# Patient Record
Sex: MALE | Race: WHITE | NOT HISPANIC OR LATINO
[De-identification: names, ages, dates, MRNs, and addresses within clinical notes are randomized per-mention and may not be internally consistent; named-entity substitution may affect disease eponyms.]

---

## 2017-01-04 ENCOUNTER — RX RENEWAL (OUTPATIENT)
Age: 62
End: 2017-01-04

## 2017-01-06 ENCOUNTER — OTHER (OUTPATIENT)
Age: 62
End: 2017-01-06

## 2017-01-23 ENCOUNTER — OTHER (OUTPATIENT)
Age: 62
End: 2017-01-23

## 2017-01-23 ENCOUNTER — MOBILE ON CALL (OUTPATIENT)
Age: 62
End: 2017-01-23

## 2017-01-27 ENCOUNTER — OTHER (OUTPATIENT)
Age: 62
End: 2017-01-27

## 2017-02-06 ENCOUNTER — LABORATORY RESULT (OUTPATIENT)
Age: 62
End: 2017-02-06

## 2017-02-06 ENCOUNTER — APPOINTMENT (OUTPATIENT)
Dept: INTERNAL MEDICINE | Facility: HOSPITAL | Age: 62
End: 2017-02-06

## 2017-02-06 ENCOUNTER — OUTPATIENT (OUTPATIENT)
Dept: OUTPATIENT SERVICES | Facility: HOSPITAL | Age: 62
LOS: 1 days | End: 2017-02-06

## 2017-02-06 ENCOUNTER — RESULT CHARGE (OUTPATIENT)
Age: 62
End: 2017-02-06

## 2017-02-06 ENCOUNTER — MED ADMIN CHARGE (OUTPATIENT)
Age: 62
End: 2017-02-06

## 2017-02-06 VITALS — HEIGHT: 67 IN | BODY MASS INDEX: 32.01 KG/M2 | WEIGHT: 203.92 LBS

## 2017-02-06 DIAGNOSIS — Z87.2 PERSONAL HISTORY OF DISEASES OF THE SKIN AND SUBCUTANEOUS TISSUE: ICD-10-CM

## 2017-02-06 LAB
ALBUMIN SERPL ELPH-MCNC: 4.1 G/DL — SIGNIFICANT CHANGE UP (ref 3.3–5)
ALP SERPL-CCNC: 78 U/L — SIGNIFICANT CHANGE UP (ref 40–120)
ALT FLD-CCNC: 18 U/L — SIGNIFICANT CHANGE UP (ref 4–41)
APPEARANCE UR: CLEAR — SIGNIFICANT CHANGE UP
AST SERPL-CCNC: 14 U/L — SIGNIFICANT CHANGE UP (ref 4–40)
BACTERIA # UR AUTO: SIGNIFICANT CHANGE UP
BASOPHILS # BLD AUTO: 0.07 K/UL — SIGNIFICANT CHANGE UP (ref 0–0.2)
BASOPHILS NFR BLD AUTO: 0.9 % — SIGNIFICANT CHANGE UP (ref 0–2)
BILIRUB SERPL-MCNC: < 0.2 MG/DL — LOW (ref 0.2–1.2)
BILIRUB UR-MCNC: NEGATIVE — SIGNIFICANT CHANGE UP
BLOOD UR QL VISUAL: HIGH
BUN SERPL-MCNC: 15 MG/DL — SIGNIFICANT CHANGE UP (ref 7–23)
CALCIUM SERPL-MCNC: 9.4 MG/DL — SIGNIFICANT CHANGE UP (ref 8.4–10.5)
CHLORIDE SERPL-SCNC: 97 MMOL/L — LOW (ref 98–107)
CO2 SERPL-SCNC: 25 MMOL/L — SIGNIFICANT CHANGE UP (ref 22–31)
COLOR SPEC: YELLOW — SIGNIFICANT CHANGE UP
CREAT SERPL-MCNC: 0.79 MG/DL — SIGNIFICANT CHANGE UP (ref 0.5–1.3)
EOSINOPHIL # BLD AUTO: 0.17 K/UL — SIGNIFICANT CHANGE UP (ref 0–0.5)
EOSINOPHIL NFR BLD AUTO: 2.1 % — SIGNIFICANT CHANGE UP (ref 0–6)
GLUCOSE BLDC GLUCOMTR-MCNC: 154
GLUCOSE SERPL-MCNC: 169 MG/DL — HIGH (ref 70–99)
GLUCOSE UR-MCNC: NEGATIVE — SIGNIFICANT CHANGE UP
HBA1C BLD-MCNC: 7.8 % — HIGH (ref 4–5.6)
HCT VFR BLD CALC: 33.8 % — LOW (ref 39–50)
HGB BLD-MCNC: 11.1 G/DL — LOW (ref 13–17)
HYALINE CASTS # UR AUTO: SIGNIFICANT CHANGE UP (ref 0–?)
IMM GRANULOCYTES NFR BLD AUTO: 0.1 % — SIGNIFICANT CHANGE UP (ref 0–1.5)
KETONES UR-MCNC: NEGATIVE — SIGNIFICANT CHANGE UP
LEUKOCYTE ESTERASE UR-ACNC: SIGNIFICANT CHANGE UP
LYMPHOCYTES # BLD AUTO: 1.56 K/UL — SIGNIFICANT CHANGE UP (ref 1–3.3)
LYMPHOCYTES # BLD AUTO: 19.6 % — SIGNIFICANT CHANGE UP (ref 13–44)
MCHC RBC-ENTMCNC: 29.8 PG — SIGNIFICANT CHANGE UP (ref 27–34)
MCHC RBC-ENTMCNC: 32.8 % — SIGNIFICANT CHANGE UP (ref 32–36)
MCV RBC AUTO: 90.9 FL — SIGNIFICANT CHANGE UP (ref 80–100)
MONOCYTES # BLD AUTO: 0.43 K/UL — SIGNIFICANT CHANGE UP (ref 0–0.9)
MONOCYTES NFR BLD AUTO: 5.4 % — SIGNIFICANT CHANGE UP (ref 2–14)
MUCOUS THREADS # UR AUTO: SIGNIFICANT CHANGE UP
NEUTROPHILS # BLD AUTO: 5.72 K/UL — SIGNIFICANT CHANGE UP (ref 1.8–7.4)
NEUTROPHILS NFR BLD AUTO: 71.9 % — SIGNIFICANT CHANGE UP (ref 43–77)
NITRITE UR-MCNC: NEGATIVE — SIGNIFICANT CHANGE UP
PH UR: 6.5 — SIGNIFICANT CHANGE UP (ref 4.6–8)
PLATELET # BLD AUTO: 227 K/UL — SIGNIFICANT CHANGE UP (ref 150–400)
PMV BLD: 10.5 FL — SIGNIFICANT CHANGE UP (ref 7–13)
POTASSIUM SERPL-MCNC: 5.2 MMOL/L — SIGNIFICANT CHANGE UP (ref 3.5–5.3)
POTASSIUM SERPL-SCNC: 5.2 MMOL/L — SIGNIFICANT CHANGE UP (ref 3.5–5.3)
PROT SERPL-MCNC: 6.7 G/DL — SIGNIFICANT CHANGE UP (ref 6–8.3)
PROT UR-MCNC: 30 — SIGNIFICANT CHANGE UP
RBC # BLD: 3.72 M/UL — LOW (ref 4.2–5.8)
RBC # FLD: 14.4 % — SIGNIFICANT CHANGE UP (ref 10.3–14.5)
RBC CASTS # UR COMP ASSIST: SIGNIFICANT CHANGE UP (ref 0–?)
SODIUM SERPL-SCNC: 135 MMOL/L — SIGNIFICANT CHANGE UP (ref 135–145)
SP GR SPEC: 1.02 — SIGNIFICANT CHANGE UP (ref 1–1.03)
SQUAMOUS # UR AUTO: SIGNIFICANT CHANGE UP
UROBILINOGEN FLD QL: NORMAL E.U. — SIGNIFICANT CHANGE UP (ref 0.1–0.2)
WBC # BLD: 7.96 K/UL — SIGNIFICANT CHANGE UP (ref 3.8–10.5)
WBC # FLD AUTO: 7.96 K/UL — SIGNIFICANT CHANGE UP (ref 3.8–10.5)
WBC UR QL: SIGNIFICANT CHANGE UP (ref 0–?)

## 2017-02-07 VITALS — SYSTOLIC BLOOD PRESSURE: 135 MMHG | DIASTOLIC BLOOD PRESSURE: 70 MMHG

## 2017-02-07 DIAGNOSIS — E11.65 TYPE 2 DIABETES MELLITUS WITH HYPERGLYCEMIA: ICD-10-CM

## 2017-02-08 ENCOUNTER — RX RENEWAL (OUTPATIENT)
Age: 62
End: 2017-02-08

## 2017-02-08 ENCOUNTER — OTHER (OUTPATIENT)
Age: 62
End: 2017-02-08

## 2017-02-08 LAB
M TB TUBERC IFN-G BLD QL: 0 IU/ML — SIGNIFICANT CHANGE UP
M TB TUBERC IFN-G BLD QL: 0.03 IU/ML — SIGNIFICANT CHANGE UP
M TB TUBERC IFN-G BLD QL: NEGATIVE — SIGNIFICANT CHANGE UP
MITOGEN IGNF BCKGRD COR BLD-ACNC: >10 IU/ML — SIGNIFICANT CHANGE UP

## 2017-02-10 DIAGNOSIS — H40.9 UNSPECIFIED GLAUCOMA: ICD-10-CM

## 2017-02-10 DIAGNOSIS — E03.9 HYPOTHYROIDISM, UNSPECIFIED: ICD-10-CM

## 2017-02-10 DIAGNOSIS — F20.9 SCHIZOPHRENIA, UNSPECIFIED: ICD-10-CM

## 2017-02-10 DIAGNOSIS — Z00.00 ENCOUNTER FOR GENERAL ADULT MEDICAL EXAMINATION WITHOUT ABNORMAL FINDINGS: ICD-10-CM

## 2017-02-10 DIAGNOSIS — R56.9 UNSPECIFIED CONVULSIONS: ICD-10-CM

## 2017-02-13 ENCOUNTER — MEDICATION RENEWAL (OUTPATIENT)
Age: 62
End: 2017-02-13

## 2017-02-14 ENCOUNTER — RX RENEWAL (OUTPATIENT)
Age: 62
End: 2017-02-14

## 2017-03-02 ENCOUNTER — APPOINTMENT (OUTPATIENT)
Dept: OPHTHALMOLOGY | Facility: CLINIC | Age: 62
End: 2017-03-02

## 2017-03-02 ENCOUNTER — OUTPATIENT (OUTPATIENT)
Dept: OUTPATIENT SERVICES | Facility: HOSPITAL | Age: 62
LOS: 1 days | End: 2017-03-02

## 2017-03-06 ENCOUNTER — OTHER (OUTPATIENT)
Age: 62
End: 2017-03-06

## 2017-03-09 ENCOUNTER — MEDICATION RENEWAL (OUTPATIENT)
Age: 62
End: 2017-03-09

## 2017-04-11 ENCOUNTER — APPOINTMENT (OUTPATIENT)
Dept: INTERNAL MEDICINE | Facility: HOSPITAL | Age: 62
End: 2017-04-11

## 2017-04-26 ENCOUNTER — OTHER (OUTPATIENT)
Age: 62
End: 2017-04-26

## 2017-05-22 ENCOUNTER — APPOINTMENT (OUTPATIENT)
Dept: INTERNAL MEDICINE | Facility: HOSPITAL | Age: 62
End: 2017-05-22

## 2017-05-22 ENCOUNTER — OUTPATIENT (OUTPATIENT)
Dept: OUTPATIENT SERVICES | Facility: HOSPITAL | Age: 62
LOS: 1 days | End: 2017-05-22

## 2017-05-22 VITALS — HEIGHT: 67 IN | WEIGHT: 207.23 LBS | BODY MASS INDEX: 32.53 KG/M2

## 2017-05-22 VITALS — SYSTOLIC BLOOD PRESSURE: 129 MMHG | DIASTOLIC BLOOD PRESSURE: 67 MMHG

## 2017-05-23 DIAGNOSIS — I10 ESSENTIAL (PRIMARY) HYPERTENSION: ICD-10-CM

## 2017-05-23 DIAGNOSIS — E78.5 HYPERLIPIDEMIA, UNSPECIFIED: ICD-10-CM

## 2017-05-23 DIAGNOSIS — F20.9 SCHIZOPHRENIA, UNSPECIFIED: ICD-10-CM

## 2017-05-23 DIAGNOSIS — E87.1 HYPO-OSMOLALITY AND HYPONATREMIA: ICD-10-CM

## 2017-05-23 DIAGNOSIS — H40.9 UNSPECIFIED GLAUCOMA: ICD-10-CM

## 2017-05-23 DIAGNOSIS — E11.65 TYPE 2 DIABETES MELLITUS WITH HYPERGLYCEMIA: ICD-10-CM

## 2017-05-23 DIAGNOSIS — Z23 ENCOUNTER FOR IMMUNIZATION: ICD-10-CM

## 2017-05-23 DIAGNOSIS — Z00.00 ENCOUNTER FOR GENERAL ADULT MEDICAL EXAMINATION WITHOUT ABNORMAL FINDINGS: ICD-10-CM

## 2017-05-26 ENCOUNTER — OTHER (OUTPATIENT)
Age: 62
End: 2017-05-26

## 2017-05-30 LAB — GLUCOSE BLDC GLUCOMTR-MCNC: 188

## 2017-06-02 ENCOUNTER — OTHER (OUTPATIENT)
Age: 62
End: 2017-06-02

## 2017-06-09 DIAGNOSIS — H40.10X3 UNSPECIFIED OPEN-ANGLE GLAUCOMA, SEVERE STAGE: ICD-10-CM

## 2017-06-20 ENCOUNTER — APPOINTMENT (OUTPATIENT)
Dept: PLASTIC SURGERY | Facility: HOSPITAL | Age: 62
End: 2017-06-20

## 2017-06-20 ENCOUNTER — OUTPATIENT (OUTPATIENT)
Dept: OUTPATIENT SERVICES | Facility: HOSPITAL | Age: 62
LOS: 1 days | End: 2017-06-20

## 2017-06-20 VITALS
BODY MASS INDEX: 32.49 KG/M2 | DIASTOLIC BLOOD PRESSURE: 69 MMHG | SYSTOLIC BLOOD PRESSURE: 129 MMHG | WEIGHT: 207 LBS | HEIGHT: 67 IN | HEART RATE: 71 BPM

## 2017-06-21 DIAGNOSIS — M65.341 TRIGGER FINGER, RIGHT RING FINGER: ICD-10-CM

## 2017-07-03 ENCOUNTER — EMERGENCY (EMERGENCY)
Facility: HOSPITAL | Age: 62
LOS: 1 days | Discharge: HOME | End: 2017-07-03
Attending: EMERGENCY MEDICINE | Admitting: EMERGENCY MEDICINE
Payer: MEDICAID

## 2017-07-03 ENCOUNTER — APPOINTMENT (OUTPATIENT)
Dept: OPHTHALMOLOGY | Facility: CLINIC | Age: 62
End: 2017-07-03

## 2017-07-03 VITALS
HEART RATE: 86 BPM | DIASTOLIC BLOOD PRESSURE: 63 MMHG | RESPIRATION RATE: 16 BRPM | SYSTOLIC BLOOD PRESSURE: 130 MMHG | OXYGEN SATURATION: 98 %

## 2017-07-03 VITALS
SYSTOLIC BLOOD PRESSURE: 98 MMHG | RESPIRATION RATE: 18 BRPM | DIASTOLIC BLOOD PRESSURE: 60 MMHG | TEMPERATURE: 98 F | HEART RATE: 96 BPM | OXYGEN SATURATION: 98 %

## 2017-07-03 LAB
ALBUMIN SERPL ELPH-MCNC: 3.6 G/DL — SIGNIFICANT CHANGE UP (ref 3.3–5)
ALP SERPL-CCNC: 52 U/L — SIGNIFICANT CHANGE UP (ref 40–120)
ALT FLD-CCNC: 12 U/L — SIGNIFICANT CHANGE UP (ref 4–41)
APPEARANCE UR: CLEAR — SIGNIFICANT CHANGE UP
AST SERPL-CCNC: 14 U/L — SIGNIFICANT CHANGE UP (ref 4–40)
BASE EXCESS BLDV CALC-SCNC: -3.4 MMOL/L — SIGNIFICANT CHANGE UP
BASOPHILS # BLD AUTO: 0.01 K/UL — SIGNIFICANT CHANGE UP (ref 0–0.2)
BASOPHILS NFR BLD AUTO: 0.2 % — SIGNIFICANT CHANGE UP (ref 0–2)
BASOPHILS NFR SPEC: 0 % — SIGNIFICANT CHANGE UP (ref 0–2)
BILIRUB SERPL-MCNC: 0.2 MG/DL — SIGNIFICANT CHANGE UP (ref 0.2–1.2)
BILIRUB UR-MCNC: NEGATIVE — SIGNIFICANT CHANGE UP
BLOOD GAS VENOUS - CREATININE: 1.05 MG/DL — SIGNIFICANT CHANGE UP (ref 0.5–1.3)
BLOOD UR QL VISUAL: NEGATIVE — SIGNIFICANT CHANGE UP
BUN SERPL-MCNC: 30 MG/DL — HIGH (ref 7–23)
CALCIUM SERPL-MCNC: 7.3 MG/DL — LOW (ref 8.4–10.5)
CHLORIDE BLDV-SCNC: 104 MMOL/L — SIGNIFICANT CHANGE UP (ref 96–108)
CHLORIDE SERPL-SCNC: 97 MMOL/L — LOW (ref 98–107)
CO2 SERPL-SCNC: 19 MMOL/L — LOW (ref 22–31)
COLOR SPEC: SIGNIFICANT CHANGE UP
CREAT SERPL-MCNC: 0.98 MG/DL — SIGNIFICANT CHANGE UP (ref 0.5–1.3)
EOSINOPHIL # BLD AUTO: 0.25 K/UL — SIGNIFICANT CHANGE UP (ref 0–0.5)
EOSINOPHIL NFR BLD AUTO: 4.9 % — SIGNIFICANT CHANGE UP (ref 0–6)
EOSINOPHIL NFR FLD: 4 % — SIGNIFICANT CHANGE UP (ref 0–6)
GAS PNL BLDV: 128 MMOL/L — LOW (ref 136–146)
GLUCOSE BLDV-MCNC: 133 — HIGH (ref 70–99)
GLUCOSE SERPL-MCNC: 154 MG/DL — HIGH (ref 70–99)
GLUCOSE UR-MCNC: NEGATIVE — SIGNIFICANT CHANGE UP
HCO3 BLDV-SCNC: 20 MMOL/L — SIGNIFICANT CHANGE UP (ref 20–27)
HCT VFR BLD CALC: 33.3 % — LOW (ref 39–50)
HCT VFR BLDV CALC: 31.5 % — LOW (ref 39–51)
HGB BLD-MCNC: 10.9 G/DL — LOW (ref 13–17)
HGB BLDV-MCNC: 10.2 G/DL — LOW (ref 13–17)
IMM GRANULOCYTES # BLD AUTO: 0.02 # — SIGNIFICANT CHANGE UP
IMM GRANULOCYTES NFR BLD AUTO: 0.4 % — SIGNIFICANT CHANGE UP (ref 0–1.5)
KETONES UR-MCNC: NEGATIVE — SIGNIFICANT CHANGE UP
LACTATE BLDV-MCNC: 2.4 MMOL/L — HIGH (ref 0.5–2)
LEUKOCYTE ESTERASE UR-ACNC: NEGATIVE — SIGNIFICANT CHANGE UP
LYMPHOCYTES # BLD AUTO: 1.12 K/UL — SIGNIFICANT CHANGE UP (ref 1–3.3)
LYMPHOCYTES # BLD AUTO: 22.1 % — SIGNIFICANT CHANGE UP (ref 13–44)
LYMPHOCYTES NFR SPEC AUTO: 18 % — SIGNIFICANT CHANGE UP (ref 13–44)
MANUAL SMEAR VERIFICATION: YES — SIGNIFICANT CHANGE UP
MCHC RBC-ENTMCNC: 29.8 PG — SIGNIFICANT CHANGE UP (ref 27–34)
MCHC RBC-ENTMCNC: 32.7 % — SIGNIFICANT CHANGE UP (ref 32–36)
MCV RBC AUTO: 91 FL — SIGNIFICANT CHANGE UP (ref 80–100)
METAMYELOCYTES # FLD: 1 % — SIGNIFICANT CHANGE UP (ref 0–1)
MONOCYTES # BLD AUTO: 0.94 K/UL — HIGH (ref 0–0.9)
MONOCYTES NFR BLD AUTO: 18.5 % — HIGH (ref 2–14)
MONOCYTES NFR BLD: 10 % — HIGH (ref 2–9)
NEUTROPHIL AB SER-ACNC: 58 % — SIGNIFICANT CHANGE UP (ref 43–77)
NEUTROPHILS # BLD AUTO: 2.73 K/UL — SIGNIFICANT CHANGE UP (ref 1.8–7.4)
NEUTROPHILS NFR BLD AUTO: 53.9 % — SIGNIFICANT CHANGE UP (ref 43–77)
NEUTS BAND # BLD: 9 % — HIGH (ref 0–6)
NITRITE UR-MCNC: NEGATIVE — SIGNIFICANT CHANGE UP
NON-SQ EPI CELLS # UR AUTO: <1 — SIGNIFICANT CHANGE UP
NRBC # FLD: 0 — SIGNIFICANT CHANGE UP
PCO2 BLDV: 43 MMHG — SIGNIFICANT CHANGE UP (ref 41–51)
PH BLDV: 7.32 PH — SIGNIFICANT CHANGE UP (ref 7.32–7.43)
PH UR: 6 — SIGNIFICANT CHANGE UP (ref 4.6–8)
PLATELET # BLD AUTO: 176 K/UL — SIGNIFICANT CHANGE UP (ref 150–400)
PLATELET COUNT - ESTIMATE: NORMAL — SIGNIFICANT CHANGE UP
PMV BLD: 10.2 FL — SIGNIFICANT CHANGE UP (ref 7–13)
PO2 BLDV: < 24 MMHG — LOW (ref 35–40)
POTASSIUM BLDV-SCNC: 4.3 MMOL/L — SIGNIFICANT CHANGE UP (ref 3.4–4.5)
POTASSIUM SERPL-MCNC: 4.6 MMOL/L — SIGNIFICANT CHANGE UP (ref 3.5–5.3)
POTASSIUM SERPL-SCNC: 4.6 MMOL/L — SIGNIFICANT CHANGE UP (ref 3.5–5.3)
PROT SERPL-MCNC: 6.3 G/DL — SIGNIFICANT CHANGE UP (ref 6–8.3)
PROT UR-MCNC: NEGATIVE — SIGNIFICANT CHANGE UP
RBC # BLD: 3.66 M/UL — LOW (ref 4.2–5.8)
RBC # FLD: 14.1 % — SIGNIFICANT CHANGE UP (ref 10.3–14.5)
RBC CASTS # UR COMP ASSIST: SIGNIFICANT CHANGE UP (ref 0–?)
SAO2 % BLDV: 25.4 % — LOW (ref 60–85)
SODIUM SERPL-SCNC: 132 MMOL/L — LOW (ref 135–145)
SP GR SPEC: 1.01 — SIGNIFICANT CHANGE UP (ref 1–1.03)
T4 FREE SERPL-MCNC: 1.02 NG/DL — SIGNIFICANT CHANGE UP (ref 0.9–1.8)
TSH SERPL-MCNC: 6 UIU/ML — HIGH (ref 0.27–4.2)
UROBILINOGEN FLD QL: NORMAL E.U. — SIGNIFICANT CHANGE UP (ref 0.1–0.2)
WBC # BLD: 5.07 K/UL — SIGNIFICANT CHANGE UP (ref 3.8–10.5)
WBC # FLD AUTO: 5.07 K/UL — SIGNIFICANT CHANGE UP (ref 3.8–10.5)
WBC UR QL: SIGNIFICANT CHANGE UP (ref 0–?)

## 2017-07-03 PROCEDURE — 93010 ELECTROCARDIOGRAM REPORT: CPT

## 2017-07-03 PROCEDURE — 99285 EMERGENCY DEPT VISIT HI MDM: CPT | Mod: 25

## 2017-07-03 RX ORDER — METOCLOPRAMIDE HCL 10 MG
10 TABLET ORAL ONCE
Qty: 0 | Refills: 0 | Status: COMPLETED | OUTPATIENT
Start: 2017-07-03 | End: 2017-07-03

## 2017-07-03 RX ORDER — SODIUM CHLORIDE 9 MG/ML
1000 INJECTION INTRAMUSCULAR; INTRAVENOUS; SUBCUTANEOUS ONCE
Qty: 0 | Refills: 0 | Status: COMPLETED | OUTPATIENT
Start: 2017-07-03 | End: 2017-07-03

## 2017-07-03 RX ORDER — ONDANSETRON 8 MG/1
4 TABLET, FILM COATED ORAL ONCE
Qty: 0 | Refills: 0 | Status: COMPLETED | OUTPATIENT
Start: 2017-07-03 | End: 2017-07-03

## 2017-07-03 RX ADMIN — ONDANSETRON 4 MILLIGRAM(S): 8 TABLET, FILM COATED ORAL at 16:05

## 2017-07-03 RX ADMIN — SODIUM CHLORIDE 1000 MILLILITER(S): 9 INJECTION INTRAMUSCULAR; INTRAVENOUS; SUBCUTANEOUS at 16:06

## 2017-07-03 NOTE — ED PROVIDER NOTE - PMH
Bipolar disorder    Diabetes    Glaucoma    Hypertension    Hypothyroid    Schizophrenia    Seborrheic dermatitis

## 2017-07-03 NOTE — ED ADULT TRIAGE NOTE - CHIEF COMPLAINT QUOTE
pt c/o generalized weakness, nausea, vomiting, and decreased appetite since Saturday. pt reports having diarrhea since last night. denies any fevers, chills or any additional symptoms. FS: 152  PMHX: dm

## 2017-07-03 NOTE — ED ADULT NURSE NOTE - OBJECTIVE STATEMENT
to ed room 20. changed into gown, c/o nausea and vomiting on Sat.  decreased appetite. iv start 20g right ac. blood drawn. provided urinal.

## 2017-07-03 NOTE — ED PROVIDER NOTE - OBJECTIVE STATEMENT
62yo male with h/o DM, htn, hypothyroid, schizophrenia p/w 2 days of nausea, retching as well as watery, nonbloody diarrhea since last night. No abdominal pain, no fevers, chills. Also with decreased PO and weakness. No dysuria. no chest pain, no sob. NO sick contacts 60yo male with h/o DM, htn, hypothyroid, schizophrenia p/w 2 days of nausea, retching as well as watery, nonbloody diarrhea since last night. No abdominal pain, no fevers, chills. Also with decreased PO and weakness. No dysuria. no chest pain, no sob. NO sick contacts.  Steele att: 61M h/o htn, dm, hypothyroid, schizophrenia c/o nausea, vomiting, diarrhea x 2 days. Past two days patient nausea, 5-6 nonbloody emesis, 5-6 nonbloody loose stools, neg abd pain. 62yo male with h/o DM, htn, hypothyroid, schizophrenia p/w 2 days of nausea, retching as well as watery, nonbloody diarrhea since last night. No abdominal pain, no fevers, chills. Also with decreased PO and weakness. No dysuria. no chest pain, no sob. NO sick contacts.    Ivette att: 61M h/o htn, dm, hypothyroid, schizophrenia c/o nausea, vomiting, diarrhea x 2 days. Past two days patient nausea, 5-6 nonbloody emesis, 5-6 nonbloody loose stools, neg abd pain. Gregory po, drinking fluids. Of note patient has baseline hyponatremia, takes 2 sodium tabs a day, has been tolerating his salt tabs. Rec'd zofran in ED with some relief, has some persistent nausea.

## 2017-07-03 NOTE — ED PROVIDER NOTE - PROGRESS NOTE DETAILS
Ivette att: vss, k nl, na nl, lactate 2.2, patient rec'd 1L normal saline by iv. Plan reglan, reassess nausea. Discussed with patient either gastroenteritis or food poisoning. Important to rehydrate with pedialyte or gatorade, c/w salt tabs. IMproved, tolerating PO, waiting UA and then can dc

## 2017-07-03 NOTE — ED PROVIDER NOTE - CARE PLAN
Principal Discharge DX:	Gastroenteritis  Instructions for follow-up, activity and diet:	Seen in ER for abdominal pain, acute gastroenteritis versus stomach poisoning. Take Tylenol as directed every 4-6 hours as needed for pain. Hydrate well. Advance slowly to your regular diet. See your primary doctor within 3-5 days of discharge for close follow-up. If you develop any new or worsening symptoms, return to ER.

## 2017-07-03 NOTE — ED PROVIDER NOTE - MEDICAL DECISION MAKING DETAILS
62yo wit nausea and diarrhea, no abdominal pain, abd soft nontender, vss, will hydrate, labs, zofran, will check vbg given metformin and thryoid

## 2017-07-03 NOTE — ED PROVIDER NOTE - ATTENDING CONTRIBUTION TO CARE
Dr. Steele: I have personally seen and examined this patient at the bedside. I have fully participated in the care of this patient. I have reviewed all pertinent clinical information, including history, physical exam, plan and the Resident's note and agree except as noted. HPI above as by me. PE above as by me. DDX PLAN  N/V/D X 2 DAYS. vss. vss, k nl, na nl, lactate 2.2, patient rec'd 1L normal saline by iv. Plan reglan, reassess nausea. Discussed with patient either gastroenteritis or food poisoning. Important to rehydrate with pedialyte or gatorade, c/w salt tabs.

## 2017-07-03 NOTE — ED PROVIDER NOTE - PLAN OF CARE
Seen in ER for abdominal pain, acute gastroenteritis versus stomach poisoning. Take Tylenol as directed every 4-6 hours as needed for pain. Hydrate well. Advance slowly to your regular diet. See your primary doctor within 3-5 days of discharge for close follow-up. If you develop any new or worsening symptoms, return to ER.

## 2017-07-05 LAB
BACTERIA UR CULT: SIGNIFICANT CHANGE UP
SPECIMEN SOURCE: SIGNIFICANT CHANGE UP

## 2017-07-07 ENCOUNTER — OTHER (OUTPATIENT)
Age: 62
End: 2017-07-07

## 2017-07-12 ENCOUNTER — RESULT REVIEW (OUTPATIENT)
Age: 62
End: 2017-07-12

## 2017-07-12 ENCOUNTER — LABORATORY RESULT (OUTPATIENT)
Age: 62
End: 2017-07-12

## 2017-07-12 ENCOUNTER — OUTPATIENT (OUTPATIENT)
Dept: OUTPATIENT SERVICES | Facility: HOSPITAL | Age: 62
LOS: 1 days | End: 2017-07-12

## 2017-07-12 ENCOUNTER — APPOINTMENT (OUTPATIENT)
Dept: INTERNAL MEDICINE | Facility: HOSPITAL | Age: 62
End: 2017-07-12

## 2017-07-12 VITALS — BODY MASS INDEX: 32.33 KG/M2 | WEIGHT: 206 LBS | HEIGHT: 67 IN

## 2017-07-12 LAB — TSH SERPL-MCNC: 2.67 UIU/ML — SIGNIFICANT CHANGE UP (ref 0.27–4.2)

## 2017-07-13 DIAGNOSIS — L23.9 ALLERGIC CONTACT DERMATITIS, UNSPECIFIED CAUSE: ICD-10-CM

## 2017-07-13 DIAGNOSIS — E11.65 TYPE 2 DIABETES MELLITUS WITH HYPERGLYCEMIA: ICD-10-CM

## 2017-07-13 LAB
CREAT UR-MCNC: 116 MG/DL — SIGNIFICANT CHANGE UP
GLUCOSE BLDC GLUCOMTR-MCNC: 198
MICROALBUMIN UR-MCNC: 7.2 MG/DL — SIGNIFICANT CHANGE UP
MICROALBUMIN/CREAT UR-RTO: 62 MG/G — HIGH (ref 0–30)

## 2017-07-17 ENCOUNTER — OUTPATIENT (OUTPATIENT)
Dept: OUTPATIENT SERVICES | Facility: HOSPITAL | Age: 62
LOS: 1 days | End: 2017-07-17

## 2017-07-17 ENCOUNTER — LABORATORY RESULT (OUTPATIENT)
Age: 62
End: 2017-07-17

## 2017-07-17 ENCOUNTER — APPOINTMENT (OUTPATIENT)
Dept: OTOLARYNGOLOGY | Facility: CLINIC | Age: 62
End: 2017-07-17

## 2017-07-17 ENCOUNTER — OUTPATIENT (OUTPATIENT)
Dept: OUTPATIENT SERVICES | Facility: HOSPITAL | Age: 62
LOS: 1 days | Discharge: HOME | End: 2017-07-17

## 2017-07-17 ENCOUNTER — APPOINTMENT (OUTPATIENT)
Dept: INTERNAL MEDICINE | Facility: HOSPITAL | Age: 62
End: 2017-07-17

## 2017-07-17 VITALS — BODY MASS INDEX: 32.18 KG/M2 | WEIGHT: 205 LBS | HEIGHT: 67 IN

## 2017-07-17 VITALS — WEIGHT: 198 LBS | HEIGHT: 67 IN | BODY MASS INDEX: 31.08 KG/M2

## 2017-07-17 VITALS — HEART RATE: 80 BPM | SYSTOLIC BLOOD PRESSURE: 124 MMHG | DIASTOLIC BLOOD PRESSURE: 72 MMHG

## 2017-07-17 DIAGNOSIS — M65.341 TRIGGER FINGER, RIGHT RING FINGER: ICD-10-CM

## 2017-07-17 DIAGNOSIS — L03.119 CELLULITIS OF UNSPECIFIED PART OF LIMB: ICD-10-CM

## 2017-07-17 DIAGNOSIS — M65.321 TRIGGER FINGER, RIGHT INDEX FINGER: ICD-10-CM

## 2017-07-17 DIAGNOSIS — S92.414D NONDISPLACED FRACTURE OF PROXIMAL PHALANX OF RIGHT GREAT TOE, SUBSEQUENT ENCOUNTER FOR FRACTURE WITH ROUTINE HEALING: ICD-10-CM

## 2017-07-17 LAB
ALBUMIN SERPL ELPH-MCNC: 3.3 G/DL — SIGNIFICANT CHANGE UP (ref 3.3–5)
ALP SERPL-CCNC: 47 U/L — SIGNIFICANT CHANGE UP (ref 40–120)
ALT FLD-CCNC: 13 U/L — SIGNIFICANT CHANGE UP (ref 4–41)
AST SERPL-CCNC: 17 U/L — SIGNIFICANT CHANGE UP (ref 4–40)
BILIRUB SERPL-MCNC: 0.3 MG/DL — SIGNIFICANT CHANGE UP (ref 0.2–1.2)
BUN SERPL-MCNC: 42 MG/DL — HIGH (ref 7–23)
CALCIUM SERPL-MCNC: 7.1 MG/DL — LOW (ref 8.4–10.5)
CHLORIDE SERPL-SCNC: 98 MMOL/L — SIGNIFICANT CHANGE UP (ref 98–107)
CO2 SERPL-SCNC: 11 MMOL/L — LOW (ref 22–31)
CREAT SERPL-MCNC: 1.28 MG/DL — SIGNIFICANT CHANGE UP (ref 0.5–1.3)
GLUCOSE SERPL-MCNC: 119 MG/DL — HIGH (ref 70–99)
POTASSIUM SERPL-MCNC: 5 MMOL/L — SIGNIFICANT CHANGE UP (ref 3.5–5.3)
POTASSIUM SERPL-SCNC: 5 MMOL/L — SIGNIFICANT CHANGE UP (ref 3.5–5.3)
PROT SERPL-MCNC: 6 G/DL — SIGNIFICANT CHANGE UP (ref 6–8.3)
SODIUM SERPL-SCNC: 130 MMOL/L — LOW (ref 135–145)
VALPROATE SERPL-MCNC: 43.1 UG/ML — LOW (ref 50–100)

## 2017-07-18 DIAGNOSIS — R11.10 VOMITING, UNSPECIFIED: ICD-10-CM

## 2017-07-18 DIAGNOSIS — E11.65 TYPE 2 DIABETES MELLITUS WITH HYPERGLYCEMIA: ICD-10-CM

## 2017-07-19 ENCOUNTER — INPATIENT (INPATIENT)
Facility: HOSPITAL | Age: 62
LOS: 4 days | Discharge: HOME | End: 2017-07-24
Attending: SURGERY | Admitting: SURGERY
Payer: MEDICAID

## 2017-07-19 VITALS
RESPIRATION RATE: 18 BRPM | DIASTOLIC BLOOD PRESSURE: 64 MMHG | HEART RATE: 105 BPM | OXYGEN SATURATION: 97 % | SYSTOLIC BLOOD PRESSURE: 111 MMHG | TEMPERATURE: 98 F

## 2017-07-19 LAB
ALBUMIN SERPL ELPH-MCNC: 3.2 G/DL — LOW (ref 3.3–5)
ALP SERPL-CCNC: 43 U/L — SIGNIFICANT CHANGE UP (ref 40–120)
ALT FLD-CCNC: 8 U/L — SIGNIFICANT CHANGE UP (ref 4–41)
APPEARANCE UR: CLEAR — SIGNIFICANT CHANGE UP
AST SERPL-CCNC: 11 U/L — SIGNIFICANT CHANGE UP (ref 4–40)
BASE EXCESS BLDV CALC-SCNC: -1.1 MMOL/L — SIGNIFICANT CHANGE UP
BASOPHILS # BLD AUTO: 0.01 K/UL — SIGNIFICANT CHANGE UP (ref 0–0.2)
BASOPHILS NFR BLD AUTO: 0.2 % — SIGNIFICANT CHANGE UP (ref 0–2)
BILIRUB SERPL-MCNC: 0.2 MG/DL — SIGNIFICANT CHANGE UP (ref 0.2–1.2)
BILIRUB UR-MCNC: NEGATIVE — SIGNIFICANT CHANGE UP
BLOOD GAS VENOUS - CREATININE: 0.94 MG/DL — SIGNIFICANT CHANGE UP (ref 0.5–1.3)
BLOOD UR QL VISUAL: NEGATIVE — SIGNIFICANT CHANGE UP
BUN SERPL-MCNC: 26 MG/DL — HIGH (ref 7–23)
CALCIUM SERPL-MCNC: 7.7 MG/DL — LOW (ref 8.4–10.5)
CHLORIDE BLDV-SCNC: 101 MMOL/L — SIGNIFICANT CHANGE UP (ref 96–108)
CHLORIDE SERPL-SCNC: 98 MMOL/L — SIGNIFICANT CHANGE UP (ref 98–107)
CO2 SERPL-SCNC: 22 MMOL/L — SIGNIFICANT CHANGE UP (ref 22–31)
COLOR SPEC: YELLOW — SIGNIFICANT CHANGE UP
CREAT SERPL-MCNC: 0.87 MG/DL — SIGNIFICANT CHANGE UP (ref 0.5–1.3)
EOSINOPHIL # BLD AUTO: 0.18 K/UL — SIGNIFICANT CHANGE UP (ref 0–0.5)
EOSINOPHIL NFR BLD AUTO: 3.6 % — SIGNIFICANT CHANGE UP (ref 0–6)
GAS PNL BLDV: 129 MMOL/L — LOW (ref 136–146)
GLUCOSE BLDV-MCNC: 90 — SIGNIFICANT CHANGE UP (ref 70–99)
GLUCOSE SERPL-MCNC: 89 MG/DL — SIGNIFICANT CHANGE UP (ref 70–99)
GLUCOSE UR-MCNC: NEGATIVE — SIGNIFICANT CHANGE UP
HCO3 BLDV-SCNC: 23 MMOL/L — SIGNIFICANT CHANGE UP (ref 20–27)
HCT VFR BLD CALC: 29.8 % — LOW (ref 39–50)
HCT VFR BLDV CALC: 32.8 % — LOW (ref 39–51)
HGB BLD-MCNC: 10.2 G/DL — LOW (ref 13–17)
HGB BLDV-MCNC: 10.6 G/DL — LOW (ref 13–17)
IMM GRANULOCYTES # BLD AUTO: 0.03 # — SIGNIFICANT CHANGE UP
IMM GRANULOCYTES NFR BLD AUTO: 0.6 % — SIGNIFICANT CHANGE UP (ref 0–1.5)
KETONES UR-MCNC: SIGNIFICANT CHANGE UP
LACTATE BLDV-MCNC: 1.4 MMOL/L — SIGNIFICANT CHANGE UP (ref 0.5–2)
LEUKOCYTE ESTERASE UR-ACNC: NEGATIVE — SIGNIFICANT CHANGE UP
LIDOCAIN IGE QN: 40.2 U/L — SIGNIFICANT CHANGE UP (ref 7–60)
LYMPHOCYTES # BLD AUTO: 0.97 K/UL — LOW (ref 1–3.3)
LYMPHOCYTES # BLD AUTO: 19.3 % — SIGNIFICANT CHANGE UP (ref 13–44)
MCHC RBC-ENTMCNC: 30.7 PG — SIGNIFICANT CHANGE UP (ref 27–34)
MCHC RBC-ENTMCNC: 34.2 % — SIGNIFICANT CHANGE UP (ref 32–36)
MCV RBC AUTO: 89.8 FL — SIGNIFICANT CHANGE UP (ref 80–100)
MONOCYTES # BLD AUTO: 0.62 K/UL — SIGNIFICANT CHANGE UP (ref 0–0.9)
MONOCYTES NFR BLD AUTO: 12.4 % — SIGNIFICANT CHANGE UP (ref 2–14)
MUCOUS THREADS # UR AUTO: SIGNIFICANT CHANGE UP
NEUTROPHILS # BLD AUTO: 3.21 K/UL — SIGNIFICANT CHANGE UP (ref 1.8–7.4)
NEUTROPHILS NFR BLD AUTO: 63.9 % — SIGNIFICANT CHANGE UP (ref 43–77)
NITRITE UR-MCNC: NEGATIVE — SIGNIFICANT CHANGE UP
NRBC # FLD: 0 — SIGNIFICANT CHANGE UP
OB PNL STL: NEGATIVE — SIGNIFICANT CHANGE UP
PCO2 BLDV: 45 MMHG — SIGNIFICANT CHANGE UP (ref 41–51)
PH BLDV: 7.35 PH — SIGNIFICANT CHANGE UP (ref 7.32–7.43)
PH UR: 6 — SIGNIFICANT CHANGE UP (ref 4.6–8)
PLATELET # BLD AUTO: 179 K/UL — SIGNIFICANT CHANGE UP (ref 150–400)
PMV BLD: 10.8 FL — SIGNIFICANT CHANGE UP (ref 7–13)
PO2 BLDV: 33 MMHG — LOW (ref 35–40)
POTASSIUM BLDV-SCNC: 4 MMOL/L — SIGNIFICANT CHANGE UP (ref 3.4–4.5)
POTASSIUM SERPL-MCNC: 4.2 MMOL/L — SIGNIFICANT CHANGE UP (ref 3.5–5.3)
POTASSIUM SERPL-SCNC: 4.2 MMOL/L — SIGNIFICANT CHANGE UP (ref 3.5–5.3)
PROT SERPL-MCNC: 5.9 G/DL — LOW (ref 6–8.3)
PROT UR-MCNC: 30 — SIGNIFICANT CHANGE UP
RBC # BLD: 3.32 M/UL — LOW (ref 4.2–5.8)
RBC # FLD: 14.4 % — SIGNIFICANT CHANGE UP (ref 10.3–14.5)
RBC CASTS # UR COMP ASSIST: SIGNIFICANT CHANGE UP (ref 0–?)
SAO2 % BLDV: 50.3 % — LOW (ref 60–85)
SODIUM SERPL-SCNC: 132 MMOL/L — LOW (ref 135–145)
SP GR SPEC: 1.02 — SIGNIFICANT CHANGE UP (ref 1–1.03)
SQUAMOUS # UR AUTO: SIGNIFICANT CHANGE UP
UROBILINOGEN FLD QL: NORMAL E.U. — SIGNIFICANT CHANGE UP (ref 0.1–0.2)
WBC # BLD: 5.02 K/UL — SIGNIFICANT CHANGE UP (ref 3.8–10.5)
WBC # FLD AUTO: 5.02 K/UL — SIGNIFICANT CHANGE UP (ref 3.8–10.5)
WBC UR QL: SIGNIFICANT CHANGE UP (ref 0–?)

## 2017-07-19 PROCEDURE — 74177 CT ABD & PELVIS W/CONTRAST: CPT | Mod: 26

## 2017-07-19 RX ORDER — ACETAMINOPHEN 500 MG
650 TABLET ORAL ONCE
Qty: 0 | Refills: 0 | Status: COMPLETED | OUTPATIENT
Start: 2017-07-19 | End: 2017-07-19

## 2017-07-19 RX ORDER — METOCLOPRAMIDE HCL 10 MG
10 TABLET ORAL ONCE
Qty: 0 | Refills: 0 | Status: COMPLETED | OUTPATIENT
Start: 2017-07-19 | End: 2017-07-19

## 2017-07-19 RX ORDER — SODIUM CHLORIDE 9 MG/ML
1500 INJECTION INTRAMUSCULAR; INTRAVENOUS; SUBCUTANEOUS ONCE
Qty: 0 | Refills: 0 | Status: COMPLETED | OUTPATIENT
Start: 2017-07-19 | End: 2017-07-19

## 2017-07-19 RX ORDER — FAMOTIDINE 10 MG/ML
20 INJECTION INTRAVENOUS ONCE
Qty: 0 | Refills: 0 | Status: COMPLETED | OUTPATIENT
Start: 2017-07-19 | End: 2017-07-19

## 2017-07-19 RX ADMIN — FAMOTIDINE 20 MILLIGRAM(S): 10 INJECTION INTRAVENOUS at 19:13

## 2017-07-19 RX ADMIN — Medication 650 MILLIGRAM(S): at 19:13

## 2017-07-19 RX ADMIN — Medication 10 MILLIGRAM(S): at 19:13

## 2017-07-19 RX ADMIN — Medication 1 ENEMA: at 19:50

## 2017-07-19 RX ADMIN — Medication 30 MILLILITER(S): at 19:13

## 2017-07-19 RX ADMIN — SODIUM CHLORIDE 1500 MILLILITER(S): 9 INJECTION INTRAMUSCULAR; INTRAVENOUS; SUBCUTANEOUS at 19:13

## 2017-07-19 NOTE — ED PROVIDER NOTE - ATTENDING CONTRIBUTION TO CARE
Pt was seen and evaluated by me. Pt states having some abd pain over the past 4 days having abd pain with nausea. Pt was at [place] and had a CT that showed abdominal hernias but no entrapment or incarceration. Pt states since he was told to take in clears but did eat solids with some worsening nausea. Pt denies any fever, chills, SOB, chest pain, or diarrhea. Lungs CTA b/l. RRR. Abd soft, non-tender.

## 2017-07-19 NOTE — ED PROVIDER NOTE - MEDICAL DECISION MAKING DETAILS
62 y/o male with abdominal hernia with nausea and abd discomfort. Concern for hernia/constipation. Labs, IVF, UA. 60 y/o male with abdominal hernia with nausea and abd discomfort. Concern for hernia/constipation. Labs, IVF, UA.  drea: ddx: pancreatitis vs gastritis vs reducibel abdominal hernia

## 2017-07-19 NOTE — ED ADULT TRIAGE NOTE - CHIEF COMPLAINT QUOTE
patient was at [facility] from adult day care program on sunday for nausea and vomiting, dx with hernia and sent home. patient presents today with same symptoms of nausea and vomiting, also reports high blood sugar. Patient states he did not understand his instructions of clear liquids and ate solid food. pt c/o generalized abd pain and nausea.

## 2017-07-19 NOTE — ED PROVIDER NOTE - OBJECTIVE STATEMENT
62 y/o male with PMHx of DM, HTN, Hypothyroidism, and abd hernia presents to ED for abd pain X 4 days. Pt states having some abd pain with nausea and was seen at [facility] where he had a CT that showed a hernia but no entrapment of obstruction. Pt was told to stick to clears and advance as tolerated and had solids yesterday and started to have some increased nausea and abd pain. Denies any fever, chills, SOB, chest pain, or diarrhea. Last BM yesterday. 60 y/o male with PMHx of DM, HTN, Hypothyroidism, and abd hernia presents to ED for abd pain X 4 days. Pt states having some abd pain with nausea and was seen at [facility] where he had a CT that showed a hernia but no entrapment or obstruction. Pt was told to stick to clears and advance as tolerated and had solids yesterday and started to have some increased nausea and abd pain. Denies any fever, chills, SOB, chest pain, or diarrhea. Last BM yesterday.  drea: 60yo M pmh htn, dm, bipolar d/o, recently dx w/ non incarcerated abdominal hernia p/w weakness & abdominal pain for past 4 days. pt also c/o of n/v, last on monday. denies any cp, obstipation, f/c, diarrhea

## 2017-07-19 NOTE — ED ADULT NURSE REASSESSMENT NOTE - NS ED NURSE REASSESS COMMENT FT1
Received on stretcher in 23 with brother at bedside. Awake, ambulatory without assistance, A&Ox4, NAD observed, respirations even and unlabored on room air. Enema administered earlier, pt had BM, reports feeling better and med administration from earlier and BM. VS recorded. Comfort and safety measures provided. Call bell within reach. Awaiting CT scan.

## 2017-07-20 DIAGNOSIS — K56.69 OTHER INTESTINAL OBSTRUCTION: ICD-10-CM

## 2017-07-20 DIAGNOSIS — F20.9 SCHIZOPHRENIA, UNSPECIFIED: ICD-10-CM

## 2017-07-20 LAB
BUN SERPL-MCNC: 9 MG/DL — SIGNIFICANT CHANGE UP (ref 7–23)
CALCIUM SERPL-MCNC: 8 MG/DL — LOW (ref 8.4–10.5)
CHLORIDE SERPL-SCNC: 100 MMOL/L — SIGNIFICANT CHANGE UP (ref 98–107)
CO2 SERPL-SCNC: 25 MMOL/L — SIGNIFICANT CHANGE UP (ref 22–31)
CREAT SERPL-MCNC: 0.64 MG/DL — SIGNIFICANT CHANGE UP (ref 0.5–1.3)
GLUCOSE SERPL-MCNC: 134 MG/DL — HIGH (ref 70–99)
HCT VFR BLD CALC: 29.2 % — LOW (ref 39–50)
HGB BLD-MCNC: 9.8 G/DL — LOW (ref 13–17)
MAGNESIUM SERPL-MCNC: 1.6 MG/DL — SIGNIFICANT CHANGE UP (ref 1.6–2.6)
MCHC RBC-ENTMCNC: 29.7 PG — SIGNIFICANT CHANGE UP (ref 27–34)
MCHC RBC-ENTMCNC: 33.6 % — SIGNIFICANT CHANGE UP (ref 32–36)
MCV RBC AUTO: 88.5 FL — SIGNIFICANT CHANGE UP (ref 80–100)
NRBC # FLD: 0 — SIGNIFICANT CHANGE UP
PHOSPHATE SERPL-MCNC: 1.9 MG/DL — LOW (ref 2.5–4.5)
PLATELET # BLD AUTO: 191 K/UL — SIGNIFICANT CHANGE UP (ref 150–400)
PMV BLD: 10.1 FL — SIGNIFICANT CHANGE UP (ref 7–13)
POTASSIUM SERPL-MCNC: 4.3 MMOL/L — SIGNIFICANT CHANGE UP (ref 3.5–5.3)
POTASSIUM SERPL-SCNC: 4.3 MMOL/L — SIGNIFICANT CHANGE UP (ref 3.5–5.3)
RBC # BLD: 3.3 M/UL — LOW (ref 4.2–5.8)
RBC # FLD: 14.7 % — HIGH (ref 10.3–14.5)
SODIUM SERPL-SCNC: 137 MMOL/L — SIGNIFICANT CHANGE UP (ref 135–145)
WBC # BLD: 3.84 K/UL — SIGNIFICANT CHANGE UP (ref 3.8–10.5)
WBC # FLD AUTO: 3.84 K/UL — SIGNIFICANT CHANGE UP (ref 3.8–10.5)

## 2017-07-20 PROCEDURE — 71010: CPT | Mod: 26

## 2017-07-20 RX ORDER — ENOXAPARIN SODIUM 100 MG/ML
40 INJECTION SUBCUTANEOUS
Qty: 0 | Refills: 0 | Status: DISCONTINUED | OUTPATIENT
Start: 2017-07-20 | End: 2017-07-24

## 2017-07-20 RX ORDER — LATANOPROST 0.05 MG/ML
1 SOLUTION/ DROPS OPHTHALMIC; TOPICAL AT BEDTIME
Qty: 0 | Refills: 0 | Status: DISCONTINUED | OUTPATIENT
Start: 2017-07-20 | End: 2017-07-21

## 2017-07-20 RX ORDER — VENLAFAXINE HCL 75 MG
75 CAPSULE, EXT RELEASE 24 HR ORAL DAILY
Qty: 0 | Refills: 0 | Status: DISCONTINUED | OUTPATIENT
Start: 2017-07-20 | End: 2017-07-20

## 2017-07-20 RX ORDER — VALPROIC ACID (AS SODIUM SALT) 250 MG/5ML
500 SOLUTION, ORAL ORAL
Qty: 0 | Refills: 0 | Status: DISCONTINUED | OUTPATIENT
Start: 2017-07-20 | End: 2017-07-21

## 2017-07-20 RX ORDER — LISINOPRIL 2.5 MG/1
10 TABLET ORAL DAILY
Qty: 0 | Refills: 0 | Status: DISCONTINUED | OUTPATIENT
Start: 2017-07-20 | End: 2017-07-20

## 2017-07-20 RX ORDER — CLOZAPINE 150 MG/1
25 TABLET, ORALLY DISINTEGRATING ORAL
Qty: 0 | Refills: 0 | Status: DISCONTINUED | OUTPATIENT
Start: 2017-07-20 | End: 2017-07-21

## 2017-07-20 RX ORDER — SODIUM CHLORIDE 9 MG/ML
1000 INJECTION, SOLUTION INTRAVENOUS
Qty: 0 | Refills: 0 | Status: DISCONTINUED | OUTPATIENT
Start: 2017-07-20 | End: 2017-07-21

## 2017-07-20 RX ORDER — ATORVASTATIN CALCIUM 80 MG/1
40 TABLET, FILM COATED ORAL AT BEDTIME
Qty: 0 | Refills: 0 | Status: DISCONTINUED | OUTPATIENT
Start: 2017-07-20 | End: 2017-07-20

## 2017-07-20 RX ORDER — HALOPERIDOL DECANOATE 100 MG/ML
5 INJECTION INTRAMUSCULAR EVERY 6 HOURS
Qty: 0 | Refills: 0 | Status: DISCONTINUED | OUTPATIENT
Start: 2017-07-20 | End: 2017-07-22

## 2017-07-20 RX ORDER — HALOPERIDOL DECANOATE 100 MG/ML
5 INJECTION INTRAMUSCULAR EVERY 6 HOURS
Qty: 0 | Refills: 0 | Status: DISCONTINUED | OUTPATIENT
Start: 2017-07-20 | End: 2017-07-24

## 2017-07-20 RX ORDER — INSULIN LISPRO 100/ML
VIAL (ML) SUBCUTANEOUS EVERY 6 HOURS
Qty: 0 | Refills: 0 | Status: DISCONTINUED | OUTPATIENT
Start: 2017-07-20 | End: 2017-07-23

## 2017-07-20 RX ORDER — LEVOTHYROXINE SODIUM 125 MCG
37.5 TABLET ORAL DAILY
Qty: 0 | Refills: 0 | Status: DISCONTINUED | OUTPATIENT
Start: 2017-07-20 | End: 2017-07-23

## 2017-07-20 RX ORDER — CLOZAPINE 150 MG/1
100 TABLET, ORALLY DISINTEGRATING ORAL
Qty: 0 | Refills: 0 | Status: DISCONTINUED | OUTPATIENT
Start: 2017-07-20 | End: 2017-07-21

## 2017-07-20 RX ORDER — DIVALPROEX SODIUM 500 MG/1
1000 TABLET, DELAYED RELEASE ORAL AT BEDTIME
Qty: 0 | Refills: 0 | Status: DISCONTINUED | OUTPATIENT
Start: 2017-07-20 | End: 2017-07-20

## 2017-07-20 RX ORDER — VENLAFAXINE HCL 75 MG
75 CAPSULE, EXT RELEASE 24 HR ORAL
Qty: 0 | Refills: 0 | Status: DISCONTINUED | OUTPATIENT
Start: 2017-07-20 | End: 2017-07-21

## 2017-07-20 RX ORDER — CLOZAPINE 150 MG/1
100 TABLET, ORALLY DISINTEGRATING ORAL DAILY
Qty: 0 | Refills: 0 | Status: DISCONTINUED | OUTPATIENT
Start: 2017-07-20 | End: 2017-07-20

## 2017-07-20 RX ORDER — CLOZAPINE 150 MG/1
25 TABLET, ORALLY DISINTEGRATING ORAL DAILY
Qty: 0 | Refills: 0 | Status: DISCONTINUED | OUTPATIENT
Start: 2017-07-20 | End: 2017-07-20

## 2017-07-20 RX ORDER — SODIUM CHLORIDE 9 MG/ML
1000 INJECTION, SOLUTION INTRAVENOUS
Qty: 0 | Refills: 0 | Status: DISCONTINUED | OUTPATIENT
Start: 2017-07-20 | End: 2017-07-20

## 2017-07-20 RX ORDER — TIMOLOL 0.5 %
1 DROPS OPHTHALMIC (EYE) DAILY
Qty: 0 | Refills: 0 | Status: DISCONTINUED | OUTPATIENT
Start: 2017-07-20 | End: 2017-07-22

## 2017-07-20 RX ADMIN — CLOZAPINE 25 MILLIGRAM(S): 150 TABLET, ORALLY DISINTEGRATING ORAL at 23:40

## 2017-07-20 RX ADMIN — Medication 75 MILLIGRAM(S): at 23:40

## 2017-07-20 RX ADMIN — LISINOPRIL 10 MILLIGRAM(S): 2.5 TABLET ORAL at 07:37

## 2017-07-20 RX ADMIN — CLOZAPINE 100 MILLIGRAM(S): 150 TABLET, ORALLY DISINTEGRATING ORAL at 19:51

## 2017-07-20 RX ADMIN — Medication 500 MILLIGRAM(S): at 19:50

## 2017-07-20 RX ADMIN — SODIUM CHLORIDE 150 MILLILITER(S): 9 INJECTION, SOLUTION INTRAVENOUS at 02:59

## 2017-07-20 RX ADMIN — Medication 37.5 MICROGRAM(S): at 10:45

## 2017-07-20 RX ADMIN — LATANOPROST 1 DROP(S): 0.05 SOLUTION/ DROPS OPHTHALMIC; TOPICAL at 23:39

## 2017-07-20 RX ADMIN — ENOXAPARIN SODIUM 40 MILLIGRAM(S): 100 INJECTION SUBCUTANEOUS at 19:51

## 2017-07-20 RX ADMIN — SODIUM CHLORIDE 100 MILLILITER(S): 9 INJECTION, SOLUTION INTRAVENOUS at 19:52

## 2017-07-20 RX ADMIN — SODIUM CHLORIDE 100 MILLILITER(S): 9 INJECTION, SOLUTION INTRAVENOUS at 12:54

## 2017-07-20 NOTE — BEHAVIORAL HEALTH ASSESSMENT NOTE - SUMMARY
60 yo male, domiciled with brother, with schizophrenia and OCD, multiple past psychiatric hospitalizations (last in late 90s), no past suicide attempts, no current substance use, PMH Diabetes, Glaucoma, Hypertension, Hypothyroid, Seborrheic dermatitis, presents with a chief complaint of abdominal pain, nausea, and emesis.  Found to have primary SBO.  NGT placed.  Psych consulted for med management given pt is NPO with NGT in place.  Pt has been psychiatrically stable on current regiment.  Discussed with team and pt will be able to continue home meds through NGT with tube clamped for 1 hr after med administration.

## 2017-07-20 NOTE — BEHAVIORAL HEALTH ASSESSMENT NOTE - NSBHCHARTREVIEWIMAGING_PSY_A_CORE FT
< from: Xray Chest 1 View AP- PORTABLE-Urgent (07.20.17 @ 13:20) >    IMPRESSION:  Enteric tube present extending below diaphragm into abdomen with distal   end in epigastric region and tip over right upper quadrant.    Visualized lungs underinflated with slight crowding and accentuation of   parenchymal markings but otherwise remain grossly clear. No left pleural   effusion. Stable cardiac and mediastinal silhouettes.    Several distended bowel loops noted compatible with history of bowel   obstruction. No pneumoperitoneum.    Spinal degenerative changes again noted.    < end of copied text >

## 2017-07-20 NOTE — BEHAVIORAL HEALTH ASSESSMENT NOTE - HPI (INCLUDE ILLNESS QUALITY, SEVERITY, DURATION, TIMING, CONTEXT, MODIFYING FACTORS, ASSOCIATED SIGNS AND SYMPTOMS)
62 yo male, domiciled with brother, with schizophrenia and OCD, multiple past psychiatric hospitalizations (last in late 90s), no past suicide attempts, no current substance use, PMH Diabetes, Glaucoma, Hypertension, Hypothyroid, Seborrheic dermatitis, presents with a chief complaint of abdominal pain, nausea, and emesis.  Found to have primary SBO.  NGT placed.  Psych consulted for med management given pt is NPO with NGT in place.      Seen and examined at bedside.  Pt's brother David is present.  Pt AAOx3.  Pt calm and cooperative.  Reports attending adult day care program at [place] 6 days per week.  States he is doing well and has lots of friends there.  Pt reports stable psychiatric symptoms on current regimen.  Denies mood or psychotic symptoms.  Denies SI/HI/AH/VH.  No delusions elicited.  Reports washing rituals are manageable at this time.  Reports last hospitalization >20 years ago.  Per staff at [place], pt has been in good behavioral control, calm and social with peers. 60 yo male, domiciled with brother, with schizophrenia and OCD, multiple past psychiatric hospitalizations (last in late 90s), no past suicide attempts, no current substance use, PMH Diabetes, Glaucoma, Hypertension, Hypothyroid, Seborrheic dermatitis, presents with a chief complaint of abdominal pain, nausea, and emesis.  Found to have primary SBO.  NGT placed.  Psych consulted for med management given pt is NPO with NGT in place.      Seen and examined at bedside.  Pt's brother David is present.  Pt AAOx3.  Pt calm and cooperative.  Reports attending adult day care program at [place] 6 days per week.  States he is doing well and has lots of friends there.  Pt reports stable psychiatric symptoms on current regimen.  Denies mood or psychotic symptoms.  Denies SI/HI/AH/VH.  No delusions elicited.  Reports washing rituals are manageable at this time.  Reports last hospitalization >20 years ago.  Per staff at [place], pt has been in good behavioral control, calm and social with peers.  Per pt and his brother, pt was hospitalized previously and all psychiatric medications were stopped.  Pt decompensated during this time.

## 2017-07-20 NOTE — H&P ADULT - NSHPPHYSICALEXAM_GEN_ALL_CORE
T(C): 36.8 (07-19-17 @ 21:04), Max: 36.8 (07-19-17 @ 21:04)  HR: 82 (07-20-17 @ 02:45) (77 - 105)  BP: 146/58 (07-20-17 @ 02:45) (111/64 - 164/79)  BP(mean): --  RR: 18 (07-20-17 @ 02:45) (18 - 18)  SpO2: 99% (07-20-17 @ 02:45) (97% - 99%)  Wt(kg): --  CAPILLARY BLOOD GLUCOSE  72 (20 Jul 2017 00:59)  86 (19 Jul 2017 22:23)  122 (19 Jul 2017 15:56)      General: NAD, Lying in bed comfortably  Neuro: A+Ox3, no focal deficits  HEENT: NC/AT, EOMI  GI/Abd: Soft, NT, distended, no scars, no rebound/guarding,   Vascular: All 4 extremities warm  Skin: Intact, no breakdown  Musculoskeletal: All 4 extremities moving spontaneously, no limitations.

## 2017-07-20 NOTE — BEHAVIORAL HEALTH ASSESSMENT NOTE - NSBHCHARTREVIEWLAB_PSY_A_CORE FT
9.8    3.84  )-----------( 191      ( 20 Jul 2017 18:26 )             29.2   07-20    137  |  100  |  9   ----------------------------<  134<H>  4.3   |  25  |  0.64    Ca    8.0<L>      20 Jul 2017 18:26  Phos  1.9     07-20  Mg     1.6     07-20    TPro  5.9<L>  /  Alb  3.2<L>  /  TBili  0.2  /  DBili  x   /  AST  11  /  ALT  8   /  AlkPhos  43  07-19 10.6   4.61  )-----------( 194      ( 21 Jul 2017 07:45 )             31.2   07-21    141  |  102  |  8   ----------------------------<  191<H>  4.0   |  27  |  0.57    Ca    8.0<L>      21 Jul 2017 07:45  Phos  2.4     07-21  Mg     1.6     07-21    TPro  5.9<L>  /  Alb  3.2<L>  /  TBili  0.2  /  DBili  x   /  AST  11  /  ALT  8   /  AlkPhos  43  07-19

## 2017-07-20 NOTE — H&P ADULT - FAMILY HISTORY
Father  Still living? Unknown  Family history of acute myocardial infarction, Age at diagnosis: Age Unknown     Mother  Still living? Unknown  Family history of diabetes mellitus (DM), Age at diagnosis: Age Unknown

## 2017-07-20 NOTE — H&P ADULT - NSHPREVIEWOFSYSTEMS_GEN_ALL_CORE
Patient denies fevers/chills, denies lightheadedness/dizziness, denies SOB/chest pain    ROS: 10-system review is otherwise negative except HPI above.

## 2017-07-20 NOTE — BEHAVIORAL HEALTH ASSESSMENT NOTE - PAST PSYCHOTROPIC MEDICATION
Paxil, Zoloft, Lithium, Zyprexa, Risperdal, Mellaril, Prolixin, Navane, Elavil, Pamelor, Anafranil, Lexapro

## 2017-07-20 NOTE — ED ADULT NURSE NOTE - CHIEF COMPLAINT QUOTE
patient was at [facility] from adult day care program on sunday for nausea and vomiting, dx with hernia and sent home. patient presents today with same symptoms of nausea and vomiting, also reports high blood sugar. Patient states he did not understand his instructions of clear liquids and ate solid food. pt c/o generalized abd pain and nausea.

## 2017-07-20 NOTE — ED ADULT NURSE REASSESSMENT NOTE - NS ED NURSE REASSESS COMMENT FT1
Resting on stretcher in 22. Awake, A&Ox4, NAD observed, respirations even and unlabored on room air. Pt ambulatory without assistance to restroom, pt had three loose BMs overnight with sense of urgency with each episode. Last loose BM at 07:15. VS recorded. AM meds administered as ordered. MD Kat from Surgery paged in regard to Synthroid IVP order, not given, want to clarify with provider before administration. 20G IV in right AC, dressing is clean, dry, intact, no redness, warmth, pain, or swelling at site. D5 1/2 NS infusing at 150cc/hr as ordered at IV site. Pt denies pain. States he is comfortable. Pt is NPO. Comfort and safety measures continued. Call bell within reach. Admitted to Surgery, awaiting bed assignment.

## 2017-07-20 NOTE — BEHAVIORAL HEALTH ASSESSMENT NOTE - CASE SUMMARY
60 yo male, domiciled with brother, with schizophrenia and OCD, multiple past psychiatric hospitalizations (last in late 90s), no past suicide attempts, no current substance use, PMH Diabetes, Glaucoma, Hypertension, Hypothyroid, Seborrheic dermatitis, presents with a chief complaint of abdominal pain, nausea, and emesis.  Found to have primary SBO.  NGT placed.  Psych consulted for med management given pt is NPO with NGT in place.  Pt has been psychiatrically stable on current regiment.  Discussed with team and pt will be able to continue home meds through NGT with tube clamped for 1 hr after med administration. May give the whole daily clozapine dose in the evening. Pt is calm and cooperative.

## 2017-07-20 NOTE — PROGRESS NOTE ADULT - ASSESSMENT
61 M w/ primary SBO. The ED was going to place a NGT. Will continue with decompression for now. However, if patient does not improve, will need operative intervention.  -NPO and NGT  -IVF  -VTE prophylaxis  -ISS and Synthroid  -Will need Psychiatry consult to help with IV medications while patient is NPO  AVERY Aguilar  [id number]

## 2017-07-20 NOTE — H&P ADULT - HISTORY OF PRESENT ILLNESS
Patient is a 61y old  Male who presents with a chief complaint of abdominal pain, nausea, and emesis.  Patient has a history of intermittent constipation over the past 6 months, for which there has been no cause determined so far. Patient currently has complained of abdominal fullness since Sunday.  he was seen at [facility] for evaluation, where a CT scan demonstrated a hernia without evidence of obstruction and discharged him.  He has not been able to tolerate diet since then.  He has had emesis, most recently on Monday evening, and has had mild nausea since then.  He has mild to moderate abdominal pain intermittently.  Patient had received an enema in the ED after which he felt much better.      Patient has a history of bipolar disorder, well-treated on his current medications.  He participates in an adult group home.  Patient seen and examined at bedside with brother David.

## 2017-07-20 NOTE — H&P ADULT - ATTENDING COMMENTS
This patient is a 61-year-old male who presented to the ED at [facility] at approximately 2:20 PM on 7/19/17 with complaints of having abdominal pain. The pain, that began three days prior to hospitalization, was located diffusely in his abdomen and was accompanied with nausea and emesis. He denied having had diarrhea but he reports having had constipation for the last six months. He has not had fever or chills and had not previously had similar symptoms. He has not had jaundice nor had he had dark urine. He has not had abdominal surgery. He was evaluated at [facility] a few days prior to hospitalization where he reportedly had an abdominal CT scan. The CT scan reportedly revealed the presence of a hernia and the patient was reportedly told to remain on a clear liquid diet. He took a solid diet and had recurrence of his symptoms. He had a normal bowel movement the day prior to the day of hospitalization.    He has a medical history significant for having hypertension, hypothyroidism, hyperlipidemia, chronic hyponatremia, type 2 non-insulin requiring diabetes mellitus, myopia and open angle glaucoma, schizophrenia and a bipolar disorder with obsessive compulsive disorder. He is known to have benign prostatic hypertrophy and reportedly he has had seizures. He has not had previous abdominal surgery. He has had a right great toe fracture and has contractures of the fingers of his right hand. In November 2003 he had a right sided thoracentesis and in February, 2004 he had abdominal radiographs obtained for complaints of abdominal pain. He was found to have distended small and large bowel. In June, 2010 the patient was evaluated in the ED here at [facility] for complaints of having abdominal pain and nausea. He was hospitalized on the Medical Service here at [facility] from 2/1/12 – 2/2/12 for evaluation and treatment of nausea and hyponatremia.  He was hospitalized on the Medical Service at [facility] from 12/12/12 – 12/14/12 for evaluation and treatment of hypo-osmolarity, nausea, emesis and diarrhea. He was thought to have gastroenteritis due to a virus. From 1/6/13 – 1/8/13 he was again hospitalized on the Medical Service at [facility] for evaluation and treatment of hypertension. He was hospitalized on the Medical Service at [facility] from 8/26/16 – 8/30/16 for evaluation and treatment of pain. He was evaluated in the ED here at [facility] for complaints of having generalized weakness, nausea, emesis and diarrhea. He was thought to have gastroenteritis or food poisoning. He was given parenteral fluid and was subsequently discharged to home. He has had buttock abscesses drained. Prior to the current hospitalization he took:    1)	Glucotrol	2)	Glucophage	3)	Synthroid	4)	Lisinopril  5)	Lipitor		6)	Depakote	7)	Clozapine	8)	Effexor  9)	Doxycycline	10	NaCl tablets	11)	Lumigan	12)	Timolol  13)	Chlorhexidine shower    He has no known medication allergies and he is not allergic to latex. He does not abuse ethanol and previously smoked cigarettes. The patient is single and resides with his adult brother. The patient attends [employer] and previously resided at [facility]. His family history is significant for his father having had a myocardial infarction and his mother having diabetes mellitus. Family members have schizophrenia (an aunt) and his mother had obsessive compulsive disorder. He has an adult brother, David Olmstead ([phone]). He had decreased hearing and impaired vision for which he wears corrective lenses. He has neuropathy in his feet due to diabetes mellitus. He has had lower (lumbar) back pain. He has not had headaches, dizziness, or photophobia and denied having had chest pain, dyspnea, or palpitations. He has the above noted abdominal pain that was located diffusely in his abdomen and was accompanied with anorexia, nausea, and emesis. He has not had diarrhea and he is chronically constipated. He has not had fever or chills and denied recent sick contacts although he resides in a group home. He has not had foreign travel. He has enuresis and denied having had dysuria. His 10-point review of systems was otherwise negative.    On presentation to the ED he had a:    BP	=	111/64  P	=	105  R	=	18  Temp	=	36.7  O2 Sat	=	97%  VAS	=	7/10    He was awake, alert, and fully oriented. He was in no acute distress.  He did not appear toxic.  He was anicteric. He had reactive pupils and his extra-occular movements were intact.  He did not have thrush. He had normal oropharyngeal mucosa.  He did not have JVD. There was no abnormal cervical adenopathy.  His lungs were clear and he had non-labored respirations. He had symmetrical chest wall movements.  He had regular heart tones and he did not have a murmur.  His abdomen was softly distended and non-tender. He was obese (BMI = 31.4). He did not have palpable masses. He had active bowel sounds.   He had normal external genitalia.  His extremities were well perfused. He did not have a rash.  He had dermatitis and he had contractures of the fingers of his right hand (trigger finger). He had kyphoscoliosis. His musculoskeletal exam was normal.    A 20 gauge IV cannula was inserted into a vein of his right antecubital fossa and fluid was given. Maalox, Pepcid and Reglan were given. Tylenol was given for pain. He was given an enema. Laboratory tests revealed a:    WBC	=	5	Na		=	132	Bilirubin		=	0.2  Neutro	=	64%	K		=	4.2	Alk Phos	=	43  Hgb	=	10	Cl		=	98	SGOT		=	11  Hct	=	30%	HCO3		=	22	SGPT		=	8  MCH	=	31	Glucose	=	89  MCV	=	90	BUN		=	26	Lipase		=	40  MCHC	=	34	Creat		=	0.9  Plts	=	179  			Albumin	=	3.2  PT	=	-  PTT	=	-  INR	=	-    pH	=	7.35	Lactate		=	1.4  pCO2	=	45  pO2	=	33	Urine analysis  BE	=	4.0		Specific gravity	=	1.024  				WBC			=	2 – 5  				RBC			=	2 – 5    A contrast enhanced CT scan of his abdomen and pelvis revealed multiple dilated loops of small bowel with a transition to normal caliber small intestine at the level of the terminal ileum. There was mesenteric edema and a small amount of ascites. He had a small fat-containing inguinal hernia.    A chest radiograph was obtained that revealed the NGT and clear lungs, although most of the right lung was not seen in the image.    While in the ED he had a:    BP	=	111 - 168/58 - 83  P	=	77 - 105  R	=	16 - 18  Temp	=	36.7 – 37.0  O2 Sat	=	97% - 100%  VAS	=	0/10 - 7/10    Assessment:	This patient is assessed as an acutely and chronically ill 61-year-old hypertensive, non-insulin requiring type 2 diabetic male who has schizophrenia, obsessive compulsive disorder, bipolar disorder, hyperlipidemia, hypothyroidism, chronic hyponatremia, possibly a seizure disorder, glaucoma, and benign prostatic hypertrophy who presented to the ED at [facility] at approximately 2:20 PM on 7/19/17 with complaints of having three to four days of abdominal pain that was accompanied with nausea and intermittent emesis. He had a bowel movement and passed flatus while in the ED. He was found on imaging tests to have evidence of having a small bowel obstruction with a transition zone in the distal small bowel. He has not previously had intra-abdominal surgery. It is not clear if he has had lower endoscopy and there is no history of having inflammatory bowel disease or tuberculosis.    The plan was to:    1)	Admit this patient to the Acute Care (B-Team) Surgical service.  2)	Review the imaging tests.  3)	Plan to place an NGT.  4)	Keep him NPO and give parenteral fluid. Will give NS to maintain sodium normal.  5)	Re-check and replete electrolytes.  6)	Obtain a psychiatry consultation.  7)	Provide mechanical and chemical VTE prophylaxis.  8)	Check for the etiology of his anemia. Has not previously had lower endoscopy.  9)	Obtain delayed imaging if he does not rapidly improve the symptoms of his  	bowel obstruction. May require surgery but his abdomen is benign.  10)	Check coagulation profile (in case surgery is required)  11)	Check his TSH and Hemoglobin A1-C  12)	Try to obtain additional patient history – tuberculosis, prior lower endoscopy, etc.  13)	Full support in place    Topher Colvin  90 minutes exclusive of procedures. This patient is a 61-year-old male who presented to the ED at [facility] at approximately 2:20 PM on 7/19/17 with complaints of having abdominal pain. The pain, that began three days prior to hospitalization, was located diffusely in his abdomen and was accompanied with nausea and emesis. He denied having had diarrhea but he reports having had constipation for the last six months. He has not had fever or chills and had not previously had similar symptoms. He has not had jaundice nor had he had dark urine. He has not had abdominal surgery. He was evaluated at [facility] a few days prior to hospitalization where he reportedly had an abdominal CT scan. The CT scan reportedly revealed the presence of a hernia and the patient was reportedly told to remain on a clear liquid diet. He took a solid diet and had recurrence of his symptoms. He had a normal bowel movement the day prior to the day of hospitalization.    He has a medical history significant for having hypertension, hypothyroidism, hyperlipidemia, chronic hyponatremia, type 2 non-insulin requiring diabetes mellitus, myopia and open angle glaucoma, schizophrenia and a bipolar disorder with obsessive compulsive disorder. He is known to have benign prostatic hypertrophy and reportedly he has had seizures. He has not had previous abdominal surgery. He has had a right great toe fracture and has contractures of the fingers of his right hand. In November 2003 he had a right sided thoracentesis and in February, 2004 he had abdominal radiographs obtained for complaints of abdominal pain. He was found to have distended small and large bowel. In June, 2010 the patient was evaluated in the ED here at [facility] for complaints of having abdominal pain and nausea. He was hospitalized on the Medical Service here at [facility] from 2/1/12 – 2/2/12 for evaluation and treatment of nausea and hyponatremia.  He was hospitalized on the Medical Service at [facility] from 12/12/12 – 12/14/12 for evaluation and treatment of hypo-osmolarity, nausea, emesis and diarrhea. He was thought to have gastroenteritis due to a virus. From 1/6/13 – 1/8/13 he was again hospitalized on the Medical Service at [facility] for evaluation and treatment of hypertension. He was hospitalized on the Medical Service at [facility] from 8/26/16 – 8/30/16 for evaluation and treatment of pain. He was evaluated in the ED here at [facility] for complaints of having generalized weakness, nausea, emesis and diarrhea. He was thought to have gastroenteritis or food poisoning. He was given parenteral fluid and was subsequently discharged to home. He has had buttock abscesses drained. Prior to the current hospitalization he took:    1)	Glucotrol	2)	Glucophage	3)	Synthroid	4)	Lisinopril  5)	Lipitor		6)	Depakote	7)	Clozapine	8)	Effexor  9)	Doxycycline	10	NaCl tablets	11)	Lumigan	12)	Timolol  13)	Chlorhexidine shower    He has no known medication allergies and he is not allergic to latex. He does not abuse ethanol and previously smoked cigarettes. The patient is single and resides with his adult brother. There are steps needed to enter the residence and there is a flight of steps to the basement. There is also a ramp to enter the residence. The patient attends [employer] [place] and previously resided at [facility]. His family history is significant for his mother having had myocardial infarctions and his mother having diabetes mellitus. Family members have schizophrenia (an aunt) and his mother had obsessive compulsive disorder. He has two adult brother, one of whom this patient resides with - [facility] ([phone]). The patient's other brother resides in [place]. He had decreased hearing and impaired vision for which he wears corrective lenses. He has neuropathy in his feet due to diabetes mellitus. He has had lower (lumbar) back pain. He has not had headaches, dizziness, or photophobia and denied having had chest pain, dyspnea, or palpitations. He has the above noted abdominal pain that was located diffusely in his abdomen and was accompanied with anorexia, nausea, and emesis. He has not had diarrhea and he is chronically constipated. He has not had fever or chills and denied recent sick contacts although he resides in a group home. He has not had foreign travel. He has enuresis and denied having had dysuria. His 10-point review of systems was otherwise negative. The patient has given permission for hospital personnel to discuss his healthcare with his family members.    On presentation to the ED he had a:    BP	=	111/64  P	=	105  R	=	18  Temp	=	36.7  O2 Sat	=	97%  VAS	=	7/10    He was awake, alert, and fully oriented. He was in no acute distress.  He did not appear toxic.  He was anicteric. He had reactive pupils and his extra-occular movements were intact.  He did not have thrush. He had normal oropharyngeal mucosa.  He did not have JVD. There was no abnormal cervical adenopathy.  His lungs were clear and he had non-labored respirations. He had symmetrical chest wall movements.  He had regular heart tones and he did not have a murmur.  His abdomen was softly distended and non-tender. He was obese (BMI = 31.4). He did not have palpable masses. He had active bowel sounds.   He had normal external genitalia.  His extremities were well perfused. He did not have a rash.  He had dermatitis and he had contractures of the fingers of his right hand (trigger finger). He had kyphoscoliosis. His musculoskeletal exam was normal.    A 20 gauge IV cannula was inserted into a vein of his right antecubital fossa and fluid was given. Maalox, Pepcid and Reglan were given. Tylenol was given for pain. He was given an enema. Laboratory tests revealed a:    WBC	=	5	Na		=	132	Bilirubin		=	0.2  Neutro	=	64%	K		=	4.2	Alk Phos	=	43  Hgb	=	10	Cl		=	98	SGOT		=	11  Hct	=	30%	HCO3		=	22	SGPT		=	8  MCH	=	31	Glucose	=	89  MCV	=	90	BUN		=	26	Lipase		=	40  MCHC	=	34	Creat		=	0.9  Plts	=	179  			Albumin	=	3.2  PT	=	-  PTT	=	-  INR	=	-    pH	=	7.35	Lactate		=	1.4  pCO2	=	45  pO2	=	33	Urine analysis  BE	=	4.0		Specific gravity	=	1.024  				WBC			=	2 – 5  				RBC			=	2 – 5    A contrast enhanced CT scan of his abdomen and pelvis revealed multiple dilated loops of small bowel with a transition to normal caliber small intestine at the level of the terminal ileum. There was mesenteric edema and a small amount of ascites. He had a small fat-containing inguinal hernia.    A chest radiograph was obtained that revealed the NGT and clear lungs, although most of the right lung was not seen in the image.    While in the ED he had a:    BP	=	111 - 168/58 - 83  P	=	77 - 105  R	=	16 - 18  Temp	=	36.7 – 37.0  O2 Sat	=	97% - 100%  VAS	=	0/10 - 7/10    Assessment:	This patient is assessed as an acutely and chronically ill 61-year-old hypertensive, non-insulin requiring type 2 diabetic male who has schizophrenia, obsessive compulsive disorder, bipolar disorder, hyperlipidemia, hypothyroidism, chronic hyponatremia, possibly a seizure disorder, glaucoma, and benign prostatic hypertrophy who presented to the ED at [facility] at approximately 2:20 PM on 7/19/17 with complaints of having three to four days of abdominal pain that was accompanied with nausea and intermittent emesis. He had a bowel movement and passed flatus while in the ED. He was found on imaging tests to have evidence of having a small bowel obstruction with a transition zone in the distal small bowel. He has not previously had intra-abdominal surgery. It is not clear if he has had lower endoscopy and there is no history of having inflammatory bowel disease or tuberculosis.    The plan was to:    1)	Admit this patient to the Acute Care (B-Team) Surgical service.  2)	Review the imaging tests.  3)	Plan to place an NGT.  4)	Keep him NPO and give parenteral fluid. Will give NS to maintain sodium normal.  5)	Re-check and replete electrolytes.  6)	Obtain a psychiatry consultation.  7)	Provide mechanical and chemical VTE prophylaxis.  8)	Check for the etiology of his anemia. Has not previously had lower endoscopy.  9)	Obtain delayed imaging if he does not rapidly improve the symptoms of his  	bowel obstruction. May require surgery but his abdomen is benign.  10)	Check coagulation profile (in case surgery is required)  11)	Check his TSH and Hemoglobin A1-C  12)	Try to obtain additional patient history – tuberculosis, prior lower endoscopy, etc.  13)	Try to determine who gives informed consent for this patient. He appears to have  	the capacity to make informed medical decisions.  14)	Full support in place    Topher Colvin  90 minutes exclusive of procedures.

## 2017-07-21 DIAGNOSIS — F20.0 PARANOID SCHIZOPHRENIA: ICD-10-CM

## 2017-07-21 LAB
BUN SERPL-MCNC: 8 MG/DL — SIGNIFICANT CHANGE UP (ref 7–23)
CALCIUM SERPL-MCNC: 8 MG/DL — LOW (ref 8.4–10.5)
CHLORIDE SERPL-SCNC: 102 MMOL/L — SIGNIFICANT CHANGE UP (ref 98–107)
CO2 SERPL-SCNC: 27 MMOL/L — SIGNIFICANT CHANGE UP (ref 22–31)
CREAT SERPL-MCNC: 0.57 MG/DL — SIGNIFICANT CHANGE UP (ref 0.5–1.3)
GLUCOSE SERPL-MCNC: 191 MG/DL — HIGH (ref 70–99)
HCT VFR BLD CALC: 31.2 % — LOW (ref 39–50)
HGB BLD-MCNC: 10.6 G/DL — LOW (ref 13–17)
MAGNESIUM SERPL-MCNC: 1.6 MG/DL — SIGNIFICANT CHANGE UP (ref 1.6–2.6)
MCHC RBC-ENTMCNC: 30.8 PG — SIGNIFICANT CHANGE UP (ref 27–34)
MCHC RBC-ENTMCNC: 34 % — SIGNIFICANT CHANGE UP (ref 32–36)
MCV RBC AUTO: 90.7 FL — SIGNIFICANT CHANGE UP (ref 80–100)
NRBC # FLD: 0 — SIGNIFICANT CHANGE UP
PHOSPHATE SERPL-MCNC: 2.4 MG/DL — LOW (ref 2.5–4.5)
PLATELET # BLD AUTO: 194 K/UL — SIGNIFICANT CHANGE UP (ref 150–400)
PMV BLD: 9.9 FL — SIGNIFICANT CHANGE UP (ref 7–13)
POTASSIUM SERPL-MCNC: 4 MMOL/L — SIGNIFICANT CHANGE UP (ref 3.5–5.3)
POTASSIUM SERPL-SCNC: 4 MMOL/L — SIGNIFICANT CHANGE UP (ref 3.5–5.3)
RBC # BLD: 3.44 M/UL — LOW (ref 4.2–5.8)
RBC # FLD: 14.6 % — HIGH (ref 10.3–14.5)
SODIUM SERPL-SCNC: 141 MMOL/L — SIGNIFICANT CHANGE UP (ref 135–145)
WBC # BLD: 4.61 K/UL — SIGNIFICANT CHANGE UP (ref 3.8–10.5)
WBC # FLD AUTO: 4.61 K/UL — SIGNIFICANT CHANGE UP (ref 3.8–10.5)

## 2017-07-21 PROCEDURE — 90792 PSYCH DIAG EVAL W/MED SRVCS: CPT

## 2017-07-21 PROCEDURE — 74177 CT ABD & PELVIS W/CONTRAST: CPT | Mod: 26

## 2017-07-21 PROCEDURE — 99223 1ST HOSP IP/OBS HIGH 75: CPT | Mod: AI,GC

## 2017-07-21 RX ORDER — FAMOTIDINE 10 MG/ML
20 INJECTION INTRAVENOUS
Qty: 0 | Refills: 0 | Status: DISCONTINUED | OUTPATIENT
Start: 2017-07-21 | End: 2017-07-23

## 2017-07-21 RX ORDER — MAGNESIUM SULFATE 500 MG/ML
2 VIAL (ML) INJECTION ONCE
Qty: 0 | Refills: 0 | Status: COMPLETED | OUTPATIENT
Start: 2017-07-21 | End: 2017-07-21

## 2017-07-21 RX ORDER — BIMATOPROST 0.3 MG/ML
1 SOLUTION/ DROPS OPHTHALMIC AT BEDTIME
Qty: 0 | Refills: 0 | Status: DISCONTINUED | OUTPATIENT
Start: 2017-07-21 | End: 2017-07-24

## 2017-07-21 RX ORDER — VALPROIC ACID (AS SODIUM SALT) 250 MG/5ML
1000 SOLUTION, ORAL ORAL AT BEDTIME
Qty: 0 | Refills: 0 | Status: DISCONTINUED | OUTPATIENT
Start: 2017-07-21 | End: 2017-07-22

## 2017-07-21 RX ORDER — VALPROIC ACID (AS SODIUM SALT) 250 MG/5ML
500 SOLUTION, ORAL ORAL
Qty: 0 | Refills: 0 | Status: DISCONTINUED | OUTPATIENT
Start: 2017-07-21 | End: 2017-07-21

## 2017-07-21 RX ORDER — CLOZAPINE 150 MG/1
100 TABLET, ORALLY DISINTEGRATING ORAL AT BEDTIME
Qty: 0 | Refills: 0 | Status: COMPLETED | OUTPATIENT
Start: 2017-07-21 | End: 2017-07-21

## 2017-07-21 RX ORDER — DEXTROSE MONOHYDRATE, SODIUM CHLORIDE, AND POTASSIUM CHLORIDE 50; .745; 4.5 G/1000ML; G/1000ML; G/1000ML
1000 INJECTION, SOLUTION INTRAVENOUS
Qty: 0 | Refills: 0 | Status: DISCONTINUED | OUTPATIENT
Start: 2017-07-21 | End: 2017-07-22

## 2017-07-21 RX ORDER — VENLAFAXINE HCL 75 MG
75 CAPSULE, EXT RELEASE 24 HR ORAL
Qty: 0 | Refills: 0 | Status: DISCONTINUED | OUTPATIENT
Start: 2017-07-21 | End: 2017-07-22

## 2017-07-21 RX ORDER — CLOZAPINE 150 MG/1
100 TABLET, ORALLY DISINTEGRATING ORAL
Qty: 0 | Refills: 0 | Status: DISCONTINUED | OUTPATIENT
Start: 2017-07-21 | End: 2017-07-21

## 2017-07-21 RX ORDER — CLOZAPINE 150 MG/1
25 TABLET, ORALLY DISINTEGRATING ORAL
Qty: 0 | Refills: 0 | Status: DISCONTINUED | OUTPATIENT
Start: 2017-07-21 | End: 2017-07-21

## 2017-07-21 RX ORDER — CLOZAPINE 150 MG/1
125 TABLET, ORALLY DISINTEGRATING ORAL AT BEDTIME
Qty: 0 | Refills: 0 | Status: DISCONTINUED | OUTPATIENT
Start: 2017-07-22 | End: 2017-07-22

## 2017-07-21 RX ADMIN — Medication 2: at 00:30

## 2017-07-21 RX ADMIN — Medication 37.5 MICROGRAM(S): at 06:23

## 2017-07-21 RX ADMIN — Medication 50 GRAM(S): at 17:43

## 2017-07-21 RX ADMIN — BIMATOPROST 1 DROP(S): 0.3 SOLUTION/ DROPS OPHTHALMIC at 23:21

## 2017-07-21 RX ADMIN — Medication 50 GRAM(S): at 07:29

## 2017-07-21 RX ADMIN — Medication 63.75 MILLIMOLE(S): at 16:00

## 2017-07-21 RX ADMIN — Medication 2: at 12:30

## 2017-07-21 RX ADMIN — CLOZAPINE 25 MILLIGRAM(S): 150 TABLET, ORALLY DISINTEGRATING ORAL at 07:29

## 2017-07-21 RX ADMIN — Medication 63.75 MILLIMOLE(S): at 12:31

## 2017-07-21 RX ADMIN — Medication 1000 MILLIGRAM(S): at 23:14

## 2017-07-21 RX ADMIN — FAMOTIDINE 20 MILLIGRAM(S): 10 INJECTION INTRAVENOUS at 23:07

## 2017-07-21 RX ADMIN — Medication 4: at 17:27

## 2017-07-21 RX ADMIN — Medication 500 MILLIGRAM(S): at 07:29

## 2017-07-21 RX ADMIN — CLOZAPINE 100 MILLIGRAM(S): 150 TABLET, ORALLY DISINTEGRATING ORAL at 22:06

## 2017-07-21 RX ADMIN — Medication 1 DROP(S): at 17:27

## 2017-07-21 RX ADMIN — Medication 75 MILLIGRAM(S): at 07:29

## 2017-07-21 RX ADMIN — Medication 75 MILLIGRAM(S): at 17:31

## 2017-07-21 RX ADMIN — ENOXAPARIN SODIUM 40 MILLIGRAM(S): 100 INJECTION SUBCUTANEOUS at 22:06

## 2017-07-21 NOTE — PROVIDER CONTACT NOTE (MEDICATION) - SITUATION
Pt states the he would like to continue his home glaucoma medication regimen lumigan and Timilol preservative free (xalatan ordered).

## 2017-07-21 NOTE — PROVIDER CONTACT NOTE (MEDICATION) - ACTION/TREATMENT ORDERED:
MD states that he will review the patiens chart and evaluate on 7/21 MD states that he will review the patient's chart and evaluate on 7/21

## 2017-07-21 NOTE — CHART NOTE - NSCHARTNOTEFT_GEN_A_CORE
Patient's brother brought home lumigan eye drop and insisted on using it instead of the one carried by pharmacy. Talked to pharmacy and will have nurse take the home med over to ID it and patient can use the home eye drop. Patient also takes valproic acid 1000mg at bed time at home, changed the regimen to match home dose.

## 2017-07-22 LAB
APTT BLD: 31.1 SEC — SIGNIFICANT CHANGE UP (ref 27.5–37.4)
BASOPHILS # BLD AUTO: 0.02 K/UL — SIGNIFICANT CHANGE UP (ref 0–0.2)
BASOPHILS NFR BLD AUTO: 0.5 % — SIGNIFICANT CHANGE UP (ref 0–2)
BLD GP AB SCN SERPL QL: NEGATIVE — SIGNIFICANT CHANGE UP
BUN SERPL-MCNC: 6 MG/DL — LOW (ref 7–23)
CALCIUM SERPL-MCNC: 8.3 MG/DL — LOW (ref 8.4–10.5)
CHLORIDE SERPL-SCNC: 97 MMOL/L — LOW (ref 98–107)
CO2 SERPL-SCNC: 29 MMOL/L — SIGNIFICANT CHANGE UP (ref 22–31)
CREAT SERPL-MCNC: 0.58 MG/DL — SIGNIFICANT CHANGE UP (ref 0.5–1.3)
EOSINOPHIL # BLD AUTO: 0.11 K/UL — SIGNIFICANT CHANGE UP (ref 0–0.5)
EOSINOPHIL NFR BLD AUTO: 2.5 % — SIGNIFICANT CHANGE UP (ref 0–6)
FOLATE SERPL-MCNC: > 20 NG/ML — HIGH (ref 4.7–20)
GLUCOSE SERPL-MCNC: 210 MG/DL — HIGH (ref 70–99)
HBA1C BLD-MCNC: 7.5 % — HIGH (ref 4–5.6)
HCT VFR BLD CALC: 28.4 % — LOW (ref 39–50)
HGB BLD-MCNC: 9.4 G/DL — LOW (ref 13–17)
IMM GRANULOCYTES # BLD AUTO: 0.04 # — SIGNIFICANT CHANGE UP
IMM GRANULOCYTES NFR BLD AUTO: 0.9 % — SIGNIFICANT CHANGE UP (ref 0–1.5)
INR BLD: 1.35 — HIGH (ref 0.88–1.17)
IRON SATN MFR SERPL: 167 UG/DL — SIGNIFICANT CHANGE UP (ref 155–535)
IRON SATN MFR SERPL: 20 UG/DL — LOW (ref 45–165)
LYMPHOCYTES # BLD AUTO: 0.94 K/UL — LOW (ref 1–3.3)
LYMPHOCYTES # BLD AUTO: 21.6 % — SIGNIFICANT CHANGE UP (ref 13–44)
MCHC RBC-ENTMCNC: 29.3 PG — SIGNIFICANT CHANGE UP (ref 27–34)
MCHC RBC-ENTMCNC: 33.1 % — SIGNIFICANT CHANGE UP (ref 32–36)
MCV RBC AUTO: 88.5 FL — SIGNIFICANT CHANGE UP (ref 80–100)
MONOCYTES # BLD AUTO: 0.55 K/UL — SIGNIFICANT CHANGE UP (ref 0–0.9)
MONOCYTES NFR BLD AUTO: 12.6 % — SIGNIFICANT CHANGE UP (ref 2–14)
NEUTROPHILS # BLD AUTO: 2.69 K/UL — SIGNIFICANT CHANGE UP (ref 1.8–7.4)
NEUTROPHILS NFR BLD AUTO: 61.9 % — SIGNIFICANT CHANGE UP (ref 43–77)
NRBC # FLD: 0 — SIGNIFICANT CHANGE UP
PLATELET # BLD AUTO: 230 K/UL — SIGNIFICANT CHANGE UP (ref 150–400)
PMV BLD: 10.1 FL — SIGNIFICANT CHANGE UP (ref 7–13)
POTASSIUM SERPL-MCNC: 3.8 MMOL/L — SIGNIFICANT CHANGE UP (ref 3.5–5.3)
POTASSIUM SERPL-SCNC: 3.8 MMOL/L — SIGNIFICANT CHANGE UP (ref 3.5–5.3)
PROTHROM AB SERPL-ACNC: 15.2 SEC — HIGH (ref 9.8–13.1)
RBC # BLD: 3.21 M/UL — LOW (ref 4.2–5.8)
RBC # FLD: 14.5 % — SIGNIFICANT CHANGE UP (ref 10.3–14.5)
RETICS #: 58 10X3/UL — SIGNIFICANT CHANGE UP (ref 17–73)
RETICS/RBC NFR: 1.8 % — SIGNIFICANT CHANGE UP (ref 0.5–2.5)
RH IG SCN BLD-IMP: POSITIVE — SIGNIFICANT CHANGE UP
SODIUM SERPL-SCNC: 137 MMOL/L — SIGNIFICANT CHANGE UP (ref 135–145)
UIBC SERPL-MCNC: 147 UG/DL — SIGNIFICANT CHANGE UP (ref 110–370)
VIT B12 SERPL-MCNC: 931 PG/ML — HIGH (ref 200–900)
WBC # BLD: 4.35 K/UL — SIGNIFICANT CHANGE UP (ref 3.8–10.5)
WBC # FLD AUTO: 4.35 K/UL — SIGNIFICANT CHANGE UP (ref 3.8–10.5)

## 2017-07-22 PROCEDURE — 74010: CPT | Mod: 26

## 2017-07-22 PROCEDURE — 99232 SBSQ HOSP IP/OBS MODERATE 35: CPT | Mod: GC

## 2017-07-22 RX ORDER — DEXTROSE MONOHYDRATE, SODIUM CHLORIDE, AND POTASSIUM CHLORIDE 50; .745; 4.5 G/1000ML; G/1000ML; G/1000ML
1000 INJECTION, SOLUTION INTRAVENOUS
Qty: 0 | Refills: 0 | Status: DISCONTINUED | OUTPATIENT
Start: 2017-07-22 | End: 2017-07-23

## 2017-07-22 RX ORDER — HALOPERIDOL DECANOATE 100 MG/ML
5 INJECTION INTRAMUSCULAR EVERY 6 HOURS
Qty: 0 | Refills: 0 | Status: DISCONTINUED | OUTPATIENT
Start: 2017-07-22 | End: 2017-07-24

## 2017-07-22 RX ORDER — CLOZAPINE 150 MG/1
125 TABLET, ORALLY DISINTEGRATING ORAL AT BEDTIME
Qty: 0 | Refills: 0 | Status: DISCONTINUED | OUTPATIENT
Start: 2017-07-22 | End: 2017-07-24

## 2017-07-22 RX ORDER — VALPROIC ACID (AS SODIUM SALT) 250 MG/5ML
1000 SOLUTION, ORAL ORAL AT BEDTIME
Qty: 0 | Refills: 0 | Status: DISCONTINUED | OUTPATIENT
Start: 2017-07-22 | End: 2017-07-24

## 2017-07-22 RX ORDER — VENLAFAXINE HCL 75 MG
75 CAPSULE, EXT RELEASE 24 HR ORAL
Qty: 0 | Refills: 0 | Status: DISCONTINUED | OUTPATIENT
Start: 2017-07-22 | End: 2017-07-24

## 2017-07-22 RX ADMIN — Medication 37.5 MICROGRAM(S): at 07:05

## 2017-07-22 RX ADMIN — Medication 75 MILLIGRAM(S): at 07:06

## 2017-07-22 RX ADMIN — Medication 1000 MILLIGRAM(S): at 21:35

## 2017-07-22 RX ADMIN — FAMOTIDINE 20 MILLIGRAM(S): 10 INJECTION INTRAVENOUS at 10:00

## 2017-07-22 RX ADMIN — ENOXAPARIN SODIUM 40 MILLIGRAM(S): 100 INJECTION SUBCUTANEOUS at 20:32

## 2017-07-22 RX ADMIN — BIMATOPROST 1 DROP(S): 0.3 SOLUTION/ DROPS OPHTHALMIC at 21:36

## 2017-07-22 RX ADMIN — Medication 4: at 06:09

## 2017-07-22 RX ADMIN — DEXTROSE MONOHYDRATE, SODIUM CHLORIDE, AND POTASSIUM CHLORIDE 50 MILLILITER(S): 50; .745; 4.5 INJECTION, SOLUTION INTRAVENOUS at 13:04

## 2017-07-22 RX ADMIN — Medication 2: at 13:05

## 2017-07-22 RX ADMIN — Medication 2: at 18:05

## 2017-07-22 RX ADMIN — Medication 4: at 00:00

## 2017-07-22 RX ADMIN — Medication 1 DROP(S): at 13:06

## 2017-07-22 RX ADMIN — CLOZAPINE 125 MILLIGRAM(S): 150 TABLET, ORALLY DISINTEGRATING ORAL at 21:34

## 2017-07-22 RX ADMIN — FAMOTIDINE 20 MILLIGRAM(S): 10 INJECTION INTRAVENOUS at 21:33

## 2017-07-22 RX ADMIN — DEXTROSE MONOHYDRATE, SODIUM CHLORIDE, AND POTASSIUM CHLORIDE 100 MILLILITER(S): 50; .745; 4.5 INJECTION, SOLUTION INTRAVENOUS at 06:09

## 2017-07-22 NOTE — PROGRESS NOTE ADULT - ASSESSMENT
60 yo M w/ history of bipolar disorder, schizophrenia, HTN, DM, and hypothyroidism admitted 7/19 for a terminal ileum transition point SBO. 62 yo M w/ history of bipolar disorder, schizophrenia, HTN, DM, and hypothyroidism admitted 7/20 for a terminal ileum transition point SBO.  Latest CT scan on 07/21/17 revealed no obstruction present, thus likely resolved via management with NGT and NPO.    Plan:  -Clamp NGT today for 22 hour trial.  If tolerated, will advance diet slowly.  -Lower the rate of parenteral fluid  -Monitor electrolytes

## 2017-07-23 LAB
BUN SERPL-MCNC: 7 MG/DL — SIGNIFICANT CHANGE UP (ref 7–23)
CALCIUM SERPL-MCNC: 8.4 MG/DL — SIGNIFICANT CHANGE UP (ref 8.4–10.5)
CHLORIDE SERPL-SCNC: 99 MMOL/L — SIGNIFICANT CHANGE UP (ref 98–107)
CO2 SERPL-SCNC: 26 MMOL/L — SIGNIFICANT CHANGE UP (ref 22–31)
CREAT SERPL-MCNC: 0.62 MG/DL — SIGNIFICANT CHANGE UP (ref 0.5–1.3)
GLUCOSE SERPL-MCNC: 175 MG/DL — HIGH (ref 70–99)
HCT VFR BLD CALC: 30.3 % — LOW (ref 39–50)
HGB BLD-MCNC: 10 G/DL — LOW (ref 13–17)
MAGNESIUM SERPL-MCNC: 1.5 MG/DL — LOW (ref 1.6–2.6)
MCHC RBC-ENTMCNC: 29.5 PG — SIGNIFICANT CHANGE UP (ref 27–34)
MCHC RBC-ENTMCNC: 33 % — SIGNIFICANT CHANGE UP (ref 32–36)
MCV RBC AUTO: 89.4 FL — SIGNIFICANT CHANGE UP (ref 80–100)
NRBC # FLD: 0 — SIGNIFICANT CHANGE UP
PHOSPHATE SERPL-MCNC: 3.1 MG/DL — SIGNIFICANT CHANGE UP (ref 2.5–4.5)
PLATELET # BLD AUTO: 232 K/UL — SIGNIFICANT CHANGE UP (ref 150–400)
PMV BLD: 9.9 FL — SIGNIFICANT CHANGE UP (ref 7–13)
POTASSIUM SERPL-MCNC: 4.8 MMOL/L — SIGNIFICANT CHANGE UP (ref 3.5–5.3)
POTASSIUM SERPL-SCNC: 4.8 MMOL/L — SIGNIFICANT CHANGE UP (ref 3.5–5.3)
RBC # BLD: 3.39 M/UL — LOW (ref 4.2–5.8)
RBC # FLD: 14.4 % — SIGNIFICANT CHANGE UP (ref 10.3–14.5)
SODIUM SERPL-SCNC: 137 MMOL/L — SIGNIFICANT CHANGE UP (ref 135–145)
WBC # BLD: 5.68 K/UL — SIGNIFICANT CHANGE UP (ref 3.8–10.5)
WBC # FLD AUTO: 5.68 K/UL — SIGNIFICANT CHANGE UP (ref 3.8–10.5)

## 2017-07-23 PROCEDURE — 99231 SBSQ HOSP IP/OBS SF/LOW 25: CPT | Mod: GC

## 2017-07-23 RX ORDER — METOPROLOL TARTRATE 50 MG
5 TABLET ORAL ONCE
Qty: 0 | Refills: 0 | Status: COMPLETED | OUTPATIENT
Start: 2017-07-23 | End: 2017-07-23

## 2017-07-23 RX ORDER — LEVOTHYROXINE SODIUM 125 MCG
75 TABLET ORAL DAILY
Qty: 0 | Refills: 0 | Status: DISCONTINUED | OUTPATIENT
Start: 2017-07-24 | End: 2017-07-24

## 2017-07-23 RX ORDER — ATORVASTATIN CALCIUM 80 MG/1
40 TABLET, FILM COATED ORAL AT BEDTIME
Qty: 0 | Refills: 0 | Status: DISCONTINUED | OUTPATIENT
Start: 2017-07-23 | End: 2017-07-24

## 2017-07-23 RX ORDER — LISINOPRIL 2.5 MG/1
10 TABLET ORAL DAILY
Qty: 0 | Refills: 0 | Status: DISCONTINUED | OUTPATIENT
Start: 2017-07-23 | End: 2017-07-24

## 2017-07-23 RX ORDER — MAGNESIUM SULFATE 500 MG/ML
2 VIAL (ML) INJECTION ONCE
Qty: 0 | Refills: 0 | Status: COMPLETED | OUTPATIENT
Start: 2017-07-23 | End: 2017-07-23

## 2017-07-23 RX ORDER — FAMOTIDINE 10 MG/ML
20 INJECTION INTRAVENOUS
Qty: 0 | Refills: 0 | Status: DISCONTINUED | OUTPATIENT
Start: 2017-07-23 | End: 2017-07-24

## 2017-07-23 RX ORDER — INSULIN LISPRO 100/ML
VIAL (ML) SUBCUTANEOUS
Qty: 0 | Refills: 0 | Status: DISCONTINUED | OUTPATIENT
Start: 2017-07-23 | End: 2017-07-24

## 2017-07-23 RX ADMIN — Medication 5 MILLIGRAM(S): at 03:18

## 2017-07-23 RX ADMIN — LISINOPRIL 10 MILLIGRAM(S): 2.5 TABLET ORAL at 13:02

## 2017-07-23 RX ADMIN — BIMATOPROST 1 DROP(S): 0.3 SOLUTION/ DROPS OPHTHALMIC at 22:38

## 2017-07-23 RX ADMIN — FAMOTIDINE 20 MILLIGRAM(S): 10 INJECTION INTRAVENOUS at 18:16

## 2017-07-23 RX ADMIN — ENOXAPARIN SODIUM 40 MILLIGRAM(S): 100 INJECTION SUBCUTANEOUS at 18:16

## 2017-07-23 RX ADMIN — Medication 4: at 08:05

## 2017-07-23 RX ADMIN — Medication 50 GRAM(S): at 15:45

## 2017-07-23 RX ADMIN — CLOZAPINE 125 MILLIGRAM(S): 150 TABLET, ORALLY DISINTEGRATING ORAL at 22:37

## 2017-07-23 RX ADMIN — DEXTROSE MONOHYDRATE, SODIUM CHLORIDE, AND POTASSIUM CHLORIDE 50 MILLILITER(S): 50; .745; 4.5 INJECTION, SOLUTION INTRAVENOUS at 07:09

## 2017-07-23 RX ADMIN — Medication 2: at 12:53

## 2017-07-23 RX ADMIN — Medication: at 02:47

## 2017-07-23 RX ADMIN — Medication 37.5 MICROGRAM(S): at 07:04

## 2017-07-23 RX ADMIN — Medication 75 MILLIGRAM(S): at 07:04

## 2017-07-23 RX ADMIN — Medication: at 23:37

## 2017-07-23 RX ADMIN — Medication 1000 MILLIGRAM(S): at 22:38

## 2017-07-23 RX ADMIN — ATORVASTATIN CALCIUM 40 MILLIGRAM(S): 80 TABLET, FILM COATED ORAL at 22:37

## 2017-07-23 RX ADMIN — FAMOTIDINE 20 MILLIGRAM(S): 10 INJECTION INTRAVENOUS at 12:53

## 2017-07-23 NOTE — PROVIDER CONTACT NOTE (OTHER) - ASSESSMENT
hypertensive 180/90 not headache or pounding reported
pt is calm, not diaphoretic. Does not complain of dizziness or headache

## 2017-07-24 ENCOUNTER — TRANSCRIPTION ENCOUNTER (OUTPATIENT)
Age: 62
End: 2017-07-24

## 2017-07-24 VITALS
HEART RATE: 90 BPM | DIASTOLIC BLOOD PRESSURE: 90 MMHG | SYSTOLIC BLOOD PRESSURE: 160 MMHG | TEMPERATURE: 98 F | OXYGEN SATURATION: 97 % | RESPIRATION RATE: 18 BRPM

## 2017-07-24 DIAGNOSIS — H61.21 IMPACTED CERUMEN, RIGHT EAR: ICD-10-CM

## 2017-07-24 DIAGNOSIS — H91.90 UNSPECIFIED HEARING LOSS, UNSPECIFIED EAR: ICD-10-CM

## 2017-07-24 LAB
BUN SERPL-MCNC: 6 MG/DL — LOW (ref 7–23)
CALCIUM SERPL-MCNC: 8.9 MG/DL — SIGNIFICANT CHANGE UP (ref 8.4–10.5)
CHLORIDE SERPL-SCNC: 96 MMOL/L — LOW (ref 98–107)
CO2 SERPL-SCNC: 28 MMOL/L — SIGNIFICANT CHANGE UP (ref 22–31)
CREAT SERPL-MCNC: 0.71 MG/DL — SIGNIFICANT CHANGE UP (ref 0.5–1.3)
GLUCOSE SERPL-MCNC: 191 MG/DL — HIGH (ref 70–99)
HCT VFR BLD CALC: 35 % — LOW (ref 39–50)
HGB BLD-MCNC: 11.8 G/DL — LOW (ref 13–17)
MAGNESIUM SERPL-MCNC: 1.5 MG/DL — LOW (ref 1.6–2.6)
MCHC RBC-ENTMCNC: 30.3 PG — SIGNIFICANT CHANGE UP (ref 27–34)
MCHC RBC-ENTMCNC: 33.7 % — SIGNIFICANT CHANGE UP (ref 32–36)
MCV RBC AUTO: 90 FL — SIGNIFICANT CHANGE UP (ref 80–100)
NRBC # FLD: 0 — SIGNIFICANT CHANGE UP
PHOSPHATE SERPL-MCNC: 3.9 MG/DL — SIGNIFICANT CHANGE UP (ref 2.5–4.5)
PLATELET # BLD AUTO: 253 K/UL — SIGNIFICANT CHANGE UP (ref 150–400)
PMV BLD: 9.3 FL — SIGNIFICANT CHANGE UP (ref 7–13)
POTASSIUM SERPL-MCNC: 4 MMOL/L — SIGNIFICANT CHANGE UP (ref 3.5–5.3)
POTASSIUM SERPL-SCNC: 4 MMOL/L — SIGNIFICANT CHANGE UP (ref 3.5–5.3)
RBC # BLD: 3.89 M/UL — LOW (ref 4.2–5.8)
RBC # FLD: 14.4 % — SIGNIFICANT CHANGE UP (ref 10.3–14.5)
SODIUM SERPL-SCNC: 138 MMOL/L — SIGNIFICANT CHANGE UP (ref 135–145)
WBC # BLD: 5.73 K/UL — SIGNIFICANT CHANGE UP (ref 3.8–10.5)
WBC # FLD AUTO: 5.73 K/UL — SIGNIFICANT CHANGE UP (ref 3.8–10.5)

## 2017-07-24 PROCEDURE — 99232 SBSQ HOSP IP/OBS MODERATE 35: CPT

## 2017-07-24 PROCEDURE — 99232 SBSQ HOSP IP/OBS MODERATE 35: CPT | Mod: GC

## 2017-07-24 RX ORDER — LEVOTHYROXINE SODIUM 125 MCG
75 TABLET ORAL
Qty: 0 | Refills: 0 | Status: DISCONTINUED | OUTPATIENT
Start: 2017-07-25 | End: 2017-07-24

## 2017-07-24 RX ORDER — AMLODIPINE BESYLATE 2.5 MG/1
5 TABLET ORAL
Qty: 0 | Refills: 0 | Status: DISCONTINUED | OUTPATIENT
Start: 2017-07-24 | End: 2017-07-24

## 2017-07-24 RX ORDER — FERROUS SULFATE 325(65) MG
325 TABLET ORAL
Qty: 0 | Refills: 0 | Status: DISCONTINUED | OUTPATIENT
Start: 2017-07-24 | End: 2017-07-24

## 2017-07-24 RX ORDER — ASCORBIC ACID 60 MG
500 TABLET,CHEWABLE ORAL
Qty: 0 | Refills: 0 | Status: DISCONTINUED | OUTPATIENT
Start: 2017-07-24 | End: 2017-07-24

## 2017-07-24 RX ORDER — ASCORBIC ACID 60 MG
1 TABLET,CHEWABLE ORAL
Qty: 0 | Refills: 0 | COMMUNITY
Start: 2017-07-24

## 2017-07-24 RX ORDER — DIVALPROEX SODIUM 500 MG/1
2 TABLET, DELAYED RELEASE ORAL
Qty: 0 | Refills: 0 | COMMUNITY

## 2017-07-24 RX ORDER — SODIUM CHLORIDE 9 MG/ML
2 INJECTION INTRAMUSCULAR; INTRAVENOUS; SUBCUTANEOUS
Qty: 0 | Refills: 0 | Status: DISCONTINUED | OUTPATIENT
Start: 2017-07-24 | End: 2017-07-24

## 2017-07-24 RX ORDER — DOCUSATE SODIUM 100 MG
100 CAPSULE ORAL THREE TIMES A DAY
Qty: 0 | Refills: 0 | Status: DISCONTINUED | OUTPATIENT
Start: 2017-07-24 | End: 2017-07-24

## 2017-07-24 RX ORDER — DOCUSATE SODIUM 100 MG
1 CAPSULE ORAL
Qty: 0 | Refills: 0 | COMMUNITY
Start: 2017-07-24

## 2017-07-24 RX ORDER — ATORVASTATIN CALCIUM 80 MG/1
40 TABLET, FILM COATED ORAL AT BEDTIME
Qty: 0 | Refills: 0 | Status: DISCONTINUED | OUTPATIENT
Start: 2017-07-24 | End: 2017-07-24

## 2017-07-24 RX ORDER — MAGNESIUM SULFATE 500 MG/ML
2 VIAL (ML) INJECTION ONCE
Qty: 0 | Refills: 0 | Status: COMPLETED | OUTPATIENT
Start: 2017-07-24 | End: 2017-07-24

## 2017-07-24 RX ORDER — VALPROIC ACID (AS SODIUM SALT) 250 MG/5ML
20 SOLUTION, ORAL ORAL
Qty: 0 | Refills: 0 | COMMUNITY
Start: 2017-07-24

## 2017-07-24 RX ADMIN — Medication 325 MILLIGRAM(S): at 12:13

## 2017-07-24 RX ADMIN — LISINOPRIL 10 MILLIGRAM(S): 2.5 TABLET ORAL at 05:50

## 2017-07-24 RX ADMIN — Medication 50 GRAM(S): at 11:47

## 2017-07-24 RX ADMIN — Medication: at 12:13

## 2017-07-24 RX ADMIN — Medication 75 MICROGRAM(S): at 05:22

## 2017-07-24 RX ADMIN — Medication 100 MILLIGRAM(S): at 14:28

## 2017-07-24 RX ADMIN — FAMOTIDINE 20 MILLIGRAM(S): 10 INJECTION INTRAVENOUS at 05:50

## 2017-07-24 RX ADMIN — AMLODIPINE BESYLATE 5 MILLIGRAM(S): 2.5 TABLET ORAL at 10:14

## 2017-07-24 RX ADMIN — Medication 2: at 10:14

## 2017-07-24 RX ADMIN — Medication 500 MILLIGRAM(S): at 10:14

## 2017-07-24 RX ADMIN — SODIUM CHLORIDE 2 GRAM(S): 9 INJECTION INTRAMUSCULAR; INTRAVENOUS; SUBCUTANEOUS at 10:14

## 2017-07-24 NOTE — PROGRESS NOTE ADULT - ASSESSMENT
- Iron deficient anemia - treat with iron and vitamin C, needs outpatient endoscopy  - Resume sodium tablets for hyponatremia   - add amlodipine for HTN  - D/c home

## 2017-07-24 NOTE — DISCHARGE NOTE ADULT - CONDITIONS AT DISCHARGE
Alert & oriented. Out of bed ambulating. Tolerating diet without nausea or vomiting. Abdomen is softly distended, + BS, + flatus. Voiding without difficulty. Vitals stable, afebrile. Understands all discharge instruction & will follow up with the MD. Time allowed for questions.

## 2017-07-24 NOTE — DISCHARGE NOTE ADULT - MEDICATION SUMMARY - MEDICATIONS TO TAKE
I will START or STAY ON the medications listed below when I get home from the hospital:    lisinopril 10 mg oral tablet  -- 1 tab(s) by mouth once a day  -- Indication: For Essential hypertension    Depakene 250 mg/5 mL oral syrup  -- 20 milliliter(s) by mouth once a day (at bedtime)  -- Indication: For Seizure    venlafaxine 75 mg oral capsule, extended release  -- 1 cap(s) by mouth once a day  -- Indication: For Seizure    glipiZIDE 10 mg oral tablet  -- 1 tab(s) by mouth 2 times a day  -- Indication: For Type 2 DM    Januvia 100 mg oral tablet  -- 1 tab(s) by mouth once a day  -- Indication: For Type 2 DM    Glucophage 1000 mg oral tablet  -- 1 tab(s) by mouth 2 times a day  -- Indication: For Type 2 DM    loratadine 10 mg oral tablet  -- 1 tab(s) by mouth once a day  -- Indication: For Allergies    Lipitor 40 mg oral tablet  -- 1 tab(s) by mouth once a day (at bedtime)  -- Indication: For HLD    cloZAPine 25 mg oral tablet  -- 1 tab(s) by mouth once a day  -- Indication: For Schizophrenia    cloZAPine 100 mg oral tablet  -- 1 tab(s) by mouth once a day  -- Indication: For Schizophrenia    docusate sodium 100 mg oral capsule  -- 1 cap(s) by mouth 3 times a day  -- Indication: For Bowel Regimen    sodium chloride 1000 mg oral tablet  -- 2 tab(s) by mouth once a day  -- Indication: For Supplements    Lumigan 0.01% ophthalmic solution  -- 1 drop(s) to each affected eye once a day (in the evening)  -- Indication: For Glaucoma    timolol maleate 0.5% ophthalmic gel forming solution  -- 1 drop(s) to each affected eye once a day  -- Indication: For GLaucoma    Synthroid 75 mcg (0.075 mg) oral tablet  -- 1 tab(s) by mouth once a day  -- Indication: For Hypothyroid    ascorbic acid 500 mg oral tablet  -- 1 tab(s) by mouth   -- Indication: For Supplements

## 2017-07-24 NOTE — PROGRESS NOTE BEHAVIORAL HEALTH - NSBHCHARTREVIEWLAB_PSY_A_CORE FT
11.8   5.73  )-----------( 253      ( 24 Jul 2017 07:10 )             35.0   07-24    138  |  96<L>  |  6<L>  ----------------------------<  191<H>  4.0   |  28  |  0.71    Ca    8.9      24 Jul 2017 07:10  Phos  3.9     07-24  Mg     1.5     07-24

## 2017-07-24 NOTE — DISCHARGE NOTE ADULT - HOSPITAL COURSE
61-year-old male with significant medical history of hypertension, Type 2 diabetes, schizophrenia, obsessive compulsive disorder, bipolar disorder, HLD, hypothyroidism, chronic hyponatremia, glaucoma, and BPH presented to the ED at [facility] on 7/19/17 with complaints of having abdominal pain for several days that was accompanied with nausea and vomiting. He had a bowel movement and passed flatus while in the ED. He had a CT of his abdomen performed on 07/19/17 showing small bowel obstruction with transition point at the terminal ileum with associated mesenteric edema, trace interloop fluid, and small volume abdominal and pelvic ascites. He has not previously had intra-abdominal surgery. Follow-up CT scan indicated that the bowel did not have a physical obstruction (obstruction resolved). Patient is uncertain if he has ever had a colonoscopy, and we recommend that he follows up with his primary care physician for elective colonscopy.    Additionally we have discussed with the patient the necessity to take sodium, iron (over-the-counter) and additional vitamin supplements.

## 2017-07-24 NOTE — DISCHARGE NOTE ADULT - PLAN OF CARE
return to your baseline , diet as tolerated , pain control Please follow up with Dr. Colvin within 1-2 weeks after discharge from the hospital. You may call [phone] to schedule an appointment. Please follow-up with your primary care doctor for further management, and discuss the recent hospitalization

## 2017-07-24 NOTE — PROGRESS NOTE ADULT - ATTENDING COMMENTS
July 21st, 2017  1:10 PM    Hospital Day #2    This patient was seen / evaluated on daily B-Team rounds. Care was discussed at B-Team morning report sign out. Initial assessment (H&P) completed.    BP		=	132 – 150/61 - 78  P		=	63 - 94		O2 Sat	=	95% - 99%  R		=	17 -18		I/O	=	1,250 in/2,850 out  Temp		=	36.8 – 37.1			-1.6 liters since admission  							NGT = 1,450 ml  Glucose	=	123 – 175    Weight		=	91 Kg  BMI		=	31.4    Awake, alert, and fully oriented. In no distress and non-toxic.  Anicteric. No thrush. NGT with clear fluid output.  No JVD.  Lungs clear with non-labored respirations. Symmetrical chest wall movement.  COR – RRR. No murmur.  Abdomen softly distended. No tenderness. No palpable masses or abdominal wall hernias. Bowel sounds hypoactive. No CVA tenderness.  Extremities well perfused. Some edema of his hands and scattered areas of a rash.    WBC	=	5	Na		=	141  Neutro	=	-	K		=	4.0  Hgb	=	11	Cl		=	102  Hct	=	31%	HCO3		=	27  Plts	=	194	Glucose	=	191  			BUN		=	8  PT	=	-	Creat		=	0.6  PTT	=	-  INR	=	-    Remains on:    1)	NPO  2)	D5½NS @ 100 ml/hour  3)	Enoxaparin 40 mg sq q24 hours  4)	Clozapine 25 mg via NGT qAM and 100 mg via NGT qPM  5)	Effexor XR 75 mg via NGT q24 hours  6)	Haldol 5 mg IM q6 hours prn  7)	Valproic Acid 500 mg via NGT q12 hours  8)	Insulin sliding scale  9)	Synthroid 37.5 micrograms IVP q24 hours  10)	Eye drops  	a.	Xalatan 1gtts ou qhs  	b.	Timoptic 1 gtts ou q24 hours      Assessment:	This patient is assessed as an acutely and chronically ill 61-year-old hypertensive, non-insulin requiring type 2 diabetic male who has schizophrenia, obsessive compulsive disorder, bipolar disorder, hyperlipidemia, hypothyroidism, chronic hyponatremia, possibly a seizure disorder, glaucoma, and benign prostatic hypertrophy who presented to the ED at [facility] at approximately 2:20 PM on 7/19/17 with complaints of having three to four days of abdominal pain that was accompanied with nausea and intermittent emesis. He had a bowel movement and passed flatus while in the ED. He was found on imaging tests to have evidence of having a small bowel obstruction with a transition zone in the distal small bowel. He has not previously had intra-abdominal surgery. It is not clear if he has had lower endoscopy and there is no history of having inflammatory bowel disease or tuberculosis.    The plan was to:    1)	Admit this patient to the Acute Care (B-Team) Surgical service.  2)	Review the imaging tests.  3)	Plan to place an NGT.  4)	Keep him NPO and give parenteral fluid.  5)	Re-check and replete electrolytes.  6)	Obtain a psychiatry consultation.  7)	Provide mechanical and chemical VTE prophylaxis.  8)	Check for the etiology of his anemia. Has not previously had lower  	endoscopy.  9)	Obtain delayed imaging if he does not rapidly improve the symptoms  	of his bowel obstruction. May require surgery but his abdomen is distended  	but otherwise benign.  10)	Try to obtain additional patient history – tuberculosis, prior lower  	endoscopy, etc.  11)	Try to determine who gives informed consent  for this patient.  12)	Full support in place    Topher Colvin  20 minutes exclusive of procedures.
July 22nd, 2017  9 AM    Hospital Day #3    This patient was seen / evaluated on daily B-Team rounds. Care was discussed at B-Team morning report sign out. CT scan from yesterday reviewed.    BP		=	135 – 150/74 - 91  P		=	77 - 95		O2 Sat	=	95% - 100%  R		=	16 -18		I/O	=	2,400 in/3,200 out  Temp		=	36.7 – 37.1			-2.4 liters since admission  							NGT = 1,050 ml  Glucose	=	124 – 214    Weight		=	91 Kg  BMI		=	31.4    Awake, alert, and fully oriented. In no distress and non-toxic.  Anicteric. No thrush. NGT with clear fluid output.  No JVD.  Lungs clear with non-labored respirations. Symmetrical chest wall movement.  COR – RRR. No murmur.  Abdomen softly distended. No tenderness. No palpable masses or abdominal wall hernias. Bowel sounds hypoactive. No CVA tenderness.  Extremities well perfused. Some edema of his hands and scattered areas of a rash.    WBC	=	4	Na		=	137	Reticulocyte	=	1.8%  Neutro	=	62%	K		=	3.8	  Hgb	=	9	Cl		=	97	Iron		=	20  Hct	=	28%	HCO3		=	29	TIBC		=	167  Plts	=	230	Glucose	=	210	B12		=	931  			BUN		=	6	Folate		>	20  PT	=	15.2	Creat		=	0.6  PTT	=	31.1					Hgb A1-C	=	7.5  INR	=	1.35    Contrast enhanced CT scan of the abdomen and pelvis on 7/19/17 – No bowel obstruction, trace with a small amount of ascites. A small right pleural effusion. Gallbladder sludge. Gastric diverticulum.    Remains on:    1)	NPO with sips / chips  2)	D5NS + 20 KCl/liter @ 100 ml/hour  3)	Enoxaparin 40 mg sq q24 hours  4)	Clozapine 125 mg po qPM  5)	Effexor XR 75 mg po qAM  6)	Haldol 5 mg IM q6 hours prn  7)	Valproic Acid 1000 mg po qhs  8)	Insulin sliding scale  9)	Synthroid 37.5 micrograms IVP q24 hours  9)	Pepcid 20 mg IVP q12 hours  10)	Eye drops  	a.	Lumigan 1gtts ou qhs  	b.	Timoptic 1 gtts ou q24 hours    Assessment:	This patient is assessed as an acutely and chronically ill 61-year-old hypertensive, non-insulin requiring type 2 diabetic male who has schizophrenia, obsessive compulsive disorder, bipolar disorder, hyperlipidemia, hypothyroidism, chronic hyponatremia, possibly a seizure disorder, glaucoma, and benign prostatic hypertrophy who presented to the ED at [facility] [place] at approximately 2:20 PM on 7/19/17 with complaints of having three to four days of abdominal pain that was accompanied with nausea and intermittent emesis. He had a bowel movement and passed flatus while in the ED. He was found on imaging tests to have evidence of having a small bowel obstruction with a transition zone in the distal small bowel. He has not previously had intra-abdominal surgery. Follow-up CT scan / delayed images failed to reveal evidence of the presence of a bowel obstruction (resolution of obstruction) and he has been found to have iron deficiency anemia. It is not clear if he has had lower endoscopy.    The plan was to:    1)	Review the imaging tests.  2)	Plan to clamp the NGT. If tolerated will gradually advance his diet.  3)	Lower the rate of parenteral fluid.  4)	If the patient tolerates an oral diet will resume the Lipitor, change the synthroid to an  	oral form, and resume NaCl tablets.  5)	Re-check and replete electrolytes.  6)	Give iron and vitamin C.  7)	Inform the patient and his family members of the finding of iron deficiency anemia  	and the need for endoscopy.  8)	Full support in place    Topher Colvin  20 minutes exclusive of procedures.
July 24nd, 2017  8:30 AM    Hospital Day #5    This patient was seen / evaluated on daily B-Team rounds. Care was discussed at B-Team morning report sign out. NGT removed and now getting an oral diet. Hematocrit and hemoglobin increasing without transfusions.     BP		=	146 – 189/75 - 108  P		=	75 - 88		O2 Sat	=	96% - 100%  R		=	15 -18		I/O	=	1,200 in/550 out  Temp		=	36.4 – 37.1			-2.1 liters since admission    Glucose	=	129 – 205    Weight		=	91 Kg  BMI		=	31.4    Awake, alert, and fully oriented. In no distress and non-toxic.  Anicteric. No thrush.  No JVD.  Lungs clear with non-labored respirations. Symmetrical chest wall movement.  COR – RRR. No murmur.  Abdomen softly distended. No tenderness. No palpable masses or abdominal wall hernias. Bowel sounds hypoactive. No CVA tenderness.  Extremities well perfused. Some edema of his hands and scattered areas of a rash.    WBC	=	6	Na		=	138	Reticulocyte	=	1.8%  Neutro	=	-	K		=	4.0	  Hgb	=	12	Cl		=	96	Iron		=	20  Hct	=	35%	HCO3		=	28	TIBC		=	167  Plts	=	253	Glucose	=	191	B12		=	931  			BUN		=	6	Folate		>	20  PT	=	-	Creat		=	0.7  PTT	=	-					Hgb A1-C	=	7.5  INR	=	-    Contrast enhanced CT scan of the abdomen and pelvis on 7/19/17 – No bowel obstruction, trace with a small amount of ascites. A small right pleural effusion. Gallbladder sludge. Gastric diverticulum.    Remains on:    1)	Regular, low carbohydrate diet  2)	Enoxaparin 40 mg sq q24 hours  3)	Clozapine 125 mg po qhs  4)	Effexor XR 75 mg po qAM  5)	Haldol 5 mg IVP q6 hours prn  6)	Valproic Acid 1000 mg po qhs  7)	Insulin sliding scale  8)	Synthroid 75 micrograms po q24 hours  9)	Lipitor 40 mg po qhs  10)	Lisinopril 10 mg po q24 hours  11)	Pepcid 20 mg po q12 hours  12)	Eye drops  	a.	Braxton,olon 1 gtts ou q12 hours  	b.	Lumigan 1 gtts ou q24 hours    Assessment:	This patient is assessed as an acutely and chronically ill 61-year-old hypertensive, non-insulin requiring type 2 diabetic male who has schizophrenia, obsessive compulsive disorder, bipolar disorder, hyperlipidemia, hypothyroidism, chronic hyponatremia, possibly a seizure disorder, glaucoma, and benign prostatic hypertrophy who presented to the ED at [facility] at approximately 2:20 PM on 7/19/17 with complaints of having three to four days of abdominal pain that was accompanied with nausea and intermittent emesis. He had a bowel movement and passed flatus while in the ED. He was found on imaging tests to have evidence of having a small bowel obstruction with a transition zone in the distal small bowel. He has not previously had intra-abdominal surgery. Follow-up CT scan / delayed images failed to reveal evidence of the presence of a bowel obstruction (resolution of obstruction) and he has been found to have iron deficiency anemia. It is not clear if he has had lower endoscopy.    The plan was to:    1)	Resume NaCl tablets.  2)	Give amlodipine.  3)	Treat with iron and vitamin C.  4)	Clarify orders  5)	Inform the patient and his family members of the finding of iron deficiency anemia  	and the need for endoscopy. May benefit from a capsule study of the small bowel.  6)	Discharge to home.    Topher Colvin  15 minutes exclusive of procedures.
I have personally interviewed and examined this patient, reviewed pertinent labs and imaging, and discussed the case with residents on B Team rounds.  See their adjacent note for details.    The active care issues are:  1. small bowel obstruction, resolved  2. psychiatric d/o    Advance oral diet and assess for tolerance.  May benefit from colonoscopy; will consult GI.

## 2017-07-24 NOTE — PROGRESS NOTE BEHAVIORAL HEALTH - NSBHCHARTREVIEWINVESTIGATE_PSY_A_CORE FT
Ventricular Rate 94 BPM    Atrial Rate 94 BPM    P-R Interval 148 ms    QRS Duration 92 ms    Q-T Interval 368 ms    QTC Calculation(Bezet) 460 ms    P Axis 56 degrees    R Axis 15 degrees    T Axis 67 degrees    Diagnosis Line Normal sinus rhythm  Normal ECG

## 2017-07-24 NOTE — DISCHARGE NOTE ADULT - CARE PLAN
Principal Discharge DX:	Small bowel obstruction  Goal:	return to your baseline , diet as tolerated , pain control  Instructions for follow-up, activity and diet:	Please follow up with Dr. Colvin within 1-2 weeks after discharge from the hospital. You may call [phone] to schedule an appointment.  Secondary Diagnosis:	Essential hypertension  Goal:	Please follow-up with your primary care doctor for further management, and discuss the recent hospitalization  Secondary Diagnosis:	Schizophrenia  Goal:	Please follow-up with your primary care doctor for further management, and discuss the recent hospitalization  Secondary Diagnosis:	Hypothyroid  Goal:	Please follow-up with your primary care doctor for further management, and discuss the recent hospitalization Principal Discharge DX:	Small bowel obstruction  Goal:	return to your baseline , diet as tolerated , pain control  Instructions for follow-up, activity and diet:	Please follow up with Dr. Colvin within 1-2 weeks after discharge from the hospital. You may call [phone] to schedule an appointment.  Secondary Diagnosis:	Essential hypertension  Goal:	Please follow-up with your primary care doctor for further management, and discuss the recent hospitalization  Secondary Diagnosis:	Schizophrenia  Goal:	Please follow-up with your primary care doctor for further management, and discuss the recent hospitalization  Secondary Diagnosis:	Hypothyroid  Goal:	Please follow-up with your primary care doctor for further management, and discuss the recent hospitalization Principal Discharge DX:	Small bowel obstruction  Goal:	return to your baseline , diet as tolerated , pain control  Instructions for follow-up, activity and diet:	Please follow up with Dr. Colvin within 1-2 weeks after discharge from the hospital. You may call [phone] to schedule an appointment.  Secondary Diagnosis:	Essential hypertension  Goal:	Please follow-up with your primary care doctor for further management, and discuss the recent hospitalization  Secondary Diagnosis:	Schizophrenia  Goal:	Please follow-up with your primary care doctor for further management, and discuss the recent hospitalization  Secondary Diagnosis:	Hypothyroid  Goal:	Please follow-up with your primary care doctor for further management, and discuss the recent hospitalization

## 2017-07-24 NOTE — DISCHARGE NOTE ADULT - CARE PROVIDER_API CALL
Topher Colvin (MD), Surgery  [address] [facility] [address]  Phone: [phone]  Fax: [phone]

## 2017-07-24 NOTE — PROGRESS NOTE ADULT - SUBJECTIVE AND OBJECTIVE BOX
Surgery B Team Progress Note  Hospital Day 3    Subjective:  Patient was examined at bedside, resting well.  No acute overnight events.  Denies passing flatus and denies BM.  No complaints at this time.      Objective:    Vitals:  T(C): 36.9 (22 Jul 2017 14:40), Max: 37 (21 Jul 2017 22:02)  T(F): 98.4 (22 Jul 2017 14:40), Max: 98.6 (21 Jul 2017 22:02)  HR: 82 (22 Jul 2017 14:40) (77 - 88)  BP: 165/98 (22 Jul 2017 14:40) (141/82 - 165/98)  BP(mean): --  ABP: --  ABP(mean): --  RR: 16 (22 Jul 2017 14:40) (16 - 16)  SpO2: 97% (22 Jul 2017 14:40) (96% - 100%)    Labs:                        9.4    4.35  )-----------( 230      ( 22 Jul 2017 06:00 )             28.4       07-22    137  |  97<L>  |  6<L>  ----------------------------<  210<H>  3.8   |  29  |  0.58    Ca    8.3<L>      22 Jul 2017 06:00  Phos  2.4     07-21  Mg     1.6     07-21        I&O's Detail    21 Jul 2017 07:01  -  22 Jul 2017 07:00  --------------------------------------------------------  IN:    dextrose 5% + sodium chloride 0.45%.: 800 mL    dextrose 5% + sodium chloride 0.9% with potassium chloride 20 mEq/L: 1600 mL  Total IN: 2400 mL    OUT:    Nasoenteral Tube: 1050 mL    Voided: 2150 mL  Total OUT: 3200 mL    Total NET: -800 mL      22 Jul 2017 07:01  -  22 Jul 2017 17:02  --------------------------------------------------------  IN:    dextrose 5% + sodium chloride 0.9% with potassium chloride 20 mEq/L: 200 mL    dextrose 5% + sodium chloride 0.9% with potassium chloride 20 mEq/L: 200 mL  Total IN: 400 mL    OUT:  Total OUT: 0 mL    Total NET: 400 mL    Physical Exam:  General: NAD  Lungs: Nonlabored breathing  Abdomen: Softly distended, nontender    MEDICATIONS  (STANDING):  levothyroxine Injectable 37.5 MICROGram(s) IV Push daily  enoxaparin Injectable 40 milliGRAM(s) SubCutaneous <User Schedule>  insulin lispro (HumaLOG) corrective regimen sliding scale   SubCutaneous every 6 hours  famotidine Injectable 20 milliGRAM(s) IV Push <User Schedule>  bimatoprost 0.01% Ophthalmic Solution 1 Drop(s) Both EYES at bedtime  dextrose 5% + sodium chloride 0.9% with potassium chloride 20 mEq/L 1000 milliLiter(s) (50 mL/Hr) IV Continuous <Continuous>  cloZAPine 125 milliGRAM(s) Oral at bedtime  valproic  acid Syrup 1000 milliGRAM(s) Oral at bedtime  venlafaxine XR. 75 milliGRAM(s) Oral <User Schedule>  Timolol 0.5% PF othalmic solution 1 Drop(s) 1 Drop(s) Both EYES two times a day    MEDICATIONS  (PRN):  haloperidol    Injectable 5 milliGRAM(s) IV Push every 6 hours PRN Agitation  haloperidol    Injectable 5 milliGRAM(s) IV Push every 6 hours PRN Agitation    Imaging:  CT Abdomen 07/21/17  IMPRESSION:    No bowel obstruction with progression of contrast into the colon.   Interval improvement in small bowel distention.
B Team Surgery     Subjective:   CURTIS DIAZ is a 61y Male with history of bipolar disorder, schizophrenia, HTN, DM, and hypothyroidism admitted 7/19 for a terminal ileum transition point SBO.     The patient denies any pain, but continue to wait on GI function.     Objective:  Allergies:  - No Known Allergies      PMH:  - Seborrheic dermatitis  - Bipolar disorder  - Schizophrenia  - Glaucoma  - Hypertension  - Hypothyroid  - Diabetes  - Small bowel obstruction    Meds:   - levothyroxine Injectable 37.5 MICROGram(s) IV Push daily  - enoxaparin Injectable 40 milliGRAM(s) SubCutaneous <User Schedule>  - dextrose 5% + sodium chloride 0.45%. 1000 milliLiter(s) IV Continuous <Continuous>  - timolol 0.5% Solution 1 Drop(s) Both EYES daily  - insulin lispro (HumaLOG) corrective regimen sliding scale   SubCutaneous every 6 hours  - latanoprost 0.005% Ophthalmic Solution 1 Drop(s) Both EYES at bedtime  - venlafaxine XR. 75 milliGRAM(s) Oral <User Schedule>  - valproic  acid Syrup 500 milliGRAM(s) Oral <User Schedule>  - cloZAPine 100 milliGRAM(s) Oral <User Schedule>  - cloZAPine 25 milliGRAM(s) Oral <User Schedule>  - haloperidol    Injectable 5 milliGRAM(s) IV Push every 6 hours PRN  - haloperidol    Injectable 5 milliGRAM(s) IntraMuscular every 6 hours PRN    ICU Vital Signs Last 24 Hrs  T(C): 37.1 (21 Jul 2017 06:03), Max: 37.1 (20 Jul 2017 17:46)  T(F): 98.8 (21 Jul 2017 06:03), Max: 98.8 (21 Jul 2017 06:03)  HR: 94 (21 Jul 2017 06:03) (63 - 95)  BP: 150/78 (21 Jul 2017 06:03) (130/78 - 168/83)  BP(mean): --  ABP: --  ABP(mean): --  RR: 18 (21 Jul 2017 06:03) (17 - 18)  SpO2: 95% (21 Jul 2017 06:03) (95% - 100%)    I/O:   07-20-17 @ 07:01  -  07-21-17 @ 06:35  --------------------------------------------------------  IN: 800 mL / OUT: 2600 mL / NET: -1800 mL    Physical Exam:   - General: alert, interactive during exam, NAD  - HEENT: NGT   - Pulm: breathing comfortably   - Abd: distended, nontender   - Msk: moving all extremities, moves in bed without issue   - Neuro: answers simple questions appropriately     Labs:                         9.8    3.84  )-----------( 191      ( 20 Jul 2017 18:26 )             29.2   07-20    137  |  100  |  9   ----------------------------<  134<H>  4.3   |  25  |  0.64    Ca    8.0<L>      20 Jul 2017 18:26  Phos  1.9     07-20  Mg     1.6     07-20    TPro  5.9<L>  /  Alb  3.2<L>  /  TBili  0.2  /  DBili  x   /  AST  11  /  ALT  8   /  AlkPhos  43  07-19    Rads:   CT Abd/Pelvis (7/19): Small bowel obstruction with transition point at the terminal ileum. Associated mesenteric edema, trace interloop fluid, and small volume abdominal and pelvic ascites.    Assessment:   CURTIS DIAZ is a 61y Male admitted on 7/19 for SBO, being treated medically with NGT for GI decompression. NGT continues to put out a significant amount, 1200 mL in the past 24 hours. Abdomen remains distended. The patient claimed to have a bowel movement on 7/20, but nursing notes do not corroborate. Will continue with current treatment and await confirmation of return of GI function.    Plan:   - NPO + NGT  - Home psychiatric medications through NGT   - Await GI function
B Team Surgery - [id number]    Subjective:   CURTIS DIAZ is a 61y Male with history of bipolar, schizophrenia, DM, hypothyroidism, and HTN admitted for SBO on 7/20. The patient had resumption of GI function. Denies any nausea, vomiting, or diarrhea. Tolerating clear liquids without issue.     Objective:  Allergies:  - No Known Allergies    PMH:  - Seborrheic dermatitis  - Bipolar disorder  - Glaucoma  - Hypertension  - Hypothyroid  - Diabetes  - Small bowel obstruction  - Paranoid schizophrenia    Meds:   - enoxaparin Injectable 40 milliGRAM(s) SubCutaneous <User Schedule>  - insulin lispro (HumaLOG) corrective regimen sliding scale   SubCutaneous every 6 hours  - haloperidol    Injectable 5 milliGRAM(s) IV Push every 6 hours PRN  - bimatoprost 0.01% Ophthalmic Solution 1 Drop(s) Both EYES at bedtime  - haloperidol    Injectable 5 milliGRAM(s) IV Push every 6 hours PRN  - cloZAPine 125 milliGRAM(s) Oral at bedtime  - valproic  acid Syrup 1000 milliGRAM(s) Oral at bedtime  - venlafaxine XR. 75 milliGRAM(s) Oral <User Schedule>  - Timolol 0.5% PF othalmic solution 1 Drop(s) 1 Drop(s) Both EYES two times a day  - lisinopril 10 milliGRAM(s) Oral daily  - famotidine    Tablet 20 milliGRAM(s) Oral two times a day  - levothyroxine 75 MICROGram(s) Oral daily  - atorvastatin 40 milliGRAM(s) Oral at bedtime    ICU Vital Signs Last 24 Hrs  T(C): 36.4 (23 Jul 2017 17:44), Max: 37.4 (22 Jul 2017 22:00)  T(F): 97.6 (23 Jul 2017 17:44), Max: 99.4 (22 Jul 2017 22:00)  HR: 78 (23 Jul 2017 17:44) (74 - 94)  BP: 146/84 (23 Jul 2017 17:44) (146/84 - 189/108)  BP(mean): --  ABP: --  ABP(mean): --  RR: 18 (23 Jul 2017 17:44) (17 - 18)  SpO2: 100% (23 Jul 2017 17:44) (96% - 100%)    I/O:     07-22-17 @ 07:01  -  07-23-17 @ 07:00  --------------------------------------------------------  IN: 1300 mL / OUT: 1650 mL / NET: -350 mL    07-23-17 @ 07:01  -  07-23-17 @ 21:36  --------------------------------------------------------  IN: 1200 mL / OUT: 550 mL / NET: 650 mL    Physical Exam:   - General: alert, interactive throughout exam, NAD  - Pulm: breathing comfortably on room air  - Abd: soft, NTD, mild distention   - Msk: ambulating     Labs:                         10.0   5.68  )-----------( 232      ( 23 Jul 2017 10:00 )             30.3   07-23    137  |  99  |  7   ----------------------------<  175<H>  4.8   |  26  |  0.62    Ca    8.4      23 Jul 2017 09:30  Phos  3.1     07-23  Mg     1.5     07-23    Rads:   CT Abd/Pelvis (7/22): No bowel obstruction with progression of contrast into the colon. Interval improvement in small bowel distention.    Assessment:   CURTIS DIAZ is a 61y Male admitted for SBO, now resolved (with GI function and resolution on CT scan). The patient is asymptomatic. Tolerating CLD.     Plan:   - Removed NGT  - Will advance to regular diet   - Resume home medications
B team surgery progress note    No acute events overnight. Tolerated 2 clear diets yesterday without n/v. Passing flatus, no stool yesterday. Otherwise, pain is well controlled.    O:  ICU Vital Signs Last 24 Hrs  T(C): 36.6 (24 Jul 2017 10:19), Max: 37.1 (23 Jul 2017 22:49)  T(F): 97.8 (24 Jul 2017 10:19), Max: 98.7 (23 Jul 2017 22:49)  HR: 85 (24 Jul 2017 10:19) (74 - 89)  BP: 147/96 (24 Jul 2017 10:19) (146/84 - 180/92)  RR: 18 (24 Jul 2017 10:19) (15 - 18)  SpO2: 96% (24 Jul 2017 10:19) (96% - 100%)    Vitals: Afebrile, hypertensive    Gen: NAD  Abd: Non-tender, soft, distended                          11.8   5.73  )-----------( 253      ( 24 Jul 2017 07:10 )             35.0     07-24    138  |  96<L>  |  6<L>  ----------------------------<  191<H>  4.0   |  28  |  0.71    Ca    8.9      24 Jul 2017 07:10  Phos  3.9     07-24  Mg     1.5     07-24      I&O's Detail    23 Jul 2017 07:01  -  24 Jul 2017 07:00  --------------------------------------------------------  IN:    dextrose 5% + sodium chloride 0.9% with potassium chloride 20 mEq/L: 350 mL    IV PiggyBack: 50 mL    Oral Fluid: 800 mL  Total IN: 1200 mL    OUT:    Voided: 550 mL  Total OUT: 550 mL    Total NET: 650 mL
Team B Surgery Progress Note    Patient denies abdominal pain. Has some nausea, but no vomiting.    Tmax: 37 (07-20-17 @ 05:24)  HR: 95  BP: 168/83  RR: 18  SpO2: 100%    Gen: NAD  Lungs: Non-labored breathing  Heart: S1, S2  Abdomen: Soft, distended, minimal periumbilical TTP. No skin changes.  Extremities: No deformities    07-19-17    WBC: 5.02  Hgb: 10.2  Hct: 29.8  Plt: 179    Na: 132  K: 4.2  Cl: 98  HCO3: 22  BUN: 26  Cr: 0.87  Glu: 89    Ca: 7.7    Protein: 5.9  Albumin: 3.2  Total bilirubin: 0.2  AST: 11  ALT: 8  Lipase: 40.2    CT: Small bowel obstruction with transition point at the terminal ileum.

## 2017-07-24 NOTE — DISCHARGE NOTE ADULT - PATIENT PORTAL LINK FT
“You can access the [employer] Patient Portal, offered by [facility], by registering with the following website: [email or web address]”

## 2017-07-24 NOTE — PROGRESS NOTE BEHAVIORAL HEALTH - SUMMARY
Pt is a 60 yo man, domiciled with his brother, with a hx of schizophrenia and OCD, multiple past psychiatric hospitalizations (last in late 90s), no past suicide attempts, no current substance use, PMH Diabetes, Glaucoma, Hypertension, Hypothyroid, Seborrheic dermatitis, presents with a chief complaint of abdominal pain, nausea, and emesis.  Found to have primary SBO now resolved.  Pt has been psychiatrically stable on current regiment.  Pt continues to be psychiatrically stable and may be discharged when medically cleared.

## 2017-07-24 NOTE — PROGRESS NOTE BEHAVIORAL HEALTH - NSBHCHARTREVIEWVS_PSY_A_CORE FT
Vital Signs Last 24 Hrs  T(C): 36.6 (24 Jul 2017 14:59), Max: 37.1 (23 Jul 2017 22:49)  T(F): 97.8 (24 Jul 2017 14:59), Max: 98.7 (23 Jul 2017 22:49)  HR: 90 (24 Jul 2017 14:59) (75 - 90)  BP: 160/90 (24 Jul 2017 14:59) (146/84 - 180/90)  BP(mean): --  RR: 18 (24 Jul 2017 14:59) (15 - 18)  SpO2: 97% (24 Jul 2017 14:59) (96% - 100%)

## 2017-07-24 NOTE — DISCHARGE NOTE ADULT - INSTRUCTIONS
Watch for signs of abdominal pain, nausea vomiting, fever, chills, report such symptoms to the MD. Drink 6-8 glasses of fluids daily to promote hydration. Monitor your blood sugars daily & follow up with MD regarding glucose & medications. No heavy lifting, pulling or pushing heavy objects. Follow up with primary & surgical MD. Watch for signs of abdominal pain, nausea vomiting, fever, chills, report such symptoms to the MD. Drink 6-8 glasses of fluids daily to promote hydration. Monitor your blood sugars daily & follow up with MD regarding glucose & medications. No heavy lifting, pulling or pushing heavy objects. Follow up with primary & surgical MD. Alert & oriented. Out of bed ambulating. Tolerating diet without nausea or vomiting. Abdomen is softly distended, + BS, + flatus. Voiding without difficulty. Vitals stable, afebrile. Understands all discharge instruction & will follow up with the MD. Time allowed for questions.

## 2017-07-31 ENCOUNTER — APPOINTMENT (OUTPATIENT)
Dept: INTERNAL MEDICINE | Facility: HOSPITAL | Age: 62
End: 2017-07-31
Payer: MEDICAID

## 2017-07-31 ENCOUNTER — OUTPATIENT (OUTPATIENT)
Dept: OUTPATIENT SERVICES | Facility: HOSPITAL | Age: 62
LOS: 1 days | End: 2017-07-31

## 2017-07-31 ENCOUNTER — RESULT CHARGE (OUTPATIENT)
Age: 62
End: 2017-07-31

## 2017-07-31 VITALS — WEIGHT: 197 LBS | BODY MASS INDEX: 30.92 KG/M2 | HEIGHT: 67 IN

## 2017-07-31 VITALS — DIASTOLIC BLOOD PRESSURE: 65 MMHG | SYSTOLIC BLOOD PRESSURE: 105 MMHG

## 2017-07-31 PROCEDURE — 99213 OFFICE O/P EST LOW 20 MIN: CPT | Mod: GE

## 2017-07-31 RX ORDER — NORMAL SALT TABLETS 1 G/G
1 TABLET ORAL
Qty: 60 | Refills: 1 | Status: DISCONTINUED | COMMUNITY
Start: 2017-02-06 | End: 2017-07-31

## 2017-08-01 DIAGNOSIS — E11.65 TYPE 2 DIABETES MELLITUS WITH HYPERGLYCEMIA: ICD-10-CM

## 2017-08-01 DIAGNOSIS — N40.0 BENIGN PROSTATIC HYPERPLASIA WITHOUT LOWER URINARY TRACT SYMPTOMS: ICD-10-CM

## 2017-08-01 DIAGNOSIS — Z87.891 PERSONAL HISTORY OF NICOTINE DEPENDENCE: ICD-10-CM

## 2017-08-01 DIAGNOSIS — D64.9 ANEMIA, UNSPECIFIED: ICD-10-CM

## 2017-08-01 DIAGNOSIS — E78.5 HYPERLIPIDEMIA, UNSPECIFIED: ICD-10-CM

## 2017-08-01 DIAGNOSIS — L23.9 ALLERGIC CONTACT DERMATITIS, UNSPECIFIED CAUSE: ICD-10-CM

## 2017-08-01 DIAGNOSIS — H40.9 UNSPECIFIED GLAUCOMA: ICD-10-CM

## 2017-08-01 DIAGNOSIS — E03.9 HYPOTHYROIDISM, UNSPECIFIED: ICD-10-CM

## 2017-08-01 DIAGNOSIS — E87.1 HYPO-OSMOLALITY AND HYPONATREMIA: ICD-10-CM

## 2017-08-01 DIAGNOSIS — K56.60 UNSPECIFIED INTESTINAL OBSTRUCTION: ICD-10-CM

## 2017-08-02 ENCOUNTER — APPOINTMENT (OUTPATIENT)
Dept: CHRONIC DISEASE MANAGEMENT | Facility: CLINIC | Age: 62
End: 2017-08-02

## 2017-08-02 LAB — GLUCOSE BLDC GLUCOMTR-MCNC: 145

## 2017-08-03 DIAGNOSIS — F20.9 SCHIZOPHRENIA, UNSPECIFIED: ICD-10-CM

## 2017-08-03 DIAGNOSIS — E66.9 OBESITY, UNSPECIFIED: ICD-10-CM

## 2017-08-09 ENCOUNTER — OTHER (OUTPATIENT)
Age: 62
End: 2017-08-09

## 2017-08-09 ENCOUNTER — RX RENEWAL (OUTPATIENT)
Age: 62
End: 2017-08-09

## 2017-08-14 ENCOUNTER — APPOINTMENT (OUTPATIENT)
Dept: OPHTHALMOLOGY | Facility: CLINIC | Age: 62
End: 2017-08-14

## 2017-08-14 ENCOUNTER — OUTPATIENT (OUTPATIENT)
Dept: OUTPATIENT SERVICES | Facility: HOSPITAL | Age: 62
LOS: 1 days | End: 2017-08-14

## 2017-08-14 ENCOUNTER — APPOINTMENT (OUTPATIENT)
Age: 62
End: 2017-08-14

## 2017-08-24 ENCOUNTER — APPOINTMENT (OUTPATIENT)
Dept: OPHTHALMOLOGY | Facility: CLINIC | Age: 62
End: 2017-08-24

## 2017-08-25 ENCOUNTER — MESSAGE (OUTPATIENT)
Age: 62
End: 2017-08-25

## 2017-08-28 ENCOUNTER — RX RENEWAL (OUTPATIENT)
Age: 62
End: 2017-08-28

## 2017-08-30 ENCOUNTER — LABORATORY RESULT (OUTPATIENT)
Age: 62
End: 2017-08-30

## 2017-08-30 DIAGNOSIS — H40.1132 PRIMARY OPEN-ANGLE GLAUCOMA, BILATERAL, MODERATE STAGE: ICD-10-CM

## 2017-08-31 ENCOUNTER — APPOINTMENT (OUTPATIENT)
Dept: GASTROENTEROLOGY | Facility: HOSPITAL | Age: 62
End: 2017-08-31
Payer: MEDICAID

## 2017-08-31 ENCOUNTER — OUTPATIENT (OUTPATIENT)
Dept: OUTPATIENT SERVICES | Facility: HOSPITAL | Age: 62
LOS: 1 days | End: 2017-08-31

## 2017-08-31 VITALS
SYSTOLIC BLOOD PRESSURE: 134 MMHG | HEIGHT: 67 IN | HEART RATE: 81 BPM | DIASTOLIC BLOOD PRESSURE: 65 MMHG | BODY MASS INDEX: 30.29 KG/M2 | WEIGHT: 193 LBS

## 2017-08-31 DIAGNOSIS — Z87.19 PERSONAL HISTORY OF OTHER DISEASES OF THE DIGESTIVE SYSTEM: ICD-10-CM

## 2017-08-31 LAB — CRP SERPL-MCNC: 1.7 MG/L — SIGNIFICANT CHANGE UP

## 2017-08-31 PROCEDURE — 99203 OFFICE O/P NEW LOW 30 MIN: CPT | Mod: GC

## 2017-09-01 DIAGNOSIS — R93.3 ABNORMAL FINDINGS ON DIAGNOSTIC IMAGING OF OTHER PARTS OF DIGESTIVE TRACT: ICD-10-CM

## 2017-09-01 DIAGNOSIS — Z87.19 PERSONAL HISTORY OF OTHER DISEASES OF THE DIGESTIVE SYSTEM: ICD-10-CM

## 2017-09-01 DIAGNOSIS — E66.9 OBESITY, UNSPECIFIED: ICD-10-CM

## 2017-09-06 ENCOUNTER — MED ADMIN CHARGE (OUTPATIENT)
Age: 62
End: 2017-09-06

## 2017-09-06 ENCOUNTER — APPOINTMENT (OUTPATIENT)
Dept: INTERNAL MEDICINE | Facility: HOSPITAL | Age: 62
End: 2017-09-06
Payer: MEDICAID

## 2017-09-06 ENCOUNTER — EMERGENCY (EMERGENCY)
Facility: HOSPITAL | Age: 62
LOS: 1 days | Discharge: HOME | End: 2017-09-06
Attending: EMERGENCY MEDICINE | Admitting: INTERNAL MEDICINE
Payer: MEDICAID

## 2017-09-06 ENCOUNTER — OUTPATIENT (OUTPATIENT)
Dept: OUTPATIENT SERVICES | Facility: HOSPITAL | Age: 62
LOS: 1 days | End: 2017-09-06

## 2017-09-06 VITALS
RESPIRATION RATE: 18 BRPM | SYSTOLIC BLOOD PRESSURE: 87 MMHG | HEART RATE: 94 BPM | DIASTOLIC BLOOD PRESSURE: 46 MMHG | OXYGEN SATURATION: 99 % | TEMPERATURE: 99 F

## 2017-09-06 VITALS — SYSTOLIC BLOOD PRESSURE: 95 MMHG | DIASTOLIC BLOOD PRESSURE: 49 MMHG

## 2017-09-06 VITALS — WEIGHT: 194 LBS | BODY MASS INDEX: 30.45 KG/M2 | HEIGHT: 67 IN

## 2017-09-06 VITALS
HEART RATE: 97 BPM | RESPIRATION RATE: 16 BRPM | OXYGEN SATURATION: 97 % | SYSTOLIC BLOOD PRESSURE: 90 MMHG | DIASTOLIC BLOOD PRESSURE: 60 MMHG

## 2017-09-06 DIAGNOSIS — R93.3 ABNORMAL FINDINGS ON DIAGNOSTIC IMAGING OF OTHER PARTS OF DIGESTIVE TRACT: ICD-10-CM

## 2017-09-06 DIAGNOSIS — Z23 ENCOUNTER FOR IMMUNIZATION: ICD-10-CM

## 2017-09-06 DIAGNOSIS — R11.2 NAUSEA WITH VOMITING, UNSPECIFIED: ICD-10-CM

## 2017-09-06 DIAGNOSIS — Z87.438 PERSONAL HISTORY OF OTHER DISEASES OF MALE GENITAL ORGANS: ICD-10-CM

## 2017-09-06 LAB
ALBUMIN SERPL ELPH-MCNC: 3.8 G/DL — SIGNIFICANT CHANGE UP (ref 3.3–5)
ALP SERPL-CCNC: 54 U/L — SIGNIFICANT CHANGE UP (ref 40–120)
ALT FLD-CCNC: 16 U/L — SIGNIFICANT CHANGE UP (ref 4–41)
AST SERPL-CCNC: 16 U/L — SIGNIFICANT CHANGE UP (ref 4–40)
BASE EXCESS BLDV CALC-SCNC: -8.2 MMOL/L — SIGNIFICANT CHANGE UP
BASOPHILS # BLD AUTO: 0.01 K/UL — SIGNIFICANT CHANGE UP (ref 0–0.2)
BASOPHILS NFR BLD AUTO: 0.1 % — SIGNIFICANT CHANGE UP (ref 0–2)
BILIRUB SERPL-MCNC: 0.3 MG/DL — SIGNIFICANT CHANGE UP (ref 0.2–1.2)
BLOOD GAS VENOUS - CREATININE: 0.94 MG/DL — SIGNIFICANT CHANGE UP (ref 0.5–1.3)
BUN SERPL-MCNC: 22 MG/DL — SIGNIFICANT CHANGE UP (ref 7–23)
BUN SERPL-MCNC: 28 MG/DL — HIGH (ref 7–23)
CALCIUM SERPL-MCNC: 8.8 MG/DL — SIGNIFICANT CHANGE UP (ref 8.4–10.5)
CALCIUM SERPL-MCNC: 9.2 MG/DL — SIGNIFICANT CHANGE UP (ref 8.4–10.5)
CHLORIDE BLDV-SCNC: 87 MMOL/L — LOW (ref 96–108)
CHLORIDE SERPL-SCNC: 94 MMOL/L — LOW (ref 98–107)
CHLORIDE SERPL-SCNC: 98 MMOL/L — SIGNIFICANT CHANGE UP (ref 98–107)
CHLORIDE UR-SCNC: 32 MMOL/L — SIGNIFICANT CHANGE UP
CO2 SERPL-SCNC: 20 MMOL/L — LOW (ref 22–31)
CO2 SERPL-SCNC: 22 MMOL/L — SIGNIFICANT CHANGE UP (ref 22–31)
CREAT ?TM UR-MCNC: 15.48 MG/DL — SIGNIFICANT CHANGE UP
CREAT SERPL-MCNC: 0.84 MG/DL — SIGNIFICANT CHANGE UP (ref 0.5–1.3)
CREAT SERPL-MCNC: 1.03 MG/DL — SIGNIFICANT CHANGE UP (ref 0.5–1.3)
EOSINOPHIL # BLD AUTO: 0.03 K/UL — SIGNIFICANT CHANGE UP (ref 0–0.5)
EOSINOPHIL NFR BLD AUTO: 0.3 % — SIGNIFICANT CHANGE UP (ref 0–6)
GAS PNL BLDV: 109 MMOL/L — CRITICAL LOW (ref 136–146)
GLUCOSE BLDV-MCNC: 104 — HIGH (ref 70–99)
GLUCOSE SERPL-MCNC: 110 MG/DL — HIGH (ref 70–99)
GLUCOSE SERPL-MCNC: 66 MG/DL — LOW (ref 70–99)
HCO3 BLDV-SCNC: 18 MMOL/L — LOW (ref 20–27)
HCT VFR BLD CALC: 34 % — LOW (ref 39–50)
HCT VFR BLDV CALC: 35.8 % — LOW (ref 39–51)
HGB BLD-MCNC: 11.3 G/DL — LOW (ref 13–17)
HGB BLDV-MCNC: 11.6 G/DL — LOW (ref 13–17)
IMM GRANULOCYTES # BLD AUTO: 0.02 # — SIGNIFICANT CHANGE UP
IMM GRANULOCYTES NFR BLD AUTO: 0.2 % — SIGNIFICANT CHANGE UP (ref 0–1.5)
LACTATE BLDV-MCNC: 1.8 MMOL/L — SIGNIFICANT CHANGE UP (ref 0.5–2)
LYMPHOCYTES # BLD AUTO: 0.68 K/UL — LOW (ref 1–3.3)
LYMPHOCYTES # BLD AUTO: 7.5 % — LOW (ref 13–44)
MAGNESIUM SERPL-MCNC: 1.3 MG/DL — LOW (ref 1.6–2.6)
MAGNESIUM SERPL-MCNC: 2.5 MG/DL — SIGNIFICANT CHANGE UP (ref 1.6–2.6)
MCHC RBC-ENTMCNC: 29.8 PG — SIGNIFICANT CHANGE UP (ref 27–34)
MCHC RBC-ENTMCNC: 33.2 % — SIGNIFICANT CHANGE UP (ref 32–36)
MCV RBC AUTO: 89.7 FL — SIGNIFICANT CHANGE UP (ref 80–100)
MONOCYTES # BLD AUTO: 0.83 K/UL — SIGNIFICANT CHANGE UP (ref 0–0.9)
MONOCYTES NFR BLD AUTO: 9.2 % — SIGNIFICANT CHANGE UP (ref 2–14)
NEUTROPHILS # BLD AUTO: 7.5 K/UL — HIGH (ref 1.8–7.4)
NEUTROPHILS NFR BLD AUTO: 82.7 % — HIGH (ref 43–77)
NRBC # FLD: 0 — SIGNIFICANT CHANGE UP
OSMOLALITY UR: 178 MOSMO/KG — SIGNIFICANT CHANGE UP (ref 50–1200)
PCO2 BLDV: 37 MMHG — LOW (ref 41–51)
PH BLDV: 7.29 PH — LOW (ref 7.32–7.43)
PHOSPHATE SERPL-MCNC: 3 MG/DL — SIGNIFICANT CHANGE UP (ref 2.5–4.5)
PLATELET # BLD AUTO: 236 K/UL — SIGNIFICANT CHANGE UP (ref 150–400)
PMV BLD: 10.9 FL — SIGNIFICANT CHANGE UP (ref 7–13)
PO2 BLDV: 66 MMHG — HIGH (ref 35–40)
POTASSIUM BLDV-SCNC: > 14 MMOL/L — CRITICAL HIGH (ref 3.4–4.5)
POTASSIUM SERPL-MCNC: 4.4 MMOL/L — SIGNIFICANT CHANGE UP (ref 3.5–5.3)
POTASSIUM SERPL-MCNC: 5.6 MMOL/L — HIGH (ref 3.5–5.3)
POTASSIUM SERPL-SCNC: 4.4 MMOL/L — SIGNIFICANT CHANGE UP (ref 3.5–5.3)
POTASSIUM SERPL-SCNC: 5.6 MMOL/L — HIGH (ref 3.5–5.3)
POTASSIUM UR-SCNC: 13.1 MEQ/L — SIGNIFICANT CHANGE UP
PROT SERPL-MCNC: 6.5 G/DL — SIGNIFICANT CHANGE UP (ref 6–8.3)
RBC # BLD: 3.79 M/UL — LOW (ref 4.2–5.8)
RBC # FLD: 14.6 % — HIGH (ref 10.3–14.5)
SAO2 % BLDV: 91 % — HIGH (ref 60–85)
SODIUM SERPL-SCNC: 129 MMOL/L — LOW (ref 135–145)
SODIUM SERPL-SCNC: 135 MMOL/L — SIGNIFICANT CHANGE UP (ref 135–145)
SODIUM UR-SCNC: 32 MEQ/L — SIGNIFICANT CHANGE UP
VALPROATE SERPL-MCNC: 40 UG/ML — LOW (ref 50–100)
WBC # BLD: 9.07 K/UL — SIGNIFICANT CHANGE UP (ref 3.8–10.5)
WBC # FLD AUTO: 9.07 K/UL — SIGNIFICANT CHANGE UP (ref 3.8–10.5)

## 2017-09-06 PROCEDURE — 99285 EMERGENCY DEPT VISIT HI MDM: CPT | Mod: NC

## 2017-09-06 PROCEDURE — 99213 OFFICE O/P EST LOW 20 MIN: CPT | Mod: GE

## 2017-09-06 RX ORDER — BRIMONIDINE TARTRATE 1.5 MG/ML
0.15 SOLUTION/ DROPS OPHTHALMIC TWICE DAILY
Qty: 1 | Refills: 5 | Status: DISCONTINUED | COMMUNITY
Start: 2017-08-15 | End: 2017-09-06

## 2017-09-06 RX ORDER — SODIUM CHLORIDE 9 MG/ML
1000 INJECTION INTRAMUSCULAR; INTRAVENOUS; SUBCUTANEOUS ONCE
Qty: 0 | Refills: 0 | Status: COMPLETED | OUTPATIENT
Start: 2017-09-06 | End: 2017-09-06

## 2017-09-06 RX ORDER — INSULIN HUMAN 100 [IU]/ML
5 INJECTION, SOLUTION SUBCUTANEOUS ONCE
Qty: 0 | Refills: 0 | Status: COMPLETED | OUTPATIENT
Start: 2017-09-06 | End: 2017-09-06

## 2017-09-06 RX ORDER — CALCIUM GLUCONATE 100 MG/ML
1 VIAL (ML) INTRAVENOUS ONCE
Qty: 0 | Refills: 0 | Status: COMPLETED | OUTPATIENT
Start: 2017-09-06 | End: 2017-09-06

## 2017-09-06 RX ORDER — INSULIN HUMAN 100 [IU]/ML
5 INJECTION, SOLUTION SUBCUTANEOUS ONCE
Qty: 0 | Refills: 0 | Status: DISCONTINUED | OUTPATIENT
Start: 2017-09-06 | End: 2017-09-06

## 2017-09-06 RX ORDER — DEXTROSE 50 % IN WATER 50 %
50 SYRINGE (ML) INTRAVENOUS ONCE
Qty: 0 | Refills: 0 | Status: DISCONTINUED | OUTPATIENT
Start: 2017-09-06 | End: 2017-09-06

## 2017-09-06 RX ORDER — MAGNESIUM SULFATE 500 MG/ML
2 VIAL (ML) INJECTION ONCE
Qty: 0 | Refills: 0 | Status: COMPLETED | OUTPATIENT
Start: 2017-09-06 | End: 2017-09-06

## 2017-09-06 RX ORDER — ALBUTEROL 90 UG/1
2.5 AEROSOL, METERED ORAL
Qty: 0 | Refills: 0 | Status: DISCONTINUED | OUTPATIENT
Start: 2017-09-06 | End: 2017-09-06

## 2017-09-06 RX ORDER — ONDANSETRON 8 MG/1
4 TABLET, FILM COATED ORAL ONCE
Qty: 0 | Refills: 0 | Status: COMPLETED | OUTPATIENT
Start: 2017-09-06 | End: 2017-09-06

## 2017-09-06 RX ORDER — ALBUTEROL 90 UG/1
2.5 AEROSOL, METERED ORAL
Qty: 0 | Refills: 0 | Status: COMPLETED | OUTPATIENT
Start: 2017-09-06 | End: 2017-09-06

## 2017-09-06 RX ORDER — CALCIUM GLUCONATE 100 MG/ML
1 VIAL (ML) INTRAVENOUS ONCE
Qty: 0 | Refills: 0 | Status: DISCONTINUED | OUTPATIENT
Start: 2017-09-06 | End: 2017-09-06

## 2017-09-06 RX ORDER — CLOZAPINE 150 MG/1
1 TABLET, ORALLY DISINTEGRATING ORAL
Qty: 0 | Refills: 0 | COMMUNITY

## 2017-09-06 RX ORDER — DEXTROSE 50 % IN WATER 50 %
50 SYRINGE (ML) INTRAVENOUS ONCE
Qty: 0 | Refills: 0 | Status: COMPLETED | OUTPATIENT
Start: 2017-09-06 | End: 2017-09-06

## 2017-09-06 RX ADMIN — SODIUM CHLORIDE 4000 MILLILITER(S): 9 INJECTION INTRAMUSCULAR; INTRAVENOUS; SUBCUTANEOUS at 16:07

## 2017-09-06 RX ADMIN — ALBUTEROL 2.5 MILLIGRAM(S): 90 AEROSOL, METERED ORAL at 17:40

## 2017-09-06 RX ADMIN — Medication 200 GRAM(S): at 17:15

## 2017-09-06 RX ADMIN — ALBUTEROL 2.5 MILLIGRAM(S): 90 AEROSOL, METERED ORAL at 17:15

## 2017-09-06 RX ADMIN — SODIUM CHLORIDE 2000 MILLILITER(S): 9 INJECTION INTRAMUSCULAR; INTRAVENOUS; SUBCUTANEOUS at 17:50

## 2017-09-06 RX ADMIN — Medication 50 GRAM(S): at 21:39

## 2017-09-06 RX ADMIN — Medication 50 MILLILITER(S): at 17:15

## 2017-09-06 RX ADMIN — ONDANSETRON 4 MILLIGRAM(S): 8 TABLET, FILM COATED ORAL at 16:07

## 2017-09-06 RX ADMIN — ALBUTEROL 2.5 MILLIGRAM(S): 90 AEROSOL, METERED ORAL at 18:00

## 2017-09-06 RX ADMIN — INSULIN HUMAN 5 UNIT(S): 100 INJECTION, SOLUTION SUBCUTANEOUS at 17:15

## 2017-09-06 RX ADMIN — Medication 50 GRAM(S): at 20:33

## 2017-09-06 NOTE — H&P ADULT - HISTORY OF PRESENT ILLNESS
60 y/o M with schizophrenia, HTN, DM, HLD transferred to ED from medicine clinic after he was found to be hypotensive.  Pt presented to medicine clinic today for routine f/u.  This AM, he had developed several episodes of vomiting, and loose stools.  He felt generalized weakness and lightheadedness.  He reports no fever or chills.  No known sick contacts.  He has had several similar episodes in the past that were self resolving.  He was admitted last month for SBO which resolved after non-operative management with NGT decompression.      He was transferred to ED after he was noted to by hypotensive with SBP in 90's.   In the ED, he was given 2L NS, and insulin/d50, and calcium for hyperkalemia. 62 y/o M with schizophrenia, HTN, DM, HLD, glaucoma, and BPH transferred to ED from medicine clinic after he was found to be hypotensive.  Pt presented to medicine clinic today for routine f/u.  This AM, he had developed several episodes of vomiting, and loose stools.  He felt generalized weakness and lightheadedness.  He reports no fever or chills.  No known sick contacts.  He has had several similar episodes in the past that were self resolving.  He was admitted last month for SBO which resolved after non-operative management with NGT decompression.      He was transferred to ED after he was noted to by hypotensive with SBP in 90's.   In the ED, he was given 2L NS, and insulin/d50, and calcium for hyperkalemia.

## 2017-09-06 NOTE — H&P ADULT - ASSESSMENT
62 y/o M with schizophrenia, HTN, DM, HLD, glaucoma, and BPH p/w hypotension in the setting of vomiting and diarrhea, now resolved.

## 2017-09-06 NOTE — ED PROVIDER NOTE - PROGRESS NOTE DETAILS
Brennan: Pt seen and examined by me, well appearing but has h/o SBO that presented similarly in the past requiring NG tube for management. Pt denies other associated symptoms, MMM, milf periumbilical TTP, will f/u labs, CT abdomen, IV fluids and continue to reassess. MD Ott, PGY-3: Pt signed out to me by SAPPHIRE Jeronimo.  Patient currently comfortable with normal BP, but still with N/V and concerning electrolyte derangements.  Will recheck BMP with Mg now after treatment for hyperkalemia and repletion for hypomagnesemia.  [facility]IC agreeable for admission.

## 2017-09-06 NOTE — ED PROVIDER NOTE - OBJECTIVE STATEMENT
Patient is 61 y M with PMH schizophrenia, htn, hypothyroid, DM on metformin presenting with n/v x many times and diarrhea x 1, all nonbloody,  beginning this morning. Went to routine checkup at PMD today, was hypotensive in the office and was sent to ED. Has lightheadedness on standing up today. Has had these symptoms once a week for several months.     PMD: RASHID Hammond  ROS: Denies fever, palpitations, chills, recent sickness, HA, vision changes, cough, SOB, chest pain, abdominal pain, dysuria, hematuria, rash, new joint aches, sick contacts, and recent travel. Patient is 61 y M with PMH schizophrenia, htn, hypothyroid, DM on metformin presenting with n/v x many times and diarrhea x 1, all nonbloody,  beginning this morning. Went to routine checkup at PMD today, was hypotensive in the office and was sent to ED. Has lightheadedness on standing up today. Has had these symptoms once a week for several months. No complaint of abdominal distension. Has history of recurrent SBOs managed medically.     PMD: RASHID Hammond  ROS: Denies fever, palpitations, chills, recent sickness, HA, vision changes, cough, SOB, chest pain, abdominal pain, dysuria, hematuria, rash, new joint aches, sick contacts, and recent travel.

## 2017-09-06 NOTE — ED ADULT NURSE NOTE - OBJECTIVE STATEMENT
received to room 20. states was sent in by his PMD after he noticed hypotension at a routine appointment. pt states he is feeling some nausea and dizziness for past few days. denies any onm received to room 20. states was sent in by his PMD after he noticed hypotension at a routine appointment. pt states he is feeling some nausea and dizziness for past few days. denies any chest pain, sob, weakness, constipation or abd pain. had an episode of diarrhea and vomiting this am.  denies any blood. respirations even and unlabored. received to room 20. states was sent in by his PMD after he noticed hypotension at a routine appointment. pt states he is feeling some nausea and dizziness for past few days. denies any chest pain, sob, weakness, constipation or abd pain. had an episode of diarrhea and vomiting this am.  denies any blood. respirations even and unlabored. appears comfortable. labs sent. 20g Iv placed in right hand. NS running as ordered. will reassess.

## 2017-09-06 NOTE — H&P ADULT - PROBLEM SELECTOR PLAN 2
Recurrent episodes, unclear cause  - Plan is for colonoscopy in November.  Pt follows with [facility] GI clinic. notes reviewed

## 2017-09-06 NOTE — H&P ADULT - NSHPPHYSICALEXAM_GEN_ALL_CORE
Vital Signs Last 24 Hrs  T(C): 36.9 (06 Sep 2017 20:33), Max: 37.1 (06 Sep 2017 15:00)  T(F): 98.5 (06 Sep 2017 20:33), Max: 98.7 (06 Sep 2017 15:00)  HR: 80 (06 Sep 2017 20:33) (80 - 94)  BP: 133/51 (06 Sep 2017 20:33) (87/45 - 133/51)  BP(mean): --  RR: 15 (06 Sep 2017 20:33) (15 - 18)  SpO2: 100% (06 Sep 2017 20:33) (98% - 100%)    PHYSICAL EXAM:  GENERAL: No Acute Distress  HEAD:  Atraumatic, Normocephalic  EYES: conjunctiva and sclera clear  ENMT: Moist mucous membranes   NECK: Supple  CHEST/LUNG: Clear to auscultation bilaterally  HEART: Regular rate and rhythm; No murmurs, rubs, or gallops  ABDOMEN: Soft, Nontender, distended; Bowel sounds normal  EXTREMITIES:   No clubbing, cyanosis, or pedal edema  PSYCH: Normal Affect, Normal Behavior  NEUROLOGY:   - Mental status A&O x 3,  SKIN: No rashes or lesions  MUSCULOSKELETAL: No joint swelling

## 2017-09-06 NOTE — H&P ADULT - NSHPLABSRESULTS_GEN_ALL_CORE
09-06    135  |  98  |  22  ----------------------------<  66<L>  4.4   |  22  |  0.84    Ca    9.2      06 Sep 2017 22:15  Phos  3.0     09-06  Mg     2.5     09-06    TPro  6.5  /  Alb  3.8  /  TBili  0.3  /  DBili  x   /  AST  16  /  ALT  16  /  AlkPhos  54  09-06                            11.3   9.07  )-----------( 236      ( 06 Sep 2017 15:40 )             34.0

## 2017-09-06 NOTE — ED PROVIDER NOTE - MEDICAL DECISION MAKING DETAILS
Patient hypotensive to 90s systolic with orthostatic symptoms, no tachycardia no beta blocker, no signs of internal or external hemorrhage, no other signs of sepsis, likely hypovolemic from n/v/d. Abdomen slightly distended and with history of SBO, will get scan. Plan: ivf, monitor bp, zofran, labs, vbg, ekg, pain control, ct a/p, depakote level

## 2017-09-06 NOTE — H&P ADULT - FAMILY HISTORY
<<-----Click on this checkbox to enter Family History Family history of diabetes mellitus (DM)     Father  Still living? Unknown  Family history of colon cancer, Age at diagnosis: Age Unknown     Mother  Still living? Unknown  Family history of acute myocardial infarction, Age at diagnosis: Age Unknown  Family history of colon cancer, Age at diagnosis: Age Unknown

## 2017-09-06 NOTE — ED PROVIDER NOTE - ATTENDING CONTRIBUTION TO CARE
52 yo male Type II DM, denies abd surgery hx but possible hx of obstruction resolved with NG tube presents with multiple episdes of N/V  and one large semi loose BM since this morning, without fever. No chest pain. Was seen for routine checkup coincidentally by PMD scheduled today, notced patient with low BP and sent here. In ED BP 86/50 P 90  afebrile  chest clear abd soft non tender, hypoactive BS, ext without edema. Imp: N/V, CXR, IV NS bolus for BP improvement Zofran, r/o source of infection, lactate CXR U/A, CMP. Patient strated and signed out pending IV and blood drawing.

## 2017-09-06 NOTE — ED PROVIDER NOTE - CARE PLAN
Principal Discharge DX:	Nausea vomiting and diarrhea Principal Discharge DX:	Nausea vomiting and diarrhea  Secondary Diagnosis:	Hyponatremia  Secondary Diagnosis:	Hyperkalemia

## 2017-09-06 NOTE — ED ADULT TRIAGE NOTE - CHIEF COMPLAINT QUOTE
arrives from medical clinic for eval. of hypotension.  pt states he was going to his regular clinic check up today but coincidently c/o nausea with vomiting and diarrhea since this morning. "they said my pressure was low."  pt c/o dizziness this morning w generalized weakness.   denies chest pain.  denies abd pain.  h/o DM, schizophrenia,  htn, depression, bph

## 2017-09-06 NOTE — ED PROVIDER NOTE - SHIFT CHANGE DETAILS
Patient initially evaluated since noted as hypertensive, Workup in progress and IV fluid resucitation started. Will require re-evaluation and workup, pending initial results.

## 2017-09-07 ENCOUNTER — TRANSCRIPTION ENCOUNTER (OUTPATIENT)
Age: 62
End: 2017-09-07

## 2017-09-07 VITALS
HEART RATE: 93 BPM | RESPIRATION RATE: 17 BRPM | DIASTOLIC BLOOD PRESSURE: 78 MMHG | OXYGEN SATURATION: 98 % | SYSTOLIC BLOOD PRESSURE: 136 MMHG | TEMPERATURE: 98 F

## 2017-09-07 DIAGNOSIS — E87.5 HYPERKALEMIA: ICD-10-CM

## 2017-09-07 DIAGNOSIS — E87.1 HYPO-OSMOLALITY AND HYPONATREMIA: ICD-10-CM

## 2017-09-07 DIAGNOSIS — N17.9 ACUTE KIDNEY FAILURE, UNSPECIFIED: ICD-10-CM

## 2017-09-07 DIAGNOSIS — R11.10 VOMITING, UNSPECIFIED: ICD-10-CM

## 2017-09-07 DIAGNOSIS — I95.9 HYPOTENSION, UNSPECIFIED: ICD-10-CM

## 2017-09-07 DIAGNOSIS — E03.9 HYPOTHYROIDISM, UNSPECIFIED: ICD-10-CM

## 2017-09-07 DIAGNOSIS — F20.9 SCHIZOPHRENIA, UNSPECIFIED: ICD-10-CM

## 2017-09-07 LAB
BUN SERPL-MCNC: 14 MG/DL — SIGNIFICANT CHANGE UP (ref 7–23)
CALCIUM SERPL-MCNC: 8.5 MG/DL — SIGNIFICANT CHANGE UP (ref 8.4–10.5)
CHLORIDE SERPL-SCNC: 101 MMOL/L — SIGNIFICANT CHANGE UP (ref 98–107)
CO2 SERPL-SCNC: 24 MMOL/L — SIGNIFICANT CHANGE UP (ref 22–31)
CREAT SERPL-MCNC: 0.65 MG/DL — SIGNIFICANT CHANGE UP (ref 0.5–1.3)
GLUCOSE BLDC GLUCOMTR-MCNC: 140
GLUCOSE SERPL-MCNC: 102 MG/DL — HIGH (ref 70–99)
HBA1C BLD-MCNC: 6.8 % — HIGH (ref 4–5.6)
HCT VFR BLD CALC: 32 % — LOW (ref 39–50)
HGB BLD-MCNC: 10.4 G/DL — LOW (ref 13–17)
MAGNESIUM SERPL-MCNC: 2.1 MG/DL — SIGNIFICANT CHANGE UP (ref 1.6–2.6)
MCHC RBC-ENTMCNC: 29 PG — SIGNIFICANT CHANGE UP (ref 27–34)
MCHC RBC-ENTMCNC: 32.5 % — SIGNIFICANT CHANGE UP (ref 32–36)
MCV RBC AUTO: 89.1 FL — SIGNIFICANT CHANGE UP (ref 80–100)
NRBC # FLD: 0 — SIGNIFICANT CHANGE UP
PHOSPHATE SERPL-MCNC: 3.3 MG/DL — SIGNIFICANT CHANGE UP (ref 2.5–4.5)
PLATELET # BLD AUTO: 218 K/UL — SIGNIFICANT CHANGE UP (ref 150–400)
PMV BLD: 10.5 FL — SIGNIFICANT CHANGE UP (ref 7–13)
POTASSIUM SERPL-MCNC: 4.7 MMOL/L — SIGNIFICANT CHANGE UP (ref 3.5–5.3)
POTASSIUM SERPL-SCNC: 4.7 MMOL/L — SIGNIFICANT CHANGE UP (ref 3.5–5.3)
RBC # BLD: 3.59 M/UL — LOW (ref 4.2–5.8)
RBC # FLD: 14.8 % — HIGH (ref 10.3–14.5)
SODIUM SERPL-SCNC: 138 MMOL/L — SIGNIFICANT CHANGE UP (ref 135–145)
WBC # BLD: 5.4 K/UL — SIGNIFICANT CHANGE UP (ref 3.8–10.5)
WBC # FLD AUTO: 5.4 K/UL — SIGNIFICANT CHANGE UP (ref 3.8–10.5)

## 2017-09-07 RX ORDER — CLOZAPINE 150 MG/1
100 TABLET, ORALLY DISINTEGRATING ORAL AT BEDTIME
Qty: 0 | Refills: 0 | Status: DISCONTINUED | OUTPATIENT
Start: 2017-09-07 | End: 2017-09-07

## 2017-09-07 RX ORDER — TIMOLOL 0.5 %
1 DROPS OPHTHALMIC (EYE)
Qty: 0 | Refills: 0 | Status: DISCONTINUED | OUTPATIENT
Start: 2017-09-07 | End: 2017-09-07

## 2017-09-07 RX ORDER — BRIMONIDINE TARTRATE 2 MG/MG
1 SOLUTION/ DROPS OPHTHALMIC
Qty: 0 | Refills: 0 | Status: DISCONTINUED | OUTPATIENT
Start: 2017-09-07 | End: 2017-09-07

## 2017-09-07 RX ORDER — CLOZAPINE 150 MG/1
25 TABLET, ORALLY DISINTEGRATING ORAL DAILY
Qty: 0 | Refills: 0 | Status: DISCONTINUED | OUTPATIENT
Start: 2017-09-07 | End: 2017-09-07

## 2017-09-07 RX ORDER — VENLAFAXINE HCL 75 MG
75 CAPSULE, EXT RELEASE 24 HR ORAL DAILY
Qty: 0 | Refills: 0 | Status: DISCONTINUED | OUTPATIENT
Start: 2017-09-07 | End: 2017-09-07

## 2017-09-07 RX ORDER — DIVALPROEX SODIUM 500 MG/1
1000 TABLET, DELAYED RELEASE ORAL AT BEDTIME
Qty: 0 | Refills: 0 | Status: DISCONTINUED | OUTPATIENT
Start: 2017-09-07 | End: 2017-09-07

## 2017-09-07 RX ORDER — HEPARIN SODIUM 5000 [USP'U]/ML
5000 INJECTION INTRAVENOUS; SUBCUTANEOUS EVERY 8 HOURS
Qty: 0 | Refills: 0 | Status: DISCONTINUED | OUTPATIENT
Start: 2017-09-07 | End: 2017-09-07

## 2017-09-07 RX ORDER — LEVOTHYROXINE SODIUM 125 MCG
75 TABLET ORAL DAILY
Qty: 0 | Refills: 0 | Status: DISCONTINUED | OUTPATIENT
Start: 2017-09-07 | End: 2017-09-07

## 2017-09-07 RX ORDER — ATORVASTATIN CALCIUM 80 MG/1
40 TABLET, FILM COATED ORAL AT BEDTIME
Qty: 0 | Refills: 0 | Status: DISCONTINUED | OUTPATIENT
Start: 2017-09-07 | End: 2017-09-07

## 2017-09-07 RX ORDER — LATANOPROST 0.05 MG/ML
1 SOLUTION/ DROPS OPHTHALMIC; TOPICAL AT BEDTIME
Qty: 0 | Refills: 0 | Status: DISCONTINUED | OUTPATIENT
Start: 2017-09-07 | End: 2017-09-07

## 2017-09-07 RX ORDER — LORATADINE 10 MG/1
10 TABLET ORAL DAILY
Qty: 0 | Refills: 0 | Status: DISCONTINUED | OUTPATIENT
Start: 2017-09-07 | End: 2017-09-07

## 2017-09-07 RX ADMIN — Medication 75 MILLIGRAM(S): at 12:16

## 2017-09-07 RX ADMIN — DIVALPROEX SODIUM 1000 MILLIGRAM(S): 500 TABLET, DELAYED RELEASE ORAL at 03:02

## 2017-09-07 RX ADMIN — LORATADINE 10 MILLIGRAM(S): 10 TABLET ORAL at 12:16

## 2017-09-07 RX ADMIN — Medication 75 MICROGRAM(S): at 06:52

## 2017-09-07 RX ADMIN — HEPARIN SODIUM 5000 UNIT(S): 5000 INJECTION INTRAVENOUS; SUBCUTANEOUS at 06:51

## 2017-09-07 RX ADMIN — HEPARIN SODIUM 5000 UNIT(S): 5000 INJECTION INTRAVENOUS; SUBCUTANEOUS at 13:21

## 2017-09-07 RX ADMIN — CLOZAPINE 25 MILLIGRAM(S): 150 TABLET, ORALLY DISINTEGRATING ORAL at 12:16

## 2017-09-07 NOTE — DISCHARGE NOTE ADULT - PLAN OF CARE
improved Follow up with primary care provider in 1 week for blood pressure check. resolved Follow up with PCP in 1 week to monitor kidney function and repeat BMP stable Follow up with GI outpatient, plan for colonoscopy in november diabetic diet Potassium is 4.7 on discharge. Please follow up with your primary care provider for further medical assessment. Please continue with your home medications as prescribed. We stopped your lisinopril in the hospital. Follow up with primary care provider Dr. Hammond at the [facility] medical clinic within 1 week for blood pressure check and for guidance on restarting medication. Call [phone] to make an appointment. Follow up with GI outpatient, and follow up with your scheduled for colonoscopy in november HgA1c is 6.8. Continue with diabetic diet. Continue home medications and follow up with primary care physician for further medical management.

## 2017-09-07 NOTE — DISCHARGE NOTE ADULT - PATIENT PORTAL LINK FT
“You can access the [facility] Patient Portal, offered by [facility], by registering with the following website: [email or web address]”

## 2017-09-07 NOTE — DISCHARGE NOTE ADULT - MEDICATION SUMMARY - MEDICATIONS TO TAKE
I will START or STAY ON the medications listed below when I get home from the hospital:    Depakote  mg oral tablet, extended release  -- 2 tab(s) (1000mg) by mouth once a day (at bedtime)  -- Indication: For Schizophrenia, unspecified type    venlafaxine 75 mg oral capsule, extended release  -- 1 cap(s) by mouth once a day  -- Indication: For Schizophrenia, unspecified type    glipiZIDE 10 mg oral tablet  -- 1 tab(s) by mouth 2 times a day  -- Indication: For Diabetes    Januvia 100 mg oral tablet  -- 1 tab(s) by mouth once a day  -- Indication: For Diabetes    Glucophage 1000 mg oral tablet  -- 1 tab(s) by mouth 2 times a day  -- Indication: For Diabetes    loratadine 10 mg oral tablet  -- 1 tab(s) by mouth once a day  -- Indication: For Antihistamine    Lipitor 40 mg oral tablet  -- 1 tab(s) by mouth once a day (at bedtime)  -- Indication: For Hyperlipidemia    cloZAPine 25 mg oral tablet  -- 1 tab(s) by mouth once a day after lunch  -- Indication: For Schizophrenia, unspecified type    cloZAPine 100 mg oral tablet  -- 1 tab(s) by mouth once a day (at bedtime)  -- Indication: For Schizophrenia, unspecified type    docusate sodium 100 mg oral capsule  -- 1 cap(s) by mouth 3 times a day, As Needed  -- Indication: For bowel regimen    Sodium Chloride  -- 2 tab(s) by mouth once a day  -- Indication: For Hyponatremia    Timoptic Ocudose 0.5% ophthalmic solution  -- 1 drop(s) to each affected eye 2 times a day  -- Indication: For eye drops    brimonidine 0.15% ophthalmic solution  -- 1 drop(s) to each affected eye 2 times a day  -- Indication: For eye drops    Lumigan 0.01% ophthalmic solution  -- 1 drop(s) to each affected eye once a day (in the evening)  -- Indication: For eye drops    Synthroid 75 mcg (0.075 mg) oral tablet  -- 1 tab(s) by mouth once a day  -- Indication: For Hypothyroidism, unspecified type

## 2017-09-07 NOTE — DISCHARGE NOTE ADULT - CARE PLAN
Principal Discharge DX:	Hypotension, unspecified hypotension type  Goal:	improved  Instructions for follow-up, activity and diet:	Follow up with primary care provider in 1 week for blood pressure check.  Secondary Diagnosis:	Hyperkalemia  Goal:	resolved  Secondary Diagnosis:	MARISSA (acute kidney injury)  Goal:	improved  Instructions for follow-up, activity and diet:	Follow up with PCP in 1 week to monitor kidney function and repeat BMP  Secondary Diagnosis:	Nausea vomiting and diarrhea  Goal:	stable  Instructions for follow-up, activity and diet:	Follow up with GI outpatient, plan for colonoscopy in november  Secondary Diagnosis:	Schizophrenia, unspecified type  Goal:	stable  Secondary Diagnosis:	Diabetes  Instructions for follow-up, activity and diet:	diabetic diet Principal Discharge DX:	Hypotension, unspecified hypotension type  Goal:	improved  Instructions for follow-up, activity and diet:	Follow up with primary care provider in 1 week for blood pressure check.  Secondary Diagnosis:	Hyperkalemia  Goal:	resolved  Instructions for follow-up, activity and diet:	Potassium is 4.7 on discharge. Please follow up with your primary care provider for further medical assessment.  Secondary Diagnosis:	MARISSA (acute kidney injury)  Goal:	improved  Instructions for follow-up, activity and diet:	Follow up with PCP in 1 week to monitor kidney function and repeat BMP  Secondary Diagnosis:	Nausea vomiting and diarrhea  Goal:	stable  Instructions for follow-up, activity and diet:	Follow up with GI outpatient, plan for colonoscopy in november  Secondary Diagnosis:	Schizophrenia, unspecified type  Goal:	stable  Instructions for follow-up, activity and diet:	Please continue with your home medications as prescribed.  Secondary Diagnosis:	Diabetes  Instructions for follow-up, activity and diet:	diabetic diet Principal Discharge DX:	Hypotension, unspecified hypotension type  Goal:	improved  Instructions for follow-up, activity and diet:	We stopped your lisinopril in the hospital. Follow up with primary care provider Dr. Hammond at the [facility] medical clinic within 1 week for blood pressure check and for guidance on restarting medication. Call [phone] to make an appointment.  Secondary Diagnosis:	Hyperkalemia  Goal:	resolved  Instructions for follow-up, activity and diet:	Potassium is 4.7 on discharge. Please follow up with your primary care provider for further medical assessment.  Secondary Diagnosis:	MARISSA (acute kidney injury)  Goal:	improved  Instructions for follow-up, activity and diet:	Follow up with PCP in 1 week to monitor kidney function and repeat BMP  Secondary Diagnosis:	Nausea vomiting and diarrhea  Goal:	stable  Instructions for follow-up, activity and diet:	Follow up with GI outpatient, and follow up with your scheduled for colonoscopy in november  Secondary Diagnosis:	Schizophrenia, unspecified type  Goal:	stable  Instructions for follow-up, activity and diet:	Please continue with your home medications as prescribed.  Secondary Diagnosis:	Diabetes  Instructions for follow-up, activity and diet:	HgA1c is 6.8. Continue with diabetic diet. Continue home medications and follow up with primary care physician for further medical management. Principal Discharge DX:	Hypotension, unspecified hypotension type  Goal:	improved  Instructions for follow-up, activity and diet:	We stopped your lisinopril in the hospital. Follow up with primary care provider Dr. Hammond at the [facility] medical clinic within 1 week for blood pressure check and for guidance on restarting medication. Call [phone] to make an appointment.  Secondary Diagnosis:	Hyperkalemia  Goal:	resolved  Instructions for follow-up, activity and diet:	Potassium is 4.7 on discharge. Please follow up with your primary care provider for further medical assessment.  Secondary Diagnosis:	MARISSA (acute kidney injury)  Goal:	improved  Instructions for follow-up, activity and diet:	Follow up with PCP in 1 week to monitor kidney function and repeat BMP  Secondary Diagnosis:	Nausea vomiting and diarrhea  Goal:	stable  Instructions for follow-up, activity and diet:	Follow up with GI outpatient, and follow up with your scheduled for colonoscopy in november  Secondary Diagnosis:	Schizophrenia, unspecified type  Goal:	stable  Instructions for follow-up, activity and diet:	Please continue with your home medications as prescribed.  Secondary Diagnosis:	Diabetes  Instructions for follow-up, activity and diet:	HgA1c is 6.8. Continue with diabetic diet. Continue home medications and follow up with primary care physician for further medical management.

## 2017-09-07 NOTE — PROGRESS NOTE ADULT - ASSESSMENT
60 y/o M with schizophrenia, HTN, DM, HLD, glaucoma, and BPH p/w hypotension in the setting of vomiting and diarrhea, now resolved.

## 2017-09-07 NOTE — DISCHARGE NOTE ADULT - SECONDARY DIAGNOSIS.
Hyperkalemia MARISSA (acute kidney injury) Nausea vomiting and diarrhea Schizophrenia, unspecified type Diabetes

## 2017-09-07 NOTE — DISCHARGE NOTE ADULT - HOSPITAL COURSE
62 y/o M with schizophrenia, HTN, DM, HLD, glaucoma, and BPH transferred to ED from medicine clinic after he was found to be hypotensive.  Pt presented to medicine clinic today for routine f/u.  This AM, he had developed several episodes of vomiting, and loose stools.  He felt generalized weakness and lightheadedness. He was admitted last month for SBO which resolved after non-operative management with NGT decompression.      Hospital Course: 62 y/o M with schizophrenia, HTN, DM, HLD, glaucoma, and BPH transferred to ED from medicine clinic after he was found to be hypotensive.  Pt presented to medicine clinic today for routine f/u.  This AM, he had developed several episodes of vomiting, and loose stools.  He felt generalized weakness and lightheadedness. He was admitted last month for SBO which resolved after non-operative management with NGT decompression.      Hospital Course:    Hypotension  - likely 2/2 GI losses.  Now resolved after 2L NS bolus in ED  - will continue to monitor.     Vomiting and diarrhea  -Recurrent episodes, unclear cause  -Plan is for colonoscopy in November.    -Pt follows with [facility] GI clinic. notes reviewed.     MARISSA  -Prerenal, improved after IVF hydration.     Hyperkalemia  - improved after insulin/D50.   -continue home medication, education provided    Schizophrenia  - continue clozapine, depakote, and effexor.     Hypothyroidism  -continue synthroid.    DM  -monitor FS  -SS-A1c- 6.8    Dispo: HOME

## 2017-09-07 NOTE — PROGRESS NOTE ADULT - SUBJECTIVE AND OBJECTIVE BOX
Patient is a 61y old  Male who presents with a chief complaint of Low blood pressure (07 Sep 2017 09:00)      SUBJECTIVE / OVERNIGHT EVENTS:  Pt feels well, no complaints. tolerates food. No dizziness / lightheadedness / n/ v/ d / abd pain.     MEDICATIONS  (STANDING):  diVALproex ER 1000 milliGRAM(s) Oral at bedtime  venlafaxine XR. 75 milliGRAM(s) Oral daily  loratadine 10 milliGRAM(s) Oral daily  atorvastatin 40 milliGRAM(s) Oral at bedtime  cloZAPine 100 milliGRAM(s) Oral at bedtime  cloZAPine 25 milliGRAM(s) Oral daily  brimonidine 0.2% Ophthalmic Solution 1 Drop(s) Both EYES two times a day  latanoprost 0.005% Ophthalmic Solution 1 Drop(s) Both EYES at bedtime  timolol 0.5% Solution 1 Drop(s) Both EYES two times a day  levothyroxine 75 MICROGram(s) Oral daily  heparin  Injectable 5000 Unit(s) SubCutaneous every 8 hours    MEDICATIONS  (PRN):      T(C): 36.7 (09-07-17 @ 08:39), Max: 37.1 (09-06-17 @ 15:00)  HR: 83 (09-07-17 @ 08:39) (80 - 94)  BP: 150/74 (09-07-17 @ 08:39) (87/45 - 154/86)  RR: 17 (09-07-17 @ 08:39) (15 - 18)  SpO2: 99% (09-07-17 @ 08:39) (98% - 100%)  CAPILLARY BLOOD GLUCOSE  95 (07 Sep 2017 08:56)  134 (07 Sep 2017 00:22)  115 (06 Sep 2017 15:00)        I&O's Summary      PHYSICAL EXAM:  GENERAL: NAD, well-developed  HEAD:  Atraumatic, Normocephalic  EYES: EOMI, PERRLA, conjunctiva and sclera clear  NECK: Supple, No JVD  CHEST/LUNG: Clear to auscultation bilaterally; No wheeze  HEART: s1 s2, regular rhythm and rate   ABDOMEN: Soft, Nontender, Nondistended; Bowel sounds present  EXTREMITIES:  2+ Peripheral Pulses, No clubbing, cyanosis, or edema  PSYCH: AAOx3, calm   NEUROLOGY: non-focal  SKIN: No rashes or lesions    LABS:                        10.4   5.40  )-----------( 218      ( 07 Sep 2017 09:33 )             32.0     09-07    138  |  101  |  14  ----------------------------<  102<H>  4.7   |  24  |  0.65    Ca    8.5      07 Sep 2017 09:33  Phos  3.3     09-07  Mg     2.1     09-07    TPro  6.5  /  Alb  3.8  /  TBili  0.3  /  DBili  x   /  AST  16  /  ALT  16  /  AlkPhos  54  09-06              RADIOLOGY & ADDITIONAL TESTS:    Imaging Personally Reviewed:    Consultant(s) Notes Reviewed:      Care Discussed with Consultants/Other Providers: Patient is a 61y old  Male who presents with a chief complaint of Low blood pressure (07 Sep 2017 09:00)      SUBJECTIVE / OVERNIGHT EVENTS:  Pt feels well, no complaints. tolerates food. No dizziness / lightheadedness / n/ v/ d / abd pain. No cough. No symptoms of URI.     MEDICATIONS  (STANDING):  diVALproex ER 1000 milliGRAM(s) Oral at bedtime  venlafaxine XR. 75 milliGRAM(s) Oral daily  loratadine 10 milliGRAM(s) Oral daily  atorvastatin 40 milliGRAM(s) Oral at bedtime  cloZAPine 100 milliGRAM(s) Oral at bedtime  cloZAPine 25 milliGRAM(s) Oral daily  brimonidine 0.2% Ophthalmic Solution 1 Drop(s) Both EYES two times a day  latanoprost 0.005% Ophthalmic Solution 1 Drop(s) Both EYES at bedtime  timolol 0.5% Solution 1 Drop(s) Both EYES two times a day  levothyroxine 75 MICROGram(s) Oral daily  heparin  Injectable 5000 Unit(s) SubCutaneous every 8 hours    MEDICATIONS  (PRN):      T(C): 36.7 (09-07-17 @ 08:39), Max: 37.1 (09-06-17 @ 15:00)  HR: 83 (09-07-17 @ 08:39) (80 - 94)  BP: 150/74 (09-07-17 @ 08:39) (87/45 - 154/86)  RR: 17 (09-07-17 @ 08:39) (15 - 18)  SpO2: 99% (09-07-17 @ 08:39) (98% - 100%)  CAPILLARY BLOOD GLUCOSE  95 (07 Sep 2017 08:56)  134 (07 Sep 2017 00:22)  115 (06 Sep 2017 15:00)        I&O's Summary      PHYSICAL EXAM:  GENERAL: NAD, well-developed  HEAD:  Atraumatic, Normocephalic  EYES: EOMI, PERRLA, conjunctiva and sclera clear  NECK: Supple, No JVD  CHEST/LUNG: Clear to auscultation bilaterally; No wheeze  HEART: s1 s2, regular rhythm and rate   ABDOMEN: Soft, Nontender, Nondistended; Bowel sounds present  EXTREMITIES:  2+ Peripheral Pulses, No clubbing, cyanosis, or edema  PSYCH: AAOx3, calm   NEUROLOGY: non-focal  SKIN: No rashes or lesions    LABS:                        10.4   5.40  )-----------( 218      ( 07 Sep 2017 09:33 )             32.0     09-07    138  |  101  |  14  ----------------------------<  102<H>  4.7   |  24  |  0.65    Ca    8.5      07 Sep 2017 09:33  Phos  3.3     09-07  Mg     2.1     09-07    TPro  6.5  /  Alb  3.8  /  TBili  0.3  /  DBili  x   /  AST  16  /  ALT  16  /  AlkPhos  54  09-06              RADIOLOGY & ADDITIONAL TESTS:    Imaging Personally Reviewed:    Consultant(s) Notes Reviewed:      Care Discussed with Consultants/Other Providers:

## 2017-09-08 DIAGNOSIS — E66.9 OBESITY, UNSPECIFIED: ICD-10-CM

## 2017-09-08 DIAGNOSIS — D64.9 ANEMIA, UNSPECIFIED: ICD-10-CM

## 2017-09-08 DIAGNOSIS — E78.5 HYPERLIPIDEMIA, UNSPECIFIED: ICD-10-CM

## 2017-09-08 DIAGNOSIS — E87.1 HYPO-OSMOLALITY AND HYPONATREMIA: ICD-10-CM

## 2017-09-08 DIAGNOSIS — Z86.59 PERSONAL HISTORY OF OTHER MENTAL AND BEHAVIORAL DISORDERS: ICD-10-CM

## 2017-09-08 DIAGNOSIS — F42.9 OBSESSIVE-COMPULSIVE DISORDER, UNSPECIFIED: ICD-10-CM

## 2017-09-08 DIAGNOSIS — R93.3 ABNORMAL FINDINGS ON DIAGNOSTIC IMAGING OF OTHER PARTS OF DIGESTIVE TRACT: ICD-10-CM

## 2017-09-08 DIAGNOSIS — L23.9 ALLERGIC CONTACT DERMATITIS, UNSPECIFIED CAUSE: ICD-10-CM

## 2017-09-08 DIAGNOSIS — I10 ESSENTIAL (PRIMARY) HYPERTENSION: ICD-10-CM

## 2017-09-08 DIAGNOSIS — E03.9 HYPOTHYROIDISM, UNSPECIFIED: ICD-10-CM

## 2017-09-12 ENCOUNTER — MEDICATION RENEWAL (OUTPATIENT)
Age: 62
End: 2017-09-12

## 2017-09-12 DIAGNOSIS — H40.1130 PRIMARY OPEN-ANGLE GLAUCOMA, BILATERAL, STAGE UNSPECIFIED: ICD-10-CM

## 2017-09-13 ENCOUNTER — LABORATORY RESULT (OUTPATIENT)
Age: 62
End: 2017-09-13

## 2017-09-13 ENCOUNTER — OUTPATIENT (OUTPATIENT)
Dept: OUTPATIENT SERVICES | Facility: HOSPITAL | Age: 62
LOS: 1 days | End: 2017-09-13

## 2017-09-13 ENCOUNTER — APPOINTMENT (OUTPATIENT)
Dept: INTERNAL MEDICINE | Facility: HOSPITAL | Age: 62
End: 2017-09-13
Payer: MEDICAID

## 2017-09-13 VITALS — BODY MASS INDEX: 30.45 KG/M2 | HEIGHT: 67 IN | WEIGHT: 194 LBS

## 2017-09-13 VITALS — DIASTOLIC BLOOD PRESSURE: 76 MMHG | SYSTOLIC BLOOD PRESSURE: 122 MMHG

## 2017-09-13 VITALS — SYSTOLIC BLOOD PRESSURE: 120 MMHG | DIASTOLIC BLOOD PRESSURE: 78 MMHG

## 2017-09-13 DIAGNOSIS — Z86.69 PERSONAL HISTORY OF OTHER DISEASES OF THE NERVOUS SYSTEM AND SENSE ORGANS: ICD-10-CM

## 2017-09-13 DIAGNOSIS — S92.411A DISPLACED FRACTURE OF PROXIMAL PHALANX OF RIGHT GREAT TOE, INITIAL ENCOUNTER FOR CLOSED FRACTURE: ICD-10-CM

## 2017-09-13 DIAGNOSIS — Z86.39 PERSONAL HISTORY OF OTHER ENDOCRINE, NUTRITIONAL AND METABOLIC DISEASE: ICD-10-CM

## 2017-09-13 LAB
BASOPHILS # BLD AUTO: 0.04 K/UL — SIGNIFICANT CHANGE UP (ref 0–0.2)
BASOPHILS NFR BLD AUTO: 0.6 % — SIGNIFICANT CHANGE UP (ref 0–2)
BUN SERPL-MCNC: 10 MG/DL — SIGNIFICANT CHANGE UP (ref 7–23)
CALCIUM SERPL-MCNC: 9.5 MG/DL — SIGNIFICANT CHANGE UP (ref 8.4–10.5)
CHLORIDE SERPL-SCNC: 92 MMOL/L — LOW (ref 98–107)
CO2 SERPL-SCNC: 22 MMOL/L — SIGNIFICANT CHANGE UP (ref 22–31)
CREAT SERPL-MCNC: 0.7 MG/DL — SIGNIFICANT CHANGE UP (ref 0.5–1.3)
EOSINOPHIL # BLD AUTO: 0.1 K/UL — SIGNIFICANT CHANGE UP (ref 0–0.5)
EOSINOPHIL NFR BLD AUTO: 1.6 % — SIGNIFICANT CHANGE UP (ref 0–6)
GLUCOSE BLDC GLUCOMTR-MCNC: 163
GLUCOSE SERPL-MCNC: 114 MG/DL — HIGH (ref 70–99)
HCT VFR BLD CALC: 33.8 % — LOW (ref 39–50)
HGB BLD-MCNC: 11.1 G/DL — LOW (ref 13–17)
IMM GRANULOCYTES # BLD AUTO: 0.02 # — SIGNIFICANT CHANGE UP
IMM GRANULOCYTES NFR BLD AUTO: 0.3 % — SIGNIFICANT CHANGE UP (ref 0–1.5)
LYMPHOCYTES # BLD AUTO: 1.28 K/UL — SIGNIFICANT CHANGE UP (ref 1–3.3)
LYMPHOCYTES # BLD AUTO: 19.9 % — SIGNIFICANT CHANGE UP (ref 13–44)
MCHC RBC-ENTMCNC: 29.9 PG — SIGNIFICANT CHANGE UP (ref 27–34)
MCHC RBC-ENTMCNC: 32.8 % — SIGNIFICANT CHANGE UP (ref 32–36)
MCV RBC AUTO: 91.1 FL — SIGNIFICANT CHANGE UP (ref 80–100)
MONOCYTES # BLD AUTO: 0.55 K/UL — SIGNIFICANT CHANGE UP (ref 0–0.9)
MONOCYTES NFR BLD AUTO: 8.5 % — SIGNIFICANT CHANGE UP (ref 2–14)
NEUTROPHILS # BLD AUTO: 4.45 K/UL — SIGNIFICANT CHANGE UP (ref 1.8–7.4)
NEUTROPHILS NFR BLD AUTO: 69.1 % — SIGNIFICANT CHANGE UP (ref 43–77)
NRBC # FLD: 0 — SIGNIFICANT CHANGE UP
PLATELET # BLD AUTO: 228 K/UL — SIGNIFICANT CHANGE UP (ref 150–400)
PMV BLD: 10.9 FL — SIGNIFICANT CHANGE UP (ref 7–13)
POTASSIUM SERPL-MCNC: 5 MMOL/L — SIGNIFICANT CHANGE UP (ref 3.5–5.3)
POTASSIUM SERPL-SCNC: 5 MMOL/L — SIGNIFICANT CHANGE UP (ref 3.5–5.3)
RBC # BLD: 3.71 M/UL — LOW (ref 4.2–5.8)
RBC # FLD: 14.4 % — SIGNIFICANT CHANGE UP (ref 10.3–14.5)
SODIUM SERPL-SCNC: 129 MMOL/L — LOW (ref 135–145)
WBC # BLD: 6.44 K/UL — SIGNIFICANT CHANGE UP (ref 3.8–10.5)
WBC # FLD AUTO: 6.44 K/UL — SIGNIFICANT CHANGE UP (ref 3.8–10.5)

## 2017-09-13 PROCEDURE — 99214 OFFICE O/P EST MOD 30 MIN: CPT

## 2017-09-13 RX ORDER — LEVOTHYROXINE SODIUM 0.07 MG/1
75 TABLET ORAL DAILY
Qty: 30 | Refills: 5 | Status: COMPLETED | COMMUNITY
Start: 2017-02-06 | End: 2017-08-05

## 2017-09-13 RX ORDER — LISINOPRIL 10 MG/1
10 TABLET ORAL DAILY
Qty: 30 | Refills: 5 | Status: COMPLETED | COMMUNITY
Start: 2017-02-06 | End: 2017-09-07

## 2017-09-13 RX ORDER — POLYETHYLENE GLYCOL 3350 AND ELECTROLYTES WITH LEMON FLAVOR 236; 22.74; 6.74; 5.86; 2.97 G/4L; G/4L; G/4L; G/4L; G/4L
236 POWDER, FOR SOLUTION ORAL
Qty: 4000 | Refills: 0 | Status: COMPLETED | COMMUNITY
Start: 2017-08-31 | End: 2017-11-22

## 2017-09-14 ENCOUNTER — APPOINTMENT (OUTPATIENT)
Dept: OPHTHALMOLOGY | Facility: CLINIC | Age: 62
End: 2017-09-14

## 2017-09-15 DIAGNOSIS — I10 ESSENTIAL (PRIMARY) HYPERTENSION: ICD-10-CM

## 2017-09-15 DIAGNOSIS — E11.9 TYPE 2 DIABETES MELLITUS WITHOUT COMPLICATIONS: ICD-10-CM

## 2017-09-17 NOTE — HEALTH RISK ASSESSMENT
[Intercurrent hospitalizations] : Patient stated was admitted to the hospital since last visit [de-identified] : see HPI

## 2017-09-17 NOTE — DATA REVIEWED
[FreeTextEntry1] : \par HbA1c on 07 Sep 2017 was 6.8%† and on 22 Jul 2017 was 7.5%† both on sunrise.

## 2017-09-17 NOTE — ASSESSMENT
[FreeTextEntry1] : 62 y/o M with schizophrenia, HTN, DM, HLD, glaucoma, and BPH presents for follow up from hospital discharge in stable condition at present.\par \par Hypotension: resolved, continue off lisinopril for now as blood pressure during visit normal. Monitor closely follow up with Dr. Grossman in a few weeks.  Advised once or twice a week blood pressure monitoring at home, call for blood pressures above 180 systolic.  Reviewed walk in hours with patient and brother present during visit.  Also d/w patient that venlafaxine can cause elevated blood pressure as well as nausea and vomiting as side effects.  Explained to patient he should discuss with his psychiatrist an alternative medication as there may be no need for blood pressure medications at all. \par \par Hyponatremia: continue sodium tablets, repeat electrolytes today for monitoring.\par \par Hypothyroidism: stable, no active issues, requesting refills, sent to pharmacy for patient.\par \par Constipation: pt needs refills of docusate, stating it helps from being constipated.\par \par Schizophrenia: stable on current regimen by psychiatry, advised discussion with psych to consider changing venlafaxine.\par \par Diabetes: continue medications, prescription sent to pharmacy.

## 2017-09-17 NOTE — REVIEW OF SYSTEMS
[Constipation] : constipation [Negative] : Genitourinary [Fever] : no fever [Fatigue] : no fatigue [Chest Pain] : no chest pain [Palpitations] : no palpitations [Lower Ext Edema] : no lower extremity edema [Abdominal Pain] : no abdominal pain [Nausea] : no nausea [Diarrhea] : no diarrhea [Vomiting] : no vomiting [de-identified] : under treatment with psychiatry for schizophrenia and OCD

## 2017-09-17 NOTE — HISTORY OF PRESENT ILLNESS
[Family Member] : family member [FreeTextEntry1] : hospital discharge for hypotension [de-identified] : 62 y/o M with schizophrenia, Hypertension, Diabetes type 2, Hyperlipidemia, glaucoma, and Benign Prostatic Hypertrophy presents for follow up from hospital discharge, where he was admitted for electrolyte abnormality and hypotension found during his office visit last week.  Patient is accompanied by his brother. Hospital stay was significant for holding his ace inhibitor(lisinopril), giving iv fluids for correcting electrolyte abnormalities.  \par \par Patient admitted on 9/6 and discharged 9/7, discharged with following medication list from copied from discharge summary: \par Depakote  mg oral tablet, extended release -- 2 tab(s) (1000mg) by mouth once a day (at bedtime) -- Indication: For Schizophrenia, unspecified type \par venlafaxine 75 mg oral capsule, extended release -- 1 cap(s) by mouth once a day -- Indication: For Schizophrenia, unspecified type \par glipiZIDE 10 mg oral tablet -- 1 tab(s) by mouth 2 times a day -- Indication: For Diabetes \par Januvia 100 mg oral tablet -- 1 tab(s) by mouth once a day -- Indication: For Diabetes \par Glucophage 1000 mg oral tablet -- 1 tab(s) by mouth 2 times a day -- Indication: For Diabetes \par loratadine 10 mg oral tablet -- 1 tab(s) by mouth once a day -- Indication: For Antihistamine \par Lipitor 40 mg oral tablet -- 1 tab(s) by mouth once a day (at bedtime) - Indication: For Hyperlipidemia \par cloZAPine 25 mg oral tablet -- 1 tab(s) by mouth once a day after lunch -- Indication: For Schizophrenia, unspecified type \par cloZAPine 100 mg oral tablet -- 1 tab(s) by mouth once a day (at bedtime) -- Indication: For Schizophrenia, unspecified type \par docusate sodium 100 mg oral capsule -- 1 cap(s) by mouth 3 times a day, As Needed -- Indication: For bowel regimen \par Sodium Chloride -- 2 tab(s) by mouth once a day -- Indication: For Hyponatremia \par Timoptic Ocudose 0.5% ophthalmic solution -- 1 drop(s) to each affected eye 2 times a day -- Indication: For eye drops \par brimonidine 0.15% ophthalmic solution -- 1 drop(s) to each affected eye 2 times a day -- Indication: For eye drops \par Lumigan 0.01% ophthalmic solution -- 1 drop(s) to each affected eye once a day (in the evening) -- Indication: For eye drops \par Synthroid 75 mcg (0.075 mg) oral tablet -- 1 tab(s) by mouth once a day -- Indication: For Hypothyroidism, unspecified type. \par \par STOP taking: lisinopril 10 mg oral tablet once a day.\par Patient reports taking the medications as prescribed and holding off on the lisinopril.  Pt doesn't check his blood pressures at home but does have it checked at the day care center he goes to, where it has been slightly elevated at times but otherwise is normal.  Patient wants to know if he should restart the lisinopril.  Patient also reports that he was told in the hospital that maybe he shouldn't be on venlafaxine which is prescribed by psychiatry.  He is still on venlafaxine.\par \par Patient also wants to know what he should do about the vomiting that was occurring intermittently before he was admitted to the hospital.  He has had no vomiting since discharge.  Patient is scheduled for colonoscopy in November and is being seen by GI.\par \par Diabetes: denies any hypoglycemic episodes or hyperglycemic episodes since discharge, taking all medications and needs refills.

## 2017-09-17 NOTE — PHYSICAL EXAM
[No Acute Distress] : no acute distress [Normal Voice/Communication] : normal voice/communication [No Respiratory Distress] : no respiratory distress  [Clear to Auscultation] : lungs were clear to auscultation bilaterally [Normal Rate] : normal rate  [Regular Rhythm] : with a regular rhythm [Normal S1, S2] : normal S1 and S2 [No Edema] : there was no peripheral edema [Soft] : abdomen soft [Non Tender] : non-tender

## 2017-09-18 ENCOUNTER — OUTPATIENT (OUTPATIENT)
Dept: OUTPATIENT SERVICES | Facility: HOSPITAL | Age: 62
LOS: 1 days | End: 2017-09-18

## 2017-09-18 ENCOUNTER — APPOINTMENT (OUTPATIENT)
Dept: OPHTHALMOLOGY | Facility: CLINIC | Age: 62
End: 2017-09-18

## 2017-09-18 DIAGNOSIS — F20.9 SCHIZOPHRENIA, UNSPECIFIED: ICD-10-CM

## 2017-09-18 DIAGNOSIS — E87.1 HYPO-OSMOLALITY AND HYPONATREMIA: ICD-10-CM

## 2017-09-18 DIAGNOSIS — E03.9 HYPOTHYROIDISM, UNSPECIFIED: ICD-10-CM

## 2017-09-18 DIAGNOSIS — K59.00 CONSTIPATION, UNSPECIFIED: ICD-10-CM

## 2017-09-21 ENCOUNTER — EMERGENCY (EMERGENCY)
Facility: HOSPITAL | Age: 62
LOS: 1 days | Discharge: HOME | End: 2017-09-21
Attending: EMERGENCY MEDICINE | Admitting: EMERGENCY MEDICINE
Payer: MEDICAID

## 2017-09-21 VITALS
OXYGEN SATURATION: 99 % | RESPIRATION RATE: 16 BRPM | TEMPERATURE: 98 F | SYSTOLIC BLOOD PRESSURE: 86 MMHG | HEART RATE: 97 BPM | DIASTOLIC BLOOD PRESSURE: 51 MMHG

## 2017-09-21 VITALS — DIASTOLIC BLOOD PRESSURE: 70 MMHG | SYSTOLIC BLOOD PRESSURE: 141 MMHG

## 2017-09-21 LAB
ALBUMIN SERPL ELPH-MCNC: 3.9 G/DL — SIGNIFICANT CHANGE UP (ref 3.3–5)
ALP SERPL-CCNC: 71 U/L — SIGNIFICANT CHANGE UP (ref 40–120)
ALT FLD-CCNC: 17 U/L — SIGNIFICANT CHANGE UP (ref 4–41)
APPEARANCE UR: CLEAR — SIGNIFICANT CHANGE UP
AST SERPL-CCNC: 19 U/L — SIGNIFICANT CHANGE UP (ref 4–40)
BASE EXCESS BLDV CALC-SCNC: -4 MMOL/L — SIGNIFICANT CHANGE UP
BASOPHILS # BLD AUTO: 0.04 K/UL — SIGNIFICANT CHANGE UP (ref 0–0.2)
BASOPHILS NFR BLD AUTO: 0.4 % — SIGNIFICANT CHANGE UP (ref 0–2)
BILIRUB SERPL-MCNC: 0.2 MG/DL — SIGNIFICANT CHANGE UP (ref 0.2–1.2)
BILIRUB UR-MCNC: NEGATIVE — SIGNIFICANT CHANGE UP
BLOOD GAS VENOUS - CREATININE: 1 MG/DL — SIGNIFICANT CHANGE UP (ref 0.5–1.3)
BLOOD UR QL VISUAL: NEGATIVE — SIGNIFICANT CHANGE UP
BUN SERPL-MCNC: 15 MG/DL — SIGNIFICANT CHANGE UP (ref 7–23)
CALCIUM SERPL-MCNC: 8.9 MG/DL — SIGNIFICANT CHANGE UP (ref 8.4–10.5)
CHLORIDE BLDV-SCNC: 100 MMOL/L — SIGNIFICANT CHANGE UP (ref 96–108)
CHLORIDE SERPL-SCNC: 95 MMOL/L — LOW (ref 98–107)
CHLORIDE UR-SCNC: 55 MMOL/L — SIGNIFICANT CHANGE UP
CK MB BLD-MCNC: 1.85 NG/ML — SIGNIFICANT CHANGE UP (ref 1–6.6)
CK MB BLD-MCNC: SIGNIFICANT CHANGE UP (ref 0–2.5)
CK SERPL-CCNC: 110 U/L — SIGNIFICANT CHANGE UP (ref 30–200)
CO2 SERPL-SCNC: 17 MMOL/L — LOW (ref 22–31)
COLOR SPEC: SIGNIFICANT CHANGE UP
CREAT SERPL-MCNC: 1.04 MG/DL — SIGNIFICANT CHANGE UP (ref 0.5–1.3)
EOSINOPHIL # BLD AUTO: 0.06 K/UL — SIGNIFICANT CHANGE UP (ref 0–0.5)
EOSINOPHIL NFR BLD AUTO: 0.6 % — SIGNIFICANT CHANGE UP (ref 0–6)
GAS PNL BLDV: 126 MMOL/L — LOW (ref 136–146)
GLUCOSE BLDV-MCNC: 207 — HIGH (ref 70–99)
GLUCOSE SERPL-MCNC: 201 MG/DL — HIGH (ref 70–99)
GLUCOSE UR-MCNC: NEGATIVE — SIGNIFICANT CHANGE UP
HCO3 BLDV-SCNC: 21 MMOL/L — SIGNIFICANT CHANGE UP (ref 20–27)
HCT VFR BLD CALC: 36.9 % — LOW (ref 39–50)
HCT VFR BLDV CALC: 39.1 % — SIGNIFICANT CHANGE UP (ref 39–51)
HGB BLD-MCNC: 12.3 G/DL — LOW (ref 13–17)
HGB BLDV-MCNC: 12.7 G/DL — LOW (ref 13–17)
HYALINE CASTS # UR AUTO: SIGNIFICANT CHANGE UP (ref 0–?)
IMM GRANULOCYTES # BLD AUTO: 0.04 # — SIGNIFICANT CHANGE UP
IMM GRANULOCYTES NFR BLD AUTO: 0.4 % — SIGNIFICANT CHANGE UP (ref 0–1.5)
KETONES UR-MCNC: SIGNIFICANT CHANGE UP
LACTATE BLDV-MCNC: 1.6 MMOL/L — SIGNIFICANT CHANGE UP (ref 0.5–2)
LEUKOCYTE ESTERASE UR-ACNC: NEGATIVE — SIGNIFICANT CHANGE UP
LIDOCAIN IGE QN: 27.3 U/L — SIGNIFICANT CHANGE UP (ref 7–60)
LYMPHOCYTES # BLD AUTO: 0.89 K/UL — LOW (ref 1–3.3)
LYMPHOCYTES # BLD AUTO: 8.5 % — LOW (ref 13–44)
MCHC RBC-ENTMCNC: 29.4 PG — SIGNIFICANT CHANGE UP (ref 27–34)
MCHC RBC-ENTMCNC: 33.3 % — SIGNIFICANT CHANGE UP (ref 32–36)
MCV RBC AUTO: 88.3 FL — SIGNIFICANT CHANGE UP (ref 80–100)
MONOCYTES # BLD AUTO: 0.89 K/UL — SIGNIFICANT CHANGE UP (ref 0–0.9)
MONOCYTES NFR BLD AUTO: 8.5 % — SIGNIFICANT CHANGE UP (ref 2–14)
NEUTROPHILS # BLD AUTO: 8.53 K/UL — HIGH (ref 1.8–7.4)
NEUTROPHILS NFR BLD AUTO: 81.6 % — HIGH (ref 43–77)
NITRITE UR-MCNC: NEGATIVE — SIGNIFICANT CHANGE UP
NON-SQ EPI CELLS # UR AUTO: <1 — SIGNIFICANT CHANGE UP
NRBC # FLD: 0 — SIGNIFICANT CHANGE UP
PCO2 BLDV: 37 MMHG — LOW (ref 41–51)
PH BLDV: 7.36 PH — SIGNIFICANT CHANGE UP (ref 7.32–7.43)
PH UR: 6 — SIGNIFICANT CHANGE UP (ref 4.6–8)
PLATELET # BLD AUTO: 223 K/UL — SIGNIFICANT CHANGE UP (ref 150–400)
PMV BLD: 10.9 FL — SIGNIFICANT CHANGE UP (ref 7–13)
PO2 BLDV: 82 MMHG — HIGH (ref 35–40)
POTASSIUM BLDV-SCNC: 4.8 MMOL/L — HIGH (ref 3.4–4.5)
POTASSIUM SERPL-MCNC: 5.2 MMOL/L — SIGNIFICANT CHANGE UP (ref 3.5–5.3)
POTASSIUM SERPL-SCNC: 5.2 MMOL/L — SIGNIFICANT CHANGE UP (ref 3.5–5.3)
POTASSIUM UR-SCNC: 20.3 MEQ/L — SIGNIFICANT CHANGE UP
PROT SERPL-MCNC: 6.7 G/DL — SIGNIFICANT CHANGE UP (ref 6–8.3)
PROT UR-MCNC: NEGATIVE — SIGNIFICANT CHANGE UP
RBC # BLD: 4.18 M/UL — LOW (ref 4.2–5.8)
RBC # FLD: 14.5 % — SIGNIFICANT CHANGE UP (ref 10.3–14.5)
RBC CASTS # UR COMP ASSIST: SIGNIFICANT CHANGE UP (ref 0–?)
SAO2 % BLDV: 95 % — HIGH (ref 60–85)
SODIUM SERPL-SCNC: 131 MMOL/L — LOW (ref 135–145)
SODIUM UR-SCNC: 56 MEQ/L — SIGNIFICANT CHANGE UP
SP GR SPEC: 1.01 — SIGNIFICANT CHANGE UP (ref 1–1.03)
TROPONIN T SERPL-MCNC: < 0.06 NG/ML — SIGNIFICANT CHANGE UP (ref 0–0.06)
UROBILINOGEN FLD QL: NORMAL E.U. — SIGNIFICANT CHANGE UP (ref 0.1–0.2)
WBC # BLD: 10.45 K/UL — SIGNIFICANT CHANGE UP (ref 3.8–10.5)
WBC # FLD AUTO: 10.45 K/UL — SIGNIFICANT CHANGE UP (ref 3.8–10.5)
WBC UR QL: SIGNIFICANT CHANGE UP (ref 0–?)

## 2017-09-21 PROCEDURE — 93010 ELECTROCARDIOGRAM REPORT: CPT

## 2017-09-21 PROCEDURE — 99285 EMERGENCY DEPT VISIT HI MDM: CPT | Mod: 25

## 2017-09-21 PROCEDURE — 74177 CT ABD & PELVIS W/CONTRAST: CPT | Mod: 26

## 2017-09-21 RX ORDER — ONDANSETRON 8 MG/1
4 TABLET, FILM COATED ORAL ONCE
Qty: 0 | Refills: 0 | Status: COMPLETED | OUTPATIENT
Start: 2017-09-21 | End: 2017-09-21

## 2017-09-21 RX ORDER — SODIUM CHLORIDE 9 MG/ML
1000 INJECTION INTRAMUSCULAR; INTRAVENOUS; SUBCUTANEOUS ONCE
Qty: 0 | Refills: 0 | Status: COMPLETED | OUTPATIENT
Start: 2017-09-21 | End: 2017-09-21

## 2017-09-21 RX ADMIN — ONDANSETRON 4 MILLIGRAM(S): 8 TABLET, FILM COATED ORAL at 08:29

## 2017-09-21 RX ADMIN — SODIUM CHLORIDE 1000 MILLILITER(S): 9 INJECTION INTRAMUSCULAR; INTRAVENOUS; SUBCUTANEOUS at 08:28

## 2017-09-21 RX ADMIN — SODIUM CHLORIDE 1000 MILLILITER(S): 9 INJECTION INTRAMUSCULAR; INTRAVENOUS; SUBCUTANEOUS at 09:11

## 2017-09-21 NOTE — ED ADULT NURSE NOTE - OBJECTIVE STATEMENT
pt c/o weakness and, lethargy, and vomiting this morning. pt denies any chest pain, SOB, n/d, visual changes, headache, dysuria, hematuria. pt states his PCP took him off of all of his medications recently and now feels "off". pt is hypotensive, afebrile, a/ox3, ambulatory. MD at bedside, awaiting further orders from MD, will continue to monitor, pt safety maintained.

## 2017-09-21 NOTE — ED PROVIDER NOTE - ATTENDING CONTRIBUTION TO CARE
Pt with hx schizophrenia, dm, htn with intermittent episodes of dizziness, weakness, vomiting intermittently over the past 6 months--cou[ples with temporary hypotension.  Today in [facility], with silimar episode, no cp no sobe,  in [facility] as arrived to ed.  pt reports poor oral fluid intake as a norm for him.  pe ncat  pale skin lungs cta  abd soft  voming limbs x 4  ecg noo st changes  labs reviewed---na and k wnl, pt improved after iv hydration  will po challenge  and reassess

## 2017-09-21 NOTE — ED PROVIDER NOTE - OBJECTIVE STATEMENT
61yM w/pmhx schizophrenia, DM, HTN, HLD, glaucoma, BPH presenting with weakness 61yM w/pmhx schizophrenia, DM, HTN, HLD, hx SBO, glaucoma, BPH presenting with weakness and vomiting starting this morning. Pt states he woke up and vomited multiple times this morning 61yM w/pmhx schizophrenia, DM, HTN, HLD, hx SBO, glaucoma, BPH presenting with weakness and vomiting starting this morning. Pt states he woke up and vomited multiple times this morning, denies hematemesis. Pt states he feels weak and earlier this morning felt as though he was going to fall down, associated with nausea. Pt recently hospitalized 9/08/17 for electrolyte abnormalities, hyponatremia, has hx of low blood pressure. Denies fever, chest pain, shortness of breath, diarrhea, vertigo symptoms, change in vision and any other complaints. 61yM w/pmhx schizophrenia, DM, HTN, HLD, hx SBO 07/2017, glaucoma, BPH presenting with weakness and vomiting starting this morning. Pt states he woke up and vomited multiple times this morning, denies hematemesis. Pt states he feels weak and nauseous, earlier this morning felt as though he was going to fall down. Pt recently hospitalized 9/08/17 for electrolyte abnormalities, hyponatremia, has hx of low blood pressure. Denies fever, chest pain, shortness of breath, diarrhea, vertigo symptoms, change in vision and any other complaints. 61yM w/pmhx schizophrenia, DM, HTN, HLD, hx SBO 07/2017, hypothyroid, glaucoma, BPH presenting with weakness and vomiting starting this morning. Pt states he woke up and vomited multiple times this morning, denies hematemesis. Pt states he feels weak and nauseous, earlier this morning felt as though he was going to fall down. Pt recently hospitalized 9/08/17 for electrolyte abnormalities, hyponatremia, has hx of low blood pressure. Denies fever, chest pain, shortness of breath, diarrhea, vertigo symptoms, change in vision and any other complaints.

## 2017-09-21 NOTE — ED ADULT TRIAGE NOTE - CHIEF COMPLAINT QUOTE
Pt c/o weakness with dizziness and nausea.  since 5:30am.  Pt appears lethargic, hypotensive in triage.  Pt recently admitted for hyponatremia.

## 2017-09-22 PROBLEM — R93.3 ABNORMAL COMPUTED TOMOGRAPHY OF CECUM AND TERMINAL ILEUM: Status: RESOLVED | Noted: 2017-09-01 | Resolved: 2017-09-22

## 2017-09-22 LAB
BACTERIA UR CULT: SIGNIFICANT CHANGE UP
SPECIMEN SOURCE: SIGNIFICANT CHANGE UP

## 2017-09-26 DIAGNOSIS — H40.1132 PRIMARY OPEN-ANGLE GLAUCOMA, BILATERAL, MODERATE STAGE: ICD-10-CM

## 2017-10-11 ENCOUNTER — LABORATORY RESULT (OUTPATIENT)
Age: 62
End: 2017-10-11

## 2017-10-11 ENCOUNTER — OUTPATIENT (OUTPATIENT)
Dept: OUTPATIENT SERVICES | Facility: HOSPITAL | Age: 62
LOS: 1 days | End: 2017-10-11

## 2017-10-11 ENCOUNTER — APPOINTMENT (OUTPATIENT)
Dept: INTERNAL MEDICINE | Facility: HOSPITAL | Age: 62
End: 2017-10-11
Payer: MEDICAID

## 2017-10-11 VITALS — SYSTOLIC BLOOD PRESSURE: 114 MMHG | DIASTOLIC BLOOD PRESSURE: 80 MMHG

## 2017-10-11 VITALS — HEIGHT: 67 IN

## 2017-10-11 VITALS — WEIGHT: 196 LBS | HEIGHT: 67 IN | BODY MASS INDEX: 30.76 KG/M2

## 2017-10-11 LAB
BUN SERPL-MCNC: 16 MG/DL — SIGNIFICANT CHANGE UP (ref 7–23)
CALCIUM SERPL-MCNC: 8.4 MG/DL — SIGNIFICANT CHANGE UP (ref 8.4–10.5)
CHLORIDE SERPL-SCNC: 89 MMOL/L — LOW (ref 98–107)
CO2 SERPL-SCNC: 26 MMOL/L — SIGNIFICANT CHANGE UP (ref 22–31)
CORTIS SERPL-MCNC: 6.8 UG/DL — SIGNIFICANT CHANGE UP (ref 2.7–18.4)
CREAT SERPL-MCNC: 0.7 MG/DL — SIGNIFICANT CHANGE UP (ref 0.5–1.3)
GLUCOSE SERPL-MCNC: 112 MG/DL — HIGH (ref 70–99)
HBA1C BLD-MCNC: 6.6 % — HIGH (ref 4–5.6)
POTASSIUM SERPL-MCNC: 5 MMOL/L — SIGNIFICANT CHANGE UP (ref 3.5–5.3)
POTASSIUM SERPL-SCNC: 5 MMOL/L — SIGNIFICANT CHANGE UP (ref 3.5–5.3)
SODIUM SERPL-SCNC: 128 MMOL/L — LOW (ref 135–145)

## 2017-10-11 PROCEDURE — 99213 OFFICE O/P EST LOW 20 MIN: CPT | Mod: GE

## 2017-10-12 LAB — ALDOST SERPL-MCNC: 7.9 NG/DL — SIGNIFICANT CHANGE UP

## 2017-10-13 DIAGNOSIS — R11.10 VOMITING, UNSPECIFIED: ICD-10-CM

## 2017-10-13 LAB — GLUCOSE BLDC GLUCOMTR-MCNC: 114

## 2017-10-16 ENCOUNTER — OTHER (OUTPATIENT)
Age: 62
End: 2017-10-16

## 2017-10-16 DIAGNOSIS — E11.9 TYPE 2 DIABETES MELLITUS WITHOUT COMPLICATIONS: ICD-10-CM

## 2017-10-24 ENCOUNTER — RX RENEWAL (OUTPATIENT)
Age: 62
End: 2017-10-24

## 2017-10-24 ENCOUNTER — MEDICATION RENEWAL (OUTPATIENT)
Age: 62
End: 2017-10-24

## 2017-10-24 ENCOUNTER — OTHER (OUTPATIENT)
Age: 62
End: 2017-10-24

## 2017-11-15 ENCOUNTER — APPOINTMENT (OUTPATIENT)
Dept: INTERNAL MEDICINE | Facility: HOSPITAL | Age: 62
End: 2017-11-15
Payer: MEDICAID

## 2017-11-15 ENCOUNTER — OUTPATIENT (OUTPATIENT)
Dept: OUTPATIENT SERVICES | Facility: HOSPITAL | Age: 62
LOS: 1 days | End: 2017-11-15

## 2017-11-15 VITALS — WEIGHT: 196 LBS | HEIGHT: 67 IN | BODY MASS INDEX: 30.76 KG/M2

## 2017-11-15 PROCEDURE — 99213 OFFICE O/P EST LOW 20 MIN: CPT | Mod: GE

## 2017-11-17 DIAGNOSIS — E11.9 TYPE 2 DIABETES MELLITUS WITHOUT COMPLICATIONS: ICD-10-CM

## 2017-11-17 LAB — GLUCOSE BLDC GLUCOMTR-MCNC: 132

## 2017-11-21 ENCOUNTER — MEDICATION RENEWAL (OUTPATIENT)
Age: 62
End: 2017-11-21

## 2017-11-29 ENCOUNTER — OTHER (OUTPATIENT)
Age: 62
End: 2017-11-29

## 2017-12-12 ENCOUNTER — APPOINTMENT (OUTPATIENT)
Dept: INTERNAL MEDICINE | Facility: HOSPITAL | Age: 62
End: 2017-12-12
Payer: MEDICAID

## 2017-12-12 ENCOUNTER — OUTPATIENT (OUTPATIENT)
Dept: OUTPATIENT SERVICES | Facility: HOSPITAL | Age: 62
LOS: 1 days | End: 2017-12-12

## 2017-12-12 ENCOUNTER — RESULT CHARGE (OUTPATIENT)
Age: 62
End: 2017-12-12

## 2017-12-12 VITALS — HEIGHT: 67 IN | WEIGHT: 190.04 LBS | BODY MASS INDEX: 29.83 KG/M2

## 2017-12-12 VITALS — HEART RATE: 88 BPM | SYSTOLIC BLOOD PRESSURE: 110 MMHG | DIASTOLIC BLOOD PRESSURE: 65 MMHG

## 2017-12-12 PROCEDURE — 99213 OFFICE O/P EST LOW 20 MIN: CPT | Mod: GE

## 2017-12-12 RX ORDER — BIMATOPROST 0.1 MG/ML
0.01 SOLUTION/ DROPS OPHTHALMIC DAILY
Qty: 2.5 | Refills: 5 | Status: DISCONTINUED | COMMUNITY
Start: 2017-08-15 | End: 2017-12-12

## 2017-12-12 RX ORDER — LORATADINE 10 MG/1
10 TABLET ORAL DAILY
Qty: 30 | Refills: 0 | Status: DISCONTINUED | COMMUNITY
Start: 2017-07-12 | End: 2017-12-12

## 2017-12-12 RX ORDER — HYDROCORTISONE 10 MG/G
1 OINTMENT TOPICAL TWICE DAILY
Qty: 430 | Refills: 0 | Status: DISCONTINUED | COMMUNITY
Start: 2017-07-12 | End: 2017-12-12

## 2017-12-13 LAB — GLUCOSE BLDC GLUCOMTR-MCNC: 180

## 2017-12-14 ENCOUNTER — OTHER (OUTPATIENT)
Age: 62
End: 2017-12-14

## 2017-12-14 ENCOUNTER — OUTPATIENT (OUTPATIENT)
Dept: OUTPATIENT SERVICES | Facility: HOSPITAL | Age: 62
LOS: 1 days | End: 2017-12-14
Payer: MEDICAID

## 2017-12-14 VITALS
SYSTOLIC BLOOD PRESSURE: 140 MMHG | HEART RATE: 99 BPM | DIASTOLIC BLOOD PRESSURE: 60 MMHG | WEIGHT: 192.02 LBS | TEMPERATURE: 99 F | RESPIRATION RATE: 16 BRPM | HEIGHT: 64 IN

## 2017-12-14 DIAGNOSIS — Z87.19 PERSONAL HISTORY OF OTHER DISEASES OF THE DIGESTIVE SYSTEM: ICD-10-CM

## 2017-12-14 DIAGNOSIS — E11.9 TYPE 2 DIABETES MELLITUS WITHOUT COMPLICATIONS: ICD-10-CM

## 2017-12-14 DIAGNOSIS — Z87.11 PERSONAL HISTORY OF PEPTIC ULCER DISEASE: ICD-10-CM

## 2017-12-14 DIAGNOSIS — E03.9 HYPOTHYROIDISM, UNSPECIFIED: ICD-10-CM

## 2017-12-14 LAB
BUN SERPL-MCNC: 14 MG/DL — SIGNIFICANT CHANGE UP (ref 7–23)
CALCIUM SERPL-MCNC: 8.1 MG/DL — LOW (ref 8.4–10.5)
CHLORIDE SERPL-SCNC: 96 MMOL/L — LOW (ref 98–107)
CO2 SERPL-SCNC: 25 MMOL/L — SIGNIFICANT CHANGE UP (ref 22–31)
CREAT SERPL-MCNC: 0.75 MG/DL — SIGNIFICANT CHANGE UP (ref 0.5–1.3)
GLUCOSE SERPL-MCNC: 154 MG/DL — HIGH (ref 70–99)
HBA1C BLD-MCNC: 6.9 % — HIGH (ref 4–5.6)
HCT VFR BLD CALC: 33.2 % — LOW (ref 39–50)
HGB BLD-MCNC: 10.9 G/DL — LOW (ref 13–17)
MCHC RBC-ENTMCNC: 28.8 PG — SIGNIFICANT CHANGE UP (ref 27–34)
MCHC RBC-ENTMCNC: 32.8 % — SIGNIFICANT CHANGE UP (ref 32–36)
MCV RBC AUTO: 87.6 FL — SIGNIFICANT CHANGE UP (ref 80–100)
NRBC # FLD: 0 — SIGNIFICANT CHANGE UP
PLATELET # BLD AUTO: 222 K/UL — SIGNIFICANT CHANGE UP (ref 150–400)
PMV BLD: 11.4 FL — SIGNIFICANT CHANGE UP (ref 7–13)
POTASSIUM SERPL-MCNC: 4.2 MMOL/L — SIGNIFICANT CHANGE UP (ref 3.5–5.3)
POTASSIUM SERPL-SCNC: 4.2 MMOL/L — SIGNIFICANT CHANGE UP (ref 3.5–5.3)
RBC # BLD: 3.79 M/UL — LOW (ref 4.2–5.8)
RBC # FLD: 15.3 % — HIGH (ref 10.3–14.5)
SODIUM SERPL-SCNC: 134 MMOL/L — LOW (ref 135–145)
WBC # BLD: 4.35 K/UL — SIGNIFICANT CHANGE UP (ref 3.8–10.5)
WBC # FLD AUTO: 4.35 K/UL — SIGNIFICANT CHANGE UP (ref 3.8–10.5)

## 2017-12-14 PROCEDURE — 93010 ELECTROCARDIOGRAM REPORT: CPT

## 2017-12-14 RX ORDER — CLOZAPINE 150 MG/1
1 TABLET, ORALLY DISINTEGRATING ORAL
Qty: 0 | Refills: 0 | COMMUNITY

## 2017-12-14 RX ORDER — LEVOTHYROXINE SODIUM 125 MCG
1 TABLET ORAL
Qty: 0 | Refills: 0 | COMMUNITY

## 2017-12-14 RX ORDER — VENLAFAXINE HCL 75 MG
1 CAPSULE, EXT RELEASE 24 HR ORAL
Qty: 0 | Refills: 0 | COMMUNITY

## 2017-12-14 RX ORDER — SODIUM CHLORIDE 9 MG/ML
2 INJECTION INTRAMUSCULAR; INTRAVENOUS; SUBCUTANEOUS
Qty: 0 | Refills: 0 | COMMUNITY

## 2017-12-14 RX ORDER — SITAGLIPTIN 50 MG/1
1 TABLET, FILM COATED ORAL
Qty: 0 | Refills: 0 | COMMUNITY

## 2017-12-14 RX ORDER — DIVALPROEX SODIUM 500 MG/1
2 TABLET, DELAYED RELEASE ORAL
Qty: 0 | Refills: 0 | COMMUNITY

## 2017-12-14 RX ORDER — TIMOLOL 0.5 %
1 DROPS OPHTHALMIC (EYE)
Qty: 0 | Refills: 0 | COMMUNITY

## 2017-12-14 RX ORDER — LORATADINE 10 MG/1
1 TABLET ORAL
Qty: 0 | Refills: 0 | COMMUNITY

## 2017-12-14 RX ORDER — BRIMONIDINE TARTRATE 2 MG/MG
1 SOLUTION/ DROPS OPHTHALMIC
Qty: 0 | Refills: 0 | COMMUNITY

## 2017-12-14 NOTE — H&P PST ADULT - PROBLEM SELECTOR PLAN 1
Scheduled for Colonoscopy on 12/20/17.  Lab results pending.  Preop and famotidine instructions provided and questions addressed.

## 2017-12-14 NOTE — H&P PST ADULT - PMH
Bipolar disorder    Diabetes    Glaucoma    Hypertension    Hypothyroid    Personal history of other diseases of the digestive system    Schizophrenia    Seborrheic dermatitis

## 2017-12-14 NOTE — H&P PST ADULT - FAMILY HISTORY
Family history of heart disease, mother had 3 heart attacks     Family history of diabetes mellitus in brother, both brothers     Mother  Still living? Unknown  Family history of diabetes mellitus (DM), Age at diagnosis: Age Unknown

## 2017-12-14 NOTE — H&P PST ADULT - NSANTHOSAYNRD_GEN_A_CORE
No. CASSY screening performed.  STOP BANG Legend: 0-2 = LOW Risk; 3-4 = INTERMEDIATE Risk; 5-8 = HIGH Risk

## 2017-12-14 NOTE — H&P PST ADULT - LYMPHATIC
supraclavicular L/posterior cervical L/anterior cervical L/posterior cervical R/anterior cervical R/supraclavicular R

## 2017-12-14 NOTE — H&P PST ADULT - HISTORY OF PRESENT ILLNESS
62 yr old male with medical hx of schizophrenia, Mood Disorder, hypothyroidism, DM II and Obsessive Compulsive Disorder presents for preop evaluation with dx of Personal History disease of digestive system.  Pt is now scheduled for Colonoscopy on 12/20/17.

## 2017-12-15 DIAGNOSIS — R11.10 VOMITING, UNSPECIFIED: ICD-10-CM

## 2017-12-15 DIAGNOSIS — R19.7 DIARRHEA, UNSPECIFIED: ICD-10-CM

## 2017-12-15 DIAGNOSIS — E11.65 TYPE 2 DIABETES MELLITUS WITH HYPERGLYCEMIA: ICD-10-CM

## 2017-12-18 ENCOUNTER — OUTPATIENT (OUTPATIENT)
Dept: OUTPATIENT SERVICES | Facility: HOSPITAL | Age: 62
LOS: 1 days | End: 2017-12-18

## 2017-12-18 ENCOUNTER — APPOINTMENT (OUTPATIENT)
Dept: INTERNAL MEDICINE | Facility: HOSPITAL | Age: 62
End: 2017-12-18
Payer: MEDICAID

## 2017-12-18 VITALS — BODY MASS INDEX: 30.29 KG/M2 | HEIGHT: 67 IN | WEIGHT: 193 LBS

## 2017-12-18 DIAGNOSIS — Z87.898 PERSONAL HISTORY OF OTHER SPECIFIED CONDITIONS: ICD-10-CM

## 2017-12-18 PROCEDURE — 99213 OFFICE O/P EST LOW 20 MIN: CPT | Mod: GE

## 2017-12-19 PROBLEM — Z87.898 HISTORY OF DIARRHEA: Status: RESOLVED | Noted: 2017-12-12 | Resolved: 2017-12-19

## 2017-12-19 LAB — GLUCOSE BLDC GLUCOMTR-MCNC: 123

## 2017-12-20 ENCOUNTER — OUTPATIENT (OUTPATIENT)
Dept: OUTPATIENT SERVICES | Facility: HOSPITAL | Age: 62
LOS: 1 days | Discharge: HOME | End: 2017-12-20
Payer: MEDICAID

## 2017-12-20 DIAGNOSIS — E66.9 OBESITY, UNSPECIFIED: ICD-10-CM

## 2017-12-20 DIAGNOSIS — I10 ESSENTIAL (PRIMARY) HYPERTENSION: ICD-10-CM

## 2017-12-20 DIAGNOSIS — R56.9 UNSPECIFIED CONVULSIONS: ICD-10-CM

## 2017-12-20 DIAGNOSIS — R11.10 VOMITING, UNSPECIFIED: ICD-10-CM

## 2017-12-20 DIAGNOSIS — E11.9 TYPE 2 DIABETES MELLITUS WITHOUT COMPLICATIONS: ICD-10-CM

## 2017-12-20 DIAGNOSIS — E87.1 HYPO-OSMOLALITY AND HYPONATREMIA: ICD-10-CM

## 2017-12-20 DIAGNOSIS — E03.9 HYPOTHYROIDISM, UNSPECIFIED: ICD-10-CM

## 2017-12-20 DIAGNOSIS — L23.9 ALLERGIC CONTACT DERMATITIS, UNSPECIFIED CAUSE: ICD-10-CM

## 2017-12-20 DIAGNOSIS — Z87.19 PERSONAL HISTORY OF OTHER DISEASES OF THE DIGESTIVE SYSTEM: ICD-10-CM

## 2017-12-20 DIAGNOSIS — E78.5 HYPERLIPIDEMIA, UNSPECIFIED: ICD-10-CM

## 2017-12-20 DIAGNOSIS — F20.9 SCHIZOPHRENIA, UNSPECIFIED: ICD-10-CM

## 2017-12-20 LAB — GLUCOSE BLDC GLUCOMTR-MCNC: 100 MG/DL — HIGH (ref 70–99)

## 2017-12-20 PROCEDURE — 45378 DIAGNOSTIC COLONOSCOPY: CPT | Mod: 53,GC

## 2017-12-20 NOTE — H&P ADULT - NSHPLABSRESULTS_GEN_ALL_CORE
no CBC (07-19 @ 18:45)                              10.2<L>                         5.02    )----------------(  179        63.9  % Neutrophils, 19.3  % Lymphocytes, ANC: 3.21                                29.8<L>    BMP (07-19 @ 18:45)             132<L>  |  98      |  26<H> 		Ca++ --      Ca 7.7<L>             ---------------------------------( 89    		Mg --                 4.2     |  22      |  0.87  			Ph --        LFTs (07-19 @ 18:45)      TPro 5.9<L> / Alb 3.2<L> / TBili 0.2 / DBili -- / AST 11 / ALT 8 / AlkPhos 43      VBG (07-19 @ 17:30)     7.35 / 45 / 33<L> / 23 / -1.1 / 50.3<L>%     Lactate: 1.4    < from: CT Abdomen and Pelvis w/ Oral Cont and w/ IV Cont (07.19.17 @ 23:56) >    Small bowel obstruction with transition point at the terminal ileum.   Associated mesenteric edema, trace interloop fluid, and small volume   abdominal and pelvic ascites.    < end of copied text >

## 2017-12-28 ENCOUNTER — OUTPATIENT (OUTPATIENT)
Dept: OUTPATIENT SERVICES | Facility: HOSPITAL | Age: 62
LOS: 1 days | End: 2017-12-28

## 2017-12-28 ENCOUNTER — APPOINTMENT (OUTPATIENT)
Dept: GASTROENTEROLOGY | Facility: HOSPITAL | Age: 62
End: 2017-12-28

## 2017-12-28 VITALS — HEIGHT: 67 IN | WEIGHT: 198 LBS | BODY MASS INDEX: 31.08 KG/M2

## 2017-12-28 DIAGNOSIS — E11.9 TYPE 2 DIABETES MELLITUS WITHOUT COMPLICATIONS: ICD-10-CM

## 2017-12-28 DIAGNOSIS — Z87.19 PERSONAL HISTORY OF OTHER DISEASES OF THE DIGESTIVE SYSTEM: ICD-10-CM

## 2017-12-28 DIAGNOSIS — E66.9 OBESITY, UNSPECIFIED: ICD-10-CM

## 2018-01-24 ENCOUNTER — OUTPATIENT (OUTPATIENT)
Dept: OUTPATIENT SERVICES | Facility: HOSPITAL | Age: 63
LOS: 1 days | End: 2018-01-24

## 2018-01-24 ENCOUNTER — APPOINTMENT (OUTPATIENT)
Dept: OPHTHALMOLOGY | Facility: CLINIC | Age: 63
End: 2018-01-24

## 2018-02-06 ENCOUNTER — RESULT REVIEW (OUTPATIENT)
Age: 63
End: 2018-02-06

## 2018-02-06 ENCOUNTER — OUTPATIENT (OUTPATIENT)
Dept: OUTPATIENT SERVICES | Facility: HOSPITAL | Age: 63
LOS: 1 days | Discharge: HOME | End: 2018-02-06
Payer: MEDICAID

## 2018-02-06 DIAGNOSIS — Z87.19 PERSONAL HISTORY OF OTHER DISEASES OF THE DIGESTIVE SYSTEM: ICD-10-CM

## 2018-02-06 LAB — GLUCOSE BLDC GLUCOMTR-MCNC: 137 MG/DL — HIGH (ref 70–99)

## 2018-02-06 PROCEDURE — 45380 COLONOSCOPY AND BIOPSY: CPT | Mod: 59,GC

## 2018-02-06 PROCEDURE — 45385 COLONOSCOPY W/LESION REMOVAL: CPT | Mod: GC

## 2018-02-06 PROCEDURE — 88305 TISSUE EXAM BY PATHOLOGIST: CPT | Mod: 26

## 2018-02-06 RX ORDER — POLYETHYLENE GLYCOL 3350, SODIUM SULFATE ANHYDROUS, SODIUM BICARBONATE, SODIUM CHLORIDE, POTASSIUM CHLORIDE 227.1; 21.5; 6.36; 5.53; .754 G/L; G/L; G/L; G/L; G/L
227.1 POWDER, FOR SOLUTION ORAL
Qty: 1 | Refills: 0 | Status: COMPLETED | COMMUNITY
Start: 2017-12-28 | End: 2018-02-06

## 2018-02-08 LAB — SURGICAL PATHOLOGY STUDY: SIGNIFICANT CHANGE UP

## 2018-02-09 DIAGNOSIS — H40.1133 PRIMARY OPEN-ANGLE GLAUCOMA, BILATERAL, SEVERE STAGE: ICD-10-CM

## 2018-02-12 ENCOUNTER — RX RENEWAL (OUTPATIENT)
Age: 63
End: 2018-02-12

## 2018-02-12 ENCOUNTER — MEDICATION RENEWAL (OUTPATIENT)
Age: 63
End: 2018-02-12

## 2018-02-15 ENCOUNTER — OUTPATIENT (OUTPATIENT)
Dept: OUTPATIENT SERVICES | Facility: HOSPITAL | Age: 63
LOS: 1 days | End: 2018-02-15

## 2018-02-15 ENCOUNTER — APPOINTMENT (OUTPATIENT)
Dept: GASTROENTEROLOGY | Facility: HOSPITAL | Age: 63
End: 2018-02-15
Payer: MEDICAID

## 2018-02-15 VITALS
SYSTOLIC BLOOD PRESSURE: 133 MMHG | HEART RATE: 76 BPM | DIASTOLIC BLOOD PRESSURE: 63 MMHG | HEIGHT: 67 IN | WEIGHT: 191 LBS | BODY MASS INDEX: 29.98 KG/M2

## 2018-02-15 PROCEDURE — 99213 OFFICE O/P EST LOW 20 MIN: CPT | Mod: GC

## 2018-02-16 DIAGNOSIS — E66.9 OBESITY, UNSPECIFIED: ICD-10-CM

## 2018-02-16 DIAGNOSIS — Z87.19 PERSONAL HISTORY OF OTHER DISEASES OF THE DIGESTIVE SYSTEM: ICD-10-CM

## 2018-02-16 DIAGNOSIS — D12.6 BENIGN NEOPLASM OF COLON, UNSPECIFIED: ICD-10-CM

## 2018-02-16 DIAGNOSIS — K64.8 OTHER HEMORRHOIDS: ICD-10-CM

## 2018-03-13 ENCOUNTER — OUTPATIENT (OUTPATIENT)
Dept: OUTPATIENT SERVICES | Facility: HOSPITAL | Age: 63
LOS: 1 days | End: 2018-03-13

## 2018-03-13 ENCOUNTER — APPOINTMENT (OUTPATIENT)
Dept: OPHTHALMOLOGY | Facility: CLINIC | Age: 63
End: 2018-03-13

## 2018-03-20 ENCOUNTER — APPOINTMENT (OUTPATIENT)
Dept: OPHTHALMOLOGY | Facility: CLINIC | Age: 63
End: 2018-03-20

## 2018-03-21 ENCOUNTER — APPOINTMENT (OUTPATIENT)
Dept: CT IMAGING | Facility: IMAGING CENTER | Age: 63
End: 2018-03-21
Payer: MEDICAID

## 2018-03-26 DIAGNOSIS — H40.1133 PRIMARY OPEN-ANGLE GLAUCOMA, BILATERAL, SEVERE STAGE: ICD-10-CM

## 2018-03-27 ENCOUNTER — APPOINTMENT (OUTPATIENT)
Dept: OPHTHALMOLOGY | Facility: CLINIC | Age: 63
End: 2018-03-27

## 2018-03-28 ENCOUNTER — FORM ENCOUNTER (OUTPATIENT)
Age: 63
End: 2018-03-28

## 2018-03-29 ENCOUNTER — OUTPATIENT (OUTPATIENT)
Dept: OUTPATIENT SERVICES | Facility: HOSPITAL | Age: 63
LOS: 1 days | End: 2018-03-29
Payer: MEDICAID

## 2018-03-29 ENCOUNTER — APPOINTMENT (OUTPATIENT)
Dept: CT IMAGING | Facility: IMAGING CENTER | Age: 63
End: 2018-03-29
Payer: MEDICAID

## 2018-03-29 DIAGNOSIS — Z87.19 PERSONAL HISTORY OF OTHER DISEASES OF THE DIGESTIVE SYSTEM: ICD-10-CM

## 2018-03-29 PROCEDURE — 74177 CT ABD & PELVIS W/CONTRAST: CPT | Mod: 26

## 2018-03-29 PROCEDURE — 74177 CT ABD & PELVIS W/CONTRAST: CPT

## 2018-03-29 PROCEDURE — 82565 ASSAY OF CREATININE: CPT

## 2018-04-02 ENCOUNTER — RESULT CHARGE (OUTPATIENT)
Age: 63
End: 2018-04-02

## 2018-04-02 ENCOUNTER — APPOINTMENT (OUTPATIENT)
Dept: INTERNAL MEDICINE | Facility: HOSPITAL | Age: 63
End: 2018-04-02
Payer: MEDICAID

## 2018-04-02 ENCOUNTER — LABORATORY RESULT (OUTPATIENT)
Age: 63
End: 2018-04-02

## 2018-04-02 ENCOUNTER — OUTPATIENT (OUTPATIENT)
Dept: OUTPATIENT SERVICES | Facility: HOSPITAL | Age: 63
LOS: 1 days | End: 2018-04-02

## 2018-04-02 VITALS — HEIGHT: 67 IN | BODY MASS INDEX: 30.45 KG/M2 | WEIGHT: 194 LBS

## 2018-04-02 VITALS — DIASTOLIC BLOOD PRESSURE: 76 MMHG | SYSTOLIC BLOOD PRESSURE: 118 MMHG

## 2018-04-02 LAB
ALBUMIN SERPL ELPH-MCNC: 4.1 G/DL — SIGNIFICANT CHANGE UP (ref 3.3–5)
ALP SERPL-CCNC: 72 U/L — SIGNIFICANT CHANGE UP (ref 40–120)
ALT FLD-CCNC: 23 U/L — SIGNIFICANT CHANGE UP (ref 4–41)
AST SERPL-CCNC: 19 U/L — SIGNIFICANT CHANGE UP (ref 4–40)
BILIRUB SERPL-MCNC: < 0.2 MG/DL — LOW (ref 0.2–1.2)
BUN SERPL-MCNC: 18 MG/DL — SIGNIFICANT CHANGE UP (ref 7–23)
CALCIUM SERPL-MCNC: 8.9 MG/DL — SIGNIFICANT CHANGE UP (ref 8.4–10.5)
CHLORIDE SERPL-SCNC: 95 MMOL/L — LOW (ref 98–107)
CO2 SERPL-SCNC: 27 MMOL/L — SIGNIFICANT CHANGE UP (ref 22–31)
CREAT SERPL-MCNC: 0.8 MG/DL — SIGNIFICANT CHANGE UP (ref 0.5–1.3)
GLUCOSE SERPL-MCNC: 94 MG/DL — SIGNIFICANT CHANGE UP (ref 70–99)
HBA1C BLD-MCNC: 6.2 % — HIGH (ref 4–5.6)
POTASSIUM SERPL-MCNC: 4.7 MMOL/L — SIGNIFICANT CHANGE UP (ref 3.5–5.3)
POTASSIUM SERPL-SCNC: 4.7 MMOL/L — SIGNIFICANT CHANGE UP (ref 3.5–5.3)
PROT SERPL-MCNC: 6.7 G/DL — SIGNIFICANT CHANGE UP (ref 6–8.3)
SODIUM SERPL-SCNC: 134 MMOL/L — LOW (ref 135–145)

## 2018-04-02 PROCEDURE — 99214 OFFICE O/P EST MOD 30 MIN: CPT | Mod: GC

## 2018-04-03 DIAGNOSIS — E03.9 HYPOTHYROIDISM, UNSPECIFIED: ICD-10-CM

## 2018-04-03 DIAGNOSIS — I10 ESSENTIAL (PRIMARY) HYPERTENSION: ICD-10-CM

## 2018-04-03 DIAGNOSIS — E66.9 OBESITY, UNSPECIFIED: ICD-10-CM

## 2018-04-03 DIAGNOSIS — D22.9 MELANOCYTIC NEVI, UNSPECIFIED: ICD-10-CM

## 2018-04-03 DIAGNOSIS — R56.9 UNSPECIFIED CONVULSIONS: ICD-10-CM

## 2018-04-03 DIAGNOSIS — E78.5 HYPERLIPIDEMIA, UNSPECIFIED: ICD-10-CM

## 2018-04-03 DIAGNOSIS — F20.9 SCHIZOPHRENIA, UNSPECIFIED: ICD-10-CM

## 2018-04-03 DIAGNOSIS — E87.1 HYPO-OSMOLALITY AND HYPONATREMIA: ICD-10-CM

## 2018-04-03 DIAGNOSIS — E11.9 TYPE 2 DIABETES MELLITUS WITHOUT COMPLICATIONS: ICD-10-CM

## 2018-04-04 LAB — GLUCOSE BLDC GLUCOMTR-MCNC: 104

## 2018-04-11 ENCOUNTER — OTHER (OUTPATIENT)
Age: 63
End: 2018-04-11

## 2018-04-13 ENCOUNTER — APPOINTMENT (OUTPATIENT)
Dept: INTERNAL MEDICINE | Facility: HOSPITAL | Age: 63
End: 2018-04-13

## 2018-04-13 ENCOUNTER — OUTPATIENT (OUTPATIENT)
Dept: OUTPATIENT SERVICES | Facility: HOSPITAL | Age: 63
LOS: 1 days | End: 2018-04-13

## 2018-04-13 VITALS
BODY MASS INDEX: 29.51 KG/M2 | DIASTOLIC BLOOD PRESSURE: 80 MMHG | WEIGHT: 188 LBS | SYSTOLIC BLOOD PRESSURE: 140 MMHG | HEART RATE: 72 BPM | HEIGHT: 67 IN

## 2018-04-13 VITALS — DIASTOLIC BLOOD PRESSURE: 72 MMHG | SYSTOLIC BLOOD PRESSURE: 112 MMHG

## 2018-04-13 DIAGNOSIS — E11.9 TYPE 2 DIABETES MELLITUS WITHOUT COMPLICATIONS: ICD-10-CM

## 2018-04-17 DIAGNOSIS — H40.1132 PRIMARY OPEN-ANGLE GLAUCOMA, BILATERAL, MODERATE STAGE: ICD-10-CM

## 2018-04-23 ENCOUNTER — OTHER (OUTPATIENT)
Age: 63
End: 2018-04-23

## 2018-04-24 ENCOUNTER — OTHER (OUTPATIENT)
Age: 63
End: 2018-04-24

## 2018-05-03 ENCOUNTER — MEDICATION RENEWAL (OUTPATIENT)
Age: 63
End: 2018-05-03

## 2018-05-03 ENCOUNTER — OUTPATIENT (OUTPATIENT)
Dept: OUTPATIENT SERVICES | Facility: HOSPITAL | Age: 63
LOS: 1 days | End: 2018-05-03

## 2018-05-03 ENCOUNTER — APPOINTMENT (OUTPATIENT)
Dept: OPHTHALMOLOGY | Facility: CLINIC | Age: 63
End: 2018-05-03

## 2018-05-04 ENCOUNTER — OUTPATIENT (OUTPATIENT)
Dept: OUTPATIENT SERVICES | Facility: HOSPITAL | Age: 63
LOS: 1 days | End: 2018-05-04

## 2018-05-04 ENCOUNTER — APPOINTMENT (OUTPATIENT)
Dept: DERMATOLOGY | Facility: HOSPITAL | Age: 63
End: 2018-05-04
Payer: MEDICAID

## 2018-05-04 VITALS
DIASTOLIC BLOOD PRESSURE: 61 MMHG | HEIGHT: 67 IN | BODY MASS INDEX: 29.51 KG/M2 | HEART RATE: 68 BPM | WEIGHT: 188 LBS | SYSTOLIC BLOOD PRESSURE: 128 MMHG

## 2018-05-04 DIAGNOSIS — L81.0 POSTINFLAMMATORY HYPERPIGMENTATION: ICD-10-CM

## 2018-05-04 DIAGNOSIS — L24.9 IRRITANT CONTACT DERMATITIS, UNSPECIFIED CAUSE: ICD-10-CM

## 2018-05-04 PROCEDURE — 99201 OFFICE OUTPATIENT NEW 10 MINUTES: CPT | Mod: GC

## 2018-05-21 ENCOUNTER — APPOINTMENT (OUTPATIENT)
Dept: OPHTHALMOLOGY | Facility: CLINIC | Age: 63
End: 2018-05-21

## 2018-06-13 ENCOUNTER — OTHER (OUTPATIENT)
Age: 63
End: 2018-06-13

## 2018-06-13 ENCOUNTER — RX RENEWAL (OUTPATIENT)
Age: 63
End: 2018-06-13

## 2018-06-26 ENCOUNTER — OUTPATIENT (OUTPATIENT)
Dept: OUTPATIENT SERVICES | Facility: HOSPITAL | Age: 63
LOS: 1 days | End: 2018-06-26

## 2018-06-26 ENCOUNTER — LABORATORY RESULT (OUTPATIENT)
Age: 63
End: 2018-06-26

## 2018-06-26 ENCOUNTER — APPOINTMENT (OUTPATIENT)
Dept: INTERNAL MEDICINE | Facility: HOSPITAL | Age: 63
End: 2018-06-26

## 2018-06-26 VITALS — HEART RATE: 85 BPM | SYSTOLIC BLOOD PRESSURE: 128 MMHG | DIASTOLIC BLOOD PRESSURE: 70 MMHG

## 2018-06-26 VITALS — TEMPERATURE: 98 F | BODY MASS INDEX: 30.92 KG/M2 | HEIGHT: 67 IN | WEIGHT: 197 LBS

## 2018-06-26 DIAGNOSIS — Z87.898 PERSONAL HISTORY OF OTHER SPECIFIED CONDITIONS: ICD-10-CM

## 2018-06-26 DIAGNOSIS — D12.6 BENIGN NEOPLASM OF COLON, UNSPECIFIED: ICD-10-CM

## 2018-06-26 LAB — HBA1C BLD-MCNC: 6.9 % — HIGH (ref 4–5.6)

## 2018-06-29 PROBLEM — D12.6 TUBULAR ADENOMA OF COLON: Noted: 2018-02-15

## 2018-06-29 PROBLEM — Z87.898 HISTORY OF FATIGUE: Status: RESOLVED | Noted: 2017-07-31 | Resolved: 2018-06-29

## 2018-06-29 LAB — GLUCOSE BLDC GLUCOMTR-MCNC: 113

## 2018-06-29 NOTE — HISTORY OF PRESENT ILLNESS
[Family Member] : family member [FreeTextEntry8] : 62 y.o. M with schizophrenia, T2DM, HLD, HTN, Depression, OCD, chronic hyponatremia likely 2/2 SSRI, glaucoma s/p Laser surgery, BPH, MRSA cellulitis, SBO, who presents with acute visit for cough. \par \par Patient states he has been been coughing for about a week. Prior to developing cough, pt endorsed nasal congestion, rhinorrhea, and sore throat. Cough is described as sporadic and sudden and occurring almost every night, but also  during the day as well. Episodes of cough can last up to 5 minutes. Cough is non-productive. Patient unsure whether he is experiencing post nasal drip or not. He states that he attends an adult  center where one of his friends had similar symptoms of sore throat and nasal congestion prior to him developing symptoms. His brother also had similar symptoms a month ago; he did not require treatment with antibiotics. He denies wheezing. Patient denies SOB, orthopnea, or LE edema. He denies fevers or chills. He denies CP or ARANGO. \par \par Patient does not have hx of asthma or environmental allergies. He smoked for 2 PPD from [id number]; he quit smoking after 1975. \par \par

## 2018-06-29 NOTE — REVIEW OF SYSTEMS
[Cough] : cough [Negative] : Neurological [Fever] : no fever [Chills] : no chills [Fatigue] : no fatigue [Night Sweats] : no night sweats [Recent Change In Weight] : ~T no recent weight change [Discharge] : no discharge [Vision Problems] : no vision problems [Itching] : no itching [Nasal Discharge] : no nasal discharge [Sore Throat] : no sore throat [Chest Pain] : no chest pain [Palpitations] : no palpitations [Orthopena] : no orthopnea [Shortness Of Breath] : no shortness of breath [Wheezing] : no wheezing [Dyspnea on Exertion] : not dyspnea on exertion [Abdominal Pain] : no abdominal pain [FreeTextEntry4] : +/- Post nasal drip

## 2018-06-29 NOTE — ASSESSMENT
[FreeTextEntry1] : 62 y.o. M with schizophrenia, T2DM, HLD, HTN, Depression, OCD, chronic hyponatremia likely 2/2 SSRI, glaucoma, s/p laser therapy, BPH, MRSA skin infections, SBO, who presents for acute visit with cc of cough. \par \par Cough: \par -+ sick contacts at \par -Patient afebrile, no chills, not SOB, not using accessory respiratory muscles, lung fields CTAB\par -+edematous nasal mucosa \par -Cough likely 2/2 post-nasal drip in setting of viral URI vs viral bronchitis, although less likely due to clear lungs without rhonchi/wheezing\par -Start Flonase 2 puffs BID x 2 weeks, guanifenesin 200 mg q4h PRN cough\par -Patient instructed to RTC if worsening cough or fevers; instructed to present to ED if develops SOB along with worsening symptoms\par \par \par Diabetes: \par -rechecked Hgb A1c \par \par -Pt will return to clinic for appt on 7/17 -- he has forms for home services he needs filled out. \par -Otherwise, return in 3 months for monitoring of chronic conditions\par \par Amy Martínez, PGY-2\par [employer] [facility]\par Discussed with Dr. Angeles

## 2018-06-29 NOTE — PHYSICAL EXAM
[No Acute Distress] : no acute distress [Normal Sclera/Conjunctiva] : normal sclera/conjunctiva [PERRL] : pupils equal round and reactive to light [EOMI] : extraocular movements intact [Normal Oropharynx] : the oropharynx was normal [Supple] : supple [No Lymphadenopathy] : no lymphadenopathy [No Respiratory Distress] : no respiratory distress  [Clear to Auscultation] : lungs were clear to auscultation bilaterally [No Accessory Muscle Use] : no accessory muscle use [Normal Rate] : normal rate  [Regular Rhythm] : with a regular rhythm [Normal S1, S2] : normal S1 and S2 [No Edema] : there was no peripheral edema [Soft] : abdomen soft [Non Tender] : non-tender [Non-distended] : non-distended [No Masses] : no abdominal mass palpated [de-identified] : Edematous nasal mucosa

## 2018-07-02 NOTE — BEHAVIORAL HEALTH ASSESSMENT NOTE - NSBHCHARTREVIEWVS_PSY_A_CORE FT
"Requested Prescriptions   Pending Prescriptions Disp Refills     atorvastatin (LIPITOR) 20 MG tablet [Pharmacy Med Name: ATORVASTATIN 20MG TABLETS]    Last Written Prescription Date:  6/26/2017  Last Fill Quantity: 90,  # refills: 3   Last office visit: 3/15/2018 with prescribing provider:  Opal Freedman     Future Office Visit:     90 tablet 0     Sig: TAKE 1 TABLET(20 MG) BY MOUTH DAILY    Statins Protocol Failed    7/1/2018 10:50 AM       Failed - LDL on file in past 12 months    Recent Labs   Lab Test  06/26/17   0924   LDL  20            Passed - No abnormal creatine kinase in past 12 months    No lab results found.            Passed - Recent (12 mo) or future (30 days) visit within the authorizing provider's specialty    Patient had office visit in the last 12 months or has a visit in the next 30 days with authorizing provider or within the authorizing provider's specialty.  See \"Patient Info\" tab in inbasket, or \"Choose Columns\" in Meds & Orders section of the refill encounter.           Passed - Patient is age 18 or older       Passed - No active pregnancy on record       Passed - No positive pregnancy test in past 12 months          " Vital Signs Last 24 Hrs  T(C): 37.1 (20 Jul 2017 17:46), Max: 37.1 (20 Jul 2017 17:46)  T(F): 98.7 (20 Jul 2017 17:46), Max: 98.7 (20 Jul 2017 17:46)  HR: 66 (20 Jul 2017 17:46) (63 - 95)  BP: 130/78 (20 Jul 2017 17:46) (130/78 - 168/83)  BP(mean): --  RR: 17 (20 Jul 2017 17:46) (16 - 18)  SpO2: 98% (20 Jul 2017 17:46) (96% - 100%) Vital Signs Last 24 Hrs  T(C): 37.1 (21 Jul 2017 06:03), Max: 37.1 (20 Jul 2017 17:46)  T(F): 98.8 (21 Jul 2017 06:03), Max: 98.8 (21 Jul 2017 06:03)  HR: 94 (21 Jul 2017 06:03) (66 - 94)  BP: 150/78 (21 Jul 2017 06:03) (130/78 - 150/78)  BP(mean): --  RR: 18 (21 Jul 2017 06:03) (17 - 18)  SpO2: 95% (21 Jul 2017 06:03) (95% - 99%)

## 2018-07-06 DIAGNOSIS — E11.9 TYPE 2 DIABETES MELLITUS WITHOUT COMPLICATIONS: ICD-10-CM

## 2018-07-06 DIAGNOSIS — J06.9 ACUTE UPPER RESPIRATORY INFECTION, UNSPECIFIED: ICD-10-CM

## 2018-07-06 DIAGNOSIS — Z87.891 PERSONAL HISTORY OF NICOTINE DEPENDENCE: ICD-10-CM

## 2018-07-17 ENCOUNTER — OUTPATIENT (OUTPATIENT)
Dept: OUTPATIENT SERVICES | Facility: HOSPITAL | Age: 63
LOS: 1 days | End: 2018-07-17
Payer: MEDICARE

## 2018-07-17 ENCOUNTER — APPOINTMENT (OUTPATIENT)
Dept: INTERNAL MEDICINE | Facility: HOSPITAL | Age: 63
End: 2018-07-17
Payer: MEDICAID

## 2018-07-17 ENCOUNTER — LABORATORY RESULT (OUTPATIENT)
Age: 63
End: 2018-07-17

## 2018-07-17 VITALS
HEIGHT: 67 IN | DIASTOLIC BLOOD PRESSURE: 85 MMHG | BODY MASS INDEX: 31 KG/M2 | SYSTOLIC BLOOD PRESSURE: 145 MMHG | WEIGHT: 197.53 LBS | HEART RATE: 84 BPM

## 2018-07-17 DIAGNOSIS — E11.9 TYPE 2 DIABETES MELLITUS WITHOUT COMPLICATIONS: ICD-10-CM

## 2018-07-17 DIAGNOSIS — F42.9 OBSESSIVE-COMPULSIVE DISORDER, UNSPECIFIED: ICD-10-CM

## 2018-07-17 PROBLEM — Z87.19 PERSONAL HISTORY OF OTHER DISEASES OF THE DIGESTIVE SYSTEM: Chronic | Status: ACTIVE | Noted: 2017-12-14

## 2018-07-17 LAB
ALBUMIN SERPL ELPH-MCNC: 4.3 G/DL — SIGNIFICANT CHANGE UP (ref 3.3–5)
ALP SERPL-CCNC: 74 U/L — SIGNIFICANT CHANGE UP (ref 40–120)
ALT FLD-CCNC: 18 U/L — SIGNIFICANT CHANGE UP (ref 4–41)
AST SERPL-CCNC: 19 U/L — SIGNIFICANT CHANGE UP (ref 4–40)
BASOPHILS # BLD AUTO: 0.04 K/UL — SIGNIFICANT CHANGE UP (ref 0–0.2)
BASOPHILS NFR BLD AUTO: 0.6 % — SIGNIFICANT CHANGE UP (ref 0–2)
BILIRUB SERPL-MCNC: < 0.2 MG/DL — LOW (ref 0.2–1.2)
BUN SERPL-MCNC: 17 MG/DL — SIGNIFICANT CHANGE UP (ref 7–23)
CALCIUM SERPL-MCNC: 9.5 MG/DL — SIGNIFICANT CHANGE UP (ref 8.4–10.5)
CHLORIDE SERPL-SCNC: 91 MMOL/L — LOW (ref 98–107)
CHOLEST SERPL-MCNC: 119 MG/DL — LOW (ref 120–199)
CO2 SERPL-SCNC: 25 MMOL/L — SIGNIFICANT CHANGE UP (ref 22–31)
CREAT SERPL-MCNC: 0.83 MG/DL — SIGNIFICANT CHANGE UP (ref 0.5–1.3)
EOSINOPHIL # BLD AUTO: 0.1 K/UL — SIGNIFICANT CHANGE UP (ref 0–0.5)
EOSINOPHIL NFR BLD AUTO: 1.6 % — SIGNIFICANT CHANGE UP (ref 0–6)
GLUCOSE BLDC GLUCOMTR-MCNC: 132
GLUCOSE SERPL-MCNC: 148 MG/DL — HIGH (ref 70–99)
HCT VFR BLD CALC: 35.1 % — LOW (ref 39–50)
HDLC SERPL-MCNC: 48 MG/DL — SIGNIFICANT CHANGE UP (ref 35–55)
HGB BLD-MCNC: 11.8 G/DL — LOW (ref 13–17)
IMM GRANULOCYTES # BLD AUTO: 0.03 # — SIGNIFICANT CHANGE UP
IMM GRANULOCYTES NFR BLD AUTO: 0.5 % — SIGNIFICANT CHANGE UP (ref 0–1.5)
LIPID PNL WITH DIRECT LDL SERPL: 63 MG/DL — SIGNIFICANT CHANGE UP
LYMPHOCYTES # BLD AUTO: 1.39 K/UL — SIGNIFICANT CHANGE UP (ref 1–3.3)
LYMPHOCYTES # BLD AUTO: 22.1 % — SIGNIFICANT CHANGE UP (ref 13–44)
MCHC RBC-ENTMCNC: 30.8 PG — SIGNIFICANT CHANGE UP (ref 27–34)
MCHC RBC-ENTMCNC: 33.6 % — SIGNIFICANT CHANGE UP (ref 32–36)
MCV RBC AUTO: 91.6 FL — SIGNIFICANT CHANGE UP (ref 80–100)
MONOCYTES # BLD AUTO: 0.65 K/UL — SIGNIFICANT CHANGE UP (ref 0–0.9)
MONOCYTES NFR BLD AUTO: 10.3 % — SIGNIFICANT CHANGE UP (ref 2–14)
NEUTROPHILS # BLD AUTO: 4.09 K/UL — SIGNIFICANT CHANGE UP (ref 1.8–7.4)
NEUTROPHILS NFR BLD AUTO: 64.9 % — SIGNIFICANT CHANGE UP (ref 43–77)
NRBC # FLD: 0 — SIGNIFICANT CHANGE UP
PLATELET # BLD AUTO: 188 K/UL — SIGNIFICANT CHANGE UP (ref 150–400)
PMV BLD: 11.1 FL — SIGNIFICANT CHANGE UP (ref 7–13)
POTASSIUM SERPL-MCNC: 4.6 MMOL/L — SIGNIFICANT CHANGE UP (ref 3.5–5.3)
POTASSIUM SERPL-SCNC: 4.6 MMOL/L — SIGNIFICANT CHANGE UP (ref 3.5–5.3)
PROT SERPL-MCNC: 6.9 G/DL — SIGNIFICANT CHANGE UP (ref 6–8.3)
RBC # BLD: 3.83 M/UL — LOW (ref 4.2–5.8)
RBC # FLD: 13.7 % — SIGNIFICANT CHANGE UP (ref 10.3–14.5)
SODIUM SERPL-SCNC: 129 MMOL/L — LOW (ref 135–145)
TRIGL SERPL-MCNC: 98 MG/DL — SIGNIFICANT CHANGE UP (ref 10–149)
TSH SERPL-MCNC: 1.66 UIU/ML — SIGNIFICANT CHANGE UP (ref 0.27–4.2)
WBC # BLD: 6.3 K/UL — SIGNIFICANT CHANGE UP (ref 3.8–10.5)
WBC # FLD AUTO: 6.3 K/UL — SIGNIFICANT CHANGE UP (ref 3.8–10.5)

## 2018-07-17 PROCEDURE — 99214 OFFICE O/P EST MOD 30 MIN: CPT | Mod: GC

## 2018-07-17 NOTE — PHYSICAL EXAM
[No Acute Distress] : no acute distress [Well Nourished] : well nourished [Well Developed] : well developed [Well-Appearing] : well-appearing [PERRL] : pupils equal round and reactive to light [EOMI] : extraocular movements intact [Normal Outer Ear/Nose] : the outer ears and nose were normal in appearance [Normal Oropharynx] : the oropharynx was normal [No JVD] : no jugular venous distention [Supple] : supple [Clear to Auscultation] : lungs were clear to auscultation bilaterally [Normal Rate] : normal rate  [Regular Rhythm] : with a regular rhythm [No Murmur] : no murmur heard [No Varicosities] : no varicosities [No Edema] : there was no peripheral edema [No Extremity Clubbing/Cyanosis] : no extremity clubbing/cyanosis [Soft] : abdomen soft [Non Tender] : non-tender [No Masses] : no abdominal mass palpated [No HSM] : no HSM [Normal Bowel Sounds] : normal bowel sounds [No CVA Tenderness] : no CVA  tenderness [No Spinal Tenderness] : no spinal tenderness [Kyphosis] : kyphosis [No Joint Swelling] : no joint swelling [Grossly Normal Strength/Tone] : grossly normal strength/tone [Coordination Grossly Intact] : coordination grossly intact [No Focal Deficits] : no focal deficits [Normal Affect] : the affect was normal [Normal Mood] : the mood was normal [Normal Insight/Judgement] : insight and judgment were intact [de-identified] : mildy distended

## 2018-07-17 NOTE — ASSESSMENT
[FreeTextEntry1] : Patient is a 63 yo male with PMHx of Schizophrenia, DMII, HLD, Hypertension, Depression, OCD, chronic Hyponatremia (likely secondary to SSRI use), Glaucoma (s/p laser therapy), BPH (not on Flomax 2/2 SE), MRSA skin infections, recent SBO, presenting for a yearly physical \par \par # Type 2 Diabetes \par - FS at home controlled\par - Last A1c in June 2108 of 6.9 \par - Cont Metformin 1000mg BID, Januvia 100 qd, and Glipizide 5mg BID\par \par # Hyponatremia\par - Etiology likely 2/2 SIADH 2/2 psych meds + polydipsia\par - No recent seizures\par - c/w Salt tabs \par - Re-check CMP today \par \par # HLD\par - Cont atorvastatin 20 mg\par - will check lipid panel\par \par # Hypothyroidism \par - will check TSH\par - c/w Synthyroid 75mcg \par \par # Schizophrenia/OCD\par - Patient is followed by Psych\par - denies any auditory or visual hallucinations\par - denies any SI/HI\par - Continue Effexor, Clozapine and Depakote \par - Management as per Psych\par \par # Skin lesion \par - hyperpigmented macules with irregular border on dorsum of foot \par - Per Derm, this is 2/2 post-inflammatory hyperpigmentation\par - c/w Cerave Cream BID PRN\par \par # HCM\par - UTD immunizations\par - HCV Non-reactive \par - Patient with colonoscopy 2/2018 with 2 resected polyps, one of which was tubular adenoma. \par - Repeat Colonoscopy in 2019 \par - Repeat Pneumovax 12/2018\par \par RTC in 6 months \par \par Case discussed with Dr. Estrada

## 2018-07-17 NOTE — HISTORY OF PRESENT ILLNESS
[FreeTextEntry1] : Patient is a 61 yo male with PMHx of Schizophrenia, DMII, HLD, Hypertension, Depression, OCD, chronic Hyponatremia (likely secondary to SSRI use), Glaucoma (s/p laser therapy), BPH (not on Flomax 2/2 SE), MRSA skin infections, recent SBO, presenting for a yearly physical  [de-identified] : Patient recently underwent 2 colonoscopy (first was aborted 2/2 poor prep) with 2 polyps identified and removed. One of the removed polyps was identified as tubular adenoma. Patient reports that there have been no further hypoglycemic episodes since his medication adjustment and states that his FS at home are usually in the loww 100s. Patient reports that his schizophrenia is well controlled and he follows with a psychiatrist who comes to his day care program. Patient denies any SI/HI/AH/VH. \par \par Patient denies any fevers/chills, sudden changes in vision, CP, palpitations, SOB, abdominal pain, N/V/D/C, hematuria, dysuria.

## 2018-07-18 ENCOUNTER — RESULT REVIEW (OUTPATIENT)
Age: 63
End: 2018-07-18

## 2018-07-18 DIAGNOSIS — E87.1 HYPO-OSMOLALITY AND HYPONATREMIA: ICD-10-CM

## 2018-07-18 DIAGNOSIS — D12.6 BENIGN NEOPLASM OF COLON, UNSPECIFIED: ICD-10-CM

## 2018-07-18 DIAGNOSIS — Z00.00 ENCOUNTER FOR GENERAL ADULT MEDICAL EXAMINATION WITHOUT ABNORMAL FINDINGS: ICD-10-CM

## 2018-07-18 DIAGNOSIS — E78.5 HYPERLIPIDEMIA, UNSPECIFIED: ICD-10-CM

## 2018-07-18 DIAGNOSIS — F20.9 SCHIZOPHRENIA, UNSPECIFIED: ICD-10-CM

## 2018-07-18 DIAGNOSIS — E03.9 HYPOTHYROIDISM, UNSPECIFIED: ICD-10-CM

## 2018-07-18 DIAGNOSIS — F42.9 OBSESSIVE-COMPULSIVE DISORDER, UNSPECIFIED: ICD-10-CM

## 2018-07-23 ENCOUNTER — OUTPATIENT (OUTPATIENT)
Dept: OUTPATIENT SERVICES | Facility: HOSPITAL | Age: 63
LOS: 1 days | Discharge: HOME | End: 2018-07-23

## 2018-07-23 ENCOUNTER — APPOINTMENT (OUTPATIENT)
Dept: OTOLARYNGOLOGY | Facility: CLINIC | Age: 63
End: 2018-07-23
Payer: MEDICAID

## 2018-07-23 PROCEDURE — G0268 REMOVAL OF IMPACTED WAX MD: CPT

## 2018-07-23 PROCEDURE — 92567 TYMPANOMETRY: CPT

## 2018-07-23 PROCEDURE — 99213 OFFICE O/P EST LOW 20 MIN: CPT | Mod: 25

## 2018-07-23 PROCEDURE — 92557 COMPREHENSIVE HEARING TEST: CPT

## 2018-07-26 DIAGNOSIS — H90.3 SENSORINEURAL HEARING LOSS, BILATERAL: ICD-10-CM

## 2018-07-26 DIAGNOSIS — H61.21 IMPACTED CERUMEN, RIGHT EAR: ICD-10-CM

## 2018-08-03 ENCOUNTER — APPOINTMENT (OUTPATIENT)
Dept: DERMATOLOGY | Facility: HOSPITAL | Age: 63
End: 2018-08-03
Payer: MEDICAID

## 2018-08-03 ENCOUNTER — OUTPATIENT (OUTPATIENT)
Dept: OUTPATIENT SERVICES | Facility: HOSPITAL | Age: 63
LOS: 1 days | End: 2018-08-03

## 2018-08-03 VITALS
HEART RATE: 77 BPM | WEIGHT: 195 LBS | BODY MASS INDEX: 30.61 KG/M2 | DIASTOLIC BLOOD PRESSURE: 77 MMHG | HEIGHT: 67 IN | SYSTOLIC BLOOD PRESSURE: 145 MMHG

## 2018-08-03 DIAGNOSIS — Z12.83 ENCOUNTER FOR SCREENING FOR MALIGNANT NEOPLASM OF SKIN: ICD-10-CM

## 2018-08-03 DIAGNOSIS — L24.9 IRRITANT CONTACT DERMATITIS, UNSPECIFIED CAUSE: ICD-10-CM

## 2018-08-03 PROCEDURE — 99213 OFFICE O/P EST LOW 20 MIN: CPT | Mod: GC

## 2018-08-14 ENCOUNTER — OTHER (OUTPATIENT)
Age: 63
End: 2018-08-14

## 2018-08-15 ENCOUNTER — MED ADMIN CHARGE (OUTPATIENT)
Age: 63
End: 2018-08-15

## 2018-08-27 ENCOUNTER — MEDICATION RENEWAL (OUTPATIENT)
Age: 63
End: 2018-08-27

## 2018-09-06 ENCOUNTER — APPOINTMENT (OUTPATIENT)
Dept: OPHTHALMOLOGY | Facility: CLINIC | Age: 63
End: 2018-09-06

## 2018-09-06 ENCOUNTER — OUTPATIENT (OUTPATIENT)
Dept: OUTPATIENT SERVICES | Facility: HOSPITAL | Age: 63
LOS: 1 days | End: 2018-09-06

## 2018-09-07 ENCOUNTER — OTHER (OUTPATIENT)
Age: 63
End: 2018-09-07

## 2018-09-14 DIAGNOSIS — H40.1131 PRIMARY OPEN-ANGLE GLAUCOMA, BILATERAL, MILD STAGE: ICD-10-CM

## 2018-10-09 DIAGNOSIS — H40.1133 PRIMARY OPEN-ANGLE GLAUCOMA, BILATERAL, SEVERE STAGE: ICD-10-CM

## 2018-10-26 ENCOUNTER — OTHER (OUTPATIENT)
Age: 63
End: 2018-10-26

## 2018-10-29 ENCOUNTER — RX RENEWAL (OUTPATIENT)
Age: 63
End: 2018-10-29

## 2018-10-29 ENCOUNTER — MEDICATION RENEWAL (OUTPATIENT)
Age: 63
End: 2018-10-29

## 2018-11-19 ENCOUNTER — LABORATORY RESULT (OUTPATIENT)
Age: 63
End: 2018-11-19

## 2018-11-19 ENCOUNTER — APPOINTMENT (OUTPATIENT)
Dept: INTERNAL MEDICINE | Facility: HOSPITAL | Age: 63
End: 2018-11-19
Payer: MEDICAID

## 2018-11-19 ENCOUNTER — RESULT CHARGE (OUTPATIENT)
Age: 63
End: 2018-11-19

## 2018-11-19 ENCOUNTER — OUTPATIENT (OUTPATIENT)
Dept: OUTPATIENT SERVICES | Facility: HOSPITAL | Age: 63
LOS: 1 days | End: 2018-11-19

## 2018-11-19 VITALS — HEIGHT: 67 IN | WEIGHT: 200 LBS | BODY MASS INDEX: 31.39 KG/M2

## 2018-11-19 VITALS — HEART RATE: 79 BPM | DIASTOLIC BLOOD PRESSURE: 68 MMHG | SYSTOLIC BLOOD PRESSURE: 110 MMHG

## 2018-11-19 VITALS — TEMPERATURE: 98.6 F

## 2018-11-19 DIAGNOSIS — R11.2 NAUSEA WITH VOMITING, UNSPECIFIED: ICD-10-CM

## 2018-11-19 LAB
ALBUMIN SERPL ELPH-MCNC: 3.9 G/DL — SIGNIFICANT CHANGE UP (ref 3.3–5)
ALP SERPL-CCNC: 64 U/L — SIGNIFICANT CHANGE UP (ref 40–120)
ALT FLD-CCNC: 19 U/L — SIGNIFICANT CHANGE UP (ref 4–41)
ANISOCYTOSIS BLD QL: SLIGHT — SIGNIFICANT CHANGE UP
AST SERPL-CCNC: 12 U/L — SIGNIFICANT CHANGE UP (ref 4–40)
BASOPHILS # BLD AUTO: 0.03 K/UL — SIGNIFICANT CHANGE UP (ref 0–0.2)
BASOPHILS NFR BLD AUTO: 0.4 % — SIGNIFICANT CHANGE UP (ref 0–2)
BASOPHILS NFR SPEC: 0 % — SIGNIFICANT CHANGE UP (ref 0–2)
BILIRUB SERPL-MCNC: 0.4 MG/DL — SIGNIFICANT CHANGE UP (ref 0.2–1.2)
BLASTS # FLD: 0 % — SIGNIFICANT CHANGE UP (ref 0–0)
BUN SERPL-MCNC: 33 MG/DL — HIGH (ref 7–23)
CALCIUM SERPL-MCNC: 7.7 MG/DL — LOW (ref 8.4–10.5)
CHLORIDE SERPL-SCNC: 94 MMOL/L — LOW (ref 98–107)
CO2 SERPL-SCNC: 19 MMOL/L — LOW (ref 22–31)
CREAT SERPL-MCNC: 1.13 MG/DL — SIGNIFICANT CHANGE UP (ref 0.5–1.3)
EOSINOPHIL # BLD AUTO: 0.27 K/UL — SIGNIFICANT CHANGE UP (ref 0–0.5)
EOSINOPHIL NFR BLD AUTO: 3.6 % — SIGNIFICANT CHANGE UP (ref 0–6)
EOSINOPHIL NFR FLD: 4.4 % — SIGNIFICANT CHANGE UP (ref 0–6)
GIANT PLATELETS BLD QL SMEAR: PRESENT — SIGNIFICANT CHANGE UP
GLUCOSE SERPL-MCNC: 173 MG/DL — HIGH (ref 70–99)
HCT VFR BLD CALC: 38.3 % — LOW (ref 39–50)
HGB BLD-MCNC: 12.8 G/DL — LOW (ref 13–17)
IMM GRANULOCYTES # BLD AUTO: 0.01 # — SIGNIFICANT CHANGE UP
IMM GRANULOCYTES NFR BLD AUTO: 0.1 % — SIGNIFICANT CHANGE UP (ref 0–1.5)
LYMPHOCYTES # BLD AUTO: 0.98 K/UL — LOW (ref 1–3.3)
LYMPHOCYTES # BLD AUTO: 13.2 % — SIGNIFICANT CHANGE UP (ref 13–44)
LYMPHOCYTES NFR SPEC AUTO: 13.2 % — SIGNIFICANT CHANGE UP (ref 13–44)
MCHC RBC-ENTMCNC: 30.8 PG — SIGNIFICANT CHANGE UP (ref 27–34)
MCHC RBC-ENTMCNC: 33.4 % — SIGNIFICANT CHANGE UP (ref 32–36)
MCV RBC AUTO: 92.1 FL — SIGNIFICANT CHANGE UP (ref 80–100)
METAMYELOCYTES # FLD: 3.5 % — HIGH (ref 0–1)
MONOCYTES # BLD AUTO: 1.33 K/UL — HIGH (ref 0–0.9)
MONOCYTES NFR BLD AUTO: 17.9 % — HIGH (ref 2–14)
MONOCYTES NFR BLD: 12.3 % — HIGH (ref 2–9)
MYELOCYTES NFR BLD: 0 % — SIGNIFICANT CHANGE UP (ref 0–0)
NEUTROPHIL AB SER-ACNC: 34.2 % — LOW (ref 43–77)
NEUTROPHILS # BLD AUTO: 4.8 K/UL — SIGNIFICANT CHANGE UP (ref 1.8–7.4)
NEUTROPHILS NFR BLD AUTO: 64.8 % — SIGNIFICANT CHANGE UP (ref 43–77)
NEUTS BAND # BLD: 29.8 % — HIGH (ref 0–6)
NRBC # FLD: 0 — SIGNIFICANT CHANGE UP
OTHER - HEMATOLOGY %: 0 — SIGNIFICANT CHANGE UP
OVALOCYTES BLD QL SMEAR: SLIGHT — SIGNIFICANT CHANGE UP
PLATELET # BLD AUTO: 234 K/UL — SIGNIFICANT CHANGE UP (ref 150–400)
PLATELET COUNT - ESTIMATE: NORMAL — SIGNIFICANT CHANGE UP
PMV BLD: 11.6 FL — SIGNIFICANT CHANGE UP (ref 7–13)
POIKILOCYTOSIS BLD QL AUTO: SIGNIFICANT CHANGE UP
POTASSIUM SERPL-MCNC: 4.7 MMOL/L — SIGNIFICANT CHANGE UP (ref 3.5–5.3)
POTASSIUM SERPL-SCNC: 4.7 MMOL/L — SIGNIFICANT CHANGE UP (ref 3.5–5.3)
PROMYELOCYTES # FLD: 0 % — SIGNIFICANT CHANGE UP (ref 0–0)
PROT SERPL-MCNC: 6.8 G/DL — SIGNIFICANT CHANGE UP (ref 6–8.3)
RBC # BLD: 4.16 M/UL — LOW (ref 4.2–5.8)
RBC # FLD: 14.1 % — SIGNIFICANT CHANGE UP (ref 10.3–14.5)
SODIUM SERPL-SCNC: 127 MMOL/L — LOW (ref 135–145)
VARIANT LYMPHS # BLD: 2.6 % — SIGNIFICANT CHANGE UP
WBC # BLD: 7.42 K/UL — SIGNIFICANT CHANGE UP (ref 3.8–10.5)
WBC # FLD AUTO: 7.42 K/UL — SIGNIFICANT CHANGE UP (ref 3.8–10.5)

## 2018-11-19 PROCEDURE — 99213 OFFICE O/P EST LOW 20 MIN: CPT | Mod: GE

## 2018-11-19 NOTE — ASSESSMENT
[FreeTextEntry1] : Patient is a 63 yo male with PMHx of Schizophrenia, DMII, HLD, Hypertension, Depression, OCD, chronic Hyponatremia (likely secondary to SSRI use), Glaucoma (s/p laser therapy), BPH (not on Flomax 2/2 SE), MRSA skin infections,  SBO requiring NG tube decompression in 2016 who presents for nausea.\par \par #Nausea/Vomiting:\par - Non toxic appearing, abd exam benign.\par - Instructed that if he stops having BM, flatus or has severe abd pain, to go to the ED for concern of SBO. Brother and patient understand.\par - CBC, CMP to eval for leukocytosis and electrolyte abnormalities.\par - Likely secondary to a viral illness, will treat conservatively\par - Encouraged PO hydration.\par - Follow up with GI for colonoscopy due in 02/2019.\par \par D/w Dr. Taylor\par \par Ephraim Broussard PGY 3\par \par RTC in 1-2 months for routine visit.

## 2018-11-19 NOTE — PHYSICAL EXAM
[No Acute Distress] : no acute distress [Well Nourished] : well nourished [Normal Voice/Communication] : normal voice/communication [Ill-Appearing] : ill-appearing [Normal Sclera/Conjunctiva] : normal sclera/conjunctiva [PERRL] : pupils equal round and reactive to light [Normal Outer Ear/Nose] : the outer ears and nose were normal in appearance [Normal Oropharynx] : the oropharynx was normal [No JVD] : no jugular venous distention [Supple] : supple [No Respiratory Distress] : no respiratory distress  [Clear to Auscultation] : lungs were clear to auscultation bilaterally [No Accessory Muscle Use] : no accessory muscle use [Normal Rate] : normal rate  [Regular Rhythm] : with a regular rhythm [Normal S1, S2] : normal S1 and S2 [Soft] : abdomen soft [Non Tender] : non-tender [No HSM] : no HSM [Normal Bowel Sounds] : normal bowel sounds [Normal Supraclavicular Nodes] : no supraclavicular lymphadenopathy [Normal Anterior Cervical Nodes] : no anterior cervical lymphadenopathy [No CVA Tenderness] : no CVA  tenderness [No Spinal Tenderness] : no spinal tenderness [No Rash] : no rash [Normal Gait] : normal gait [Coordination Grossly Intact] : coordination grossly intact [No Focal Deficits] : no focal deficits [Speech Grossly Normal] : speech grossly normal [Memory Grossly Normal] : memory grossly normal [Normal Affect] : the affect was normal [Normal Mood] : the mood was normal [Normal Insight/Judgement] : insight and judgment were intact

## 2018-11-19 NOTE — HISTORY OF PRESENT ILLNESS
[Nausea] : nausea [Vomiting] : vomiting [Diarrhea] : diarrhea [FreeTextEntry8] : Patient is a 63 yo male with PMHx of Schizophrenia, DMII, HLD, Hypertension, Depression, OCD, chronic Hyponatremia (likely secondary to SSRI use), Glaucoma (s/p laser therapy), BPH (not on Flomax 2/2 SE), MRSA skin infections, SBO requiring NG tube decompression in 2016 who presents for nausea.\par \par The patient has started to have nausea and vomiting since the last two days. Has noticed his FS have been from 160-190 despite eating. Has some abd. pain which is not new and is located in the b/l LQ. Denies any BBM, dysuria, fevers. +chills. Had evidence of SBO a couple of years ago that got better with the NG tube. Last flatus was yesterday. Denies any new meds. Denies eating any new foods. 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

## 2018-11-19 NOTE — REVIEW OF SYSTEMS
[Chills] : chills [Abdominal Pain] : abdominal pain [Nausea] : nausea [Vomiting] : vomiting [Negative] : Heme/Lymph [Fever] : no fever [Night Sweats] : no night sweats [Discharge] : no discharge [Vision Problems] : no vision problems [Dysuria] : no dysuria [Incontinence] : no incontinence [Hematuria] : no hematuria [Frequency] : no frequency [Joint Pain] : no joint pain [Joint Stiffness] : no joint stiffness [Back Pain] : no back pain [Itching] : no itching [Skin Rash] : no skin rash [Headache] : no headache [Dizziness] : no dizziness [Unsteady Walk] : no ataxia [Memory Loss] : no memory loss [FreeTextEntry7] : see hpi

## 2018-11-19 NOTE — END OF VISIT
[] : Resident [FreeTextEntry3] : 62M here for acute visit for evaluation of abdominal discomfort, nausea and vomiting. History of SBO in the past, but endorses he is still passing flatus at this time. +sick contacts. No fevers. Abdominal exam benign. Will tx with hydration, supportive care, check CBC and CMP. Educated to return to clinic or proceed to ED if abdominal pain becomes severe, or he stops passing flatus. Agree with remainder of plan as documented above by Dr. Broussard.

## 2018-11-20 DIAGNOSIS — E11.9 TYPE 2 DIABETES MELLITUS WITHOUT COMPLICATIONS: ICD-10-CM

## 2018-11-21 ENCOUNTER — OTHER (OUTPATIENT)
Age: 63
End: 2018-11-21

## 2018-11-21 LAB — GLUCOSE BLDC GLUCOMTR-MCNC: 199

## 2018-11-29 ENCOUNTER — RESULT CHARGE (OUTPATIENT)
Age: 63
End: 2018-11-29

## 2018-11-29 ENCOUNTER — OUTPATIENT (OUTPATIENT)
Dept: OUTPATIENT SERVICES | Facility: HOSPITAL | Age: 63
LOS: 1 days | End: 2018-11-29

## 2018-11-29 ENCOUNTER — APPOINTMENT (OUTPATIENT)
Dept: GASTROENTEROLOGY | Facility: HOSPITAL | Age: 63
End: 2018-11-29
Payer: MEDICAID

## 2018-11-29 VITALS
SYSTOLIC BLOOD PRESSURE: 120 MMHG | DIASTOLIC BLOOD PRESSURE: 67 MMHG | BODY MASS INDEX: 31.62 KG/M2 | TEMPERATURE: 80 F | HEIGHT: 67 IN | WEIGHT: 201.44 LBS

## 2018-11-29 PROCEDURE — 99213 OFFICE O/P EST LOW 20 MIN: CPT | Mod: GC

## 2018-11-30 DIAGNOSIS — E66.9 OBESITY, UNSPECIFIED: ICD-10-CM

## 2018-11-30 DIAGNOSIS — D12.6 BENIGN NEOPLASM OF COLON, UNSPECIFIED: ICD-10-CM

## 2018-11-30 DIAGNOSIS — Z87.19 PERSONAL HISTORY OF OTHER DISEASES OF THE DIGESTIVE SYSTEM: ICD-10-CM

## 2018-11-30 DIAGNOSIS — R11.2 NAUSEA WITH VOMITING, UNSPECIFIED: ICD-10-CM

## 2018-12-06 ENCOUNTER — LABORATORY RESULT (OUTPATIENT)
Age: 63
End: 2018-12-06

## 2018-12-06 ENCOUNTER — APPOINTMENT (OUTPATIENT)
Dept: INTERNAL MEDICINE | Facility: HOSPITAL | Age: 63
End: 2018-12-06

## 2018-12-06 ENCOUNTER — APPOINTMENT (OUTPATIENT)
Dept: INTERNAL MEDICINE | Facility: HOSPITAL | Age: 63
End: 2018-12-06
Payer: MEDICAID

## 2018-12-06 ENCOUNTER — OUTPATIENT (OUTPATIENT)
Dept: OUTPATIENT SERVICES | Facility: HOSPITAL | Age: 63
LOS: 1 days | End: 2018-12-06

## 2018-12-06 VITALS — WEIGHT: 197.5 LBS | BODY MASS INDEX: 31 KG/M2 | HEIGHT: 67 IN

## 2018-12-06 VITALS — HEART RATE: 78 BPM | DIASTOLIC BLOOD PRESSURE: 65 MMHG | SYSTOLIC BLOOD PRESSURE: 120 MMHG

## 2018-12-06 DIAGNOSIS — L23.9 ALLERGIC CONTACT DERMATITIS, UNSPECIFIED CAUSE: ICD-10-CM

## 2018-12-06 DIAGNOSIS — L24.9 IRRITANT CONTACT DERMATITIS, UNSPECIFIED CAUSE: ICD-10-CM

## 2018-12-06 LAB
BASOPHILS # BLD AUTO: 0.06 K/UL — SIGNIFICANT CHANGE UP (ref 0–0.2)
BASOPHILS NFR BLD AUTO: 0.9 % — SIGNIFICANT CHANGE UP (ref 0–2)
BUN SERPL-MCNC: 14 MG/DL — SIGNIFICANT CHANGE UP (ref 7–23)
CALCIUM SERPL-MCNC: 9.3 MG/DL — SIGNIFICANT CHANGE UP (ref 8.4–10.5)
CHLORIDE SERPL-SCNC: 95 MMOL/L — LOW (ref 98–107)
CO2 SERPL-SCNC: 27 MMOL/L — SIGNIFICANT CHANGE UP (ref 22–31)
CREAT SERPL-MCNC: 0.74 MG/DL — SIGNIFICANT CHANGE UP (ref 0.5–1.3)
EOSINOPHIL # BLD AUTO: 0.23 K/UL — SIGNIFICANT CHANGE UP (ref 0–0.5)
EOSINOPHIL NFR BLD AUTO: 3.4 % — SIGNIFICANT CHANGE UP (ref 0–6)
GLUCOSE SERPL-MCNC: 128 MG/DL — HIGH (ref 70–99)
HBA1C BLD-MCNC: 7.1 % — HIGH (ref 4–5.6)
HCT VFR BLD CALC: 33.5 % — LOW (ref 39–50)
HGB BLD-MCNC: 10.7 G/DL — LOW (ref 13–17)
IMM GRANULOCYTES # BLD AUTO: 0.03 # — SIGNIFICANT CHANGE UP
IMM GRANULOCYTES NFR BLD AUTO: 0.4 % — SIGNIFICANT CHANGE UP (ref 0–1.5)
LYMPHOCYTES # BLD AUTO: 1.14 K/UL — SIGNIFICANT CHANGE UP (ref 1–3.3)
LYMPHOCYTES # BLD AUTO: 16.7 % — SIGNIFICANT CHANGE UP (ref 13–44)
MCHC RBC-ENTMCNC: 30.4 PG — SIGNIFICANT CHANGE UP (ref 27–34)
MCHC RBC-ENTMCNC: 31.9 % — LOW (ref 32–36)
MCV RBC AUTO: 95.2 FL — SIGNIFICANT CHANGE UP (ref 80–100)
MONOCYTES # BLD AUTO: 0.49 K/UL — SIGNIFICANT CHANGE UP (ref 0–0.9)
MONOCYTES NFR BLD AUTO: 7.2 % — SIGNIFICANT CHANGE UP (ref 2–14)
NEUTROPHILS # BLD AUTO: 4.89 K/UL — SIGNIFICANT CHANGE UP (ref 1.8–7.4)
NEUTROPHILS NFR BLD AUTO: 71.4 % — SIGNIFICANT CHANGE UP (ref 43–77)
NRBC # FLD: 0 — SIGNIFICANT CHANGE UP
PLATELET # BLD AUTO: 237 K/UL — SIGNIFICANT CHANGE UP (ref 150–400)
PMV BLD: 10.5 FL — SIGNIFICANT CHANGE UP (ref 7–13)
POTASSIUM SERPL-MCNC: 4.6 MMOL/L — SIGNIFICANT CHANGE UP (ref 3.5–5.3)
POTASSIUM SERPL-SCNC: 4.6 MMOL/L — SIGNIFICANT CHANGE UP (ref 3.5–5.3)
RBC # BLD: 3.52 M/UL — LOW (ref 4.2–5.8)
RBC # FLD: 14.1 % — SIGNIFICANT CHANGE UP (ref 10.3–14.5)
SODIUM SERPL-SCNC: 135 MMOL/L — SIGNIFICANT CHANGE UP (ref 135–145)
WBC # BLD: 6.84 K/UL — SIGNIFICANT CHANGE UP (ref 3.8–10.5)
WBC # FLD AUTO: 6.84 K/UL — SIGNIFICANT CHANGE UP (ref 3.8–10.5)

## 2018-12-06 PROCEDURE — 99214 OFFICE O/P EST MOD 30 MIN: CPT | Mod: GC

## 2018-12-06 RX ORDER — FLUTICASONE PROPIONATE 50 UG/1
50 SPRAY, METERED NASAL
Qty: 1 | Refills: 0 | Status: COMPLETED | COMMUNITY
Start: 2018-06-26 | End: 2018-12-06

## 2018-12-06 RX ORDER — GUAIFENESIN 200 MG 200 MG/1
200 TABLET ORAL
Qty: 45 | Refills: 0 | Status: DISCONTINUED | COMMUNITY
Start: 2018-06-26 | End: 2018-12-06

## 2018-12-06 NOTE — REVIEW OF SYSTEMS
[Hesitancy] : hesitancy [Nocturia] : nocturia [Fever] : no fever [Chills] : no chills [Fatigue] : no fatigue [Recent Change In Weight] : ~T no recent weight change [Discharge] : no discharge [Pain] : no pain [Vision Problems] : no vision problems [Earache] : no earache [Nasal Discharge] : no nasal discharge [Chest Pain] : no chest pain [Palpitations] : no palpitations [Claudication] : no  leg claudication [Orthopena] : no orthopnea [Shortness Of Breath] : no shortness of breath [Wheezing] : no wheezing [Cough] : no cough [Abdominal Pain] : no abdominal pain [Nausea] : no nausea [Constipation] : no constipation [Vomiting] : no vomiting [Dysuria] : no dysuria [Hematuria] : no hematuria [Frequency] : no frequency [Joint Pain] : no joint pain [Back Pain] : no back pain [Headache] : no headache [Dizziness] : no dizziness [Unsteady Walk] : no ataxia

## 2018-12-06 NOTE — ASSESSMENT
[FreeTextEntry1] : Patient is a 61 yo male with PMHx of Schizophrenia, DMII, HLD, Hypertension, Depression, OCD, chronic Hyponatremia (likely secondary to SSRI use), Glaucoma (s/p laser therapy), BPH (not on Flomax 2/2 SE), MRSA skin infections, recent SBO, presenting for a yearly physical.\par \par #BPH\par - Prostate exam demonstrating mildly enlarged prostate. \par - will start back on tamsulosin. Advised not to drink any fluids before bed. \par - Will check PSA\par \par # Type 2 Diabetes \par - FS at home controlled\par - Cont Metformin 1000mg BID, Januvia 100 qd, and Glipizide 5mg BID but will check hgba1c today\par - Patient requesting follow up with endocrinology. referral given. \par \par # Hyponatremia\par - Etiology likely 2/2 SIADH 2/2 psych meds + polydipsia\par - No recent seizures\par - c/w Salt tabs \par - Re-check CMP today \par \par # HCM\par - UTD immunizations. Flu vaccine today. \par - HCV Non-reactive \par - Patient with colonoscopy 2/2018 with 2 resected polyps, one of which was tubular adenoma for repeat in february \par - Repeat Pneumovax 12/2019\par - Day care paperwork filled out. \par \par RTC in 6 months.\par discussed with Dr. Estrada\par \par Gris Martin\par EMIM PGY-2\par Pager [phone]

## 2018-12-06 NOTE — PHYSICAL EXAM
[No Acute Distress] : no acute distress [Well Nourished] : well nourished [Well Developed] : well developed [Normal Sclera/Conjunctiva] : normal sclera/conjunctiva [EOMI] : extraocular movements intact [Normal Outer Ear/Nose] : the outer ears and nose were normal in appearance [Supple] : supple [No Respiratory Distress] : no respiratory distress  [Clear to Auscultation] : lungs were clear to auscultation bilaterally [No Accessory Muscle Use] : no accessory muscle use [Normal Rate] : normal rate  [Normal S1, S2] : normal S1 and S2 [No Murmur] : no murmur heard [Soft] : abdomen soft [Non Tender] : non-tender [Non-distended] : non-distended [Normal Affect] : the affect was normal [Alert and Oriented x3] : oriented to person, place, and time [de-identified] : obese [FreeTextEntry1] : prostate exam done by Dr. Estrada. mildly enlarged. Smooth. L lobe slightly larger than R. Non-tender.

## 2018-12-06 NOTE — HISTORY OF PRESENT ILLNESS
[FreeTextEntry1] : follow up visit [de-identified] : 61 yo male with PMHx of Schizophrenia, DMII, HLD, Hypertension, Depression, OCD, chronic Hyponatremia (likely secondary to SSRI use), Glaucoma (s/p laser therapy), BPH, MRSA skin infections, SBO requiring NG tube decompression in 2016 presents for wellness visit. Patient endorses nocturia, incomplete voiding. denies dysuria or hematuria. has been off flomax for several years due to incontinence, but now brother wakes him up in the middle of the night to use the bathroom. patient reports that his nausea and vomiting have resolved. he denies any dizziness, headaches, visual changes, chest pain, shortness of breath. \par \par He states that his finger sticks have been very stable- 90s-100s. he had an aberrancy with 180 today but that was after eating. he checks his finger sticks once per day and uses his brother's machine and equipment. \par \par Patient's BP has been well controlled. He reports compliance with his medications.\par \par Regarding his schizophrenia, he is compliant with his medications and follows up with his psychiatrist regularly. He attends a day care center and brought paperwork to be filled out with him. \par \par Patient has an appointment for endoscopy/colonoscopy in february. he has the bowel prep at home. For evaluation of tubular adenoma and that past nausea/vomiting.

## 2018-12-07 DIAGNOSIS — N40.0 BENIGN PROSTATIC HYPERPLASIA WITHOUT LOWER URINARY TRACT SYMPTOMS: ICD-10-CM

## 2018-12-07 DIAGNOSIS — E11.9 TYPE 2 DIABETES MELLITUS WITHOUT COMPLICATIONS: ICD-10-CM

## 2018-12-07 DIAGNOSIS — Z00.00 ENCOUNTER FOR GENERAL ADULT MEDICAL EXAMINATION WITHOUT ABNORMAL FINDINGS: ICD-10-CM

## 2018-12-07 DIAGNOSIS — E87.1 HYPO-OSMOLALITY AND HYPONATREMIA: ICD-10-CM

## 2018-12-07 LAB — GLUCOSE BLDC GLUCOMTR-MCNC: 171

## 2018-12-08 LAB
CREAT UR-MCNC: 56 MG/DL — SIGNIFICANT CHANGE UP
MICROALBUMIN UR-MCNC: 12.1 MG/DL — SIGNIFICANT CHANGE UP
MICROALBUMIN/CREAT UR-RTO: 214 MG/G — HIGH (ref 0–30)
PSA SERPL-MCNC: 1.38 NG/ML — SIGNIFICANT CHANGE UP (ref 0–4)

## 2018-12-26 ENCOUNTER — OTHER (OUTPATIENT)
Age: 63
End: 2018-12-26

## 2019-01-03 ENCOUNTER — APPOINTMENT (OUTPATIENT)
Dept: OPHTHALMOLOGY | Facility: CLINIC | Age: 64
End: 2019-01-03

## 2019-01-03 ENCOUNTER — OUTPATIENT (OUTPATIENT)
Dept: OUTPATIENT SERVICES | Facility: HOSPITAL | Age: 64
LOS: 1 days | End: 2019-01-03

## 2019-02-07 ENCOUNTER — OTHER (OUTPATIENT)
Age: 64
End: 2019-02-07

## 2019-02-11 ENCOUNTER — RX RENEWAL (OUTPATIENT)
Age: 64
End: 2019-02-11

## 2019-02-11 ENCOUNTER — OTHER (OUTPATIENT)
Age: 64
End: 2019-02-11

## 2019-02-14 ENCOUNTER — APPOINTMENT (OUTPATIENT)
Dept: OPHTHALMOLOGY | Facility: CLINIC | Age: 64
End: 2019-02-14

## 2019-02-28 DIAGNOSIS — H40.2233 CHRONIC ANGLE-CLOSURE GLAUCOMA, BILATERAL, SEVERE STAGE: ICD-10-CM

## 2019-03-07 ENCOUNTER — OTHER (OUTPATIENT)
Age: 64
End: 2019-03-07

## 2019-03-07 ENCOUNTER — APPOINTMENT (OUTPATIENT)
Dept: GASTROENTEROLOGY | Facility: HOSPITAL | Age: 64
End: 2019-03-07
Payer: MEDICAID

## 2019-03-07 ENCOUNTER — OUTPATIENT (OUTPATIENT)
Dept: OUTPATIENT SERVICES | Facility: HOSPITAL | Age: 64
LOS: 1 days | End: 2019-03-07

## 2019-03-07 VITALS
HEIGHT: 67 IN | BODY MASS INDEX: 31.71 KG/M2 | HEART RATE: 79 BPM | WEIGHT: 202 LBS | DIASTOLIC BLOOD PRESSURE: 85 MMHG | SYSTOLIC BLOOD PRESSURE: 149 MMHG

## 2019-03-07 DIAGNOSIS — D12.6 BENIGN NEOPLASM OF COLON, UNSPECIFIED: ICD-10-CM

## 2019-03-07 DIAGNOSIS — E66.9 OBESITY, UNSPECIFIED: ICD-10-CM

## 2019-03-07 PROCEDURE — 99213 OFFICE O/P EST LOW 20 MIN: CPT | Mod: GC

## 2019-03-07 NOTE — END OF VISIT
[] : Fellow [FreeTextEntry3] : As modified and discussed with patient\par MD LISANDRO Ignacio FAC[facility]\par Associate Professor of Medicine\par [facility]\par

## 2019-03-07 NOTE — ASSESSMENT
[FreeTextEntry1] : Impression;\par - Tubular adenoma resected in 2/2018: Overdue for repeat Colonoscopy given poor prep previously \par - History of small bowel obstruction- transition point at terminal ileum. Differential includes Crohn's disease stricture or stenosis secondary to inflammation, carcinoid, small bowel lymphoma, internal hernia. Last colonoscopy in 02/2018- could not reach terminal ileum due to poor prep\par - Obesity (BMI 31)\par - DM2\par \par Plan: \par - Schedule for repeat colonoscopy for appropriate screening & evaluation of terminal ileum giving history of SBO.\par --- Plan for two day prep given hx of poor prep during colonoscopy. Mg Citrate two days before Colonoscopy, followed by Golytely and Dulcolax the day before.\par - Counseled on siblings obtaining colonoscopy\par - Weight loss counseling provided. Metamucil with breakfast + Glucerna for lunch\par - RTC after colonoscopy. \par \par D/w Dr. Monge

## 2019-03-07 NOTE — PHYSICAL EXAM
[General Appearance - Well Nourished] : well nourished [General Appearance - Well Developed] : well developed [Extraocular Movements] : extraocular movements were intact [Hearing Threshold Finger Rub Not Cooke] : hearing was normal [Nasal Cavity] : the nasal mucosa and septum were normal [Neck Cervical Mass (___cm)] : no neck mass was observed [Jugular Venous Distention Increased] : there was no jugular-venous distention [Respiration, Rhythm And Depth] : normal respiratory rhythm and effort [Auscultation Breath Sounds / Voice Sounds] : lungs were clear to auscultation bilaterally [Heart Sounds] : normal S1 and S2 [Murmurs] : no murmurs [Heart Sounds Pericardial Friction Rub] : no pericardial rub [Bowel Sounds] : normal bowel sounds [Abdomen Soft] : soft [Abdomen Tenderness] : non-tender [Cervical Lymph Nodes Enlarged Posterior Bilaterally] : posterior cervical [Cervical Lymph Nodes Enlarged Anterior Bilaterally] : anterior cervical [Nail Clubbing] : no clubbing  or cyanosis of the fingernails [Motor Tone] : muscle strength and tone were normal [Skin Color & Pigmentation] : normal skin color and pigmentation [Skin Turgor] : normal skin turgor [] : no rash [Sensation] : the sensory exam was normal to light touch and pinprick [Motor Exam] : the motor exam was normal [Affect] : the affect was normal [Mood] : the mood was normal

## 2019-03-07 NOTE — HISTORY OF PRESENT ILLNESS
[___ Month(s) Ago] : [unfilled] month(s) ago [de-identified] : 62 M hx of schizophrenia, depression, OCD, chronic hyponatremia thought 2/2 SSRI, DM, HLD, HTN, Obesity and hx of SBO at the terminal ileum (managed with NGT) presented re-scheduling for colonoscopy.\par \par Patient denies any complaints at this time and states he feel well. During previous visit he had nausea, but states since then it has resolved. He denies fevers, chills, CP, SOB, N/V/D, abdominal pain, melena, hematochezia and hematemesis. He has intermittent constipation, relieved by colace. He reports he put off having the colonoscopy after previous visit because he 'got caught up with other things.'\par \par Last colonoscopy: 2/2018\par Impression:          \par - Stool in the entire examined colon.\par - Two 3 mm polyps in the sigmoid colon, removed with a jumbo cold forceps. Resected and retrieved. Hyperplastic Polyp\par - One 8 mm polyp in the rectum, removed with a cold snare. Resected and retrieved. Tubular Adenoma

## 2019-04-02 ENCOUNTER — OUTPATIENT (OUTPATIENT)
Dept: OUTPATIENT SERVICES | Facility: HOSPITAL | Age: 64
LOS: 1 days | Discharge: HOME | End: 2019-04-02

## 2019-04-02 DIAGNOSIS — D12.6 BENIGN NEOPLASM OF COLON, UNSPECIFIED: ICD-10-CM

## 2019-04-02 LAB — GLUCOSE BLDC GLUCOMTR-MCNC: 150 MG/DL — HIGH (ref 70–99)

## 2019-04-19 ENCOUNTER — OTHER (OUTPATIENT)
Age: 64
End: 2019-04-19

## 2019-04-22 ENCOUNTER — OTHER (OUTPATIENT)
Age: 64
End: 2019-04-22

## 2019-04-22 ENCOUNTER — RX RENEWAL (OUTPATIENT)
Age: 64
End: 2019-04-22

## 2019-04-25 ENCOUNTER — APPOINTMENT (OUTPATIENT)
Dept: OPHTHALMOLOGY | Facility: CLINIC | Age: 64
End: 2019-04-25

## 2019-05-02 ENCOUNTER — APPOINTMENT (OUTPATIENT)
Dept: OPHTHALMOLOGY | Facility: CLINIC | Age: 64
End: 2019-05-02

## 2019-05-08 ENCOUNTER — OUTPATIENT (OUTPATIENT)
Dept: OUTPATIENT SERVICES | Facility: HOSPITAL | Age: 64
LOS: 1 days | End: 2019-05-08

## 2019-05-08 ENCOUNTER — LABORATORY RESULT (OUTPATIENT)
Age: 64
End: 2019-05-08

## 2019-05-08 ENCOUNTER — APPOINTMENT (OUTPATIENT)
Dept: INTERNAL MEDICINE | Facility: CLINIC | Age: 64
End: 2019-05-08
Payer: MEDICAID

## 2019-05-08 VITALS
HEIGHT: 67 IN | DIASTOLIC BLOOD PRESSURE: 72 MMHG | BODY MASS INDEX: 31.08 KG/M2 | SYSTOLIC BLOOD PRESSURE: 140 MMHG | HEART RATE: 68 BPM | WEIGHT: 198 LBS

## 2019-05-08 DIAGNOSIS — Z87.09 PERSONAL HISTORY OF OTHER DISEASES OF THE RESPIRATORY SYSTEM: ICD-10-CM

## 2019-05-08 LAB
ANION GAP SERPL CALC-SCNC: 13 MMO/L — SIGNIFICANT CHANGE UP (ref 7–14)
BUN SERPL-MCNC: 13 MG/DL — SIGNIFICANT CHANGE UP (ref 7–23)
CALCIUM SERPL-MCNC: 9.2 MG/DL — SIGNIFICANT CHANGE UP (ref 8.4–10.5)
CHLORIDE SERPL-SCNC: 93 MMOL/L — LOW (ref 98–107)
CO2 SERPL-SCNC: 25 MMOL/L — SIGNIFICANT CHANGE UP (ref 22–31)
CREAT SERPL-MCNC: 0.79 MG/DL — SIGNIFICANT CHANGE UP (ref 0.5–1.3)
GLUCOSE SERPL-MCNC: 166 MG/DL — HIGH (ref 70–99)
HBA1C BLD-MCNC: 7 % — HIGH (ref 4–5.6)
POTASSIUM SERPL-MCNC: 4.6 MMOL/L — SIGNIFICANT CHANGE UP (ref 3.5–5.3)
POTASSIUM SERPL-SCNC: 4.6 MMOL/L — SIGNIFICANT CHANGE UP (ref 3.5–5.3)
SODIUM SERPL-SCNC: 131 MMOL/L — LOW (ref 135–145)

## 2019-05-08 PROCEDURE — 99213 OFFICE O/P EST LOW 20 MIN: CPT | Mod: GE

## 2019-05-08 RX ORDER — LINACLOTIDE 290 UG/1
290 CAPSULE, GELATIN COATED ORAL DAILY
Qty: 30 | Refills: 2 | Status: DISCONTINUED | COMMUNITY
Start: 2019-04-02 | End: 2019-05-08

## 2019-05-08 RX ORDER — POLYETHYLENE GLYCOL 3350 AND ELECTROLYTES WITH LEMON FLAVOR 236; 22.74; 6.74; 5.86; 2.97 G/4L; G/4L; G/4L; G/4L; G/4L
236 POWDER, FOR SOLUTION ORAL
Qty: 1 | Refills: 0 | Status: DISCONTINUED | COMMUNITY
Start: 2019-03-07 | End: 2019-05-08

## 2019-05-08 RX ORDER — POLYETHYLENE GLYCOL 3350 AND ELECTROLYTES WITH LEMON FLAVOR 236; 22.74; 6.74; 5.86; 2.97 G/4L; G/4L; G/4L; G/4L; G/4L
236 POWDER, FOR SOLUTION ORAL
Qty: 1 | Refills: 0 | Status: DISCONTINUED | COMMUNITY
Start: 2018-11-29 | End: 2019-05-08

## 2019-05-08 RX ORDER — MINERAL OIL 1000 MG/ML
LIQUID ORAL
Qty: 30 | Refills: 0 | Status: DISCONTINUED | COMMUNITY
Start: 2018-07-23 | End: 2019-05-08

## 2019-05-08 RX ORDER — MAGNESIUM CITRATE
SOLUTION, ORAL ORAL
Qty: 1 | Refills: 0 | Status: DISCONTINUED | COMMUNITY
Start: 2019-03-07 | End: 2019-05-08

## 2019-05-10 LAB — GLUCOSE BLDC GLUCOMTR-MCNC: 162

## 2019-05-13 NOTE — ASSESSMENT
[FreeTextEntry1] : 61 yo male with Schizophrenia, T2DM, HLD, Hypertension, Depression, OCD, chronic Hyponatremia (likely secondary to SSRI use), Glaucoma (s/p laser therapy), BPH, MRSA skin infections, SBO requiring NG tube decompression in 2016, and tubular adenoma of the colon who presents for f/u of chronic conditions. \par \par 1) T2DM: \par -hgb A1C today\par -continue metformin, glipizide, Januvia\par -pt with recent optho exam, cataract on R eyes; has f/u with optho in the next few months\par -pt with positive urine microalbuminuria; will initiated lisinopril 5 mg \par -podiatry last seen last week; diabetic foot exam today with intact sensation and absence of foot ulcers\par \par 2) Chronic constipation; \par -pt self d/kelly Linzess\par -recommended pt resume colace; ordered senna to pharmacy\par -f/u with GI on 5/16\par \par 3) Tubular adenoma of the colon: \par -pt with appointment with GI on 5/16, as above to discuss repeat colonoscopy\par \par 4) HTN: \par -initiated lisinopril 5 mg, as above; will titrate as appropriate\par \par 5) Hyponatremia:\par -likely medication induced\par -patient denies polydipsia and excessive water intake\par -BMP ordered today\par \par 6) hypothyroidism: \par -c/w current dose of synthroid\par \par 7) Schizophrenia: \par -management as per psychiatry\par -pt reports medication adherence \par \par 8) Seizure: \par -no recent seizure events\par -c/w with depakote\par \par 9) Anemia, normocytic: \par -TSH wnl, will order iron studies, ferritin, b12 and folate upon next visit\par -colonoscopy, as above\par -consider SPEP, UPEP upon next visit \par \par 10: HCM: \par -Needs Prevnar upon turning 64\par -On waiting list for shingrix at pharmacy \par \par RTC in 6 months\par \par Amy Martínez, PGY-2\par Discussed with Dr. Noonan

## 2019-05-13 NOTE — HISTORY OF PRESENT ILLNESS
[FreeTextEntry1] : f/u of chronic conditions  [de-identified] : 61 yo male with Schizophrenia, T2DM, HLD, Hypertension, Depression, OCD, chronic Hyponatremia (likely secondary to SSRI use), Glaucoma (s/p laser therapy), BPH, MRSA skin infections, SBO requiring NG tube decompression in 2016, and tubular adenoma of the colon who presents for f/u of chronic conditions. \par \par Pt saw GI in April for attempted colonoscopy; however, despite prep, patient still  had rectal vault on exam and procedure was postponed. GI will order 2 day prep prior to next colonoscopy. They also prescribed Linzess, which patient took only 3 times as he reports it only stimulated a BM the first time but subsequently didn't work; he has since self d/kelly the medication. Patient reports he has a BM every few days; occasionally, he will have multiple BMs on the same day. He believes colace, take for a few days, helps him with his constipation. He states GI d/kelly his Miralax as it doesn’t work for him. He has not previously tried senna. \par \par With regard to his diabetes, patient measures his fasting BG, which he reports to generally range between 100-115, with 79 as the lowest FS and 168 as the highest. He endorses adherence to his oral medications. He denies episodes of hypoglycemia. \par \par Patient reports 2 episodes of n/v that occurred a few weeks ago that have since resolved. He feels his N/V correlate with higher BG. \par \par Patient endorses compliance with his psychiatric medications. \par

## 2019-05-13 NOTE — END OF VISIT
[] : Resident [FreeTextEntry3] : 62M with history of type 2 diabetes, hypertension, schizophrenia who presents for follow-up of chronic medical conditions as listed. Had recent colonoscopy with poor preparation; stool in rectum-procedure not completed. GI planning repeat with 2d prep and managing chronic constipation. Has asymptomatic, mild, chronic, stable hyponatremia thought medication induced from venlafaxine. Needs form completion for psychiatric day program, completed and bloodwork ordered for this purpose. Noted chronic stable normocytic anemia that is asymptomatic-has normal TSH (7/2018) without subsequent development of symptoms or change in levothyroxine dose, but not other anemia etiology testing per chart review--will need further evaluation. Diabetes has been fairly controlled previously with A1c 7.1% prior, recheck at this time. On ACEI for microalbuminuria, BP at goal.

## 2019-05-13 NOTE — PHYSICAL EXAM
[No Acute Distress] : no acute distress [Well Developed] : well developed [Well-Appearing] : well-appearing [Normal Sclera/Conjunctiva] : normal sclera/conjunctiva [Normal Outer Ear/Nose] : the outer ears and nose were normal in appearance [PERRL] : pupils equal round and reactive to light [No JVD] : no jugular venous distention [No Respiratory Distress] : no respiratory distress  [No Lymphadenopathy] : no lymphadenopathy [Supple] : supple [No Accessory Muscle Use] : no accessory muscle use [Normal Rate] : normal rate  [Clear to Auscultation] : lungs were clear to auscultation bilaterally [Normal S1, S2] : normal S1 and S2 [Regular Rhythm] : with a regular rhythm [No Murmur] : no murmur heard [Non-distended] : non-distended [Soft] : abdomen soft [Non Tender] : non-tender [No HSM] : no HSM [Normal Bowel Sounds] : normal bowel sounds [Normal Anterior Cervical Nodes] : no anterior cervical lymphadenopathy [No Rash] : no rash [Normal Affect] : the affect was normal [Comprehensive Foot Exam Normal] : Right and left foot were examined and both feet are normal. No ulcers in either foot. Toes are normal and with full ROM.  Normal tactile sensation with monofilament testing throughout both feet [de-identified] : Trace - 1+ LE edema

## 2019-05-14 DIAGNOSIS — D12.6 BENIGN NEOPLASM OF COLON, UNSPECIFIED: ICD-10-CM

## 2019-05-14 DIAGNOSIS — E11.9 TYPE 2 DIABETES MELLITUS WITHOUT COMPLICATIONS: ICD-10-CM

## 2019-05-14 DIAGNOSIS — K59.09 OTHER CONSTIPATION: ICD-10-CM

## 2019-05-14 DIAGNOSIS — I10 ESSENTIAL (PRIMARY) HYPERTENSION: ICD-10-CM

## 2019-05-14 DIAGNOSIS — F20.9 SCHIZOPHRENIA, UNSPECIFIED: ICD-10-CM

## 2019-05-14 DIAGNOSIS — E87.1 HYPO-OSMOLALITY AND HYPONATREMIA: ICD-10-CM

## 2019-05-14 DIAGNOSIS — E03.9 HYPOTHYROIDISM, UNSPECIFIED: ICD-10-CM

## 2019-05-16 ENCOUNTER — OUTPATIENT (OUTPATIENT)
Dept: OUTPATIENT SERVICES | Facility: HOSPITAL | Age: 64
LOS: 1 days | End: 2019-05-16

## 2019-05-16 ENCOUNTER — APPOINTMENT (OUTPATIENT)
Dept: GASTROENTEROLOGY | Facility: CLINIC | Age: 64
End: 2019-05-16
Payer: MEDICAID

## 2019-05-16 VITALS
DIASTOLIC BLOOD PRESSURE: 82 MMHG | SYSTOLIC BLOOD PRESSURE: 145 MMHG | HEART RATE: 74 BPM | OXYGEN SATURATION: 95 % | BODY MASS INDEX: 30.92 KG/M2 | WEIGHT: 197 LBS | HEIGHT: 67 IN

## 2019-05-16 PROCEDURE — 99213 OFFICE O/P EST LOW 20 MIN: CPT | Mod: GC

## 2019-05-16 NOTE — REVIEW OF SYSTEMS
[Fever] : no fever [Feeling Poorly] : not feeling poorly [Chills] : no chills [Feeling Tired] : not feeling tired [Eye Pain] : no eye pain [Nosebleeds] : no nosebleeds [Earache] : no earache [Nasal Discharge] : no nasal discharge [Heart Rate Is Slow] : the heart rate was not slow [Heart Rate Is Fast] : the heart rate was not fast [Chest Pain] : no chest pain [Palpitations] : no palpitations [Shortness Of Breath] : no shortness of breath [Wheezing] : no wheezing [Cough] : no cough [SOB on Exertion] : no shortness of breath during exertion [Vomiting] : no vomiting [Constipation] : no constipation [Abdominal Pain] : no abdominal pain [Diarrhea] : no diarrhea [Dysuria] : no dysuria [Joint Stiffness] : no joint stiffness [Arthralgias] : no arthralgias [Joint Swelling] : no joint swelling [Skin Wound] : no skin wound [Skin Lesions] : no skin lesions [Confused] : no confusion [Itching] : no itching [Convulsions] : no convulsions [Dizziness] : no dizziness [Anxiety] : no anxiety [Fainting] : no fainting [Depression] : no depression [Muscle Weakness] : no muscle weakness [Easy Bruising] : no tendency for easy bruising [Easy Bleeding] : no tendency for easy bleeding

## 2019-05-16 NOTE — HISTORY OF PRESENT ILLNESS
[Heartburn] : denies heartburn [Nausea] : denies nausea [Vomiting] : denies vomiting [Diarrhea] : denies diarrhea [Constipation] : denies constipation [Yellow Skin Or Eyes (Jaundice)] : denies jaundice [Abdominal Pain] : denies abdominal pain [Abdominal Swelling] : denies abdominal swelling [Wt Gain ___ Lbs] : no recent weight gain [de-identified] : 63M with schizophrenia, depression, OCD, chronic hyponatremia thought 2/2 SSRI, DM2, HLD, HTN, Obesity and hx of SBO at the terminal ileum (managed with NGT 2017) presented for re-scheduling for colonoscopy. Patient had been scheduled for 2 recent colonoscopies 2019 which were both of poor prep. The patient has been started on Linzess for chronic constipation on April 2. He states the Linzess helped with daily BM's initially then stopped working. He was prescribed Colace and Senna by his PMD which he states is giving him daily BM's. He is currently schedule for repeat colonoscopy 6/19/2019. He is feeling well today without nausea or vomiting, He is tolerating PO. \par \par RECENT LABS:\par 12/2018: Hgb 10.7, , MCV 95\par Iron studies TIBC 167, Fe 20 \par \par \par ENDOSCOPIC EVALUATION: \par Last colonoscopy: 2/2018\par Impression: \par - Stool in the entire examined colon.\par - Two 3 mm polyps in the sigmoid colon, removed with a jumbo cold forceps. Resected and retrieved. Hyperplastic Polyp\par - One 8 mm polyp in the rectum, removed with a cold snare. Resected and retrieved. Tubular Adenoma. \par  [Wt Loss ___ Lbs] : no recent weight loss

## 2019-05-16 NOTE — END OF VISIT
[] : Fellow [FreeTextEntry3] : As modified and discussed with patient\par MD LIASNDRO Ignacio [facility]AC[facility]\par Associate Professor of Medicine\par [facility]\par

## 2019-05-16 NOTE — PHYSICAL EXAM
[General Appearance - Alert] : alert [General Appearance - Well Nourished] : well nourished [General Appearance - In No Acute Distress] : in no acute distress [General Appearance - Well-Appearing] : healthy appearing [General Appearance - Well Developed] : well developed [Sclera] : the sclera and conjunctiva were normal [Examination Of The Oral Cavity] : the lips and gums were normal [Oropharynx] : the oropharynx was normal [Nasal Cavity] : the nasal mucosa and septum were normal [Neck Appearance] : the appearance of the neck was normal [Respiration, Rhythm And Depth] : normal respiratory rhythm and effort [Exaggerated Use Of Accessory Muscles For Inspiration] : no accessory muscle use [Heart Sounds] : normal S1 and S2 [Auscultation Breath Sounds / Voice Sounds] : lungs were clear to auscultation bilaterally [Edema] : there was no peripheral edema [Abdomen Soft] : soft [Bowel Sounds] : normal bowel sounds [Abdomen Tenderness] : non-tender [Cervical Lymph Nodes Enlarged Posterior Bilaterally] : posterior cervical [Supraclavicular Lymph Nodes Enlarged Bilaterally] : supraclavicular [Cervical Lymph Nodes Enlarged Anterior Bilaterally] : anterior cervical [No CVA Tenderness] : no ~M costovertebral angle tenderness [Axillary Lymph Nodes Enlarged Bilaterally] : axillary [Skin Color & Pigmentation] : normal skin color and pigmentation [Abnormal Walk] : normal gait [] : no rash [Skin Lesions] : no skin lesions [No Focal Deficits] : no focal deficits [Oriented To Time, Place, And Person] : oriented to person, place, and time

## 2019-05-16 NOTE — ASSESSMENT
[FreeTextEntry1] : Impression:\par 1)Tubular adenoma of colon- last Colonoscopy 2018, poor bowel prep \par 2)Normocytic Anemia- no overt GI bleeding; patient with anemia of chronic disease on last blood work\par 3)Chronic constipation- improving with bowel regimen\par 4)Obesity (BMI 30)\par \par Recommendations:\par -Plan for repeat colonoscopy 6/19/2019; will add on EGD given normocytic anemia seen\par Risks, benefits, and alternatives described to patient in detail including but not limited to risks of bleeding, infection, perforation and missed lesions. The patient expresses full understanding and is agreeable to the procedure.\par -continue with current bowel regimen (Colace and senna, Linzess PRN)\par -Plan for 3 day prep: written instructions given to patient:\par Day 1: Mag Citrate x 1; Day 2: Mag Citrate x 2; Day 3: Split Dose Golytely with Dulcolax (clear liquid day for all days)\par -RTC following procedures to discuss results

## 2019-05-24 ENCOUNTER — OTHER (OUTPATIENT)
Age: 64
End: 2019-05-24

## 2019-05-24 ENCOUNTER — EMERGENCY (EMERGENCY)
Facility: HOSPITAL | Age: 64
LOS: 1 days | Discharge: HOME | End: 2019-05-24
Attending: EMERGENCY MEDICINE | Admitting: EMERGENCY MEDICINE
Payer: MEDICAID

## 2019-05-24 VITALS
RESPIRATION RATE: 18 BRPM | OXYGEN SATURATION: 99 % | HEART RATE: 89 BPM | SYSTOLIC BLOOD PRESSURE: 154 MMHG | DIASTOLIC BLOOD PRESSURE: 94 MMHG | TEMPERATURE: 98 F

## 2019-05-24 VITALS
TEMPERATURE: 98 F | SYSTOLIC BLOOD PRESSURE: 150 MMHG | RESPIRATION RATE: 17 BRPM | OXYGEN SATURATION: 98 % | DIASTOLIC BLOOD PRESSURE: 74 MMHG | HEART RATE: 73 BPM

## 2019-05-24 LAB
ALBUMIN SERPL ELPH-MCNC: 3.7 G/DL — SIGNIFICANT CHANGE UP (ref 3.3–5)
ALP SERPL-CCNC: 66 U/L — SIGNIFICANT CHANGE UP (ref 40–120)
ALT FLD-CCNC: 11 U/L — SIGNIFICANT CHANGE UP (ref 4–41)
ANION GAP SERPL CALC-SCNC: 11 MMO/L — SIGNIFICANT CHANGE UP (ref 7–14)
APPEARANCE UR: CLEAR — SIGNIFICANT CHANGE UP
AST SERPL-CCNC: 10 U/L — SIGNIFICANT CHANGE UP (ref 4–40)
BACTERIA # UR AUTO: NEGATIVE — SIGNIFICANT CHANGE UP
BASE EXCESS BLDV CALC-SCNC: 2.7 MMOL/L — SIGNIFICANT CHANGE UP
BASOPHILS # BLD AUTO: 0.02 K/UL — SIGNIFICANT CHANGE UP (ref 0–0.2)
BASOPHILS NFR BLD AUTO: 0.4 % — SIGNIFICANT CHANGE UP (ref 0–2)
BILIRUB SERPL-MCNC: < 0.2 MG/DL — LOW (ref 0.2–1.2)
BILIRUB UR-MCNC: NEGATIVE — SIGNIFICANT CHANGE UP
BLOOD GAS VENOUS - CREATININE: 0.69 MG/DL — SIGNIFICANT CHANGE UP (ref 0.5–1.3)
BLOOD GAS VENOUS - FIO2: 21 — SIGNIFICANT CHANGE UP
BLOOD UR QL VISUAL: SIGNIFICANT CHANGE UP
BUN SERPL-MCNC: 10 MG/DL — SIGNIFICANT CHANGE UP (ref 7–23)
CALCIUM SERPL-MCNC: 8.7 MG/DL — SIGNIFICANT CHANGE UP (ref 8.4–10.5)
CHLORIDE BLDV-SCNC: 100 MMOL/L — SIGNIFICANT CHANGE UP (ref 96–108)
CHLORIDE SERPL-SCNC: 99 MMOL/L — SIGNIFICANT CHANGE UP (ref 98–107)
CO2 SERPL-SCNC: 26 MMOL/L — SIGNIFICANT CHANGE UP (ref 22–31)
COLOR SPEC: SIGNIFICANT CHANGE UP
CREAT SERPL-MCNC: 0.75 MG/DL — SIGNIFICANT CHANGE UP (ref 0.5–1.3)
EOSINOPHIL # BLD AUTO: 0.38 K/UL — SIGNIFICANT CHANGE UP (ref 0–0.5)
EOSINOPHIL NFR BLD AUTO: 6.7 % — HIGH (ref 0–6)
GAS PNL BLDV: 134 MMOL/L — LOW (ref 136–146)
GLUCOSE BLDV-MCNC: 112 MG/DL — HIGH (ref 70–99)
GLUCOSE SERPL-MCNC: 124 MG/DL — HIGH (ref 70–99)
GLUCOSE UR-MCNC: NEGATIVE — SIGNIFICANT CHANGE UP
HCO3 BLDV-SCNC: 25 MMOL/L — SIGNIFICANT CHANGE UP (ref 20–27)
HCT VFR BLD CALC: 30.9 % — LOW (ref 39–50)
HCT VFR BLDV CALC: 32.8 % — LOW (ref 39–51)
HGB BLD-MCNC: 10.3 G/DL — LOW (ref 13–17)
HGB BLDV-MCNC: 10.6 G/DL — LOW (ref 13–17)
HYALINE CASTS # UR AUTO: NEGATIVE — SIGNIFICANT CHANGE UP
IMM GRANULOCYTES NFR BLD AUTO: 0.5 % — SIGNIFICANT CHANGE UP (ref 0–1.5)
KETONES UR-MCNC: SIGNIFICANT CHANGE UP
LACTATE BLDV-MCNC: 1.9 MMOL/L — SIGNIFICANT CHANGE UP (ref 0.5–2)
LEUKOCYTE ESTERASE UR-ACNC: NEGATIVE — SIGNIFICANT CHANGE UP
LIDOCAIN IGE QN: 18.2 U/L — SIGNIFICANT CHANGE UP (ref 7–60)
LYMPHOCYTES # BLD AUTO: 1.34 K/UL — SIGNIFICANT CHANGE UP (ref 1–3.3)
LYMPHOCYTES # BLD AUTO: 23.8 % — SIGNIFICANT CHANGE UP (ref 13–44)
MCHC RBC-ENTMCNC: 30 PG — SIGNIFICANT CHANGE UP (ref 27–34)
MCHC RBC-ENTMCNC: 33.3 % — SIGNIFICANT CHANGE UP (ref 32–36)
MCV RBC AUTO: 90.1 FL — SIGNIFICANT CHANGE UP (ref 80–100)
MONOCYTES # BLD AUTO: 0.74 K/UL — SIGNIFICANT CHANGE UP (ref 0–0.9)
MONOCYTES NFR BLD AUTO: 13.1 % — SIGNIFICANT CHANGE UP (ref 2–14)
NEUTROPHILS # BLD AUTO: 3.13 K/UL — SIGNIFICANT CHANGE UP (ref 1.8–7.4)
NEUTROPHILS NFR BLD AUTO: 55.5 % — SIGNIFICANT CHANGE UP (ref 43–77)
NITRITE UR-MCNC: NEGATIVE — SIGNIFICANT CHANGE UP
NRBC # FLD: 0 K/UL — SIGNIFICANT CHANGE UP (ref 0–0)
PCO2 BLDV: 53 MMHG — HIGH (ref 41–51)
PH BLDV: 7.35 PH — SIGNIFICANT CHANGE UP (ref 7.32–7.43)
PH UR: 6.5 — SIGNIFICANT CHANGE UP (ref 5–8)
PLATELET # BLD AUTO: 183 K/UL — SIGNIFICANT CHANGE UP (ref 150–400)
PMV BLD: 10.2 FL — SIGNIFICANT CHANGE UP (ref 7–13)
PO2 BLDV: 26 MMHG — LOW (ref 35–40)
POTASSIUM BLDV-SCNC: 3.8 MMOL/L — SIGNIFICANT CHANGE UP (ref 3.4–4.5)
POTASSIUM SERPL-MCNC: 4 MMOL/L — SIGNIFICANT CHANGE UP (ref 3.5–5.3)
POTASSIUM SERPL-SCNC: 4 MMOL/L — SIGNIFICANT CHANGE UP (ref 3.5–5.3)
PROT SERPL-MCNC: 6.3 G/DL — SIGNIFICANT CHANGE UP (ref 6–8.3)
PROT UR-MCNC: 20 — SIGNIFICANT CHANGE UP
RBC # BLD: 3.43 M/UL — LOW (ref 4.2–5.8)
RBC # FLD: 13.9 % — SIGNIFICANT CHANGE UP (ref 10.3–14.5)
RBC CASTS # UR COMP ASSIST: SIGNIFICANT CHANGE UP (ref 0–?)
SAO2 % BLDV: 39.8 % — LOW (ref 60–85)
SODIUM SERPL-SCNC: 136 MMOL/L — SIGNIFICANT CHANGE UP (ref 135–145)
SP GR SPEC: 1.01 — SIGNIFICANT CHANGE UP (ref 1–1.04)
SQUAMOUS # UR AUTO: SIGNIFICANT CHANGE UP
UROBILINOGEN FLD QL: NORMAL — SIGNIFICANT CHANGE UP
WBC # BLD: 5.64 K/UL — SIGNIFICANT CHANGE UP (ref 3.8–10.5)
WBC # FLD AUTO: 5.64 K/UL — SIGNIFICANT CHANGE UP (ref 3.8–10.5)
WBC UR QL: SIGNIFICANT CHANGE UP (ref 0–?)

## 2019-05-24 PROCEDURE — 99284 EMERGENCY DEPT VISIT MOD MDM: CPT

## 2019-05-24 PROCEDURE — 74177 CT ABD & PELVIS W/CONTRAST: CPT | Mod: 26

## 2019-05-24 RX ORDER — SODIUM CHLORIDE 9 MG/ML
1000 INJECTION INTRAMUSCULAR; INTRAVENOUS; SUBCUTANEOUS ONCE
Refills: 0 | Status: COMPLETED | OUTPATIENT
Start: 2019-05-24 | End: 2019-05-24

## 2019-05-24 RX ADMIN — SODIUM CHLORIDE 1000 MILLILITER(S): 9 INJECTION INTRAMUSCULAR; INTRAVENOUS; SUBCUTANEOUS at 17:47

## 2019-05-24 RX ADMIN — SODIUM CHLORIDE 1000 MILLILITER(S): 9 INJECTION INTRAMUSCULAR; INTRAVENOUS; SUBCUTANEOUS at 16:29

## 2019-05-24 NOTE — ED PROVIDER NOTE - OBJECTIVE STATEMENT
63 yr old male with hx of  schizophrenia, Mood Disorder, hypothyroidism, DM II and SBO presents to ed c/o n/v/d that resolved but now with worsening abd pain and decrease po intake past couple days.  no fever, no dysuria, no trauma.

## 2019-05-24 NOTE — ED PROVIDER NOTE - NSFOLLOWUPINSTRUCTIONS_ED_ALL_ED_FT
You were seen in the emergency department for abdominal pain. Please follow up with your primary doctor and with a gastroenterologist in the next 5-7 days. Return to the emergency department immediately if you experience fever, vomiting or any other concerning symptoms.

## 2019-05-24 NOTE — ED PROVIDER NOTE - CLINICAL SUMMARY MEDICAL DECISION MAKING FREE TEXT BOX
63 yr old male with hx of  schizophrenia, Mood Disorder, hypothyroidism, DM II and SBO presents to ed c/o n/v/d that resolved but now with worsening abd pain and decrease po intake past couple days.  no fever, no dysuria, no trauma.    abd pain with n/v r/o sbo vs partial vs constipation vs colitis vs appy.  labs, ct, fluids, lactate, re-assess

## 2019-05-24 NOTE — ED PROVIDER NOTE - PROGRESS NOTE DETAILS
Presley: normal lactate, no wbc.  ct pending.  s/o to Dr Quintanilla. Elizabeth Goldberger PGY-2: pt signed out to me pending ctap read. No obstruction just dilated duodenum. Findings relayed to pt that will need to be followed up. Pt w mild residual pain improved from prior, abd NT. Will po trial likely dc Elizabeth Goldberger PGY-2: tolerating PO. will dc Gong: Patient seen and examined prior to discharge.  Seen with Dr. Goldberger, noted to be tolerating PO well.  Instructed patient to follow up with GI regarding dilated duodenum.  Pt reports slight abdominal pain, but much improved since arrival.  Pt has no nausea or vomiting.  Pt stable for discharge home with outpt follow up.

## 2019-06-07 ENCOUNTER — OTHER (OUTPATIENT)
Age: 64
End: 2019-06-07

## 2019-06-11 ENCOUNTER — OTHER (OUTPATIENT)
Age: 64
End: 2019-06-11

## 2019-06-14 ENCOUNTER — RX RENEWAL (OUTPATIENT)
Age: 64
End: 2019-06-14

## 2019-06-14 ENCOUNTER — OTHER (OUTPATIENT)
Age: 64
End: 2019-06-14

## 2019-06-17 ENCOUNTER — OTHER (OUTPATIENT)
Age: 64
End: 2019-06-17

## 2019-06-17 ENCOUNTER — APPOINTMENT (OUTPATIENT)
Dept: OPHTHALMOLOGY | Facility: CLINIC | Age: 64
End: 2019-06-17

## 2019-06-21 ENCOUNTER — OUTPATIENT (OUTPATIENT)
Dept: OUTPATIENT SERVICES | Facility: HOSPITAL | Age: 64
LOS: 1 days | End: 2019-06-21

## 2019-06-21 ENCOUNTER — APPOINTMENT (OUTPATIENT)
Dept: INTERNAL MEDICINE | Facility: CLINIC | Age: 64
End: 2019-06-21
Payer: MEDICAID

## 2019-06-21 VITALS
DIASTOLIC BLOOD PRESSURE: 80 MMHG | OXYGEN SATURATION: 96 % | HEART RATE: 72 BPM | WEIGHT: 197 LBS | HEIGHT: 67 IN | SYSTOLIC BLOOD PRESSURE: 130 MMHG | BODY MASS INDEX: 30.92 KG/M2

## 2019-06-21 LAB — GLUCOSE BLDC GLUCOMTR-MCNC: 107

## 2019-06-21 PROCEDURE — 99214 OFFICE O/P EST MOD 30 MIN: CPT | Mod: GC

## 2019-06-21 RX ORDER — LISINOPRIL 5 MG/1
5 TABLET ORAL
Refills: 3 | Status: DISCONTINUED | COMMUNITY
Start: 2019-06-21 | End: 2019-06-21

## 2019-06-21 NOTE — PHYSICAL EXAM

## 2019-06-21 NOTE — ASSESSMENT
[FreeTextEntry1] : Patient is a 65 yo male with PMHx of Schizophrenia, DMII, HLD, Hypertension, Depression, OCD, chronic Hyponatremia (likely secondary to SSRI use), Glaucoma (s/p laser therapy), BPH (not on Flomax 2/2 SE), MRSA skin infections, SBO requiring NG tube decompression in 2016 who presents for nausea.\par \par Nausea: Follow up with GI. EGD, colonoscopy ordered. CMP, CBC normal. Lipase normal.\par \par DM2: Taking glipizide 5 mg, Januvia, Metformin. LAst A1c was 7.0 in 05/2019. Urine  mg 12/2018. On Lisinopril 5 mg.\par Last seen by podiatry: 05/2019\par Last seen by opthalmology: 06/2019\par \par Seizure: Last seizure was in 1997. Currently stable on Depakote 1000 mg.\par \par HCM:\par Vaccinations UTD\par Urine MCR UTD\par A1c UTD\par Colonoscopy: Per GI next week.\par \par D/w Dr. Estrada\par \par F/u 1 month\par \par [employer]

## 2019-06-21 NOTE — HISTORY OF PRESENT ILLNESS
[FreeTextEntry1] : Follow up Post ED [de-identified] : Patient is a 63 yo male with PMHx of Schizophrenia, DMII, HLD, Hypertension, Depression, OCD, chronic Hyponatremia (likely secondary to SSRI use), Glaucoma (s/p laser therapy), BPH (not on Flomax 2/2 SE), MRSA skin infections, SBO requiring NG tube decompression in 2016 who presents for nausea.\par \par The patient presented to the ED in May 2019 complaining of abdominal pain, with a CTAP: Moderately dilated duodenum without transition point. Recommended to follow up with GI. GI planning endoscopy and colonoscopy next week. Last colonoscopy: 2/2018\par Impression: \par - Stool in the entire examined colon.\par - Two 3 mm polyps in the sigmoid colon, removed with a jumbo cold forceps. Resected and retrieved. Hyperplastic Polyp\par - One 8 mm polyp in the rectum, removed with a cold snare. Resected and retrieved. Tubular Adenoma.\par  NV resolved, denies any abd pain. Now tolerating PO.\par \par DM2: Taking glipizide 5 mg, Januvia, Metformin. LAst A1c was 7.0 in 05/2019. Urine  mg 12/2018. On Lisinopril 5 mg.\par Last seen by podiatry: 05/2019\par Last seen by opthalmology: 06/2019\par \par Seizure: Last seizure was in 1997. Currently stable on Depakote 1000 mg.\par \par HCM:\par Vaccinations UTD\par Urine MCR UTD\par A1c UTD\par Colonoscopy: Per GI next week.

## 2019-06-24 DIAGNOSIS — E11.9 TYPE 2 DIABETES MELLITUS WITHOUT COMPLICATIONS: ICD-10-CM

## 2019-06-24 DIAGNOSIS — D12.6 BENIGN NEOPLASM OF COLON, UNSPECIFIED: ICD-10-CM

## 2019-06-24 DIAGNOSIS — R56.9 UNSPECIFIED CONVULSIONS: ICD-10-CM

## 2019-06-25 ENCOUNTER — OUTPATIENT (OUTPATIENT)
Dept: OUTPATIENT SERVICES | Facility: HOSPITAL | Age: 64
LOS: 1 days | Discharge: HOME | End: 2019-06-25
Payer: MEDICAID

## 2019-06-25 ENCOUNTER — RESULT REVIEW (OUTPATIENT)
Age: 64
End: 2019-06-25

## 2019-06-25 DIAGNOSIS — D12.6 BENIGN NEOPLASM OF COLON, UNSPECIFIED: ICD-10-CM

## 2019-06-25 LAB — GLUCOSE BLDC GLUCOMTR-MCNC: 111 MG/DL — HIGH (ref 70–99)

## 2019-06-25 PROCEDURE — 88305 TISSUE EXAM BY PATHOLOGIST: CPT | Mod: 26

## 2019-06-25 PROCEDURE — 45385 COLONOSCOPY W/LESION REMOVAL: CPT | Mod: GC

## 2019-06-25 PROCEDURE — 88312 SPECIAL STAINS GROUP 1: CPT | Mod: 26

## 2019-06-25 PROCEDURE — 43239 EGD BIOPSY SINGLE/MULTIPLE: CPT | Mod: GC

## 2019-06-25 RX ORDER — SODIUM CHLORIDE 9 MG/ML
1000 INJECTION, SOLUTION INTRAVENOUS
Refills: 0 | Status: DISCONTINUED | OUTPATIENT
Start: 2019-06-25 | End: 2019-07-10

## 2019-06-25 RX ADMIN — SODIUM CHLORIDE 30 MILLILITER(S): 9 INJECTION, SOLUTION INTRAVENOUS at 12:31

## 2019-06-27 LAB — SURGICAL PATHOLOGY STUDY: SIGNIFICANT CHANGE UP

## 2019-06-28 ENCOUNTER — RX CHANGE (OUTPATIENT)
Age: 64
End: 2019-06-28

## 2019-07-03 ENCOUNTER — RX RENEWAL (OUTPATIENT)
Age: 64
End: 2019-07-03

## 2019-07-19 ENCOUNTER — LABORATORY RESULT (OUTPATIENT)
Age: 64
End: 2019-07-19

## 2019-07-19 ENCOUNTER — OUTPATIENT (OUTPATIENT)
Dept: OUTPATIENT SERVICES | Facility: HOSPITAL | Age: 64
LOS: 1 days | End: 2019-07-19

## 2019-07-19 ENCOUNTER — APPOINTMENT (OUTPATIENT)
Dept: INTERNAL MEDICINE | Facility: CLINIC | Age: 64
End: 2019-07-19
Payer: MEDICAID

## 2019-07-19 VITALS
WEIGHT: 200 LBS | HEART RATE: 76 BPM | SYSTOLIC BLOOD PRESSURE: 142 MMHG | DIASTOLIC BLOOD PRESSURE: 88 MMHG | BODY MASS INDEX: 31.32 KG/M2

## 2019-07-19 LAB
ALBUMIN SERPL ELPH-MCNC: 4.5 G/DL — SIGNIFICANT CHANGE UP (ref 3.3–5)
ALP SERPL-CCNC: 72 U/L — SIGNIFICANT CHANGE UP (ref 40–120)
ALT FLD-CCNC: 13 U/L — SIGNIFICANT CHANGE UP (ref 4–41)
ANION GAP SERPL CALC-SCNC: 13 MMO/L — SIGNIFICANT CHANGE UP (ref 7–14)
AST SERPL-CCNC: 14 U/L — SIGNIFICANT CHANGE UP (ref 4–40)
BASOPHILS # BLD AUTO: 0.06 K/UL — SIGNIFICANT CHANGE UP (ref 0–0.2)
BASOPHILS NFR BLD AUTO: 1 % — SIGNIFICANT CHANGE UP (ref 0–2)
BILIRUB SERPL-MCNC: < 0.2 MG/DL — LOW (ref 0.2–1.2)
BUN SERPL-MCNC: 12 MG/DL — SIGNIFICANT CHANGE UP (ref 7–23)
CALCIUM SERPL-MCNC: 9.6 MG/DL — SIGNIFICANT CHANGE UP (ref 8.4–10.5)
CHLORIDE SERPL-SCNC: 90 MMOL/L — LOW (ref 98–107)
CO2 SERPL-SCNC: 26 MMOL/L — SIGNIFICANT CHANGE UP (ref 22–31)
CREAT SERPL-MCNC: 0.71 MG/DL — SIGNIFICANT CHANGE UP (ref 0.5–1.3)
EOSINOPHIL # BLD AUTO: 0.04 K/UL — SIGNIFICANT CHANGE UP (ref 0–0.5)
EOSINOPHIL NFR BLD AUTO: 0.6 % — SIGNIFICANT CHANGE UP (ref 0–6)
FERRITIN SERPL-MCNC: 112.3 NG/ML — SIGNIFICANT CHANGE UP (ref 30–400)
GLUCOSE SERPL-MCNC: 75 MG/DL — SIGNIFICANT CHANGE UP (ref 70–99)
HCT VFR BLD CALC: 35.4 % — LOW (ref 39–50)
HGB BLD-MCNC: 11.7 G/DL — LOW (ref 13–17)
IMM GRANULOCYTES NFR BLD AUTO: 0.3 % — SIGNIFICANT CHANGE UP (ref 0–1.5)
IRON SATN MFR SERPL: 285 UG/DL — SIGNIFICANT CHANGE UP (ref 155–535)
IRON SATN MFR SERPL: 57 UG/DL — SIGNIFICANT CHANGE UP (ref 45–165)
LYMPHOCYTES # BLD AUTO: 1.43 K/UL — SIGNIFICANT CHANGE UP (ref 1–3.3)
LYMPHOCYTES # BLD AUTO: 22.8 % — SIGNIFICANT CHANGE UP (ref 13–44)
MCHC RBC-ENTMCNC: 29.9 PG — SIGNIFICANT CHANGE UP (ref 27–34)
MCHC RBC-ENTMCNC: 33.1 % — SIGNIFICANT CHANGE UP (ref 32–36)
MCV RBC AUTO: 90.5 FL — SIGNIFICANT CHANGE UP (ref 80–100)
MONOCYTES # BLD AUTO: 0.51 K/UL — SIGNIFICANT CHANGE UP (ref 0–0.9)
MONOCYTES NFR BLD AUTO: 8.1 % — SIGNIFICANT CHANGE UP (ref 2–14)
NEUTROPHILS # BLD AUTO: 4.21 K/UL — SIGNIFICANT CHANGE UP (ref 1.8–7.4)
NEUTROPHILS NFR BLD AUTO: 67.2 % — SIGNIFICANT CHANGE UP (ref 43–77)
NRBC # FLD: 0 K/UL — SIGNIFICANT CHANGE UP (ref 0–0)
PLATELET # BLD AUTO: 217 K/UL — SIGNIFICANT CHANGE UP (ref 150–400)
PMV BLD: 10.7 FL — SIGNIFICANT CHANGE UP (ref 7–13)
POTASSIUM SERPL-MCNC: 4.5 MMOL/L — SIGNIFICANT CHANGE UP (ref 3.5–5.3)
POTASSIUM SERPL-SCNC: 4.5 MMOL/L — SIGNIFICANT CHANGE UP (ref 3.5–5.3)
PROT SERPL-MCNC: 6.9 G/DL — SIGNIFICANT CHANGE UP (ref 6–8.3)
PROT UR-MCNC: 23.4 MG/DL — SIGNIFICANT CHANGE UP
RBC # BLD: 3.91 M/UL — LOW (ref 4.2–5.8)
RBC # FLD: 13.5 % — SIGNIFICANT CHANGE UP (ref 10.3–14.5)
SODIUM SERPL-SCNC: 129 MMOL/L — LOW (ref 135–145)
TSH SERPL-MCNC: 2.22 UIU/ML — SIGNIFICANT CHANGE UP (ref 0.27–4.2)
UIBC SERPL-MCNC: 228.2 UG/DL — SIGNIFICANT CHANGE UP (ref 110–370)
VIT B12 SERPL-MCNC: 693 PG/ML — SIGNIFICANT CHANGE UP (ref 200–900)
WBC # BLD: 6.27 K/UL — SIGNIFICANT CHANGE UP (ref 3.8–10.5)
WBC # FLD AUTO: 6.27 K/UL — SIGNIFICANT CHANGE UP (ref 3.8–10.5)

## 2019-07-19 PROCEDURE — 84166 PROTEIN E-PHORESIS/URINE/CSF: CPT | Mod: 26

## 2019-07-19 PROCEDURE — 84165 PROTEIN E-PHORESIS SERUM: CPT | Mod: 26

## 2019-07-19 PROCEDURE — 99213 OFFICE O/P EST LOW 20 MIN: CPT | Mod: GE

## 2019-07-19 RX ORDER — MAGNESIUM CITRATE
SOLUTION, ORAL ORAL
Qty: 2 | Refills: 0 | Status: COMPLETED | COMMUNITY
Start: 2019-06-07 | End: 2019-07-19

## 2019-07-19 RX ORDER — POLYETHYLENE GLYCOL 3350, SODIUM SULFATE ANHYDROUS, SODIUM BICARBONATE, SODIUM CHLORIDE, POTASSIUM CHLORIDE 227.1; 21.5; 6.36; 5.53; .754 G/L; G/L; G/L; G/L; G/L
227.1 POWDER, FOR SOLUTION ORAL
Qty: 1 | Refills: 0 | Status: COMPLETED | COMMUNITY
Start: 2019-06-07 | End: 2019-07-19

## 2019-07-22 DIAGNOSIS — K22.70 BARRETT'S ESOPHAGUS WITHOUT DYSPLASIA: ICD-10-CM

## 2019-07-22 DIAGNOSIS — D64.9 ANEMIA, UNSPECIFIED: ICD-10-CM

## 2019-07-22 DIAGNOSIS — E87.1 HYPO-OSMOLALITY AND HYPONATREMIA: ICD-10-CM

## 2019-07-22 DIAGNOSIS — E11.9 TYPE 2 DIABETES MELLITUS WITHOUT COMPLICATIONS: ICD-10-CM

## 2019-07-22 DIAGNOSIS — I10 ESSENTIAL (PRIMARY) HYPERTENSION: ICD-10-CM

## 2019-07-22 DIAGNOSIS — F20.9 SCHIZOPHRENIA, UNSPECIFIED: ICD-10-CM

## 2019-07-22 DIAGNOSIS — E03.9 HYPOTHYROIDISM, UNSPECIFIED: ICD-10-CM

## 2019-07-22 DIAGNOSIS — N40.0 BENIGN PROSTATIC HYPERPLASIA WITHOUT LOWER URINARY TRACT SYMPTOMS: ICD-10-CM

## 2019-07-22 DIAGNOSIS — K59.09 OTHER CONSTIPATION: ICD-10-CM

## 2019-07-22 NOTE — HISTORY OF PRESENT ILLNESS
[Family Member] : family member [FreeTextEntry1] : f/u of chronic conditions [de-identified] : 61 yo male with Schizophrenia, T2DM, HLD, Hypertension, Depression, OCD, chronic Hyponatremia (likely secondary to SSRI use), Glaucoma (s/p laser therapy), BPH, MRSA skin infections, SBO requiring NG tube decompression in 2016, and tubular adenoma of the colon who presents for f/u of chronic conditions. Patient was last seen in clinic on 6/21/19 after recent visit to ED for N/V. Patient underwent CT of A/P and was found to have dilated duodenum without transition point. He was discharged with plans to f/u with GI. Pt underwent EGD and colonoscopy on 6/25 with findings of Barett's esophagus as well as gastritis/duodenitis. Bx was negative for H pyloria. Patient has not had any further episodes of N/V since his colonoscopy. Prior to that, sxs were occurring about twice per month at about 4 am, when he would wake up with N/V. \par \par With regard to diabetes, patient's fasting BG has ranged from , but mostly in low 100s. He denies any episodes of hypoglycemia. \par \par Patient endorses adherence to all his psychiatric medications. \par

## 2019-07-22 NOTE — ASSESSMENT
[FreeTextEntry1] : 61 yo male with Schizophrenia, T2DM, HLD, Hypertension, Depression, OCD, chronic Hyponatremia (likely secondary to SSRI use), Glaucoma (s/p laser therapy), BPH, MRSA skin infections, SBO requiring NG tube decompression in 2016, and tubular adenoma of the colon who presents for f/u of chronic conditions\par \par 1) Nausea/Vomiting in setting of gastritis/duodenitis with EGD findings of Schneider's esophagus: \par -bx negative for H pylori\par -will initiate Protonix 40 mg daily\par -will need surveillance EGD yearly given Schneider's esophagus\par -has f/u with GI on 8/8\par \par 2)  T2DM: \par -well controlled; last A1C in May was 7.0%\par -continue metformin, glipizide, Januvia\par -pt with recent optho exam, cataract in R eye and worsening IOP; plan with optho for intervention later this summer\par -pt with positive urine microalbuminuria; titrated lisinopril to 20 mg daily. Patient will return in 1 month for BMP to ensure Cr and K remain stable\par -podiatry seen in May 2019; last diabetic foot exam in clinic in May 2019 revealed intact sensation\par \par 3) Chronic constipation; \par -continue with senna colace \par \par 4) Colonic polyps: \par -Colonoscopy last occurred on 6/25/19 that demonstrated 5 mm and 7mm  polyp; pathology findings revealed one hyperplastic polyp; there was another polyp but pathology results state there was no tissue present for histologic evaluation\par -Per GI, repeat colonoscopy in 3 years \par \par 5) HTN: \par -outside goal: 142/88\par -will uptitrate llisinopril to 20 mg as above\par \par 6) Hyponatremia: \par -chem sent today d/t patient request\par -on NaCl tabs\par \par 7) Anemia, normocytic: \par -TSH previously wnl one year ago, will resend\par -sending iron studies and B12 \par -will also send SPEP, UPEP, immunofixation\par \par 8) Hypothyroidism: \par -c/w with synthroid\par -TSH today, as above\par \par 9) Schizophrenia: \par -no recent seizure events\par -c/w with depakote\par \par 10: HCM: \par -Needs prevnar upon turning 64\par -UTD with colonoscopy until 2022\par \par RTC in 1 month\par \par Discussed with Dr. Fields\par \par Amy Martínez, PGY-3\par [employer]

## 2019-07-22 NOTE — END OF VISIT
[] : Resident [FreeTextEntry3] : 62 year old man with h/o schizophrenia, controlled DM c/b nephropathy/CKD, HTN, HLD, hyponatremia thought 2/2 SSRI, hypothyroidism who presents for follow up-was seein in ED with abd pain, nausea, vomiting and had EGD/colonoscopy-diffuse gastritis and duodenitis, now symptoms all resolved. Also with Schneider's but negative H pylori. Fasting FSG  reported, A1c last 7.0% 2 mos ago. /88, resident exam benign with normal abdominal examination and no peripheral edema. Noted normocytic anemia on labs, will send labs to evaluate though he has CKD- including TSH, Fe studies, B12, SPEP/ UPEP.

## 2019-07-23 LAB
GAS PNL BLDMV: SIGNIFICANT CHANGE UP
GAS PNL BLDMV: SIGNIFICANT CHANGE UP

## 2019-07-29 ENCOUNTER — APPOINTMENT (OUTPATIENT)
Dept: OTOLARYNGOLOGY | Facility: CLINIC | Age: 64
End: 2019-07-29
Payer: MEDICAID

## 2019-07-29 ENCOUNTER — OUTPATIENT (OUTPATIENT)
Dept: OUTPATIENT SERVICES | Facility: HOSPITAL | Age: 64
LOS: 1 days | Discharge: HOME | End: 2019-07-29

## 2019-07-29 VITALS
BODY MASS INDEX: 30.92 KG/M2 | HEART RATE: 75 BPM | DIASTOLIC BLOOD PRESSURE: 86 MMHG | HEIGHT: 67 IN | WEIGHT: 197 LBS | SYSTOLIC BLOOD PRESSURE: 139 MMHG

## 2019-07-29 PROCEDURE — 99213 OFFICE O/P EST LOW 20 MIN: CPT | Mod: 25

## 2019-07-29 PROCEDURE — 69210 REMOVE IMPACTED EAR WAX UNI: CPT

## 2019-07-29 RX ORDER — PANTOPRAZOLE 40 MG/1
40 TABLET, DELAYED RELEASE ORAL
Qty: 90 | Refills: 3 | Status: DISCONTINUED | COMMUNITY
Start: 2019-07-19 | End: 2019-07-29

## 2019-07-29 NOTE — REASON FOR VISIT
[Subsequent Evaluation] : a subsequent evaluation for [Other: _____] : [unfilled] [FreeTextEntry2] : follow up for mild bilateral hearing loss

## 2019-07-29 NOTE — HISTORY OF PRESENT ILLNESS
[de-identified] : 63 year old male follow up for mild bilateral hearing loss. Denies otalgia, otorrhea, ear infections, tinnitus, dizziness, vertigo, headaches related to hearing. Denies use of hearing aids. Last audiogram  7/23/18. never had imaging\par \par

## 2019-07-29 NOTE — PROCEDURE
[Cerumen Impaction] : Cerumen Impaction [Same] : same as the Pre Op Dx. [] : Removal of Cerumen [FreeTextEntry6] : Cerumen was removed under binocular microscopy from the right ear with a combination of a suction and/or a loop curette. The patient tolerated the procedure well and there were no complications. The  findings are noted above.\par

## 2019-07-30 RX ORDER — OMEPRAZOLE 20 MG/1
20 CAPSULE, DELAYED RELEASE ORAL DAILY
Qty: 90 | Refills: 3 | Status: DISCONTINUED | COMMUNITY
Start: 2019-07-29 | End: 2019-07-30

## 2019-08-01 DIAGNOSIS — H61.21 IMPACTED CERUMEN, RIGHT EAR: ICD-10-CM

## 2019-08-01 DIAGNOSIS — H90.3 SENSORINEURAL HEARING LOSS, BILATERAL: ICD-10-CM

## 2019-08-08 ENCOUNTER — OUTPATIENT (OUTPATIENT)
Dept: OUTPATIENT SERVICES | Facility: HOSPITAL | Age: 64
LOS: 1 days | End: 2019-08-08

## 2019-08-08 ENCOUNTER — RX RENEWAL (OUTPATIENT)
Age: 64
End: 2019-08-08

## 2019-08-08 ENCOUNTER — APPOINTMENT (OUTPATIENT)
Dept: GASTROENTEROLOGY | Facility: CLINIC | Age: 64
End: 2019-08-08
Payer: MEDICAID

## 2019-08-08 VITALS
HEART RATE: 82 BPM | DIASTOLIC BLOOD PRESSURE: 76 MMHG | BODY MASS INDEX: 30.76 KG/M2 | SYSTOLIC BLOOD PRESSURE: 118 MMHG | HEIGHT: 67 IN | OXYGEN SATURATION: 97 % | WEIGHT: 196 LBS

## 2019-08-08 DIAGNOSIS — E66.9 OBESITY, UNSPECIFIED: ICD-10-CM

## 2019-08-08 DIAGNOSIS — D12.6 BENIGN NEOPLASM OF COLON, UNSPECIFIED: ICD-10-CM

## 2019-08-08 DIAGNOSIS — R11.2 NAUSEA WITH VOMITING, UNSPECIFIED: ICD-10-CM

## 2019-08-08 PROCEDURE — 99213 OFFICE O/P EST LOW 20 MIN: CPT | Mod: GC

## 2019-08-08 NOTE — HISTORY OF PRESENT ILLNESS
[Heartburn] : denies heartburn [Nausea] : denies nausea [Vomiting] : denies vomiting [Diarrhea] : denies diarrhea [Constipation] : denies constipation [Yellow Skin Or Eyes (Jaundice)] : denies jaundice [Abdominal Pain] : denies abdominal pain [Abdominal Swelling] : denies abdominal swelling [Wt Gain ___ Lbs] : no recent weight gain [de-identified] : 63M with schizophrenia, depression, OCD, chronic hyponatremia thought 2/2 SSRI, DM2, HLD, HTN, Obesity and hx of SBO at the terminal ileum (managed with NGT 2017) presented for re-scheduling for colonoscopy. Patient had been scheduled for 2 recent colonoscopies 2019 which were both of poor prep. Had repeat colonoscopy in 6/2019 with two polyps, hyperplastic. EGd done at the same time showing possible Schneider's esophagus but pathology inconclusive.\par \par Patient denies any abdominal pain, diarrhea, melena, BRBpR. He does report that he has episodes of N/V that occurs every 4-5 weeks for many years where  he wakes up and has N/V and retching. Denies any abdominal pain. Sometimes associated with diarrhea. Denies any fevers or chills. Denies weight loss or eating take out. He does go to an adult group home. Denies any sick contacts.\par \par RECENT LABS:\par 12/2018: Hgb 10.7, , MCV 95\par Iron studies TIBC 167, Fe 20 \par \par \par ENDOSCOPIC EVALUATION: \par Colonoscopy 2/2018\par Impression: \par - Stool in the entire examined colon.\par - Two 3 mm polyps in the sigmoid colon, removed with a jumbo cold forceps. Resected and retrieved. Hyperplastic Polyp\par - One 8 mm polyp in the rectum, removed with a cold snare. Resected and retrieved. Tubular Adenoma. \par \par \par EGD/colonscopy 6/2019\par   - Ararat-colored mucosa suspicious for short-segment \par                      Schneider's esophagus. Biopsied.\par                      - Gastritis. Biopsied.\par                      - Fundic gland polyps.\par                      - Duodenitis. Biopsied.\par \par  Non-bleeding internal hemorrhoids.\par                      - One 5 mm polyp in the sigmoid colon, removed with a \par                      cold snare. Resected and retrieved.\par                      - One 7 mm polyp in the transverse colon, removed with a \par                      hot snare. Resected and retrieved.\par                      - Stool in the entire examined colon.\par \par 1. Colon, transverse, polyp, biopsy\par - No tissue present for histologic examination.\par \par 2. Colon, sigmoid, polyp, biopsy\par - Hyperplastic polyp.\par \par 3. Duodenum, biopsy\par - Duodenal mucosa with features of chronic peptic\par duodenitis.\par \par 4. Stomach, biopsy\par - Gastric mucosa with intestinal metaplasia and chronic\par inactive gastritis.\par - Negative for Helicobacter microorganisms (special stain).\par - Negative for dysplasia.\par \par 5. Gastroesophageal junction, biopsy\par - Squamous and cardiofundic-type mucosa with intestinal\par metaplasia, see comment.\par - Negative for dysplasia.\par \par  [Wt Loss ___ Lbs] : no recent weight loss

## 2019-08-08 NOTE — ASSESSMENT
[FreeTextEntry1] : Impression:\par #Tubular adenoma of colon- last Colonoscopy 2018, poor bowel prep \par Repeat EGD colonoscopy done 6/2019 with 2 polyps removed showing hyperplastic pathology. Prep poor.\par # Schneider's Esophagus\par EGD done 6/10 with two 2cm tongues suspicious for BE. Pathology inconclusive but no dysplasia\par Plan for repeat EGD in 1 year\par #N/V\par Occurs ever 4-5 weeks and lasts less than 24 hours. Sometimes associated with diarrhea but not early satiety, weight loss. Denies any marijuana use. HbA1c of 7.1.\par DDx: viral gastroenteritis vs. gastroparesis vs. cyclical vomiting syndrome vs. biliary etiology\par # Chronic constipation- improving with bowel regimen\par # Obesity (BMI 30)\par \par Recommendations:\par - repeat EGD and colonoscopy in 1 year for Schneider's surveillance and CRC surveillance\par - abdominal sono for evaluation of N/V\par -  Dietary strategies discussed with the patient including use of psyllium husk before breakfast and supper and Glucerna in place of lunch; mild exercise also discussed; weight once weekly; think of calory spending  \par - RTC in 6 months

## 2019-08-08 NOTE — PHYSICAL EXAM
[General Appearance - Alert] : alert [General Appearance - In No Acute Distress] : in no acute distress [General Appearance - Well Nourished] : well nourished [General Appearance - Well Developed] : well developed [General Appearance - Well-Appearing] : healthy appearing [Sclera] : the sclera and conjunctiva were normal [Nasal Cavity] : the nasal mucosa and septum were normal [Examination Of The Oral Cavity] : the lips and gums were normal [Oropharynx] : the oropharynx was normal [Neck Appearance] : the appearance of the neck was normal [Respiration, Rhythm And Depth] : normal respiratory rhythm and effort [Exaggerated Use Of Accessory Muscles For Inspiration] : no accessory muscle use [Auscultation Breath Sounds / Voice Sounds] : lungs were clear to auscultation bilaterally [Heart Sounds] : normal S1 and S2 [Edema] : there was no peripheral edema [Bowel Sounds] : normal bowel sounds [Abdomen Soft] : soft [Abdomen Tenderness] : non-tender [Cervical Lymph Nodes Enlarged Posterior Bilaterally] : posterior cervical [Cervical Lymph Nodes Enlarged Anterior Bilaterally] : anterior cervical [Supraclavicular Lymph Nodes Enlarged Bilaterally] : supraclavicular [Axillary Lymph Nodes Enlarged Bilaterally] : axillary [No CVA Tenderness] : no ~M costovertebral angle tenderness [Abnormal Walk] : normal gait [Skin Color & Pigmentation] : normal skin color and pigmentation [] : no rash [Skin Lesions] : no skin lesions [No Focal Deficits] : no focal deficits [Oriented To Time, Place, And Person] : oriented to person, place, and time

## 2019-08-08 NOTE — END OF VISIT
[] : Fellow [FreeTextEntry3] : As modified and discussed with patient\par MD LISANDRO Ignacio [facility]At[facility]\par Associate Professor of Medicine\par [facility]\par

## 2019-08-08 NOTE — REVIEW OF SYSTEMS
[Feeling Poorly] : not feeling poorly [Chills] : no chills [Fever] : no fever [Feeling Tired] : not feeling tired [Earache] : no earache [Eye Pain] : no eye pain [Nasal Discharge] : no nasal discharge [Nosebleeds] : no nosebleeds [Heart Rate Is Fast] : the heart rate was not fast [Heart Rate Is Slow] : the heart rate was not slow [Palpitations] : no palpitations [Chest Pain] : no chest pain [Shortness Of Breath] : no shortness of breath [Wheezing] : no wheezing [Cough] : no cough [SOB on Exertion] : no shortness of breath during exertion [Abdominal Pain] : no abdominal pain [Diarrhea] : no diarrhea [Vomiting] : no vomiting [Constipation] : no constipation [Dysuria] : no dysuria [Arthralgias] : no arthralgias [Joint Swelling] : no joint swelling [Joint Stiffness] : no joint stiffness [Skin Lesions] : no skin lesions [Itching] : no itching [Skin Wound] : no skin wound [Confused] : no confusion [Convulsions] : no convulsions [Dizziness] : no dizziness [Fainting] : no fainting [Anxiety] : no anxiety [Depression] : no depression [Muscle Weakness] : no muscle weakness [Easy Bruising] : no tendency for easy bruising [Easy Bleeding] : no tendency for easy bleeding

## 2019-08-23 ENCOUNTER — RESULT CHARGE (OUTPATIENT)
Age: 64
End: 2019-08-23

## 2019-08-23 ENCOUNTER — LABORATORY RESULT (OUTPATIENT)
Age: 64
End: 2019-08-23

## 2019-08-23 ENCOUNTER — APPOINTMENT (OUTPATIENT)
Dept: INTERNAL MEDICINE | Facility: CLINIC | Age: 64
End: 2019-08-23
Payer: MEDICAID

## 2019-08-23 ENCOUNTER — OUTPATIENT (OUTPATIENT)
Dept: OUTPATIENT SERVICES | Facility: HOSPITAL | Age: 64
LOS: 1 days | End: 2019-08-23

## 2019-08-23 VITALS — HEIGHT: 67.5 IN | WEIGHT: 193.38 LBS | HEART RATE: 72 BPM | OXYGEN SATURATION: 98 % | BODY MASS INDEX: 30 KG/M2

## 2019-08-23 VITALS — DIASTOLIC BLOOD PRESSURE: 78 MMHG | SYSTOLIC BLOOD PRESSURE: 122 MMHG | HEART RATE: 74 BPM

## 2019-08-23 LAB
ANION GAP SERPL CALC-SCNC: 11 MMO/L — SIGNIFICANT CHANGE UP (ref 7–14)
BUN SERPL-MCNC: 14 MG/DL — SIGNIFICANT CHANGE UP (ref 7–23)
CALCIUM SERPL-MCNC: 9.5 MG/DL — SIGNIFICANT CHANGE UP (ref 8.4–10.5)
CHLORIDE SERPL-SCNC: 95 MMOL/L — LOW (ref 98–107)
CHOLEST SERPL-MCNC: 102 MG/DL — LOW (ref 120–199)
CO2 SERPL-SCNC: 25 MMOL/L — SIGNIFICANT CHANGE UP (ref 22–31)
CREAT SERPL-MCNC: 0.74 MG/DL — SIGNIFICANT CHANGE UP (ref 0.5–1.3)
FOLATE SERPL-MCNC: 18 NG/ML — SIGNIFICANT CHANGE UP (ref 4.7–20)
GLUCOSE BLDC GLUCOMTR-MCNC: 153
GLUCOSE SERPL-MCNC: 123 MG/DL — HIGH (ref 70–99)
HBA1C BLD-MCNC: 6.5 % — HIGH (ref 4–5.6)
HDLC SERPL-MCNC: 34 MG/DL — LOW (ref 35–55)
LIPID PNL WITH DIRECT LDL SERPL: 62 MG/DL — SIGNIFICANT CHANGE UP
POTASSIUM SERPL-MCNC: 5 MMOL/L — SIGNIFICANT CHANGE UP (ref 3.5–5.3)
POTASSIUM SERPL-SCNC: 5 MMOL/L — SIGNIFICANT CHANGE UP (ref 3.5–5.3)
SODIUM SERPL-SCNC: 131 MMOL/L — LOW (ref 135–145)
TRIGL SERPL-MCNC: 58 MG/DL — SIGNIFICANT CHANGE UP (ref 10–149)

## 2019-08-23 PROCEDURE — 99213 OFFICE O/P EST LOW 20 MIN: CPT | Mod: GE

## 2019-08-23 NOTE — ASSESSMENT
[FreeTextEntry1] : 61 yo male with Schizophrenia, T2DM, HLD, Hypertension, Depression, OCD, chronic Hyponatremia (likely secondary to SSRI use), Glaucoma (s/p laser therapy), BPH, MRSA skin infections, SBO requiring NG tube decompression in 2016, and tubular adenoma of the colon who presents for f/u of chronic conditions.\par \par 1) Nausea/Vomiting in setting of gastritis/duodenitis with EGD findings of Schneider's esophagus: \par -c/w Protonix 40 mg daily\par -will need surveillance EGD yearly given Schneider's esophagus\par -follows up with GI; he will undergo US abdomen ordered by GI \par \par 2) T2DM: \par -well controlled; last A1C in May was 7.0%; A1C ordered for today\par -continue metformin, glipizide, Januvia\par -pt with recent optho exam, cataract in R eye and worsening IOP; plan with optho for intervention later this summer\par -pt with positive urine microalbuminuria; titrated lisinopril to 20 mg daily during last visit. Tolerating well. Will order BMP today to endure Cr and electrolytes remain stable \par -podiatry seen in May 2019; last diabetic foot exam in clinic in May 2019 revealed intact sensation\par \par \par 3) Chronic constipation; \par -will increase senna to 2 tablets qhs\par -continue colace\par \par 4) Colonic polyps: \par -Colonoscopy last occurred on 6/25/19 that demonstrated 5 mm and 7mm polyp; pathology findings revealed one hyperplastic polyp; there was another polyp but pathology results state there was no tissue present for histologic evaluation\par -Given poor prep, will have to repeat colonoscopy in 1 year; June 2020\par \par 5) HTN: \par -Patient danna log of BPs performed daily at day program; BPs typically range from 120s-high 130s/70s-80s\par -will maintain current dose of lisinopril 20mg \par -BMP toay\par \par 6) Hyponatremia: \par -BMP today\par -c/w NaCl tabs\par \par 7)Anemia, normocytic: \par -TSH w/l\par -iron studies, B12 wnl. Will send folate today\par -SPEP, UPEP, immunifixation (on Sunrise) wnl\par -No specific etiology found at this time. Discussed referral to hematology with patient for MDS w/u. He defers at this time and wants to readdress during his next appt. \par \par 8) Schizophrenia: \par -no recent seizure events \par -c/w with depakote\par \par 9) HCM: \par -Needs prevnar upon turning 64\par -UTD with colonoscopy until 2022

## 2019-08-23 NOTE — HISTORY OF PRESENT ILLNESS
[FreeTextEntry1] : f/u of chronic conditions [de-identified] : 63 yo male with Schizophrenia, T2DM, HLD, Hypertension, Depression, OCD, chronic Hyponatremia (likely secondary to SSRI use), Glaucoma (s/p laser therapy), BPH, MRSA skin infections, SBO requiring NG tube decompression in 2016, and tubular adenoma of the colon who presents for f/u of chronic conditions. Patient was last seen in clinic on July 19th. \par \par With regard to vomiting, patient has not had any further episodes since prior to his last clinic visit. He reports adherence to PPI that was initiate during last visit. He is following up with GI. \par \par With regard to T2DM, patient reports his fasting FS as generally remaining in the high 90s. He reports adherence to his antidiabetic agents.

## 2019-08-23 NOTE — PHYSICAL EXAM
[No Acute Distress] : no acute distress [Well Developed] : well developed [Well Nourished] : well nourished [Well-Appearing] : well-appearing [Normal Sclera/Conjunctiva] : normal sclera/conjunctiva [PERRL] : pupils equal round and reactive to light [EOMI] : extraocular movements intact [Normal Outer Ear/Nose] : the outer ears and nose were normal in appearance [Normal] : the outer ears and nose were normal in appearance and the oropharynx was normal [No JVD] : no jugular venous distention [No Respiratory Distress] : no respiratory distress  [No Accessory Muscle Use] : no accessory muscle use [Clear to Auscultation] : lungs were clear to auscultation bilaterally [Normal Rate] : normal rate  [Normal S1, S2] : normal S1 and S2 [Regular Rhythm] : with a regular rhythm [Soft] : abdomen soft [No Edema] : there was no peripheral edema [Non Tender] : non-tender [Normal Bowel Sounds] : normal bowel sounds [No HSM] : no HSM [No Rash] : no rash

## 2019-08-26 ENCOUNTER — OTHER (OUTPATIENT)
Age: 64
End: 2019-08-26

## 2019-08-26 DIAGNOSIS — E11.9 TYPE 2 DIABETES MELLITUS WITHOUT COMPLICATIONS: ICD-10-CM

## 2019-08-26 DIAGNOSIS — K59.09 OTHER CONSTIPATION: ICD-10-CM

## 2019-08-26 DIAGNOSIS — D12.6 BENIGN NEOPLASM OF COLON, UNSPECIFIED: ICD-10-CM

## 2019-08-26 DIAGNOSIS — D64.9 ANEMIA, UNSPECIFIED: ICD-10-CM

## 2019-08-26 DIAGNOSIS — I10 ESSENTIAL (PRIMARY) HYPERTENSION: ICD-10-CM

## 2019-08-26 DIAGNOSIS — E78.5 HYPERLIPIDEMIA, UNSPECIFIED: ICD-10-CM

## 2019-08-26 DIAGNOSIS — E87.1 HYPO-OSMOLALITY AND HYPONATREMIA: ICD-10-CM

## 2019-08-26 DIAGNOSIS — K22.70 BARRETT'S ESOPHAGUS WITHOUT DYSPLASIA: ICD-10-CM

## 2019-08-26 DIAGNOSIS — F20.9 SCHIZOPHRENIA, UNSPECIFIED: ICD-10-CM

## 2019-08-28 ENCOUNTER — FORM ENCOUNTER (OUTPATIENT)
Age: 64
End: 2019-08-28

## 2019-08-29 ENCOUNTER — OUTPATIENT (OUTPATIENT)
Dept: OUTPATIENT SERVICES | Facility: HOSPITAL | Age: 64
LOS: 1 days | End: 2019-08-29
Payer: MEDICAID

## 2019-08-29 ENCOUNTER — APPOINTMENT (OUTPATIENT)
Dept: ULTRASOUND IMAGING | Facility: IMAGING CENTER | Age: 64
End: 2019-08-29
Payer: MEDICAID

## 2019-08-29 DIAGNOSIS — R11.2 NAUSEA WITH VOMITING, UNSPECIFIED: ICD-10-CM

## 2019-08-29 PROCEDURE — 76700 US EXAM ABDOM COMPLETE: CPT

## 2019-08-29 PROCEDURE — 76700 US EXAM ABDOM COMPLETE: CPT | Mod: 26

## 2019-08-30 RX ORDER — PANTOPRAZOLE 40 MG/1
40 TABLET, DELAYED RELEASE ORAL
Qty: 90 | Refills: 2 | Status: DISCONTINUED | COMMUNITY
Start: 2019-07-30 | End: 2019-08-30

## 2019-09-03 NOTE — H&P ADULT - ASSESSMENT
ASSESSMENT: Patient is a 61y old m with primary SBO    PLAN:    - NGT  - IV fluids  - Home Meds  - f/u GI function  - Will likely need OR this admission for exploration  - Patient and plan discussed with Attending, Dr. Marii Kat MD PGY3  B Team Surgery, #[id number] Erythromycin Pregnancy And Lactation Text: This medication is Pregnancy Category B and is considered safe during pregnancy. It is also excreted in breast milk.

## 2019-09-06 ENCOUNTER — APPOINTMENT (OUTPATIENT)
Dept: OPHTHALMOLOGY | Facility: CLINIC | Age: 64
End: 2019-09-06

## 2019-09-19 ENCOUNTER — RX RENEWAL (OUTPATIENT)
Age: 64
End: 2019-09-19

## 2019-10-11 ENCOUNTER — APPOINTMENT (OUTPATIENT)
Dept: OPHTHALMOLOGY | Facility: CLINIC | Age: 64
End: 2019-10-11

## 2019-10-17 NOTE — PROVIDER CONTACT NOTE (OTHER) - REASON
clozapine
[FreeTextEntry8] : CC: new pt., vision trouble \par \par 53 yo visually impaired female, needs new doctor. Not interested in CPE today, would like to defer till next visit. \par Pt. has eye pain-- visually impaired, states the glare bothers her eyes. \par Pt. suffers from allergies, benadryl does not work for her. She is interested in something else. \par Pt. has hx of acid reflux, otc meds no longer working for her. \par Pt. has racing thoughts at night that keep her awake, pt. states depression and anxiety. First time living alone, used to the comforts of living with others. \par Denies fevers, chills, cp, sob.

## 2019-10-29 ENCOUNTER — OTHER (OUTPATIENT)
Age: 64
End: 2019-10-29

## 2019-10-30 RX ORDER — OMEPRAZOLE 20 MG/1
20 CAPSULE, DELAYED RELEASE ORAL DAILY
Qty: 90 | Refills: 3 | Status: DISCONTINUED | COMMUNITY
Start: 2019-08-30 | End: 2019-10-30

## 2019-11-01 ENCOUNTER — APPOINTMENT (OUTPATIENT)
Dept: INTERNAL MEDICINE | Facility: CLINIC | Age: 64
End: 2019-11-01
Payer: MEDICARE

## 2019-11-01 ENCOUNTER — LABORATORY RESULT (OUTPATIENT)
Age: 64
End: 2019-11-01

## 2019-11-01 ENCOUNTER — OUTPATIENT (OUTPATIENT)
Dept: OUTPATIENT SERVICES | Facility: HOSPITAL | Age: 64
LOS: 1 days | End: 2019-11-01

## 2019-11-01 VITALS — HEART RATE: 80 BPM | WEIGHT: 197 LBS | BODY MASS INDEX: 30.56 KG/M2 | HEIGHT: 67.5 IN | OXYGEN SATURATION: 95 %

## 2019-11-01 VITALS — DIASTOLIC BLOOD PRESSURE: 74 MMHG | SYSTOLIC BLOOD PRESSURE: 122 MMHG | HEART RATE: 72 BPM

## 2019-11-01 LAB
ANION GAP SERPL CALC-SCNC: 14 MMO/L — SIGNIFICANT CHANGE UP (ref 7–14)
BUN SERPL-MCNC: 16 MG/DL — SIGNIFICANT CHANGE UP (ref 7–23)
CALCIUM SERPL-MCNC: 9.7 MG/DL — SIGNIFICANT CHANGE UP (ref 8.4–10.5)
CHLORIDE SERPL-SCNC: 96 MMOL/L — LOW (ref 98–107)
CO2 SERPL-SCNC: 26 MMOL/L — SIGNIFICANT CHANGE UP (ref 22–31)
CREAT SERPL-MCNC: 0.92 MG/DL — SIGNIFICANT CHANGE UP (ref 0.5–1.3)
GLUCOSE SERPL-MCNC: 58 MG/DL — LOW (ref 70–99)
HBA1C BLD-MCNC: 7 % — HIGH (ref 4–5.6)
POTASSIUM SERPL-MCNC: 5.5 MMOL/L — HIGH (ref 3.5–5.3)
POTASSIUM SERPL-SCNC: 5.5 MMOL/L — HIGH (ref 3.5–5.3)
SODIUM SERPL-SCNC: 136 MMOL/L — SIGNIFICANT CHANGE UP (ref 135–145)

## 2019-11-01 PROCEDURE — 99214 OFFICE O/P EST MOD 30 MIN: CPT | Mod: GC

## 2019-11-01 RX ORDER — LISINOPRIL 5 MG/1
5 TABLET ORAL DAILY
Qty: 90 | Refills: 1 | Status: DISCONTINUED | COMMUNITY
Start: 2019-05-08 | End: 2019-11-01

## 2019-11-01 NOTE — PHYSICAL EXAM
[No Acute Distress] : no acute distress [Well Nourished] : well nourished [Well Developed] : well developed [Normal Sclera/Conjunctiva] : normal sclera/conjunctiva [PERRL] : pupils equal round and reactive to light [Normal Outer Ear/Nose] : the outer ears and nose were normal in appearance [Normal Oropharynx] : the oropharynx was normal [No Lymphadenopathy] : no lymphadenopathy [Supple] : supple [No Respiratory Distress] : no respiratory distress  [No Accessory Muscle Use] : no accessory muscle use [Clear to Auscultation] : lungs were clear to auscultation bilaterally [Normal Rate] : normal rate  [Regular Rhythm] : with a regular rhythm [Normal S1, S2] : normal S1 and S2 [No Murmur] : no murmur heard [Pedal Pulses Present] : the pedal pulses are present [No Edema] : there was no peripheral edema [No Extremity Clubbing/Cyanosis] : no extremity clubbing/cyanosis [Soft] : abdomen soft [Non Tender] : non-tender [Non-distended] : non-distended [No Masses] : no abdominal mass palpated [Normal Bowel Sounds] : normal bowel sounds [No Joint Swelling] : no joint swelling [Grossly Normal Strength/Tone] : grossly normal strength/tone [No Rash] : no rash [Coordination Grossly Intact] : coordination grossly intact [No Focal Deficits] : no focal deficits [Normal Gait] : normal gait [Normal Affect] : the affect was normal

## 2019-11-04 ENCOUNTER — APPOINTMENT (OUTPATIENT)
Dept: INTERNAL MEDICINE | Facility: CLINIC | Age: 64
End: 2019-11-04

## 2019-11-04 ENCOUNTER — OUTPATIENT (OUTPATIENT)
Dept: OUTPATIENT SERVICES | Facility: HOSPITAL | Age: 64
LOS: 1 days | End: 2019-11-04

## 2019-11-04 ENCOUNTER — LABORATORY RESULT (OUTPATIENT)
Age: 64
End: 2019-11-04

## 2019-11-04 LAB
ANION GAP SERPL CALC-SCNC: 13 MMO/L — SIGNIFICANT CHANGE UP (ref 7–14)
BUN SERPL-MCNC: 16 MG/DL — SIGNIFICANT CHANGE UP (ref 7–23)
CALCIUM SERPL-MCNC: 8.7 MG/DL — SIGNIFICANT CHANGE UP (ref 8.4–10.5)
CHLORIDE SERPL-SCNC: 96 MMOL/L — LOW (ref 98–107)
CO2 SERPL-SCNC: 21 MMOL/L — LOW (ref 22–31)
CREAT SERPL-MCNC: 1 MG/DL — SIGNIFICANT CHANGE UP (ref 0.5–1.3)
GAMMA INTERFERON BACKGROUND BLD IA-ACNC: 0.01 IU/ML — SIGNIFICANT CHANGE UP
GLUCOSE SERPL-MCNC: 133 MG/DL — HIGH (ref 70–99)
M TB IFN-G BLD-IMP: NEGATIVE — SIGNIFICANT CHANGE UP
M TB IFN-G CD4+ BCKGRND COR BLD-ACNC: 0 IU/ML — SIGNIFICANT CHANGE UP
M TB IFN-G CD4+CD8+ BCKGRND COR BLD-ACNC: 0 IU/ML — SIGNIFICANT CHANGE UP
POTASSIUM SERPL-MCNC: 5.1 MMOL/L — SIGNIFICANT CHANGE UP (ref 3.5–5.3)
POTASSIUM SERPL-SCNC: 5.1 MMOL/L — SIGNIFICANT CHANGE UP (ref 3.5–5.3)
QUANT TB PLUS MITOGEN MINUS NIL: 1.09 IU/ML — SIGNIFICANT CHANGE UP
SODIUM SERPL-SCNC: 130 MMOL/L — LOW (ref 135–145)

## 2019-11-05 NOTE — ASSESSMENT
[FreeTextEntry1] : #HCM \par - Needs prevnar when he turns 64 \par - Will need colonoscopy in June 2020 (last one 6/25/19 showed 5 mm and 7 mm polyp; pathology with one hyperplastic polyp and another polyp but pathology said no tissue present for histology eval) \par - Flu shot given today \par - Pt needs paperwork filled out, ordered quantiferon \par \par Case discussed with Dr. Byers \par \par Return to Clinic in 10 weeks\par \par Kamilah Ilic\par PGY-1\par [employer]

## 2019-11-05 NOTE — HISTORY OF PRESENT ILLNESS
[FreeTextEntry1] : follow up diabetes, HTN, chronic medical conditions [de-identified] : This is a 63-year-old man with Schizophrenia, T2DM, HLD, Hypertension, Depression, OCD, chronic Hyponatremia (likely secondary to SSRI use), Glaucoma (s/p laser therapy), BPH, MRSA skin infections, SBO requiring NG tube decompression in 2016, and tubular adenoma of the colon who presents for f/u of chronic conditions. Patient was last seen in clinic 3 months ago. \par \par Pt denies any nausea or vomiting since starting his pantoprazole. There was a question of insurance coverage regarding the pantoprazole, but pt reports that he has been taking pantoprazole 40 mg daily. He is following up with GI, had U/S abdomen done which was negative. \par \par With regard to T2DM, patient reports his fasting FS as generally running from the high 90s to 110s. He reports adherence to his antidiabetic agents. Pt denies any hypoglycemic episodes. Pt requests a prescription for a glucometer, test strips, and lancets. \par \par Pt reports increased urinary hesitancy, currently on tamsulosin 0.4. Denies any hematuria, abdominal pain, or back pain. \par \par Pt brought a form from his adult day center to be filled out, needs a quantiferon test.

## 2019-11-05 NOTE — END OF VISIT
[] : Resident [FreeTextEntry3] : 63M here for follow up - feeling generally well, taking PPI after seeing GI and having EGD and abdominal pain is improved; examination benign. DM is controlled with A1c 6.5% in Aug, started lisinopril for +UAC, BP at goal, hyponatremia 2/2 SSRI, chronic.

## 2019-11-05 NOTE — REVIEW OF SYSTEMS
[Constipation] : constipation [Hesitancy] : hesitancy [Fever] : no fever [Chills] : no chills [Recent Change In Weight] : ~T no recent weight change [Vision Problems] : no vision problems [Sore Throat] : no sore throat [Chest Pain] : no chest pain [Palpitations] : no palpitations [Lower Ext Edema] : no lower extremity edema [Shortness Of Breath] : no shortness of breath [Abdominal Pain] : no abdominal pain [Nausea] : no nausea [Diarrhea] : no diarrhea [Vomiting] : no vomiting [Heartburn] : no heartburn [Melena] : no melena [Dysuria] : no dysuria [Incontinence] : no incontinence [Hematuria] : no hematuria [Frequency] : no frequency [Back Pain] : no back pain [Skin Rash] : no skin rash [Headache] : no headache

## 2019-11-06 ENCOUNTER — OTHER (OUTPATIENT)
Age: 64
End: 2019-11-06

## 2019-11-06 DIAGNOSIS — K59.09 OTHER CONSTIPATION: ICD-10-CM

## 2019-11-06 DIAGNOSIS — I10 ESSENTIAL (PRIMARY) HYPERTENSION: ICD-10-CM

## 2019-11-06 DIAGNOSIS — F20.9 SCHIZOPHRENIA, UNSPECIFIED: ICD-10-CM

## 2019-11-06 DIAGNOSIS — K22.70 BARRETT'S ESOPHAGUS WITHOUT DYSPLASIA: ICD-10-CM

## 2019-11-06 DIAGNOSIS — Z23 ENCOUNTER FOR IMMUNIZATION: ICD-10-CM

## 2019-11-06 DIAGNOSIS — N40.0 BENIGN PROSTATIC HYPERPLASIA WITHOUT LOWER URINARY TRACT SYMPTOMS: ICD-10-CM

## 2019-11-06 DIAGNOSIS — D64.9 ANEMIA, UNSPECIFIED: ICD-10-CM

## 2019-11-06 DIAGNOSIS — E11.9 TYPE 2 DIABETES MELLITUS WITHOUT COMPLICATIONS: ICD-10-CM

## 2019-11-06 DIAGNOSIS — E87.1 HYPO-OSMOLALITY AND HYPONATREMIA: ICD-10-CM

## 2019-11-06 DIAGNOSIS — Z00.00 ENCOUNTER FOR GENERAL ADULT MEDICAL EXAMINATION WITHOUT ABNORMAL FINDINGS: ICD-10-CM

## 2019-11-08 ENCOUNTER — OTHER (OUTPATIENT)
Age: 64
End: 2019-11-08

## 2019-11-08 RX ORDER — LANCETS 33 GAUGE
EACH MISCELLANEOUS
Qty: 2 | Refills: 5 | Status: DISCONTINUED | COMMUNITY
Start: 2019-11-01 | End: 2019-11-08

## 2019-11-12 ENCOUNTER — RX RENEWAL (OUTPATIENT)
Age: 64
End: 2019-11-12

## 2019-11-21 ENCOUNTER — APPOINTMENT (OUTPATIENT)
Dept: OPHTHALMOLOGY | Facility: CLINIC | Age: 64
End: 2019-11-21

## 2019-11-22 LAB — GLUCOSE BLDC GLUCOMTR-MCNC: 147

## 2019-12-03 ENCOUNTER — LABORATORY RESULT (OUTPATIENT)
Age: 64
End: 2019-12-03

## 2019-12-03 ENCOUNTER — APPOINTMENT (OUTPATIENT)
Dept: INTERNAL MEDICINE | Facility: CLINIC | Age: 64
End: 2019-12-03
Payer: MEDICARE

## 2019-12-03 ENCOUNTER — OUTPATIENT (OUTPATIENT)
Dept: OUTPATIENT SERVICES | Facility: HOSPITAL | Age: 64
LOS: 1 days | End: 2019-12-03
Payer: MEDICARE

## 2019-12-03 VITALS — DIASTOLIC BLOOD PRESSURE: 66 MMHG | SYSTOLIC BLOOD PRESSURE: 110 MMHG | TEMPERATURE: 98.3 F

## 2019-12-03 VITALS
OXYGEN SATURATION: 99 % | HEART RATE: 73 BPM | BODY MASS INDEX: 30.4 KG/M2 | SYSTOLIC BLOOD PRESSURE: 120 MMHG | WEIGHT: 196 LBS | DIASTOLIC BLOOD PRESSURE: 70 MMHG | HEIGHT: 67.5 IN

## 2019-12-03 DIAGNOSIS — Z00.00 ENCOUNTER FOR GENERAL ADULT MEDICAL EXAMINATION W/OUT ABNORMAL FINDINGS: ICD-10-CM

## 2019-12-03 LAB
ALBUMIN SERPL ELPH-MCNC: 3.8 G/DL — SIGNIFICANT CHANGE UP (ref 3.3–5)
ALP SERPL-CCNC: 63 U/L — SIGNIFICANT CHANGE UP (ref 40–120)
ALT FLD-CCNC: 11 U/L — SIGNIFICANT CHANGE UP (ref 4–41)
ANION GAP SERPL CALC-SCNC: 15 MMO/L — HIGH (ref 7–14)
AST SERPL-CCNC: 15 U/L — SIGNIFICANT CHANGE UP (ref 4–40)
BASOPHILS # BLD AUTO: 0.03 K/UL — SIGNIFICANT CHANGE UP (ref 0–0.2)
BASOPHILS NFR BLD AUTO: 0.4 % — SIGNIFICANT CHANGE UP (ref 0–2)
BILIRUB SERPL-MCNC: 0.2 MG/DL — SIGNIFICANT CHANGE UP (ref 0.2–1.2)
BUN SERPL-MCNC: 22 MG/DL — SIGNIFICANT CHANGE UP (ref 7–23)
CALCIUM SERPL-MCNC: 8 MG/DL — LOW (ref 8.4–10.5)
CHLORIDE SERPL-SCNC: 95 MMOL/L — LOW (ref 98–107)
CO2 SERPL-SCNC: 20 MMOL/L — LOW (ref 22–31)
CREAT SERPL-MCNC: 0.84 MG/DL — SIGNIFICANT CHANGE UP (ref 0.5–1.3)
EOSINOPHIL # BLD AUTO: 0.22 K/UL — SIGNIFICANT CHANGE UP (ref 0–0.5)
EOSINOPHIL NFR BLD AUTO: 3.2 % — SIGNIFICANT CHANGE UP (ref 0–6)
GLUCOSE BLDC GLUCOMTR-MCNC: 75
GLUCOSE SERPL-MCNC: 115 MG/DL — HIGH (ref 70–99)
HCT VFR BLD CALC: 35.2 % — LOW (ref 39–50)
HGB BLD-MCNC: 11.1 G/DL — LOW (ref 13–17)
IMM GRANULOCYTES NFR BLD AUTO: 0.6 % — SIGNIFICANT CHANGE UP (ref 0–1.5)
LYMPHOCYTES # BLD AUTO: 1.09 K/UL — SIGNIFICANT CHANGE UP (ref 1–3.3)
LYMPHOCYTES # BLD AUTO: 15.8 % — SIGNIFICANT CHANGE UP (ref 13–44)
MCHC RBC-ENTMCNC: 29.9 PG — SIGNIFICANT CHANGE UP (ref 27–34)
MCHC RBC-ENTMCNC: 31.5 % — LOW (ref 32–36)
MCV RBC AUTO: 94.9 FL — SIGNIFICANT CHANGE UP (ref 80–100)
MONOCYTES # BLD AUTO: 0.96 K/UL — HIGH (ref 0–0.9)
MONOCYTES NFR BLD AUTO: 13.9 % — SIGNIFICANT CHANGE UP (ref 2–14)
NEUTROPHILS # BLD AUTO: 4.55 K/UL — SIGNIFICANT CHANGE UP (ref 1.8–7.4)
NEUTROPHILS NFR BLD AUTO: 66.1 % — SIGNIFICANT CHANGE UP (ref 43–77)
NRBC # FLD: 0 K/UL — SIGNIFICANT CHANGE UP (ref 0–0)
PLATELET # BLD AUTO: 212 K/UL — SIGNIFICANT CHANGE UP (ref 150–400)
PMV BLD: 11.9 FL — SIGNIFICANT CHANGE UP (ref 7–13)
POTASSIUM SERPL-MCNC: 4.7 MMOL/L — SIGNIFICANT CHANGE UP (ref 3.5–5.3)
POTASSIUM SERPL-SCNC: 4.7 MMOL/L — SIGNIFICANT CHANGE UP (ref 3.5–5.3)
PROT SERPL-MCNC: 6.6 G/DL — SIGNIFICANT CHANGE UP (ref 6–8.3)
RBC # BLD: 3.71 M/UL — LOW (ref 4.2–5.8)
RBC # FLD: 14.1 % — SIGNIFICANT CHANGE UP (ref 10.3–14.5)
SODIUM SERPL-SCNC: 130 MMOL/L — LOW (ref 135–145)
TSH SERPL-MCNC: 4.97 UIU/ML — HIGH (ref 0.27–4.2)
WBC # BLD: 6.89 K/UL — SIGNIFICANT CHANGE UP (ref 3.8–10.5)
WBC # FLD AUTO: 6.89 K/UL — SIGNIFICANT CHANGE UP (ref 3.8–10.5)

## 2019-12-03 PROCEDURE — 99214 OFFICE O/P EST MOD 30 MIN: CPT | Mod: GC

## 2019-12-03 NOTE — PHYSICAL EXAM
[Normal Outer Ear/Nose] : the outer ears and nose were normal in appearance [No Lymphadenopathy] : no lymphadenopathy [Normal Oropharynx] : the oropharynx was normal [No Accessory Muscle Use] : no accessory muscle use [No Respiratory Distress] : no respiratory distress  [Supple] : supple [Clear to Auscultation] : lungs were clear to auscultation bilaterally [No Edema] : there was no peripheral edema [Soft] : abdomen soft [Normal Bowel Sounds] : normal bowel sounds [Alert and Oriented x3] : oriented to person, place, and time [Normal Insight/Judgement] : insight and judgment were intact [Normal] : normal gait, coordination grossly intact, no focal deficits and deep tendon reflexes were 2+ and symmetric [de-identified] : Pale, appears fatigued [de-identified] : tenderness to palpation over epigastrium and RUQ

## 2019-12-03 NOTE — ASSESSMENT
[FreeTextEntry1] : 61 yo male with Schizophrenia, T2DM, HLD, Hypertension, Depression, OCD, chronic Hyponatremia (likely secondary to SSRI use), Glaucoma (s/p laser therapy), BPH, MRSA skin infections, SBO requiring NG tube decompression in 2016, and tubular adenoma of the colon who presents for f/u of chronic conditions as well as fatigue and generalized weakness. \par \par 1) Nausea/Vomiting in setting of gastritis/duodenitis with EGD findings of Schneider's esophagus: \par -c/w Protonix 40 mg daily\par -will need surveillance EGD yearly given Schneider's esophagus\par -followed by GI\par - Abdominal US in August 2019 wnl\par -educated pt on how to properly take protonix; he has not been waiting at least a half hour prior to eating\par \par 2)  T2DM: \par -well controlled; last A1C in November was 7%\par -continue metformin and Januvia\par -given reduced eGFR, will reduce glipizide dosing from 5 mg BID to 5 mg daily\par -following with optho for glaucoma\par -pt with positive urine microalbuminuria in 2018; will reorder urine microalbumin today\par -podiatry seen in May 2019; last diabetic foot exam in clinic in May 2019 revealed intact sensation\par \par 3) Chronic constipation; \par -continue senna to 2 tablets qhs\par \par 4) Hyperkalemia: \par - Now resolved\par -likely in setting of increased dosage of Lisinopril; stable at lisinopril 10 mg\par -repeat BMP today\par \par 5) Hyponatremia: \par -continue NaCl tabs\par \par 6) Anemia, normocytic: \par -Will reorder CBC today given paleness and fatigue to evaluate for worsening possible anemia\par -iron studies, B12, folate, all wnl\par -SPEP, UPEP, immunofixation (on Sunrise) wnl\par -No specific etiology found at this time. If anemia is worsening, stressed to patient that hematology eval is recommended. He's been deferring up to this point. Pt expresses understanding. \par \par 7) Schizophrenia: \par -no recent seizure events \par -c/w with depakote\par \par 8) Hypothyroidism: \par -check TSH today given worsening fatigue\par \par 9) BPH: \par -continue with Flomax and finasteride\par \par 10) HCM: \par -Pt is now 64 and needs Prevnar; however, given that he is feeling unwell, will hold off until next visit\par -UTD colonoscopy until June 2020\par \par \par NB: Advised pt that if he develops fevers, chills, worsening N/V or abdominal pain, he should go to the emergency room for evaluation. Pt expresses understanding. \par \par RTC in 1 month\par \par Discussed with Dr. Estrada\par \par Amy Martínez, PGY-3\par [employer]

## 2019-12-03 NOTE — HISTORY OF PRESENT ILLNESS
[FreeTextEntry1] : f/u of chronic conditions and fatigue/generalized weakness  [de-identified] : 61 yo male with Schizophrenia, T2DM, HLD, Hypertension, Depression, OCD, chronic Hyponatremia (likely secondary to SSRI use), Glaucoma (s/p laser therapy), BPH, MRSA skin infections, SBO requiring NG tube decompression in 2016, and tubular adenoma of the colon who presents for f/u of chronic conditions and also c/o of fatigue and generalized weakness. Patient was last seen in clinic one month ago by Dr. Gallego. \par \par Patient states "I have not been in good health recently." He reports he has been feeling tired and weak. He has been sleeping a lot ovr the past week and thus has not attended his day program. About three days ago, he also felt nauseated but did not have any emesis. Pt has also been constipated, although he states he did finally have a BM today. He's also no longer nauseated, although he still endorses some abdominal pain over his epigastrium and RUQ. Additionally, he endorses a mild cough and congestion that has now resolved. ROS negative for CP, SOB, dysuria, hematuria, diarrhea, or fevers. \par \par With regard to his diabetes, pt states that his BG was 191 yesterday morning and 124 this morning. He denies episodes of symptomatic hypoglycemia. His FS in clinic was 75, although he'd eaten a peanut butter and jelly sandwich for lunch prior to coming to clinic. \par \par Patient's hesitancy with urination has improved since initiation of finasteride. \par \par During pt's last visit, he was found to be hyperkalemic to 5.5; lisinopril was held and his repeat K was 5.1. Lisinopril was restarted at 10 mg daily, i.e. at half the prior dose.

## 2019-12-03 NOTE — REVIEW OF SYSTEMS
[Chills] : chills [Cough] : cough [Sore Throat] : sore throat [Abdominal Pain] : abdominal pain [Nausea] : nausea [Constipation] : constipation [Back Pain] : back pain [Negative] : Heme/Lymph [Fever] : no fever [Night Sweats] : no night sweats [Diarrhea] : no diarrhea [Vomiting] : no vomiting [Melena] : no melena [Joint Pain] : no joint pain [Joint Stiffness] : no joint stiffness [Muscle Pain] : no muscle pain [Muscle Weakness] : no muscle weakness [Joint Swelling] : no joint swelling

## 2019-12-04 DIAGNOSIS — F20.9 SCHIZOPHRENIA, UNSPECIFIED: ICD-10-CM

## 2019-12-04 DIAGNOSIS — N40.0 BENIGN PROSTATIC HYPERPLASIA WITHOUT LOWER URINARY TRACT SYMPTOMS: ICD-10-CM

## 2019-12-04 DIAGNOSIS — E03.9 HYPOTHYROIDISM, UNSPECIFIED: ICD-10-CM

## 2019-12-04 DIAGNOSIS — K22.70 BARRETT'S ESOPHAGUS WITHOUT DYSPLASIA: ICD-10-CM

## 2019-12-04 DIAGNOSIS — D12.6 BENIGN NEOPLASM OF COLON, UNSPECIFIED: ICD-10-CM

## 2019-12-04 DIAGNOSIS — D64.9 ANEMIA, UNSPECIFIED: ICD-10-CM

## 2019-12-04 DIAGNOSIS — R56.9 UNSPECIFIED CONVULSIONS: ICD-10-CM

## 2019-12-04 DIAGNOSIS — E11.9 TYPE 2 DIABETES MELLITUS WITHOUT COMPLICATIONS: ICD-10-CM

## 2019-12-04 DIAGNOSIS — E87.1 HYPO-OSMOLALITY AND HYPONATREMIA: ICD-10-CM

## 2019-12-04 LAB
CREAT UR-MCNC: 119 MG/DL — SIGNIFICANT CHANGE UP
MICROALBUMIN UR-MCNC: 15.8 MG/DL — SIGNIFICANT CHANGE UP
MICROALBUMIN/CREAT UR-RTO: 133 MG/G — HIGH (ref 0–30)

## 2019-12-13 NOTE — PROGRESS NOTE BEHAVIORAL HEALTH - NSBHFUPINTERVALCCFT_PSY_A_CORE
MARTIR POWELL ; 01/03/2020 ; [facility] [address] MARTIR POWELL ; 01/03/2020 ; [facility] [address] MARTIR POWELL ; 01/03/2020 ; [facility] MARTIR Keith ; 03/16/2020 ; [facility] [address] MARTIR POWELL ; 01/03/2020 ; [facility] MARTIR Keith ; 03/16/2020 ; [facility] [address] " I'm going to go home soon."

## 2019-12-18 ENCOUNTER — RX RENEWAL (OUTPATIENT)
Age: 64
End: 2019-12-18

## 2020-01-07 ENCOUNTER — OUTPATIENT (OUTPATIENT)
Dept: OUTPATIENT SERVICES | Facility: HOSPITAL | Age: 65
LOS: 1 days | End: 2020-01-07

## 2020-01-07 ENCOUNTER — LABORATORY RESULT (OUTPATIENT)
Age: 65
End: 2020-01-07

## 2020-01-07 ENCOUNTER — MED ADMIN CHARGE (OUTPATIENT)
Age: 65
End: 2020-01-07

## 2020-01-07 ENCOUNTER — RESULT CHARGE (OUTPATIENT)
Age: 65
End: 2020-01-07

## 2020-01-07 ENCOUNTER — APPOINTMENT (OUTPATIENT)
Dept: INTERNAL MEDICINE | Facility: CLINIC | Age: 65
End: 2020-01-07
Payer: MEDICARE

## 2020-01-07 VITALS
SYSTOLIC BLOOD PRESSURE: 115 MMHG | BODY MASS INDEX: 30.09 KG/M2 | WEIGHT: 194 LBS | OXYGEN SATURATION: 95 % | DIASTOLIC BLOOD PRESSURE: 70 MMHG | HEIGHT: 67.5 IN | HEART RATE: 90 BPM

## 2020-01-07 DIAGNOSIS — E83.51 HYPOCALCEMIA: ICD-10-CM

## 2020-01-07 LAB
ALBUMIN SERPL ELPH-MCNC: 4.2 G/DL — SIGNIFICANT CHANGE UP (ref 3.3–5)
ALP SERPL-CCNC: 83 U/L — SIGNIFICANT CHANGE UP (ref 40–120)
ALT FLD-CCNC: 31 U/L — SIGNIFICANT CHANGE UP (ref 4–41)
ANION GAP SERPL CALC-SCNC: 13 MMO/L — SIGNIFICANT CHANGE UP (ref 7–14)
AST SERPL-CCNC: 23 U/L — SIGNIFICANT CHANGE UP (ref 4–40)
BILIRUB SERPL-MCNC: < 0.2 MG/DL — LOW (ref 0.2–1.2)
BUN SERPL-MCNC: 14 MG/DL — SIGNIFICANT CHANGE UP (ref 7–23)
CALCIUM SERPL-MCNC: 9.5 MG/DL — SIGNIFICANT CHANGE UP (ref 8.4–10.5)
CHLORIDE SERPL-SCNC: 91 MMOL/L — LOW (ref 98–107)
CO2 SERPL-SCNC: 23 MMOL/L — SIGNIFICANT CHANGE UP (ref 22–31)
CREAT SERPL-MCNC: 0.75 MG/DL — SIGNIFICANT CHANGE UP (ref 0.5–1.3)
GLUCOSE SERPL-MCNC: 110 MG/DL — HIGH (ref 70–99)
PHOSPHATE SERPL-MCNC: 4 MG/DL — SIGNIFICANT CHANGE UP (ref 2.5–4.5)
POTASSIUM SERPL-MCNC: 4.8 MMOL/L — SIGNIFICANT CHANGE UP (ref 3.5–5.3)
POTASSIUM SERPL-SCNC: 4.8 MMOL/L — SIGNIFICANT CHANGE UP (ref 3.5–5.3)
PROT SERPL-MCNC: 7.3 G/DL — SIGNIFICANT CHANGE UP (ref 6–8.3)
PTH-INTACT SERPL-MCNC: 71.44 PG/ML — HIGH (ref 15–65)
SODIUM SERPL-SCNC: 127 MMOL/L — LOW (ref 135–145)

## 2020-01-07 PROCEDURE — 99214 OFFICE O/P EST MOD 30 MIN: CPT | Mod: GC

## 2020-01-07 RX ORDER — BLOOD SUGAR DIAGNOSTIC
STRIP MISCELLANEOUS
Qty: 100 | Refills: 5 | Status: DISCONTINUED | COMMUNITY
Start: 2019-11-01 | End: 2020-01-07

## 2020-01-07 RX ORDER — SENNOSIDES 8.6 MG
8.6 TABLET ORAL
Qty: 180 | Refills: 2 | Status: DISCONTINUED | COMMUNITY
Start: 2019-05-08 | End: 2020-01-07

## 2020-01-07 RX ORDER — LANCETS 33 GAUGE
EACH MISCELLANEOUS
Qty: 3 | Refills: 3 | Status: DISCONTINUED | COMMUNITY
Start: 2019-11-07 | End: 2020-01-07

## 2020-01-07 RX ORDER — FLASH GLUCOSE SCANNING READER
EACH MISCELLANEOUS
Qty: 1 | Refills: 0 | Status: DISCONTINUED | COMMUNITY
Start: 2019-06-21 | End: 2020-01-07

## 2020-01-07 RX ORDER — FLASH GLUCOSE SENSOR
KIT MISCELLANEOUS
Qty: 2 | Refills: 5 | Status: DISCONTINUED | COMMUNITY
Start: 2019-06-21 | End: 2020-01-07

## 2020-01-07 RX ORDER — BLOOD-GLUCOSE METER
W/DEVICE KIT MISCELLANEOUS
Qty: 1 | Refills: 0 | Status: DISCONTINUED | COMMUNITY
Start: 2019-11-01 | End: 2020-01-07

## 2020-01-08 DIAGNOSIS — E83.51 HYPOCALCEMIA: ICD-10-CM

## 2020-01-08 DIAGNOSIS — I10 ESSENTIAL (PRIMARY) HYPERTENSION: ICD-10-CM

## 2020-01-08 DIAGNOSIS — E11.9 TYPE 2 DIABETES MELLITUS WITHOUT COMPLICATIONS: ICD-10-CM

## 2020-01-08 DIAGNOSIS — F20.9 SCHIZOPHRENIA, UNSPECIFIED: ICD-10-CM

## 2020-01-08 DIAGNOSIS — E87.1 HYPO-OSMOLALITY AND HYPONATREMIA: ICD-10-CM

## 2020-01-08 DIAGNOSIS — N40.0 BENIGN PROSTATIC HYPERPLASIA WITHOUT LOWER URINARY TRACT SYMPTOMS: ICD-10-CM

## 2020-01-08 DIAGNOSIS — E03.9 HYPOTHYROIDISM, UNSPECIFIED: ICD-10-CM

## 2020-01-08 DIAGNOSIS — D64.9 ANEMIA, UNSPECIFIED: ICD-10-CM

## 2020-01-08 DIAGNOSIS — K59.09 OTHER CONSTIPATION: ICD-10-CM

## 2020-01-08 DIAGNOSIS — R56.9 UNSPECIFIED CONVULSIONS: ICD-10-CM

## 2020-01-08 DIAGNOSIS — K22.70 BARRETT'S ESOPHAGUS WITHOUT DYSPLASIA: ICD-10-CM

## 2020-01-08 DIAGNOSIS — E11.65 TYPE 2 DIABETES MELLITUS WITH HYPERGLYCEMIA: ICD-10-CM

## 2020-01-08 LAB — 24R-OH-CALCIDIOL SERPL-MCNC: 27.2 NG/ML — LOW (ref 30–80)

## 2020-01-08 NOTE — END OF VISIT
[] : Resident [FreeTextEntry3] : Had fall on the street with R knee injury. Went to Urgent Care and saw Ortho, treated with abx. Has appointment with Wound Care next week coming up. No longer walking with a cane. \par Last A1c in 11/2019=7.\par Continues to report fatigue/tiredness. TSH elevated at last visit but never picked up higher dose of levothyroxine (100mcg). Will defer repeating TSH today.\par Has Schneider's esophagus on protonix. On yearly surveillance.\par Chronic anemia since at least 2015, which has been stable. Extensive workup but pt declining Heme referral at this time. Would still recommend to r/o MDS.\par Agree with plan as per Dr. Martínez.

## 2020-01-08 NOTE — ASSESSMENT
[FreeTextEntry1] : 63 yo male with Schizophrenia, T2DM, HLD, Hypertension, Depression, OCD, chronic Hyponatremia (likely secondary to SSRI use), Glaucoma (s/p laser therapy), BPH, MRSA skin infections, SBO requiring NG tube decompression in 2016, and tubular adenoma of the colon who presents for f/u of chronic conditions after recent mechanical fall. \par \par 1) Mechanical fall:\par -pt tripped on shoelaces; fall c/b cellulitis. He is s/p course of antibiotics. \par -no fevers, chills after course of abx was completed; he does not appear to have systemic infection\par -f/u with ortho and wound care\par \par 2) T2DM: \par -well controlled, Last A1c was 7%\par -continue metformin, Januvia; glipizide 5 mg BID reinstated\par -following with optho for glaucoma\par -continue lisinopril for microalbuminuria\par -UTD with diabetic foot exam\par \par 3) Hypocalcemia: \par -will reorder CMP today, as well as phosphorus, PTH, and vitamin D to evaluate for possible etiologies\par \par 4) Hyponatremia: \par -continue salt tabs\par \par 5) Chronic constipation:\par -no longer on senna as it is no longer covered by insurance; continue colace and Miralax\par \par 6) Hypothyroidism: \par -Advised pt to  increased dose of synthroid from pharmacy\par -will retest TSH in 5 weeks\par \par 7) BPH: \par -continue with Flomax and finasteride\par \par 8) Anemia, normocytic: \par -iron studies, B12, folate, all wnl\par -SPEP, UPEP, immunofixation (on Sunrise) wnl\par -No specific etiology found at this time. If anemia worsens, stressed to patient that hematology eval is recommended. He's been deferring up to this point. Pt expresses understanding. \par \par 9) Schizophrenia: \par -continue clozapine and venlafaxine; management as per psych\par \par 10) Schneider's Esophagus: \par -continue protonix\par -yearly EGD for surveillance with GI\par \par 11) Seizure: \par -no recent seizure events\par -continue with Depakote\par \par 12) HCM: \par -Prevnar today; will need pneumovax in January 2021\par -UTD with influenza \par -UTD with colonoscopy until June 2020\par \par RTC in 1 month\par \par Discussed  and evaluated patient with Dr. Finch\par \par Amy Martínez, PGY-3\par \par [employer] [facility]

## 2020-01-08 NOTE — REVIEW OF SYSTEMS
[Fatigue] : fatigue [Constipation] : constipation [Joint Pain] : joint pain [Negative] : Psychiatric [FreeTextEntry9] : R knee pain

## 2020-01-08 NOTE — HISTORY OF PRESENT ILLNESS
[Family Member] : family member [FreeTextEntry1] : f/u of chronic medical conditions [de-identified] : 61 yo male with Schizophrenia, T2DM, HLD, Hypertension, Depression, OCD, chronic Hyponatremia (likely secondary to SSRI use), Glaucoma (s/p laser therapy), BPH, MRSA skin infections, SBO requiring NG tube decompression in 2016, and tubular adenoma of the colon who presents for f/u of chronic conditions. Patient was last seen in clinic one month ago by me. \par \par Since patient was last seen in clinic, on 12/14/19, he suffered a mechanical fall. Patient was wearing boots and tripped over his shoelaces and fell; he fell on the street, landing on his knees. Pt's brother helped him up and suggested for pt to be evaluated in the ED, but patient declined. Finally, patient was persuaded to seek medical care as his R knee became more painful; he was referred to an orthopedic surgeon, who treated pt for cellulitis with likely a "sulfa drug," most likely Bactrim. Patient completed his course of antibiotics and is no longer febrile; the orthopedic surgeon continued to follow up with patient and has referred him to wound care. Pt has a wound care appt in 2 weeks. \par \par With regard to Diabetes, patient states he has had labile blood sugars, particularly during the time when he was febrile. He was reinstated on BID glipizide approximately 3 weeks ago, as he was having fasting BG in the 190s. Today, his fasting BG was 119. \par \par Patient continues to complain of fatigue. He is having intermittent constipation and is unable to take Senna as his insurance no longer covers it.

## 2020-01-08 NOTE — PHYSICAL EXAM
[Normal] : no acute distress, well nourished, well developed and well-appearing [PERRL] : pupils equal round and reactive to light [Normal Sclera/Conjunctiva] : normal sclera/conjunctiva [Normal Outer Ear/Nose] : the outer ears and nose were normal in appearance [No JVD] : no jugular venous distention [No Respiratory Distress] : no respiratory distress  [No Accessory Muscle Use] : no accessory muscle use [Clear to Auscultation] : lungs were clear to auscultation bilaterally [Normal Rate] : normal rate  [Normal S1, S2] : normal S1 and S2 [Regular Rhythm] : with a regular rhythm [Soft] : abdomen soft [Non Tender] : non-tender [Non-distended] : non-distended [No Rash] : no rash [Normal Bowel Sounds] : normal bowel sounds [Coordination Grossly Intact] : coordination grossly intact [No Focal Deficits] : no focal deficits [Normal Gait] : normal gait [Memory Grossly Normal] : memory grossly normal [Speech Grossly Normal] : speech grossly normal [Normal Insight/Judgement] : insight and judgment were intact [de-identified] : 1+ LE edema b/l [de-identified] : R knee redness and swelling, granulation tissue

## 2020-01-09 LAB — GLUCOSE BLDC GLUCOMTR-MCNC: 88

## 2020-01-21 ENCOUNTER — APPOINTMENT (OUTPATIENT)
Dept: WOUND CARE | Facility: CLINIC | Age: 65
End: 2020-01-21
Payer: MEDICARE

## 2020-01-21 VITALS — BODY MASS INDEX: 30.09 KG/M2 | WEIGHT: 195 LBS

## 2020-01-21 VITALS
HEART RATE: 91 BPM | TEMPERATURE: 98.4 F | DIASTOLIC BLOOD PRESSURE: 71 MMHG | RESPIRATION RATE: 16 BRPM | SYSTOLIC BLOOD PRESSURE: 127 MMHG

## 2020-01-21 DIAGNOSIS — Z86.59 PERSONAL HISTORY OF OTHER MENTAL AND BEHAVIORAL DISORDERS: ICD-10-CM

## 2020-01-21 DIAGNOSIS — E11.65 TYPE 2 DIABETES MELLITUS WITH HYPERGLYCEMIA: ICD-10-CM

## 2020-01-21 DIAGNOSIS — Z87.891 PERSONAL HISTORY OF NICOTINE DEPENDENCE: ICD-10-CM

## 2020-01-21 DIAGNOSIS — Z80.0 FAMILY HISTORY OF MALIGNANT NEOPLASM OF DIGESTIVE ORGANS: ICD-10-CM

## 2020-01-21 DIAGNOSIS — M24.541 CONTRACTURE, RIGHT HAND: ICD-10-CM

## 2020-01-21 DIAGNOSIS — Z87.19 PERSONAL HISTORY OF OTHER DISEASES OF THE DIGESTIVE SYSTEM: ICD-10-CM

## 2020-01-21 PROCEDURE — 10060 I&D ABSCESS SIMPLE/SINGLE: CPT

## 2020-01-21 PROCEDURE — 99204 OFFICE O/P NEW MOD 45 MIN: CPT | Mod: 25

## 2020-01-27 LAB — BACTERIA SPEC CULT: ABNORMAL

## 2020-01-30 PROBLEM — Z80.0 FAMILY HISTORY OF COLON CANCER: Status: INACTIVE | Noted: 2017-09-01

## 2020-01-30 NOTE — PLAN
[FreeTextEntry1] :  \par   infection- abscess\par Recommendation:\par  \par Wash wound with ----0.9% saline\par Apply  alginate\par Change dressing  3 times per week.\par Leg elevation as tolerated\par Encouraged ambulation or exercise.\par Optimization of nutrition.\par Offloading to the wound site.\par -----Wound supplies ordered via [employer]\par Patient given contact information\par -----Homecare orders in place\par Follow up appointment scheduled for 2-3 weeks\par \par

## 2020-01-30 NOTE — ASSESSMENT
[FreeTextEntry1] : 64 yr old DM HLD hypothrd htn male with right knee injury, fall, s/p bleed schitzophrenia\par small abscess drained today tolerated well\par culture taken\par ace- swollen- \par datacomplexity lab, xr, old rec, test resultsreviewed \par  \par risk  surgery- tolerated drainage well, minimal bleeding\par \par \par

## 2020-01-30 NOTE — PHYSICAL EXAM
[Normal Breath Sounds] : Normal breath sounds [Normal Rate and Rhythm] : normal rate and rhythm [2+] : right 2+ [Alert] : alert [Skin Ulcer] : ulcer [Oriented to Place] : oriented to place [Oriented to Person] : oriented to person [Calm] : calm [Oriented to Time] : oriented to time [JVD] : no jugular venous distention  [de-identified] : nad- some anxiety with family [de-identified] : rowdy [de-identified] : cellulitis [FreeTextEntry3] : 1 cm [FreeTextEntry1] : knee [FreeTextEntry2] : 1 cm [FreeTextEntry4] : .1 [FreeTextEntry5] : scalpel i/d [de-identified] : 4 x 4 redness around the per-wound\par \par left ankle--19.5\par right ankle --21.5\par indication right knee abscess\par analgesia lido\par procedure betadine prep\par findings 3 cc pus\par post op- packed\par dressing ace\par  \par  [TWNoteComboBox1] : Right [TWNoteComboBox4] : Moderate [de-identified] : No [de-identified] : 2.5% Lidocaine Topical [TWNoteComboBox6] : Traumatic [TWNoteComboBox7] : Velia [de-identified] : Multilayer other compression wrap

## 2020-01-30 NOTE — HISTORY OF PRESENT ILLNESS
[FreeTextEntry1] : location right knee injury\par severity s/p bleed\par timing/duration weeks\par quality swollen- went to ortho, said no infection, then that he needed procedure in the hospital\par no patella fracture\par other- schitzophrenia\par

## 2020-02-03 ENCOUNTER — APPOINTMENT (OUTPATIENT)
Dept: OTOLARYNGOLOGY | Facility: CLINIC | Age: 65
End: 2020-02-03
Payer: MEDICARE

## 2020-02-03 VITALS
WEIGHT: 198 LBS | HEIGHT: 67.5 IN | HEART RATE: 89 BPM | BODY MASS INDEX: 30.71 KG/M2 | SYSTOLIC BLOOD PRESSURE: 124 MMHG | DIASTOLIC BLOOD PRESSURE: 72 MMHG

## 2020-02-03 PROCEDURE — 92557 COMPREHENSIVE HEARING TEST: CPT

## 2020-02-03 PROCEDURE — 92567 TYMPANOMETRY: CPT

## 2020-02-03 PROCEDURE — 99213 OFFICE O/P EST LOW 20 MIN: CPT | Mod: 25

## 2020-02-03 NOTE — REASON FOR VISIT
[Subsequent Evaluation] : a subsequent evaluation for [Other: _____] : [unfilled] [FreeTextEntry2] : bilateral hearing loss

## 2020-02-03 NOTE — HISTORY OF PRESENT ILLNESS
[de-identified] : 64 year old male follow up for bilateral hearing loss. Patient states hearing has been stable since the last visit. Denies otalgia, otorrhea, tinnitus, ear infections. Reports occasional dizziness, when changing positions. No concerns

## 2020-02-03 NOTE — DATA REVIEWED
[de-identified] : audiogram shows mild to moderate SNHL bilterally with  right ear being slightly better  in the right ear.  Type As tymps\par SD 88% bilaterally

## 2020-02-03 NOTE — PHYSICAL EXAM
[Normal] : assessment of respiratory effort is normal [FreeTextEntry6] : dry skin in conchal bowl [FreeTextEntry8] : complete impaction, removed using curette and alligator [de-identified] : thickened [de-identified] : normal [de-identified] : unable to see through TM

## 2020-02-06 ENCOUNTER — APPOINTMENT (OUTPATIENT)
Dept: OPHTHALMOLOGY | Facility: CLINIC | Age: 65
End: 2020-02-06

## 2020-02-13 ENCOUNTER — LABORATORY RESULT (OUTPATIENT)
Age: 65
End: 2020-02-13

## 2020-02-13 ENCOUNTER — APPOINTMENT (OUTPATIENT)
Dept: INTERNAL MEDICINE | Facility: CLINIC | Age: 65
End: 2020-02-13
Payer: MEDICARE

## 2020-02-13 ENCOUNTER — OUTPATIENT (OUTPATIENT)
Dept: OUTPATIENT SERVICES | Facility: HOSPITAL | Age: 65
LOS: 1 days | End: 2020-02-13

## 2020-02-13 ENCOUNTER — RESULT CHARGE (OUTPATIENT)
Age: 65
End: 2020-02-13

## 2020-02-13 VITALS
OXYGEN SATURATION: 92 % | HEART RATE: 76 BPM | WEIGHT: 194 LBS | RESPIRATION RATE: 15 BRPM | HEIGHT: 67.5 IN | SYSTOLIC BLOOD PRESSURE: 120 MMHG | DIASTOLIC BLOOD PRESSURE: 68 MMHG | BODY MASS INDEX: 30.09 KG/M2

## 2020-02-13 DIAGNOSIS — Z86.39 PERSONAL HISTORY OF OTHER ENDOCRINE, NUTRITIONAL AND METABOLIC DISEASE: ICD-10-CM

## 2020-02-13 LAB
ANION GAP SERPL CALC-SCNC: 12 MMO/L — SIGNIFICANT CHANGE UP (ref 7–14)
BUN SERPL-MCNC: 16 MG/DL — SIGNIFICANT CHANGE UP (ref 7–23)
CALCIUM SERPL-MCNC: 8.8 MG/DL — SIGNIFICANT CHANGE UP (ref 8.4–10.5)
CHLORIDE SERPL-SCNC: 90 MMOL/L — LOW (ref 98–107)
CO2 SERPL-SCNC: 25 MMOL/L — SIGNIFICANT CHANGE UP (ref 22–31)
CREAT SERPL-MCNC: 0.78 MG/DL — SIGNIFICANT CHANGE UP (ref 0.5–1.3)
GLUCOSE BLDC GLUCOMTR-MCNC: 119
GLUCOSE SERPL-MCNC: 163 MG/DL — HIGH (ref 70–99)
POTASSIUM SERPL-MCNC: 5.1 MMOL/L — SIGNIFICANT CHANGE UP (ref 3.5–5.3)
POTASSIUM SERPL-SCNC: 5.1 MMOL/L — SIGNIFICANT CHANGE UP (ref 3.5–5.3)
SODIUM SERPL-SCNC: 127 MMOL/L — LOW (ref 135–145)
TSH SERPL-MCNC: 4.43 UIU/ML — HIGH (ref 0.27–4.2)

## 2020-02-13 PROCEDURE — 99213 OFFICE O/P EST LOW 20 MIN: CPT | Mod: GE

## 2020-02-13 RX ORDER — DIVALPROEX SODIUM 500 1/1
500 TABLET, EXTENDED RELEASE ORAL DAILY
Qty: 180 | Refills: 1 | Status: ACTIVE | COMMUNITY
Start: 2017-02-06

## 2020-02-13 RX ORDER — TIMOLOL MALEATE 6.8 MG/ML
0.5 SOLUTION OPHTHALMIC TWICE DAILY
Qty: 1 | Refills: 5 | Status: DISCONTINUED | COMMUNITY
Start: 2017-08-15 | End: 2020-02-13

## 2020-02-13 RX ORDER — DICLOXACILLIN SODIUM 500 MG/1
500 CAPSULE ORAL 4 TIMES DAILY
Qty: 28 | Refills: 0 | Status: DISCONTINUED | COMMUNITY
Start: 2020-02-03 | End: 2020-02-13

## 2020-02-14 ENCOUNTER — APPOINTMENT (OUTPATIENT)
Dept: WOUND CARE | Facility: CLINIC | Age: 65
End: 2020-02-14
Payer: MEDICARE

## 2020-02-14 DIAGNOSIS — E87.1 HYPO-OSMOLALITY AND HYPONATREMIA: ICD-10-CM

## 2020-02-14 DIAGNOSIS — L97.819 NON-PRESSURE CHRONIC ULCER OF OTHER PART OF RIGHT LOWER LEG WITH UNSPECIFIED SEVERITY: ICD-10-CM

## 2020-02-14 DIAGNOSIS — I10 ESSENTIAL (PRIMARY) HYPERTENSION: ICD-10-CM

## 2020-02-14 DIAGNOSIS — E11.9 TYPE 2 DIABETES MELLITUS WITHOUT COMPLICATIONS: ICD-10-CM

## 2020-02-14 DIAGNOSIS — J06.9 ACUTE UPPER RESPIRATORY INFECTION, UNSPECIFIED: ICD-10-CM

## 2020-02-14 DIAGNOSIS — E03.9 HYPOTHYROIDISM, UNSPECIFIED: ICD-10-CM

## 2020-02-14 DIAGNOSIS — E55.9 VITAMIN D DEFICIENCY, UNSPECIFIED: ICD-10-CM

## 2020-02-14 LAB — HBA1C MFR BLD HPLC: 6.8

## 2020-02-14 PROCEDURE — 99213 OFFICE O/P EST LOW 20 MIN: CPT | Mod: 25

## 2020-02-14 PROCEDURE — 17250 CHEM CAUT OF GRANLTJ TISSUE: CPT

## 2020-02-14 NOTE — ASSESSMENT
[FreeTextEntry1] : 64 y.o. M with schizophrenia, controlled T2DM, HLD, Hypertension, Depression, OCD, chronic Hyponatremia (likely secondary to SSRI + polydypsia), Glaucoma (s/p laser therapy), BPH, MRSA skin infections, SBO requiring NG tube decompression in 2016, and tubular adenoma of the colon who presents for f/u of chronic conditions. Pt endorses URI symptoms. \par \par 1) URI: \par -Patient noted to have coarse breath sounds on exam; however, sounds seem to emanate from upper airway with suspicion of likely post-nasal drip induced cough\par -will initiate flonase BID x 2 weeks\par \par 2) R knee ulceration s/p mechanical fall: \par -Patient s/p I&D with wound care on 1/21. Wound grew MSSA; pt is now s/p 7 day course of dicloxacillin. \par \par 3) T2DM: \par -A1C today is 7%\par -continue metformin, Januvia, glipizide\par -following with optho for glaucoma\par -continue with lisinopril for microalbuminuria\par -UTD with diabetic foot exam \par \par 4) Vitamin D insufficiency with mildly elevated PTH: \par -likely 2/2 to secondary hyperpara\par -continue vitamin D supplementation \par -should  have vitamin D and TSH redrawn in May\par \par 5) Hypothyroidism: \par -TSH redrawn today after increase of synthroid dosage\par \par 6) HTN: \par -continue lisinopril \par \par 7) Hyponatremia: \par -BMP redrawn t karolyn\par \par 8) Schneider's esophagus: \par -c/w Protonix \par -yearly EGD for surveillance with GI\par \par 9) HCM: \par -UTD with Prevnar in January 2020, will need pneumovax in January 2021\par -UTD with influenza vaccine\par -UTD with colonoscopy until June 2020\par \par RTC in 2 months\par \par Discussed pt with Dr. Rizo\par \par Amy Martínez, PGY-3\par [employer]

## 2020-02-14 NOTE — REVIEW OF SYSTEMS
[Nasal Discharge] : nasal discharge [Cough] : cough [Negative] : Psychiatric [Earache] : no earache [Nosebleeds] : no nosebleeds [Hoarseness] : no hoarseness [Shortness Of Breath] : no shortness of breath [Postnasal Drip] : no postnasal drip [Dyspnea on Exertion] : not dyspnea on exertion

## 2020-02-14 NOTE — HISTORY OF PRESENT ILLNESS
[de-identified] : 64 y.o. M with schizophrenia, controlled T2DM, HLD, Hypertension, Depression, OCD, chronic Hyponatremia (likely secondary to SSRI + polydypsia), Glaucoma (s/p laser therapy), BPH, MRSA skin infections, SBO requiring NG tube decompression in 2016, and tubular adenoma of the colon who presents for f/u of chronic conditions. Patient was last seen in clinic one month ago by me.\par \par Patient complains of URI since 6 days ago. He endorses rhinorrhea and nasal congestion as well as non-productive cough. He denies fevers or chills. \par \par With regard to his R knee injury s/p mechanical fall on 12/14, patient has been evaluated by wound care. Wound care provider performed I&D of knee as he was found to have a small abscess that grew MSSA. Patient has since completed a course of dicloxacillin. \par \par With regard to DM, patient's fasting BG has ranged from . He denies episodes of hypoglycemia. \par \par Patient states he is better with regard to prior symptom of fatigue.  [FreeTextEntry1] : f/u of chronic conditions

## 2020-02-14 NOTE — PHYSICAL EXAM
[Supple] : supple [Normal Sclera/Conjunctiva] : normal sclera/conjunctiva [Normal Rate] : normal rate  [Normal] : normal gait, coordination grossly intact, no focal deficits and deep tendon reflexes were 2+ and symmetric [Speech Grossly Normal] : speech grossly normal [Memory Grossly Normal] : memory grossly normal [Normal Mood] : the mood was normal [Normal Insight/Judgement] : insight and judgment were intact [de-identified] : Coarse breath sounds [de-identified] : Kenji montoya [de-identified] : Ulceration over R patella

## 2020-03-05 ENCOUNTER — APPOINTMENT (OUTPATIENT)
Dept: WOUND CARE | Facility: CLINIC | Age: 65
End: 2020-03-05

## 2020-03-12 ENCOUNTER — APPOINTMENT (OUTPATIENT)
Dept: WOUND CARE | Facility: CLINIC | Age: 65
End: 2020-03-12
Payer: MEDICARE

## 2020-03-12 VITALS — TEMPERATURE: 97.6 F | HEIGHT: 67.5 IN | BODY MASS INDEX: 30.09 KG/M2 | WEIGHT: 194 LBS

## 2020-03-12 VITALS — HEART RATE: 71 BPM | SYSTOLIC BLOOD PRESSURE: 137 MMHG | DIASTOLIC BLOOD PRESSURE: 74 MMHG

## 2020-03-12 PROCEDURE — 99213 OFFICE O/P EST LOW 20 MIN: CPT

## 2020-03-19 NOTE — ASSESSMENT
[FreeTextEntry1] : 64 yr old DM HLD hypothrd htn male with right knee injury, fall, s/p bleed schitzophrenia\par eschar dry/ healed\par  ace- swollen- \par datacomplexity lab, xr, old rec, test resultsreviewed \par  risk  - low\par \par \par

## 2020-03-19 NOTE — PHYSICAL EXAM
[Normal Breath Sounds] : Normal breath sounds [Normal Rate and Rhythm] : normal rate and rhythm [2+] : right 2+ [Skin Ulcer] : ulcer [Alert] : alert [Oriented to Person] : oriented to person [Oriented to Place] : oriented to place [Oriented to Time] : oriented to time [Calm] : calm [4 x 4] : 4 x 4  [JVD] : no jugular venous distention  [de-identified] : nad- some anxiety with family [de-identified] : rowdy [de-identified] : cellulitis [FreeTextEntry1] : knee [FreeTextEntry2] : 0.5cm [FreeTextEntry3] : 0.5 cm [FreeTextEntry4] : 1.2cm [FreeTextEntry5] : 4x4 gauze [de-identified] : sub q tissue [de-identified] : 4 x 4 redness around the per-wound improved\par ag nitrate applied\par left ankle--19.5\par right ankle --21.5\par  \par  \par  [TWNoteComboBox1] : Right [TWNoteComboBox4] : Moderate [TWNoteComboBox5] : No [TWNoteComboBox6] : Traumatic [de-identified] : No [de-identified] : Normal [de-identified] : None [de-identified] : None [de-identified] : 100% [de-identified] : No [de-identified] : 2.5% Lidocaine Topical [TWNoteComboBox7] : Mechanical [de-identified] : Debridement non-excisional [de-identified] : Multilayer other compression wrap

## 2020-03-19 NOTE — HISTORY OF PRESENT ILLNESS
[FreeTextEntry1] : location right knee injury\par severity s/p bleed had  debridement and drainage and culture for staph aureus\par timing/duration weeks\par quality swollen- went to ortho, said no infection, then that he needed procedure in the hospital\par no patella fracture\par other- schitzophrenia\par

## 2020-03-19 NOTE — PHYSICAL EXAM
[Normal Breath Sounds] : Normal breath sounds [Normal Rate and Rhythm] : normal rate and rhythm [2+] : right 2+ [Skin Ulcer] : ulcer [Alert] : alert [Oriented to Person] : oriented to person [Oriented to Place] : oriented to place [Oriented to Time] : oriented to time [Calm] : calm [JVD] : no jugular venous distention  [de-identified] : nad- some anxiety with family [de-identified] : rowdy [de-identified] : cellulitis [FreeTextEntry1] : knee eschar  [FreeTextEntry2] : 0 [FreeTextEntry3] : 0 [FreeTextEntry4] : 0 [de-identified] : \par  [TWNoteComboBox1] : Right [TWNoteComboBox4] : Moderate [TWNoteComboBox5] : No [TWNoteComboBox6] : Traumatic [de-identified] : No [de-identified] : Normal [de-identified] : None [de-identified] : None [de-identified] : 100% [de-identified] : No [de-identified] : 2.5% Lidocaine Topical [TWNoteComboBox7] : Mechanical [de-identified] : Debridement non-excisional [de-identified] : Multilayer other compression wrap

## 2020-03-19 NOTE — PLAN
[FreeTextEntry1] :  \par  \par Recommendation:\par  \par Wash wound with --soap\par  \par Change dressing  3 times per week.\par Leg elevation as tolerated\par Encouraged ambulation or exercise.\par Optimization of nutrition.\par Offloading to the wound site.\par -----Wound supplies ordered via [employer]\par Patient given contact information\par -----Homecare orders in place\par Follow up appointment scheduled for 2-3 weeks\par \par

## 2020-03-19 NOTE — PLAN
[FreeTextEntry1] :  \par   infection- abscess\par Recommendation:\par  \par Wash wound with ----0.9% saline\par Apply  alginate\par Change dressing  3 times per week.\par Leg elevation as tolerated\par Encouraged ambulation or exercise.\par Optimization of nutrition.\par Offloading to the wound site.\par -----Wound supplies ordered via [employer]\par Patient given contact information\par -----Homecare orders in place\par Follow up appointment scheduled for 2-3 weeks\par \par

## 2020-03-19 NOTE — ASSESSMENT
[FreeTextEntry1] : 64 yr old DM HLD hypothrd htn male with right knee injury, fall, s/p bleed schitzophrenia\par ag nitrate applied to granular tissue/ tolerated well\par  \par ace- swollen- \par datacomplexity lab, xr, old rec, test resultsreviewed \par  \par risk  surgery- tolerated  well, minimal bleeding\par \par \par

## 2020-04-28 ENCOUNTER — RX RENEWAL (OUTPATIENT)
Age: 65
End: 2020-04-28

## 2020-04-30 ENCOUNTER — OUTPATIENT (OUTPATIENT)
Dept: OUTPATIENT SERVICES | Facility: HOSPITAL | Age: 65
LOS: 1 days | End: 2020-04-30

## 2020-04-30 ENCOUNTER — APPOINTMENT (OUTPATIENT)
Dept: GASTROENTEROLOGY | Facility: CLINIC | Age: 65
End: 2020-04-30

## 2020-04-30 VITALS — HEIGHT: 67.5 IN | WEIGHT: 198 LBS | BODY MASS INDEX: 30.71 KG/M2

## 2020-05-07 ENCOUNTER — APPOINTMENT (OUTPATIENT)
Dept: OPHTHALMOLOGY | Facility: CLINIC | Age: 65
End: 2020-05-07

## 2020-05-08 ENCOUNTER — LABORATORY RESULT (OUTPATIENT)
Age: 65
End: 2020-05-08

## 2020-05-08 ENCOUNTER — OUTPATIENT (OUTPATIENT)
Dept: OUTPATIENT SERVICES | Facility: HOSPITAL | Age: 65
LOS: 1 days | End: 2020-05-08

## 2020-05-08 ENCOUNTER — APPOINTMENT (OUTPATIENT)
Dept: INTERNAL MEDICINE | Facility: CLINIC | Age: 65
End: 2020-05-08

## 2020-05-08 DIAGNOSIS — E11.9 TYPE 2 DIABETES MELLITUS WITHOUT COMPLICATIONS: ICD-10-CM

## 2020-05-08 DIAGNOSIS — E03.9 HYPOTHYROIDISM, UNSPECIFIED: ICD-10-CM

## 2020-05-08 DIAGNOSIS — E87.1 HYPO-OSMOLALITY AND HYPONATREMIA: ICD-10-CM

## 2020-05-08 DIAGNOSIS — E55.9 VITAMIN D DEFICIENCY, UNSPECIFIED: ICD-10-CM

## 2020-05-08 DIAGNOSIS — K22.70 BARRETT'S ESOPHAGUS WITHOUT DYSPLASIA: ICD-10-CM

## 2020-05-08 DIAGNOSIS — I10 ESSENTIAL (PRIMARY) HYPERTENSION: ICD-10-CM

## 2020-05-08 DIAGNOSIS — J06.9 ACUTE UPPER RESPIRATORY INFECTION, UNSPECIFIED: ICD-10-CM

## 2020-05-08 DIAGNOSIS — L97.819 NON-PRESSURE CHRONIC ULCER OF OTHER PART OF RIGHT LOWER LEG WITH UNSPECIFIED SEVERITY: ICD-10-CM

## 2020-05-08 LAB
ANION GAP SERPL CALC-SCNC: 11 MMO/L — SIGNIFICANT CHANGE UP (ref 7–14)
BUN SERPL-MCNC: 13 MG/DL — SIGNIFICANT CHANGE UP (ref 7–23)
CALCIUM SERPL-MCNC: 9.5 MG/DL — SIGNIFICANT CHANGE UP (ref 8.4–10.5)
CHLORIDE SERPL-SCNC: 89 MMOL/L — LOW (ref 98–107)
CO2 SERPL-SCNC: 27 MMOL/L — SIGNIFICANT CHANGE UP (ref 22–31)
CREAT SERPL-MCNC: 0.83 MG/DL — SIGNIFICANT CHANGE UP (ref 0.5–1.3)
GLUCOSE SERPL-MCNC: 215 MG/DL — HIGH (ref 70–99)
POTASSIUM SERPL-MCNC: 4.9 MMOL/L — SIGNIFICANT CHANGE UP (ref 3.5–5.3)
POTASSIUM SERPL-SCNC: 4.9 MMOL/L — SIGNIFICANT CHANGE UP (ref 3.5–5.3)
SODIUM SERPL-SCNC: 127 MMOL/L — LOW (ref 135–145)

## 2020-06-01 ENCOUNTER — RECORD ABSTRACTING (OUTPATIENT)
Age: 65
End: 2020-06-01

## 2020-06-30 ENCOUNTER — APPOINTMENT (OUTPATIENT)
Dept: INTERNAL MEDICINE | Facility: CLINIC | Age: 65
End: 2020-06-30

## 2020-07-01 ENCOUNTER — LABORATORY RESULT (OUTPATIENT)
Age: 65
End: 2020-07-01

## 2020-07-01 ENCOUNTER — APPOINTMENT (OUTPATIENT)
Dept: INTERNAL MEDICINE | Facility: CLINIC | Age: 65
End: 2020-07-01
Payer: MEDICARE

## 2020-07-01 ENCOUNTER — OUTPATIENT (OUTPATIENT)
Dept: OUTPATIENT SERVICES | Facility: HOSPITAL | Age: 65
LOS: 1 days | End: 2020-07-01

## 2020-07-01 VITALS
OXYGEN SATURATION: 99 % | BODY MASS INDEX: 31.08 KG/M2 | TEMPERATURE: 97.3 F | HEIGHT: 67 IN | SYSTOLIC BLOOD PRESSURE: 130 MMHG | WEIGHT: 198 LBS | HEART RATE: 79 BPM | DIASTOLIC BLOOD PRESSURE: 80 MMHG

## 2020-07-01 DIAGNOSIS — E03.9 HYPOTHYROIDISM, UNSPECIFIED: ICD-10-CM

## 2020-07-01 DIAGNOSIS — L97.819 NON-PRESSURE CHRONIC ULCER OF OTHER PART OF RIGHT LOWER LEG WITH UNSPECIFIED SEVERITY: ICD-10-CM

## 2020-07-01 DIAGNOSIS — E55.9 VITAMIN D DEFICIENCY, UNSPECIFIED: ICD-10-CM

## 2020-07-01 DIAGNOSIS — J06.9 ACUTE UPPER RESPIRATORY INFECTION, UNSPECIFIED: ICD-10-CM

## 2020-07-01 DIAGNOSIS — E78.5 HYPERLIPIDEMIA, UNSPECIFIED: ICD-10-CM

## 2020-07-01 DIAGNOSIS — H40.9 UNSPECIFIED GLAUCOMA: ICD-10-CM

## 2020-07-01 DIAGNOSIS — N40.0 BENIGN PROSTATIC HYPERPLASIA WITHOUT LOWER URINARY TRACT SYMPTOMS: ICD-10-CM

## 2020-07-01 DIAGNOSIS — E87.1 HYPO-OSMOLALITY AND HYPONATREMIA: ICD-10-CM

## 2020-07-01 DIAGNOSIS — F20.9 SCHIZOPHRENIA, UNSPECIFIED: ICD-10-CM

## 2020-07-01 DIAGNOSIS — I10 ESSENTIAL (PRIMARY) HYPERTENSION: ICD-10-CM

## 2020-07-01 DIAGNOSIS — E11.9 TYPE 2 DIABETES MELLITUS WITHOUT COMPLICATIONS: ICD-10-CM

## 2020-07-01 LAB
ALBUMIN SERPL ELPH-MCNC: 4.4 G/DL — SIGNIFICANT CHANGE UP (ref 3.3–5)
ALP SERPL-CCNC: 70 U/L — SIGNIFICANT CHANGE UP (ref 40–120)
ALT FLD-CCNC: 14 U/L — SIGNIFICANT CHANGE UP (ref 4–41)
ANION GAP SERPL CALC-SCNC: 15 MMO/L — HIGH (ref 7–14)
AST SERPL-CCNC: 16 U/L — SIGNIFICANT CHANGE UP (ref 4–40)
BASOPHILS # BLD AUTO: 0.05 K/UL — SIGNIFICANT CHANGE UP (ref 0–0.2)
BASOPHILS NFR BLD AUTO: 0.9 % — SIGNIFICANT CHANGE UP (ref 0–2)
BILIRUB SERPL-MCNC: < 0.2 MG/DL — LOW (ref 0.2–1.2)
BUN SERPL-MCNC: 18 MG/DL — SIGNIFICANT CHANGE UP (ref 7–23)
CALCIUM SERPL-MCNC: 9.5 MG/DL — SIGNIFICANT CHANGE UP (ref 8.4–10.5)
CHLORIDE SERPL-SCNC: 90 MMOL/L — LOW (ref 98–107)
CO2 SERPL-SCNC: 22 MMOL/L — SIGNIFICANT CHANGE UP (ref 22–31)
CREAT SERPL-MCNC: 0.76 MG/DL — SIGNIFICANT CHANGE UP (ref 0.5–1.3)
EOSINOPHIL # BLD AUTO: 0.12 K/UL — SIGNIFICANT CHANGE UP (ref 0–0.5)
EOSINOPHIL NFR BLD AUTO: 2.3 % — SIGNIFICANT CHANGE UP (ref 0–6)
GLUCOSE SERPL-MCNC: 95 MG/DL — SIGNIFICANT CHANGE UP (ref 70–99)
HBA1C BLD-MCNC: 6.3 % — HIGH (ref 4–5.6)
HCT VFR BLD CALC: 32.9 % — LOW (ref 39–50)
HGB BLD-MCNC: 11 G/DL — LOW (ref 13–17)
IMM GRANULOCYTES NFR BLD AUTO: 0.4 % — SIGNIFICANT CHANGE UP (ref 0–1.5)
LYMPHOCYTES # BLD AUTO: 1.43 K/UL — SIGNIFICANT CHANGE UP (ref 1–3.3)
LYMPHOCYTES # BLD AUTO: 26.8 % — SIGNIFICANT CHANGE UP (ref 13–44)
MCHC RBC-ENTMCNC: 28.9 PG — SIGNIFICANT CHANGE UP (ref 27–34)
MCHC RBC-ENTMCNC: 33.4 % — SIGNIFICANT CHANGE UP (ref 32–36)
MCV RBC AUTO: 86.6 FL — SIGNIFICANT CHANGE UP (ref 80–100)
MONOCYTES # BLD AUTO: 0.49 K/UL — SIGNIFICANT CHANGE UP (ref 0–0.9)
MONOCYTES NFR BLD AUTO: 9.2 % — SIGNIFICANT CHANGE UP (ref 2–14)
NEUTROPHILS # BLD AUTO: 3.22 K/UL — SIGNIFICANT CHANGE UP (ref 1.8–7.4)
NEUTROPHILS NFR BLD AUTO: 60.4 % — SIGNIFICANT CHANGE UP (ref 43–77)
NRBC # FLD: 0 K/UL — SIGNIFICANT CHANGE UP (ref 0–0)
OSMOLALITY SERPL: 273 MOSMO/KG — LOW (ref 275–295)
OSMOLALITY UR: 516 MOSMO/KG — SIGNIFICANT CHANGE UP (ref 50–1200)
PLATELET # BLD AUTO: 211 K/UL — SIGNIFICANT CHANGE UP (ref 150–400)
PMV BLD: 11.3 FL — SIGNIFICANT CHANGE UP (ref 7–13)
POTASSIUM SERPL-MCNC: 4.9 MMOL/L — SIGNIFICANT CHANGE UP (ref 3.5–5.3)
POTASSIUM SERPL-SCNC: 4.9 MMOL/L — SIGNIFICANT CHANGE UP (ref 3.5–5.3)
PROT SERPL-MCNC: 6.8 G/DL — SIGNIFICANT CHANGE UP (ref 6–8.3)
RBC # BLD: 3.8 M/UL — LOW (ref 4.2–5.8)
RBC # FLD: 14.6 % — HIGH (ref 10.3–14.5)
SODIUM SERPL-SCNC: 127 MMOL/L — LOW (ref 135–145)
T4 AB SER-ACNC: 8.59 UG/DL — SIGNIFICANT CHANGE UP (ref 5.1–13)
TSH SERPL-MCNC: 0.92 UIU/ML — SIGNIFICANT CHANGE UP (ref 0.27–4.2)
WBC # BLD: 5.33 K/UL — SIGNIFICANT CHANGE UP (ref 3.8–10.5)
WBC # FLD AUTO: 5.33 K/UL — SIGNIFICANT CHANGE UP (ref 3.8–10.5)

## 2020-07-01 PROCEDURE — 99213 OFFICE O/P EST LOW 20 MIN: CPT | Mod: GE

## 2020-07-01 RX ORDER — LEVOBUNOLOL HYDROCHLORIDE 5 MG/ML
0.5 SOLUTION/ DROPS OPHTHALMIC TWICE DAILY
Refills: 0 | Status: ACTIVE | COMMUNITY
Start: 2020-06-15

## 2020-07-01 RX ORDER — DICLOXACILLIN SODIUM 500 MG/1
500 CAPSULE ORAL 4 TIMES DAILY
Qty: 28 | Refills: 2 | Status: DISCONTINUED | COMMUNITY
Start: 2020-02-14 | End: 2020-07-01

## 2020-07-01 RX ORDER — DOCUSATE SODIUM 100 MG/1
100 CAPSULE, LIQUID FILLED ORAL
Qty: 270 | Refills: 1 | Status: DISCONTINUED | COMMUNITY
End: 2020-07-01

## 2020-07-01 NOTE — ASSESSMENT
[FreeTextEntry1] : 64 y.o. M with schizophrenia, controlled T2DM, HLD, Hypertension, Depression, OCD, chronic Hyponatremia (likely secondary to SSRI + polydypsia), Glaucoma (s/p laser therapy), BPH, MRSA skin infections, SBO requiring NG tube decompression in 2016, and tubular adenoma of the colon who presents for f/u of chronic conditions. \par \par # HTN\par - BP 130s controlled\par - c/w lisinopril 5 mg daily \par \par # HLD\par - repeat lipid panel today\par - c/w atorvastatin\par \par # Hyponatremia and hypochloremia\par - may be 2/2 SSRI, less likely polydipsia if pt is reliable, appears euvolemic\par - c/w salt tablets BID\par - obtain cmp, serum osmolality and urine studies today\par \par # Hypothyroidism - clinically euthyroid\par - obtain TSH/T4 today\par - c/w synthroid 100 mcg daily\par \par # T2DM\par - no overt hypoglycemia episodes, although w/ occasional AM FS in 70s \par - on glipizide bid, metformin, and januvia \par - obtain a1c today - if A1c at or near goal of 7, will attempt reducing glipizide to once daily and monitor response\par - pt requesting endocrinology referral for diabetes and thyroid mgmt, referral given\par - c/w lisinopril for microalbuminuria\par - follows w/ ophtho for glaucoma; appointments rescheduled due to covid, next in 10/2020\par \par # Schizophrenia\par - symptoms controlled with clozapine\par - cbc today\par  \par # Vit d deficiency\par - secondary hyperparathyroidism (mildly elevated PTH, with mildly low vit d)\par - vitamin d level today\par \par # Schneider's esophagus\par - c/w Protonix \par - f/u with GI\par \par # Knee ulcer\par - resolved\par \par # HCM\par - UTD with Prevnar in January 2020, will need pneumovax in January 2021\par - UTD with influenza vaccine\par - UTD with colonoscopy until June 2020\par \par RTC 3 months\par \par Pt seen and discussed with Dr. Mackay\par \par [place], PGY-2\par [employer]

## 2020-07-01 NOTE — END OF VISIT
[FreeTextEntry3] : 64yoM presents for follow up of chronic conditions. HTN - stable, , continue current medication. DM - A1c has been fairly stable over the last year, patient notes some AM FS in the 70s - will repeat A1c today and can consider decreasing glipizide if <7 to prevent hypoglycemia in this patient. Hyponatremia - check metabolic panel, serum osm, urine studies - continue salt tabs, consider nephrology evaluation. Schizophrenia - continue current medications and appropriate follow up. [] : Resident

## 2020-07-01 NOTE — HISTORY OF PRESENT ILLNESS
[FreeTextEntry1] : f/u  [de-identified] : 63 y/o M with schizophrenia, controlled T2DM, HLD, HTN, depression, OCD, chronic hyponatremia (likely secondary to SSRI + polydypsia), glaucoma (s/p laser therapy), BPH, prior MRSA skin infections, SBO requiring NG tube decompression in 2016, and tubular adenoma of the colon who presents for f/u of chronic conditions. Pt currently feels well and denies any acute symptoms. \par \par In terms of diabetes, pt's FS ranges from , usually in 80s-90s. Denies any hypoglycemia symptoms. Drinks sugar-free sodas and uses sugar-free maple syrup. \par \par In terms of hyponatremia, patient endorses only drinking 3-4 glasses of water a day. He eats a diet with vegetables, various meats, carbohydrates. He is adherent to taking sodium chloride tablets. \par \par His knee is healing well, with no pain or swelling. \par \par His URI symptoms that he had at the last visit have completely resolved. \par \par He is clinically euthyroid with no hot/cold intolerance, no diarrhea/constipation, no tremors or lethargy.

## 2020-07-01 NOTE — PHYSICAL EXAM
[Normal] : normal gait, coordination grossly intact, no focal deficits and deep tendon reflexes were 2+ and symmetric [Left Foot Was Examined] : left foot ~C was examined [Right Foot Was Examined] : Right foot ~C was examined [None] : no ulcers in either foot were found [] : both feet [de-identified] : disheveled appearing, with stained dress shirt and torn socks [de-identified] : b/l trace pitting edema [de-identified] : erythematous R knee, no fluctuance or warmth [TWNoteComboBox6] : False [TWNoteComboBox5] : False [TWNoteComboBox4] : +2

## 2020-07-02 LAB — 24R-OH-CALCIDIOL SERPL-MCNC: 49.5 NG/ML — SIGNIFICANT CHANGE UP (ref 30–80)

## 2020-07-02 RX ORDER — UBIDECARENONE/VIT E ACET 100MG-5
25 MCG CAPSULE ORAL
Qty: 90 | Refills: 1 | Status: DISCONTINUED | COMMUNITY
Start: 2020-01-09 | End: 2020-07-02

## 2020-08-03 ENCOUNTER — RX RENEWAL (OUTPATIENT)
Age: 65
End: 2020-08-03

## 2020-10-01 ENCOUNTER — APPOINTMENT (OUTPATIENT)
Dept: OPHTHALMOLOGY | Facility: CLINIC | Age: 65
End: 2020-10-01

## 2020-10-12 ENCOUNTER — LABORATORY RESULT (OUTPATIENT)
Age: 65
End: 2020-10-12

## 2020-10-12 ENCOUNTER — APPOINTMENT (OUTPATIENT)
Dept: INTERNAL MEDICINE | Facility: CLINIC | Age: 65
End: 2020-10-12
Payer: MEDICARE

## 2020-10-12 ENCOUNTER — OUTPATIENT (OUTPATIENT)
Dept: OUTPATIENT SERVICES | Facility: HOSPITAL | Age: 65
LOS: 1 days | End: 2020-10-12

## 2020-10-12 ENCOUNTER — MED ADMIN CHARGE (OUTPATIENT)
Age: 65
End: 2020-10-12

## 2020-10-12 VITALS
DIASTOLIC BLOOD PRESSURE: 70 MMHG | OXYGEN SATURATION: 99 % | WEIGHT: 198 LBS | SYSTOLIC BLOOD PRESSURE: 130 MMHG | HEART RATE: 98 BPM | BODY MASS INDEX: 31.08 KG/M2 | HEIGHT: 67 IN

## 2020-10-12 VITALS — TEMPERATURE: 97.6 F

## 2020-10-12 DIAGNOSIS — N40.0 BENIGN PROSTATIC HYPERPLASIA WITHOUT LOWER URINARY TRACT SYMPMS: ICD-10-CM

## 2020-10-12 DIAGNOSIS — H61.21 IMPACTED CERUMEN, RIGHT EAR: ICD-10-CM

## 2020-10-12 DIAGNOSIS — D50.9 IRON DEFICIENCY ANEMIA, UNSPECIFIED: ICD-10-CM

## 2020-10-12 LAB
ALBUMIN SERPL ELPH-MCNC: 4 G/DL — SIGNIFICANT CHANGE UP (ref 3.3–5)
ALP SERPL-CCNC: 63 U/L — SIGNIFICANT CHANGE UP (ref 40–120)
ALT FLD-CCNC: 12 U/L — SIGNIFICANT CHANGE UP (ref 4–41)
ANION GAP SERPL CALC-SCNC: 12 MMO/L — SIGNIFICANT CHANGE UP (ref 7–14)
AST SERPL-CCNC: 14 U/L — SIGNIFICANT CHANGE UP (ref 4–40)
BILIRUB SERPL-MCNC: < 0.2 MG/DL — LOW (ref 0.2–1.2)
BUN SERPL-MCNC: 13 MG/DL — SIGNIFICANT CHANGE UP (ref 7–23)
CALCIUM SERPL-MCNC: 8.9 MG/DL — SIGNIFICANT CHANGE UP (ref 8.4–10.5)
CHLORIDE SERPL-SCNC: 91 MMOL/L — LOW (ref 98–107)
CO2 SERPL-SCNC: 25 MMOL/L — SIGNIFICANT CHANGE UP (ref 22–31)
CREAT SERPL-MCNC: 0.7 MG/DL — SIGNIFICANT CHANGE UP (ref 0.5–1.3)
FERRITIN SERPL-MCNC: 70.3 NG/ML — SIGNIFICANT CHANGE UP (ref 30–400)
GLUCOSE BLDC GLUCOMTR-MCNC: 153
GLUCOSE SERPL-MCNC: 125 MG/DL — HIGH (ref 70–99)
HBA1C BLD-MCNC: 6.5 % — HIGH (ref 4–5.6)
IRON SATN MFR SERPL: 266 UG/DL — SIGNIFICANT CHANGE UP (ref 155–535)
IRON SATN MFR SERPL: 87 UG/DL — SIGNIFICANT CHANGE UP (ref 45–165)
POTASSIUM SERPL-MCNC: 4.8 MMOL/L — SIGNIFICANT CHANGE UP (ref 3.5–5.3)
POTASSIUM SERPL-SCNC: 4.8 MMOL/L — SIGNIFICANT CHANGE UP (ref 3.5–5.3)
PROT SERPL-MCNC: 6.3 G/DL — SIGNIFICANT CHANGE UP (ref 6–8.3)
SODIUM SERPL-SCNC: 128 MMOL/L — LOW (ref 135–145)
UIBC SERPL-MCNC: 179 UG/DL — SIGNIFICANT CHANGE UP (ref 110–370)

## 2020-10-12 PROCEDURE — 99214 OFFICE O/P EST MOD 30 MIN: CPT | Mod: GC

## 2020-10-14 PROBLEM — H61.21 EXCESSIVE CERUMEN IN RIGHT EAR CANAL: Status: RESOLVED | Noted: 2017-07-17 | Resolved: 2020-10-14

## 2020-10-14 NOTE — HISTORY OF PRESENT ILLNESS
[FreeTextEntry1] : f/u hyponatremia [de-identified] : 65 y/o M with schizophrenia, controlled T2DM, HLD, HTN, hypothyroidism, Schneider's esophagus, depression, OCD, chronic hyponatremia (likely 2/2 SIADH from clozapine), glaucoma (s/p laser therapy), BPH, multiple colonic tubular adenomas here for follow-up. Pt feels well without any acute complaints, last seen in July 2020. \par \par Of note, pt has seen his glaucoma specialist who prescribed dorzolamide. Pt describes eye irritation and itchy eyes since taking this medication. He has another appointment on 11/05/2020 to evaluate efficacy of the medication. \par \par In terms of diabetes, pt's lowest fingerstick was in the 70s. He denies any hypoglycemia symptoms. \par \par In terms of hyponatremia, patient drinks about 24 oz of water a day. He has run out of salt tabs for a while and recently restarted taking them in the last week.  \par \par Pt is currently euthyroid without any hot/cold intolerance, diarrhea/constipation, tremors or lethargy. \par \par Pt has also started taking iron tablets OTC for anemia.

## 2020-10-14 NOTE — ASSESSMENT
[FreeTextEntry1] : 65 y/o M with schizophrenia, controlled T2DM, HLD, HTN, hypothyroidism, Schneider's esophagus, depression, OCD, chronic hyponatremia (likely 2/2 SIADH from clozapine), glaucoma (s/p laser therapy), BPH, multiple colonic tubular adenomas here for follow-up. \par \par # R/o DAWNA\par - suspect DAWNA given anemia with elevated RDW\par - f/u iron studies drawn today\par - pt already on iron 325 daily OTC\par - counseled on side effects of iron including constipation\par - if this is DAWNA, concerning for malignancy given h/o of poor prep and tubular adenoma in 2018. Tasked GI to make appointment for repeat endoscopy/colonoscopy\par \par # Hyponatremia\par - likely 2/2 SIADH due to clozapine with elevated urine osmolality\par - counseled on fluid restriction and continuation of salt tabs 1g bid\par - goal Na > 130 preprocedure per last GI note (due for upper and lower endoscopy)\par - f/u repeat CMP today\par \par # Suspicion for Schneider's esophagus\par - pathology indeterminant per GI note - due for surveillance repeat\par - GI to schedule appointment w/ patient for upper and lower endo\par \par # DM\par - no hypoglycemia episodes although FS as low as 70s\par - last a1c 07/2020 6.3, lowered glipizide to once daily from bid at that time\par - on orals metformin 1bid, glipizide, and januvia\par - will repeat a1c today and consider reducing medication burden if a1c too controlled\par - pt receives routine ophthalmology care and sees glaucoma specialist (next 11/5/2020)\par - f/u microalbumin/cr ratio\par - c/w ace inhibitor\par \par # Hypothyroidism\par - biochemically and clinically euthyroid on synthroid 100 mcg daily\par - last TSH 07/2020 0.92\par \par #  HTN\par - controlled on lisinopril 10 mg daily\par \par # HLD\par - c/w atorvastatin\par - f/u lipid panel\par \par # BPH\par - symptoms controlled on tamsulosin and finasteride\par - refilled rx\par \par # Schizophrenia\par - on clozapine\par - follows with Dr. Tejeda, gets monthly CBCs with psych\par \par # HCM\par - Flu shot today\par - Prevnar Jan 2020, will need pneumovax Jan 2021\par - Last colonoscopy 2019 - per last GI note, need to repeat upper and lower this year. GI messaged for appointment scheduling\par \par [place], PGY2\par [employer]\par \par CDW Dr. Angeles\par RTC 6 months

## 2020-10-14 NOTE — PHYSICAL EXAM
[Normal Sclera/Conjunctiva] : normal sclera/conjunctiva [Normal] : affect was normal and insight and judgment were intact [de-identified] : raised yellow patch following contour of lower eye

## 2020-10-15 DIAGNOSIS — K22.70 BARRETT'S ESOPHAGUS WITHOUT DYSPLASIA: ICD-10-CM

## 2020-10-15 DIAGNOSIS — D50.9 IRON DEFICIENCY ANEMIA, UNSPECIFIED: ICD-10-CM

## 2020-10-15 DIAGNOSIS — N40.0 BENIGN PROSTATIC HYPERPLASIA WITHOUT LOWER URINARY TRACT SYMPTOMS: ICD-10-CM

## 2020-10-16 DIAGNOSIS — E11.9 TYPE 2 DIABETES MELLITUS WITHOUT COMPLICATIONS: ICD-10-CM

## 2020-10-16 DIAGNOSIS — E87.1 HYPO-OSMOLALITY AND HYPONATREMIA: ICD-10-CM

## 2020-10-16 DIAGNOSIS — Z23 ENCOUNTER FOR IMMUNIZATION: ICD-10-CM

## 2020-10-23 ENCOUNTER — NON-APPOINTMENT (OUTPATIENT)
Age: 65
End: 2020-10-23

## 2020-11-11 ENCOUNTER — NON-APPOINTMENT (OUTPATIENT)
Age: 65
End: 2020-11-11

## 2020-11-19 ENCOUNTER — APPOINTMENT (OUTPATIENT)
Dept: GASTROENTEROLOGY | Facility: CLINIC | Age: 65
End: 2020-11-19
Payer: MEDICARE

## 2020-11-19 ENCOUNTER — OUTPATIENT (OUTPATIENT)
Dept: OUTPATIENT SERVICES | Facility: HOSPITAL | Age: 65
LOS: 1 days | End: 2020-11-19

## 2020-11-19 VITALS
DIASTOLIC BLOOD PRESSURE: 75 MMHG | RESPIRATION RATE: 16 BRPM | WEIGHT: 198 LBS | SYSTOLIC BLOOD PRESSURE: 125 MMHG | HEIGHT: 67 IN | HEART RATE: 100 BPM | OXYGEN SATURATION: 98 % | BODY MASS INDEX: 31.08 KG/M2

## 2020-11-19 DIAGNOSIS — K59.09 OTHER CONSTIPATION: ICD-10-CM

## 2020-11-19 DIAGNOSIS — D16.6 BENIGN NEOPLASM OF VERTEBRAL COLUMN: ICD-10-CM

## 2020-11-19 DIAGNOSIS — E66.9 OBESITY, UNSPECIFIED: ICD-10-CM

## 2020-11-19 DIAGNOSIS — K22.70 BARRETT'S ESOPHAGUS WITHOUT DYSPLASIA: ICD-10-CM

## 2020-11-19 PROCEDURE — 99213 OFFICE O/P EST LOW 20 MIN: CPT | Mod: GC

## 2020-11-19 NOTE — ASSESSMENT
[FreeTextEntry1] : mpression:\par #Tubular adenoma of colon- Colonoscopy done 6/2019 with 2 polyps removed showing hyperplastic pathology. Prep poor.\par # Schneider's Esophagus\par EGD done 6/10 with two 2cm tongues suspicious for BE. Pathology inconclusive but no dysplasia\par Plan for repeat EGD in 1 year\par # Chronic constipation- improving with bowel regimen\par # Obesity (BMI 30)\par # Chronic hyponatremia- Latest Na was 127 on 2/2020\par # Anemia- Iron studies not consistent with iron deficiency anemia\par \par Recommendations:\par - Will schedule for repeat EGD and colonoscopy for Schneider's surveillance and CRC surveillance however will need  Na checked one week before procedure to ensure if its >130. (This was discussed w Dr Monge) Plan was discussed with the schedular. \par Risks and benefits of the procedure, including: perforation (tear in the colon), bleeding, reaction to sedation, and possible risk for surgical resection and colostomy bag explained\par - Will plan for 2 day prep given history of poor preps in the past\par - Dietary strategies discussed with the patient including use of psyllium husk before breakfast and supper and Glucerna in place of lunch; mild exercise also discussed; weight once weekly; think of calory spending \par \par D/W Dr Monge. \par

## 2020-11-19 NOTE — END OF VISIT
[] : Fellow [FreeTextEntry3] : As modified and discussed with patient\par MD LISANDRO Ignacio [facility]\par Associate Professor of Medicine\par [facility]\par

## 2020-11-30 ENCOUNTER — APPOINTMENT (OUTPATIENT)
Dept: OPHTHALMOLOGY | Facility: CLINIC | Age: 65
End: 2020-11-30

## 2020-12-18 ENCOUNTER — NON-APPOINTMENT (OUTPATIENT)
Age: 65
End: 2020-12-18

## 2020-12-22 ENCOUNTER — EMERGENCY (EMERGENCY)
Facility: HOSPITAL | Age: 65
LOS: 1 days | Discharge: HOME | End: 2020-12-22
Attending: EMERGENCY MEDICINE | Admitting: EMERGENCY MEDICINE
Payer: MEDICARE

## 2020-12-22 ENCOUNTER — OUTPATIENT (OUTPATIENT)
Dept: OUTPATIENT SERVICES | Facility: HOSPITAL | Age: 65
LOS: 1 days | End: 2020-12-22

## 2020-12-22 ENCOUNTER — APPOINTMENT (OUTPATIENT)
Dept: INTERNAL MEDICINE | Facility: CLINIC | Age: 65
End: 2020-12-22

## 2020-12-22 VITALS
HEIGHT: 64 IN | TEMPERATURE: 97 F | RESPIRATION RATE: 16 BRPM | HEART RATE: 93 BPM | SYSTOLIC BLOOD PRESSURE: 134 MMHG | OXYGEN SATURATION: 97 % | DIASTOLIC BLOOD PRESSURE: 72 MMHG

## 2020-12-22 VITALS
OXYGEN SATURATION: 100 % | RESPIRATION RATE: 16 BRPM | TEMPERATURE: 97 F | HEART RATE: 88 BPM | SYSTOLIC BLOOD PRESSURE: 126 MMHG | DIASTOLIC BLOOD PRESSURE: 76 MMHG

## 2020-12-22 VITALS — TEMPERATURE: 96.5 F

## 2020-12-22 DIAGNOSIS — K22.70 BARRETT'S ESOPHAGUS WITHOUT DYSPLASIA: ICD-10-CM

## 2020-12-22 DIAGNOSIS — D12.6 BENIGN NEOPLASM OF COLON, UNSPECIFIED: ICD-10-CM

## 2020-12-22 PROCEDURE — 72125 CT NECK SPINE W/O DYE: CPT | Mod: 26

## 2020-12-22 PROCEDURE — 70450 CT HEAD/BRAIN W/O DYE: CPT | Mod: 26

## 2020-12-22 PROCEDURE — 99284 EMERGENCY DEPT VISIT MOD MDM: CPT | Mod: GC

## 2020-12-22 RX ORDER — ACETAMINOPHEN 500 MG
650 TABLET ORAL ONCE
Refills: 0 | Status: COMPLETED | OUTPATIENT
Start: 2020-12-22 | End: 2020-12-22

## 2020-12-22 RX ORDER — TETANUS TOXOID, REDUCED DIPHTHERIA TOXOID AND ACELLULAR PERTUSSIS VACCINE, ADSORBED 5; 2.5; 8; 8; 2.5 [IU]/.5ML; [IU]/.5ML; UG/.5ML; UG/.5ML; UG/.5ML
0.5 SUSPENSION INTRAMUSCULAR ONCE
Refills: 0 | Status: COMPLETED | OUTPATIENT
Start: 2020-12-22 | End: 2020-12-22

## 2020-12-22 RX ADMIN — TETANUS TOXOID, REDUCED DIPHTHERIA TOXOID AND ACELLULAR PERTUSSIS VACCINE, ADSORBED 0.5 MILLILITER(S): 5; 2.5; 8; 8; 2.5 SUSPENSION INTRAMUSCULAR at 14:05

## 2020-12-22 RX ADMIN — Medication 650 MILLIGRAM(S): at 15:23

## 2020-12-22 NOTE — ED PROVIDER NOTE - MUSCULOSKELETAL, MLM
Spine appears normal, range of motion is not limited, no muscle or joint tenderness. FROM of b/l knees and wrists. No bony tenderness.

## 2020-12-22 NOTE — ED PROVIDER NOTE - CLINICAL SUMMARY MEDICAL DECISION MAKING FREE TEXT BOX
65M with hx of HTN presenting after a mechanical fall today after leaving his doctor's office for a routine visit. On exam, appears well VS, MSK exam wnl, neuro exam wnl, ambulating, has abrasions on left palm, bilateral knees. Will obtain CTH and neck, give adacel, disposition pending work-up and response to treatment.

## 2020-12-22 NOTE — ED PROVIDER NOTE - ATTENDING CONTRIBUTION TO CARE
Pt was seen and evaluated by me. Pt is a 65 male with PMHx of HTN who presented to the ED for head injury s/p fall. Pt states he was leaving his doctor's office when he slipped on his shoelace and fell forward hitting the left side of his head. Pt also notes landing on his hands and knees. Pt admits to slight achy sensation to left side of head. Pt denies any LOC. Pt denies any neck pain, back pain, fever, chills, nausea, vomiting, SOB, chest pain, or abd pain. Pt denies any wrist, elbow, shoulder, knee, or hip pain. No abrasions to scalp. No midline bony tenderness. FROM of b/l UE and LE. No bony tenderness. + distal pulses. Abrasions noted to b/l knees. Abrasion to right 5th finger. FROM.   Concern for head injury  CT, Tdap, Analgesia

## 2020-12-22 NOTE — ED PROVIDER NOTE - PATIENT PORTAL LINK FT
You can access the FollowMyHealth Patient Portal offered by [facility] by registering at the following website: [email or web address]. By joining [employer]’s FollowMyHealth portal, you will also be able to view your health information using other applications (apps) compatible with our system.

## 2020-12-22 NOTE — ED PROVIDER NOTE - PROGRESS NOTE DETAILS
Dr. Mata: Abrasions to b/l knees and right 5th finger were cleaned and Bacitracin applied. Covered with sterile gauze. stone pgy3: received s/o on pt. Patient reassessed, NAD, non-toxic appearing. results dw pt, questions answered. neuro intact, ambulating. return precautions provided.

## 2020-12-22 NOTE — ED ADULT TRIAGE NOTE - CHIEF COMPLAINT QUOTE
pt at doctors office getting routine blood work drawn when he tripped and fell on the pavement at hit his head. Abrasions noted to bilateral hands and knees. Pt states he hit his head, Denies anticoagulant use, LOC. No lacerations/abrasions noted to head. Appears comfortable.

## 2020-12-22 NOTE — ED PROVIDER NOTE - OBJECTIVE STATEMENT
65M with hx of HTN presenting after a mechanical fall today after leaving his doctor's office for a routine visit. Patient slipped on his shoelace. Fell forwards, braced himself with his hands. + head trauma. no LOC. No headache, nausea, vomiting, neck pain, chest pain, abdominal pain, extremity pain. Ambulating well. Denies hand or wrist pain. Does not remember last time he had a tetanus shot.

## 2020-12-22 NOTE — ED PROVIDER NOTE - PHYSICAL EXAMINATION
GENERAL: non-toxic appearing, in NAD  HEAD: atraumatic, normocephalic  EYES: vision grossly intact, no conjunctivitis or discharge  EARS: hearing grossly intact  NOSE: no nasal discharge, epistaxis   CARDIAC: RRR, normal S1S2,  no appreciable murmurs, no cyanosis, cap refill < 2 seconds  PULM: no respiratory distress, oxygen saturation on RA wnl, CTAB, no crackles, rales, rhonchi, or wheezing  GI: abdomen nondistended, soft, nontender, no guarding or rebound tenderness, no palpable masses  NEURO: awake and alert, follows commands, normal speech, PERRLA, EOMI, no focal motor or sensory deficits, normal gait  MSK: spine appears normal, no joint swelling or erythema, no gross deformities of extremities  EXT: no peripheral edema, calf tenderness, redness or swelling  SKIN: abrasions on left palm, bilateral knees  PSYCH: appropriate mood and affect

## 2020-12-22 NOTE — ED PROVIDER NOTE - CARE PLAN
Principal Discharge DX:	Fall   Principal Discharge DX:	Injury of head, initial encounter  Secondary Diagnosis:	Abrasion of knee, bilateral  Secondary Diagnosis:	Abrasion of right hand, initial encounter

## 2020-12-22 NOTE — ED ADULT NURSE NOTE - OBJECTIVE STATEMENT
64 y/o male presents to ED complaining of accidental fall. Pt a&ox4, endorses tripping over his laces on the way into a medical office to get his blood drawn for future colonoscopy. Pt denies LOC, endorses hitting his head on pavement, denies blood thinners. Lacerations noted to R hand. Pt endorses hitting his knees on the ground. MD at bedside for eval. Orders to follow. Will monitor.

## 2020-12-22 NOTE — ED PROVIDER NOTE - NSFOLLOWUPINSTRUCTIONS_ED_ALL_ED_FT
1) Please follow up with your Primary Care Provider in 24-48 hours  2) Seek immediate medical care for any new or returning symptoms including but not limited severe pain, vision changes, difficulty walking, persistent nausea and/or vomiting  3) Take Tylenol 650 mg every 4-6 hours as needed for pain. Do not take more than 2 grams within a 24 hour period

## 2020-12-24 ENCOUNTER — NON-APPOINTMENT (OUTPATIENT)
Age: 65
End: 2020-12-24

## 2020-12-24 NOTE — DISCUSSION/SUMMARY
[ED] : a call from ED [FreeTextEntry1] : [facility] ED 12/22/20\par 65M w/ HTN who presented after mechanical fall after leaving his doctor's office for\par a routine visit. On exam, appeared well; VSS, MSK exam wnl, neuro exam wnl,\par ambulating. Pt was noted to have abrasions on left palm, bilateral knees. CTH/neck wnl. Given adacel, d/c'd home. Rec'd to take tylenol for pain. Pt was attempted to be reached, VM left. FDT tasked to schedule post ED f/u appt. \par

## 2020-12-24 NOTE — DISCUSSION/SUMMARY
[PCP: _____] : PCP: [unfilled] [ED] : a call from ED [FreeTextEntry1] : Presenting to ED after a mechanical fall today, fell forward and hit his head. CTH/neck was without acute pathology. Per ED exam pt has abrasions on left palm, bilateral knees. Pt received adacel and discharged home.  [Follow Up Appt with Provider within 7 days] : follow up appt with provider within 7 days

## 2020-12-28 ENCOUNTER — APPOINTMENT (OUTPATIENT)
Dept: OPHTHALMOLOGY | Facility: CLINIC | Age: 65
End: 2020-12-28

## 2021-01-03 ENCOUNTER — APPOINTMENT (OUTPATIENT)
Dept: DISASTER EMERGENCY | Facility: CLINIC | Age: 66
End: 2021-01-03

## 2021-01-04 LAB — SARS-COV-2 N GENE NPH QL NAA+PROBE: NOT DETECTED

## 2021-01-05 ENCOUNTER — NON-APPOINTMENT (OUTPATIENT)
Age: 66
End: 2021-01-05

## 2021-01-06 ENCOUNTER — NON-APPOINTMENT (OUTPATIENT)
Age: 66
End: 2021-01-06

## 2021-01-08 ENCOUNTER — APPOINTMENT (OUTPATIENT)
Dept: INTERNAL MEDICINE | Facility: CLINIC | Age: 66
End: 2021-01-08

## 2021-01-08 ENCOUNTER — OUTPATIENT (OUTPATIENT)
Dept: OUTPATIENT SERVICES | Facility: HOSPITAL | Age: 66
LOS: 1 days | End: 2021-01-08

## 2021-01-08 ENCOUNTER — RESULT REVIEW (OUTPATIENT)
Age: 66
End: 2021-01-08

## 2021-01-08 VITALS — TEMPERATURE: 95.9 F

## 2021-01-08 DIAGNOSIS — E87.1 HYPO-OSMOLALITY AND HYPONATREMIA: ICD-10-CM

## 2021-01-08 LAB
ANION GAP SERPL CALC-SCNC: 10 MMOL/L — SIGNIFICANT CHANGE UP (ref 7–14)
BUN SERPL-MCNC: 11 MG/DL — SIGNIFICANT CHANGE UP (ref 7–23)
CALCIUM SERPL-MCNC: 9.8 MG/DL — SIGNIFICANT CHANGE UP (ref 8.4–10.5)
CHLORIDE SERPL-SCNC: 94 MMOL/L — LOW (ref 98–107)
CO2 SERPL-SCNC: 26 MMOL/L — SIGNIFICANT CHANGE UP (ref 22–31)
CREAT SERPL-MCNC: 0.72 MG/DL — SIGNIFICANT CHANGE UP (ref 0.5–1.3)
GLUCOSE SERPL-MCNC: 124 MG/DL — HIGH (ref 70–99)
POTASSIUM SERPL-MCNC: 4.8 MMOL/L — SIGNIFICANT CHANGE UP (ref 3.5–5.3)
POTASSIUM SERPL-SCNC: 4.8 MMOL/L — SIGNIFICANT CHANGE UP (ref 3.5–5.3)
SODIUM SERPL-SCNC: 130 MMOL/L — LOW (ref 135–145)

## 2021-01-13 RX ORDER — PEG-3350, SODIUM SULFATE, SODIUM CHLORIDE, POTASSIUM CHLORIDE, SODIUM ASCORBATE AND ASCORBIC ACID 7.5-2.691G
100 KIT ORAL
Qty: 1 | Refills: 0 | Status: COMPLETED | COMMUNITY
Start: 2020-11-19 | End: 2021-01-04

## 2021-01-13 RX ORDER — POLYETHYLENE GLYCOL 3350 AND ELECTROLYTES WITH LEMON FLAVOR 236; 22.74; 6.74; 5.86; 2.97 G/4L; G/4L; G/4L; G/4L; G/4L
236 POWDER, FOR SOLUTION ORAL
Qty: 1 | Refills: 0 | Status: COMPLETED | COMMUNITY
Start: 2020-11-19 | End: 2021-01-04

## 2021-01-22 ENCOUNTER — APPOINTMENT (OUTPATIENT)
Dept: OPHTHALMOLOGY | Facility: CLINIC | Age: 66
End: 2021-01-22

## 2021-02-16 NOTE — ED PROVIDER NOTE - NS_EDPROVIDERDISPOUSERTYPE_ED_A_ED
Flu/RSV POC obtained and running.  COVID specimen sent to lab.     Attending Attestation (For Attendings USE Only)...

## 2021-02-17 ENCOUNTER — OUTPATIENT (OUTPATIENT)
Dept: OUTPATIENT SERVICES | Facility: HOSPITAL | Age: 66
LOS: 1 days | End: 2021-02-17

## 2021-02-17 ENCOUNTER — RX RENEWAL (OUTPATIENT)
Age: 66
End: 2021-02-17

## 2021-02-17 ENCOUNTER — RESULT REVIEW (OUTPATIENT)
Age: 66
End: 2021-02-17

## 2021-02-17 ENCOUNTER — APPOINTMENT (OUTPATIENT)
Dept: INTERNAL MEDICINE | Facility: CLINIC | Age: 66
End: 2021-02-17

## 2021-02-17 VITALS — TEMPERATURE: 96.5 F

## 2021-02-17 DIAGNOSIS — K22.70 BARRETT'S ESOPHAGUS WITHOUT DYSPLASIA: ICD-10-CM

## 2021-02-17 DIAGNOSIS — E66.9 OBESITY, UNSPECIFIED: ICD-10-CM

## 2021-02-17 DIAGNOSIS — K59.09 OTHER CONSTIPATION: ICD-10-CM

## 2021-02-17 DIAGNOSIS — D12.6 BENIGN NEOPLASM OF COLON, UNSPECIFIED: ICD-10-CM

## 2021-02-17 LAB
ANION GAP SERPL CALC-SCNC: 9 MMOL/L — SIGNIFICANT CHANGE UP (ref 7–14)
BUN SERPL-MCNC: 13 MG/DL — SIGNIFICANT CHANGE UP (ref 7–23)
CALCIUM SERPL-MCNC: 9.3 MG/DL — SIGNIFICANT CHANGE UP (ref 8.4–10.5)
CHLORIDE SERPL-SCNC: 93 MMOL/L — LOW (ref 98–107)
CO2 SERPL-SCNC: 28 MMOL/L — SIGNIFICANT CHANGE UP (ref 22–31)
CREAT SERPL-MCNC: 0.71 MG/DL — SIGNIFICANT CHANGE UP (ref 0.5–1.3)
GLUCOSE SERPL-MCNC: 130 MG/DL — HIGH (ref 70–99)
POTASSIUM SERPL-MCNC: 4.8 MMOL/L — SIGNIFICANT CHANGE UP (ref 3.5–5.3)
POTASSIUM SERPL-SCNC: 4.8 MMOL/L — SIGNIFICANT CHANGE UP (ref 3.5–5.3)
SODIUM SERPL-SCNC: 130 MMOL/L — LOW (ref 135–145)

## 2021-02-21 ENCOUNTER — APPOINTMENT (OUTPATIENT)
Dept: DISASTER EMERGENCY | Facility: CLINIC | Age: 66
End: 2021-02-21

## 2021-02-22 ENCOUNTER — NON-APPOINTMENT (OUTPATIENT)
Age: 66
End: 2021-02-22

## 2021-02-22 ENCOUNTER — RX RENEWAL (OUTPATIENT)
Age: 66
End: 2021-02-22

## 2021-02-22 ENCOUNTER — APPOINTMENT (OUTPATIENT)
Dept: DISASTER EMERGENCY | Facility: CLINIC | Age: 66
End: 2021-02-22

## 2021-02-23 ENCOUNTER — NON-APPOINTMENT (OUTPATIENT)
Age: 66
End: 2021-02-23

## 2021-02-23 LAB — SARS-COV-2 N GENE NPH QL NAA+PROBE: NOT DETECTED

## 2021-02-24 ENCOUNTER — RX RENEWAL (OUTPATIENT)
Age: 66
End: 2021-02-24

## 2021-03-09 ENCOUNTER — APPOINTMENT (OUTPATIENT)
Dept: OTOLARYNGOLOGY | Facility: CLINIC | Age: 66
End: 2021-03-09
Payer: MEDICARE

## 2021-03-09 VITALS
SYSTOLIC BLOOD PRESSURE: 167 MMHG | WEIGHT: 198 LBS | HEIGHT: 66 IN | DIASTOLIC BLOOD PRESSURE: 85 MMHG | BODY MASS INDEX: 31.82 KG/M2 | TEMPERATURE: 98 F | HEART RATE: 71 BPM

## 2021-03-09 PROCEDURE — 92567 TYMPANOMETRY: CPT

## 2021-03-09 PROCEDURE — 69210 REMOVE IMPACTED EAR WAX UNI: CPT

## 2021-03-09 PROCEDURE — 92557 COMPREHENSIVE HEARING TEST: CPT

## 2021-03-09 PROCEDURE — 99214 OFFICE O/P EST MOD 30 MIN: CPT | Mod: 25

## 2021-03-09 NOTE — PROCEDURE
[Cerumen Impaction] : Cerumen Impaction [Same] : same as the Pre Op Dx. [] : Removal of Cerumen [FreeTextEntry6] : Cerumen was removed from both ears under binocular microscopy with a combination of a suction and/or a loop curette. The patient tolerated the procedure well and there were no complications. The  findings are noted above.\par

## 2021-03-09 NOTE — DATA REVIEWED
[de-identified] : Right ear showed normal hearing through 2K sloping to mild to moderate sensorineural hearing loss; left ear showed hearing normal through 500 Hz sloping to mild to moderate sensorineural hearing loss.  Shows some worsening since the previous test as well as an asymmetry at 3 frequencies [de-identified] : Had CT of the brain 12/2020 because of a fall.  No tumor seen

## 2021-03-09 NOTE — REASON FOR VISIT
[Subsequent Evaluation] : a subsequent evaluation for [FreeTextEntry2] : f/u for bilateral hearing loss

## 2021-03-09 NOTE — HISTORY OF PRESENT ILLNESS
[de-identified] : 64 year old male follow up for bilateral hearing loss. Patient feels that his hearing may have decreased and was wondering if he was a candidate for hearing aids. Denies otalgia, otorrhea, tinnitus, ear infections. Reports occasional dizziness, when changing positions. No concerns \par

## 2021-03-11 ENCOUNTER — NON-APPOINTMENT (OUTPATIENT)
Age: 66
End: 2021-03-11

## 2021-03-16 ENCOUNTER — NON-APPOINTMENT (OUTPATIENT)
Age: 66
End: 2021-03-16

## 2021-03-16 ENCOUNTER — APPOINTMENT (OUTPATIENT)
Dept: DISASTER EMERGENCY | Facility: CLINIC | Age: 66
End: 2021-03-16

## 2021-03-17 LAB — SARS-COV-2 N GENE NPH QL NAA+PROBE: NOT DETECTED

## 2021-03-17 RX ORDER — PEG-3350, SODIUM SULFATE, SODIUM CHLORIDE, POTASSIUM CHLORIDE, SODIUM ASCORBATE AND ASCORBIC ACID 7.5-2.691G
100 KIT ORAL
Qty: 1 | Refills: 0 | Status: DISCONTINUED | COMMUNITY
Start: 2021-01-13 | End: 2021-03-17

## 2021-03-17 RX ORDER — POLYETHYLENE GLYCOL 3350 AND ELECTROLYTES WITH LEMON FLAVOR 236; 22.74; 6.74; 5.86; 2.97 G/4L; G/4L; G/4L; G/4L; G/4L
236 POWDER, FOR SOLUTION ORAL
Qty: 1 | Refills: 0 | Status: DISCONTINUED | COMMUNITY
Start: 2021-01-13 | End: 2021-03-17

## 2021-03-18 ENCOUNTER — RESULT REVIEW (OUTPATIENT)
Age: 66
End: 2021-03-18

## 2021-03-18 ENCOUNTER — APPOINTMENT (OUTPATIENT)
Dept: INTERNAL MEDICINE | Facility: CLINIC | Age: 66
End: 2021-03-18

## 2021-03-18 ENCOUNTER — OUTPATIENT (OUTPATIENT)
Dept: OUTPATIENT SERVICES | Facility: HOSPITAL | Age: 66
LOS: 1 days | End: 2021-03-18

## 2021-03-18 ENCOUNTER — NON-APPOINTMENT (OUTPATIENT)
Age: 66
End: 2021-03-18

## 2021-03-18 VITALS — TEMPERATURE: 97.3 F

## 2021-03-18 LAB
ANION GAP SERPL CALC-SCNC: 10 MMOL/L — SIGNIFICANT CHANGE UP (ref 7–14)
BUN SERPL-MCNC: 15 MG/DL — SIGNIFICANT CHANGE UP (ref 7–23)
CALCIUM SERPL-MCNC: 9.5 MG/DL — SIGNIFICANT CHANGE UP (ref 8.4–10.5)
CHLORIDE SERPL-SCNC: 87 MMOL/L — LOW (ref 98–107)
CO2 SERPL-SCNC: 26 MMOL/L — SIGNIFICANT CHANGE UP (ref 22–31)
CREAT SERPL-MCNC: 0.81 MG/DL — SIGNIFICANT CHANGE UP (ref 0.5–1.3)
GLUCOSE SERPL-MCNC: 156 MG/DL — HIGH (ref 70–99)
POTASSIUM SERPL-MCNC: 5.4 MMOL/L — HIGH (ref 3.5–5.3)
POTASSIUM SERPL-SCNC: 5.4 MMOL/L — HIGH (ref 3.5–5.3)
SODIUM SERPL-SCNC: 123 MMOL/L — LOW (ref 135–145)

## 2021-03-19 DIAGNOSIS — K59.09 OTHER CONSTIPATION: ICD-10-CM

## 2021-03-19 DIAGNOSIS — D12.6 BENIGN NEOPLASM OF COLON, UNSPECIFIED: ICD-10-CM

## 2021-03-19 DIAGNOSIS — K22.70 BARRETT'S ESOPHAGUS WITHOUT DYSPLASIA: ICD-10-CM

## 2021-03-19 RX ORDER — POLYETHYLENE GLYCOL 3350 AND ELECTROLYTES WITH LEMON FLAVOR 236; 22.74; 6.74; 5.86; 2.97 G/4L; G/4L; G/4L; G/4L; G/4L
236 POWDER, FOR SOLUTION ORAL
Qty: 1 | Refills: 0 | Status: DISCONTINUED | COMMUNITY
Start: 2020-04-30 | End: 2021-03-19

## 2021-03-19 RX ORDER — POLYETHYLENE GLYCOL 3350, SODIUM SULFATE, SODIUM CHLORIDE, POTASSIUM CHLORIDE, ASCORBIC ACID, SODIUM ASCORBATE 7.5-2.691G
100 KIT ORAL
Qty: 1 | Refills: 0 | Status: DISCONTINUED | COMMUNITY
Start: 2020-04-30 | End: 2021-03-19

## 2021-04-09 ENCOUNTER — APPOINTMENT (OUTPATIENT)
Dept: OPHTHALMOLOGY | Facility: CLINIC | Age: 66
End: 2021-04-09

## 2021-04-19 ENCOUNTER — NON-APPOINTMENT (OUTPATIENT)
Age: 66
End: 2021-04-19

## 2021-05-10 NOTE — HISTORY OF PRESENT ILLNESS
No [de-identified] : 64M with schizophrenia, depression, OCD, chronic hyponatremia thought 2/2 SSRI, DM2, HLD, HTN, Obesity and hx of SBO at the terminal ileum (managed with NGT 2017) presents to schedule EGD and colonoscopy.\par \par Patient had been scheduled for 2 recent colonoscopies 2019 which were both of poor prep. Had repeat colonoscopy in 6/2019 with two polyps, hyperplastic. EGD done at the same time showing possible Schneider's esophagus but pathology inconclusive. Pt has had Na <130 most of this year and hasn’t been able to schedule the endoscopic procedure. Pt states he was out of his salt tabs for most of this year and most recently got supplies for it. \par \par The patient denies heartburn, dyspepsia, dysphagia, change in bowel habit, melena, weight loss, anemia or hematochezia, hematemesis. Pt denies family history of gallstones, colorectal cancer, polyps, breast, ovarian cancer. \par \par Lab\par 12/03/2019: Hgb 11.1\par 10/2020: Na 128\par Iron studies TIBC 167, Fe 20.\par

## 2021-05-12 ENCOUNTER — APPOINTMENT (OUTPATIENT)
Dept: INTERNAL MEDICINE | Facility: CLINIC | Age: 66
End: 2021-05-12
Payer: MEDICARE

## 2021-05-12 ENCOUNTER — NON-APPOINTMENT (OUTPATIENT)
Age: 66
End: 2021-05-12

## 2021-05-12 ENCOUNTER — OUTPATIENT (OUTPATIENT)
Dept: OUTPATIENT SERVICES | Facility: HOSPITAL | Age: 66
LOS: 1 days | End: 2021-05-12

## 2021-05-12 ENCOUNTER — RESULT REVIEW (OUTPATIENT)
Age: 66
End: 2021-05-12

## 2021-05-12 VITALS
WEIGHT: 166.5 LBS | OXYGEN SATURATION: 98 % | HEIGHT: 66 IN | DIASTOLIC BLOOD PRESSURE: 70 MMHG | HEART RATE: 86 BPM | BODY MASS INDEX: 26.76 KG/M2 | SYSTOLIC BLOOD PRESSURE: 136 MMHG | RESPIRATION RATE: 16 BRPM

## 2021-05-12 VITALS — TEMPERATURE: 95.2 F

## 2021-05-12 DIAGNOSIS — R56.9 UNSPECIFIED CONVULSIONS: ICD-10-CM

## 2021-05-12 LAB
A1C WITH ESTIMATED AVERAGE GLUCOSE RESULT: 6.9 % — HIGH (ref 4–5.6)
BASOPHILS # BLD AUTO: 0.04 K/UL — SIGNIFICANT CHANGE UP (ref 0–0.2)
BASOPHILS NFR BLD AUTO: 1 % — SIGNIFICANT CHANGE UP (ref 0–2)
EOSINOPHIL # BLD AUTO: 0.08 K/UL — SIGNIFICANT CHANGE UP (ref 0–0.5)
EOSINOPHIL NFR BLD AUTO: 1.9 % — SIGNIFICANT CHANGE UP (ref 0–6)
ESTIMATED AVERAGE GLUCOSE: 151 MG/DL — HIGH (ref 68–114)
GLUCOSE BLDC GLUCOMTR-MCNC: 128
HCT VFR BLD CALC: 34.8 % — LOW (ref 39–50)
HGB BLD-MCNC: 11.2 G/DL — LOW (ref 13–17)
IANC: 2.61 K/UL — SIGNIFICANT CHANGE UP (ref 1.5–8.5)
IMM GRANULOCYTES NFR BLD AUTO: 0.5 % — SIGNIFICANT CHANGE UP (ref 0–1.5)
LYMPHOCYTES # BLD AUTO: 1.12 K/UL — SIGNIFICANT CHANGE UP (ref 1–3.3)
LYMPHOCYTES # BLD AUTO: 26.7 % — SIGNIFICANT CHANGE UP (ref 13–44)
MAGNESIUM SERPL-MCNC: 1.5 MG/DL — LOW (ref 1.6–2.6)
MCHC RBC-ENTMCNC: 30.4 PG — SIGNIFICANT CHANGE UP (ref 27–34)
MCHC RBC-ENTMCNC: 32.2 GM/DL — SIGNIFICANT CHANGE UP (ref 32–36)
MCV RBC AUTO: 94.3 FL — SIGNIFICANT CHANGE UP (ref 80–100)
MONOCYTES # BLD AUTO: 0.33 K/UL — SIGNIFICANT CHANGE UP (ref 0–0.9)
MONOCYTES NFR BLD AUTO: 7.9 % — SIGNIFICANT CHANGE UP (ref 2–14)
NEUTROPHILS # BLD AUTO: 2.61 K/UL — SIGNIFICANT CHANGE UP (ref 1.8–7.4)
NEUTROPHILS NFR BLD AUTO: 62 % — SIGNIFICANT CHANGE UP (ref 43–77)
NRBC # BLD: 0 /100 WBCS — SIGNIFICANT CHANGE UP
NRBC # FLD: 0 K/UL — SIGNIFICANT CHANGE UP
PHOSPHATE SERPL-MCNC: 3.6 MG/DL — SIGNIFICANT CHANGE UP (ref 2.5–4.5)
PLATELET # BLD AUTO: 197 K/UL — SIGNIFICANT CHANGE UP (ref 150–400)
RBC # BLD: 3.69 M/UL — LOW (ref 4.2–5.8)
RBC # FLD: 13.5 % — SIGNIFICANT CHANGE UP (ref 10.3–14.5)
T4 FREE SERPL-MCNC: 1.5 NG/DL — SIGNIFICANT CHANGE UP (ref 0.9–1.8)
TSH SERPL-MCNC: 1.63 UIU/ML — SIGNIFICANT CHANGE UP (ref 0.27–4.2)
WBC # BLD: 4.2 K/UL — SIGNIFICANT CHANGE UP (ref 3.8–10.5)
WBC # FLD AUTO: 4.2 K/UL — SIGNIFICANT CHANGE UP (ref 3.8–10.5)

## 2021-05-12 PROCEDURE — 99213 OFFICE O/P EST LOW 20 MIN: CPT | Mod: GE

## 2021-05-12 RX ORDER — GLIPIZIDE 5 MG/1
5 TABLET ORAL
Qty: 180 | Refills: 0 | Status: DISCONTINUED | COMMUNITY
Start: 2018-04-13 | End: 2021-05-12

## 2021-05-12 RX ORDER — BLOOD SUGAR DIAGNOSTIC
STRIP MISCELLANEOUS
Qty: 1 | Refills: 1 | Status: DISCONTINUED | COMMUNITY
Start: 2019-11-12 | End: 2021-05-12

## 2021-05-12 RX ORDER — LORATADINE 10 MG
17 TABLET,DISINTEGRATING ORAL
Qty: 2 | Refills: 2 | Status: ACTIVE | COMMUNITY
Start: 2021-05-12 | End: 1900-01-01

## 2021-05-12 RX ORDER — FLUTICASONE PROPIONATE 50 UG/1
50 SPRAY, METERED NASAL
Qty: 1 | Refills: 1 | Status: DISCONTINUED | COMMUNITY
Start: 2020-02-13 | End: 2021-05-12

## 2021-05-12 NOTE — REVIEW OF SYSTEMS
[Recent Change In Weight] : ~T recent weight change [Constipation] : constipation [Nocturia] : nocturia [Negative] : Heme/Lymph

## 2021-05-13 ENCOUNTER — RESULT REVIEW (OUTPATIENT)
Age: 66
End: 2021-05-13

## 2021-05-13 ENCOUNTER — OUTPATIENT (OUTPATIENT)
Dept: OUTPATIENT SERVICES | Facility: HOSPITAL | Age: 66
LOS: 1 days | End: 2021-05-13

## 2021-05-13 ENCOUNTER — APPOINTMENT (OUTPATIENT)
Dept: GASTROENTEROLOGY | Facility: CLINIC | Age: 66
End: 2021-05-13
Payer: MEDICARE

## 2021-05-13 VITALS
SYSTOLIC BLOOD PRESSURE: 112 MMHG | HEIGHT: 66 IN | OXYGEN SATURATION: 99 % | BODY MASS INDEX: 26.68 KG/M2 | DIASTOLIC BLOOD PRESSURE: 70 MMHG | WEIGHT: 166 LBS | HEART RATE: 71 BPM

## 2021-05-13 VITALS — TEMPERATURE: 97.1 F

## 2021-05-13 DIAGNOSIS — R56.9 UNSPECIFIED CONVULSIONS: ICD-10-CM

## 2021-05-13 DIAGNOSIS — I10 ESSENTIAL (PRIMARY) HYPERTENSION: ICD-10-CM

## 2021-05-13 DIAGNOSIS — F20.9 SCHIZOPHRENIA, UNSPECIFIED: ICD-10-CM

## 2021-05-13 DIAGNOSIS — D12.6 BENIGN NEOPLASM OF COLON, UNSPECIFIED: ICD-10-CM

## 2021-05-13 DIAGNOSIS — E11.9 TYPE 2 DIABETES MELLITUS WITHOUT COMPLICATIONS: ICD-10-CM

## 2021-05-13 DIAGNOSIS — E11.65 TYPE 2 DIABETES MELLITUS WITH HYPERGLYCEMIA: ICD-10-CM

## 2021-05-13 DIAGNOSIS — K22.70 BARRETT'S ESOPHAGUS WITHOUT DYSPLASIA: ICD-10-CM

## 2021-05-13 DIAGNOSIS — K59.09 OTHER CONSTIPATION: ICD-10-CM

## 2021-05-13 DIAGNOSIS — Z23 ENCOUNTER FOR IMMUNIZATION: ICD-10-CM

## 2021-05-13 DIAGNOSIS — R63.4 ABNORMAL WEIGHT LOSS: ICD-10-CM

## 2021-05-13 LAB
ANION GAP SERPL CALC-SCNC: 8 MMOL/L — SIGNIFICANT CHANGE UP (ref 7–14)
BUN SERPL-MCNC: 17 MG/DL — SIGNIFICANT CHANGE UP (ref 7–23)
CALCIUM SERPL-MCNC: 9 MG/DL — SIGNIFICANT CHANGE UP (ref 8.4–10.5)
CHLORIDE SERPL-SCNC: 95 MMOL/L — LOW (ref 98–107)
CHLORIDE UR-SCNC: 77 MMOL/L — SIGNIFICANT CHANGE UP
CO2 SERPL-SCNC: 27 MMOL/L — SIGNIFICANT CHANGE UP (ref 22–31)
CREAT SERPL-MCNC: 0.8 MG/DL — SIGNIFICANT CHANGE UP (ref 0.5–1.3)
GLUCOSE SERPL-MCNC: 189 MG/DL — HIGH (ref 70–99)
POTASSIUM SERPL-MCNC: 5 MMOL/L — SIGNIFICANT CHANGE UP (ref 3.5–5.3)
POTASSIUM SERPL-SCNC: 5 MMOL/L — SIGNIFICANT CHANGE UP (ref 3.5–5.3)
SODIUM SERPL-SCNC: 130 MMOL/L — LOW (ref 135–145)
SODIUM UR-SCNC: 69 MMOL/L — SIGNIFICANT CHANGE UP

## 2021-05-13 PROCEDURE — 99214 OFFICE O/P EST MOD 30 MIN: CPT | Mod: GC

## 2021-05-13 NOTE — END OF VISIT
[] : Resident [FreeTextEntry3] : 64M with schizophrenia, controlled T2DM, HLD, HTN, hypothyroidism on synthroid, Schneider's esophagus, depression, OCD, chronic hyponatremia (probably 2/2 SGA), glaucoma s/p laser therapy, BPH, multiple colonic TAs in past here for follow-up.\par \par #Hyponatremia \par Noted to have hyponatremia to 123 in 3/21, unable to get repeat colonoscopy. Pt was off sodium tablets at that time, restarted after. Will recheck today. If not > 130, will increase dose to 1g TID. If > 130 will be able to get repeat colonoscopy. \par \par #Weight loss\par Noted to have unintentional weight loss ~30 lbs over the last 2 months. Pt denies worsening depression, change in diet, exercise. No known hx of prostate ca, CT A/P in 2018 wnl, doesn't meet criteria for Lung Ca screening. Has hx of tubular adenoma, already needs repeat C-scope. If repeat negative, will consider CT C/A/P for malignancy w/u\par \par #Hypoglycemia \par Repeated episodes of hypoglycemia to 60s, fasting AM FS usually 100s. Will d/c glipizide, c/w metformin, januvia. Recheck HbA1c \par \par #HTN\par BP 130s/70s today, c/w lisinopril\par K noted to be elevated to 5.4 on last BMP, will repeat today. If remains elevated, will decrease or d/c ACEi. [Time Spent: ___ minutes] : I have spent [unfilled] minutes of time on the encounter.

## 2021-05-13 NOTE — PHYSICAL EXAM
[Normal] : affect was normal and insight and judgment were intact [de-identified] : systolic murmur [de-identified] : 1+ pitting edema b/l

## 2021-05-13 NOTE — ASSESSMENT
[FreeTextEntry1] : 64M with schizophrenia, controlled T2DM, HLD, HTN, hypothyroidism on synthroid, Schneider's esophagus, depression, OCD, chronic hyponatremia (probably 2/2 SGA), glaucoma s/p laser therapy, BPH, multiple colonic TAs in past here for follow-up. \par \par # Unexpected weight loss of ~30 lbs in 2 months\par - concern for hyperthyroidism vs malignancy given GI hx\par - f/u GI for upper and lower endoscopy\par - f/u TFTs, CBC\par - if workup is unrevealing, will pursue CTC and AP with IV contrast to r/o malignancy\par - continue to monitor weight\par \par # Hyponatremia\par - Na 123 in 03/2021 i/s/o not taking NaCl\par - repeat BMP, urine studies today\par - fluid restriction reinforced\par - c/w NaCl 1g bid. may increase 1g tid or 2g bid pending sodium\par \par # Suspicion for Schneider's esophagus\par - due for repeat EGD/colon pending sNa > 130\par - f/u GI appointment for rescheduling\par \par # Chronic constipation\par - miralax bid PRN\par \par # T2DM\par - previously well controlled on oral medications\par - repeat a1c 5/12/2021\par - c/w metformin 1g bid, januvia 100 mg daily\par - STOP glipizide given hypoglycemia symptoms\par - continue FS log, counseled on hypoglycemic symptoms and to reach out to provider if with more episodes of hypoglycemia\par  \par # Hypothyroidism\par - constipation may be symptomatic of hypothyroidism, however, also with uncontrolled weight loss which may be symptomatic of hyperthyroidism. no other overt thyroid related symptoms\par - repeat TSH w/ reflex T4\par - c/w levothyroxine 100 mcg qAM\par \par # Seizure\par - last seizures in 1990s\par - c/w depakote\par \par # Schizophrenia / depression\par - no warning signs, pt without overt negative symptoms\par - c/w clozapine, effexor \par \par # HCM\par - 2nd dose PPSV23 5/12/2021\par - COVID19 - completed pfizer vaccine early April 2021\par - Due for repeat colonoscopy\par \par Jones [place], PGY2\par [employer] [facility]\par \par CDW Dr. Brown\par RTC 3 months

## 2021-05-13 NOTE — PHYSICAL EXAM
[General Appearance - Alert] : alert [General Appearance - In No Acute Distress] : in no acute distress [Sclera] : the sclera and conjunctiva were normal [PERRL With Normal Accommodation] : pupils were equal in size, round, and reactive to light [Outer Ear] : the ears and nose were normal in appearance [Neck Appearance] : the appearance of the neck was normal [Bowel Sounds] : normal bowel sounds [Abdomen Soft] : soft [Abdomen Tenderness] : non-tender [No Focal Deficits] : no focal deficits [Oriented To Time, Place, And Person] : oriented to person, place, and time

## 2021-05-13 NOTE — HISTORY OF PRESENT ILLNESS
[FreeTextEntry1] : f/u [de-identified] : 64M with schizophrenia, controlled T2DM, HLD, HTN, hypothyroidism on synthroid, Schneider's esophagus, depression, OCD, chronic hyponatremia (probably 2/2 SGA), glaucoma s/p laser therapy, BPH, multiple colonic TAs in past here for follow-up.\par \par In the interim, pt was supposed to undergo EGD/colonoscopy, however, procedure was cancelled due to hyponatremia with sodium of 123. K was also 5.4 at that time. Pt had been rx salt tabs 1g bid but had not taken it for several weeks. He has started taking it again for the last several weeks.\par \par Pt has had occasional episodes of hypoglycemia with FS in the high 60s with symptoms of irritability and hunger. Pt called the office and was told to cut back on metformin 1g bid to daily. Pt's checks FS in AM, and is usually around 100s, highest low 140s. \par \par Of concern, pt was consistently around 190 lbs but today's weight is 166. Unintentional weight loss, no changes of diet, no melena, hematochezia, fever, chills, night sweats. \par \par Pt has constipation, defecates on average every 3 to 4 days. Pt has LUTS with nocturia 3-4x/night, denies dysuria. \par \par Pt denies any other symptoms including headache, fevers, chills, cough, vision or hearing changes, chest pain, sob, abd pain, dysuria, tremors, hot or cold intolerance, nail or hair changes.

## 2021-05-14 DIAGNOSIS — D12.6 BENIGN NEOPLASM OF COLON, UNSPECIFIED: ICD-10-CM

## 2021-05-14 DIAGNOSIS — K22.70 BARRETT'S ESOPHAGUS WITHOUT DYSPLASIA: ICD-10-CM

## 2021-05-14 DIAGNOSIS — F20.9 SCHIZOPHRENIA, UNSPECIFIED: ICD-10-CM

## 2021-05-14 DIAGNOSIS — E87.1 HYPO-OSMOLALITY AND HYPONATREMIA: ICD-10-CM

## 2021-05-14 DIAGNOSIS — K59.09 OTHER CONSTIPATION: ICD-10-CM

## 2021-05-14 LAB — OSMOLALITY UR: 382 MOSM/KG — SIGNIFICANT CHANGE UP (ref 50–1200)

## 2021-05-14 NOTE — END OF VISIT
[] : Fellow [Time Spent: ___ minutes] : I have spent [unfilled] minutes of time on the encounter. [FreeTextEntry3] : \par 66 yo M pmh schizophrenia, OCD, chronic hyponatremia to 120s who was referred to expedite endoscopic procedures.  In 2019 -> had poor prep but had polyps removed (1 hyperplastic, 1 not captured).  Had been planned for repeat due to bad prep.  Also found to have C0M2, non dysplastic barretts - who was due in 2020 for repeat EGD - on PPI currently.  PCP concerned re: weight loss (30 lbs still).  Also with some constipation persisting off meds\par \par Ultimately last BMP shows Na 123 (pending results from yesterday) and had not been full evaluated.  Has repeat labs and urine studies pending.  Starting Salt tabs.\par \par Will need EGD/Colon.  Colon with 2 day prep\par Needs standing bowel Regimen with Miralax +/- metamucil.  \par C/w PPI at this time\par Follow with PCP - will coordinate with team

## 2021-05-14 NOTE — HISTORY OF PRESENT ILLNESS
[de-identified] : Mr. Olmstead is a 64yo M with schizophrenia, depression, OCD, chronic hyponatremia thought 2/2 SSRI, DM2, HLD, HTN, Obesity and hx of SBO at the terminal ileum (managed with NGT 2017) who presents to schedule EGD and colonoscopy.\par \par Patient had been scheduled for 2 recent colonoscopies 2019 which were both of poor prep. Had repeat colonoscopy in 6/2019 with two polyps (1 hyperplastic, 1 lost). EGD done at the same time showing possible Schneider's esophagus but pathology inconclusive. He has had chronic Na <130. Patient was recommended repeat colonoscopy and endoscopy with 2 day prep, but had multiple cancellations for various reasons - inadequate prep, low Na and no COVID testing. Patient now presents to reschedule the colonoscopy. He was recently started on salt tabs for hyponatremia (after not taking them for awhile) and MiraLAX for constipation. He endorses unintentional 30lbs weight loss over the past year. eats plenty of fiber. \par \par The patient denies heartburn, dyspepsia, dysphagia, melena, hematochezia, hematemesis. Pt denies family history of gallstones, colorectal cancer, polyps, breast, ovarian cancer.

## 2021-05-14 NOTE — ASSESSMENT
[FreeTextEntry1] : 64yo M with schizophrenia, depression, OCD, chronic hyponatremia thought 2/2 SSRI, DM2, HLD, HTN, Obesity and hx of SBO at the terminal ileum (managed with NGT 2017) who presents to schedule EGD and colonoscopy.\par \par # Schneider's esophagus, C0M2, non dysplastic. Planned for repeat endoscopy for surveillance. On PPI.\par # Colorectal screening - history of TA, recent colonoscopies with poor prep now with reported weight loss. Will repeat colonoscopy with proper prep. Given previous cancellation due to hyponatremia, would aim to schedule the procedure after the sodium is stable and workup is completed by PCP. \par # Hyponatremia - unclear etiology, currently being evaluated by PCP\par # Constipation - off medications until started on Miralax BID by PCP y/d, will follow\par # Weight loss\par # DM2\par \par Plan:\par - Plan for telephonic visit in 2 months - if hyponatremia workup is completed and patient is optimized will schedule a colonoscopy at that time\par - c/w salt tabs\par - Miralax BID\par - Repeat endoscopy and colonoscopy pending above (plan for 2 day prep)\par \par Discussed with Dr. Jarquin

## 2021-06-02 ENCOUNTER — APPOINTMENT (OUTPATIENT)
Dept: NEPHROLOGY | Facility: CLINIC | Age: 66
End: 2021-06-02

## 2021-07-09 ENCOUNTER — NON-APPOINTMENT (OUTPATIENT)
Age: 66
End: 2021-07-09

## 2021-07-09 ENCOUNTER — APPOINTMENT (OUTPATIENT)
Dept: OPHTHALMOLOGY | Facility: CLINIC | Age: 66
End: 2021-07-09
Payer: MEDICARE

## 2021-07-09 PROCEDURE — 92004 COMPRE OPH EXAM NEW PT 1/>: CPT

## 2021-07-09 PROCEDURE — 92202 OPSCPY EXTND ON/MAC DRAW: CPT

## 2021-07-09 PROCEDURE — 92133 CPTRZD OPH DX IMG PST SGM ON: CPT

## 2021-07-22 ENCOUNTER — OUTPATIENT (OUTPATIENT)
Dept: OUTPATIENT SERVICES | Facility: HOSPITAL | Age: 66
LOS: 1 days | End: 2021-07-22

## 2021-07-22 ENCOUNTER — APPOINTMENT (OUTPATIENT)
Dept: GASTROENTEROLOGY | Facility: CLINIC | Age: 66
End: 2021-07-22
Payer: MEDICARE

## 2021-07-22 VITALS
HEIGHT: 66 IN | HEART RATE: 69 BPM | BODY MASS INDEX: 26.52 KG/M2 | WEIGHT: 165 LBS | RESPIRATION RATE: 16 BRPM | DIASTOLIC BLOOD PRESSURE: 86 MMHG | SYSTOLIC BLOOD PRESSURE: 140 MMHG | OXYGEN SATURATION: 98 %

## 2021-07-22 VITALS — TEMPERATURE: 97.3 F

## 2021-07-22 DIAGNOSIS — K59.09 OTHER CONSTIPATION: ICD-10-CM

## 2021-07-22 PROCEDURE — 99213 OFFICE O/P EST LOW 20 MIN: CPT | Mod: GC

## 2021-07-27 DIAGNOSIS — K59.09 OTHER CONSTIPATION: ICD-10-CM

## 2021-07-27 DIAGNOSIS — E87.1 HYPO-OSMOLALITY AND HYPONATREMIA: ICD-10-CM

## 2021-07-27 DIAGNOSIS — Z08 ENCOUNTER FOR FOLLOW-UP EXAMINATION AFTER COMPLETED TREATMENT FOR MALIGNANT NEOPLASM: ICD-10-CM

## 2021-07-27 DIAGNOSIS — K22.70 BARRETT'S ESOPHAGUS WITHOUT DYSPLASIA: ICD-10-CM

## 2021-07-28 NOTE — END OF VISIT
[] : Fellow [FreeTextEntry3] : 65-year-old male with chronic hyponatremia secondary to SIADH/history of SBO at TI with chronic constipation, One bowel movement every week to 2 weeks here for evaluation for EGD/colonoscopy.\par \par Presurgical testing for EGD/: Given history of Schneider's and constipation\par Patient will need 2 day prep\par Start MiraLax twice a day for ongoing constipation\par

## 2021-07-28 NOTE — REASON FOR VISIT
[Procedure: _________] : a [unfilled] procedure visit [FreeTextEntry1] : f/u schedule EGD/colon for Schneider's esophagus

## 2021-07-28 NOTE — REVIEW OF SYSTEMS
[Fever] : no fever [Chills] : no chills [Eye Pain] : no eye pain [Dry Eyes] : no dryness of the eyes [Sore Throat] : no sore throat [Hoarseness] : no hoarseness [Chest Pain] : no chest pain [Palpitations] : no palpitations [Shortness Of Breath] : no shortness of breath [Cough] : no cough [Muscle Weakness] : no muscle weakness [Feelings Of Weakness] : no feelings of weakness [Easy Bleeding] : no tendency for easy bleeding [Easy Bruising] : no tendency for easy bruising

## 2021-07-28 NOTE — ASSESSMENT
[FreeTextEntry1] : Mr. Olmstead was last seen in clinic 5/31/21 to schedule an EGD/colon. However, we opted to ensure he was optimized first with repeat Na levels prior to scheduling. He was also instructed to take miralax bid (pcp managing).\par \par Patient is medically optimized to proceed with EGD/colon. Informed patient that it is medically safe for him to take the miralax and it is essential to his bowel prep. Call his brother, whom he lives with, and left a VM regarding him taking miralax.\par \par #. Schneider's esophagus\par #. Colon cancer screening\par #. Chronic hyponatremia \par - Plan for EGD/colon\par - 2 day prep with mag citrate/golytely\par - Resume miralax bid\par - Ordered for commode\par - PST\par - Continue salt tabs\par \par RTC pending procedure \par \par Patient seen and discussed with Dr. Miranda Ny.\par \par Denisse Bustamante MD\par GI/Hepatology Fellow

## 2021-07-28 NOTE — HISTORY OF PRESENT ILLNESS
[de-identified] : Patient is a 64 yo M with chronic hyponatremia thought to be 2/2 SIADH per chart on salt tabs, SBO at TI (medically managed with NGT 2017), non dysplastic Barretts COM2, h/o colon polyps (hyperplastic), chronic constipation, 30 pound weight loss in 1 year and presents today as sa f/u patient to schedule his EGD/colon.\par \par Mr. Olmstead was last seen in clinic 5/31/21 to schedule an EGD/colon. However, we opted to ensure he was optimized first with repeat Na levels prior to scheduling. He was also instructed to take Miralax bid (pcp managing).\par \par His Na came back 130, which appears to be around his baseline and he is on salt tablets. He continues to have constipation, reporting 1 BM every 2 weeks, no n/v/d, or abdominal pain. Says he is not taking any medication and stopped Miralax because his brother said it will cause problems with his mental status. Patient also denying any new changes in his weight.\par \par Patient also requesting a commode for the bowel prep. Says he is not able to make it to the bathroom in time.

## 2021-07-28 NOTE — PHYSICAL EXAM
[General Appearance - Alert] : alert [General Appearance - In No Acute Distress] : in no acute distress [Sclera] : the sclera and conjunctiva were normal [Extraocular Movements] : extraocular movements were intact [Outer Ear] : the ears and nose were normal in appearance [Hearing Threshold Finger Rub Not [place]] : hearing was normal [Neck Appearance] : the appearance of the neck was normal [Neck Cervical Mass (___cm)] : no neck mass was observed [Exaggerated Use Of Accessory Muscles For Inspiration] : no accessory muscle use [Heart Rate And Rhythm] : heart rate was normal and rhythm regular [Edema] : there was no peripheral edema [Abdomen Soft] : soft [Abdomen Tenderness] : non-tender [Musculoskeletal - Swelling] : no joint swelling seen [] : no rash [Skin Lesions] : no skin lesions [Motor Exam] : the motor exam was normal [Oriented To Time, Place, And Person] : oriented to person, place, and time [Affect] : the affect was normal [FreeTextEntry1] : slow gait

## 2021-08-02 NOTE — DISCHARGE NOTE ADULT - HAS THE PATIENT RECEIVED THE INFLUENZA VACCINE DURING THIS VISIT
"Routing refill request to provider for review/approval because:  ACT score    Last Written Prescription Date:  2/1/21  Last Fill Quantity: 1,  # refills: 2   Last office visit provider:  6/18/21     Requested Prescriptions   Pending Prescriptions Disp Refills     albuterol (PROAIR HFA/PROVENTIL HFA/VENTOLIN HFA) 108 (90 Base) MCG/ACT inhaler       Sig: Inhale 2 puffs into the lungs every 6 hours as needed       Asthma Maintenance Inhalers - Anticholinergics Failed - 7/29/2021 10:38 AM        Failed - Asthma control assessment score within normal limits in last 6 months     Please review ACT score.           Passed - Patient is age 12 years or older        Passed - Medication is active on med list        Passed - Recent (6 mo) or future (30 days) visit within the authorizing provider's specialty     Patient had office visit in the last 6 months or has a visit in the next 30 days with authorizing provider or within the authorizing provider's specialty.  See \"Patient Info\" tab in inbasket, or \"Choose Columns\" in Meds & Orders section of the refill encounter.           Short-Acting Beta Agonist Inhalers Protocol  Failed - 7/29/2021 10:38 AM        Failed - Asthma control assessment score within normal limits in last 6 months     Please review ACT score.           Passed - Patient is age 12 or older        Passed - Medication is active on med list        Passed - Recent (6 mo) or future (30 days) visit within the authorizing provider's specialty     Patient had office visit in the last 6 months or has a visit in the next 30 days with authorizing provider or within the authorizing provider's specialty.  See \"Patient Info\" tab in inbasket, or \"Choose Columns\" in Meds & Orders section of the refill encounter.                 Ar Cope RN 08/02/21 2:36 PM  "
Pending Prescriptions:                       Disp   Refills    albuterol (PROAIR HFA/PROVENTIL HFA/BABATUNDE*                    Sig: Inhale 2 puffs into the lungs every 6 hours as           needed      Last filled 6/17/2021  
No

## 2021-08-11 ENCOUNTER — OUTPATIENT (OUTPATIENT)
Dept: OUTPATIENT SERVICES | Facility: HOSPITAL | Age: 66
LOS: 1 days | End: 2021-08-11

## 2021-08-11 ENCOUNTER — RESULT REVIEW (OUTPATIENT)
Age: 66
End: 2021-08-11

## 2021-08-11 ENCOUNTER — APPOINTMENT (OUTPATIENT)
Dept: NEPHROLOGY | Facility: CLINIC | Age: 66
End: 2021-08-11
Payer: MEDICARE

## 2021-08-11 VITALS
HEART RATE: 66 BPM | HEIGHT: 66 IN | SYSTOLIC BLOOD PRESSURE: 162 MMHG | WEIGHT: 170 LBS | DIASTOLIC BLOOD PRESSURE: 80 MMHG | OXYGEN SATURATION: 98 % | BODY MASS INDEX: 27.32 KG/M2 | RESPIRATION RATE: 15 BRPM

## 2021-08-11 VITALS — TEMPERATURE: 97.2 F

## 2021-08-11 DIAGNOSIS — E87.1 HYPO-OSMOLALITY AND HYPONATREMIA: ICD-10-CM

## 2021-08-11 LAB
ALBUMIN SERPL ELPH-MCNC: 4.2 G/DL — SIGNIFICANT CHANGE UP (ref 3.3–5)
ALP SERPL-CCNC: 67 U/L — SIGNIFICANT CHANGE UP (ref 40–120)
ALT FLD-CCNC: 18 U/L — SIGNIFICANT CHANGE UP (ref 4–41)
ANION GAP SERPL CALC-SCNC: 13 MMOL/L — SIGNIFICANT CHANGE UP (ref 7–14)
AST SERPL-CCNC: 14 U/L — SIGNIFICANT CHANGE UP (ref 4–40)
BILIRUB SERPL-MCNC: 0.3 MG/DL — SIGNIFICANT CHANGE UP (ref 0.2–1.2)
BUN SERPL-MCNC: 15 MG/DL — SIGNIFICANT CHANGE UP (ref 7–23)
CALCIUM SERPL-MCNC: 9.6 MG/DL — SIGNIFICANT CHANGE UP (ref 8.4–10.5)
CHLORIDE SERPL-SCNC: 91 MMOL/L — LOW (ref 98–107)
CO2 SERPL-SCNC: 24 MMOL/L — SIGNIFICANT CHANGE UP (ref 22–31)
CREAT SERPL-MCNC: 0.77 MG/DL — SIGNIFICANT CHANGE UP (ref 0.5–1.3)
GLUCOSE SERPL-MCNC: 148 MG/DL — HIGH (ref 70–99)
POTASSIUM SERPL-MCNC: 5 MMOL/L — SIGNIFICANT CHANGE UP (ref 3.5–5.3)
POTASSIUM SERPL-SCNC: 5 MMOL/L — SIGNIFICANT CHANGE UP (ref 3.5–5.3)
PROT SERPL-MCNC: 6.4 G/DL — SIGNIFICANT CHANGE UP (ref 6–8.3)
SODIUM SERPL-SCNC: 128 MMOL/L — LOW (ref 135–145)

## 2021-08-11 PROCEDURE — 99204 OFFICE O/P NEW MOD 45 MIN: CPT | Mod: GC

## 2021-08-11 NOTE — REVIEW OF SYSTEMS
[As Noted in HPI] : as noted in HPI [Fever] : no fever [Eye Pain] : no eye pain [Earache] : no earache [Chest Pain] : no chest pain [Lower Ext Edema] : no extremity edema [Shortness Of Breath] : no shortness of breath [Wheezing] : no wheezing [Cough] : no cough [Abdominal Pain] : no abdominal pain [Constipation] : no constipation [Dysuria] : no dysuria [Incontinence] : no incontinence [Joint Swelling] : no joint swelling [Limb Swelling] : no limb swelling [Itching] : no itching [Dizziness] : no dizziness [Easy Bruising] : no tendency for easy bruising

## 2021-08-11 NOTE — ASSESSMENT
[FreeTextEntry1] : 1. Hyponatremia: Pt. with chronic/long standing history of hyponatremia. On review of previous labs on [facility], pt. noted to have low SNa levels since 2007. Last SNa was low but stable at 130 on 5/12/21. Pt. clinically euvolemic. Pt. with likely SIADH in setting of Venlafaxine and Clozapine use. Check SNa today. Pt. currently on oral salt tablets. Continue with oral salt tablets (1g BID). Fluid restriction also advised. Monitor SNa. \par \par RTC in 4 months.

## 2021-08-11 NOTE — HISTORY OF PRESENT ILLNESS
[FreeTextEntry1] : 65-year-old male with PMH of schizophrenia, depression, OCD, DM, HLD, HTN, hypothyroidism, Schneider's esophagus, chronic hyponatremia (on oral salt tablets), glaucoma s/p laser therapy and BPH presents to renal clinic for initial evaluation of chronic hyponatremia. \par \par Pt. reports low (serum) sodium levels for many years since he started taking his psych medications. Pt. has been on oral salt tablets (1g BID) however reports non-compliance to oral sodium tablets in the past. Pt. was also advised fluid restriction in the past (as per pt). On review of previous labs on [facility], pt. noted to have low SNa levels since 2007. Last SNa was low but stable at 130 on 5/12/21.  \par \par Pt. currently feels well but reports increased fluid intake. Pt. reports compliance with his medications and has no issues with urination.

## 2021-08-11 NOTE — END OF VISIT
[FreeTextEntry3] : I was physically present for the key portions of the evaluation and management (E/M) service provided. I agree with the above history, ROS, physical exam, assessment and plan which I have reviewed and edited where appropriate. I have also reviewed the assessment and management of hyponatremia with the patient during clinic visit today.\par \par Pt. with chronic mild euvolemic hyponatremia. Check labs today (as outlined above). Continue with oral salt tablets. Fluid restriction advised.

## 2021-08-11 NOTE — PHYSICAL EXAM
[General Appearance - Alert] : alert [Sclera] : the sclera and conjunctiva were normal [Outer Ear] : the ears and nose were normal in appearance [Neck Appearance] : the appearance of the neck was normal [Auscultation Breath Sounds / Voice Sounds] : lungs were clear to auscultation bilaterally [Edema] : there was no peripheral edema [Abdomen Tenderness] : non-tender [No CVA Tenderness] : no ~M costovertebral angle tenderness [General Appearance - In No Acute Distress] : in no acute distress [Jugular Venous Distention Increased] : there was no jugular-venous distention [Respiration, Rhythm And Depth] : normal respiratory rhythm and effort [Heart Sounds] : normal S1 and S2 [Abdomen Soft] : soft [Nail Clubbing] : no clubbing  or cyanosis of the fingernails [Musculoskeletal - Swelling] : no joint swelling seen [] : no rash [FreeTextEntry1] : a

## 2021-08-13 ENCOUNTER — NON-APPOINTMENT (OUTPATIENT)
Age: 66
End: 2021-08-13

## 2021-08-23 ENCOUNTER — APPOINTMENT (OUTPATIENT)
Dept: INTERNAL MEDICINE | Facility: CLINIC | Age: 66
End: 2021-08-23

## 2021-08-23 ENCOUNTER — RESULT REVIEW (OUTPATIENT)
Age: 66
End: 2021-08-23

## 2021-08-23 ENCOUNTER — OUTPATIENT (OUTPATIENT)
Dept: OUTPATIENT SERVICES | Facility: HOSPITAL | Age: 66
LOS: 1 days | End: 2021-08-23

## 2021-08-23 VITALS — TEMPERATURE: 97.5 F

## 2021-08-23 DIAGNOSIS — E87.1 HYPO-OSMOLALITY AND HYPONATREMIA: ICD-10-CM

## 2021-08-23 LAB
ANION GAP SERPL CALC-SCNC: 15 MMOL/L — HIGH (ref 7–14)
BUN SERPL-MCNC: 14 MG/DL — SIGNIFICANT CHANGE UP (ref 7–23)
CALCIUM SERPL-MCNC: 9.8 MG/DL — SIGNIFICANT CHANGE UP (ref 8.4–10.5)
CHLORIDE SERPL-SCNC: 97 MMOL/L — LOW (ref 98–107)
CO2 SERPL-SCNC: 23 MMOL/L — SIGNIFICANT CHANGE UP (ref 22–31)
CREAT SERPL-MCNC: 0.75 MG/DL — SIGNIFICANT CHANGE UP (ref 0.5–1.3)
GLUCOSE SERPL-MCNC: 75 MG/DL — SIGNIFICANT CHANGE UP (ref 70–99)
POTASSIUM SERPL-MCNC: 5.3 MMOL/L — SIGNIFICANT CHANGE UP (ref 3.5–5.3)
POTASSIUM SERPL-SCNC: 5.3 MMOL/L — SIGNIFICANT CHANGE UP (ref 3.5–5.3)
SODIUM SERPL-SCNC: 135 MMOL/L — SIGNIFICANT CHANGE UP (ref 135–145)

## 2021-08-26 ENCOUNTER — NON-APPOINTMENT (OUTPATIENT)
Age: 66
End: 2021-08-26

## 2021-10-01 ENCOUNTER — NON-APPOINTMENT (OUTPATIENT)
Age: 66
End: 2021-10-01

## 2021-10-02 ENCOUNTER — EMERGENCY (EMERGENCY)
Facility: HOSPITAL | Age: 66
LOS: 1 days | Discharge: HOME | End: 2021-10-02
Attending: STUDENT IN AN ORGANIZED HEALTH CARE EDUCATION/TRAINING PROGRAM | Admitting: STUDENT IN AN ORGANIZED HEALTH CARE EDUCATION/TRAINING PROGRAM
Payer: MEDICARE

## 2021-10-02 ENCOUNTER — TRANSCRIPTION ENCOUNTER (OUTPATIENT)
Age: 66
End: 2021-10-02

## 2021-10-02 VITALS
SYSTOLIC BLOOD PRESSURE: 141 MMHG | OXYGEN SATURATION: 100 % | HEART RATE: 81 BPM | DIASTOLIC BLOOD PRESSURE: 78 MMHG | HEIGHT: 64 IN | TEMPERATURE: 98 F | RESPIRATION RATE: 16 BRPM

## 2021-10-02 LAB

## 2021-10-02 PROCEDURE — 99284 EMERGENCY DEPT VISIT MOD MDM: CPT

## 2021-10-02 NOTE — ED PROVIDER NOTE - OBJECTIVE STATEMENT
64 yo male with PMH DM, glaucoma presents to ED for MAB after being found to be covid positive yesterday. Reports he has been having coughing, sneezing, generalized weakness, fatigue, nausea. Denies shortness of breath, abd pain, chest, vomiting, diarrhea x 5days.     Dr. Edgar  Allergies: none 64 yo male with PMH DM, glaucoma presents to ED for MAB after being found to be covid positive yesterday. Reports he has been having coughing, sneezing, generalized weakness, fatigue, nausea. Denies shortness of breath, abd pain, chest, vomiting, diarrhea x 5days. Sent to ED for MAB infusion  Patient's cell phone   PMD : Dr. Edgar  Allergies: none

## 2021-10-02 NOTE — ED ADULT NURSE NOTE - NSIMPLEMENTINTERV_GEN_ALL_ED
Implemented All Universal Safety Interventions:  [place] to call system. Call bell, personal items and telephone within reach. Instruct patient to call for assistance. Room bathroom lighting operational. Non-slip footwear when patient is off stretcher. Physically safe environment: no spills, clutter or unnecessary equipment. Stretcher in lowest position, wheels locked, appropriate side rails in place.

## 2021-10-02 NOTE — ED ADULT TRIAGE NOTE - CHIEF COMPLAINT QUOTE
Pt sent from urgent care for positive covid test, c/o cough, fatigue. Denies SOB. Was vaccinated against covid in March

## 2021-10-02 NOTE — ED PROVIDER NOTE - CLINICAL SUMMARY MEDICAL DECISION MAKING FREE TEXT BOX
covid positive, requesting MAB. MAB is not available in the ED today, will refer to outpatient MAB team

## 2021-10-02 NOTE — ED PROVIDER NOTE - PATIENT PORTAL LINK FT
You can access the FollowMyHealth Patient Portal offered by [facility] by registering at the following website: [email or web address]. By joining [employer]’s FollowMyHealth portal, you will also be able to view your health information using other applications (apps) compatible with our system.

## 2021-10-02 NOTE — ED ADULT NURSE NOTE - OBJECTIVE STATEMENT
Pt arriving to intake room 2 A&OX4 ambulatory for + covid test. Pt fully vaccinated. Sent to ED by Urgent Care for monoclonal antibody treatment. Resp even and unlabored. Denies CP, SOB. Endorsing cough. RVP sent. Pt to be discharged.

## 2021-10-02 NOTE — ED PROVIDER NOTE - NS ED ROS FT
Constitutional: + fevers or chills  Cardiac: no palpitations, chest pain  Lungs: no shortness of breath, wheezes  Abd: no abd pain, nausea, vomiting, diarrhea  Genitourinary: no dysuria, increased urinary frequency, hematuria  Neurology: no sensorimotor deficits, no dizziness, no headache, no visual changes  Skin: no rashes  All other ROS negative except as per HPI

## 2021-10-03 ENCOUNTER — APPOINTMENT (OUTPATIENT)
Dept: DISASTER EMERGENCY | Facility: CLINIC | Age: 66
End: 2021-10-03

## 2021-10-06 ENCOUNTER — NON-APPOINTMENT (OUTPATIENT)
Age: 66
End: 2021-10-06

## 2021-10-07 ENCOUNTER — NON-APPOINTMENT (OUTPATIENT)
Age: 66
End: 2021-10-07

## 2021-10-08 ENCOUNTER — EMERGENCY (EMERGENCY)
Facility: HOSPITAL | Age: 66
LOS: 1 days | Discharge: HOME | End: 2021-10-08
Attending: EMERGENCY MEDICINE
Payer: COMMERCIAL

## 2021-10-08 VITALS
TEMPERATURE: 98 F | DIASTOLIC BLOOD PRESSURE: 80 MMHG | SYSTOLIC BLOOD PRESSURE: 156 MMHG | RESPIRATION RATE: 18 BRPM | OXYGEN SATURATION: 95 % | HEART RATE: 89 BPM

## 2021-10-08 VITALS
DIASTOLIC BLOOD PRESSURE: 90 MMHG | OXYGEN SATURATION: 94 % | HEIGHT: 64 IN | SYSTOLIC BLOOD PRESSURE: 166 MMHG | HEART RATE: 88 BPM | TEMPERATURE: 98 F | WEIGHT: 169.98 LBS | RESPIRATION RATE: 18 BRPM

## 2021-10-08 PROCEDURE — 70486 CT MAXILLOFACIAL W/O DYE: CPT | Mod: MA

## 2021-10-08 PROCEDURE — 72170 X-RAY EXAM OF PELVIS: CPT

## 2021-10-08 PROCEDURE — 72125 CT NECK SPINE W/O DYE: CPT | Mod: MA

## 2021-10-08 PROCEDURE — 72125 CT NECK SPINE W/O DYE: CPT | Mod: 26,MA

## 2021-10-08 PROCEDURE — 99284 EMERGENCY DEPT VISIT MOD MDM: CPT | Mod: 25

## 2021-10-08 PROCEDURE — 99285 EMERGENCY DEPT VISIT HI MDM: CPT

## 2021-10-08 PROCEDURE — 70450 CT HEAD/BRAIN W/O DYE: CPT | Mod: MA

## 2021-10-08 PROCEDURE — 71045 X-RAY EXAM CHEST 1 VIEW: CPT | Mod: 26

## 2021-10-08 PROCEDURE — 93010 ELECTROCARDIOGRAM REPORT: CPT

## 2021-10-08 PROCEDURE — 70486 CT MAXILLOFACIAL W/O DYE: CPT | Mod: 26,MA

## 2021-10-08 PROCEDURE — 82962 GLUCOSE BLOOD TEST: CPT

## 2021-10-08 PROCEDURE — 72170 X-RAY EXAM OF PELVIS: CPT | Mod: 26

## 2021-10-08 PROCEDURE — 76377 3D RENDER W/INTRP POSTPROCES: CPT | Mod: 26

## 2021-10-08 PROCEDURE — 71045 X-RAY EXAM CHEST 1 VIEW: CPT

## 2021-10-08 PROCEDURE — 76377 3D RENDER W/INTRP POSTPROCES: CPT

## 2021-10-08 PROCEDURE — 70450 CT HEAD/BRAIN W/O DYE: CPT | Mod: 26,MA

## 2021-10-08 PROCEDURE — 93005 ELECTROCARDIOGRAM TRACING: CPT

## 2021-10-08 RX ORDER — ACETAMINOPHEN 500 MG
1000 TABLET ORAL ONCE
Refills: 0 | Status: DISCONTINUED | OUTPATIENT
Start: 2021-10-08 | End: 2021-10-08

## 2021-10-08 RX ORDER — SODIUM CHLORIDE 9 MG/ML
500 INJECTION INTRAMUSCULAR; INTRAVENOUS; SUBCUTANEOUS ONCE
Refills: 0 | Status: DISCONTINUED | OUTPATIENT
Start: 2021-10-08 | End: 2021-10-08

## 2021-10-08 RX ORDER — ACETAMINOPHEN 500 MG
975 TABLET ORAL ONCE
Refills: 0 | Status: COMPLETED | OUTPATIENT
Start: 2021-10-08 | End: 2021-10-08

## 2021-10-08 RX ORDER — BACITRACIN ZINC 500 UNIT/G
1 OINTMENT IN PACKET (EA) TOPICAL ONCE
Refills: 0 | Status: COMPLETED | OUTPATIENT
Start: 2021-10-08 | End: 2021-10-08

## 2021-10-08 RX ADMIN — Medication 975 MILLIGRAM(S): at 17:04

## 2021-10-08 RX ADMIN — Medication 1 APPLICATION(S): at 18:11

## 2021-10-08 NOTE — ED PROVIDER NOTE - NSFOLLOWUPCLINICS_GEN_ALL_ED_FT
[facility] - ENT  Otolaryngology (ENT)  [address] [place] [address]  Phone: [phone]  Fax:     [facility]  Plastic Surgery  [address] [place] [address]  Phone: [phone]  Fax:   Follow Up Time: 1-3 Days

## 2021-10-08 NOTE — ED PROVIDER NOTE - PATIENT PORTAL LINK FT
You can access the FollowMyHealth Patient Portal offered by [facility] by registering at the following website: [email or web address]. By joining [employer]’s FollowMyHealth portal, you will also be able to view your health information using other applications (apps) compatible with our system.

## 2021-10-08 NOTE — CHART NOTE - NSCHARTNOTEFT_GEN_A_CORE
COVERING ED :  Social Work consulted to patient to assist with transportation home for discharge. Social Work reviewed patient's chart. Patient is a 65 y.o. English speaking, , single, male who presents to [facility] ED s/p fall. As per medical team, patient is medically cleared for discharge home with no skilled PT needs.     LCSW met with patient to confirm demographics, and final discharge plan. Patient verbalized understanding, and consented to interaction. Patient consented to interaction, and able to confirm demographics. Patient requesting transportation home for discharge.     LCSW called [facility] (P: [phone]) and spoke with rep Shay who confirmed transportation with [employer] (P: [phone]) for 8:15 PM pickup. LCSW made patient and medical team aware of dispo. No barriers to discharge identified, at this time. Social Work to remain available, and continue to collaborate with interdisciplinary team. COVERING ED :  Social Work consulted to patient to assist with transportation home for discharge. Social Work reviewed patient's chart. Patient is a 65 y.o. English speaking, , single, male who presents to [facility] ED s/p fall. As per medical team, patient is medically cleared for discharge home with no skilled PT needs.     LCSW met with patient to confirm demographics, and final discharge plan. Patient verbalized understanding, and consented to interaction. Patient consented to interaction, and able to confirm demographics. Patient requesting transportation home for discharge.     LCSW called [facility] (P: [phone]) and spoke with rep Shay who confirmed transportation with [employer] (P: [phone]) (trip #: [id number]) for 8:45 PM pickup. LCSW made medical team and patient aware of final discharge plan. No barriers to discharge identified, at this time. Social Work to remain available, and continue to collaborate with interdisciplinary team.

## 2021-10-08 NOTE — ED PROVIDER NOTE - ATTENDING CONTRIBUTION TO CARE
pt is a 66 y/o male with psych history, dm, htn covid pos this past week but doing well, vss, not hypoxic with mechanical fall, no loc on face with hematoma to forehead with other abrasions, no other injuries or complaints, CT head/max/face, analgesia, possible nasal fx, eomi. maew.

## 2021-10-08 NOTE — ED ADULT NURSE NOTE - OBJECTIVE STATEMENT
64 YO male PMHx schizophrenia BIBEMS s/p fall. patient reports that he was walking behind his brother, his brother was walking too fast and when he tried to catch up, lost his balance and fell onto his face denies LOC. Patient has a laceration to forehead and to bridge of nose, bleeding minimally, controlled by gauze. Patient is A&OX4, airway patent, breathing spontaneous, equal and symmetric chest rise and fall, skin warm dry and pink. denies chest pain, SOB, HA, N/V/D, abdominal pain, fever/chills, urinary symptoms, hematuria, weakness, dizziness, numbness, tinging. Peripheral pulses present b/l. Skin warm, dry and pink. Pt placed in position of comfort. Pt educated on call bell system and provided call bell. Bed in lowest position, wheels locked, appropriate side rails raised. Pt denies needs at this time.

## 2021-10-08 NOTE — ED ADULT TRIAGE NOTE - CADM TRG TX PRIOR TO ARRIVAL
Subjective   Patient ID: Norman is a 2 year old male.    Chief Complaint   Patient presents with   • Eye Problem     B eye redness/discharge x 3 days.   • Congestion     nasal congestion     2 y/o male presents to clinic today in the care of mother with c/o bilateral eye irritation x 3 days. Mother reports yellowish discharge from eyes. Mother also reports nasal congestion.    Mother denies fever, coughing, sneezing or runny nose.             History reviewed. No pertinent past medical history.    MEDICATIONS:  Current Outpatient Medications   Medication Sig   • polymyxin b-trimethoprim (POLYTRIM) 77189-0.1 UNIT/ML-% ophthalmic solution Place 1 drop into both eyes every 6 hours for 7 days.     No current facility-administered medications for this visit.        ALLERGIES:  ALLERGIES:  No Known Allergies    PAST SURGICAL HISTORY:  Past Surgical History:   Procedure Laterality Date   • Circumcision, primary         FAMILY HISTORY:  Family History   Problem Relation Age of Onset   • Diabetes Father    • Hypertension Father    • Hyperlipidemia Father        SOCIAL HISTORY:  Social History     Tobacco Use   • Smoking status: Never Smoker   • Smokeless tobacco: Never Used   Substance Use Topics   • Alcohol use: Not on file   • Drug use: No         Patient's medications, allergies, past medical, surgical, social and family histories were reviewed and updated as appropriate.  Patient's medications, allergies, past medical, surgical, and social history  were reviewed and updated as appropriate.    Review of Systems   Constitutional: Negative.    HENT: Negative.    Eyes: Positive for pain, discharge and redness.   Respiratory: Negative.    Cardiovascular: Negative.    Gastrointestinal: Negative.    Endocrine: Negative.    Genitourinary: Negative.    Musculoskeletal: Negative.    Skin: Negative.    Neurological: Negative.    Psychiatric/Behavioral: Negative.        Objective   Physical Exam  Vitals signs and nursing note  reviewed.   Constitutional:       General: He is awake and active.      Appearance: Normal appearance. He is well-developed.   HENT:      Head: Normocephalic and atraumatic.      Right Ear: Hearing, tympanic membrane, ear canal, external ear and canal normal.      Left Ear: Hearing, tympanic membrane, ear canal, external ear and canal normal.      Nose: Nose normal.      Mouth/Throat:      Lips: Pink.      Mouth: Mucous membranes are moist.      Pharynx: Oropharynx is clear.   Eyes:      General: Red reflex is present bilaterally.         Right eye: Discharge and erythema present.         Left eye: Discharge and erythema present.     Extraocular Movements: Extraocular movements intact.      Conjunctiva/sclera: Conjunctivae normal.      Pupils: Pupils are equal, round, and reactive to light.      Comments: Yellowish thick crust and discharge noted to bilateral eye.    Neck:      Musculoskeletal: Full passive range of motion without pain.   Cardiovascular:      Rate and Rhythm: Normal rate and regular rhythm.      Pulses: Normal pulses.      Heart sounds: Normal heart sounds, S1 normal and S2 normal.   Pulmonary:      Effort: Pulmonary effort is normal.      Breath sounds: Normal breath sounds.   Skin:     General: Skin is warm.   Neurological:      General: No focal deficit present.      Mental Status: He is alert and oriented for age.       Visit Vitals  /59 (BP Location: LUE - Left upper extremity, Patient Position: Sitting, Cuff Size: Pediatric)   Pulse 130   Temp 99.5 °F (37.5 °C) (Tympanic)   Resp 24   Wt 15.8 kg (34 lb 11.6 oz)   SpO2 98%       Assessment   Problem List Items Addressed This Visit     None      Visit Diagnoses     Acute bacterial conjunctivitis of both eyes    -  Primary    Relevant Medications    polymyxin b-trimethoprim (POLYTRIM) 84901-1.1 UNIT/ML-% ophthalmic solution    Fever, unspecified fever cause        Relevant Medications    acetaminophen (TYLENOL) 160 MG/5ML solution 236.8 mg  (Completed)        Acute Bacterial Conjunctivitis of both eyes   -Use medication as prescribed   -Good handwashing   -Over-the-counter Tylenol or Ibuprofen as needed for fever or pain   -Change facial towel daily   -Avoid rubbing eyes   Instructions provided as documented in the AVS.    Thank you for visiting [facility].  Please follow up with your PCP Follow-up with PCP x 1 week .   none

## 2021-10-08 NOTE — ED PROVIDER NOTE - NSICDXFAMILYHX_GEN_ALL_CORE_FT
FAMILY HISTORY:  Family history of diabetes mellitus in brother, both brothers  Family history of heart disease, mother had 3 heart attacks    Mother  Still living? Unknown  Family history of diabetes mellitus (DM), Age at diagnosis: Age Unknown

## 2021-10-08 NOTE — ED PROVIDER NOTE - OBJECTIVE STATEMENT
66 y/o male with schizo and bipolar history, dm, htn, recent covid pos (10/2) who presents after a fall. Patient reports that  he was walking on the street, walking really fast (but currently does not know why he was walking fast). he then tried to grab on to the a stop sign, but tripped on the curb on the sidewalk and fell face forward. he did not have any lightheadedness, dizziness, nausea, vomiting, confusion prior to the fall. he did not have any confusion or any other symptoms afterwards. He denies any recent fever or chills, palpitations, chest pain. EMS placed C-collar en route but he denies any neck pain or stiffness.

## 2021-10-08 NOTE — ED PROVIDER NOTE - NSFOLLOWUPINSTRUCTIONS_ED_ALL_ED_FT
You came in after you fell on the street. We gave some pain medications, and your pain improved. Your Xrays were normal. We also did CT scan of the face and head, and it showed nasal bone fractures on both side. We do not recommend any intervention on this, but rather follow up with the plastic surgery team. For pain, you can take tylenol and ibuprofen. Otherwise, please follow up with your primary care doctor.

## 2021-10-08 NOTE — ED PROVIDER NOTE - CLINICAL SUMMARY MEDICAL DECISION MAKING FREE TEXT BOX
See Attending Note 66 y/o male with schizo and bipolar history, dm, htn, recent covid pos (10/2) who presents after a fall. will get imaging, give tylenol, re-evaluate. likely dc.

## 2021-10-08 NOTE — ED PROVIDER NOTE - NSICDXPASTMEDICALHX_GEN_ALL_CORE_FT
PAST MEDICAL HISTORY:  Bipolar disorder     Diabetes     Glaucoma     Hypertension     Hypothyroid     Personal history of other diseases of the digestive system     Schizophrenia     Seborrheic dermatitis

## 2021-10-08 NOTE — ED PROVIDER NOTE - CARE PLAN
1 Principal Discharge DX:	Fall   Principal Discharge DX:	Fall  Secondary Diagnosis:	Nasal fracture

## 2021-10-12 ENCOUNTER — NON-APPOINTMENT (OUTPATIENT)
Age: 66
End: 2021-10-12

## 2021-10-12 ENCOUNTER — APPOINTMENT (OUTPATIENT)
Dept: OTOLARYNGOLOGY | Facility: CLINIC | Age: 66
End: 2021-10-12
Payer: MEDICARE

## 2021-10-12 VITALS
SYSTOLIC BLOOD PRESSURE: 132 MMHG | DIASTOLIC BLOOD PRESSURE: 82 MMHG | BODY MASS INDEX: 27.32 KG/M2 | HEART RATE: 76 BPM | WEIGHT: 170 LBS | HEIGHT: 66 IN

## 2021-10-12 DIAGNOSIS — H61.23 IMPACTED CERUMEN, BILATERAL: ICD-10-CM

## 2021-10-12 PROCEDURE — 99214 OFFICE O/P EST MOD 30 MIN: CPT | Mod: 25

## 2021-10-12 PROCEDURE — 69210 REMOVE IMPACTED EAR WAX UNI: CPT | Mod: RT

## 2021-10-12 RX ORDER — SODIUM SULFATE, POTASSIUM SULFATE, MAGNESIUM SULFATE 17.5; 3.13; 1.6 G/ML; G/ML; G/ML
17.5-3.13-1.6 SOLUTION, CONCENTRATE ORAL
Qty: 4 | Refills: 0 | Status: COMPLETED | COMMUNITY
Start: 2021-03-19 | End: 2021-10-12

## 2021-10-12 RX ORDER — SODIUM SULFATE, POTASSIUM SULFATE, MAGNESIUM SULFATE 17.5; 3.13; 1.6 G/ML; G/ML; G/ML
17.5-3.13-1.6 SOLUTION, CONCENTRATE ORAL
Qty: 1 | Refills: 0 | Status: COMPLETED | COMMUNITY
Start: 2021-03-16 | End: 2021-10-12

## 2021-10-12 NOTE — PHYSICAL EXAM
[FreeTextEntry1] : Has multiple abrasions over his forehead over the dorsum of his nose with scabbing and along his chin [de-identified] : Cerumen impaction right ear [de-identified] : Has multiple abrasions over his forehead over the dorsum of his nose with scabbing and along his chin-obvious nasal infracture  of the right nasal bones and slight outfracture of the left ; no evidence of a septal hematoma [Normal] : mucosa is normal

## 2021-10-12 NOTE — HISTORY OF PRESENT ILLNESS
[de-identified] : 65 year old male presents for follow up for nasal fracture. States this Friday 10/8/21 was walking fast and fell flat on his face, went to [facility] and was advised to follow up with plastic surgeon and ENT.  States is currently having  pain 5 out 10 on pain scale on face and nose, was worse on Friday. \par

## 2021-10-12 NOTE — REASON FOR VISIT
[Subsequent Evaluation] : a subsequent evaluation for [FreeTextEntry2] : follow up for nasal fracture

## 2021-10-12 NOTE — DATA REVIEWED
[de-identified] : CT reviewed showed obvious fracture of the lateral nasal wall right side and left

## 2021-10-18 ENCOUNTER — APPOINTMENT (OUTPATIENT)
Dept: PLASTIC SURGERY | Facility: CLINIC | Age: 66
End: 2021-10-18
Payer: MEDICARE

## 2021-10-18 PROCEDURE — 99203 OFFICE O/P NEW LOW 30 MIN: CPT

## 2021-11-01 ENCOUNTER — APPOINTMENT (OUTPATIENT)
Dept: INTERNAL MEDICINE | Facility: CLINIC | Age: 66
End: 2021-11-01

## 2021-11-09 ENCOUNTER — NON-APPOINTMENT (OUTPATIENT)
Age: 66
End: 2021-11-09

## 2021-11-09 ENCOUNTER — APPOINTMENT (OUTPATIENT)
Dept: OPHTHALMOLOGY | Facility: CLINIC | Age: 66
End: 2021-11-09
Payer: MEDICARE

## 2021-11-09 PROCEDURE — 92012 INTRM OPH EXAM EST PATIENT: CPT

## 2021-11-09 PROCEDURE — 92083 EXTENDED VISUAL FIELD XM: CPT

## 2021-11-11 ENCOUNTER — APPOINTMENT (OUTPATIENT)
Dept: OTOLARYNGOLOGY | Facility: CLINIC | Age: 66
End: 2021-11-11
Payer: MEDICARE

## 2021-11-11 VITALS
HEIGHT: 66 IN | SYSTOLIC BLOOD PRESSURE: 137 MMHG | HEART RATE: 71 BPM | BODY MASS INDEX: 27.32 KG/M2 | DIASTOLIC BLOOD PRESSURE: 77 MMHG | WEIGHT: 170 LBS

## 2021-11-11 DIAGNOSIS — H90.3 SENSORINEURAL HEARING LOSS, BILATERAL: ICD-10-CM

## 2021-11-11 PROCEDURE — 92567 TYMPANOMETRY: CPT

## 2021-11-11 PROCEDURE — 92557 COMPREHENSIVE HEARING TEST: CPT

## 2021-11-11 PROCEDURE — 99213 OFFICE O/P EST LOW 20 MIN: CPT | Mod: 25

## 2021-11-11 NOTE — HISTORY OF PRESENT ILLNESS
[de-identified] : 65 year old male presents for follow up for nasal fracture. Here for a hearing test. Reports bilateral hearing loss for 3-4 years. Patient denies otalgia, otorrhea, tinnitus, dizziness, vertigo, headaches related to hearing. . Seen on October 12, 2021 after a fall with abrasions all over his nose and his face.  At that time he was referred to plastic surgery for follow-up.\par Reports clear anterior rhinorrhea. States his breathing has been good without any issues.\par Denies nasal congestion, sinus pain/pressure, post nasal drip. Denies any fevers, recent ear/nose/throat infection.

## 2021-11-11 NOTE — DATA REVIEWED
[de-identified] : Hearing WNL to 1kHz sloping to a mild to moderate SNHL to 8kHz, AD\par Hearing WNL at 250Hz sloping to a mild to moderate SNHL to 8kHz, AS\par Type As tymp, AU

## 2021-11-11 NOTE — PHYSICAL EXAM
[FreeTextEntry1] : Facial debris and scabbing appears to have completely resolved [] : septum deviated to the right [Normal] : mucosa is normal

## 2021-11-11 NOTE — REASON FOR VISIT
[Subsequent Evaluation] : a subsequent evaluation for [Hearing Loss] : hearing loss [FreeTextEntry2] : follow up for nasal fracture

## 2021-11-20 NOTE — REASON FOR VISIT
[Consultation] : a consultation visit [Family Member] : family member [FreeTextEntry1] : For a nasal fracture, from a fall 10/09/21, Patient went to [facility], where imaging where taken. No problems breathing through nose. Nose is Painful when touched, Patient states other bones were broken around chest.

## 2021-12-09 ENCOUNTER — RX RENEWAL (OUTPATIENT)
Age: 66
End: 2021-12-09

## 2021-12-14 ENCOUNTER — RX RENEWAL (OUTPATIENT)
Age: 66
End: 2021-12-14

## 2021-12-22 ENCOUNTER — APPOINTMENT (OUTPATIENT)
Dept: NEPHROLOGY | Facility: CLINIC | Age: 66
End: 2021-12-22

## 2022-01-13 ENCOUNTER — APPOINTMENT (OUTPATIENT)
Dept: GASTROENTEROLOGY | Facility: CLINIC | Age: 67
End: 2022-01-13

## 2022-02-11 ENCOUNTER — NON-APPOINTMENT (OUTPATIENT)
Age: 67
End: 2022-02-11

## 2022-02-11 ENCOUNTER — APPOINTMENT (OUTPATIENT)
Dept: OPHTHALMOLOGY | Facility: CLINIC | Age: 67
End: 2022-02-11
Payer: MEDICARE

## 2022-02-11 PROCEDURE — 92012 INTRM OPH EXAM EST PATIENT: CPT

## 2022-02-11 PROCEDURE — 92133 CPTRZD OPH DX IMG PST SGM ON: CPT

## 2022-04-26 ENCOUNTER — RX RENEWAL (OUTPATIENT)
Age: 67
End: 2022-04-26

## 2022-04-26 RX ORDER — PANTOPRAZOLE 40 MG/1
40 TABLET, DELAYED RELEASE ORAL
Qty: 120 | Refills: 0 | Status: ACTIVE | COMMUNITY
Start: 2019-10-30 | End: 1900-01-01

## 2022-05-04 ENCOUNTER — OUTPATIENT (OUTPATIENT)
Dept: OUTPATIENT SERVICES | Facility: HOSPITAL | Age: 67
LOS: 1 days | Discharge: HOME | End: 2022-05-04
Payer: MEDICARE

## 2022-05-04 PROCEDURE — 90792 PSYCH DIAG EVAL W/MED SRVCS: CPT | Mod: 95

## 2022-05-05 DIAGNOSIS — F20.9 SCHIZOPHRENIA, UNSPECIFIED: ICD-10-CM

## 2022-05-05 DIAGNOSIS — F42.9 OBSESSIVE-COMPULSIVE DISORDER, UNSPECIFIED: ICD-10-CM

## 2022-05-05 NOTE — ED ADULT TRIAGE NOTE - LOCATION:

## 2022-05-25 LAB
BASOPHILS # BLD AUTO: 0.04 K/UL — SIGNIFICANT CHANGE UP (ref 0–0.2)
BASOPHILS NFR BLD AUTO: 0.7 % — SIGNIFICANT CHANGE UP (ref 0–2)
EOSINOPHIL # BLD AUTO: 0.05 K/UL — SIGNIFICANT CHANGE UP (ref 0–0.5)
EOSINOPHIL NFR BLD AUTO: 0.8 % — SIGNIFICANT CHANGE UP (ref 0–6)
HCT VFR BLD CALC: 32 % — LOW (ref 39–50)
HGB BLD-MCNC: 10.7 G/DL — LOW (ref 13–17)
IANC: 4.1 K/UL — SIGNIFICANT CHANGE UP (ref 1.8–7.4)
IMM GRANULOCYTES NFR BLD AUTO: 0.5 % — SIGNIFICANT CHANGE UP (ref 0–1.5)
LYMPHOCYTES # BLD AUTO: 1.18 K/UL — SIGNIFICANT CHANGE UP (ref 1–3.3)
LYMPHOCYTES # BLD AUTO: 19.5 % — SIGNIFICANT CHANGE UP (ref 13–44)
MCHC RBC-ENTMCNC: 31.2 PG — SIGNIFICANT CHANGE UP (ref 27–34)
MCHC RBC-ENTMCNC: 33.4 GM/DL — SIGNIFICANT CHANGE UP (ref 32–36)
MCV RBC AUTO: 93.3 FL — SIGNIFICANT CHANGE UP (ref 80–100)
MONOCYTES # BLD AUTO: 0.65 K/UL — SIGNIFICANT CHANGE UP (ref 0–0.9)
MONOCYTES NFR BLD AUTO: 10.7 % — SIGNIFICANT CHANGE UP (ref 2–14)
NEUTROPHILS # BLD AUTO: 4.1 K/UL — SIGNIFICANT CHANGE UP (ref 1.8–7.4)
NEUTROPHILS NFR BLD AUTO: 67.8 % — SIGNIFICANT CHANGE UP (ref 43–77)
NRBC # BLD: 0 /100 WBCS — SIGNIFICANT CHANGE UP
NRBC # FLD: 0 K/UL — SIGNIFICANT CHANGE UP
PLATELET # BLD AUTO: 227 K/UL — SIGNIFICANT CHANGE UP (ref 150–400)
RBC # BLD: 3.43 M/UL — LOW (ref 4.2–5.8)
RBC # FLD: 13.5 % — SIGNIFICANT CHANGE UP (ref 10.3–14.5)
WBC # BLD: 6.05 K/UL — SIGNIFICANT CHANGE UP (ref 3.8–10.5)
WBC # FLD AUTO: 6.05 K/UL — SIGNIFICANT CHANGE UP (ref 3.8–10.5)

## 2022-05-31 PROCEDURE — 99443: CPT

## 2022-06-24 LAB
ALBUMIN SERPL ELPH-MCNC: 4.1 G/DL — SIGNIFICANT CHANGE UP (ref 3.3–5)
ALP SERPL-CCNC: 92 U/L — SIGNIFICANT CHANGE UP (ref 40–120)
ALT FLD-CCNC: 14 U/L — SIGNIFICANT CHANGE UP (ref 4–41)
ANION GAP SERPL CALC-SCNC: 12 MMOL/L — SIGNIFICANT CHANGE UP (ref 7–14)
AST SERPL-CCNC: 12 U/L — SIGNIFICANT CHANGE UP (ref 4–40)
BASOPHILS # BLD AUTO: 0.03 K/UL — SIGNIFICANT CHANGE UP (ref 0–0.2)
BASOPHILS NFR BLD AUTO: 0.7 % — SIGNIFICANT CHANGE UP (ref 0–2)
BILIRUB SERPL-MCNC: <0.2 MG/DL — SIGNIFICANT CHANGE UP (ref 0.2–1.2)
BUN SERPL-MCNC: 16 MG/DL — SIGNIFICANT CHANGE UP (ref 7–23)
CALCIUM SERPL-MCNC: 9.3 MG/DL — SIGNIFICANT CHANGE UP (ref 8.4–10.5)
CHLORIDE SERPL-SCNC: 90 MMOL/L — LOW (ref 98–107)
CO2 SERPL-SCNC: 27 MMOL/L — SIGNIFICANT CHANGE UP (ref 22–31)
CREAT SERPL-MCNC: 0.77 MG/DL — SIGNIFICANT CHANGE UP (ref 0.5–1.3)
EGFR: 98 ML/MIN/1.73M2 — SIGNIFICANT CHANGE UP
EOSINOPHIL # BLD AUTO: 0.06 K/UL — SIGNIFICANT CHANGE UP (ref 0–0.5)
EOSINOPHIL NFR BLD AUTO: 1.4 % — SIGNIFICANT CHANGE UP (ref 0–6)
GLUCOSE SERPL-MCNC: 80 MG/DL — SIGNIFICANT CHANGE UP (ref 70–99)
HCT VFR BLD CALC: 32.8 % — LOW (ref 39–50)
HGB BLD-MCNC: 11 G/DL — LOW (ref 13–17)
IANC: 2.3 K/UL — SIGNIFICANT CHANGE UP (ref 1.8–7.4)
IMM GRANULOCYTES NFR BLD AUTO: 0.7 % — SIGNIFICANT CHANGE UP (ref 0–1.5)
LYMPHOCYTES # BLD AUTO: 1.29 K/UL — SIGNIFICANT CHANGE UP (ref 1–3.3)
LYMPHOCYTES # BLD AUTO: 30.9 % — SIGNIFICANT CHANGE UP (ref 13–44)
MCHC RBC-ENTMCNC: 30.7 PG — SIGNIFICANT CHANGE UP (ref 27–34)
MCHC RBC-ENTMCNC: 33.5 GM/DL — SIGNIFICANT CHANGE UP (ref 32–36)
MCV RBC AUTO: 91.6 FL — SIGNIFICANT CHANGE UP (ref 80–100)
MONOCYTES # BLD AUTO: 0.47 K/UL — SIGNIFICANT CHANGE UP (ref 0–0.9)
MONOCYTES NFR BLD AUTO: 11.2 % — SIGNIFICANT CHANGE UP (ref 2–14)
NEUTROPHILS # BLD AUTO: 2.3 K/UL — SIGNIFICANT CHANGE UP (ref 1.8–7.4)
NEUTROPHILS NFR BLD AUTO: 55.1 % — SIGNIFICANT CHANGE UP (ref 43–77)
NRBC # BLD: 0 /100 WBCS — SIGNIFICANT CHANGE UP
NRBC # FLD: 0 K/UL — SIGNIFICANT CHANGE UP
PLATELET # BLD AUTO: 220 K/UL — SIGNIFICANT CHANGE UP (ref 150–400)
POTASSIUM SERPL-MCNC: 5 MMOL/L — SIGNIFICANT CHANGE UP (ref 3.5–5.3)
POTASSIUM SERPL-SCNC: 5 MMOL/L — SIGNIFICANT CHANGE UP (ref 3.5–5.3)
PROT SERPL-MCNC: 6 G/DL — SIGNIFICANT CHANGE UP (ref 6–8.3)
RBC # BLD: 3.58 M/UL — LOW (ref 4.2–5.8)
RBC # FLD: 13.2 % — SIGNIFICANT CHANGE UP (ref 10.3–14.5)
SODIUM SERPL-SCNC: 129 MMOL/L — LOW (ref 135–145)
VALPROATE SERPL-MCNC: 66 UG/ML — SIGNIFICANT CHANGE UP (ref 50–100)
WBC # BLD: 4.18 K/UL — SIGNIFICANT CHANGE UP (ref 3.8–10.5)
WBC # FLD AUTO: 4.18 K/UL — SIGNIFICANT CHANGE UP (ref 3.8–10.5)

## 2022-07-08 ENCOUNTER — APPOINTMENT (OUTPATIENT)
Dept: OPHTHALMOLOGY | Facility: CLINIC | Age: 67
End: 2022-07-08

## 2022-07-08 ENCOUNTER — NON-APPOINTMENT (OUTPATIENT)
Age: 67
End: 2022-07-08

## 2022-07-08 PROCEDURE — 92133 CPTRZD OPH DX IMG PST SGM ON: CPT

## 2022-07-08 PROCEDURE — 92014 COMPRE OPH EXAM EST PT 1/>: CPT

## 2022-07-08 PROCEDURE — 92202 OPSCPY EXTND ON/MAC DRAW: CPT

## 2022-07-19 NOTE — BEHAVIORAL HEALTH ASSESSMENT NOTE - NSBHVIOLRISKFACTORS_PSY_A_CORE
no outdoor environmental allergies/no indoor environmental allergies/no reactions to food/no reactions to animals
None known

## 2022-07-25 PROCEDURE — 90853 GROUP PSYCHOTHERAPY: CPT | Mod: 95

## 2022-07-26 LAB
ALBUMIN SERPL ELPH-MCNC: 3.9 G/DL — SIGNIFICANT CHANGE UP (ref 3.3–5)
ALP SERPL-CCNC: 73 U/L — SIGNIFICANT CHANGE UP (ref 40–120)
ALT FLD-CCNC: 11 U/L — SIGNIFICANT CHANGE UP (ref 4–41)
ANION GAP SERPL CALC-SCNC: 13 MMOL/L — SIGNIFICANT CHANGE UP (ref 7–14)
AST SERPL-CCNC: 13 U/L — SIGNIFICANT CHANGE UP (ref 4–40)
BASOPHILS # BLD AUTO: 0.03 K/UL — SIGNIFICANT CHANGE UP (ref 0–0.2)
BASOPHILS NFR BLD AUTO: 0.7 % — SIGNIFICANT CHANGE UP (ref 0–2)
BILIRUB SERPL-MCNC: <0.2 MG/DL — SIGNIFICANT CHANGE UP (ref 0.2–1.2)
BUN SERPL-MCNC: 22 MG/DL — SIGNIFICANT CHANGE UP (ref 7–23)
CALCIUM SERPL-MCNC: 9.2 MG/DL — SIGNIFICANT CHANGE UP (ref 8.4–10.5)
CHLORIDE SERPL-SCNC: 90 MMOL/L — LOW (ref 98–107)
CO2 SERPL-SCNC: 24 MMOL/L — SIGNIFICANT CHANGE UP (ref 22–31)
CREAT SERPL-MCNC: 0.84 MG/DL — SIGNIFICANT CHANGE UP (ref 0.5–1.3)
EGFR: 96 ML/MIN/1.73M2 — SIGNIFICANT CHANGE UP
EOSINOPHIL # BLD AUTO: 0.06 K/UL — SIGNIFICANT CHANGE UP (ref 0–0.5)
EOSINOPHIL NFR BLD AUTO: 1.3 % — SIGNIFICANT CHANGE UP (ref 0–6)
GLUCOSE SERPL-MCNC: 127 MG/DL — HIGH (ref 70–99)
HCT VFR BLD CALC: 32.7 % — LOW (ref 39–50)
HGB BLD-MCNC: 10.6 G/DL — LOW (ref 13–17)
IANC: 2.74 K/UL — SIGNIFICANT CHANGE UP (ref 1.8–7.4)
IMM GRANULOCYTES NFR BLD AUTO: 0.7 % — SIGNIFICANT CHANGE UP (ref 0–1.5)
LYMPHOCYTES # BLD AUTO: 1.28 K/UL — SIGNIFICANT CHANGE UP (ref 1–3.3)
LYMPHOCYTES # BLD AUTO: 28 % — SIGNIFICANT CHANGE UP (ref 13–44)
MCHC RBC-ENTMCNC: 30.2 PG — SIGNIFICANT CHANGE UP (ref 27–34)
MCHC RBC-ENTMCNC: 32.4 GM/DL — SIGNIFICANT CHANGE UP (ref 32–36)
MCV RBC AUTO: 93.2 FL — SIGNIFICANT CHANGE UP (ref 80–100)
MONOCYTES # BLD AUTO: 0.43 K/UL — SIGNIFICANT CHANGE UP (ref 0–0.9)
MONOCYTES NFR BLD AUTO: 9.4 % — SIGNIFICANT CHANGE UP (ref 2–14)
NEUTROPHILS # BLD AUTO: 2.74 K/UL — SIGNIFICANT CHANGE UP (ref 1.8–7.4)
NEUTROPHILS NFR BLD AUTO: 59.9 % — SIGNIFICANT CHANGE UP (ref 43–77)
NRBC # BLD: 0 /100 WBCS — SIGNIFICANT CHANGE UP
NRBC # FLD: 0 K/UL — SIGNIFICANT CHANGE UP
PLATELET # BLD AUTO: 182 K/UL — SIGNIFICANT CHANGE UP (ref 150–400)
POTASSIUM SERPL-MCNC: 4.8 MMOL/L — SIGNIFICANT CHANGE UP (ref 3.5–5.3)
POTASSIUM SERPL-SCNC: 4.8 MMOL/L — SIGNIFICANT CHANGE UP (ref 3.5–5.3)
PROT SERPL-MCNC: 6.3 G/DL — SIGNIFICANT CHANGE UP (ref 6–8.3)
RBC # BLD: 3.51 M/UL — LOW (ref 4.2–5.8)
RBC # FLD: 13.6 % — SIGNIFICANT CHANGE UP (ref 10.3–14.5)
SODIUM SERPL-SCNC: 127 MMOL/L — LOW (ref 135–145)
WBC # BLD: 4.57 K/UL — SIGNIFICANT CHANGE UP (ref 3.8–10.5)
WBC # FLD AUTO: 4.57 K/UL — SIGNIFICANT CHANGE UP (ref 3.8–10.5)

## 2022-08-05 PROCEDURE — 99214 OFFICE O/P EST MOD 30 MIN: CPT | Mod: 95

## 2022-08-15 PROCEDURE — 90853 GROUP PSYCHOTHERAPY: CPT | Mod: 95

## 2022-08-22 PROCEDURE — 90853 GROUP PSYCHOTHERAPY: CPT | Mod: 95

## 2022-08-31 LAB
BASOPHILS # BLD AUTO: 0.02 K/UL — SIGNIFICANT CHANGE UP (ref 0–0.2)
BASOPHILS NFR BLD AUTO: 0.3 % — SIGNIFICANT CHANGE UP (ref 0–2)
EOSINOPHIL # BLD AUTO: 0.07 K/UL — SIGNIFICANT CHANGE UP (ref 0–0.5)
EOSINOPHIL NFR BLD AUTO: 1.2 % — SIGNIFICANT CHANGE UP (ref 0–6)
HCT VFR BLD CALC: 31.5 % — LOW (ref 39–50)
HGB BLD-MCNC: 10.8 G/DL — LOW (ref 13–17)
IANC: 4.17 K/UL — SIGNIFICANT CHANGE UP (ref 1.8–7.4)
IMM GRANULOCYTES NFR BLD AUTO: 0.3 % — SIGNIFICANT CHANGE UP (ref 0–1.5)
LYMPHOCYTES # BLD AUTO: 1.25 K/UL — SIGNIFICANT CHANGE UP (ref 1–3.3)
LYMPHOCYTES # BLD AUTO: 20.9 % — SIGNIFICANT CHANGE UP (ref 13–44)
MCHC RBC-ENTMCNC: 30.8 PG — SIGNIFICANT CHANGE UP (ref 27–34)
MCHC RBC-ENTMCNC: 34.3 GM/DL — SIGNIFICANT CHANGE UP (ref 32–36)
MCV RBC AUTO: 89.7 FL — SIGNIFICANT CHANGE UP (ref 80–100)
MONOCYTES # BLD AUTO: 0.46 K/UL — SIGNIFICANT CHANGE UP (ref 0–0.9)
MONOCYTES NFR BLD AUTO: 7.7 % — SIGNIFICANT CHANGE UP (ref 2–14)
NEUTROPHILS # BLD AUTO: 4.17 K/UL — SIGNIFICANT CHANGE UP (ref 1.8–7.4)
NEUTROPHILS NFR BLD AUTO: 69.6 % — SIGNIFICANT CHANGE UP (ref 43–77)
NRBC # BLD: 0 /100 WBCS — SIGNIFICANT CHANGE UP (ref 0–0)
NRBC # FLD: 0 K/UL — SIGNIFICANT CHANGE UP (ref 0–0)
PLATELET # BLD AUTO: 174 K/UL — SIGNIFICANT CHANGE UP (ref 150–400)
RBC # BLD: 3.51 M/UL — LOW (ref 4.2–5.8)
RBC # FLD: 14 % — SIGNIFICANT CHANGE UP (ref 10.3–14.5)
WBC # BLD: 5.99 K/UL — SIGNIFICANT CHANGE UP (ref 3.8–10.5)
WBC # FLD AUTO: 5.99 K/UL — SIGNIFICANT CHANGE UP (ref 3.8–10.5)

## 2022-09-13 PROCEDURE — 99443: CPT | Mod: 95

## 2022-09-19 PROCEDURE — 90853 GROUP PSYCHOTHERAPY: CPT | Mod: 95

## 2022-09-22 LAB
BASOPHILS # BLD AUTO: 0.03 K/UL — SIGNIFICANT CHANGE UP (ref 0–0.2)
BASOPHILS NFR BLD AUTO: 0.6 % — SIGNIFICANT CHANGE UP (ref 0–2)
EOSINOPHIL # BLD AUTO: 0.05 K/UL — SIGNIFICANT CHANGE UP (ref 0–0.5)
EOSINOPHIL NFR BLD AUTO: 1 % — SIGNIFICANT CHANGE UP (ref 0–6)
HCT VFR BLD CALC: 31.2 % — LOW (ref 39–50)
HGB BLD-MCNC: 10.5 G/DL — LOW (ref 13–17)
IANC: 2.88 K/UL — SIGNIFICANT CHANGE UP (ref 1.8–7.4)
IMM GRANULOCYTES NFR BLD AUTO: 0.6 % — SIGNIFICANT CHANGE UP (ref 0–0.9)
LYMPHOCYTES # BLD AUTO: 1.34 K/UL — SIGNIFICANT CHANGE UP (ref 1–3.3)
LYMPHOCYTES # BLD AUTO: 27.5 % — SIGNIFICANT CHANGE UP (ref 13–44)
MCHC RBC-ENTMCNC: 30 PG — SIGNIFICANT CHANGE UP (ref 27–34)
MCHC RBC-ENTMCNC: 33.7 GM/DL — SIGNIFICANT CHANGE UP (ref 32–36)
MCV RBC AUTO: 89.1 FL — SIGNIFICANT CHANGE UP (ref 80–100)
MONOCYTES # BLD AUTO: 0.54 K/UL — SIGNIFICANT CHANGE UP (ref 0–0.9)
MONOCYTES NFR BLD AUTO: 11.1 % — SIGNIFICANT CHANGE UP (ref 2–14)
NEUTROPHILS # BLD AUTO: 2.88 K/UL — SIGNIFICANT CHANGE UP (ref 1.8–7.4)
NEUTROPHILS NFR BLD AUTO: 59.2 % — SIGNIFICANT CHANGE UP (ref 43–77)
NRBC # BLD: 0 /100 WBCS — SIGNIFICANT CHANGE UP (ref 0–0)
NRBC # FLD: 0 K/UL — SIGNIFICANT CHANGE UP (ref 0–0)
PLATELET # BLD AUTO: 206 K/UL — SIGNIFICANT CHANGE UP (ref 150–400)
RBC # BLD: 3.5 M/UL — LOW (ref 4.2–5.8)
RBC # FLD: 14.6 % — HIGH (ref 10.3–14.5)
WBC # BLD: 4.87 K/UL — SIGNIFICANT CHANGE UP (ref 3.8–10.5)
WBC # FLD AUTO: 4.87 K/UL — SIGNIFICANT CHANGE UP (ref 3.8–10.5)

## 2022-10-13 PROCEDURE — 99443: CPT | Mod: 95

## 2022-10-25 LAB
BASOPHILS # BLD AUTO: 0.03 K/UL — SIGNIFICANT CHANGE UP (ref 0–0.2)
BASOPHILS NFR BLD AUTO: 0.5 % — SIGNIFICANT CHANGE UP (ref 0–2)
EOSINOPHIL # BLD AUTO: 0.02 K/UL — SIGNIFICANT CHANGE UP (ref 0–0.5)
EOSINOPHIL NFR BLD AUTO: 0.3 % — SIGNIFICANT CHANGE UP (ref 0–6)
HCT VFR BLD CALC: 29.5 % — LOW (ref 39–50)
HGB BLD-MCNC: 9.7 G/DL — LOW (ref 13–17)
IANC: 4.28 K/UL — SIGNIFICANT CHANGE UP (ref 1.8–7.4)
IMM GRANULOCYTES NFR BLD AUTO: 0.6 % — SIGNIFICANT CHANGE UP (ref 0–0.9)
LYMPHOCYTES # BLD AUTO: 1.4 K/UL — SIGNIFICANT CHANGE UP (ref 1–3.3)
LYMPHOCYTES # BLD AUTO: 21.8 % — SIGNIFICANT CHANGE UP (ref 13–44)
MCHC RBC-ENTMCNC: 30.1 PG — SIGNIFICANT CHANGE UP (ref 27–34)
MCHC RBC-ENTMCNC: 32.9 GM/DL — SIGNIFICANT CHANGE UP (ref 32–36)
MCV RBC AUTO: 91.6 FL — SIGNIFICANT CHANGE UP (ref 80–100)
MONOCYTES # BLD AUTO: 0.66 K/UL — SIGNIFICANT CHANGE UP (ref 0–0.9)
MONOCYTES NFR BLD AUTO: 10.3 % — SIGNIFICANT CHANGE UP (ref 2–14)
NEUTROPHILS # BLD AUTO: 4.28 K/UL — SIGNIFICANT CHANGE UP (ref 1.8–7.4)
NEUTROPHILS NFR BLD AUTO: 66.5 % — SIGNIFICANT CHANGE UP (ref 43–77)
NRBC # BLD: 0 /100 WBCS — SIGNIFICANT CHANGE UP (ref 0–0)
NRBC # FLD: 0 K/UL — SIGNIFICANT CHANGE UP (ref 0–0)
PLATELET # BLD AUTO: 269 K/UL — SIGNIFICANT CHANGE UP (ref 150–400)
RBC # BLD: 3.22 M/UL — LOW (ref 4.2–5.8)
RBC # FLD: 15 % — HIGH (ref 10.3–14.5)
WBC # BLD: 6.43 K/UL — SIGNIFICANT CHANGE UP (ref 3.8–10.5)
WBC # FLD AUTO: 6.43 K/UL — SIGNIFICANT CHANGE UP (ref 3.8–10.5)

## 2022-10-31 PROCEDURE — 90853 GROUP PSYCHOTHERAPY: CPT | Mod: 95

## 2022-11-07 PROCEDURE — 90853 GROUP PSYCHOTHERAPY: CPT | Mod: 95

## 2022-11-08 ENCOUNTER — APPOINTMENT (OUTPATIENT)
Dept: OPHTHALMOLOGY | Facility: CLINIC | Age: 67
End: 2022-11-08

## 2022-11-08 ENCOUNTER — NON-APPOINTMENT (OUTPATIENT)
Age: 67
End: 2022-11-08

## 2022-11-08 PROCEDURE — 92012 INTRM OPH EXAM EST PATIENT: CPT

## 2022-11-08 PROCEDURE — 92083 EXTENDED VISUAL FIELD XM: CPT

## 2022-11-10 PROCEDURE — 99214 OFFICE O/P EST MOD 30 MIN: CPT | Mod: 95

## 2022-11-22 LAB
BASOPHILS # BLD AUTO: 0.05 K/UL — SIGNIFICANT CHANGE UP (ref 0–0.2)
BASOPHILS NFR BLD AUTO: 0.7 % — SIGNIFICANT CHANGE UP (ref 0–2)
EOSINOPHIL # BLD AUTO: 0.46 K/UL — SIGNIFICANT CHANGE UP (ref 0–0.5)
EOSINOPHIL NFR BLD AUTO: 6.7 % — HIGH (ref 0–6)
HCT VFR BLD CALC: 26.9 % — LOW (ref 39–50)
HGB BLD-MCNC: 9.2 G/DL — LOW (ref 13–17)
IANC: 4.69 K/UL — SIGNIFICANT CHANGE UP (ref 1.8–7.4)
IMM GRANULOCYTES NFR BLD AUTO: 0.6 % — SIGNIFICANT CHANGE UP (ref 0–0.9)
LYMPHOCYTES # BLD AUTO: 1.08 K/UL — SIGNIFICANT CHANGE UP (ref 1–3.3)
LYMPHOCYTES # BLD AUTO: 15.7 % — SIGNIFICANT CHANGE UP (ref 13–44)
MCHC RBC-ENTMCNC: 31.5 PG — SIGNIFICANT CHANGE UP (ref 27–34)
MCHC RBC-ENTMCNC: 34.2 GM/DL — SIGNIFICANT CHANGE UP (ref 32–36)
MCV RBC AUTO: 92.1 FL — SIGNIFICANT CHANGE UP (ref 80–100)
MONOCYTES # BLD AUTO: 0.58 K/UL — SIGNIFICANT CHANGE UP (ref 0–0.9)
MONOCYTES NFR BLD AUTO: 8.4 % — SIGNIFICANT CHANGE UP (ref 2–14)
NEUTROPHILS # BLD AUTO: 4.69 K/UL — SIGNIFICANT CHANGE UP (ref 1.8–7.4)
NEUTROPHILS NFR BLD AUTO: 67.9 % — SIGNIFICANT CHANGE UP (ref 43–77)
NRBC # BLD: 0 /100 WBCS — SIGNIFICANT CHANGE UP (ref 0–0)
NRBC # FLD: 0 K/UL — SIGNIFICANT CHANGE UP (ref 0–0)
PLATELET # BLD AUTO: 221 K/UL — SIGNIFICANT CHANGE UP (ref 150–400)
RBC # BLD: 2.92 M/UL — LOW (ref 4.2–5.8)
RBC # FLD: 15.3 % — HIGH (ref 10.3–14.5)
WBC # BLD: 6.9 K/UL — SIGNIFICANT CHANGE UP (ref 3.8–10.5)
WBC # FLD AUTO: 6.9 K/UL — SIGNIFICANT CHANGE UP (ref 3.8–10.5)

## 2022-11-28 PROCEDURE — 90853 GROUP PSYCHOTHERAPY: CPT | Mod: 95

## 2022-12-05 PROCEDURE — 90853 GROUP PSYCHOTHERAPY: CPT | Mod: 95

## 2022-12-09 PROCEDURE — 99443: CPT

## 2022-12-12 PROCEDURE — 90853 GROUP PSYCHOTHERAPY: CPT | Mod: 95

## 2022-12-13 ENCOUNTER — LABORATORY RESULT (OUTPATIENT)
Age: 67
End: 2022-12-13

## 2022-12-13 ENCOUNTER — APPOINTMENT (OUTPATIENT)
Dept: INTERNAL MEDICINE | Facility: CLINIC | Age: 67
End: 2022-12-13

## 2022-12-13 VITALS
HEIGHT: 66 IN | TEMPERATURE: 97.3 F | OXYGEN SATURATION: 98 % | HEART RATE: 83 BPM | WEIGHT: 168 LBS | DIASTOLIC BLOOD PRESSURE: 60 MMHG | SYSTOLIC BLOOD PRESSURE: 96 MMHG | BODY MASS INDEX: 27 KG/M2

## 2022-12-13 DIAGNOSIS — R11.2 NAUSEA WITH VOMITING, UNSPECIFIED: ICD-10-CM

## 2022-12-13 DIAGNOSIS — L85.3 XEROSIS CUTIS: ICD-10-CM

## 2022-12-13 DIAGNOSIS — Z87.898 PERSONAL HISTORY OF OTHER SPECIFIED CONDITIONS: ICD-10-CM

## 2022-12-13 DIAGNOSIS — R00.0 TACHYCARDIA, UNSPECIFIED: ICD-10-CM

## 2022-12-13 DIAGNOSIS — L81.0 POSTINFLAMMATORY HYPERPIGMENTATION: ICD-10-CM

## 2022-12-13 DIAGNOSIS — R01.1 CARDIAC MURMUR, UNSPECIFIED: ICD-10-CM

## 2022-12-13 DIAGNOSIS — Z86.79 PERSONAL HISTORY OF OTHER DISEASES OF THE CIRCULATORY SYSTEM: ICD-10-CM

## 2022-12-13 DIAGNOSIS — Z86.018 PERSONAL HISTORY OF OTHER BENIGN NEOPLASM: ICD-10-CM

## 2022-12-13 DIAGNOSIS — D64.9 ANEMIA, UNSPECIFIED: ICD-10-CM

## 2022-12-13 DIAGNOSIS — Z87.81 PERSONAL HISTORY OF (HEALED) TRAUMATIC FRACTURE: ICD-10-CM

## 2022-12-13 DIAGNOSIS — Z86.69 PERSONAL HISTORY OF OTHER DISEASES OF THE NERVOUS SYSTEM AND SENSE ORGANS: ICD-10-CM

## 2022-12-13 DIAGNOSIS — H90.5 UNSPECIFIED SENSORINEURAL HEARING LOSS: ICD-10-CM

## 2022-12-13 DIAGNOSIS — K64.8 OTHER HEMORRHOIDS: ICD-10-CM

## 2022-12-13 DIAGNOSIS — H91.8X9 OTHER SPECIFIED HEARING LOSS, UNSPECIFIED EAR: ICD-10-CM

## 2022-12-13 DIAGNOSIS — I45.10 UNSPECIFIED RIGHT BUNDLE-BRANCH BLOCK: ICD-10-CM

## 2022-12-13 DIAGNOSIS — Z87.19 PERSONAL HISTORY OF OTHER DISEASES OF THE DIGESTIVE SYSTEM: ICD-10-CM

## 2022-12-13 DIAGNOSIS — Z01.818 ENCOUNTER FOR OTHER PREPROCEDURAL EXAMINATION: ICD-10-CM

## 2022-12-13 PROCEDURE — G0439: CPT

## 2022-12-13 RX ORDER — NETARSUDIL 0.2 MG/ML
0.02 SOLUTION/ DROPS OPHTHALMIC; TOPICAL
Qty: 2 | Refills: 0 | Status: ACTIVE | COMMUNITY
Start: 2022-11-08

## 2022-12-13 RX ORDER — POLYETHYLENE GLYCOL 3350 AND ELECTROLYTES WITH LEMON FLAVOR 236; 22.74; 6.74; 5.86; 2.97 G/4L; G/4L; G/4L; G/4L; G/4L
236 POWDER, FOR SOLUTION ORAL
Qty: 1 | Refills: 0 | Status: DISCONTINUED | COMMUNITY
Start: 2021-07-22 | End: 2022-12-13

## 2022-12-13 RX ORDER — BIMATOPROST 0.1 MG/ML
0.01 SOLUTION/ DROPS OPHTHALMIC DAILY
Refills: 0 | Status: ACTIVE | COMMUNITY
Start: 2022-12-13

## 2022-12-13 RX ORDER — LATANOPROST/PF 0.005 %
0.01 DROPS OPHTHALMIC (EYE)
Qty: 3 | Refills: 1 | Status: DISCONTINUED | COMMUNITY
Start: 2019-05-08 | End: 2022-12-13

## 2022-12-13 NOTE — HISTORY OF PRESENT ILLNESS
[de-identified] : Lauro Olmstead is a 67M with PMhx of schizophrenia/ OCD, T2DM, HLD, HTN, hypothyroidism, Schneider's esophagus, chronic hyponatremia (probably 2/2 SIADH from meds) presents to establish care. Accompanied by brother David\par \tasneem Ran out of synthroid, transiently had a high TSH but was recently rechecked by Endo 2 weeks and was told he could cont levothyroxine 74mcg. a1c 7.2%. On Januvia, glipizde, and Metfomrin. Endo labs showed anemia and he was told he might need to see Heme. Of note, he has never had a proper colonoscopy--initially had poor prep, then COVID happened, then hyponatremic so could not receive anesthesia.\par Recently established with cardiologist [facility], who told him he had a heart murmur and right bundle branch block. He was told to obtain TTE and stress but insurance switched so was unable to follow through\par Out of finasteride requesting refill\par following w/ outpatient Psych at [facility]. Dr Christina [phone]. Wants labs rechecked.\par Multiple falls over the last year. Reports he generally feels unbalanced, denies syncope or LOC. Has been previously told he might need MRI for unilateral hearing loss but then was lost to f/u with ENT

## 2022-12-13 NOTE — HEALTH RISK ASSESSMENT
[Former] : Former [No] : In the past 12 months have you used drugs other than those required for medical reasons? No [Two or more falls in past year] : Patient reported two or more falls in the past year [With Family] : lives with family [On disability] : on disability [Reports changes in hearing] : Reports changes in hearing [Medical reason not done] : Medical reason not done [de-identified] : schizophrenia dx under care of Psych [Reports changes in vision] : Reports no changes in vision [Reports changes in dental health] : Reports no changes in dental health

## 2022-12-13 NOTE — ASSESSMENT
[FreeTextEntry1] : HCM:\par - CBC, CMP, iron, microalbumin\par - Does not meet reqs for lung ca\par - Will need to discuss AAA screening\par - COVID dates\par - Will need to discuss PCV20 and Shingrix\par - Colonoscopy due 2029\par \par RTC 1 month given multiple med complaints. Will need ECG if unable to obtain records from prev Cardiologist

## 2022-12-13 NOTE — PHYSICAL EXAM
[No Acute Distress] : no acute distress [Normal Sclera/Conjunctiva] : normal sclera/conjunctiva [EOMI] : extraocular movements intact [Normal Outer Ear/Nose] : the outer ears and nose were normal in appearance [No Respiratory Distress] : no respiratory distress  [No Accessory Muscle Use] : no accessory muscle use [Clear to Auscultation] : lungs were clear to auscultation bilaterally [Normal Rate] : normal rate  [Regular Rhythm] : with a regular rhythm [Normal S1, S2] : normal S1 and S2 [Soft] : abdomen soft [Non-distended] : non-distended [Kyphosis] : kyphosis [Normal Gait] : normal gait [Right Foot Was Examined] : Right foot ~C was examined [Left Foot Was Examined] : left foot ~C was examined [None] : no ulcers in either foot were found [] : both feet [de-identified] : cerumen present bilaterally. poor dentition noted [de-identified] : ?murmur best heard in left sternal border [de-identified] : Trace edema bilaterally [de-identified] : 5th digits on both hands do not fully extend [de-identified] :  5/5 bilaterally, leg antigravity. Some tremor noted with finger-to-nose bilaterally. Heel to shin normal. Positive Romberg [de-identified] : Cooperative. Answer questions appropriately, sometimes answers circumferentially [TWNoteComboBox4] : +1

## 2022-12-14 LAB
ALBUMIN SERPL ELPH-MCNC: 4.2 G/DL
ALP BLD-CCNC: 95 U/L
ALT SERPL-CCNC: 15 U/L
ANION GAP SERPL CALC-SCNC: 10 MMOL/L
APPEARANCE: CLEAR
AST SERPL-CCNC: 18 U/L
BASOPHILS # BLD AUTO: 0.04 K/UL
BASOPHILS NFR BLD AUTO: 0.7 %
BILIRUB SERPL-MCNC: <0.2 MG/DL
BILIRUBIN URINE: NEGATIVE
BLOOD URINE: NEGATIVE
BUN SERPL-MCNC: 20 MG/DL
CALCIUM SERPL-MCNC: 9.5 MG/DL
CHLORIDE SERPL-SCNC: 96 MMOL/L
CO2 SERPL-SCNC: 26 MMOL/L
COLOR: YELLOW
CREAT SERPL-MCNC: 0.76 MG/DL
EGFR: 98 ML/MIN/1.73M2
EOSINOPHIL # BLD AUTO: 0.28 K/UL
EOSINOPHIL NFR BLD AUTO: 4.8 %
FERRITIN SERPL-MCNC: 82 NG/ML
GLUCOSE QUALITATIVE U: NEGATIVE
GLUCOSE SERPL-MCNC: 152 MG/DL
HCT VFR BLD CALC: 33.4 %
HGB BLD-MCNC: 10.6 G/DL
IMM GRANULOCYTES NFR BLD AUTO: 0.3 %
IRON SATN MFR SERPL: 28 %
IRON SERPL-MCNC: 84 UG/DL
KETONES URINE: NEGATIVE
LEUKOCYTE ESTERASE URINE: NEGATIVE
LYMPHOCYTES # BLD AUTO: 1.22 K/UL
LYMPHOCYTES NFR BLD AUTO: 21 %
MAN DIFF?: NORMAL
MCHC RBC-ENTMCNC: 30.8 PG
MCHC RBC-ENTMCNC: 31.7 GM/DL
MCV RBC AUTO: 97.1 FL
MONOCYTES # BLD AUTO: 0.47 K/UL
MONOCYTES NFR BLD AUTO: 8.1 %
NEUTROPHILS # BLD AUTO: 3.78 K/UL
NEUTROPHILS NFR BLD AUTO: 65.1 %
NITRITE URINE: NEGATIVE
PH URINE: 6
PLATELET # BLD AUTO: 226 K/UL
POTASSIUM SERPL-SCNC: 5.1 MMOL/L
PROT SERPL-MCNC: 6.4 G/DL
PROTEIN URINE: ABNORMAL
RBC # BLD: 3.44 M/UL
RBC # FLD: 15.2 %
SODIUM SERPL-SCNC: 132 MMOL/L
SPECIFIC GRAVITY URINE: 1.02
T PALLIDUM AB SER QL IA: NEGATIVE
TIBC SERPL-MCNC: 305 UG/DL
UIBC SERPL-MCNC: 220 UG/DL
UROBILINOGEN URINE: NORMAL
VIT B12 SERPL-MCNC: 526 PG/ML
WBC # FLD AUTO: 5.81 K/UL

## 2022-12-19 PROCEDURE — 90853 GROUP PSYCHOTHERAPY: CPT | Mod: 95

## 2022-12-21 LAB
BASOPHILS # BLD AUTO: 0.05 K/UL — SIGNIFICANT CHANGE UP (ref 0–0.2)
BASOPHILS NFR BLD AUTO: 1.3 % — SIGNIFICANT CHANGE UP (ref 0–2)
EOSINOPHIL # BLD AUTO: 0.11 K/UL — SIGNIFICANT CHANGE UP (ref 0–0.5)
EOSINOPHIL NFR BLD AUTO: 2.9 % — SIGNIFICANT CHANGE UP (ref 0–6)
HCT VFR BLD CALC: 30.6 % — LOW (ref 39–50)
HGB BLD-MCNC: 10.1 G/DL — LOW (ref 13–17)
IANC: 1.99 K/UL — SIGNIFICANT CHANGE UP (ref 1.8–7.4)
IMM GRANULOCYTES NFR BLD AUTO: 0.3 % — SIGNIFICANT CHANGE UP (ref 0–0.9)
LYMPHOCYTES # BLD AUTO: 1.23 K/UL — SIGNIFICANT CHANGE UP (ref 1–3.3)
LYMPHOCYTES # BLD AUTO: 32.5 % — SIGNIFICANT CHANGE UP (ref 13–44)
MCHC RBC-ENTMCNC: 30.8 PG — SIGNIFICANT CHANGE UP (ref 27–34)
MCHC RBC-ENTMCNC: 33 GM/DL — SIGNIFICANT CHANGE UP (ref 32–36)
MCV RBC AUTO: 93.3 FL — SIGNIFICANT CHANGE UP (ref 80–100)
MONOCYTES # BLD AUTO: 0.4 K/UL — SIGNIFICANT CHANGE UP (ref 0–0.9)
MONOCYTES NFR BLD AUTO: 10.6 % — SIGNIFICANT CHANGE UP (ref 2–14)
NEUTROPHILS # BLD AUTO: 1.99 K/UL — SIGNIFICANT CHANGE UP (ref 1.8–7.4)
NEUTROPHILS NFR BLD AUTO: 52.4 % — SIGNIFICANT CHANGE UP (ref 43–77)
NRBC # BLD: 0 /100 WBCS — SIGNIFICANT CHANGE UP (ref 0–0)
NRBC # FLD: 0 K/UL — SIGNIFICANT CHANGE UP (ref 0–0)
PLATELET # BLD AUTO: 214 K/UL — SIGNIFICANT CHANGE UP (ref 150–400)
RBC # BLD: 3.28 M/UL — LOW (ref 4.2–5.8)
RBC # FLD: 14.9 % — HIGH (ref 10.3–14.5)
WBC # BLD: 3.79 K/UL — LOW (ref 3.8–10.5)
WBC # FLD AUTO: 3.79 K/UL — LOW (ref 3.8–10.5)

## 2022-12-26 NOTE — BEHAVIORAL HEALTH ASSESSMENT NOTE - SUBSTANCE USE
Good (>75%) 2y7m M w/ known hx of egg allergy presenting after allergic reaction. Pt cracked an egg on his head, began crying. MOC noted rash and hives on head, gave epi pen w/in of event (1515), sx resolved in minutes. MOC does not believe he got any in his mouth. Good UOP and PO. No known sick contacts. NKDA. VUTD. None known

## 2023-01-17 ENCOUNTER — APPOINTMENT (OUTPATIENT)
Dept: INTERNAL MEDICINE | Facility: CLINIC | Age: 68
End: 2023-01-17
Payer: MEDICARE

## 2023-01-17 VITALS
SYSTOLIC BLOOD PRESSURE: 114 MMHG | WEIGHT: 170 LBS | BODY MASS INDEX: 28.32 KG/M2 | HEART RATE: 77 BPM | TEMPERATURE: 97.3 F | DIASTOLIC BLOOD PRESSURE: 61 MMHG | HEIGHT: 65 IN | OXYGEN SATURATION: 98 %

## 2023-01-17 DIAGNOSIS — R60.0 LOCALIZED EDEMA: ICD-10-CM

## 2023-01-17 PROCEDURE — G0009: CPT

## 2023-01-17 PROCEDURE — 99214 OFFICE O/P EST MOD 30 MIN: CPT | Mod: 25

## 2023-01-17 PROCEDURE — 90677 PCV20 VACCINE IM: CPT

## 2023-01-17 NOTE — PHYSICAL EXAM
[No Acute Distress] : no acute distress [Normal Sclera/Conjunctiva] : normal sclera/conjunctiva [EOMI] : extraocular movements intact [No Respiratory Distress] : no respiratory distress  [No Accessory Muscle Use] : no accessory muscle use [Clear to Auscultation] : lungs were clear to auscultation bilaterally [Normal Rate] : normal rate  [Regular Rhythm] : with a regular rhythm [Normal S1, S2] : normal S1 and S2 [Kyphosis] : kyphosis [Normal Gait] : normal gait [Right Foot Was Examined] : Right foot ~C was examined [Left Foot Was Examined] : left foot ~C was examined [None] : no ulcers in either foot were found [] : both feet [de-identified] : ?murmur best heard in left sternal border [de-identified] : +1 pitting edema bilaterally [de-identified] : Answering questions appropriately. Gets on exam table and ambulates without assistance [de-identified] : Cooperative. Answer questions appropriately, sometimes answers circumferentially [TWNoteComboBox4] : +1

## 2023-01-17 NOTE — HISTORY OF PRESENT ILLNESS
[de-identified] : Lauro Olmstead is a 67M with PMhx of schizophrenia/ OCD, T2DM, HLD, HTN, hypothyroidism, Schneider's esophagus, chronic hyponatremia (probably 2/2 SIADH from meds) presents to follow-up multiple medical complaints. Accompanied by brother David\tasneem \tasneem Has some insurance issues so was not able to obtain TTE, pleans to start rehab this month\tasneem Did see Nephro, was recommended to have urine tests but was unable to provide sample in the office\par Brother asking for tips for water restriction. Patient uses a lot of caffeine (hence drinks a lot of coffee/tea) and precontemplative for cutting back, also drinks liquid for miralax\tasneem Presents with an old ECG with a RBBB

## 2023-01-17 NOTE — ASSESSMENT
[FreeTextEntry1] : HCM:\par - Will need to discuss AAA screening\par - COVID dates\par - PCV20 today\par - Will need to discuss Shingrix

## 2023-01-26 LAB
BASOPHILS # BLD AUTO: 0.03 K/UL — SIGNIFICANT CHANGE UP (ref 0–0.2)
BASOPHILS NFR BLD AUTO: 0.5 % — SIGNIFICANT CHANGE UP (ref 0–2)
EOSINOPHIL # BLD AUTO: 0.09 K/UL — SIGNIFICANT CHANGE UP (ref 0–0.5)
EOSINOPHIL NFR BLD AUTO: 1.5 % — SIGNIFICANT CHANGE UP (ref 0–6)
HCT VFR BLD CALC: 30.8 % — LOW (ref 39–50)
HGB BLD-MCNC: 9.8 G/DL — LOW (ref 13–17)
IANC: 4.65 K/UL — SIGNIFICANT CHANGE UP (ref 1.8–7.4)
IMM GRANULOCYTES NFR BLD AUTO: 0.3 % — SIGNIFICANT CHANGE UP (ref 0–0.9)
LYMPHOCYTES # BLD AUTO: 0.7 K/UL — LOW (ref 1–3.3)
LYMPHOCYTES # BLD AUTO: 11.8 % — LOW (ref 13–44)
MCHC RBC-ENTMCNC: 30.3 PG — SIGNIFICANT CHANGE UP (ref 27–34)
MCHC RBC-ENTMCNC: 31.8 GM/DL — LOW (ref 32–36)
MCV RBC AUTO: 95.4 FL — SIGNIFICANT CHANGE UP (ref 80–100)
MONOCYTES # BLD AUTO: 0.43 K/UL — SIGNIFICANT CHANGE UP (ref 0–0.9)
MONOCYTES NFR BLD AUTO: 7.3 % — SIGNIFICANT CHANGE UP (ref 2–14)
NEUTROPHILS # BLD AUTO: 4.65 K/UL — SIGNIFICANT CHANGE UP (ref 1.8–7.4)
NEUTROPHILS NFR BLD AUTO: 78.6 % — HIGH (ref 43–77)
NRBC # BLD: 0 /100 WBCS — SIGNIFICANT CHANGE UP (ref 0–0)
NRBC # FLD: 0 K/UL — SIGNIFICANT CHANGE UP (ref 0–0)
PLATELET # BLD AUTO: 140 K/UL — LOW (ref 150–400)
RBC # BLD: 3.23 M/UL — LOW (ref 4.2–5.8)
RBC # FLD: 14.8 % — HIGH (ref 10.3–14.5)
VALPROATE SERPL-MCNC: 54.1 UG/ML — SIGNIFICANT CHANGE UP (ref 50–100)
WBC # BLD: 5.92 K/UL — SIGNIFICANT CHANGE UP (ref 3.8–10.5)
WBC # FLD AUTO: 5.92 K/UL — SIGNIFICANT CHANGE UP (ref 3.8–10.5)

## 2023-01-30 ENCOUNTER — INPATIENT (INPATIENT)
Facility: HOSPITAL | Age: 68
LOS: 13 days | Discharge: NURSING, REHAB OR LONG-TERM CARE | End: 2023-02-13
Attending: INTERNAL MEDICINE | Admitting: INTERNAL MEDICINE
Payer: MEDICARE

## 2023-01-30 VITALS
DIASTOLIC BLOOD PRESSURE: 52 MMHG | HEART RATE: 46 BPM | RESPIRATION RATE: 18 BRPM | SYSTOLIC BLOOD PRESSURE: 88 MMHG | OXYGEN SATURATION: 99 %

## 2023-01-30 DIAGNOSIS — R27.0 ATAXIA, UNSPECIFIED: ICD-10-CM

## 2023-01-30 LAB
A1C WITH ESTIMATED AVERAGE GLUCOSE RESULT: 6.4 % — HIGH (ref 4–5.6)
ALBUMIN SERPL ELPH-MCNC: 3.1 G/DL — LOW (ref 3.3–5)
ALP SERPL-CCNC: 104 U/L — SIGNIFICANT CHANGE UP (ref 40–120)
ALT FLD-CCNC: 65 U/L — HIGH (ref 4–41)
AMMONIA BLD-MCNC: 31 UMOL/L — SIGNIFICANT CHANGE UP (ref 11–55)
ANION GAP SERPL CALC-SCNC: 13 MMOL/L — SIGNIFICANT CHANGE UP (ref 7–14)
ANISOCYTOSIS BLD QL: SLIGHT — SIGNIFICANT CHANGE UP
APPEARANCE UR: CLEAR — SIGNIFICANT CHANGE UP
APTT BLD: 45.9 SEC — HIGH (ref 27–36.3)
AST SERPL-CCNC: 36 U/L — SIGNIFICANT CHANGE UP (ref 4–40)
B PERT DNA SPEC QL NAA+PROBE: SIGNIFICANT CHANGE UP
B PERT+PARAPERT DNA PNL SPEC NAA+PROBE: SIGNIFICANT CHANGE UP
BACTERIA # UR AUTO: NEGATIVE — SIGNIFICANT CHANGE UP
BASOPHILS # BLD AUTO: 0.04 K/UL — SIGNIFICANT CHANGE UP (ref 0–0.2)
BASOPHILS NFR BLD AUTO: 0.9 % — SIGNIFICANT CHANGE UP (ref 0–2)
BILIRUB SERPL-MCNC: <0.2 MG/DL — SIGNIFICANT CHANGE UP (ref 0.2–1.2)
BILIRUB UR-MCNC: NEGATIVE — SIGNIFICANT CHANGE UP
BORDETELLA PARAPERTUSSIS (RAPRVP): SIGNIFICANT CHANGE UP
BUN SERPL-MCNC: 26 MG/DL — HIGH (ref 7–23)
C PNEUM DNA SPEC QL NAA+PROBE: SIGNIFICANT CHANGE UP
CALCIUM SERPL-MCNC: 8.7 MG/DL — SIGNIFICANT CHANGE UP (ref 8.4–10.5)
CHLORIDE SERPL-SCNC: 96 MMOL/L — LOW (ref 98–107)
CO2 SERPL-SCNC: 23 MMOL/L — SIGNIFICANT CHANGE UP (ref 22–31)
COLOR SPEC: YELLOW — SIGNIFICANT CHANGE UP
CREAT SERPL-MCNC: 0.67 MG/DL — SIGNIFICANT CHANGE UP (ref 0.5–1.3)
DIFF PNL FLD: NEGATIVE — SIGNIFICANT CHANGE UP
EGFR: 102 ML/MIN/1.73M2 — SIGNIFICANT CHANGE UP
EOSINOPHIL # BLD AUTO: 0.07 K/UL — SIGNIFICANT CHANGE UP (ref 0–0.5)
EOSINOPHIL NFR BLD AUTO: 1.7 % — SIGNIFICANT CHANGE UP (ref 0–6)
EPI CELLS # UR: 1 /HPF — SIGNIFICANT CHANGE UP (ref 0–5)
ESTIMATED AVERAGE GLUCOSE: 137 — SIGNIFICANT CHANGE UP
FLUAV SUBTYP SPEC NAA+PROBE: SIGNIFICANT CHANGE UP
FLUBV RNA SPEC QL NAA+PROBE: SIGNIFICANT CHANGE UP
GLUCOSE SERPL-MCNC: 97 MG/DL — SIGNIFICANT CHANGE UP (ref 70–99)
GLUCOSE UR QL: ABNORMAL
HADV DNA SPEC QL NAA+PROBE: SIGNIFICANT CHANGE UP
HCOV 229E RNA SPEC QL NAA+PROBE: SIGNIFICANT CHANGE UP
HCOV HKU1 RNA SPEC QL NAA+PROBE: SIGNIFICANT CHANGE UP
HCOV NL63 RNA SPEC QL NAA+PROBE: SIGNIFICANT CHANGE UP
HCOV OC43 RNA SPEC QL NAA+PROBE: SIGNIFICANT CHANGE UP
HCT VFR BLD CALC: 29.5 % — LOW (ref 39–50)
HGB BLD-MCNC: 9.4 G/DL — LOW (ref 13–17)
HMPV RNA SPEC QL NAA+PROBE: SIGNIFICANT CHANGE UP
HPIV1 RNA SPEC QL NAA+PROBE: SIGNIFICANT CHANGE UP
HPIV2 RNA SPEC QL NAA+PROBE: SIGNIFICANT CHANGE UP
HPIV3 RNA SPEC QL NAA+PROBE: SIGNIFICANT CHANGE UP
HPIV4 RNA SPEC QL NAA+PROBE: SIGNIFICANT CHANGE UP
HYALINE CASTS # UR AUTO: 2 /LPF — SIGNIFICANT CHANGE UP (ref 0–7)
IANC: 3.17 K/UL — SIGNIFICANT CHANGE UP (ref 1.8–7.4)
INR BLD: 0.97 RATIO — SIGNIFICANT CHANGE UP (ref 0.88–1.16)
KETONES UR-MCNC: ABNORMAL
LEUKOCYTE ESTERASE UR-ACNC: NEGATIVE — SIGNIFICANT CHANGE UP
LYMPHOCYTES # BLD AUTO: 0.84 K/UL — LOW (ref 1–3.3)
LYMPHOCYTES # BLD AUTO: 20.5 % — SIGNIFICANT CHANGE UP (ref 13–44)
M PNEUMO DNA SPEC QL NAA+PROBE: SIGNIFICANT CHANGE UP
MACROCYTES BLD QL: SLIGHT — SIGNIFICANT CHANGE UP
MCHC RBC-ENTMCNC: 30.5 PG — SIGNIFICANT CHANGE UP (ref 27–34)
MCHC RBC-ENTMCNC: 31.9 GM/DL — LOW (ref 32–36)
MCV RBC AUTO: 95.8 FL — SIGNIFICANT CHANGE UP (ref 80–100)
MONOCYTES # BLD AUTO: 0.32 K/UL — SIGNIFICANT CHANGE UP (ref 0–0.9)
MONOCYTES NFR BLD AUTO: 7.7 % — SIGNIFICANT CHANGE UP (ref 2–14)
NEUTROPHILS # BLD AUTO: 2.84 K/UL — SIGNIFICANT CHANGE UP (ref 1.8–7.4)
NEUTROPHILS NFR BLD AUTO: 69.2 % — SIGNIFICANT CHANGE UP (ref 43–77)
NITRITE UR-MCNC: NEGATIVE — SIGNIFICANT CHANGE UP
OVALOCYTES BLD QL SMEAR: SLIGHT — SIGNIFICANT CHANGE UP
PH UR: 6 — SIGNIFICANT CHANGE UP (ref 5–8)
PLAT MORPH BLD: NORMAL — SIGNIFICANT CHANGE UP
PLATELET # BLD AUTO: 69 K/UL — LOW (ref 150–400)
PLATELET COUNT - ESTIMATE: ABNORMAL
POIKILOCYTOSIS BLD QL AUTO: SIGNIFICANT CHANGE UP
POLYCHROMASIA BLD QL SMEAR: SLIGHT — SIGNIFICANT CHANGE UP
POTASSIUM SERPL-MCNC: 5.1 MMOL/L — SIGNIFICANT CHANGE UP (ref 3.5–5.3)
POTASSIUM SERPL-SCNC: 5.1 MMOL/L — SIGNIFICANT CHANGE UP (ref 3.5–5.3)
PROT SERPL-MCNC: 5.3 G/DL — LOW (ref 6–8.3)
PROT UR-MCNC: ABNORMAL
PROTHROM AB SERPL-ACNC: 11.2 SEC — SIGNIFICANT CHANGE UP (ref 10.5–13.4)
RAPID RVP RESULT: SIGNIFICANT CHANGE UP
RBC # BLD: 3.08 M/UL — LOW (ref 4.2–5.8)
RBC # FLD: 14.9 % — HIGH (ref 10.3–14.5)
RBC BLD AUTO: ABNORMAL
RBC CASTS # UR COMP ASSIST: 1 /HPF — SIGNIFICANT CHANGE UP (ref 0–4)
RSV RNA SPEC QL NAA+PROBE: SIGNIFICANT CHANGE UP
RV+EV RNA SPEC QL NAA+PROBE: SIGNIFICANT CHANGE UP
SARS-COV-2 RNA SPEC QL NAA+PROBE: SIGNIFICANT CHANGE UP
SCHISTOCYTES BLD QL AUTO: SLIGHT — SIGNIFICANT CHANGE UP
SODIUM SERPL-SCNC: 132 MMOL/L — LOW (ref 135–145)
SP GR SPEC: 1.02 — SIGNIFICANT CHANGE UP (ref 1.01–1.05)
TSH SERPL-MCNC: 4.91 UIU/ML — HIGH (ref 0.27–4.2)
UROBILINOGEN FLD QL: SIGNIFICANT CHANGE UP
VALPROATE SERPL-MCNC: 39.9 UG/ML — LOW (ref 50–100)
WBC # BLD: 4.1 K/UL — SIGNIFICANT CHANGE UP (ref 3.8–10.5)
WBC # FLD AUTO: 4.1 K/UL — SIGNIFICANT CHANGE UP (ref 3.8–10.5)
WBC UR QL: 2 /HPF — SIGNIFICANT CHANGE UP (ref 0–5)

## 2023-01-30 PROCEDURE — 70498 CT ANGIOGRAPHY NECK: CPT | Mod: 26,MA

## 2023-01-30 PROCEDURE — 71045 X-RAY EXAM CHEST 1 VIEW: CPT | Mod: 26

## 2023-01-30 PROCEDURE — 99285 EMERGENCY DEPT VISIT HI MDM: CPT | Mod: GC

## 2023-01-30 PROCEDURE — 70496 CT ANGIOGRAPHY HEAD: CPT | Mod: 26,MA

## 2023-01-30 RX ORDER — SODIUM CHLORIDE 9 MG/ML
1000 INJECTION INTRAMUSCULAR; INTRAVENOUS; SUBCUTANEOUS ONCE
Refills: 0 | Status: COMPLETED | OUTPATIENT
Start: 2023-01-30 | End: 2023-01-30

## 2023-01-30 RX ADMIN — SODIUM CHLORIDE 1000 MILLILITER(S): 9 INJECTION INTRAMUSCULAR; INTRAVENOUS; SUBCUTANEOUS at 17:36

## 2023-01-30 NOTE — ED PROVIDER NOTE - OBJECTIVE STATEMENT
66 y/o male with schizo and bipolar history, dm, htn brought in by brother for dizziness and slurred speech. Symptoms began on Friday and has been persistent. Initially believed symptoms to be secondary to a decrease psychiatric medication. However, given persistent symptoms, brother brought patient in. Also notes confusion. patient AOX3 at baseline and is now approximately 68 y/o male with schizo and bipolar history, dm, htn brought in by brother for dizziness and slurred speech. Symptoms began on Friday and has been persistent. Initially believed symptoms to be secondary to a decrease psychiatric medication. However, given persistent symptoms, brother brought patient in. Also notes confusion. patient AOX3 at baseline and is now aox1-2. Brother notes chronic cough but denies any fevers, chest pain, nausea vomiting or diarrhea.

## 2023-01-30 NOTE — ED PROVIDER NOTE - PHYSICAL EXAMINATION
GEN - A&O x2   HEAD - NC/AT   EYES- PERRL, EOMI  ENT: Airway patent, mmm  PULMONARY - CTA b/l, symmetric breath sounds. No W/R/R.  CARDIAC -s1s2, RRR, no M,G,R, No JVD  ABDOMEN - +BS, ND, NT, soft, no guarding, no rebound, no masses , no rigidity  : No CVA TTP, no suprapubic TTP  BACK - Normal  spine   EXTREMITIES - FROM, symmetric pulses, capillary refill < 2 seconds, no edema, 5/5 strength in b/l UE and LE  SKIN - no rash or bruising   NEUROLOGIC - alert with altered mentation from baseline, speech slurred no focal deficits, CN II-XII grossly intact, sensation grossly intact

## 2023-01-30 NOTE — ED PROVIDER NOTE - ATTENDING CONTRIBUTION TO CARE
I have personally performed a history and physical examination of the patient and discussed management with the resident as well as the patient.  I reviewed the resident's note and agree with the documented findings and plan of care.  I have authored and modified critical sections of the Provider Note, including but not limited to HPI, Physical Exam and MDM.    67-year-old male past medical history of schizophrenia and bipolar, diabetes, hypertension presents by brother for dizziness and slurred speech for the last 2 days.  Patient has been persistently confused, oriented to self and place without time on arrival.  Recent decrease in Clazuril dose due to change in blood counts.  On examination no evidence of focal motor deficits outside of slurred speech and altered sensorium.  Possible CVA versus UTI versus PNA versus metabolic derangement.  History corroborated by brother at bedside who states patient has had issues of hyponatremia in the past due to fluid retention.  No evidence of clinical fluid overload however will evaluate with CBC, CMP for metabolic derangement.  Will provide symptomatic control as needed.  Patient signed out to Dr. Lara.

## 2023-01-30 NOTE — ED PROVIDER NOTE - PROGRESS NOTE DETAILS
Constantine, PGY3: patient still acutely ataxic with slurred speech. CT angios negative. Will discuss w/ neuro and likely admit to medicine Constantine, PGY3: discussed case w/ medicine. Will admit on tele

## 2023-01-30 NOTE — ED ADULT TRIAGE NOTE - BEFAST ARM SIDE DRIFT
Prescription corrected & e-scribed to pharmacy per pt.  Request.
Rx sent to pharmacy has quantity listed as 1 - need to resend with the number of strips to be dispensed.  [address] [place] [address] [phone]
No

## 2023-01-30 NOTE — ED PROVIDER NOTE - CLINICAL SUMMARY MEDICAL DECISION MAKING FREE TEXT BOX
67-year-old male past medical history of schizophrenia and bipolar, diabetes, hypertension presents by brother for dizziness and slurred speech for the last 2 days.  Patient has been persistently confused, oriented to self and place without time on arrival.  Recent decrease in Clazuril dose due to change in blood counts.  On examination no evidence of focal motor deficits outside of slurred speech and altered sensorium.  Possible CVA versus UTI versus PNA versus metabolic derangement.  History corroborated by brother at bedside who states patient has had issues of hyponatremia in the past due to fluid retention.  No evidence of clinical fluid overload however will evaluate with CBC, CMP for metabolic derangement.      Dr. Martinez: See attending attestation.

## 2023-01-31 DIAGNOSIS — F20.9 SCHIZOPHRENIA, UNSPECIFIED: ICD-10-CM

## 2023-01-31 DIAGNOSIS — E11.65 TYPE 2 DIABETES MELLITUS WITH HYPERGLYCEMIA: ICD-10-CM

## 2023-01-31 DIAGNOSIS — G93.40 ENCEPHALOPATHY, UNSPECIFIED: ICD-10-CM

## 2023-01-31 DIAGNOSIS — E03.9 HYPOTHYROIDISM, UNSPECIFIED: ICD-10-CM

## 2023-01-31 DIAGNOSIS — Z29.9 ENCOUNTER FOR PROPHYLACTIC MEASURES, UNSPECIFIED: ICD-10-CM

## 2023-01-31 DIAGNOSIS — D64.9 ANEMIA, UNSPECIFIED: ICD-10-CM

## 2023-01-31 DIAGNOSIS — I95.9 HYPOTENSION, UNSPECIFIED: ICD-10-CM

## 2023-01-31 DIAGNOSIS — N40.0 BENIGN PROSTATIC HYPERPLASIA WITHOUT LOWER URINARY TRACT SYMPTOMS: ICD-10-CM

## 2023-01-31 DIAGNOSIS — Z86.59 PERSONAL HISTORY OF OTHER MENTAL AND BEHAVIORAL DISORDERS: ICD-10-CM

## 2023-01-31 DIAGNOSIS — D69.6 THROMBOCYTOPENIA, UNSPECIFIED: ICD-10-CM

## 2023-01-31 DIAGNOSIS — R68.0 HYPOTHERMIA, NOT ASSOCIATED WITH LOW ENVIRONMENTAL TEMPERATURE: ICD-10-CM

## 2023-01-31 DIAGNOSIS — E87.5 HYPERKALEMIA: ICD-10-CM

## 2023-01-31 DIAGNOSIS — Z87.898 PERSONAL HISTORY OF OTHER SPECIFIED CONDITIONS: ICD-10-CM

## 2023-01-31 LAB
24R-OH-CALCIDIOL SERPL-MCNC: 40 NG/ML — SIGNIFICANT CHANGE UP (ref 30–80)
ALBUMIN SERPL ELPH-MCNC: 3.4 G/DL — SIGNIFICANT CHANGE UP (ref 3.3–5)
ALP SERPL-CCNC: 96 U/L — SIGNIFICANT CHANGE UP (ref 40–120)
ALT FLD-CCNC: 69 U/L — HIGH (ref 4–41)
AMMONIA BLD-MCNC: 58 UMOL/L — HIGH (ref 11–55)
ANION GAP SERPL CALC-SCNC: 11 MMOL/L — SIGNIFICANT CHANGE UP (ref 7–14)
ANION GAP SERPL CALC-SCNC: 7 MMOL/L — SIGNIFICANT CHANGE UP (ref 7–14)
ANION GAP SERPL CALC-SCNC: 8 MMOL/L — SIGNIFICANT CHANGE UP (ref 7–14)
AST SERPL-CCNC: 39 U/L — SIGNIFICANT CHANGE UP (ref 4–40)
B BURGDOR C6 AB SER-ACNC: NEGATIVE — SIGNIFICANT CHANGE UP
B BURGDOR IGG+IGM SER-ACNC: 0.08 INDEX — SIGNIFICANT CHANGE UP (ref 0.01–0.89)
BASE EXCESS BLDV CALC-SCNC: 0.2 MMOL/L — SIGNIFICANT CHANGE UP (ref -2–3)
BASE EXCESS BLDV CALC-SCNC: 1.9 MMOL/L — SIGNIFICANT CHANGE UP (ref -2–3)
BASE EXCESS BLDV CALC-SCNC: 2.4 MMOL/L — SIGNIFICANT CHANGE UP (ref -2–3)
BASOPHILS # BLD AUTO: 0.01 K/UL — SIGNIFICANT CHANGE UP (ref 0–0.2)
BASOPHILS # BLD AUTO: 0.01 K/UL — SIGNIFICANT CHANGE UP (ref 0–0.2)
BASOPHILS NFR BLD AUTO: 0.2 % — SIGNIFICANT CHANGE UP (ref 0–2)
BASOPHILS NFR BLD AUTO: 0.3 % — SIGNIFICANT CHANGE UP (ref 0–2)
BILIRUB DIRECT SERPL-MCNC: <0.2 MG/DL — SIGNIFICANT CHANGE UP (ref 0–0.3)
BILIRUB INDIRECT FLD-MCNC: SIGNIFICANT CHANGE UP MG/DL (ref 0–1)
BILIRUB SERPL-MCNC: <0.2 MG/DL — SIGNIFICANT CHANGE UP (ref 0.2–1.2)
BILIRUB SERPL-MCNC: <0.2 MG/DL — SIGNIFICANT CHANGE UP (ref 0.2–1.2)
BLOOD GAS VENOUS COMPREHENSIVE RESULT: SIGNIFICANT CHANGE UP
BUN SERPL-MCNC: 27 MG/DL — HIGH (ref 7–23)
BUN SERPL-MCNC: 27 MG/DL — HIGH (ref 7–23)
BUN SERPL-MCNC: 28 MG/DL — HIGH (ref 7–23)
C3 SERPL-MCNC: 90 MG/DL — SIGNIFICANT CHANGE UP (ref 90–180)
C4 SERPL-MCNC: 18 MG/DL — SIGNIFICANT CHANGE UP (ref 10–40)
CALCIUM SERPL-MCNC: 8.6 MG/DL — SIGNIFICANT CHANGE UP (ref 8.4–10.5)
CALCIUM SERPL-MCNC: 8.6 MG/DL — SIGNIFICANT CHANGE UP (ref 8.4–10.5)
CALCIUM SERPL-MCNC: 8.7 MG/DL — SIGNIFICANT CHANGE UP (ref 8.4–10.5)
CHLORIDE BLDV-SCNC: 100 MMOL/L — SIGNIFICANT CHANGE UP (ref 96–108)
CHLORIDE BLDV-SCNC: 98 MMOL/L — SIGNIFICANT CHANGE UP (ref 96–108)
CHLORIDE BLDV-SCNC: 99 MMOL/L — SIGNIFICANT CHANGE UP (ref 96–108)
CHLORIDE SERPL-SCNC: 97 MMOL/L — LOW (ref 98–107)
CHLORIDE SERPL-SCNC: 98 MMOL/L — SIGNIFICANT CHANGE UP (ref 98–107)
CHLORIDE SERPL-SCNC: 98 MMOL/L — SIGNIFICANT CHANGE UP (ref 98–107)
CHOLEST SERPL-MCNC: 89 MG/DL — SIGNIFICANT CHANGE UP
CK MB BLD-MCNC: 11.7 % — HIGH (ref 0–2.5)
CK MB BLD-MCNC: 12.3 % — HIGH (ref 0–2.5)
CK MB CFR SERPL CALC: 20.2 NG/ML — HIGH
CK MB CFR SERPL CALC: 24.8 NG/ML — HIGH
CK SERPL-CCNC: 172 U/L — SIGNIFICANT CHANGE UP (ref 30–200)
CK SERPL-CCNC: 201 U/L — HIGH (ref 30–200)
CLOSURE TME COLL+EPINEP BLD: 67 K/UL — LOW (ref 150–400)
CO2 BLDV-SCNC: 29.1 MMOL/L — HIGH (ref 22–26)
CO2 BLDV-SCNC: 29.2 MMOL/L — HIGH (ref 22–26)
CO2 BLDV-SCNC: 29.8 MMOL/L — HIGH (ref 22–26)
CO2 SERPL-SCNC: 25 MMOL/L — SIGNIFICANT CHANGE UP (ref 22–31)
CO2 SERPL-SCNC: 26 MMOL/L — SIGNIFICANT CHANGE UP (ref 22–31)
CO2 SERPL-SCNC: 26 MMOL/L — SIGNIFICANT CHANGE UP (ref 22–31)
CORTIS AM PEAK SERPL-MCNC: 53.7 UG/DL — HIGH (ref 6–18.4)
CREAT SERPL-MCNC: 0.64 MG/DL — SIGNIFICANT CHANGE UP (ref 0.5–1.3)
CREAT SERPL-MCNC: 0.72 MG/DL — SIGNIFICANT CHANGE UP (ref 0.5–1.3)
CREAT SERPL-MCNC: 0.81 MG/DL — SIGNIFICANT CHANGE UP (ref 0.5–1.3)
CRP SERPL-MCNC: 28.1 MG/L — HIGH
CULTURE RESULTS: SIGNIFICANT CHANGE UP
EGFR: 100 ML/MIN/1.73M2 — SIGNIFICANT CHANGE UP
EGFR: 103 ML/MIN/1.73M2 — SIGNIFICANT CHANGE UP
EGFR: 96 ML/MIN/1.73M2 — SIGNIFICANT CHANGE UP
EOSINOPHIL # BLD AUTO: 0.01 K/UL — SIGNIFICANT CHANGE UP (ref 0–0.5)
EOSINOPHIL # BLD AUTO: 0.03 K/UL — SIGNIFICANT CHANGE UP (ref 0–0.5)
EOSINOPHIL NFR BLD AUTO: 0.2 % — SIGNIFICANT CHANGE UP (ref 0–6)
EOSINOPHIL NFR BLD AUTO: 0.8 % — SIGNIFICANT CHANGE UP (ref 0–6)
ERYTHROCYTE [SEDIMENTATION RATE] IN BLOOD: 5 MM/HR — SIGNIFICANT CHANGE UP (ref 1–15)
ETHANOL SERPL-MCNC: <10 MG/DL — SIGNIFICANT CHANGE UP
FERRITIN SERPL-MCNC: 165 NG/ML — SIGNIFICANT CHANGE UP (ref 30–400)
FOLATE SERPL-MCNC: 6.8 NG/ML — SIGNIFICANT CHANGE UP (ref 3.1–17.5)
GAS PNL BLDV: 129 MMOL/L — LOW (ref 136–145)
GAS PNL BLDV: 130 MMOL/L — LOW (ref 136–145)
GAS PNL BLDV: 132 MMOL/L — LOW (ref 136–145)
GLUCOSE BLDC GLUCOMTR-MCNC: 106 MG/DL — HIGH (ref 70–99)
GLUCOSE BLDC GLUCOMTR-MCNC: 112 MG/DL — HIGH (ref 70–99)
GLUCOSE BLDC GLUCOMTR-MCNC: 118 MG/DL — HIGH (ref 70–99)
GLUCOSE BLDC GLUCOMTR-MCNC: 121 MG/DL — HIGH (ref 70–99)
GLUCOSE BLDC GLUCOMTR-MCNC: 95 MG/DL — SIGNIFICANT CHANGE UP (ref 70–99)
GLUCOSE BLDV-MCNC: 103 MG/DL — HIGH (ref 70–99)
GLUCOSE BLDV-MCNC: 140 MG/DL — HIGH (ref 70–99)
GLUCOSE BLDV-MCNC: 82 MG/DL — SIGNIFICANT CHANGE UP (ref 70–99)
GLUCOSE SERPL-MCNC: 110 MG/DL — HIGH (ref 70–99)
GLUCOSE SERPL-MCNC: 188 MG/DL — HIGH (ref 70–99)
GLUCOSE SERPL-MCNC: 93 MG/DL — SIGNIFICANT CHANGE UP (ref 70–99)
HAPTOGLOB SERPL-MCNC: 114 MG/DL — SIGNIFICANT CHANGE UP (ref 34–200)
HCO3 BLDV-SCNC: 27 MMOL/L — SIGNIFICANT CHANGE UP (ref 22–29)
HCO3 BLDV-SCNC: 28 MMOL/L — SIGNIFICANT CHANGE UP (ref 22–29)
HCO3 BLDV-SCNC: 28 MMOL/L — SIGNIFICANT CHANGE UP (ref 22–29)
HCT VFR BLD CALC: 29.9 % — LOW (ref 39–50)
HCT VFR BLD CALC: 30.7 % — LOW (ref 39–50)
HCT VFR BLDA CALC: 30 % — LOW (ref 39–51)
HCT VFR BLDA CALC: 31 % — LOW (ref 39–51)
HCT VFR BLDA CALC: 31 % — LOW (ref 39–51)
HDLC SERPL-MCNC: 53 MG/DL — SIGNIFICANT CHANGE UP
HGB BLD CALC-MCNC: 10 G/DL — LOW (ref 13–17)
HGB BLD CALC-MCNC: 10.2 G/DL — LOW (ref 13–17)
HGB BLD CALC-MCNC: 10.4 G/DL — LOW (ref 13–17)
HGB BLD-MCNC: 10 G/DL — LOW (ref 13–17)
HGB BLD-MCNC: 9.6 G/DL — LOW (ref 13–17)
HIV 1+2 AB+HIV1 P24 AG SERPL QL IA: SIGNIFICANT CHANGE UP
IANC: 3.19 K/UL — SIGNIFICANT CHANGE UP (ref 1.8–7.4)
IANC: 4.78 K/UL — SIGNIFICANT CHANGE UP (ref 1.8–7.4)
IMM GRANULOCYTES NFR BLD AUTO: 1 % — HIGH (ref 0–0.9)
IMM GRANULOCYTES NFR BLD AUTO: 1.1 % — HIGH (ref 0–0.9)
IRON SATN MFR SERPL: 28 % — SIGNIFICANT CHANGE UP (ref 14–50)
IRON SATN MFR SERPL: 65 UG/DL — SIGNIFICANT CHANGE UP (ref 45–165)
LACTATE BLDV-MCNC: 1 MMOL/L — SIGNIFICANT CHANGE UP (ref 0.5–2)
LACTATE BLDV-MCNC: 2.1 MMOL/L — HIGH (ref 0.5–2)
LACTATE BLDV-MCNC: 2.6 MMOL/L — HIGH (ref 0.5–2)
LDH SERPL L TO P-CCNC: 184 U/L — SIGNIFICANT CHANGE UP (ref 135–225)
LIPID PNL WITH DIRECT LDL SERPL: 23 MG/DL — SIGNIFICANT CHANGE UP
LYMPHOCYTES # BLD AUTO: 0.31 K/UL — LOW (ref 1–3.3)
LYMPHOCYTES # BLD AUTO: 0.41 K/UL — LOW (ref 1–3.3)
LYMPHOCYTES # BLD AUTO: 10.6 % — LOW (ref 13–44)
LYMPHOCYTES # BLD AUTO: 5.8 % — LOW (ref 13–44)
MAGNESIUM SERPL-MCNC: 1.7 MG/DL — SIGNIFICANT CHANGE UP (ref 1.6–2.6)
MCHC RBC-ENTMCNC: 30.1 PG — SIGNIFICANT CHANGE UP (ref 27–34)
MCHC RBC-ENTMCNC: 30.4 PG — SIGNIFICANT CHANGE UP (ref 27–34)
MCHC RBC-ENTMCNC: 32.1 GM/DL — SIGNIFICANT CHANGE UP (ref 32–36)
MCHC RBC-ENTMCNC: 32.6 GM/DL — SIGNIFICANT CHANGE UP (ref 32–36)
MCV RBC AUTO: 92.5 FL — SIGNIFICANT CHANGE UP (ref 80–100)
MCV RBC AUTO: 94.6 FL — SIGNIFICANT CHANGE UP (ref 80–100)
MONOCYTES # BLD AUTO: 0.14 K/UL — SIGNIFICANT CHANGE UP (ref 0–0.9)
MONOCYTES # BLD AUTO: 0.2 K/UL — SIGNIFICANT CHANGE UP (ref 0–0.9)
MONOCYTES NFR BLD AUTO: 2.6 % — SIGNIFICANT CHANGE UP (ref 2–14)
MONOCYTES NFR BLD AUTO: 5.2 % — SIGNIFICANT CHANGE UP (ref 2–14)
NEUTROPHILS # BLD AUTO: 3.19 K/UL — SIGNIFICANT CHANGE UP (ref 1.8–7.4)
NEUTROPHILS # BLD AUTO: 4.78 K/UL — SIGNIFICANT CHANGE UP (ref 1.8–7.4)
NEUTROPHILS NFR BLD AUTO: 82.1 % — HIGH (ref 43–77)
NEUTROPHILS NFR BLD AUTO: 90.1 % — HIGH (ref 43–77)
NON HDL CHOLESTEROL: 36 MG/DL — SIGNIFICANT CHANGE UP
NRBC # BLD: 0 /100 WBCS — SIGNIFICANT CHANGE UP (ref 0–0)
NRBC # BLD: 0 /100 WBCS — SIGNIFICANT CHANGE UP (ref 0–0)
NRBC # FLD: 0.02 K/UL — HIGH (ref 0–0)
NRBC # FLD: 0.02 K/UL — HIGH (ref 0–0)
NT-PROBNP SERPL-SCNC: 346 PG/ML — HIGH
PCO2 BLDV: 48 MMHG — SIGNIFICANT CHANGE UP (ref 42–55)
PCO2 BLDV: 49 MMHG — SIGNIFICANT CHANGE UP (ref 42–55)
PCO2 BLDV: 57 MMHG — HIGH (ref 42–55)
PH BLDV: 7.29 — LOW (ref 7.32–7.43)
PH BLDV: 7.37 — SIGNIFICANT CHANGE UP (ref 7.32–7.43)
PH BLDV: 7.37 — SIGNIFICANT CHANGE UP (ref 7.32–7.43)
PHOSPHATE SERPL-MCNC: 4.6 MG/DL — HIGH (ref 2.5–4.5)
PLATELET # BLD AUTO: 101 K/UL — LOW (ref 150–400)
PLATELET # BLD AUTO: 76 K/UL — LOW (ref 150–400)
PO2 BLDV: 52 MMHG — SIGNIFICANT CHANGE UP
PO2 BLDV: 53 MMHG — SIGNIFICANT CHANGE UP
PO2 BLDV: 86 MMHG — SIGNIFICANT CHANGE UP
POTASSIUM BLDV-SCNC: 5.4 MMOL/L — HIGH (ref 3.5–5.1)
POTASSIUM BLDV-SCNC: 5.5 MMOL/L — HIGH (ref 3.5–5.1)
POTASSIUM BLDV-SCNC: 5.6 MMOL/L — HIGH (ref 3.5–5.1)
POTASSIUM SERPL-MCNC: 5.2 MMOL/L — SIGNIFICANT CHANGE UP (ref 3.5–5.3)
POTASSIUM SERPL-MCNC: 5.5 MMOL/L — HIGH (ref 3.5–5.3)
POTASSIUM SERPL-MCNC: 5.6 MMOL/L — HIGH (ref 3.5–5.3)
POTASSIUM SERPL-SCNC: 5.2 MMOL/L — SIGNIFICANT CHANGE UP (ref 3.5–5.3)
POTASSIUM SERPL-SCNC: 5.5 MMOL/L — HIGH (ref 3.5–5.3)
POTASSIUM SERPL-SCNC: 5.6 MMOL/L — HIGH (ref 3.5–5.3)
PROCALCITONIN SERPL-MCNC: 0.02 NG/ML — SIGNIFICANT CHANGE UP (ref 0.02–0.1)
PROT SERPL-MCNC: 5.6 G/DL — LOW (ref 6–8.3)
RBC # BLD: 3.16 M/UL — LOW (ref 4.2–5.8)
RBC # BLD: 3.16 M/UL — LOW (ref 4.2–5.8)
RBC # BLD: 3.32 M/UL — LOW (ref 4.2–5.8)
RBC # FLD: 14.8 % — HIGH (ref 10.3–14.5)
RBC # FLD: 14.9 % — HIGH (ref 10.3–14.5)
RETICS #: 56.9 K/UL — SIGNIFICANT CHANGE UP (ref 25–125)
RETICS/RBC NFR: 1.8 % — SIGNIFICANT CHANGE UP (ref 0.5–2.5)
SAO2 % BLDV: 80 % — SIGNIFICANT CHANGE UP
SAO2 % BLDV: 82.8 % — SIGNIFICANT CHANGE UP
SAO2 % BLDV: 96.6 % — SIGNIFICANT CHANGE UP
SODIUM SERPL-SCNC: 131 MMOL/L — LOW (ref 135–145)
SODIUM SERPL-SCNC: 132 MMOL/L — LOW (ref 135–145)
SODIUM SERPL-SCNC: 133 MMOL/L — LOW (ref 135–145)
SPECIMEN SOURCE: SIGNIFICANT CHANGE UP
T3 SERPL-MCNC: 46 NG/DL — LOW (ref 80–200)
T4 AB SER-ACNC: 5.74 UG/DL — SIGNIFICANT CHANGE UP (ref 5.1–13)
TIBC SERPL-MCNC: 234 UG/DL — SIGNIFICANT CHANGE UP (ref 220–430)
TRIGL SERPL-MCNC: 64 MG/DL — SIGNIFICANT CHANGE UP
TROPONIN T, HIGH SENSITIVITY RESULT: 36 NG/L — SIGNIFICANT CHANGE UP
TROPONIN T, HIGH SENSITIVITY RESULT: 42 NG/L — SIGNIFICANT CHANGE UP
UIBC SERPL-MCNC: 169 UG/DL — SIGNIFICANT CHANGE UP (ref 110–370)
VIT B12 SERPL-MCNC: 967 PG/ML — HIGH (ref 200–900)
WBC # BLD: 3.88 K/UL — SIGNIFICANT CHANGE UP (ref 3.8–10.5)
WBC # BLD: 5.31 K/UL — SIGNIFICANT CHANGE UP (ref 3.8–10.5)
WBC # FLD AUTO: 3.88 K/UL — SIGNIFICANT CHANGE UP (ref 3.8–10.5)
WBC # FLD AUTO: 5.31 K/UL — SIGNIFICANT CHANGE UP (ref 3.8–10.5)

## 2023-01-31 PROCEDURE — 99223 1ST HOSP IP/OBS HIGH 75: CPT

## 2023-01-31 PROCEDURE — 93306 TTE W/DOPPLER COMPLETE: CPT | Mod: 26

## 2023-01-31 PROCEDURE — 99222 1ST HOSP IP/OBS MODERATE 55: CPT

## 2023-01-31 PROCEDURE — 93970 EXTREMITY STUDY: CPT | Mod: 26

## 2023-01-31 PROCEDURE — 93010 ELECTROCARDIOGRAM REPORT: CPT

## 2023-01-31 PROCEDURE — 90792 PSYCH DIAG EVAL W/MED SRVCS: CPT

## 2023-01-31 RX ORDER — ATORVASTATIN CALCIUM 80 MG/1
1 TABLET, FILM COATED ORAL
Qty: 0 | Refills: 0 | DISCHARGE

## 2023-01-31 RX ORDER — DIVALPROEX SODIUM 500 MG/1
1000 TABLET, DELAYED RELEASE ORAL
Qty: 0 | Refills: 0 | DISCHARGE

## 2023-01-31 RX ORDER — DEXTROSE 50 % IN WATER 50 %
12.5 SYRINGE (ML) INTRAVENOUS ONCE
Refills: 0 | Status: DISCONTINUED | OUTPATIENT
Start: 2023-01-31 | End: 2023-02-13

## 2023-01-31 RX ORDER — ATORVASTATIN CALCIUM 80 MG/1
20 TABLET, FILM COATED ORAL AT BEDTIME
Refills: 0 | Status: DISCONTINUED | OUTPATIENT
Start: 2023-01-31 | End: 2023-01-31

## 2023-01-31 RX ORDER — CLOPIDOGREL BISULFATE 75 MG/1
75 TABLET, FILM COATED ORAL DAILY
Refills: 0 | Status: DISCONTINUED | OUTPATIENT
Start: 2023-01-31 | End: 2023-01-31

## 2023-01-31 RX ORDER — DIVALPROEX SODIUM 500 MG/1
1000 TABLET, DELAYED RELEASE ORAL AT BEDTIME
Refills: 0 | Status: DISCONTINUED | OUTPATIENT
Start: 2023-01-31 | End: 2023-01-31

## 2023-01-31 RX ORDER — PIPERACILLIN AND TAZOBACTAM 4; .5 G/20ML; G/20ML
3.38 INJECTION, POWDER, LYOPHILIZED, FOR SOLUTION INTRAVENOUS EVERY 8 HOURS
Refills: 0 | Status: COMPLETED | OUTPATIENT
Start: 2023-01-31 | End: 2023-02-06

## 2023-01-31 RX ORDER — SODIUM CHLORIDE 9 MG/ML
1000 INJECTION, SOLUTION INTRAVENOUS
Refills: 0 | Status: DISCONTINUED | OUTPATIENT
Start: 2023-01-31 | End: 2023-02-13

## 2023-01-31 RX ORDER — DIVALPROEX SODIUM 500 MG/1
750 TABLET, DELAYED RELEASE ORAL AT BEDTIME
Refills: 0 | Status: DISCONTINUED | OUTPATIENT
Start: 2023-01-31 | End: 2023-02-13

## 2023-01-31 RX ORDER — CLOZAPINE 150 MG/1
50 TABLET, ORALLY DISINTEGRATING ORAL AT BEDTIME
Refills: 0 | Status: DISCONTINUED | OUTPATIENT
Start: 2023-01-31 | End: 2023-02-03

## 2023-01-31 RX ORDER — SODIUM CHLORIDE 9 MG/ML
1000 INJECTION INTRAMUSCULAR; INTRAVENOUS; SUBCUTANEOUS
Refills: 0 | Status: DISCONTINUED | OUTPATIENT
Start: 2023-01-31 | End: 2023-02-01

## 2023-01-31 RX ORDER — LEVOTHYROXINE SODIUM 125 MCG
75 TABLET ORAL DAILY
Refills: 0 | Status: DISCONTINUED | OUTPATIENT
Start: 2023-01-31 | End: 2023-01-31

## 2023-01-31 RX ORDER — INSULIN LISPRO 100/ML
VIAL (ML) SUBCUTANEOUS AT BEDTIME
Refills: 0 | Status: DISCONTINUED | OUTPATIENT
Start: 2023-01-31 | End: 2023-02-13

## 2023-01-31 RX ORDER — LEVOTHYROXINE SODIUM 125 MCG
60 TABLET ORAL
Refills: 0 | Status: DISCONTINUED | OUTPATIENT
Start: 2023-01-31 | End: 2023-01-31

## 2023-01-31 RX ORDER — DEXTROSE 50 % IN WATER 50 %
25 SYRINGE (ML) INTRAVENOUS ONCE
Refills: 0 | Status: DISCONTINUED | OUTPATIENT
Start: 2023-01-31 | End: 2023-02-13

## 2023-01-31 RX ORDER — PIPERACILLIN AND TAZOBACTAM 4; .5 G/20ML; G/20ML
3.38 INJECTION, POWDER, LYOPHILIZED, FOR SOLUTION INTRAVENOUS ONCE
Refills: 0 | Status: COMPLETED | OUTPATIENT
Start: 2023-01-31 | End: 2023-01-31

## 2023-01-31 RX ORDER — DEXTROSE 50 % IN WATER 50 %
50 SYRINGE (ML) INTRAVENOUS ONCE
Refills: 0 | Status: DISCONTINUED | OUTPATIENT
Start: 2023-01-31 | End: 2023-01-31

## 2023-01-31 RX ORDER — ATORVASTATIN CALCIUM 80 MG/1
20 TABLET, FILM COATED ORAL AT BEDTIME
Refills: 0 | Status: DISCONTINUED | OUTPATIENT
Start: 2023-01-31 | End: 2023-02-06

## 2023-01-31 RX ORDER — HYDROCORTISONE 20 MG
100 TABLET ORAL ONCE
Refills: 0 | Status: COMPLETED | OUTPATIENT
Start: 2023-01-31 | End: 2023-01-31

## 2023-01-31 RX ORDER — FERROUS SULFATE 325(65) MG
325 TABLET ORAL AT BEDTIME
Refills: 0 | Status: DISCONTINUED | OUTPATIENT
Start: 2023-01-31 | End: 2023-02-13

## 2023-01-31 RX ORDER — FERROUS SULFATE 325(65) MG
325 TABLET ORAL DAILY
Refills: 0 | Status: DISCONTINUED | OUTPATIENT
Start: 2023-01-31 | End: 2023-01-31

## 2023-01-31 RX ORDER — ASPIRIN/CALCIUM CARB/MAGNESIUM 324 MG
300 TABLET ORAL DAILY
Refills: 0 | Status: DISCONTINUED | OUTPATIENT
Start: 2023-01-31 | End: 2023-01-31

## 2023-01-31 RX ORDER — GLUCAGON INJECTION, SOLUTION 0.5 MG/.1ML
1 INJECTION, SOLUTION SUBCUTANEOUS ONCE
Refills: 0 | Status: DISCONTINUED | OUTPATIENT
Start: 2023-01-31 | End: 2023-02-13

## 2023-01-31 RX ORDER — DEXTROSE 50 % IN WATER 50 %
25 SYRINGE (ML) INTRAVENOUS ONCE
Refills: 0 | Status: COMPLETED | OUTPATIENT
Start: 2023-01-31 | End: 2023-01-31

## 2023-01-31 RX ORDER — INSULIN HUMAN 100 [IU]/ML
5 INJECTION, SOLUTION SUBCUTANEOUS ONCE
Refills: 0 | Status: COMPLETED | OUTPATIENT
Start: 2023-01-31 | End: 2023-01-31

## 2023-01-31 RX ORDER — CLOZAPINE 150 MG/1
25 TABLET, ORALLY DISINTEGRATING ORAL
Refills: 0 | Status: DISCONTINUED | OUTPATIENT
Start: 2023-01-31 | End: 2023-01-31

## 2023-01-31 RX ORDER — SODIUM CHLORIDE 9 MG/ML
1000 INJECTION INTRAMUSCULAR; INTRAVENOUS; SUBCUTANEOUS ONCE
Refills: 0 | Status: DISCONTINUED | OUTPATIENT
Start: 2023-01-31 | End: 2023-02-01

## 2023-01-31 RX ORDER — FINASTERIDE 5 MG/1
5 TABLET, FILM COATED ORAL DAILY
Refills: 0 | Status: DISCONTINUED | OUTPATIENT
Start: 2023-01-31 | End: 2023-01-31

## 2023-01-31 RX ORDER — FINASTERIDE 5 MG/1
5 TABLET, FILM COATED ORAL DAILY
Refills: 0 | Status: DISCONTINUED | OUTPATIENT
Start: 2023-01-31 | End: 2023-02-13

## 2023-01-31 RX ORDER — VENLAFAXINE HCL 75 MG
0 CAPSULE, EXT RELEASE 24 HR ORAL
Qty: 0 | Refills: 0 | DISCHARGE

## 2023-01-31 RX ORDER — TIMOLOL 0.5 %
1 DROPS OPHTHALMIC (EYE)
Refills: 0 | Status: DISCONTINUED | OUTPATIENT
Start: 2023-01-31 | End: 2023-02-13

## 2023-01-31 RX ORDER — VALPROIC ACID (AS SODIUM SALT) 250 MG/5ML
250 SOLUTION, ORAL ORAL EVERY 6 HOURS
Refills: 0 | Status: DISCONTINUED | OUTPATIENT
Start: 2023-01-31 | End: 2023-01-31

## 2023-01-31 RX ORDER — INSULIN LISPRO 100/ML
VIAL (ML) SUBCUTANEOUS
Refills: 0 | Status: DISCONTINUED | OUTPATIENT
Start: 2023-01-31 | End: 2023-02-13

## 2023-01-31 RX ORDER — PANTOPRAZOLE SODIUM 20 MG/1
40 TABLET, DELAYED RELEASE ORAL
Refills: 0 | Status: DISCONTINUED | OUTPATIENT
Start: 2023-01-31 | End: 2023-01-31

## 2023-01-31 RX ORDER — LATANOPROST 0.05 MG/ML
1 SOLUTION/ DROPS OPHTHALMIC; TOPICAL AT BEDTIME
Refills: 0 | Status: DISCONTINUED | OUTPATIENT
Start: 2023-01-31 | End: 2023-02-13

## 2023-01-31 RX ORDER — ASPIRIN/CALCIUM CARB/MAGNESIUM 324 MG
325 TABLET ORAL DAILY
Refills: 0 | Status: DISCONTINUED | OUTPATIENT
Start: 2023-01-31 | End: 2023-02-02

## 2023-01-31 RX ORDER — TIMOLOL 0.5 %
1 DROPS OPHTHALMIC (EYE)
Qty: 0 | Refills: 0 | DISCHARGE

## 2023-01-31 RX ORDER — CHOLECALCIFEROL (VITAMIN D3) 125 MCG
1000 CAPSULE ORAL DAILY
Refills: 0 | Status: DISCONTINUED | OUTPATIENT
Start: 2023-01-31 | End: 2023-01-31

## 2023-01-31 RX ORDER — CHOLECALCIFEROL (VITAMIN D3) 125 MCG
1000 CAPSULE ORAL DAILY
Refills: 0 | Status: DISCONTINUED | OUTPATIENT
Start: 2023-01-31 | End: 2023-02-13

## 2023-01-31 RX ORDER — VENLAFAXINE HCL 75 MG
75 CAPSULE, EXT RELEASE 24 HR ORAL
Refills: 0 | Status: DISCONTINUED | OUTPATIENT
Start: 2023-01-31 | End: 2023-01-31

## 2023-01-31 RX ORDER — DEXTROSE 50 % IN WATER 50 %
15 SYRINGE (ML) INTRAVENOUS ONCE
Refills: 0 | Status: DISCONTINUED | OUTPATIENT
Start: 2023-01-31 | End: 2023-02-13

## 2023-01-31 RX ORDER — VENLAFAXINE HCL 75 MG
75 CAPSULE, EXT RELEASE 24 HR ORAL AT BEDTIME
Refills: 0 | Status: DISCONTINUED | OUTPATIENT
Start: 2023-01-31 | End: 2023-01-31

## 2023-01-31 RX ORDER — ASPIRIN/CALCIUM CARB/MAGNESIUM 324 MG
324 TABLET ORAL ONCE
Refills: 0 | Status: COMPLETED | OUTPATIENT
Start: 2023-01-31 | End: 2023-01-31

## 2023-01-31 RX ORDER — LEVOTHYROXINE SODIUM 125 MCG
75 TABLET ORAL DAILY
Refills: 0 | Status: DISCONTINUED | OUTPATIENT
Start: 2023-01-31 | End: 2023-02-13

## 2023-01-31 RX ORDER — VANCOMYCIN HCL 1 G
1250 VIAL (EA) INTRAVENOUS EVERY 12 HOURS
Refills: 0 | Status: DISCONTINUED | OUTPATIENT
Start: 2023-01-31 | End: 2023-02-03

## 2023-01-31 RX ORDER — CLOZAPINE 150 MG/1
100 TABLET, ORALLY DISINTEGRATING ORAL AT BEDTIME
Refills: 0 | Status: DISCONTINUED | OUTPATIENT
Start: 2023-01-31 | End: 2023-01-31

## 2023-01-31 RX ORDER — VENLAFAXINE HCL 75 MG
75 CAPSULE, EXT RELEASE 24 HR ORAL AT BEDTIME
Refills: 0 | Status: DISCONTINUED | OUTPATIENT
Start: 2023-01-31 | End: 2023-02-13

## 2023-01-31 RX ADMIN — CLOZAPINE 50 MILLIGRAM(S): 150 TABLET, ORALLY DISINTEGRATING ORAL at 21:20

## 2023-01-31 RX ADMIN — Medication 75 MILLIGRAM(S): at 04:11

## 2023-01-31 RX ADMIN — INSULIN HUMAN 5 UNIT(S): 100 INJECTION, SOLUTION SUBCUTANEOUS at 06:21

## 2023-01-31 RX ADMIN — DIVALPROEX SODIUM 750 MILLIGRAM(S): 500 TABLET, DELAYED RELEASE ORAL at 21:20

## 2023-01-31 RX ADMIN — Medication 166.67 MILLIGRAM(S): at 17:34

## 2023-01-31 RX ADMIN — PIPERACILLIN AND TAZOBACTAM 25 GRAM(S): 4; .5 INJECTION, POWDER, LYOPHILIZED, FOR SOLUTION INTRAVENOUS at 11:29

## 2023-01-31 RX ADMIN — LATANOPROST 1 DROP(S): 0.05 SOLUTION/ DROPS OPHTHALMIC; TOPICAL at 22:04

## 2023-01-31 RX ADMIN — Medication 1 DROP(S): at 17:34

## 2023-01-31 RX ADMIN — PIPERACILLIN AND TAZOBACTAM 25 GRAM(S): 4; .5 INJECTION, POWDER, LYOPHILIZED, FOR SOLUTION INTRAVENOUS at 16:49

## 2023-01-31 RX ADMIN — Medication 52.5 MILLIGRAM(S): at 12:45

## 2023-01-31 RX ADMIN — Medication 52.5 MILLIGRAM(S): at 06:15

## 2023-01-31 RX ADMIN — PIPERACILLIN AND TAZOBACTAM 25 GRAM(S): 4; .5 INJECTION, POWDER, LYOPHILIZED, FOR SOLUTION INTRAVENOUS at 21:21

## 2023-01-31 RX ADMIN — Medication 1 DROP(S): at 06:16

## 2023-01-31 RX ADMIN — SODIUM CHLORIDE 100 MILLILITER(S): 9 INJECTION INTRAMUSCULAR; INTRAVENOUS; SUBCUTANEOUS at 18:10

## 2023-01-31 RX ADMIN — Medication 25 MILLILITER(S): at 06:16

## 2023-01-31 RX ADMIN — Medication 100 MILLIGRAM(S): at 06:15

## 2023-01-31 RX ADMIN — PIPERACILLIN AND TAZOBACTAM 200 GRAM(S): 4; .5 INJECTION, POWDER, LYOPHILIZED, FOR SOLUTION INTRAVENOUS at 08:03

## 2023-01-31 RX ADMIN — Medication 325 MILLIGRAM(S): at 21:18

## 2023-01-31 RX ADMIN — ATORVASTATIN CALCIUM 20 MILLIGRAM(S): 80 TABLET, FILM COATED ORAL at 21:21

## 2023-01-31 RX ADMIN — Medication 75 MILLIGRAM(S): at 22:03

## 2023-01-31 RX ADMIN — Medication 60 MICROGRAM(S): at 06:55

## 2023-01-31 NOTE — H&P ADULT - NSHPPHYSICALEXAM_GEN_ALL_CORE
PHYSICAL EXAM:  Vital Signs Last 24 Hrs  T(C): 36.9 (01-31-23 @ 02:28)  T(F): 98.4 (01-31-23 @ 02:28), Max: 98.6 (01-30-23 @ 16:56)  HR: 59 (01-31-23 @ 02:28) (46 - 60)  BP: 118/62 (01-31-23 @ 02:28)  BP(mean): --  RR: 16 (01-31-23 @ 02:28) (16 - 19)  SpO2: 100% (01-31-23 @ 02:28) (95% - 100%)  Wt(kg): --    Constitutional: NAD, awake and alert, well developed  EYES: EOMI, conjunctiva clear  ENT:  Normal Hearing, no tonsillar exudates   Neck: Soft and supple , no thyromegaly   Respiratory: Breath sounds are clear bilaterally, No wheezing, rales or rhonchi, no tachypnea, no accessory muscle use  Cardiovascular: S1 and S2, regular rate and rhythm, no Murmurs, gallops or rubs, no JVD, mild ankle edema  Gastrointestinal: Bowel Sounds present, soft, nontender, nondistended, no guarding, no rebound  Extremities: No cyanosis or clubbing; warm to touch  Neurological:   Limited 2/2 AMS  Oriented x1 (name). Following commands. Aphasic and mild-moderate dysarthria. Appears to have word finding difficulty. Repetition not intact. Not able to perform FTN.  Strength in all extremities 5/5, sensation intact throughout as well.  Musculoskeletal: 5/5 strength b/l upper and lower extremities; no joint swelling. PHYSICAL EXAM:  Vital Signs Last 24 Hrs  T(C): 36.9 (01-31-23 @ 02:28)  T(F): 98.4 (01-31-23 @ 02:28), Max: 98.6 (01-30-23 @ 16:56)  HR: 59 (01-31-23 @ 02:28) (46 - 60)  BP: 118/62 (01-31-23 @ 02:28)  BP(mean): --  RR: 16 (01-31-23 @ 02:28) (16 - 19)  SpO2: 100% (01-31-23 @ 02:28) (95% - 100%)  Wt(kg): --    Constitutional: NAD, awake and alert, well developed  EYES: EOMI, conjunctiva clear  ENT:  Normal Hearing, no tonsillar exudates   Neck: Soft and supple , no thyromegaly   Respiratory: Breath sounds are clear bilaterally, No wheezing, rales or rhonchi, no tachypnea, no accessory muscle use  Cardiovascular: S1 and S2, regular rate and rhythm, no Murmurs, gallops or rubs, no JVD, mild ankle edema  Gastrointestinal: Bowel Sounds present, soft, nontender, nondistended, no guarding, no rebound  Extremities: No cyanosis or clubbing; warm to touch  Neurological:   Limited 2/2 AMS  Oriented x1 (name). Following commands. Aphasic and mild-moderate dysarthria. Appears to have word finding difficulty. Repetition not intact. Not able to perform FTN.  Strength in all extremities 5/5, sensation intact throughout as well.  Musculoskeletal: 5/5 strength b/l upper and lower extremities; no joint swelling.  Skin: mild erythema (blanchable) to distal extremities and hands. No palpable purpura, vesicles, ulceration

## 2023-01-31 NOTE — CONSULT NOTE ADULT - SUBJECTIVE AND OBJECTIVE BOX
Patient seen and evaluated at bedside    Chief Complaint:    HPI:  67M with PMHx schizophrenia, hx seizure, bipolar d/o, HTN, DM2, hx SBO presenting with confusion, slurred speech and difficulty ambulating x4 days. History was obtained from brother David at bedside who lives with patient as patient is not answering questions completely with some word finding difficulty At baseline patient oriented x3, conversive and ambulates without difficulty. Around 4PM Friday, the brother noticed him having difficulty holding his balance and requiring assistance with ambulation. He also was intermittently dizzy and LH but no LOC. He also appeared more confused with orientation x1-2. He had difficulty speaking and was slurring his words. Patient's brother did not witness any seizure-like activity, LOC, urinary/bowel incontinence, CP, SOB, abdominal pain. He did have an episode of vomiting but resolved and has baseline constipation. No fevers, chills, abdominal pain but does have baseline chronic dry cough     (2023 02:43)    66 yo M w/ PMH schizophrenia, hx seizure, bipolar d/o, HTN, DM2 who presents with AMS. History obtained per chart review as patient could not provide history. Per chart review, patient lives with his brother David, who noticed patient had difficulty with ambulating and speech. At baseline, patient is AAOx3 and able to converse and ambulate without difficulty. At bedside, patient is alert but oriented x0. Could not review ROS with patient given AMS.     In the ED, HR in 40s, rest of vitals stable, on arrival to floors, temp was 90, started on bear hugger. Patient given hydrocortisone, levothyroxine, and dextrose push.       PMHx:   Diabetes    Hypothyroid    Hypertension    Glaucoma    Schizophrenia    Bipolar disorder    Seborrheic dermatitis    Personal history of other diseases of the digestive system        PSHx:   No significant past surgical history        Allergies:  No Known Allergies      Home Meds:    Current Medications:   aspirin Suppository 300 milliGRAM(s) Rectal daily  dextrose 5%. 1000 milliLiter(s) IV Continuous <Continuous>  dextrose 5%. 1000 milliLiter(s) IV Continuous <Continuous>  dextrose 50% Injectable 25 Gram(s) IV Push once  dextrose 50% Injectable 12.5 Gram(s) IV Push once  dextrose 50% Injectable 25 Gram(s) IV Push once  dextrose Oral Gel 15 Gram(s) Oral once PRN  glucagon  Injectable 1 milliGRAM(s) IntraMuscular once  insulin lispro (ADMELOG) corrective regimen sliding scale   SubCutaneous three times a day before meals  insulin lispro (ADMELOG) corrective regimen sliding scale   SubCutaneous at bedtime  latanoprost 0.005% Ophthalmic Solution 1 Drop(s) Both EYES at bedtime  levothyroxine Injectable 60 MICROGram(s) IV Push <User Schedule>  piperacillin/tazobactam IVPB. 3.375 Gram(s) IV Intermittent once  piperacillin/tazobactam IVPB.- 3.375 Gram(s) IV Intermittent once  piperacillin/tazobactam IVPB.- 3.375 Gram(s) IV Intermittent once  piperacillin/tazobactam IVPB.. 3.375 Gram(s) IV Intermittent every 8 hours  sodium chloride 0.9% Bolus 1000 milliLiter(s) IV Bolus once  timolol 0.5% Solution 1 Drop(s) Both EYES two times a day  valproate sodium  IVPB 250 milliGRAM(s) IV Intermittent every 6 hours  vancomycin  IVPB 1250 milliGRAM(s) IV Intermittent every 12 hours      FAMILY HISTORY:  Family history of diabetes mellitus (DM) (Mother)    Family history of heart disease  mother had 3 heart attacks    Family history of diabetes mellitus in brother  both brothers        Social History:  Smoking History:  Alcohol Use:  Drug Use:    REVIEW OF SYSTEMS:  Cannot assess       Physical Exam:  T(F): 91.5 (), Max: 98.6 ()  HR: 67 () (46 - 67)  BP: 114/55 () (88/52 - 124/56)  RR: 17 ()  SpO2: 92% ()  GENERAL: On bear hugger   CHEST/LUNG: Clear to auscultation bilaterally; No wheeze, equal breath sounds bilaterally   HEART: Regular rate and rhythm; No murmurs, rubs, or gallops  EXTREMITIES:  No clubbing, cyanosis, or edema  NEUROLOGY: AAOx0       Cardiovascular Diagnostic Testing:    ECG: Personally reviewed:  NSR, RBBB, known from prior     Echo:      Stress Testing:    Cath:    Imaging:    CXR:      Labs: Personally reviewed                        9.6    3.88  )-----------( 76       ( 2023 03:45 )             29.9         132<L>  |  98  |  27<H>  ----------------------------<  110<H>  5.6<H>   |  26  |  0.64    Ca    8.7      2023 03:45    TPro  5.6<L>  /  Alb  3.4  /  TBili  <0.2  /  DBili  <0.2  /  AST  39  /  ALT  69<H>  /  AlkPhos  96      PT/INR - ( 2023 16:45 )   PT: 11.2 sec;   INR: 0.97 ratio         PTT - ( 2023 16:45 )  PTT:45.9 sec    Total Cholesterol: 89  LDL: --  HDL: 53  T      Thyroid Stimulating Hormone, Serum: 4.91 uIU/mL ( @ 16:49)

## 2023-01-31 NOTE — CONSULT NOTE ADULT - SUBJECTIVE AND OBJECTIVE BOX
HEMATOLOGY ONCOLOGY CONSULT     Patient is a 67y old  Male who presents with a chief complaint of confusion, dizziness (31 Jan 2023 12:54)      HPI:  67M with PMHx schizophrenia, hx seizure, bipolar d/o, HTN, DM2, hx SBO presenting with confusion, slurred speech and difficulty ambulating x4 days. History was obtained from brother David at bedside who lives with patient as patient is not answering questions completely with some word finding difficulty At baseline patient oriented x3, conversive and ambulates without difficulty. Around 4PM Friday, the brother noticed him having difficulty holding his balance and requiring assistance with ambulation. He also was intermittently dizzy and LH but no LOC. He also appeared more confused with orientation x1-2. He had difficulty speaking and was slurring his words. Patient's brother did not witness any seizure-like activity, LOC, urinary/bowel incontinence, CP, SOB, abdominal pain. He did have an episode of vomiting but resolved and has baseline constipation. No fevers, chills, abdominal pain but does have baseline chronic dry cough     (31 Jan 2023 02:43)       ROS:  Negative except for:    PAST MEDICAL & SURGICAL HISTORY:  Diabetes      Hypothyroid      Hypertension      Glaucoma      Schizophrenia      Bipolar disorder      Seborrheic dermatitis      Personal history of other diseases of the digestive system      No significant past surgical history          SOCIAL HISTORY:    FAMILY HISTORY:  Family history of diabetes mellitus (DM) (Mother)    Family history of heart disease  mother had 3 heart attacks    Family history of diabetes mellitus in brother  both brothers        MEDICATIONS  (STANDING):  aspirin enteric coated 325 milliGRAM(s) Oral daily  atorvastatin 20 milliGRAM(s) Oral at bedtime  cholecalciferol 1000 Unit(s) Oral daily  cloZAPine 50 milliGRAM(s) Oral at bedtime  dextrose 5%. 1000 milliLiter(s) (50 mL/Hr) IV Continuous <Continuous>  dextrose 5%. 1000 milliLiter(s) (100 mL/Hr) IV Continuous <Continuous>  dextrose 50% Injectable 25 Gram(s) IV Push once  dextrose 50% Injectable 12.5 Gram(s) IV Push once  dextrose 50% Injectable 25 Gram(s) IV Push once  divalproex  milliGRAM(s) Oral at bedtime  ferrous    sulfate 325 milliGRAM(s) Oral at bedtime  finasteride 5 milliGRAM(s) Oral daily  glucagon  Injectable 1 milliGRAM(s) IntraMuscular once  insulin lispro (ADMELOG) corrective regimen sliding scale   SubCutaneous three times a day before meals  insulin lispro (ADMELOG) corrective regimen sliding scale   SubCutaneous at bedtime  latanoprost 0.005% Ophthalmic Solution 1 Drop(s) Both EYES at bedtime  levothyroxine 75 MICROGram(s) Oral daily  piperacillin/tazobactam IVPB.. 3.375 Gram(s) IV Intermittent every 8 hours  sodium chloride 0.9% Bolus 1000 milliLiter(s) IV Bolus once  sodium chloride 0.9%. 1000 milliLiter(s) (100 mL/Hr) IV Continuous <Continuous>  timolol 0.5% Solution 1 Drop(s) Both EYES two times a day  vancomycin  IVPB 1250 milliGRAM(s) IV Intermittent every 12 hours  venlafaxine XR. 75 milliGRAM(s) Oral at bedtime    MEDICATIONS  (PRN):  dextrose Oral Gel 15 Gram(s) Oral once PRN Blood Glucose LESS THAN 70 milliGRAM(s)/deciliter      Allergies    No Known Allergies    Intolerances        Vital Signs Last 24 Hrs  T(C): 36.3 (31 Jan 2023 15:00), Max: 36.9 (30 Jan 2023 22:03)  T(F): 97.4 (31 Jan 2023 15:00), Max: 98.5 (30 Jan 2023 22:03)  HR: 88 (31 Jan 2023 15:00) (53 - 88)  BP: 118/61 (31 Jan 2023 15:00) (98/54 - 126/62)  BP(mean): --  RR: 17 (31 Jan 2023 15:00) (16 - 19)  SpO2: 95% (31 Jan 2023 15:00) (91% - 100%)    Parameters below as of 31 Jan 2023 15:00  Patient On (Oxygen Delivery Method): room air        PHYSICAL EXAM  General: adult in NAD  HEENT: clear oropharynx, anicteric sclera, pink conjunctiva  Neck: supple  CV: normal S1/S2 with no murmur rubs or gallops  Lungs: positive air movement b/l ant lungs,clear to auscultation, no wheezes, no rales  Abdomen: soft non-tender non-distended, no hepatosplenomegaly  Ext: no clubbing cyanosis or edema  Skin: no rashes and no petechiae  Neuro: alert and oriented X 4, no focal deficits      01-30-23 @ 07:01  -  01-31-23 @ 07:00  --------------------------------------------------------  IN: 0 mL / OUT: 400 mL / NET: -400 mL      LABS:                          10.0   5.31  )-----------( 101      ( 31 Jan 2023 13:32 )             30.7         Mean Cell Volume : 92.5 fL  Mean Cell Hemoglobin : 30.1 pg  Mean Cell Hemoglobin Concentration : 32.6 gm/dL  Auto Neutrophil # : 4.78 K/uL  Auto Lymphocyte # : 0.31 K/uL  Auto Monocyte # : 0.14 K/uL  Auto Eosinophil # : 0.01 K/uL  Auto Basophil # : 0.01 K/uL  Auto Neutrophil % : 90.1 %  Auto Lymphocyte % : 5.8 %  Auto Monocyte % : 2.6 %  Auto Eosinophil % : 0.2 %  Auto Basophil % : 0.2 %      01-31    133<L>  |  97<L>  |  28<H>  ----------------------------<  188<H>  5.2   |  25  |  0.81    Ca    8.6      31 Jan 2023 13:32  Phos  4.6     01-31  Mg     1.70     01-31    TPro  5.6<L>  /  Alb  3.4  /  TBili  <0.2  /  DBili  <0.2  /  AST  39  /  ALT  69<H>  /  AlkPhos  96  01-31      PT/INR - ( 30 Jan 2023 16:45 )   PT: 11.2 sec;   INR: 0.97 ratio         PTT - ( 30 Jan 2023 16:45 )  PTT:45.9 sec    Reticulocyte Percent: 1.8 % (01-31 @ 03:45)  Folate, Serum: 6.8 ng/mL (01-31 @ 03:45)  Vitamin B12, Serum: 967 pg/mL (01-31 @ 03:45)  Ferritin, Serum: 165 ng/mL (01-31 @ 03:45)  Iron - Total Binding Capacity.: 234 ug/dL (01-31 @ 03:45)              BLOOD SMEAR INTERPRETATION:       RADIOLOGY & ADDITIONAL STUDIES:

## 2023-01-31 NOTE — PROGRESS NOTE ADULT - PROBLEM SELECTOR PLAN 8
On metformin 1g BID, januvia 100mg qd, glipizide 5mg qd - hold inpatient  A1C 6.4  ISS On metformin 1g BID, januvia 100mg qd, glipizide 5mg qd - hold inpatient. A1C 6.4    - ISS

## 2023-01-31 NOTE — CONSULT NOTE ADULT - ASSESSMENT
67 year old right-handed man with past medical history schizophrenia, bipolar disorder, T2DM, HTN, glaucoma, BPH presents to [facility] ED brought in by his brother with whom he lives with due to confusion, gait instability and dysarthria that has developed since 1/27/23 at 4:00PM. LKN 1/27/23 unknown time, preMRS 0, NIHSS 2. Patient outside window for intervention (TNK and thrombectomy). Neuro exam demonstrates alert patient, prolonged speech latency and perseveration, stuttering, moderate dysarthria, impaired attention/concentration, no drift on motor exam, +dyskinesia of head likely adverse effect of clozapine, +retropulsion (did not participate in dysmetria testing). CTH, CTA H/N did not reveal any acute intracranial pathology.    Impression: Confusion, mild to moderate dysarthria and prolonged speech latency with perseveration and halting quality (?transcortical motor aphasia or mixed aphasia), and truncal ataxia, consider ischemic stroke vs. toxic metabolic encephalopathy vs. nutritional/vitamin deficiency vs. unlikely progression of psychiatric disorders or autoimmune encephalitis given acuity of symptom onset    Recommendations:  - please ensure patient receives all home medications that he has missed  - MRI brain w/o contrast  - Start aspirin 325mg NOW  - Start atorvastatin 40mg  - Continue aspirin 81mg and plavix 75mg until MRI brain is done  - TTE w/ bubble study  - lipid profile, B1, B6, B12, folate, ammonia, CK, ESR, CRP, HIV, lyme, copper, zinc, vitamin D, calcium  - urine tox screen, ethanol screen  - A1C 6.4  - TSH (H) 4.91, T3 46  - PT/OT/speech therapy    Pt to f/u with Dr. Jonathan Orozco after discharge:  [address] [place] [address]  [phone]    Pt to be seen and discussed w/ neurology attending, Dr. Orozco. 67 year old right-handed man with past medical history schizophrenia, bipolar disorder, T2DM, HTN, glaucoma, BPH presents to [facility] ED brought in by his brother with whom he lives with due to confusion, gait instability and dysarthria that has developed since 1/27/23 at 4:00PM. LKN 1/27/23 unknown time, preMRS 0, NIHSS 3-4. Patient outside window for intervention (TNK and thrombectomy). Neuro exam demonstrates alert patient, prolonged speech latency and perseveration, stuttering, moderate dysarthria, impaired attention/concentration, no drift on motor exam, +dyskinesia of head likely adverse effect of clozapine, +retropulsion (did not participate in dysmetria testing). CTH, CTA H/N did not reveal any acute intracranial pathology.    Impression: Confusion, mild to moderate dysarthria and prolonged speech latency with perseveration and halting quality (?transcortical motor aphasia or mixed aphasia), and truncal ataxia, consider ischemic stroke vs. toxic metabolic encephalopathy vs. nutritional/vitamin deficiency vs. unlikely progression of psychiatric disorders or autoimmune encephalitis given acuity of symptom onset    Recommendations:  - please ensure patient receives all home medications that he has missed  - MRI brain w/o contrast  - Start aspirin 325mg NOW  - Start atorvastatin 40mg  - Continue aspirin 81mg and plavix 75mg until MRI brain is done  - TTE w/ bubble study  - lipid profile, B1, B6, B12, folate, ammonia, CK, ESR, CRP, HIV, lyme, copper, zinc, vitamin D, calcium  - urine tox screen, ethanol screen  - A1C 6.4  - TSH (H) 4.91, T3 46  - PT/OT/speech therapy    Pt to f/u with Dr. Jonathan Orozco after discharge:  [address] [place] [address] [phone]    Pt to be seen and discussed w/ neurology attending, Dr. Orozco.

## 2023-01-31 NOTE — H&P ADULT - PROBLEM SELECTOR PLAN 10
Hold chemical DVT ppx given thrombocytopenia and awaiting stability of this  Dysphagia screen, S&S eval  Puree diet  PT/OT/S&S eval Finasteride when can take PO    [place] placed for urinary retention >700cc urine

## 2023-01-31 NOTE — PROGRESS NOTE ADULT - SUBJECTIVE AND OBJECTIVE BOX
***************************************************************  Miky Munguia, PGY1  Internal Medicine   pager:  VILMAJ: [phone] [facility]: [phone]  ***************************************************************    CURTIS DIAZ  67y  MRN: [id number]    Patient is a 67y old  Male who presents with a chief complaint of confusion, dizziness (2023 02:43)    Interval/Overnight Events:  - noted o/n w/ hypothermia, placed on warming blanket, micu consulted, labs drawn, endocrine consulted for possible AI v hypothyroid (but normal T4), started on 1x hydrocortisone, noted hypoK, insulin shifted.   - o/n cards, endo, heme, psych consulted, pending recs    Subjective: Pt seen and examined at bedside. Denies fever, CP, SOB, abn pain, N/V, dysuria. Tolerating diet.      MEDICATIONS  (STANDING):  aspirin Suppository 300 milliGRAM(s) Rectal daily  dextrose 5%. 1000 milliLiter(s) (50 mL/Hr) IV Continuous <Continuous>  dextrose 5%. 1000 milliLiter(s) (100 mL/Hr) IV Continuous <Continuous>  dextrose 50% Injectable 25 Gram(s) IV Push once  dextrose 50% Injectable 12.5 Gram(s) IV Push once  dextrose 50% Injectable 25 Gram(s) IV Push once  glucagon  Injectable 1 milliGRAM(s) IntraMuscular once  insulin lispro (ADMELOG) corrective regimen sliding scale   SubCutaneous three times a day before meals  insulin lispro (ADMELOG) corrective regimen sliding scale   SubCutaneous at bedtime  latanoprost 0.005% Ophthalmic Solution 1 Drop(s) Both EYES at bedtime  levothyroxine Injectable 60 MICROGram(s) IV Push <User Schedule>  piperacillin/tazobactam IVPB. 3.375 Gram(s) IV Intermittent once  piperacillin/tazobactam IVPB.- 3.375 Gram(s) IV Intermittent once  piperacillin/tazobactam IVPB.- 3.375 Gram(s) IV Intermittent once  piperacillin/tazobactam IVPB.. 3.375 Gram(s) IV Intermittent every 8 hours  sodium chloride 0.9% Bolus 1000 milliLiter(s) IV Bolus once  timolol 0.5% Solution 1 Drop(s) Both EYES two times a day  valproate sodium  IVPB 250 milliGRAM(s) IV Intermittent every 6 hours  vancomycin  IVPB 1250 milliGRAM(s) IV Intermittent every 12 hours    MEDICATIONS  (PRN):  dextrose Oral Gel 15 Gram(s) Oral once PRN Blood Glucose LESS THAN 70 milliGRAM(s)/deciliter    Objective:    Vitals: Vital Signs Last 24 Hrs  T(C): 33.1 (23 @ 05:17), Max: 37 (23 @ 16:56)  T(F): 91.5 (23 @ 05:17), Max: 98.6 (23 @ 16:56)  HR: 67 (23 @ 04:28) (46 - 67)  BP: 114/55 (23 @ 04:28) (88/52 - 124/56)  BP(mean): --  RR: 17 (23 @ 04:28) (16 - 19)  SpO2: 92% (23 @ 04:28) (91% - 100%)                I&O's Summary    2023 07:01  -  2023 07:00  --------------------------------------------------------  IN: 0 mL / OUT: 400 mL / NET: -400 mL    PHYSICAL EXAM:  GENERAL: NAD  HEAD:  Atraumatic, Normocephalic  EYES: EOMI, conjunctiva and sclera clear  CHEST/LUNG: Clear to auscultation bilaterally; No rales, rhonchi, wheezing, or rubs  HEART: Regular rate and rhythm; No murmurs, rubs, or gallops  ABDOMEN: Soft, Nontender, Nondistended;   SKIN: Erythema blanchable distal extremities  NERVOUS SYSTEM:  Alert & Oriented X1 (name only) following commands, no focal deficits    LABS:      132<L>  |  98  |  27<H>  ----------------------------<  110<H>  5.6<H>   |  26  |  0.64      132<L>  |  96<L>  |  26<H>  ----------------------------<  97  5.1   |  23  |  0.67    Ca    8.7      2023 03:45  Ca    8.7      2023 16:49    TPro  5.6<L>  /  Alb  3.4  /  TBili  <0.2  /  DBili  <0.2  /  AST  39  /  ALT  69<H>  /  AlkPhos  96    TPro  5.3<L>  /  Alb  3.1<L>  /  TBili  <0.2  /  DBili  x   /  AST  36  /  ALT  65<H>  /  AlkPhos  104      PT/INR - ( 2023 16:45 )   PT: 11.2 sec;   INR: 0.97 ratio      PTT - ( 2023 16:45 )  PTT:45.9 sec              Urinalysis Basic - ( 2023 18:07 )    Color: Yellow / Appearance: Clear / S.021 / pH: x  Gluc: x / Ketone: Trace  / Bili: Negative / Urobili: <2 mg/dL   Blood: x / Protein: 30 mg/dL / Nitrite: Negative   Leuk Esterase: Negative / RBC: 1 /HPF / WBC 2 /HPF   Sq Epi: x / Non Sq Epi: 1 /HPF / Bacteria: Negative                      9.6    3.88  )-----------( 76       ( 2023 03:45 )             29.9                         9.4    4.10  )-----------( 69       ( 2023 16:45 )             29.5     CAPILLARY BLOOD GLUCOSE      POCT Blood Glucose.: 121 mg/dL (2023 06:20)  POCT Blood Glucose.: 95 mg/dL (2023 03:03)  POCT Blood Glucose.: 106 mg/dL (2023 02:06)  POCT Blood Glucose.: 108 mg/dL (2023 21:54)  POCT Blood Glucose.: 137 mg/dL (2023 13:58)      RADIOLOGY & ADDITIONAL TESTS:    Imaging Personally Reviewed:  [x ] YES  [ ] NO    Consultants involved in case:   Consultant(s) Notes Reviewed:  [ x] YES  [ ] NO:   Care Discussed with Consultants/Other Providers [x ] YES  [ ] NO         ***************************************************************  Miky Munguia, PGY1  Internal Medicine   pager:  VILMAJ: [phone] [facility]: [phone]  ***************************************************************    CURTIS DIAZ  67y  MRN: [id number]    Patient is a 67y old  Male who presents with a chief complaint of confusion, dizziness (2023 02:43)    Interval/Overnight Events:  - noted o/n w/ hypothermia, placed on warming blanket, micu consulted, labs drawn, endocrine consulted for possible AI v hypothyroid (but normal T4), started on 1x hydrocortisone, noted hypoK, insulin shifted.  - normothermic today  - o/n cards, endo, heme, psych consulted, pending recs  - cards: f/u echo  - endo: no need to adjust levothyroxine, space away from PPI Ca Fe  - neuro: unlikely stroke, f/u MRI   - psych:   - heme:     Subjective: Pt seen and examined at bedside. Dysarthric, unable to communicate and participate in exam. Denied any pain. Tolerating diet.      MEDICATIONS  (STANDING):  aspirin Suppository 300 milliGRAM(s) Rectal daily  dextrose 5%. 1000 milliLiter(s) (50 mL/Hr) IV Continuous <Continuous>  dextrose 5%. 1000 milliLiter(s) (100 mL/Hr) IV Continuous <Continuous>  dextrose 50% Injectable 25 Gram(s) IV Push once  dextrose 50% Injectable 12.5 Gram(s) IV Push once  dextrose 50% Injectable 25 Gram(s) IV Push once  glucagon  Injectable 1 milliGRAM(s) IntraMuscular once  insulin lispro (ADMELOG) corrective regimen sliding scale   SubCutaneous three times a day before meals  insulin lispro (ADMELOG) corrective regimen sliding scale   SubCutaneous at bedtime  latanoprost 0.005% Ophthalmic Solution 1 Drop(s) Both EYES at bedtime  levothyroxine Injectable 60 MICROGram(s) IV Push <User Schedule>  piperacillin/tazobactam IVPB. 3.375 Gram(s) IV Intermittent once  piperacillin/tazobactam IVPB.- 3.375 Gram(s) IV Intermittent once  piperacillin/tazobactam IVPB.- 3.375 Gram(s) IV Intermittent once  piperacillin/tazobactam IVPB.. 3.375 Gram(s) IV Intermittent every 8 hours  sodium chloride 0.9% Bolus 1000 milliLiter(s) IV Bolus once  timolol 0.5% Solution 1 Drop(s) Both EYES two times a day  valproate sodium  IVPB 250 milliGRAM(s) IV Intermittent every 6 hours  vancomycin  IVPB 1250 milliGRAM(s) IV Intermittent every 12 hours    MEDICATIONS  (PRN):  dextrose Oral Gel 15 Gram(s) Oral once PRN Blood Glucose LESS THAN 70 milliGRAM(s)/deciliter    Objective:    Vitals: Vital Signs Last 24 Hrs  T(C): 33.1 (23 @ 05:17), Max: 37 (23 @ 16:56)  T(F): 91.5 (23 @ 05:17), Max: 98.6 (23 @ 16:56)  HR: 67 (23 @ 04:28) (46 - 67)  BP: 114/55 (23 @ 04:28) (88/52 - 124/56)  BP(mean): --  RR: 17 (23 @ 04:28) (16 - 19)  SpO2: 92% (23 @ 04:28) (91% - 100%)                I&O's Summary    2023 07:01  -  2023 07:00  --------------------------------------------------------  IN: 0 mL / OUT: 400 mL / NET: -400 mL    PHYSICAL EXAM:  GENERAL: NAD  HEAD:  Atraumatic, Normocephalic. +facial erythema  EYES: EOMI, conjunctiva and sclera clear  CHEST/LUNG: Clear to auscultation bilaterally; No rales, rhonchi, wheezing, or rubs  HEART: Regular rate and rhythm; No murmurs, rubs, or gallops  ABDOMEN: Soft, Nontender, Nondistended;   SKIN: Erythema blanchable distal extremities  NERVOUS SYSTEM:  Alert & Oriented X1 (name only) following commands, no focal deficits    LABS:      132<L>  |  98  |  27<H>  ----------------------------<  110<H>  5.6<H>   |  26  |  0.64      132<L>  |  96<L>  |  26<H>  ----------------------------<  97  5.1   |  23  |  0.67    Ca    8.7      2023 03:45  Ca    8.7      2023 16:49    TPro  5.6<L>  /  Alb  3.4  /  TBili  <0.2  /  DBili  <0.2  /  AST  39  /  ALT  69<H>  /  AlkPhos  96    TPro  5.3<L>  /  Alb  3.1<L>  /  TBili  <0.2  /  DBili  x   /  AST  36  /  ALT  65<H>  /  AlkPhos  104      PT/INR - ( 2023 16:45 )   PT: 11.2 sec;   INR: 0.97 ratio      PTT - ( 2023 16:45 )  PTT:45.9 sec              Urinalysis Basic - ( 2023 18:07 )    Color: Yellow / Appearance: Clear / S.021 / pH: x  Gluc: x / Ketone: Trace  / Bili: Negative / Urobili: <2 mg/dL   Blood: x / Protein: 30 mg/dL / Nitrite: Negative   Leuk Esterase: Negative / RBC: 1 /HPF / WBC 2 /HPF   Sq Epi: x / Non Sq Epi: 1 /HPF / Bacteria: Negative                      9.6    3.88  )-----------( 76       ( 2023 03:45 )             29.9                         9.4    4.10  )-----------( 69       ( 2023 16:45 )             29.5     CAPILLARY BLOOD GLUCOSE      POCT Blood Glucose.: 121 mg/dL (2023 06:20)  POCT Blood Glucose.: 95 mg/dL (2023 03:03)  POCT Blood Glucose.: 106 mg/dL (2023 02:06)  POCT Blood Glucose.: 108 mg/dL (2023 21:54)  POCT Blood Glucose.: 137 mg/dL (2023 13:58)      RADIOLOGY & ADDITIONAL TESTS:    Imaging Personally Reviewed:  [x ] YES  [ ] NO    Consultants involved in case:   Consultant(s) Notes Reviewed:  [ x] YES  [ ] NO:   Care Discussed with Consultants/Other Providers [x ] YES  [ ] NO

## 2023-01-31 NOTE — BH CONSULTATION LIAISON ASSESSMENT NOTE - CURRENT MEDICATION
MEDICATIONS  (STANDING):  aspirin enteric coated 325 milliGRAM(s) Oral daily  atorvastatin 20 milliGRAM(s) Oral at bedtime  cholecalciferol 1000 Unit(s) Oral daily  dextrose 5%. 1000 milliLiter(s) (50 mL/Hr) IV Continuous <Continuous>  dextrose 5%. 1000 milliLiter(s) (100 mL/Hr) IV Continuous <Continuous>  dextrose 50% Injectable 25 Gram(s) IV Push once  dextrose 50% Injectable 12.5 Gram(s) IV Push once  dextrose 50% Injectable 25 Gram(s) IV Push once  divalproex ER 1000 milliGRAM(s) Oral at bedtime  ferrous    sulfate 325 milliGRAM(s) Oral at bedtime  finasteride 5 milliGRAM(s) Oral daily  glucagon  Injectable 1 milliGRAM(s) IntraMuscular once  insulin lispro (ADMELOG) corrective regimen sliding scale   SubCutaneous three times a day before meals  insulin lispro (ADMELOG) corrective regimen sliding scale   SubCutaneous at bedtime  latanoprost 0.005% Ophthalmic Solution 1 Drop(s) Both EYES at bedtime  levothyroxine 75 MICROGram(s) Oral daily  piperacillin/tazobactam IVPB.- 3.375 Gram(s) IV Intermittent once  piperacillin/tazobactam IVPB.. 3.375 Gram(s) IV Intermittent every 8 hours  sodium chloride 0.9% Bolus 1000 milliLiter(s) IV Bolus once  sodium chloride 0.9%. 1000 milliLiter(s) (100 mL/Hr) IV Continuous <Continuous>  timolol 0.5% Solution 1 Drop(s) Both EYES two times a day  vancomycin  IVPB 1250 milliGRAM(s) IV Intermittent every 12 hours  venlafaxine 75 milliGRAM(s) Oral with dinner    MEDICATIONS  (PRN):  dextrose Oral Gel 15 Gram(s) Oral once PRN Blood Glucose LESS THAN 70 milliGRAM(s)/deciliter

## 2023-01-31 NOTE — PROGRESS NOTE ADULT - PROBLEM SELECTOR PLAN 10
Finasteride when can take PO    Pennington placed for urinary retention >700cc urine - restart home finasteride    Pennington placed for urinary retention >700cc urine    ToV tonight

## 2023-01-31 NOTE — H&P ADULT - PROBLEM SELECTOR PLAN 4
SBP 80s in ED improved with IVF. No current dizziness  -hold ACE-I SBP 80s in ED improved with IVF. No current dizziness  -hold ACE-I    #Bradycardia  Improving 50-60s now  TSH mildly elevated on synthroid  Check cortisol, echo, tele SBP 80s in ED improved with IVF. No current dizziness  -hold ACE-I  -possible myocarditis from clozapine, CKMB elevated - repeat trop and CK @ 6 hours  -cardiology consult in AM for possible myocarditis    #Bradycardia  Improving 60-70s now  TSH mildly elevated on synthroid  Check cortisol, echo, tele SBP 80s in ED improved with IVF. No current dizziness  -hold ACE-I  -possible myocarditis from clozapine, CKMB elevated - repeat trop and CK @ 6 hours  -cardiology consulted and will see patient    #Bradycardia  Improving 60-70s now  TSH mildly elevated on synthroid  Check cortisol, echo, tele

## 2023-01-31 NOTE — H&P ADULT - PROBLEM SELECTOR PLAN 9
Resume levothyroxine 75mcg qd Holding clozapine given elevated CKMB and cannot take PO right now. Failed dysphagia screen   -psych c/s in AM for med management. Unclear if side effects from medications    #Hx of OCD  venlafaxine when can take PO    #Hx seizure  IV depakote

## 2023-01-31 NOTE — H&P ADULT - ASSESSMENT
67M with PMHx schizophrenia, hx seizure, bipolar d/o, HTN, DM2, hx SBO presenting with confusion, slurred speech and difficulty ambulating x4 days. Admitted for acute encephalopathy 67M with PMHx schizophrenia, hx seizure, bipolar d/o, HTN, DM2, hx SBO presenting with confusion, slurred speech and difficulty ambulating x4 days. Admitted for acute encephalopathy. Found to have hypotension and bradycardia that are improved. Now with hypothermia as well.

## 2023-01-31 NOTE — PROGRESS NOTE ADULT - ATTENDING COMMENTS
67 M with above history, admitted with several days of encephalopathy, found to have pancytopenia, SIRS, hypothermia, hypotension, hyperkalemia.    Broad differential, including infectious/inflammatory/drug-induced/Endocrine.  Hypothermia/hypotension improved.  Encephalopathy persists    C/w Broad spectrum abx and infectious work-up for SIRS.  will obtain abdominal imaging for mild abd tenderness on exam    MRI pending to r/o CVA, neuro input appreciated    Endo input appreciated, unlikely AI or thyroid related.  S/p 1 dose steroids, will monitor off    Erythema on face/hands improving.     TTE with grossly Nl LV, trops OK, unlikely myocarditis.  Cards input appreciated.

## 2023-01-31 NOTE — BH CONSULTATION LIAISON ASSESSMENT NOTE - MSE UNSTRUCTURED FT
limited exam    no catalepsy, mild jerking rigidity, no clonus b/l in arms but making purposeless movements at times, shakes hand and appropriately extinguishes on command, no negativism noted, notable head movements, exaggerated blinking, grimacing at times.

## 2023-01-31 NOTE — ED ADULT NURSE NOTE - OBJECTIVE STATEMENT
Report received from day shift rn. No nurse completed by day shift RN. Pt A&Ox1-2(name and location), pmhx htn, dm ,bipolar, brought to ed by brother for slurred speech and ams since friday. Pt resting in stretcher. Pt denies chest pain, sob, abd pain, ha, dizziness, n/v/d, fevers/chills at this time. slurred speech noted. 20G PIC to L AC from day rn. Stretcher in lowest position, side rail up, call bell within reach. on [facility] monitor.

## 2023-01-31 NOTE — H&P ADULT - PROBLEM SELECTOR PROBLEM 8
History of OCD (obsessive compulsive disorder) Type 2 diabetes mellitus with hyperglycemia, without long-term current use of insulin

## 2023-01-31 NOTE — BH CONSULTATION LIAISON ASSESSMENT NOTE - HPI (INCLUDE ILLNESS QUALITY, SEVERITY, DURATION, TIMING, CONTEXT, MODIFYING FACTORS, ASSOCIATED SIGNS AND SYMPTOMS)
67 M with PPH of schizophrenia on clozapine, PMH of HTN, DMII, SBO, seizures, adm for confusion, slurred speech, psych c/s for med mgmt.     Exam limited, patient whispers what sounds like "it hurts" though is barely intelligible, AOx0, attempts to shake hand but has some ambitendancy, appears to understand some commands, mild rigidity in arms R>L, errantly moving his head and grimacing, some automatic grasping.     Discussed with brother David - in usual state of health until 1/30, had some issues with sleepiness on clozapine which was decreased slightly but otherwise no abrupt changes until then. 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 Has not had similar episodes of unresponsiveness, aphasia, errant purposeless movements in the past.

## 2023-01-31 NOTE — PROGRESS NOTE ADULT - PROBLEM SELECTOR PLAN 7
Hgb 9.4 similar to baseline. Denies active bleeding. Had precancerous polyp removed 3 years ago per brother and had trouble getting repeat C-scope done since  -f/u outpatient PMD for C-scope  -LDH, retic, haptoglobin, blue top - neg for hemolysis  -heme c/s to see in AM  -folate, b12, ferritin, iron studies - unremarkable

## 2023-01-31 NOTE — H&P ADULT - PROBLEM SELECTOR PLAN 6
Resume clozapine 100mg qhs and 25mg at noon  -psych c/s in AM for med management. Unclear if side effects from medications Resume levothyroxine 75mcg qd  TSH mildly elevated, T3 low, T4 normal  Endo c/s (emailed)

## 2023-01-31 NOTE — PROGRESS NOTE ADULT - SUBJECTIVE AND OBJECTIVE BOX
Patient is a 67y old  Male who presents with a chief complaint of confusion, dizziness (31 Jan 2023 07:57)    INTERVAL HPI/OVERNIGHT EVENTS:  Pt admitted for acute encephalopathy. Temp 98.4 in the ED- Overnight, Pt found to be hypothermic (90.4) w/repeat 91.5 after bear hugger. Began warming fluids x1 and placed warming blanket. Pt received hydrocortisone x1 d/t concern over adrenal insufficiency in the context of hypotension and bradycardia improved with IVF in the ED, w/now new hypothermia- as per ICU rec. Broad antibiotic coverage initiated.    This morning, Pt is AOx1-2, consistent w/mental status since arrival. Pt has dysarthria and limited attention/concentration w/prolonged speech latency and stuttering.    REVIEW OF SYSTEMS:  ROS limited d/t Pt mental status. Negative for acute pain.    FAMILY HISTORY:  Family history of diabetes mellitus (DM) (Mother)  Family history of heart disease  Mother had 3 heart attacks  Family history of diabetes mellitus in both brothers    T(C): 33.1 (01-31-23 @ 05:17), Max: 37 (01-30-23 @ 16:56)  HR: 67 (01-31-23 @ 04:28) (46 - 67)  BP: 114/55 (01-31-23 @ 04:28) (88/52 - 124/56)  RR: 17 (01-31-23 @ 04:28) (16 - 19)  SpO2: 92% (01-31-23 @ 04:28) (91% - 100%)  Wt(kg): --Vital Signs Last 24 Hrs  T(C): 33.1 (31 Jan 2023 05:17), Max: 37 (30 Jan 2023 16:56)  T(F): 91.5 (31 Jan 2023 05:17), Max: 98.6 (30 Jan 2023 16:56)  HR: 67 (31 Jan 2023 04:28) (46 - 67)  BP: 114/55 (31 Jan 2023 04:28) (88/52 - 124/56)  BP(mean): --  RR: 17 (31 Jan 2023 04:28) (16 - 19)  SpO2: 92% (31 Jan 2023 04:28) (91% - 100%)    Parameters below as of 31 Jan 2023 04:28  Patient On (Oxygen Delivery Method): room air    PHYSICAL EXAM:  GENERAL: NAD  HEAD:  Atraumatic, Normocephalic  EYES: EOMI, PERRLA, conjunctiva and sclera clear  NECK: Supple, No JVD, Normal thyroid  NERVOUS SYSTEM: AOx1-2  CHEST/LUNG: Clear to auscultation bilaterally; No rales, rhonchi, wheezing, or rubs  HEART: Regular rate and rhythm; No murmurs, rubs, or gallops  ABDOMEN: Soft, Nontender, Nondistended  EXTREMITIES:  No peripheral edema  LYMPH: No lymphadenopathy noted  SKIN: Erythema of hands and arms; No skin rashes or lesions    Consultant(s) Notes Reviewed:  [x] YES  [ ] NO  Care Discussed with Consultants/Other Providers [x] YES  [ ] NO    LABS:    RADIOLOGY & ADDITIONAL TESTS:  CXR neg  CT head neg  CTA head/neck neg    Imaging Personally Reviewed:  [x] YES  [ ] NO  aspirin Suppository 300 milliGRAM(s) Rectal daily  dextrose 5%. 1000 milliLiter(s) IV Continuous <Continuous>  dextrose 5%. 1000 milliLiter(s) IV Continuous <Continuous>  dextrose 50% Injectable 25 Gram(s) IV Push once  dextrose 50% Injectable 12.5 Gram(s) IV Push once  dextrose 50% Injectable 25 Gram(s) IV Push once  dextrose Oral Gel 15 Gram(s) Oral once PRN  glucagon  Injectable 1 milliGRAM(s) IntraMuscular once  insulin lispro (ADMELOG) corrective regimen sliding scale   SubCutaneous three times a day before meals  insulin lispro (ADMELOG) corrective regimen sliding scale   SubCutaneous at bedtime  latanoprost 0.005% Ophthalmic Solution 1 Drop(s) Both EYES at bedtime  levothyroxine Injectable 60 MICROGram(s) IV Push <User Schedule>  piperacillin/tazobactam IVPB.- 3.375 Gram(s) IV Intermittent once  piperacillin/tazobactam IVPB.- 3.375 Gram(s) IV Intermittent once  piperacillin/tazobactam IVPB.. 3.375 Gram(s) IV Intermittent every 8 hours  sodium chloride 0.9% Bolus 1000 milliLiter(s) IV Bolus once  timolol 0.5% Solution 1 Drop(s) Both EYES two times a day  valproate sodium  IVPB 250 milliGRAM(s) IV Intermittent every 6 hours  vancomycin  IVPB 1250 milliGRAM(s) IV Intermittent every 12 hours    HEALTH ISSUES - PROBLEM Dx:  Acute encephalopathy    Thrombocytopenia    Anemia    Hypotension    Type 2 diabetes mellitus with hyperglycemia, without long-term current use of insulin    Schizophrenia    Need for prophylactic measure    Hypothyroidism    BPH (benign prostatic hyperplasia)    Hypothermia not due to cold exposure    Hyperkalemia Patient is a 67y old  Male who presents with a chief complaint of confusion, dizziness (2023 07:57)    INTERVAL HPI/OVERNIGHT EVENTS:  Pt admitted for acute encephalopathy. Temp 98.4 in the ED- Overnight, Pt found to be hypothermic (90.4) w/repeat 91.5 after bear hugger. Began warming fluids x1 and placed warming blanket. Pt received hydrocortisone x1 d/t concern over adrenal insufficiency in the context of hypotension and bradycardia improved with IVF in the ED, w/now new hypothermia- as per ICU rec. Broad antibiotic coverage Zosyn/vancomycin initiated.    This morning, Pt is AOx1-2, consistent w/mental status since arrival. Pt has dysarthria and limited attention/concentration w/prolonged speech latency and stuttering.    REVIEW OF SYSTEMS:  ROS limited d/t Pt mental status. Negative for acute pain.    FAMILY HISTORY:  Family history of diabetes mellitus (DM) (Mother)  Family history of heart disease  Mother had 3 heart attacks  Family history of diabetes mellitus in both brothers    T(C): 33.1 (23 @ 05:17), Max: 37 (23 @ 16:56)  HR: 67 (23 @ 04:28) (46 - 67)  BP: 114/55 (23 @ 04:28) (88/52 - 124/56)  RR: 17 (23 @ 04:28) (16 - 19)  SpO2: 92% (23 @ 04:28) (91% - 100%)  Wt(kg): --Vital Signs Last 24 Hrs  T(C): 33.1 (2023 05:17), Max: 37 (2023 16:56)  T(F): 91.5 (2023 05:17), Max: 98.6 (2023 16:56)  HR: 67 (2023 04:28) (46 - 67)  BP: 114/55 (2023 04:28) (88/52 - 124/56)  BP(mean): --  RR: 17 (2023 04:28) (16 - 19)  SpO2: 92% (2023 04:28) (91% - 100%)    Parameters below as of 2023 04:28  Patient On (Oxygen Delivery Method): room air    PHYSICAL EXAM:  GENERAL: NAD  HEAD:  Atraumatic, Normocephalic  EYES: EOMI, PERRLA, conjunctiva and sclera clear  NECK: Supple, No JVD, Normal thyroid  NERVOUS SYSTEM: AOx1-2  CHEST/LUNG: Clear to auscultation bilaterally; No rales, rhonchi, wheezing, or rubs  HEART: Regular rate and rhythm; No murmurs, rubs, or gallops  ABDOMEN: Soft, Nontender, Nondistended  EXTREMITIES:  No peripheral edema  LYMPH: No lymphadenopathy noted  SKIN: Erythema of hands and arms; No skin rashes or lesions    Consultant(s) Notes Reviewed:  [x] YES  [ ] NO  Care Discussed with Consultants/Other Providers [x] YES  [ ] NO    LABS:      132<L>  |  98  |  27<H>  ----------------------------<  110<H>  5.6<H>   |  26  |  0.64      132<L>  |  96<L>  |  26<H>  ----------------------------<  97  5.1   |  23  |  0.67    Ca    8.7      2023 03:45  Ca    8.7      2023 16:49    TPro  5.6<L>  /  Alb  3.4  /  TBili  <0.2  /  DBili  <0.2  /  AST  39  /  ALT  69<H>  /  AlkPhos  96    TPro  5.3<L>  /  Alb  3.1<L>  /  TBili  <0.2  /  DBili  x   /  AST  36  /  ALT  65<H>  /  AlkPhos  104      PT/INR - ( 2023 16:45 )   PT: 11.2 sec;   INR: 0.97 ratio      PTT - ( 2023 16:45 )  PTT:45.9 sec              Urinalysis Basic - ( 2023 18:07 )    Color: Yellow / Appearance: Clear / S.021 / pH: x  Gluc: x / Ketone: Trace  / Bili: Negative / Urobili: <2 mg/dL   Blood: x / Protein: 30 mg/dL / Nitrite: Negative   Leuk Esterase: Negative / RBC: 1 /HPF / WBC 2 /HPF   Sq Epi: x / Non Sq Epi: 1 /HPF / Bacteria: Negative                      9.6    3.88  )-----------( 76       ( 2023 03:45 )             29.9                         9.4    4.10  )-----------( 69       ( 2023 16:45 )             29.5     CAPILLARY BLOOD GLUCOSE      POCT Blood Glucose.: 121 mg/dL (2023 06:20)  POCT Blood Glucose.: 95 mg/dL (2023 03:03)  POCT Blood Glucose.: 106 mg/dL (2023 02:06)  POCT Blood Glucose.: 108 mg/dL (2023 21:54)  POCT Blood Glucose.: 137 mg/dL (2023 13:58)    RADIOLOGY & ADDITIONAL TESTS:  CXR neg  CT head neg  CTA head/neck neg    Imaging Personally Reviewed:  [x] YES  [ ] NO  aspirin Suppository 300 milliGRAM(s) Rectal daily  dextrose 5%. 1000 milliLiter(s) IV Continuous <Continuous>  dextrose 5%. 1000 milliLiter(s) IV Continuous <Continuous>  dextrose 50% Injectable 25 Gram(s) IV Push once  dextrose 50% Injectable 12.5 Gram(s) IV Push once  dextrose 50% Injectable 25 Gram(s) IV Push once  dextrose Oral Gel 15 Gram(s) Oral once PRN  glucagon  Injectable 1 milliGRAM(s) IntraMuscular once  insulin lispro (ADMELOG) corrective regimen sliding scale   SubCutaneous three times a day before meals  insulin lispro (ADMELOG) corrective regimen sliding scale   SubCutaneous at bedtime  latanoprost 0.005% Ophthalmic Solution 1 Drop(s) Both EYES at bedtime  levothyroxine Injectable 60 MICROGram(s) IV Push <User Schedule>  piperacillin/tazobactam IVPB.- 3.375 Gram(s) IV Intermittent once  piperacillin/tazobactam IVPB.- 3.375 Gram(s) IV Intermittent once  piperacillin/tazobactam IVPB.. 3.375 Gram(s) IV Intermittent every 8 hours  sodium chloride 0.9% Bolus 1000 milliLiter(s) IV Bolus once  timolol 0.5% Solution 1 Drop(s) Both EYES two times a day  valproate sodium  IVPB 250 milliGRAM(s) IV Intermittent every 6 hours  vancomycin  IVPB 1250 milliGRAM(s) IV Intermittent every 12 hours    HEALTH ISSUES - PROBLEM Dx:  Acute encephalopathy    Thrombocytopenia    Anemia    Hypotension    Type 2 diabetes mellitus with hyperglycemia, without long-term current use of insulin    Schizophrenia    Need for prophylactic measure    Hypothyroidism    BPH (benign prostatic hyperplasia)    Hypothermia not due to cold exposure    Hyperkalemia

## 2023-01-31 NOTE — PROGRESS NOTE ADULT - PROBLEM SELECTOR PLAN 3
Plt 69 down from baseline 200s. Recently clozapine decreased one month ago. No other med changes  -discussed case with hematology on call given also slight shistocytes on diff and heme has overall low suspicion for TTP given normal bili, kidney function. However hemolysis labs recommended. No need for [id number] unless abnormal hemolysis labs  -LDH, retic, haptoglobin, blue top - neg for hemolysis  -heme c/s to see in AM and review peripheral smear.   -folate, b12, ferritin, iron studies - unremarkable  -no recent heparin. Low suspicion HIT  -repeat CBC plt 69--76 Plt 69 down from baseline 200s. Recently clozapine decreased one month ago. No other med changes  -discussed case with hematology on call given also slight schistocytes on diff and heme has overall low suspicion for TTP given normal bili, kidney function. However hemolysis labs recommended. No need for [id number] unless abnormal hemolysis labs  -LDH, retic, haptoglobin, blue top - neg for hemolysis  -heme c/s to see in AM and review peripheral smear.   -folate, b12, ferritin, iron studies - unremarkable  -no recent heparin. Low suspicion HIT  -repeat CBC plt 69--76

## 2023-01-31 NOTE — PROGRESS NOTE ADULT - PROBLEM SELECTOR PLAN 7
Hgb 9.4 similar to baseline. Denies active bleeding. Had precancerous polyp removed 3 years ago per brother and had trouble getting repeat C-scope done since  -f/u outpatient PMD for C-scope  -LDH, retic, haptoglobin, blue top - neg for hemolysis  -heme c/s to see in AM  -folate, b12, ferritin, iron studies - unremarkable Hgb 9.4 similar to baseline. Denies active bleeding. Had precancerous polyp removed 3 years ago per brother and had trouble getting repeat C-scope done since. Pancytopenia, possibly from clozapine?    -f/u outpatient PMD for C-scope  -LDH, retic, haptoglobin, blue top - neg for hemolysis  -heme c/s to see in AM  -folate, b12, ferritin, iron studies - unremarkable

## 2023-01-31 NOTE — PROGRESS NOTE ADULT - PROBLEM SELECTOR PLAN 5
K 5.1--5.6  -getting insulin D50  -repeat BMP  -creatinine function stable  -ensure bowel movements  -Pennington placement for urinary retention of >700cc urine  -nephro c/s if MARISSA or persistent hyperkalemia

## 2023-01-31 NOTE — PROGRESS NOTE ADULT - PROBLEM SELECTOR PLAN 1
Baseline oriented 3, ambulates on own. Now oriented 1-2, ataxic, slurred speech, dysarthria. Seen by neuro - impression: Confusion, mild to moderate dysarthria and prolonged speech latency with perseveration and halting quality (?transcortical motor aphasia or mixed aphasia), and truncal ataxia, consider ischemic stroke vs. toxic metabolic encephalopathy vs. nutritional/vitamin deficiency vs. unlikely progression of psychiatric disorders or autoimmune encephalitis given acuity of symptom onset. Other considerations include sepsis though CXR clear, UA normal, no signs of skin infections. Possible AI given bradycardia, hypotension and now hypothermia. Less likely myxedema coma give T4 normal and TSH only minimally elevated. TSH mildly elevated 4.91, A1C 6.4. Takes his levothyroxine as well. Patient is maintaining airway and responsive. Not lethargic, awake and alert. Follows commands.     - stress dose hydrocortisone 100mg to assess for improvement in temp  - ICU contacted overnight  - endo c/s - f/u for recs on further AI w/u and steroid management (emailed)  - f/u BCx x2, UCx. Broad spectrum vanc/zosyn for now given hypothermia  - erythema to hands and bilateral legs, not palpable purpura or vesicular. No blistering or vesicles. ESR low. Will send ANCA, KIRTI, anti-dsDNA, C3, C4. Possible rheum c/s if rest of w/u unrevealing  - tele  - TTE with bubble  - MRI brain noncontrast ordered  - failed dysphagia screen - strict NPO  - ASA suppository for now  - lipid profile, B1, B6, B12, folate, ammonia, CK, ESR, CRP, HIV, lyme, copper, zinc, vitamin D, calcium, cortisol, alcohol level, Utox  - neuro checks q4h  - PT/OT/S&S eval - when can adequately follow commands and medically more stable. Bedrest for now

## 2023-01-31 NOTE — PROGRESS NOTE ADULT - PROBLEM SELECTOR PLAN 11
Hold chemical DVT ppx given thrombocytopenia and awaiting stability of this  Failed dysphagia screen, strict NPO  PT/OT/S&S eval when able to tolerate and medically improved

## 2023-01-31 NOTE — PROGRESS NOTE ADULT - PROBLEM SELECTOR PLAN 9
Holding clozapine given elevated CKMB and cannot take PO right now. Failed dysphagia screen   -psych c/s in AM for med management. Unclear if side effects from medications    #Hx of OCD  venlafaxine when can take PO    #Hx seizure  IV depakote

## 2023-01-31 NOTE — BH CONSULTATION LIAISON ASSESSMENT NOTE - SUMMARY
67 M with PPH of schizophrenia on clozapine, PMH of HTN, DMII, SBO, seizures, adm for confusion, slurred speech, psych c/s for med mgmt.     Patient with AMS of unclear etiology, currently undergoing workup for sepsis and also for neurologic causes. There is some concern for catatonia though this would not occur abruptly nor would one expect hypotension, may have some features of this in context of delirium. Would reduce depakote given intermittent thrombocytopenia, would not stop given history of seizures. Would reduce clozapine given (low) concern for myocarditis, would not stop abruptly as this can cause malignant catatonia which is on ddx.     Brother David OK with plan as discussed   Message sent to Dr. Carter outpt psych     Recs:  - Reduce clozapine to 50mg qHS, DC the AM dose  - Reduce Depakote to 750mg qHS, if thrombocytopenia worsens would continue to taper  - OK to c/w Effexor as ordered, if persistantly hyponatremic would assess for SIADH and consider stopping   - PRN for agitation Zyprexa 2.5mg q6h PRN PO/IM  - please trend LFTs, ammonia  - if serum ammonia still elevated would consider L-carnitine IV  - psych will follow  - neuro, cardiology f/u appreciated

## 2023-01-31 NOTE — ED ADULT NURSE REASSESSMENT NOTE - NS ED NURSE REASSESS COMMENT FT1
Awake and alert. A&O3. Respirations even and unlabored, sating on room air at 98%. Blood pressure stable. No signs of distress or SOB.  Safety precautions in place.  Awaiting CT scan.
pt at baseline mental status, respirations even and unlabored,completing full sentences. pt comfortable, no new complaints at this time. report given to [facility] RN. pt to be transported to [facility]
report received from AM shift RN. pt A&Ox3, respirations even and unlabored, completing full sentences. pt sitting in stretcher comfortable at this time, no new complaints at this time. NSR on CM. VS as charted. MD aware of BP, receiving fluids as ordered. stretcher in lowest position, siderails up, family at bedside. pt awaiting CTr

## 2023-01-31 NOTE — CONSULT NOTE ADULT - ASSESSMENT
67M with PMHx schizophrenia, hx seizure, bipolar d/o, HTN, DM2, hx SBO presenting with confusion, slurred speech and difficulty ambulating x4 days. History was obtained from brother David at bedside who lives with patient as patient is not answering questions completely with some word finding difficulty At baseline patient oriented x3, conversive and ambulates without difficulty.  inpt, pt has hypothermia and labs with hyponatremia and hyperkalemia  with concern for myocarditis, infection, workup underway    endocrine called for hypothyroidism, pt also with DM       1. hypothyroidism   TSH mildly elevated with nl total  T4   this is very mild TSH elevation, would not adjust home levothyroxien dosing for this level   pt is on PPI at home and if this is taken with levothyroxine than this can decrease levoT absorption and thus lead to TSH elevation   pt has been on levothyroxine 75mcg long term, continue this inpt   administer on epty stomach 4 hrs apart from iron.calcium/ppi- he is on iron inpt - please make sure this is timed correctly     2. Hypothermia/hyponatremia with hyperkalemia  this can be due to infedction and other metabolic etiologies   pt is currently on vanc and zosyn  rule out neurologic etiologies, infectious workup   would reccomend we check 8am cortisol STAT- pending from this am - if this is <10 please inform endocrine   >18 rules out AI   he is otherwise HD stable - do not want to start steroids unless we have further workup  if HD unstable at any point follow protocol - fluids, pressors, steroids         3.DM  a1c 6.4  pt has DM as well- on metformin, Glipzide and januvia    While inpatient  BG target 100-180 mg/dl,   - Admelog  Low dose Correction Scale Premeal & seperate low dose  Correction Scale Bedtime   - Hypoglycemia protocol in place   - Carb Consistent Diet   - Nutrition consult     dc planning;  pt follows with outpt endocrine- can resume care with them  please inform otupt endocrine of inpt admission and need to fu outpt   may want to consider decrease in glizpide given hypoglycemia at home   otherwise can resume home meds if no contraindications at dc (LFTS and GFR normal lactate normal)        Mary Allen MD  Attending Physician   Department of Endocrinology, Diabetes and Metabolism     weekdays: 9am to 5pm: email [facility] or [facility] and or TEAMS     Nights and weekends: [phone]  Please note that this patient may be followed by a different provider tomorrow.   If no answer or after hours, please contact [phone].  For final dc reccomendations, please call [phone] or page the endocrine fellow on call.

## 2023-01-31 NOTE — CONSULT NOTE ADULT - ASSESSMENT
66 yo M w/ PMH schizophrenia, hx seizure, bipolar d/o, HTN, DM2 who presents with AMS. Cardiology consulted for elevated CKMB.     #Elevated CKMB   Concern for possible myocarditis given elevated CKMB per primary team. Trop negative x1. On clozapine which is on hold.   -F/u echo   -F/u 2nd troponin set  68 yo M w/ PMH schizophrenia, hx seizure, bipolar d/o, HTN, DM2 who presents with AMS. Cardiology consulted for elevated CKMB.     #Elevated CKMB   Concern for possible myocarditis given elevated CKMB per primary team. Trop negative x1. On clozapine which is on hold.   -F/u echo   -F/u 2nd troponin set   -Recommend BNP

## 2023-01-31 NOTE — CONSULT NOTE ADULT - ASSESSMENT
67M with PMHx schizophrenia, hx seizure, bipolar d/o, HTN, DM2, hx SBO presenting with confusion, slurred speech and difficulty ambulating x4 days. 67M with PMHx schizophrenia, hx seizure, bipolar d/o, HTN, DM2, hx SBO presenting with confusion, slurred speech and difficulty ambulating x4 days, found to have acute encephalopathy with hypotension, bradycardi and intermittent hypothermia. Hematology consulted for thrombocytopenia.    #Thrombocytopenia  Pt initially presenting with a platelet of 69, that have increased to 101 since admission.   - Coags wnl, suggesting low suspicion of DIC/TTP  - No signs of hemolysis given normal reticulocyte count, T bilirubin, LDH and haptoglobin  - Low suspicion of HIT given no recent exposure to Heparin  - Platelets have been normalizing  - Suspect that thrombocytopenia was likely secondary to myelosuppression from hypothermia    Hematology will sign off at this time. If clinical status changes, please reconsult Hematology. Please do not hesitate to reach out to Hematology with any questions/concerns.    Elaine Flores M.D.  Hematology and Medical Oncology Fellow  Pager: [phone]  For weekends and evenings (5 pm - 8 am), please page Heme/Onc fellow on call.

## 2023-01-31 NOTE — CONSULT NOTE ADULT - SUBJECTIVE AND OBJECTIVE BOX
Neurology Consult    CURTIS DIAZ  67y Male  [id number]    HPI:  67 year old right-handed man with past medical history schizophrenia, bipolar disorder, T2DM, HTN, glaucoma, SBO in , BPH, and COVID in 10/2021 presents to [facility] ED brought in by his brother with whom he lives with due to confusion, gait instability and dysarthria that has developed since  at 4:00PM. Patient unable to provide history. Brother states that patient's symptoms started with dizziness on  and he did not accompany him to the [employer] that evening. Patient's brother then noticed he was slurring his words on  and wanted help to ambulate. He was holding on to objects and his brother helped him walk. Over the weekend, he need a walker and preferred not to walk due to imbalance. He stopped talking as much this morning and was more confused, which then prompted brother to bring him into the hospital. Brother denies fever, chills, shortness of breath, complaints of chest pain, vomiting, diarrhea at home.    Patient follows with psychiatry and dose of clozapine was changed 1 month ago due to downtrending WBC counts. He has been tolerating well and psychiatric conditions have been stable according to brother. No other medication changes within the week.    Patient used to live in assisted living facility, but moved in with his brother in . He completes ADLs independently (toileting, eating, etc), however does not assist with chores. Pt also takes medications on his own, but did not take most of them this morning.    LKN 23 unknown time, preMRS 0, NIHSS 2. PT outside window for intervention (TNK and thrombectomy).  In ED, patient found to be hypotensive 88/52 HR 46, RR 18, 99% O2 sat and required NS infusion. He has a history of hyponatremia and hypoglycemia.    REVIEW OF SYSTEMS:  Patient is an unreliable historian due to mental status, unable to answer ROS questions.    PAST MEDICAL & SURGICAL HISTORY:  Diabetes  Hypothyroid  Hypertension  Glaucoma  Schizophrenia  Bipolar disorder  Seborrheic dermatitis    No significant past surgical history    As stated in HPI.    FAMILY HISTORY:  Family history of diabetes mellitus (DM) (Mother)  Family history of TIA (father)  Family history of heart disease - mother w/ 3 heart attacks  Family history of diabetes mellitus in 2 brothers    As stated in HPI, unless otherwise noted above.     SOCIAL HISTORY:   As stated in HPI, unless otherwise noted above.     MEDICATIONS    Home Medications:  cloZAPine 100 mg oral tablet: orally once a day pm (14 Dec 2017 16:20)  cloZAPine 25 mg oral tablet: orally once a day, noon (14 Dec 2017 16:21)  Depakote ER: 1000 milligram(s) orally, pm (14 Dec 2017 16:22)  Effexor 75 mg oral tablet: orally once a day, pm (14 Dec 2017 16:21)  glipiZIDE 10 mg oral tablet: orally 2 times a day (14 Dec 2017 16:18)  Januvia 100 mg oral tablet: 1 tab(s) orally once a day, am (14 Dec 2017 16:19)  Lipitor 20 mg oral tablet: 1 tab(s) orally once a day, pm (14 Dec 2017 16:22)  Lumigan 0.01% ophthalmic solution: 1 drop(s) to each affected eye once a day (in the evening) (14 Dec 2017 16:24)  metFORMIN 1000 mg oral tablet: 1 tab(s) orally 2 times a day (14 Dec 2017 16:19)  Synthroid 75 mcg (0.075 mg) oral tablet: 1 tab(s) orally once a day, am (14 Dec 2017 16:18)  Timoptic Ocudose 0.5% ophthalmic solution: 1 drop(s) to each affected eye 2 times a day (14 Dec 2017 16:25)    MEDICATIONS  (STANDING):    MEDICATIONS  (PRN):    Allergies  No Known Allergies    VITALS & EXAMINATION  Vital Signs Last 24 Hrs  T(C): 36.9 (2023 22:03), Max: 37 (2023 16:56)  T(F): 98.5 (2023 22:03), Max: 98.6 (2023 16:56)  HR: 59 (2023 22:03) (46 - 59)  BP: 124/56 (2023 22:03) (88/52 - 124/56)  BP(mean): --  RR: 17 (2023 22:03) (16 - 19)  SpO2: 98% (2023 22:03) (95% - 99%)    Parameters below as of 2023 22:03  Patient On (Oxygen Delivery Method): room air    PHYSICAL EXAM:  General: in no acute distress, non-diaphoretic  Head: normocephalic, atraumatic  Eyes: non-icteric sclera, no conjunctival injection  Abdomen: soft, non-tender  Extremities: + ankle edema b/l, no deformities    Neurologic:  - Mental Status: Alert although doesn't track examiner consistently, awake, oriented to person, did not know location, said January and , did not know president; speech is stuttering with prolonged latency, +speech and motor perseveration, able to name thumb and phone, repetition and comprehension intact; unable to complete serial 7s and could not spell WORLD backwards; performed midline commands (stick out tongue), could not complete crossed multistep commands    - Cranial Nerves: visual fields are full to confrontation; pupils are equal, round, and reactive to light; extraocular movements are intact without nystagmus; facial sensation is intact in the V1-V3 distribution bilaterally; face is symmetric with normal eye closure and smile; hearing is intact to conversation; uvula is midline and soft palate rises symmetrically; head turning intact; tongue protrudes in the midline.    -Motor/Strength Testing:                                 Right           Left  Deltoid                     5                 5  Biceps                      5                 5  Triceps                     5                 5  Hip Flex                   5                  5  Knee Flex                 5                  5  Knee Ext	      5                  5  Dorsiflex                  5                  5  Plantarflex               5                  5    - There is no pronator drift. Normal muscle bulk and tone throughout. Dyskinesias of head.    - Reflexes:   Bicep (C5/C6):                  R 2+ --- L 2+   Brachioradialis (C5/C6) :   R 2+ --- L 2+   Patella (L3/L4) :                 R 2+ --- L 2+   Ankle (S1) :                       R 2+ --- L 2+     - Plantar response down on left and withdraws vs. down on right    - Sensory: Intact throughout to light touch and pinprick  - Coordination: Patient could not complete FTN testing given confusion  - Gait: Retropulsion when he attempts to change from lying to sitting position, unable to do so without 2 person assistance    LABS:    CBC                       9.4    4.10  )-----------( 69       ( 2023 16:45 )             29.5     Chem -30    132<L>  |  96<L>  |  26<H>  ----------------------------<  97  5.1   |  23  |  0.67    Ca    8.7      2023 16:49    TPro  5.3<L>  /  Alb  3.1<L>  /  TBili  <0.2  /  DBili  x   /  AST  36  /  ALT  65<H>  /  AlkPhos  104      Coags PT/INR - ( 2023 16:45 )   PT: 11.2 sec;   INR: 0.97 ratio         PTT - ( 2023 16:45 )  PTT:45.9 sec  LFTs LIVER FUNCTIONS - ( 2023 16:49 )  Alb: 3.1 g/dL / Pro: 5.3 g/dL / ALK PHOS: 104 U/L / ALT: 65 U/L / AST: 36 U/L / GGT: x           U/A Urinalysis Basic - ( 2023 18:07 )    Color: Yellow / Appearance: Clear / S.021 / pH: x  Gluc: x / Ketone: Trace  / Bili: Negative / Urobili: <2 mg/dL   Blood: x / Protein: 30 mg/dL / Nitrite: Negative   Leuk Esterase: Negative / RBC: 1 /HPF / WBC 2 /HPF   Sq Epi: x / Non Sq Epi: 1 /HPF / Bacteria: Negative    STUDIES & IMAGING  CT Angio Head w/ IV Cont (23 @ 20:00) >  IMPRESSION:  CT BRAIN:  No acute intracranial mass effect, hemorrhage, midline shift or   extra-axial fluid collection.    CT ANGIOGRAPHY NECK:  No evidence of hemodynamically significant stenosis using NASCET   criteria. Patent vertebral arteries. No evidence of vascular dissection.    CT ANGIOGRAPHY BRAIN:  No major vessel occlusion or proximal stenosis. Neurology Consult    CURTIS DIAZ  67y Male  [id number]    HPI:  67 year old right-handed man with past medical history schizophrenia, bipolar disorder, T2DM, HTN, glaucoma, SBO in , BPH, and COVID in 10/2021 presents to [facility] ED brought in by his brother with whom he lives with due to confusion, gait instability and dysarthria that has developed since  at 4:00PM. Patient unable to provide history. Brother states that patient's symptoms started with dizziness on  and he did not accompany him to the [employer] that evening. Patient's brother then noticed he was slurring his words on  and wanted help to ambulate. He was holding on to objects and his brother helped him walk. Over the weekend, he need a walker and preferred not to walk due to imbalance. He stopped talking as much this morning and was more confused, which then prompted brother to bring him into the hospital. Brother denies fever, chills, shortness of breath, complaints of chest pain, vomiting, diarrhea at home.    Patient follows with psychiatry and dose of clozapine was changed 1 month ago due to downtrending WBC counts. He has been tolerating well and psychiatric conditions have been stable according to brother. No other medication changes within the week.    Patient used to live in assisted living facility, but moved in with his brother in . He completes ADLs independently (toileting, eating, etc), however does not assist with chores. Pt also takes medications on his own, but did not take most of them this morning.    LKN 23 unknown time, preMRS 0, NIHSS 2. PT outside window for intervention (TNK and thrombectomy).  In ED, patient found to be hypotensive 88/52 HR 46, RR 18, 99% O2 sat and required NS infusion. He has a history of hyponatremia and hypoglycemia.    REVIEW OF SYSTEMS:  Patient is an unreliable historian due to mental status, unable to answer ROS questions.    PAST MEDICAL & SURGICAL HISTORY:  Diabetes  Hypothyroid  Hypertension  Glaucoma  Schizophrenia  Bipolar disorder  Seborrheic dermatitis    No significant past surgical history    As stated in HPI.    FAMILY HISTORY:  Family history of diabetes mellitus (DM) (Mother)  Family history of TIA (father)  Family history of heart disease - mother w/ 3 heart attacks  Family history of diabetes mellitus in 2 brothers    As stated in HPI, unless otherwise noted above.     SOCIAL HISTORY:   As stated in HPI, unless otherwise noted above.     MEDICATIONS    Home Medications:  cloZAPine 100 mg oral tablet: orally once a day pm (14 Dec 2017 16:20)  cloZAPine 25 mg oral tablet: orally once a day, noon (14 Dec 2017 16:21)  Depakote ER: 1000 milligram(s) orally, pm (14 Dec 2017 16:22)  Effexor 75 mg oral tablet: orally once a day, pm (14 Dec 2017 16:21)  glipiZIDE 10 mg oral tablet: orally 2 times a day (14 Dec 2017 16:18)  Januvia 100 mg oral tablet: 1 tab(s) orally once a day, am (14 Dec 2017 16:19)  Lipitor 20 mg oral tablet: 1 tab(s) orally once a day, pm (14 Dec 2017 16:22)  Lumigan 0.01% ophthalmic solution: 1 drop(s) to each affected eye once a day (in the evening) (14 Dec 2017 16:24)  metFORMIN 1000 mg oral tablet: 1 tab(s) orally 2 times a day (14 Dec 2017 16:19)  Synthroid 75 mcg (0.075 mg) oral tablet: 1 tab(s) orally once a day, am (14 Dec 2017 16:18)  Timoptic Ocudose 0.5% ophthalmic solution: 1 drop(s) to each affected eye 2 times a day (14 Dec 2017 16:25)    MEDICATIONS  (STANDING):    MEDICATIONS  (PRN):    Allergies  No Known Allergies    VITALS & EXAMINATION  Vital Signs Last 24 Hrs  T(C): 36.9 (2023 22:03), Max: 37 (2023 16:56)  T(F): 98.5 (2023 22:03), Max: 98.6 (2023 16:56)  HR: 59 (2023 22:03) (46 - 59)  BP: 124/56 (2023 22:03) (88/52 - 124/56)  BP(mean): --  RR: 17 (2023 22:03) (16 - 19)  SpO2: 98% (2023 22:03) (95% - 99%)    Parameters below as of 2023 22:03  Patient On (Oxygen Delivery Method): room air    PHYSICAL EXAM:  General: in no acute distress, non-diaphoretic  Head: normocephalic, atraumatic  Eyes: non-icteric sclera, no conjunctival injection  Abdomen: soft, non-tender  Extremities: + ankle edema b/l, no deformities    Neurologic:  - Mental Status: Alert although doesn't track examiner consistently, awake, oriented to person, did not know location, said January and , did not know president; speech is stuttering with prolonged latency, +speech and motor perseveration, able to name thumb and phone, repetition and comprehension intact; unable to complete serial 7s and could not spell WORLD backwards; performed midline commands (stick out tongue), could not complete crossed multistep commands    - Cranial Nerves: visual fields are full to confrontation; pupils are equal, round, and reactive to light; extraocular movements are intact without nystagmus; facial sensation is intact in the V1-V3 distribution bilaterally; face is symmetric with normal eye closure and smile; hearing is intact to conversation; uvula is midline and soft palate rises symmetrically; head turning intact; tongue protrudes in the midline.    -Motor/Strength Testing:                                 Right           Left  Deltoid                     5                 5  Biceps                      5                 5  Triceps                     5                 5  Hip Flex                   5                  5  Knee Flex                 5                  5  Knee Ext	      5                  5  Dorsiflex                  5                  5  Plantarflex               5                  5    - There is no pronator drift. Normal muscle bulk and tone throughout. Dyskinesias of head.    - Reflexes:   Bicep (C5/C6):                  R 2+ --- L 2+   Brachioradialis (C5/C6) :   R 2+ --- L 2+   Patella (L3/L4) :                 R 2+ --- L 2+   Ankle (S1) :                       R 2+ --- L 2+     - Plantar response down on left and withdraws vs. down on right    - Sensory: Intact throughout to light touch and pinprick  - Coordination: Patient could not complete FTN testing reliably given confusion, no noticeable dysmetria when completing the task deliberately and slowly  - Gait: Retropulsion when he attempts to change from lying to sitting position, unable to do so without 2 person assistance    LABS:    CBC                       9.4    4.10  )-----------( 69       ( 2023 16:45 )             29.5     Chem -30    132<L>  |  96<L>  |  26<H>  ----------------------------<  97  5.1   |  23  |  0.67    Ca    8.7      2023 16:49    TPro  5.3<L>  /  Alb  3.1<L>  /  TBili  <0.2  /  DBili  x   /  AST  36  /  ALT  65<H>  /  AlkPhos  104      Coags PT/INR - ( 2023 16:45 )   PT: 11.2 sec;   INR: 0.97 ratio         PTT - ( 2023 16:45 )  PTT:45.9 sec  LFTs LIVER FUNCTIONS - ( 2023 16:49 )  Alb: 3.1 g/dL / Pro: 5.3 g/dL / ALK PHOS: 104 U/L / ALT: 65 U/L / AST: 36 U/L / GGT: x           U/A Urinalysis Basic - ( 2023 18:07 )    Color: Yellow / Appearance: Clear / S.021 / pH: x  Gluc: x / Ketone: Trace  / Bili: Negative / Urobili: <2 mg/dL   Blood: x / Protein: 30 mg/dL / Nitrite: Negative   Leuk Esterase: Negative / RBC: 1 /HPF / WBC 2 /HPF   Sq Epi: x / Non Sq Epi: 1 /HPF / Bacteria: Negative    STUDIES & IMAGING  CT Angio Head w/ IV Cont (23 @ 20:00) >  IMPRESSION:  CT BRAIN:  No acute intracranial mass effect, hemorrhage, midline shift or   extra-axial fluid collection.    CT ANGIOGRAPHY NECK:  No evidence of hemodynamically significant stenosis using NASCET   criteria. Patent vertebral arteries. No evidence of vascular dissection.    CT ANGIOGRAPHY BRAIN:  No major vessel occlusion or proximal stenosis. Neurology Consult    CURTIS DIAZ  67y Male  [id number]    HPI:  67 year old right-handed man with past medical history schizophrenia, bipolar disorder, T2DM, HTN, glaucoma, SBO in 2017, BPH, and COVID in 10/2021 presents to [facility] ED brought in by his brother with whom he lives with due to confusion, gait instability and dysarthria that has developed since  at 4:00PM. Patient unable to provide history. Brother states that patient's symptoms started with dizziness on  and he did not accompany him to the [employer] that evening. Patient's brother then noticed he was slurring his words on  and wanted help to ambulate. He was holding on to objects and his brother helped him walk. Over the weekend, he need a walker and preferred not to walk due to imbalance. He also "stopped talking" this morning and was more confused, which then prompted patient's brother to bring him into the hospital. Brother denies fever, chills, shortness of breath, complaints of chest pain, vomiting, diarrhea at home.    Patient follows with psychiatry and dose of clozapine was changed 1 month ago due to downtrending WBC counts. He has been tolerating well and psychiatric conditions have been stable according to brother. No other medication changes within the week.    Patient used to live in assisted living facility, but moved in with his brother in . He completes ADLs independently (toileting, eating, etc), however does not assist with chores. Pt also takes medications on his own, but did not take most of them this morning.    LKN 23 unknown time, preMRS 0, NIHSS 3-4. PT outside window for intervention (TNK and thrombectomy).  In ED, patient found to be hypotensive 88/52 HR 46, RR 18, 99% O2 sat and required NS infusion. He has a history of hyponatremia and hypoglycemia.    REVIEW OF SYSTEMS:  Patient is an unreliable historian due to mental status, unable to answer ROS questions.    PAST MEDICAL & SURGICAL HISTORY:  Diabetes  Hypothyroid  Hypertension  Glaucoma  Schizophrenia  Bipolar disorder  Seborrheic dermatitis    No significant past surgical history    As stated in HPI.    FAMILY HISTORY:  Family history of diabetes mellitus (DM) (Mother)  Family history of TIA (father)  Family history of heart disease - mother w/ 3 heart attacks  Family history of diabetes mellitus in 2 brothers    As stated in HPI, unless otherwise noted above.     SOCIAL HISTORY:   As stated in HPI, unless otherwise noted above.     MEDICATIONS    Home Medications:  cloZAPine 100 mg oral tablet: orally once a day pm (14 Dec 2017 16:20)  cloZAPine 25 mg oral tablet: orally once a day, noon (14 Dec 2017 16:21)  Depakote ER: 1000 milligram(s) orally, pm (14 Dec 2017 16:22)  Effexor 75 mg oral tablet: orally once a day, pm (14 Dec 2017 16:21)  glipiZIDE 10 mg oral tablet: orally 2 times a day (14 Dec 2017 16:18)  Januvia 100 mg oral tablet: 1 tab(s) orally once a day, am (14 Dec 2017 16:19)  Lipitor 20 mg oral tablet: 1 tab(s) orally once a day, pm (14 Dec 2017 16:22)  Lumigan 0.01% ophthalmic solution: 1 drop(s) to each affected eye once a day (in the evening) (14 Dec 2017 16:24)  metFORMIN 1000 mg oral tablet: 1 tab(s) orally 2 times a day (14 Dec 2017 16:19)  Synthroid 75 mcg (0.075 mg) oral tablet: 1 tab(s) orally once a day, am (14 Dec 2017 16:18)  Timoptic Ocudose 0.5% ophthalmic solution: 1 drop(s) to each affected eye 2 times a day (14 Dec 2017 16:25)    MEDICATIONS  (STANDING):    MEDICATIONS  (PRN):    Allergies  No Known Allergies    VITALS & EXAMINATION  Vital Signs Last 24 Hrs  T(C): 36.9 (2023 22:03), Max: 37 (2023 16:56)  T(F): 98.5 (2023 22:03), Max: 98.6 (2023 16:56)  HR: 59 (2023 22:03) (46 - 59)  BP: 124/56 (2023 22:03) (88/52 - 124/56)  BP(mean): --  RR: 17 (2023 22:03) (16 - 19)  SpO2: 98% (2023 22:03) (95% - 99%)    Parameters below as of 2023 22:03  Patient On (Oxygen Delivery Method): room air    PHYSICAL EXAM:  General: in no acute distress, non-diaphoretic  Head: normocephalic, atraumatic  Eyes: non-icteric sclera, no conjunctival injection  Abdomen: soft, non-tender  Extremities: + ankle edema b/l, no deformities    Neurologic:  - Mental Status: Alert although doesn't track examiner consistently, awake, oriented to person, did not know location, said January and , did not know president; speech is stuttering with prolonged latency, +speech and motor perseveration, able to name thumb and phone, repetition and comprehension intact; unable to complete serial 7s and could not spell WORLD backwards; performed midline commands (stick out tongue), could not complete crossed multistep commands    - Cranial Nerves: visual fields are full to confrontation; pupils are equal, round, and reactive to light; extraocular movements are intact without nystagmus; facial sensation is intact in the V1-V3 distribution bilaterally; face is symmetric with normal eye closure and smile; hearing is intact to conversation; uvula is midline and soft palate rises symmetrically; head turning intact; tongue protrudes in the midline.    -Motor/Strength Testing:                                 Right           Left  Deltoid                     5                 5  Biceps                      5                 5  Triceps                     5                 5  Hip Flex                   5                  5  Knee Flex                 5                  5  Knee Ext	      5                  5  Dorsiflex                  5                  5  Plantarflex               5                  5    - There is no pronator drift. Normal muscle bulk and tone throughout. Dyskinesias of head.    - Reflexes:   Bicep (C5/C6):                  R 2+ --- L 2+   Brachioradialis (C5/C6) :   R 2+ --- L 2+   Patella (L3/L4) :                 R 2+ --- L 2+   Ankle (S1) :                       R 2+ --- L 2+     - Plantar response down on left and withdraws vs. down on right    - Sensory: Intact throughout to light touch and pinprick  - Coordination: Patient could not complete FTN testing reliably given confusion, no noticeable dysmetria when completing the task deliberately and slowly  - Gait: Retropulsion when he attempts to change from lying to sitting position, unable to do so without 2 person assistance    LABS:    CBC                       9.4    4.10  )-----------( 69       ( 2023 16:45 )             29.5     Chem -30    132<L>  |  96<L>  |  26<H>  ----------------------------<  97  5.1   |  23  |  0.67    Ca    8.7      2023 16:49    TPro  5.3<L>  /  Alb  3.1<L>  /  TBili  <0.2  /  DBili  x   /  AST  36  /  ALT  65<H>  /  AlkPhos  104      Coags PT/INR - ( 2023 16:45 )   PT: 11.2 sec;   INR: 0.97 ratio         PTT - ( 2023 16:45 )  PTT:45.9 sec  LFTs LIVER FUNCTIONS - ( 2023 16:49 )  Alb: 3.1 g/dL / Pro: 5.3 g/dL / ALK PHOS: 104 U/L / ALT: 65 U/L / AST: 36 U/L / GGT: x           U/A Urinalysis Basic - ( 2023 18:07 )    Color: Yellow / Appearance: Clear / S.021 / pH: x  Gluc: x / Ketone: Trace  / Bili: Negative / Urobili: <2 mg/dL   Blood: x / Protein: 30 mg/dL / Nitrite: Negative   Leuk Esterase: Negative / RBC: 1 /HPF / WBC 2 /HPF   Sq Epi: x / Non Sq Epi: 1 /HPF / Bacteria: Negative    STUDIES & IMAGING  CT Angio Head w/ IV Cont (23 @ 20:00) >  IMPRESSION:  CT BRAIN:  No acute intracranial mass effect, hemorrhage, midline shift or   extra-axial fluid collection.    CT ANGIOGRAPHY NECK:  No evidence of hemodynamically significant stenosis using NASCET   criteria. Patent vertebral arteries. No evidence of vascular dissection.    CT ANGIOGRAPHY BRAIN:  No major vessel occlusion or proximal stenosis.

## 2023-01-31 NOTE — H&P ADULT - PROBLEM SELECTOR PLAN 5
On metformin 1g BID, januvia 100mg qd, glipizide 5mg qd - hold inpatient  A1C 6.4  ISS K 5.1--5.6  -getting insulin D50  -repeat BMP  -creatinine function stable  -ensure bowel movements  -[place] placement for urinary retention of >700cc urine  -nephro c/s if MARISSA or persistent hyperkalemia

## 2023-01-31 NOTE — H&P ADULT - PROBLEM SELECTOR PLAN 3
Hgb 9.4 similar to baseline. Denies active bleeding. Had precancerous polyp removed 3 years ago per brother and had trouble getting repeat C-scope done since  -f/u outpatient PMD for C-scope  -LDH, retic, haptoglobin, blue top  -heme c/s  -folate, b12, ferritin, iron studies Plt 69 down from baseline 200s. Recently clozapine decreased one month ago. No other med changes  -discussed case with hematology on call given also slight shistocytes on diff and heme has overall low suspicion for TTP given normal bili, kidney function. However hemolysis labs recommended. No need for [id number] unless abnormal hemolysis labs  -LDH, retic, haptoglobin, blue top - neg for hemolysis  -heme c/s to see in AM and review peripheral smear.   -folate, b12, ferritin, iron studies - unremarkable  -no recent heparin. Low suspicion HIT  -repeat CBC plt 69--76

## 2023-01-31 NOTE — H&P ADULT - PROBLEM SELECTOR PLAN 8
Resume venlafaxine 75mg qd On metformin 1g BID, januvia 100mg qd, glipizide 5mg qd - hold inpatient  A1C 6.4  ISS

## 2023-01-31 NOTE — PROGRESS NOTE ADULT - PROBLEM SELECTOR PLAN 4
SBP 80s in ED improved with IVF. No current dizziness  -hold ACE-I  -possible myocarditis from clozapine, CKMB elevated - repeat trop and CK @ 6 hours  -cardiology consulted and will see patient    #Bradycardia  Improving 60-70s now  TSH mildly elevated on synthroid  Check cortisol, echo, tele SBP 80s in ED improved with IVF. No current dizziness  -hold antihypertensives iso hypotension  -cardiology consulted; recs appreciated    #Bradycardia  Improving 60-70s now  TSH mildly elevated on synthroid  Check cortisol, echo, tele

## 2023-01-31 NOTE — CONSULT NOTE ADULT - ATTENDING COMMENTS
_________________________________________________________________________________  Patient with history schizophrenia, hx seizure, bipolar d/o, HTN, DM2, hx SBO presenting with confusion, slurred speech and difficulty ambulating x4 days, found to have acute encephalopathy with hypotension, bradycardia and intermittent hypothermia.     - Hematology asked to see patient for acute thrombocytopenia  - no evidence of DIC with normal Coags; no clear evidence of hemolysis; low likelihood of TTP  - no prior exposure to heparin to warrant concern for HIT  - suspect acute infectious/inflammatory cause, now with normalizing platelet count  - no acute heme intervention indicated at this time; please call with any additional questions
trop stable  suspect CK-MB elevation secondary to underlying process also responsible for pt's hypothermia  TTE with grossly preserved LVEF  there is a low suspicion for clozapine-induced myocarditis. the normal Eo% is reassuring as this is usually elevated with clozapine-induced myocarditis.
Briefly   67 year old right-handed man with schizophrenia, bipolar disorder, T2DM, HTN, glaucoma, BPH presents to [facility] ED brought in by his brother with whom he lives with due to confusion, gait instability and dysarthria that has developed since 1/27/23 at 4:00PM. LKN 1/27/23 unknown time, preMRS 0, NIHSS 3-4. Patient outside window for intervention (TNK and thrombectomy). Neuro exam demonstrates alert patient, prolonged speech latency and perseveration, stuttering, moderate dysarthria, impaired attention/concentration, no drift on motor exam, +dyskinesia of head likely adverse effect of clozapine, +retropulsion (did not participate in dysmetria testing). CTH, CTA H/N did not reveal any acute intracranial pathology.    Impression: Confusion, mild to moderate dysarthria and prolonged speech latency with perseveration and halting quality (?transcortical motor aphasia or mixed aphasia), and truncal ataxia, consider ischemic stroke vs. toxic metabolic encephalopathy vs. nutritional/vitamin deficiency/medication effect vs. unlikely progression of psychiatric disorders or autoimmune encephalitis given acuity of symptom onset    Recommendations:  - please ensure patient receives all home medications that he has missed  - MRI brain w/ and w/o contrast pending   - Start aspirin 81mg daily  - Start atorvastatin 40mg  - Continue aspirin 81mg and plavix 75mg until MRI brain is done  - TTE w/ bubble study  - lipid profile, B1, B6, B12, folate, ammonia, CK, ESR, CRP, HIV, lyme, copper, zinc, vitamin D, calcium, NH3  - urine tox screen, ethanol screen  - A1C 6.4  - TSH (H) 4.91, T3 46  - PT/OT/speech therapy  - spoke with primary team intern   Pt to f/u with Dr. Jonathan Orozco after discharge:  [address] [place] [address]  [phone]    Jonathan Orozco MD  Vascular Neurology  Office: [phone]

## 2023-01-31 NOTE — PROGRESS NOTE ADULT - PROBLEM SELECTOR PLAN 3
Plt 69 down from baseline 200s. Recently clozapine decreased one month ago. No other med changes  -discussed case with hematology on call given also slight shistocytes on diff and heme has overall low suspicion for TTP given normal bili, kidney function. However hemolysis labs recommended. No need for [id number] unless abnormal hemolysis labs  -LDH, retic, haptoglobin, blue top - neg for hemolysis  -heme c/s to see in AM and review peripheral smear.   -folate, b12, ferritin, iron studies - unremarkable  -no recent heparin. Low suspicion HIT  -repeat CBC plt 69--76

## 2023-01-31 NOTE — PROGRESS NOTE ADULT - PROBLEM SELECTOR PLAN 9
Holding clozapine given elevated CKMB and cannot take PO right now. Failed dysphagia screen   -psych c/s in AM for med management. Unclear if side effects from medications    #Hx of OCD  venlafaxine when can take PO    #Hx seizure  IV depakote Holding clozapine given elevated CKMB and cannot take PO right now. Failed dysphagia screen     -psych c/s in AM for med management. Unclear if side effects from medications  - pending recs regarding restarting clozapine 25/100    #Hx of OCD  venlafaxine when can take PO    #Hx seizure  IV depakote

## 2023-01-31 NOTE — PROGRESS NOTE ADULT - PROBLEM SELECTOR PLAN 2
Temp in ED 98F but upon arrival to floor 90 and repeat 91.5 with bear hugger. MS waxes and wanes. Oriented x1-2  -warm IVF  -warming blanket  -discussed case with ICU and rec'd hydrocortisone as ordered but no further doses after for now and if no improvement then ICU consult  -AI w/u with endo c/s  -Less likely myxedema coma give T4 normal and TSH only minimally elevated  -possible sepsis? f/u BCx x2, UCx, cover with vanc/zosyn for now

## 2023-01-31 NOTE — H&P ADULT - PROBLEM SELECTOR PLAN 2
Plt 69 down from baseline 200s. Recently clozapine decreased one month ago. No other med changes  -discussed case with hematology on call given also slight shistocytes on diff and heme has overall low suspicion for TTP given normal bili, kidney function. However hemolysis labs recommended. No need for [id number] unless abnormal hemolysis labs  -LDH, retic, haptoglobin, blue top  -heme c/s to see in AM and review peripheral smear  -folate, b12, ferritin, iron studies  -no recent heparin. Low suspicion HIT Temp in ED 98F but upon arrival to floor 90 and repeat 91.5 with bear hugger. MS waxes and wanes. Oriented x1-2  -warm IVF  -warming blanket  -discussed case with ICU and rec'd hydrocortisone as ordered but no further doses after for now and if no improvement then ICU consult  -AI w/u with endo c/s  -Less likely myxedema coma give T4 normal and TSH only minimally elevated  -possible sepsis? f/u BCx x2, UCx, cover with vanc/zosyn for now

## 2023-01-31 NOTE — PROGRESS NOTE ADULT - PROBLEM SELECTOR PLAN 11
Hold chemical DVT ppx given thrombocytopenia and awaiting stability of this  Failed dysphagia screen, strict NPO  PT/OT/S&S eval when able to tolerate and medically improved Hospital Bundle    Fluids: NaCl gtt for meeting SIRS  Electrolytes: Replete K > 4, Mg > 2, Phos > 3  Nutrition: Diet CC DASH   PPX  ---VTE: hold iso pancytopenia  ---GI: hold home ppi  ---Resp: ***  Access: RAEGAN Pennington  Code Status: FULL  Dispo: pending PT/OT upon clinical improvement

## 2023-01-31 NOTE — H&P ADULT - PROBLEM SELECTOR PLAN 7
Resume depakote 1000mg qhs Hgb 9.4 similar to baseline. Denies active bleeding. Had precancerous polyp removed 3 years ago per brother and had trouble getting repeat C-scope done since  -f/u outpatient PMD for C-scope  -LDH, retic, haptoglobin, blue top - neg for hemolysis  -heme c/s to see in AM  -folate, b12, ferritin, iron studies - unremarkable

## 2023-01-31 NOTE — CHART NOTE - NSCHARTNOTEFT_GEN_A_CORE
Contacted by RN due to being unable to detect a temperature: oral, axillary and rectal attempted with no success. Writer advised RN to place patient on a warming blanket. Writer went to bedside to evaluate patient. Upon arriving to bedside patient responsive and A&O x 3 (aware of name, , place). Patient does not appear to be in acute distress. Patient denies any acute complaints however noted he felt cold. Unable to illicit full ROS 2/2 to mental status.     Plan:  -Unknown cause of encephalopathy- MRI-p to r/o stroke  -Concern for hypothermia being 2/2 to AI vs hypothyroidism (lower suspicion as T4 normal)- 1X dose of hydrocortsione ordered STAT; pt to be reassessed after administration to evaluate for improvement  -strict I&Os to be monitored   -Endocrinology to be consulted for further recommendations  -Thrombocytopenia: hemolysis labs (-) thus far- formal hematology consult in AM  -Hyperkalemia: 5 U insulin and dextrose given; EKG ordered   -ICU contacted: agree with plan above; ICU to be contacted again if no improvement with hydrocortisone   -, contacted regarding this admission and plan, further workup discussed and agreed upon as listed above Contacted by RN due to being unable to detect a temperature: oral, axillary and rectal attempted with no success. Writer advised RN to place patient on a warming blanket. Writer went to bedside to evaluate patient. Upon arriving to bedside patient responsive and A&O x 3 (aware of name, , place). Patient does not appear to be in acute distress. Patient denies any acute complaints however noted he felt cold. Unable to illicit full ROS 2/2 to mental status.     Plan:  -Unknown cause of encephalopathy- MRI-p to r/o stroke  -Concern for hypothermia being 2/2 to AI vs hypothyroidism (lower suspicion as T4 normal)- 1X dose of hydrocortsione ordered STAT; pt to be reassessed after administration to evaluate for improvement  -strict I&Os to be monitored   -Endocrinology to be consulted for further recommendations  -Thrombocytopenia: hemolysis labs (-) thus far- formal hematology consult in AM  -Hyperkalemia: 5 U insulin and dextrose given; EKG ordered   -ICU contacted: agree with plan above; ICU to be contacted again if no improvement with hydrocortisone   -, contacted regarding this admission and plan, further workup discussed and agreed upon as listed above  -Day team to follow up Cardiology, Endocrinology, Hematology and Psychiatry consults   -Sign out given to MAR, [id number] Contacted by RN due to being unable to detect a temperature: oral, axillary and rectal attempted with no success. Writer advised RN to place patient on a warming blanket. Writer went to bedside to evaluate patient. Upon arriving to bedside patient responsive and A&O x 3 (aware of name, , place). Patient does not appear to be in acute distress. Patient denies any acute complaints however noted he felt cold. Unable to illicit full ROS 2/2 to mental status.     Plan:  -Unknown cause of encephalopathy- MRI-p to r/o stroke  -Concern for hypothermia being 2/2 to AI vs hypothyroidism (lower suspicion as T4 normal)- 1X dose of hydrocortsione ordered STAT; pt to be reassessed after administration to evaluate for improvement  -1 L of warmed fluids ordered; consider maintence fluids  -strict I&Os to be monitored; garcia inserted   -Endocrinology to be consulted for further recommendations  -Thrombocytopenia: hemolysis labs (-) thus far- formal hematology consult in AM  -Hyperkalemia: 5 U insulin and dextrose given; EKG ordered   -ICU contacted: agree with plan above; ICU to be contacted again if no improvement with hydrocortisone   -, contacted regarding this admission and plan, further workup discussed and agreed upon as listed above  -Day team to follow up Cardiology, Endocrinology, Hematology and Psychiatry consults   -Sign out given to MAR ~ 6:10 AM , [id number] as patient transferred to house staff   -House staff to follow up on repeat VBG, BMP and cardiac enzymes Contacted by RN due to being unable to detect a temperature: oral, axillary and rectal attempted with no success. Writer advised RN to place patient on a warming blanket. Writer went to bedside to evaluate patient. Upon arriving to bedside patient responsive and A&O x 3 (aware of name, , place). Patient does not appear to be in acute distress. Patient denies any acute complaints however noted he felt cold. Unable to illicit full ROS 2/2 to mental status.     Plan:  -Unknown cause of encephalopathy- MRI-p to r/o stroke  -Concern for hypothermia being 2/2 to AI vs hypothyroidism (lower suspicion as T4 normal)- 1X dose of hydrocortsione ordered STAT; pt to be reassessed after administration to evaluate for improvement  -1 L of warmed fluids ordered; consider maintence fluids  -strict I&Os to be monitored; garcia inserted   -Endocrinology to be consulted for further recommendations  -Thrombocytopenia: hemolysis labs (-) thus far- formal hematology consult in AM  -Hyperkalemia: 5 U insulin and dextrose given; EKG ordered   -ICU,  contacted: agree with plan above; ICU to be contacted again if no improvement with hydrocortisone   -, contacted regarding this admission and plan, further workup discussed and agreed upon as listed above  -Day team to follow up Cardiology, Endocrinology, Hematology and Psychiatry consults   -Sign out given to MAR ~ 6:10 AM , [id number] as patient transferred to house staff   -House staff to follow up on repeat VBG, BMP and cardiac enzymes- last EKG ~ 6 AM, reviewed by  no ischemic changes- elevated CKMB possibly 2/2 myocarditis

## 2023-01-31 NOTE — PROGRESS NOTE ADULT - PROBLEM SELECTOR PLAN 6
Resume levothyroxine 75mcg qd  TSH mildly elevated, T3 low, T4 normal  Endo c/s (emailed) Resume levothyroxine 75mcg qd  TSH mildly elevated, T3 low, T4 normal    - c/w home levothyroxine  - endo recs appreciated; avoid administered levothyroxine with ppi/ca/fe

## 2023-01-31 NOTE — PROGRESS NOTE ADULT - PROBLEM SELECTOR PLAN 1
Baseline oriented 3, ambulates on own. Now oriented 1-2, ataxic, slurred speech, dysarthria.  Seen by neuro - impression: Confusion, mild to moderate dysarthria and prolonged speech latency with perseveration and halting quality (?transcortical motor aphasia or mixed aphasia), and truncal ataxia, consider ischemic stroke vs. toxic metabolic encephalopathy vs. nutritional/vitamin deficiency vs. unlikely progression of psychiatric disorders or autoimmune encephalitis given acuity of symptom onset  Other considerations include sepsis though CXR clear, UA normal, no signs of skin infections.  Possible AI given bradycardia, hypotension and now hypothermia.  Less likely myxedema coma give T4 normal and TSH only minimally elevated. Takes his levothyroxine as well.  Patient is maintaining airway and responsive. Not lethargic, awake and alert. Follows commands.   -stress dose hydrocortisone 100mg to assess for improvement in temp - if no improvement ICU c/s.   -endo c/s - f/u for recs on further AI w/u and steroid management (emailed)  -f/u BCx x2, UCx. Broad spectrum vanc/zosyn for now given hypothermia  -erythema to hands and bilateral legs, not palpable purpura or vesicular. No blistering or vesicles. ESR low. Will send ANCA, KIRTI, anti-dsDNA, C3, C4. Possible rheum c/s if rest of w/u unrevealing  -tele  -TTE with bubble  -MRI brain noncontrast ordered  -failed dysphagia screen - strict NPO  -ASA suppository for now  -lipid profile, B1, B6, B12, folate, ammonia, CK, ESR, CRP, HIV, lyme, copper, zinc, vitamin D, calcium, cortisol, alcohol level, Utox  -neurochecks  -PT/OT/S&S eval - when can adequately follow commands and medically more stable. Bedrest for now  -TSH mildly elevated 4.91, A1C 6.4 Baseline oriented 3, ambulates on own. Now oriented 1-2, ataxic, slurred speech, dysarthria. Seen by neuro - impression: Confusion, mild to moderate dysarthria and prolonged speech latency with perseveration and halting quality (?transcortical motor aphasia or mixed aphasia), and truncal ataxia, consider ischemic stroke vs. toxic metabolic encephalopathy vs. nutritional/vitamin deficiency vs. unlikely progression of psychiatric disorders or autoimmune encephalitis given acuity of symptom onset. Other considerations include sepsis though CXR clear, UA normal, no signs of skin infections. Possible AI given bradycardia, hypotension and now hypothermia. Less likely myxedema coma give T4 normal and TSH only minimally elevated. TSH mildly elevated 4.91, A1C 6.4. Takes his levothyroxine as well. Patient is maintaining airway and responsive. Not lethargic, awake and alert. Follows commands.     - stress dose hydrocortisone 100mg to assess for improvement in temp  - ICU contacted overnight  - endo c/s - f/u for recs on further AI w/u and steroid management (emailed)  - f/u BCx x2, UCx. Broad spectrum vanc/zosyn for now given hypothermia  - erythema to hands and bilateral legs, not palpable purpura or vesicular. No blistering or vesicles. ESR low. Will send ANCA, KIRTI, anti-dsDNA, C3, C4. Possible rheum c/s if rest of w/u unrevealing  - tele  - TTE with bubble  - MRI brain noncontrast ordered  - failed dysphagia screen - strict NPO  - ASA suppository for now  - lipid profile, B1, B6, B12, folate, ammonia, CK, ESR, CRP, HIV, lyme, copper, zinc, vitamin D, calcium, cortisol, alcohol level, Utox  - neuro checks q4h  - PT/OT/S&S eval - when can adequately follow commands and medically more stable. Bedrest for now Baseline oriented 3, ambulates on own. Now oriented 1-2, ataxic, slurred speech, dysarthria. Seen by neuro - impression: Confusion, mild to moderate dysarthria and prolonged speech latency with perseveration and halting quality (?transcortical motor aphasia or mixed aphasia), and truncal ataxia, consider ischemic stroke vs. toxic metabolic encephalopathy vs. nutritional/vitamin deficiency vs. unlikely progression of psychiatric disorders or autoimmune encephalitis given acuity of symptom onset. Other considerations include sepsis though CXR clear, UA normal, no signs of skin infections. Possible AI given bradycardia, hypotension and now hypothermia. Less likely myxedema coma give T4 normal and TSH only minimally elevated. TSH mildly elevated 4.91, A1C 6.4. Takes his levothyroxine as well. AM Cortisol elevated. Trop flat CKMB elevated. TTE wnl. Cards thinks unlikely myocarditis. Ammonium mildly elevated 58 likely not delivered on ice, LFTs wnl. Patient is maintaining airway and responsive. Not lethargic, awake and alert. Follows commands.     - ICU contacted overnight, not candidate at this time; s/p 1x stress dose hydrocortisone 100mg to assess for hypothermia  - endo c/s - unlikely to be AI. Will hold off on further steroids until infx r/o  - f/u BCx x2, UCx.   - c/w broad spectrum vanc/zosyn for now given hypothermia  - erythema to hands and bilateral legs, not palpable purpura or vesicular. No blistering or vesicles. ESR low. Will send ANCA, KIRTI, anti-dsDNA, C3, C4. Possible rheum c/s if rest of w/u unrevealing  - ctm on tele  - MRI brain noncontrast ordered  - neuro checks q4h  - c/w aspirin statin  - f/u lipid profile, B1, B6, B12, folate, ammonia, CK, ESR, CRP, HIV, lyme, copper, zinc, vitamin D, calcium, cortisol, alcohol level, Utox  - PT/OT/S&S eval - when can adequately follow commands and medically more stable. Bedrest for now

## 2023-01-31 NOTE — BH CONSULTATION LIAISON ASSESSMENT NOTE - NSBHCHARTREVIEWVS_PSY_A_CORE FT
Vital Signs Last 24 Hrs  T(C): 36.1 (31 Jan 2023 11:20), Max: 37 (30 Jan 2023 16:56)  T(F): 97 (31 Jan 2023 11:20), Max: 98.6 (30 Jan 2023 16:56)  HR: 65 (31 Jan 2023 11:20) (47 - 67)  BP: 126/62 (31 Jan 2023 11:20) (98/54 - 126/62)  BP(mean): --  RR: 17 (31 Jan 2023 11:20) (16 - 19)  SpO2: 95% (31 Jan 2023 11:20) (91% - 100%)    Parameters below as of 31 Jan 2023 11:20  Patient On (Oxygen Delivery Method): room air

## 2023-01-31 NOTE — PROGRESS NOTE ADULT - PROBLEM SELECTOR PLAN 2
Temp in ED 98F but upon arrival to floor 90 and repeat 91.5 with bear hugger. MS waxes and wanes. Oriented x1-2  -warm IVF  -warming blanket  -discussed case with ICU and rec'd hydrocortisone as ordered but no further doses after for now and if no improvement then ICU consult  -AI w/u with endo c/s  -Less likely myxedema coma give T4 normal and TSH only minimally elevated  -possible sepsis? f/u BCx x2, UCx, cover with vanc/zosyn for now Now normothermic.  Temp in ED 98F but upon arrival to floor 90 and repeat 91.5 with bear hugger. MS waxes and wanes. Oriented x1-2  -warm IVF  -warming blanket  -discussed case with ICU and rec'd hydrocortisone as ordered but no further doses after for now and if no improvement then ICU consult  -Less likely myxedema coma give T4 normal and TSH only minimally elevated  -possible sepsis? f/u BCx x2, UCx, cover with vanc/zosyn for now  - f/u CTAP r/o infxn

## 2023-01-31 NOTE — PATIENT PROFILE ADULT - FALL HARM RISK - HARM RISK INTERVENTIONS
Assistance with ambulation/Assistance OOB with selected safe patient handling equipment/Communicate Risk of Fall with Harm to all staff/Discuss with provider need for PT consult/Monitor for mental status changes/Monitor gait and stability/Reinforce activity limits and safety measures with patient and family/Reorient to person, place and time as needed/Review medications for side effects contributing to fall risk/Sit up slowly, dangle for a short time, stand at bedside before walking/Tailored Fall Risk Interventions/Toileting schedule using arm’s reach rule for commode and bathroom/Use of alarms - bed, chair and/or voice tab/Visual Cue: Yellow wristband and red socks/Bed in lowest position, wheels locked, appropriate side rails in place/Call bell, personal items and telephone in reach/Instruct patient to call for assistance before getting out of bed or chair/Non-slip footwear when patient is out of bed/[place] to call system/Physically safe environment - no spills, clutter or unnecessary equipment/Purposeful Proactive Rounding/Room/bathroom lighting operational, light cord in reach

## 2023-01-31 NOTE — H&P ADULT - HISTORY OF PRESENT ILLNESS
67M with PMHx schizophrenia, hx seizure, bipolar d/o, HTN, DM2, hx SBO presenting with confusion, slurred speech and difficulty ambulating x4 days. History was obtained from brother David at bedside who lives with patient as patient is not answering questions completely with some word finding difficulty At baseline patient oriented x3, conversive and ambulates without difficulty. Around 4PM Friday, the brother noticed him having difficulty holding his balance and requiring assistance with ambulation. He also was intermittently dizzy and LH but no LOC. He also appeared more confused with orientation x1-2. He had difficulty speaking and was slurring his words. Patient's brother did not witness any seizure-like activity, LOC, urinary/bowel incontinence, CP, SOB, abdominal pain. He did have an episode of vomiting but resolved and has baseline constipation. No fevers, chills, abdominal pain but does have baseline chronic dry cough

## 2023-01-31 NOTE — PROGRESS NOTE ADULT - ASSESSMENT
67M with PMHx schizophrenia, hx seizure, bipolar d/o, HTN, DM2, hx SBO presenting with confusion, slurred speech and difficulty ambulating x4 days. Admitted for acute encephalopathy. Found to have hypotension and bradycardia that are improved. Now with hypothermia as well.

## 2023-01-31 NOTE — H&P ADULT - PROBLEM SELECTOR PLAN 1
Baseline oriented Baseline oriented 3, ambulates on own. Now oriented 1-2, ataxic, slurred speech, dysarthria.  Seen by neuro - impression: Confusion, mild to moderate dysarthria and prolonged speech latency with perseveration and halting quality (?transcortical motor aphasia or mixed aphasia), and truncal ataxia, consider ischemic stroke vs. toxic metabolic encephalopathy vs. nutritional/vitamin deficiency vs. unlikely progression of psychiatric disorders or autoimmune encephalitis given acuity of symptom onset  -tele  -TTE with bubble  -MRI brain noncontrast ordered  -ASA 324mg stat now  -ASA 81mg, plavix 75mg   -lipid profile, B1, B6, B12, folate, ammonia, CK, ESR, CRP, HIV, lyme, copper, zinc, vitamin D, calcium  -neurochecks  -PT/OT/S&S eval Baseline oriented 3, ambulates on own. Now oriented 1-2, ataxic, slurred speech, dysarthria.  Seen by neuro - impression: Confusion, mild to moderate dysarthria and prolonged speech latency with perseveration and halting quality (?transcortical motor aphasia or mixed aphasia), and truncal ataxia, consider ischemic stroke vs. toxic metabolic encephalopathy vs. nutritional/vitamin deficiency vs. unlikely progression of psychiatric disorders or autoimmune encephalitis given acuity of symptom onset  -tele  -TTE with bubble  -MRI brain noncontrast ordered  -ASA 324mg stat now  -ASA 81mg, plavix 75mg   -lipid profile, B1, B6, B12, folate, ammonia, CK, ESR, CRP, HIV, lyme, copper, zinc, vitamin D, calcium, cortisol, alcohol level, Utox  -neurochecks  -PT/OT/S&S eval  -TSH mildly elevated 4.91, A1C 6.4 Baseline oriented 3, ambulates on own. Now oriented 1-2, ataxic, slurred speech, dysarthria.  Seen by neuro - impression: Confusion, mild to moderate dysarthria and prolonged speech latency with perseveration and halting quality (?transcortical motor aphasia or mixed aphasia), and truncal ataxia, consider ischemic stroke vs. toxic metabolic encephalopathy vs. nutritional/vitamin deficiency vs. unlikely progression of psychiatric disorders or autoimmune encephalitis given acuity of symptom onset  Other considerations include sepsis though CXR clear, UA normal, no signs of skin infections.  Possible AI given bradycardia, hypotension and now hypothermia.  Less likely myxedema coma give T4 normal and TSH only minimally elevated. Takes his levothyroxine as well.  Patient is maintaining airway and responsive. Not lethargic, awake and alert. Follows commands.     -stress dose hydrocortisone 100mg to assess for improvement in temp - if no improvement ICU c/s.   -endo c/s - f/u for recs on further AI w/u and steroid management (emailed)  -f/u BCx x2, UCx. Broad spectrum vanc/zosyn for now given hypothermia  Stroke w/u:  -tele  -TTE with bubble  -MRI brain noncontrast ordered  -failed dysphagia screen - strict NPO  -ASA suppository for now  -lipid profile, B1, B6, B12, folate, ammonia, CK, ESR, CRP, HIV, lyme, copper, zinc, vitamin D, calcium, cortisol, alcohol level, Utox  -neurochecks  -PT/OT/S&S eval - when can adequately follow commands and medically more stable. Bedrest for now  -TSH mildly elevated 4.91, A1C 6.4 Baseline oriented 3, ambulates on own. Now oriented 1-2, ataxic, slurred speech, dysarthria.  Seen by neuro - impression: Confusion, mild to moderate dysarthria and prolonged speech latency with perseveration and halting quality (?transcortical motor aphasia or mixed aphasia), and truncal ataxia, consider ischemic stroke vs. toxic metabolic encephalopathy vs. nutritional/vitamin deficiency vs. unlikely progression of psychiatric disorders or autoimmune encephalitis given acuity of symptom onset  Other considerations include sepsis though CXR clear, UA normal, no signs of skin infections.  Possible AI given bradycardia, hypotension and now hypothermia.  Less likely myxedema coma give T4 normal and TSH only minimally elevated. Takes his levothyroxine as well.  Patient is maintaining airway and responsive. Not lethargic, awake and alert. Follows commands.   -stress dose hydrocortisone 100mg to assess for improvement in temp - if no improvement ICU c/s.   -endo c/s - f/u for recs on further AI w/u and steroid management (emailed)  -f/u BCx x2, UCx. Broad spectrum vanc/zosyn for now given hypothermia  -erythema to hands and bilateral legs, not palpable purpura or vesicular. No blistering or vesicles. ESR low. Will send ANCA, KIRTI, anti-dsDNA, C3, C4. Possible rheum c/s if rest of w/u unrevealing  -tele  -TTE with bubble  -MRI brain noncontrast ordered  -failed dysphagia screen - strict NPO  -ASA suppository for now  -lipid profile, B1, B6, B12, folate, ammonia, CK, ESR, CRP, HIV, lyme, copper, zinc, vitamin D, calcium, cortisol, alcohol level, Utox  -neurochecks  -PT/OT/S&S eval - when can adequately follow commands and medically more stable. Bedrest for now  -TSH mildly elevated 4.91, A1C 6.4

## 2023-01-31 NOTE — PROGRESS NOTE ADULT - PROBLEM SELECTOR PLAN 5
K 5.1--5.6  -getting insulin D50  -repeat BMP  -creatinine function stable  -ensure bowel movements  -[place] placement for urinary retention of >700cc urine  -nephro c/s if MARISSA or persistent hyperkalemia K 5.1--5.6    -getting insulin D50  -repeat BMP  -creatinine function stable  -ensure bowel movements  -[place] placement for urinary retention of >700cc urine  -nephro c/s if MARISSA or persistent hyperkalemia

## 2023-01-31 NOTE — H&P ADULT - NSHPLABSRESULTS_GEN_ALL_CORE
Personally reviewed labs:                        9.4    4.10  )-----------( 69       ( 30 Jan 2023 16:45 )             29.5     01-30-23 @ 16:49    132<L>  |  96<L>  |  26<H>             --------------------------< 97     5.1  |  23  | 0.67    eGFR AA: --  eGFR N-AA: --    Calcium: 8.7  Phosphorus: --  Magnesium: --    AST: 36    ALT: 65<H>  AlkPhos: 104  Protein: 5.3<L>  Albumin: 3.1<L>  TBili: <0.2  D-Bili: --    TSH 4.91, A1C 6.4      RADIOLOGY & ADDITIONAL TESTS:    EKG my independent interpretation: sinus bradycardia @ 47bpm, no STD or AMELIA, RBBB    CXR my independent interpretation: no focal consolidation    Imaging personally reviewed:  CTH and CTA H/N:  IMPRESSION:  CT BRAIN:  No acute intracranial mass effect, hemorrhage, midline shift or   extra-axial fluid collection.    CT ANGIOGRAPHY NECK:  No evidence of hemodynamically significant stenosis using NASCET   criteria. Patent vertebral arteries. No evidence of vascular dissection.    CT ANGIOGRAPHY BRAIN:  No major vessel occlusion or proximal stenosis.        Care discussed with consultants/other providers and details of discussion: yes - discussed case with hematology regarding thrombocytopenia and mild shistocytes on diff. Rec'd hemolysis labs.

## 2023-01-31 NOTE — PROGRESS NOTE ADULT - PROBLEM SELECTOR PLAN 4
SBP 80s in ED improved with IVF. No current dizziness  -hold ACE-I  -possible myocarditis from clozapine, CKMB elevated - repeat trop and CK @ 6 hours  -cardiology consulted and will see patient    #Bradycardia  Improving 60-70s now  TSH mildly elevated on synthroid  Check cortisol, echo, tele

## 2023-01-31 NOTE — CONSULT NOTE ADULT - SUBJECTIVE AND OBJECTIVE BOX
Reason For Consult: hypothyroid/DM     HPI:  67M with PMHx schizophrenia, hx seizure, bipolar d/o, HTN, DM2, hx SBO presenting with confusion, slurred speech and difficulty ambulating x4 days. History was obtained from brother David at bedside who lives with patient as patient is not answering questions completely with some word finding difficulty At baseline patient oriented x3, conversive and ambulates without difficulty. Around 4PM Friday, the brother noticed him having difficulty holding his balance and requiring assistance with ambulation. He also was intermittently dizzy and LH but no LOC. He also appeared more confused with orientation x1-2. He had difficulty speaking and was slurring his words. Patient's brother did not witness any seizure-like activity, LOC, urinary/bowel incontinence, CP, SOB, abdominal pain. He did have an episode of vomiting but resolved and has baseline constipation. No fevers, chills, abdominal pain but does have baseline chronic dry cough      endocrine called for hypothyroidism      please note, pt is awake and will answer yes and no and take part in limited hx   his brother at bedside will give hx and collaterol information    pt lives with brother   pt was in usual state of helathy until Friday evening when he started to slur sppech and have dizziness.  pt otherwise is able to take care of ADLS,  and was doing well - eating, taking care of ADLS until friday when the confusion set in (4 days PTA)  this was a acute change in pts mental status per the brother.      pt has  hx of hypothyrodism, currently on levothyroxine. he was follows with outpt endocrine who is monitoring his thyroid   he was out of refills in december and has restarted his medication in Jan 2023 with possible few missed doses.  hard to ascertain hypothyroid symntoms from the pt such as fatigue.  TSH 4.91 with normal total T4       pt has DM as well- on metformin, Glipzide and januvia  pt checks FSBG once a day - ranges from low to to high glucose   brother does endorse known hypoglycemia at home recently this weekend to the 50s  diet includes bread, eggs, fish   no family hx of DM  a1c 6.4    pt also found to have hypothermia and hyponatremia and hyperkalemia       PAST MEDICAL & SURGICAL HISTORY:  Diabetes      Hypothyroid      Hypertension      Glaucoma      Schizophrenia      Bipolar disorder      Seborrheic dermatitis      Personal history of other diseases of the digestive system      No significant past surgical history          FAMILY HISTORY:  Family history of diabetes mellitus (DM) (Mother)    Family history of heart disease  mother had 3 heart attacks    Family history of diabetes mellitus in brother  both brothers        Social History:  lives with brother         Outpatient Medications:    Home Medications:   * Incomplete Medication History as of 31-Jan-2023 02:33 documented in Structured Notes  · 	Synthroid 75 mcg (0.075 mg) oral tablet: Last Dose Taken:  , 1 tab(s) orally once a day, am  · 	metFORMIN 1000 mg oral tablet: Last Dose Taken:  , 1 tab(s) orally 2 times a day  · 	Januvia 100 mg oral tablet: Last Dose Taken:  , 1 tab(s) orally once a day, am  · 	cloZAPine 100 mg oral tablet: Last Dose Taken:  , orally once a day pm  · 	cloZAPine 25 mg oral tablet: Last Dose Taken:  , orally once a day, noon  · 	Lumigan 0.01% ophthalmic solution: Last Dose Taken:  , 1 drop(s) to each affected eye once a day (in the evening)  · 	ferrous sulfate 325 mg (65 mg elemental iron) oral delayed release tablet: Last Dose Taken:  , 1 tab(s) orally once a day  · 	finasteride 5 mg oral tablet: Last Dose Taken:  , 1 tab(s) orally once a day  · 	divalproex sodium 500 mg oral tablet, extended release: Last Dose Taken:  , 2 tab(s) orally once a day (at bedtime)  · 	atorvastatin 20 mg oral tablet: Last Dose Taken:  , 1 tab(s) orally once a day (at bedtime)  · 	venlafaxine 75 mg oral tablet, extended release: Last Dose Taken:  , 1 tab(s) orally once a day with dinner  · 	glipiZIDE 5 mg oral tablet, extended release: Last Dose Taken:  , 1 tab(s) orally once a day  · 	omeprazole 40 mg oral delayed release capsule: Last Dose Taken:  , 1 cap(s) orally once a day  · 	Vitamin D3 25 mcg (1000 intl units) oral tablet: Last Dose Taken:  , 1 tab(s) orally once a day  · 	lisinopril 10 mg oral tablet: Last Dose Taken:  , 1 tab(s) orally once a day  · 	RHOPRESSA 0.02% OPHTH SOLUTION: Last Dose Taken:  , INSTILL 1 DROP INTO BOTH EYES AT BEDTIME AS DIRECTED  · 	LEVOBUNOLOL 0.5% EYE DROPS: Last Dose Taken:  , INSTILL 1 DROP INTO BOTH EYES TWICE A DAY  MEDICATIONS  (STANDING):  aspirin enteric coated 325 milliGRAM(s) Oral daily  atorvastatin 20 milliGRAM(s) Oral at bedtime  cholecalciferol 1000 Unit(s) Oral daily  dextrose 5%. 1000 milliLiter(s) (50 mL/Hr) IV Continuous <Continuous>  dextrose 5%. 1000 milliLiter(s) (100 mL/Hr) IV Continuous <Continuous>  dextrose 50% Injectable 25 Gram(s) IV Push once  dextrose 50% Injectable 12.5 Gram(s) IV Push once  dextrose 50% Injectable 25 Gram(s) IV Push once  divalproex ER 1000 milliGRAM(s) Oral at bedtime  ferrous    sulfate 325 milliGRAM(s) Oral daily  finasteride 5 milliGRAM(s) Oral daily  glucagon  Injectable 1 milliGRAM(s) IntraMuscular once  insulin lispro (ADMELOG) corrective regimen sliding scale   SubCutaneous three times a day before meals  insulin lispro (ADMELOG) corrective regimen sliding scale   SubCutaneous at bedtime  latanoprost 0.005% Ophthalmic Solution 1 Drop(s) Both EYES at bedtime  levothyroxine 75 MICROGram(s) Oral daily  piperacillin/tazobactam IVPB.- 3.375 Gram(s) IV Intermittent once  piperacillin/tazobactam IVPB.. 3.375 Gram(s) IV Intermittent every 8 hours  sodium chloride 0.9% Bolus 1000 milliLiter(s) IV Bolus once  sodium chloride 0.9%. 1000 milliLiter(s) (100 mL/Hr) IV Continuous <Continuous>  timolol 0.5% Solution 1 Drop(s) Both EYES two times a day  vancomycin  IVPB 1250 milliGRAM(s) IV Intermittent every 12 hours  venlafaxine 75 milliGRAM(s) Oral with dinner    MEDICATIONS  (PRN):  dextrose Oral Gel 15 Gram(s) Oral once PRN Blood Glucose LESS THAN 70 milliGRAM(s)/deciliter      Allergies    No Known Allergies    Intolerances      Review of Systems:      UNABLE TO OBTAIN    PHYSICAL EXAM:  VITALS: T(C): 36.1 (01-31-23 @ 11:20)  T(F): 97 (01-31-23 @ 11:20), Max: 98.6 (01-30-23 @ 16:56)  HR: 65 (01-31-23 @ 11:20) (46 - 67)  BP: 126/62 (01-31-23 @ 11:20) (88/52 - 126/62)  RR:  (16 - 19)  SpO2:  (91% - 100%)  Wt(kg): --  GENERAL: not in distress, however appears unwell   EYES: No proptosis, no lid lag, anicteric  HEENT:  Atraumatic, Normocephalic, moist mucous membranes  RESPIRATORY: Clear to auscultation bilaterally; No rales, rhonchi, wheezing, or rubs  CARDIOVASCULAR: Regular rate and rhythm; No murmurs; no peripheral edema  GI: Soft, nontender, non distended, normal bowel sounds  SKIN: Dry, intact, No rashes or lesions  MUSCULOSKELETAL: Full range of motion, normal strength  NEURO: sensation intact, extraocular movements intact, no tremor, normal reflexes  PSYCH: Alert and oriented x 1      POCT Blood Glucose.: 112 mg/dL (01-31-23 @ 11:19)  POCT Blood Glucose.: 118 mg/dL (01-31-23 @ 07:25)  POCT Blood Glucose.: 121 mg/dL (01-31-23 @ 06:20)  POCT Blood Glucose.: 95 mg/dL (01-31-23 @ 03:03)  POCT Blood Glucose.: 106 mg/dL (01-31-23 @ 02:06)  POCT Blood Glucose.: 108 mg/dL (01-30-23 @ 21:54)  POCT Blood Glucose.: 137 mg/dL (01-30-23 @ 13:58)                            9.6    3.88  )-----------( 76       ( 31 Jan 2023 03:45 )             29.9       01-31    131<L>  |  98  |  27<H>  ----------------------------<  93  5.5<H>   |  26  |  0.72    eGFR: 100    Ca    8.6      01-31    TPro  5.6<L>  /  Alb  3.4  /  TBili  <0.2  /  DBili  <0.2  /  AST  39  /  ALT  69<H>  /  AlkPhos  96  01-31      Thyroid Function Tests:  01-30 @ 16:49 TSH 4.91 FreeT4 -- T3 46 Anti TPO -- Anti Thyroglobulin Ab -- TSI --          01-31 Chol 89 Direct LDL -- LDL calculated 23 HDL 53 Trig 64    Radiology:

## 2023-02-01 DIAGNOSIS — J96.01 ACUTE RESPIRATORY FAILURE WITH HYPOXIA: ICD-10-CM

## 2023-02-01 DIAGNOSIS — N17.9 ACUTE KIDNEY FAILURE, UNSPECIFIED: ICD-10-CM

## 2023-02-01 LAB
AMMONIA BLD-MCNC: 49 UMOL/L — SIGNIFICANT CHANGE UP (ref 11–55)
ANION GAP SERPL CALC-SCNC: 7 MMOL/L — SIGNIFICANT CHANGE UP (ref 7–14)
BASOPHILS # BLD AUTO: 0.03 K/UL — SIGNIFICANT CHANGE UP (ref 0–0.2)
BASOPHILS NFR BLD AUTO: 0.6 % — SIGNIFICANT CHANGE UP (ref 0–2)
BUN SERPL-MCNC: 30 MG/DL — HIGH (ref 7–23)
CALCIUM SERPL-MCNC: 8.3 MG/DL — LOW (ref 8.4–10.5)
CHLORIDE SERPL-SCNC: 100 MMOL/L — SIGNIFICANT CHANGE UP (ref 98–107)
CO2 SERPL-SCNC: 27 MMOL/L — SIGNIFICANT CHANGE UP (ref 22–31)
CREAT SERPL-MCNC: 1.12 MG/DL — SIGNIFICANT CHANGE UP (ref 0.5–1.3)
DSDNA AB SER-ACNC: 70 IU/ML — HIGH
EGFR: 72 ML/MIN/1.73M2 — SIGNIFICANT CHANGE UP
EOSINOPHIL # BLD AUTO: 0.02 K/UL — SIGNIFICANT CHANGE UP (ref 0–0.5)
EOSINOPHIL NFR BLD AUTO: 0.4 % — SIGNIFICANT CHANGE UP (ref 0–6)
GLUCOSE SERPL-MCNC: 91 MG/DL — SIGNIFICANT CHANGE UP (ref 70–99)
HCT VFR BLD CALC: 27.5 % — LOW (ref 39–50)
HGB BLD-MCNC: 9.1 G/DL — LOW (ref 13–17)
IANC: 3.6 K/UL — SIGNIFICANT CHANGE UP (ref 1.8–7.4)
IMM GRANULOCYTES NFR BLD AUTO: 1 % — HIGH (ref 0–0.9)
LYMPHOCYTES # BLD AUTO: 0.84 K/UL — LOW (ref 1–3.3)
LYMPHOCYTES # BLD AUTO: 16.7 % — SIGNIFICANT CHANGE UP (ref 13–44)
MAGNESIUM SERPL-MCNC: 1.7 MG/DL — SIGNIFICANT CHANGE UP (ref 1.6–2.6)
MCHC RBC-ENTMCNC: 30.2 PG — SIGNIFICANT CHANGE UP (ref 27–34)
MCHC RBC-ENTMCNC: 33.1 GM/DL — SIGNIFICANT CHANGE UP (ref 32–36)
MCV RBC AUTO: 91.4 FL — SIGNIFICANT CHANGE UP (ref 80–100)
MONOCYTES # BLD AUTO: 0.48 K/UL — SIGNIFICANT CHANGE UP (ref 0–0.9)
MONOCYTES NFR BLD AUTO: 9.6 % — SIGNIFICANT CHANGE UP (ref 2–14)
MPO AB + PR3 PNL SER: SIGNIFICANT CHANGE UP
NEUTROPHILS # BLD AUTO: 3.6 K/UL — SIGNIFICANT CHANGE UP (ref 1.8–7.4)
NEUTROPHILS NFR BLD AUTO: 71.7 % — SIGNIFICANT CHANGE UP (ref 43–77)
NRBC # BLD: 2 /100 WBCS — HIGH (ref 0–0)
NRBC # FLD: 0.09 K/UL — HIGH (ref 0–0)
PHOSPHATE SERPL-MCNC: 5.4 MG/DL — HIGH (ref 2.5–4.5)
PLATELET # BLD AUTO: 101 K/UL — LOW (ref 150–400)
POTASSIUM SERPL-MCNC: 5.6 MMOL/L — HIGH (ref 3.5–5.3)
POTASSIUM SERPL-SCNC: 5.6 MMOL/L — HIGH (ref 3.5–5.3)
RBC # BLD: 3.01 M/UL — LOW (ref 4.2–5.8)
RBC # FLD: 15.1 % — HIGH (ref 10.3–14.5)
SODIUM SERPL-SCNC: 134 MMOL/L — LOW (ref 135–145)
WBC # BLD: 5.02 K/UL — SIGNIFICANT CHANGE UP (ref 3.8–10.5)
WBC # FLD AUTO: 5.02 K/UL — SIGNIFICANT CHANGE UP (ref 3.8–10.5)

## 2023-02-01 PROCEDURE — 71260 CT THORAX DX C+: CPT | Mod: 26

## 2023-02-01 PROCEDURE — 71045 X-RAY EXAM CHEST 1 VIEW: CPT | Mod: 26

## 2023-02-01 PROCEDURE — 70551 MRI BRAIN STEM W/O DYE: CPT | Mod: 26

## 2023-02-01 PROCEDURE — 99232 SBSQ HOSP IP/OBS MODERATE 35: CPT

## 2023-02-01 PROCEDURE — 74177 CT ABD & PELVIS W/CONTRAST: CPT | Mod: 26

## 2023-02-01 PROCEDURE — 99232 SBSQ HOSP IP/OBS MODERATE 35: CPT | Mod: GC

## 2023-02-01 PROCEDURE — 99233 SBSQ HOSP IP/OBS HIGH 50: CPT

## 2023-02-01 RX ORDER — SODIUM ZIRCONIUM CYCLOSILICATE 10 G/10G
5 POWDER, FOR SUSPENSION ORAL ONCE
Refills: 0 | Status: COMPLETED | OUTPATIENT
Start: 2023-02-01 | End: 2023-02-01

## 2023-02-01 RX ORDER — OLANZAPINE 15 MG/1
2.5 TABLET, FILM COATED ORAL EVERY 6 HOURS
Refills: 0 | Status: DISCONTINUED | OUTPATIENT
Start: 2023-02-01 | End: 2023-02-13

## 2023-02-01 RX ADMIN — Medication 1 DROP(S): at 17:21

## 2023-02-01 RX ADMIN — LATANOPROST 1 DROP(S): 0.05 SOLUTION/ DROPS OPHTHALMIC; TOPICAL at 22:30

## 2023-02-01 RX ADMIN — Medication 325 MILLIGRAM(S): at 11:47

## 2023-02-01 RX ADMIN — Medication 166.67 MILLIGRAM(S): at 17:20

## 2023-02-01 RX ADMIN — SODIUM ZIRCONIUM CYCLOSILICATE 5 GRAM(S): 10 POWDER, FOR SUSPENSION ORAL at 08:30

## 2023-02-01 RX ADMIN — PIPERACILLIN AND TAZOBACTAM 25 GRAM(S): 4; .5 INJECTION, POWDER, LYOPHILIZED, FOR SOLUTION INTRAVENOUS at 22:16

## 2023-02-01 RX ADMIN — PIPERACILLIN AND TAZOBACTAM 25 GRAM(S): 4; .5 INJECTION, POWDER, LYOPHILIZED, FOR SOLUTION INTRAVENOUS at 05:25

## 2023-02-01 RX ADMIN — PIPERACILLIN AND TAZOBACTAM 25 GRAM(S): 4; .5 INJECTION, POWDER, LYOPHILIZED, FOR SOLUTION INTRAVENOUS at 16:07

## 2023-02-01 RX ADMIN — Medication 75 MICROGRAM(S): at 05:27

## 2023-02-01 RX ADMIN — Medication 1000 UNIT(S): at 11:46

## 2023-02-01 RX ADMIN — SODIUM CHLORIDE 100 MILLILITER(S): 9 INJECTION INTRAMUSCULAR; INTRAVENOUS; SUBCUTANEOUS at 08:31

## 2023-02-01 RX ADMIN — FINASTERIDE 5 MILLIGRAM(S): 5 TABLET, FILM COATED ORAL at 11:46

## 2023-02-01 RX ADMIN — Medication 75 MILLIGRAM(S): at 22:03

## 2023-02-01 RX ADMIN — Medication 325 MILLIGRAM(S): at 22:03

## 2023-02-01 RX ADMIN — CLOZAPINE 50 MILLIGRAM(S): 150 TABLET, ORALLY DISINTEGRATING ORAL at 22:03

## 2023-02-01 RX ADMIN — ATORVASTATIN CALCIUM 20 MILLIGRAM(S): 80 TABLET, FILM COATED ORAL at 22:03

## 2023-02-01 RX ADMIN — Medication 1 DROP(S): at 05:27

## 2023-02-01 RX ADMIN — DIVALPROEX SODIUM 750 MILLIGRAM(S): 500 TABLET, DELAYED RELEASE ORAL at 22:04

## 2023-02-01 RX ADMIN — Medication 166.67 MILLIGRAM(S): at 05:26

## 2023-02-01 NOTE — PROGRESS NOTE ADULT - PROBLEM SELECTOR PLAN 12
Hospital Bundle    Fluids: NaCl gtt for meeting SIRS  Electrolytes: Replete K > 4, Mg > 2, Phos > 3  Nutrition: Diet CC DASH   PPX  ---VTE: hold iso pancytopenia  ---GI: hold home ppi  ---Resp: 1L NC   Access: PIV [place]  Code Status: FULL  Dispo: pending PT/OT upon clinical improvement

## 2023-02-01 NOTE — PROGRESS NOTE ADULT - PROBLEM SELECTOR PLAN 7
Resume levothyroxine 75mcg qd  TSH mildly elevated, T3 low, T4 normal    - c/w home levothyroxine  - endo recs appreciated; avoid administered levothyroxine with ppi/ca/fe K 5.1--5.6    -getting insulin D50  -repeat BMP  -ensure bowel movements  -renal diet  -[place] placement for urinary retention of >700cc urine

## 2023-02-01 NOTE — PROGRESS NOTE ADULT - SUBJECTIVE AND OBJECTIVE BOX
PROGRESS NOTE:   Authored by Dr. Rona Mathew    Patient is a 67y old  Male who presents with a chief complaint of confusion, dizziness (2023 07:40)      SUBJECTIVE / OVERNIGHT EVENTS:    ADDITIONAL REVIEW OF SYSTEMS:    MEDICATIONS  (STANDING):  aspirin enteric coated 325 milliGRAM(s) Oral daily  atorvastatin 20 milliGRAM(s) Oral at bedtime  cholecalciferol 1000 Unit(s) Oral daily  cloZAPine 50 milliGRAM(s) Oral at bedtime  dextrose 5%. 1000 milliLiter(s) (50 mL/Hr) IV Continuous <Continuous>  dextrose 5%. 1000 milliLiter(s) (100 mL/Hr) IV Continuous <Continuous>  dextrose 50% Injectable 25 Gram(s) IV Push once  dextrose 50% Injectable 12.5 Gram(s) IV Push once  dextrose 50% Injectable 25 Gram(s) IV Push once  divalproex  milliGRAM(s) Oral at bedtime  ferrous    sulfate 325 milliGRAM(s) Oral at bedtime  finasteride 5 milliGRAM(s) Oral daily  glucagon  Injectable 1 milliGRAM(s) IntraMuscular once  insulin lispro (ADMELOG) corrective regimen sliding scale   SubCutaneous three times a day before meals  insulin lispro (ADMELOG) corrective regimen sliding scale   SubCutaneous at bedtime  latanoprost 0.005% Ophthalmic Solution 1 Drop(s) Both EYES at bedtime  levothyroxine 75 MICROGram(s) Oral daily  piperacillin/tazobactam IVPB.. 3.375 Gram(s) IV Intermittent every 8 hours  sodium chloride 0.9% Bolus 1000 milliLiter(s) IV Bolus once  sodium chloride 0.9%. 1000 milliLiter(s) (100 mL/Hr) IV Continuous <Continuous>  timolol 0.5% Solution 1 Drop(s) Both EYES two times a day  vancomycin  IVPB 1250 milliGRAM(s) IV Intermittent every 12 hours  venlafaxine XR. 75 milliGRAM(s) Oral at bedtime    MEDICATIONS  (PRN):  dextrose Oral Gel 15 Gram(s) Oral once PRN Blood Glucose LESS THAN 70 milliGRAM(s)/deciliter  OLANZapine Injectable 2.5 milliGRAM(s) IntraMuscular every 6 hours PRN agitation      CAPILLARY BLOOD GLUCOSE      POCT Blood Glucose.: 114 mg/dL (2023 07:18)  POCT Blood Glucose.: 101 mg/dL (2023 01:33)  POCT Blood Glucose.: 107 mg/dL (2023 21:53)  POCT Blood Glucose.: 124 mg/dL (2023 16:39)  POCT Blood Glucose.: 112 mg/dL (2023 11:19)    I&O's Summary    2023 07:01  -  2023 07:00  --------------------------------------------------------  IN: 200 mL / OUT: 775 mL / NET: -575 mL        PHYSICAL EXAM:  Vital Signs Last 24 Hrs  T(C): 36.6 (2023 05:00), Max: 37 (2023 21:24)  T(F): 97.8 (2023 05:00), Max: 98.6 (2023 21:24)  HR: 92 (2023 05:00) (65 - 94)  BP: 128/79 (2023 05:00) (118/61 - 154/83)  BP(mean): --  RR: 17 (2023 05:10) (17 - 21)  SpO2: 91% (2023 05:10) (82% - 96%)    Parameters below as of 2023 05:10  Patient On (Oxygen Delivery Method): nasal cannula  O2 Flow (L/min): 1      GENERAL: NAD, lying comfortably in bed  HEAD: Atraumatic, normocephalic  EYES: EOMI b/l, conjunctiva and sclera clear  NECK: Supple, No JVD, No LAD  RESPIRATORY: Normal respiratory effort; lungs are clear to auscultation bilaterally  CARDIOVASCULAR: Regular rate and rhythm, normal S1 and S2, no murmur/rub/gallop; No lower extremity edema  ABDOMEN: Nontender, normoactive bowel sounds, no rebound/guarding; No hepatosplenomegaly  MUSCULOSKELETAL: no clubbing or cyanosis of digits; no joint swelling or tenderness to palpation  NEURO: Non focal   SKIN:   PSYCH: A+O to person, place, and time; affect appropriate    LABS:                          9.1    5.02  )-----------( 101      ( 2023 05:56 )             27.5     02-    134<L>  |  100  |  30<H>  ----------------------------<  91  5.6<H>   |  27  |  1.12    Ca    8.3<L>      2023 05:56  Phos  5.4     02-  Mg     1.70     02-    TPro  5.6<L>  /  Alb  3.4  /  TBili  <0.2  /  DBili  <0.2  /  AST  39  /  ALT  69<H>  /  AlkPhos  96      PT/INR - ( 2023 16:45 )   PT: 11.2 sec;   INR: 0.97 ratio         PTT - ( 2023 16:45 )  PTT:45.9 sec  CARDIAC MARKERS ( 2023 09:07 )  x     / x     / 172 U/L / x     / 20.2 ng/mL  CARDIAC MARKERS ( 2023 03:45 )  x     / x     / 201 U/L / x     / 24.8 ng/mL      Urinalysis Basic - ( 2023 18:07 )    Color: Yellow / Appearance: Clear / S.021 / pH: x  Gluc: x / Ketone: Trace  / Bili: Negative / Urobili: <2 mg/dL   Blood: x / Protein: 30 mg/dL / Nitrite: Negative   Leuk Esterase: Negative / RBC: 1 /HPF / WBC 2 /HPF   Sq Epi: x / Non Sq Epi: 1 /HPF / Bacteria: Negative        Culture - Urine (collected 2023 18:53)  Source: Clean Catch Clean Catch (Midstream)  Final Report (2023 17:32):    <10,000 CFU/mL Normal Urogenital Mary    Culture - Blood (collected 2023 17:00)  Source: .Blood Blood  Preliminary Report (2023 20:01):    No growth to date.    Culture - Blood (collected 2023 16:48)  Source: .Blood Blood  Preliminary Report (2023 20:01):    No growth to date.          RADIOLOGY:    Consulted note reviewed  Reviewed Imaging personally   PROGRESS NOTE:     Patient is a 67y old  Male who presents with a chief complaint of confusion, dizziness (2023 07:40)    SUBJECTIVE / OVERNIGHT EVENTS:  Pt desat to 90% on RA, improved to 96% w/2L NC.    ADDITIONAL REVIEW OF SYSTEMS:    MEDICATIONS  (STANDING):  aspirin enteric coated 325 milliGRAM(s) Oral daily  atorvastatin 20 milliGRAM(s) Oral at bedtime  cholecalciferol 1000 Unit(s) Oral daily  cloZAPine 50 milliGRAM(s) Oral at bedtime  dextrose 5%. 1000 milliLiter(s) (50 mL/Hr) IV Continuous <Continuous>  dextrose 5%. 1000 milliLiter(s) (100 mL/Hr) IV Continuous <Continuous>  dextrose 50% Injectable 25 Gram(s) IV Push once  dextrose 50% Injectable 12.5 Gram(s) IV Push once  dextrose 50% Injectable 25 Gram(s) IV Push once  divalproex  milliGRAM(s) Oral at bedtime  ferrous    sulfate 325 milliGRAM(s) Oral at bedtime  finasteride 5 milliGRAM(s) Oral daily  glucagon  Injectable 1 milliGRAM(s) IntraMuscular once  insulin lispro (ADMELOG) corrective regimen sliding scale   SubCutaneous three times a day before meals  insulin lispro (ADMELOG) corrective regimen sliding scale   SubCutaneous at bedtime  latanoprost 0.005% Ophthalmic Solution 1 Drop(s) Both EYES at bedtime  levothyroxine 75 MICROGram(s) Oral daily  piperacillin/tazobactam IVPB.. 3.375 Gram(s) IV Intermittent every 8 hours  sodium chloride 0.9% Bolus 1000 milliLiter(s) IV Bolus once  sodium chloride 0.9%. 1000 milliLiter(s) (100 mL/Hr) IV Continuous <Continuous>  timolol 0.5% Solution 1 Drop(s) Both EYES two times a day  vancomycin  IVPB 1250 milliGRAM(s) IV Intermittent every 12 hours  venlafaxine XR. 75 milliGRAM(s) Oral at bedtime    MEDICATIONS  (PRN):  dextrose Oral Gel 15 Gram(s) Oral once PRN Blood Glucose LESS THAN 70 milliGRAM(s)/deciliter  OLANZapine Injectable 2.5 milliGRAM(s) IntraMuscular every 6 hours PRN agitation      CAPILLARY BLOOD GLUCOSE      POCT Blood Glucose.: 114 mg/dL (2023 07:18)  POCT Blood Glucose.: 101 mg/dL (2023 01:33)  POCT Blood Glucose.: 107 mg/dL (2023 21:53)  POCT Blood Glucose.: 124 mg/dL (2023 16:39)  POCT Blood Glucose.: 112 mg/dL (2023 11:19)    I&O's Summary    2023 07:01  -  2023 07:00  --------------------------------------------------------  IN: 200 mL / OUT: 775 mL / NET: -575 mL        PHYSICAL EXAM:  Vital Signs Last 24 Hrs  T(C): 36.6 (2023 05:00), Max: 37 (2023 21:24)  T(F): 97.8 (2023 05:00), Max: 98.6 (2023 21:24)  HR: 92 (2023 05:00) (65 - 94)  BP: 128/79 (2023 05:00) (118/61 - 154/83)  BP(mean): --  RR: 17 (2023 05:10) (17 - 21)  SpO2: 91% (2023 05:10) (82% - 96%)    Parameters below as of 2023 05:10  Patient On (Oxygen Delivery Method): nasal cannula  O2 Flow (L/min): 1      GENERAL: NAD, lying comfortably in bed  HEAD: Atraumatic, normocephalic  EYES: EOMI b/l, conjunctiva and sclera clear  NECK: Supple, No JVD, No LAD  RESPIRATORY: Normal respiratory effort; lungs are clear to auscultation bilaterally  CARDIOVASCULAR: Regular rate and rhythm, normal S1 and S2, no murmur/rub/gallop; No lower extremity edema  ABDOMEN: Nontender, normoactive bowel sounds, no rebound/guarding; No hepatosplenomegaly  MUSCULOSKELETAL: no clubbing or cyanosis of digits; no joint swelling or tenderness to palpation  NEURO: Non focal   SKIN:   PSYCH: A+O to person, place, and time; affect appropriate    LABS:                          9.1    5.02  )-----------( 101      ( 2023 05:56 )             27.5     02-    134<L>  |  100  |  30<H>  ----------------------------<  91  5.6<H>   |  27  |  1.12    Ca    8.3<L>      2023 05:56  Phos  5.4     02-  Mg     1.70     02-    TPro  5.6<L>  /  Alb  3.4  /  TBili  <0.2  /  DBili  <0.2  /  AST  39  /  ALT  69<H>  /  AlkPhos  96      PT/INR - ( 2023 16:45 )   PT: 11.2 sec;   INR: 0.97 ratio         PTT - ( 2023 16:45 )  PTT:45.9 sec  CARDIAC MARKERS ( 2023 09:07 )  x     / x     / 172 U/L / x     / 20.2 ng/mL  CARDIAC MARKERS ( 2023 03:45 )  x     / x     / 201 U/L / x     / 24.8 ng/mL      Urinalysis Basic - ( 2023 18:07 )    Color: Yellow / Appearance: Clear / S.021 / pH: x  Gluc: x / Ketone: Trace  / Bili: Negative / Urobili: <2 mg/dL   Blood: x / Protein: 30 mg/dL / Nitrite: Negative   Leuk Esterase: Negative / RBC: 1 /HPF / WBC 2 /HPF   Sq Epi: x / Non Sq Epi: 1 /HPF / Bacteria: Negative        Culture - Urine (collected 2023 18:53)  Source: Clean Catch Clean Catch (Midstream)  Final Report (2023 17:32):    <10,000 CFU/mL Normal Urogenital Mary    Culture - Blood (collected 2023 17:00)  Source: .Blood Blood  Preliminary Report (2023 20:01):    No growth to date.    Culture - Blood (collected 2023 16:48)  Source: .Blood Blood  Preliminary Report (2023 20:01):    No growth to date.          RADIOLOGY:    Consulted note reviewed  Reviewed Imaging personally   PROGRESS NOTE:     Patient is a 67y old  Male who presents with a chief complaint of confusion, dizziness (2023 07:40)    SUBJECTIVE / OVERNIGHT EVENTS:  Pt desat to 90 on RA, improved to 96 w/2L NC. Attempted wean to RA but again desat to 82. Back on 1L NC sat 91.  Repeat CXR to evaluate for aspiration PNA (may not have presented on initial CXR).  Pt is on Zosyn/vancomycin.  Pt was sleeping at the time- Consider possible CASSY.    Pennington was taken out overnight. Nursing reports voiding. Bedside bladder scan showing 500cc retention. Finasteride re-started yesterday.    ADDITIONAL REVIEW OF SYSTEMS:  Limited 2/2 Pt AMS.    MEDICATIONS  (STANDING):  aspirin enteric coated 325 milliGRAM(s) Oral daily  atorvastatin 20 milliGRAM(s) Oral at bedtime  cholecalciferol 1000 Unit(s) Oral daily  cloZAPine 50 milliGRAM(s) Oral at bedtime  dextrose 5%. 1000 milliLiter(s) (50 mL/Hr) IV Continuous <Continuous>  dextrose 5%. 1000 milliLiter(s) (100 mL/Hr) IV Continuous <Continuous>  dextrose 50% Injectable 25 Gram(s) IV Push once  dextrose 50% Injectable 12.5 Gram(s) IV Push once  dextrose 50% Injectable 25 Gram(s) IV Push once  divalproex  milliGRAM(s) Oral at bedtime  ferrous    sulfate 325 milliGRAM(s) Oral at bedtime  finasteride 5 milliGRAM(s) Oral daily  glucagon  Injectable 1 milliGRAM(s) IntraMuscular once  insulin lispro (ADMELOG) corrective regimen sliding scale   SubCutaneous three times a day before meals  insulin lispro (ADMELOG) corrective regimen sliding scale   SubCutaneous at bedtime  latanoprost 0.005% Ophthalmic Solution 1 Drop(s) Both EYES at bedtime  levothyroxine 75 MICROGram(s) Oral daily  piperacillin/tazobactam IVPB.. 3.375 Gram(s) IV Intermittent every 8 hours  sodium chloride 0.9% Bolus 1000 milliLiter(s) IV Bolus once  sodium chloride 0.9%. 1000 milliLiter(s) (100 mL/Hr) IV Continuous <Continuous>  timolol 0.5% Solution 1 Drop(s) Both EYES two times a day  vancomycin  IVPB 1250 milliGRAM(s) IV Intermittent every 12 hours  venlafaxine XR. 75 milliGRAM(s) Oral at bedtime    MEDICATIONS  (PRN):  dextrose Oral Gel 15 Gram(s) Oral once PRN Blood Glucose LESS THAN 70 milliGRAM(s)/deciliter  OLANZapine Injectable 2.5 milliGRAM(s) IntraMuscular every 6 hours PRN agitation    CAPILLARY BLOOD GLUCOSE    POCT Blood Glucose.: 114 mg/dL (2023 07:18)  POCT Blood Glucose.: 101 mg/dL (2023 01:33)  POCT Blood Glucose.: 107 mg/dL (2023 21:53)  POCT Blood Glucose.: 124 mg/dL (2023 16:39)  POCT Blood Glucose.: 112 mg/dL (2023 11:19)    I&O's Summary    2023 07:01  -  2023 07:00  --------------------------------------------------------  IN: 200 mL / OUT: 775 mL / NET: -575 mL    PHYSICAL EXAM:  Vital Signs Last 24 Hrs  T(C): 36.6 (2023 05:00), Max: 37 (2023 21:24)  T(F): 97.8 (2023 05:00), Max: 98.6 (2023 21:24)  HR: 92 (2023 05:00) (65 - 94)  BP: 128/79 (2023 05:00) (118/61 - 154/83)  BP(mean): --  RR: 17 (2023 05:10) (17 - 21)  SpO2: 91% (2023 05:10) (82% - 96%)    Parameters below as of 2023 05:10  Patient On (Oxygen Delivery Method): nasal cannula  O2 Flow (L/min): 1    GENERAL: NAD, Pt is drowsy, wakes by calling name  HEAD: Atraumatic, normocephalic  NECK: Supple, No JVD  RESPIRATORY: Normal respiratory effort; auscultated gurgling in upper airway  CARDIOVASCULAR: Regular rate and rhythm, normal S1 and S2, no murmur/rub/gallop; No lower extremity edema  ABDOMEN: Diminished bowel sounds, no rebound/guarding; No tenderness to palpation  NEURO: AOx1  SKIN: Erythema     LABS:                          9.1    5.02  )-----------( 101      ( 2023 05:56 )             27.5     02-01    134<L>  |  100  |  30<H>  ----------------------------<  91  5.6<H>   |  27  |  1.12    Ca    8.3<L>      2023 05:56  Phos  5.4     02-  Mg     1.70     02-01    TPro  5.6<L>  /  Alb  3.4  /  TBili  <0.2  /  DBili  <0.2  /  AST  39  /  ALT  69<H>  /  AlkPhos  96  01-31    PT/INR - ( 2023 16:45 )   PT: 11.2 sec;   INR: 0.97 ratio         PTT - ( 2023 16:45 )  PTT:45.9 sec  CARDIAC MARKERS ( 2023 09:07 )  x     / x     / 172 U/L / x     / 20.2 ng/mL  CARDIAC MARKERS ( 2023 03:45 )  x     / x     / 201 U/L / x     / 24.8 ng/mL      Urinalysis Basic - ( 2023 18:07 )    Color: Yellow / Appearance: Clear / S.021 / pH: x  Gluc: x / Ketone: Trace  / Bili: Negative / Urobili: <2 mg/dL   Blood: x / Protein: 30 mg/dL / Nitrite: Negative   Leuk Esterase: Negative / RBC: 1 /HPF / WBC 2 /HPF   Sq Epi: x / Non Sq Epi: 1 /HPF / Bacteria: Negative        Culture - Urine (collected 2023 18:53)  Source: Clean Catch Clean Catch (Midstream)  Final Report (2023 17:32):    <10,000 CFU/mL Normal Urogenital Mary    Culture - Blood (collected 2023 17:00)  Source: .Blood Blood  Preliminary Report (2023 20:01):    No growth to date.    Culture - Blood (collected 2023 16:48)  Source: .Blood Blood  Preliminary Report (2023 20:01):    No growth to date.          RADIOLOGY:    Consulted note reviewed  Reviewed Imaging personally   PROGRESS NOTE:     Patient is a 67y old  Male who presents with a chief complaint of confusion, dizziness (2023 07:40)    SUBJECTIVE / OVERNIGHT EVENTS:  Pt desat to 90 on RA, improved to 96 w/2L NC. Attempted wean to RA but again desat to 82. Back on 1L NC sat 91.  Repeat CXR to evaluate for aspiration PNA (may not have presented on initial CXR).  Pt is on Zosyn/vancomycin.  Pt was sleeping at the time- Consider possible CASSY.    Pen[place] was taken out overnight. Nursing reports voiding. Bedside bladder scan showing 500cc retention. Finasteride re-started yesterday.    Nursing reports Pt has been eating small portions.    This morning, Pt is drowsy in/out of sleep w/snoring. Will wake when name is called and answers questions w/yes or no. AOx1 to person.    ADDITIONAL REVIEW OF SYSTEMS:  Limited 2/2 Pt AMS.    MEDICATIONS  (STANDING):  aspirin enteric coated 325 milliGRAM(s) Oral daily  atorvastatin 20 milliGRAM(s) Oral at bedtime  cholecalciferol 1000 Unit(s) Oral daily  cloZAPine 50 milliGRAM(s) Oral at bedtime  dextrose 5%. 1000 milliLiter(s) (50 mL/Hr) IV Continuous <Continuous>  dextrose 5%. 1000 milliLiter(s) (100 mL/Hr) IV Continuous <Continuous>  dextrose 50% Injectable 25 Gram(s) IV Push once  dextrose 50% Injectable 12.5 Gram(s) IV Push once  dextrose 50% Injectable 25 Gram(s) IV Push once  divalproex  milliGRAM(s) Oral at bedtime  ferrous    sulfate 325 milliGRAM(s) Oral at bedtime  finasteride 5 milliGRAM(s) Oral daily  glucagon  Injectable 1 milliGRAM(s) IntraMuscular once  insulin lispro (ADMELOG) corrective regimen sliding scale   SubCutaneous three times a day before meals  insulin lispro (ADMELOG) corrective regimen sliding scale   SubCutaneous at bedtime  latanoprost 0.005% Ophthalmic Solution 1 Drop(s) Both EYES at bedtime  levothyroxine 75 MICROGram(s) Oral daily  piperacillin/tazobactam IVPB.. 3.375 Gram(s) IV Intermittent every 8 hours  sodium chloride 0.9% Bolus 1000 milliLiter(s) IV Bolus once  sodium chloride 0.9%. 1000 milliLiter(s) (100 mL/Hr) IV Continuous <Continuous>  timolol 0.5% Solution 1 Drop(s) Both EYES two times a day  vancomycin  IVPB 1250 milliGRAM(s) IV Intermittent every 12 hours  venlafaxine XR. 75 milliGRAM(s) Oral at bedtime    MEDICATIONS  (PRN):  dextrose Oral Gel 15 Gram(s) Oral once PRN Blood Glucose LESS THAN 70 milliGRAM(s)/deciliter  OLANZapine Injectable 2.5 milliGRAM(s) IntraMuscular every 6 hours PRN agitation    CAPILLARY BLOOD GLUCOSE    POCT Blood Glucose.: 114 mg/dL (2023 07:18)  POCT Blood Glucose.: 101 mg/dL (2023 01:33)  POCT Blood Glucose.: 107 mg/dL (2023 21:53)  POCT Blood Glucose.: 124 mg/dL (2023 16:39)  POCT Blood Glucose.: 112 mg/dL (2023 11:19)    I&O's Summary    2023 07:01  -  2023 07:00  --------------------------------------------------------  IN: 200 mL / OUT: 775 mL / NET: -575 mL    PHYSICAL EXAM:  Vital Signs Last 24 Hrs  T(C): 36.6 (2023 05:00), Max: 37 (2023 21:24)  T(F): 97.8 (2023 05:00), Max: 98.6 (2023 21:24)  HR: 92 (2023 05:00) (65 - 94)  BP: 128/79 (2023 05:00) (118/61 - 154/83)  BP(mean): --  RR: 17 (2023 05:10) (17 - 21)  SpO2: 91% (2023 05:10) (82% - 96%)    Parameters below as of 2023 05:10  Patient On (Oxygen Delivery Method): nasal cannula  O2 Flow (L/min): 1    GENERAL: NAD, Pt is drowsy, wakes by calling name  HEAD: Atraumatic, normocephalic  NECK: Supple, No JVD  RESPIRATORY: Normal respiratory effort; auscultated gurgling in upper airway  CARDIOVASCULAR: Regular rate and rhythm, normal S1 and S2, no murmur/rub/gallop; No lower extremity edema  ABDOMEN: Diminished bowel sounds, no rebound/guarding; No tenderness to palpation  NEURO: AOx1  SKIN: Erythema of hands and legs, not progressed since yesterday; swelling of L hand (reported as occurring w/blood draws)    LABS:                          9.1    5.02  )-----------( 101      ( 2023 05:56 )             27.5     02-01    134<L>  |  100  |  30<H>  ----------------------------<  91  5.6<H>   |  27  |  1.12    Ca    8.3<L>      2023 05:56  Phos  5.4     02-  Mg     1.70     02-    TPro  5.6<L>  /  Alb  3.4  /  TBili  <0.2  /  DBili  <0.2  /  AST  39  /  ALT  69<H>  /  AlkPhos  96  01-31    PT/INR - ( 2023 16:45 )   PT: 11.2 sec;   INR: 0.97 ratio         PTT - ( 2023 16:45 )  PTT:45.9 sec  CARDIAC MARKERS ( 2023 09:07 )  x     / x     / 172 U/L / x     / 20.2 ng/mL  CARDIAC MARKERS ( 2023 03:45 )  x     / x     / 201 U/L / x     / 24.8 ng/mL      Urinalysis Basic - ( 2023 18:07 )    Color: Yellow / Appearance: Clear / S.021 / pH: x  Gluc: x / Ketone: Trace  / Bili: Negative / Urobili: <2 mg/dL   Blood: x / Protein: 30 mg/dL / Nitrite: Negative   Leuk Esterase: Negative / RBC: 1 /HPF / WBC 2 /HPF   Sq Epi: x / Non Sq Epi: 1 /HPF / Bacteria: Negative        Culture - Urine (collected 2023 18:53)  Source: Clean Catch Clean Catch (Midstream)  Final Report (2023 17:32):    <10,000 CFU/mL Normal Urogenital Mary    Culture - Blood (collected 2023 17:00)  Source: .Blood Blood  Preliminary Report (2023 20:01):    No growth to date.    Culture - Blood (collected 2023 16:48)  Source: .Blood Blood  Preliminary Report (2023 20:01):    No growth to date.    RADIOLOGY:    Consulted note reviewed  Reviewed Imaging personally

## 2023-02-01 NOTE — PROGRESS NOTE ADULT - PROBLEM SELECTOR PLAN 6
K 5.1--5.6    -getting insulin D50  -repeat BMP  -creatinine function stable  -ensure bowel movements  -Pennington placement for urinary retention of >700cc urine  -nephro c/s if MARISSA or persistent hyperkalemia SBP 80s in ED improved with IVF. No current dizziness  now normotensive     -hold antihypertensives iso hypotension  -cardiology consulted; recs appreciated    #Bradycardia  resolved    TSH mildly elevated on synthroid  Check cortisol, echo, tele

## 2023-02-01 NOTE — PROGRESS NOTE ADULT - SUBJECTIVE AND OBJECTIVE BOX
PROGRESS NOTE:   Authored by Dr. Rona Mathew    Patient is a 67y old  Male who presents with a chief complaint of confusion, dizziness (2023 19:26)      SUBJECTIVE / OVERNIGHT EVENTS: TOV yesterday night, garcia was taken out. Patient also with desaturations to 82% on RA while sleeping, placed on 1L NC.     ADDITIONAL REVIEW OF SYSTEMS:  No fever        MEDICATIONS  (STANDING):  aspirin enteric coated 325 milliGRAM(s) Oral daily  atorvastatin 20 milliGRAM(s) Oral at bedtime  cholecalciferol 1000 Unit(s) Oral daily  cloZAPine 50 milliGRAM(s) Oral at bedtime  dextrose 5%. 1000 milliLiter(s) (50 mL/Hr) IV Continuous <Continuous>  dextrose 5%. 1000 milliLiter(s) (100 mL/Hr) IV Continuous <Continuous>  dextrose 50% Injectable 25 Gram(s) IV Push once  dextrose 50% Injectable 12.5 Gram(s) IV Push once  dextrose 50% Injectable 25 Gram(s) IV Push once  divalproex  milliGRAM(s) Oral at bedtime  ferrous    sulfate 325 milliGRAM(s) Oral at bedtime  finasteride 5 milliGRAM(s) Oral daily  glucagon  Injectable 1 milliGRAM(s) IntraMuscular once  insulin lispro (ADMELOG) corrective regimen sliding scale   SubCutaneous three times a day before meals  insulin lispro (ADMELOG) corrective regimen sliding scale   SubCutaneous at bedtime  latanoprost 0.005% Ophthalmic Solution 1 Drop(s) Both EYES at bedtime  levothyroxine 75 MICROGram(s) Oral daily  piperacillin/tazobactam IVPB.. 3.375 Gram(s) IV Intermittent every 8 hours  sodium chloride 0.9% Bolus 1000 milliLiter(s) IV Bolus once  sodium chloride 0.9%. 1000 milliLiter(s) (100 mL/Hr) IV Continuous <Continuous>  sodium zirconium cyclosilicate 5 Gram(s) Oral once  timolol 0.5% Solution 1 Drop(s) Both EYES two times a day  vancomycin  IVPB 1250 milliGRAM(s) IV Intermittent every 12 hours  venlafaxine XR. 75 milliGRAM(s) Oral at bedtime    MEDICATIONS  (PRN):  dextrose Oral Gel 15 Gram(s) Oral once PRN Blood Glucose LESS THAN 70 milliGRAM(s)/deciliter      CAPILLARY BLOOD GLUCOSE      POCT Blood Glucose.: 114 mg/dL (2023 07:18)  POCT Blood Glucose.: 101 mg/dL (2023 01:33)  POCT Blood Glucose.: 107 mg/dL (2023 21:53)  POCT Blood Glucose.: 124 mg/dL (2023 16:39)  POCT Blood Glucose.: 112 mg/dL (2023 11:19)    I&O's Summary    2023 07:01  -  2023 07:00  --------------------------------------------------------  IN: 200 mL / OUT: 775 mL / NET: -575 mL        PHYSICAL EXAM:  Vital Signs Last 24 Hrs  T(C): 36.6 (2023 05:00), Max: 37 (2023 21:24)  T(F): 97.8 (2023 05:00), Max: 98.6 (2023 21:24)  HR: 92 (2023 05:00) (65 - 94)  BP: 128/79 (2023 05:00) (118/61 - 154/83)  BP(mean): --  RR: 17 (2023 05:10) (17 - 21)  SpO2: 91% (2023 05:10) (82% - 96%)    Parameters below as of 2023 05:10  Patient On (Oxygen Delivery Method): nasal cannula  O2 Flow (L/min): 1      GENERAL: NAD, lying comfortably in bed  HEAD: Atraumatic, normocephalic  EYES: EOMI b/l, conjunctiva and sclera clear  NECK: Supple, No JVD, No LAD  RESPIRATORY: Normal respiratory effort; lungs are clear to auscultation bilaterally  CARDIOVASCULAR: Regular rate and rhythm, normal S1 and S2, no murmur/rub/gallop; No lower extremity edema  ABDOMEN: Nontender, normoactive bowel sounds, no rebound/guarding; No hepatosplenomegaly  MUSCULOSKELETAL: no clubbing or cyanosis of digits; no joint swelling or tenderness to palpation  NEURO: Non focal   SKIN:   PSYCH: A+O to person, place, and time; affect appropriate    LABS:                          9.1    5.02  )-----------( 101      ( 2023 05:56 )             27.5     02-01    134<L>  |  100  |  30<H>  ----------------------------<  91  5.6<H>   |  27  |  1.12    Ca    8.3<L>      2023 05:56  Phos  5.4     02-  Mg     1.70     02-    TPro  5.6<L>  /  Alb  3.4  /  TBili  <0.2  /  DBili  <0.2  /  AST  39  /  ALT  69<H>  /  AlkPhos  96      PT/INR - ( 2023 16:45 )   PT: 11.2 sec;   INR: 0.97 ratio         PTT - ( 2023 16:45 )  PTT:45.9 sec  CARDIAC MARKERS ( 2023 09:07 )  x     / x     / 172 U/L / x     / 20.2 ng/mL  CARDIAC MARKERS ( 2023 03:45 )  x     / x     / 201 U/L / x     / 24.8 ng/mL      Urinalysis Basic - ( 2023 18:07 )    Color: Yellow / Appearance: Clear / S.021 / pH: x  Gluc: x / Ketone: Trace  / Bili: Negative / Urobili: <2 mg/dL   Blood: x / Protein: 30 mg/dL / Nitrite: Negative   Leuk Esterase: Negative / RBC: 1 /HPF / WBC 2 /HPF   Sq Epi: x / Non Sq Epi: 1 /HPF / Bacteria: Negative        Culture - Urine (collected 2023 18:53)  Source: Clean Catch Clean Catch (Midstream)  Final Report (2023 17:32):    <10,000 CFU/mL Normal Urogenital Mary    Culture - Blood (collected 2023 17:00)  Source: .Blood Blood  Preliminary Report (2023 20:01):    No growth to date.    Culture - Blood (collected 2023 16:48)  Source: .Blood Blood  Preliminary Report (2023 20:01):    No growth to date.          RADIOLOGY:    Consulted note reviewed  Reviewed Imaging personally   PROGRESS NOTE:   Authored by Dr. Rona Mathew    Patient is a 67y old  Male who presents with a chief complaint of confusion, dizziness (2023 19:26)      SUBJECTIVE / OVERNIGHT EVENTS: TOV yesterday night, radha was taken out. Patient also with desaturations to 82% on RA while sleeping, placed on 1L NC. Brother David at bedside, concerned patient's mental status has worsened since yesterday. States he appears weaker and unable to feed himself today whereas he was able to yesterday.     Patient states he is without pain. However, notes some shortness of breath. Difficult to arouse patient and pt slow to answer. States he is in the hospital, unable to say his .       ADDITIONAL REVIEW OF SYSTEMS:  No fever  + shortness of breath.  No abd pain  limited history given patient's mental status       MEDICATIONS  (STANDING):  aspirin enteric coated 325 milliGRAM(s) Oral daily  atorvastatin 20 milliGRAM(s) Oral at bedtime  cholecalciferol 1000 Unit(s) Oral daily  cloZAPine 50 milliGRAM(s) Oral at bedtime  dextrose 5%. 1000 milliLiter(s) (50 mL/Hr) IV Continuous <Continuous>  dextrose 5%. 1000 milliLiter(s) (100 mL/Hr) IV Continuous <Continuous>  dextrose 50% Injectable 25 Gram(s) IV Push once  dextrose 50% Injectable 12.5 Gram(s) IV Push once  dextrose 50% Injectable 25 Gram(s) IV Push once  divalproex  milliGRAM(s) Oral at bedtime  ferrous    sulfate 325 milliGRAM(s) Oral at bedtime  finasteride 5 milliGRAM(s) Oral daily  glucagon  Injectable 1 milliGRAM(s) IntraMuscular once  insulin lispro (ADMELOG) corrective regimen sliding scale   SubCutaneous three times a day before meals  insulin lispro (ADMELOG) corrective regimen sliding scale   SubCutaneous at bedtime  latanoprost 0.005% Ophthalmic Solution 1 Drop(s) Both EYES at bedtime  levothyroxine 75 MICROGram(s) Oral daily  piperacillin/tazobactam IVPB.. 3.375 Gram(s) IV Intermittent every 8 hours  sodium chloride 0.9% Bolus 1000 milliLiter(s) IV Bolus once  sodium chloride 0.9%. 1000 milliLiter(s) (100 mL/Hr) IV Continuous <Continuous>  sodium zirconium cyclosilicate 5 Gram(s) Oral once  timolol 0.5% Solution 1 Drop(s) Both EYES two times a day  vancomycin  IVPB 1250 milliGRAM(s) IV Intermittent every 12 hours  venlafaxine XR. 75 milliGRAM(s) Oral at bedtime    MEDICATIONS  (PRN):  dextrose Oral Gel 15 Gram(s) Oral once PRN Blood Glucose LESS THAN 70 milliGRAM(s)/deciliter      CAPILLARY BLOOD GLUCOSE      POCT Blood Glucose.: 114 mg/dL (2023 07:18)  POCT Blood Glucose.: 101 mg/dL (2023 01:33)  POCT Blood Glucose.: 107 mg/dL (2023 21:53)  POCT Blood Glucose.: 124 mg/dL (2023 16:39)  POCT Blood Glucose.: 112 mg/dL (2023 11:19)    I&O's Summary    2023 07:01  -  2023 07:00  --------------------------------------------------------  IN: 200 mL / OUT: 775 mL / NET: -575 mL        PHYSICAL EXAM:  Vital Signs Last 24 Hrs  T(C): 36.6 (2023 05:00), Max: 37 (2023 21:24)  T(F): 97.8 (2023 05:00), Max: 98.6 (2023 21:24)  HR: 92 (2023 05:00) (65 - 94)  BP: 128/79 (2023 05:00) (118/61 - 154/83)  BP(mean): --  RR: 17 (2023 05:10) (17 - 21)  SpO2: 91% (2023 05:10) (82% - 96%)    Parameters below as of 2023 05:10  Patient On (Oxygen Delivery Method): nasal cannula  O2 Flow (L/min): 1      GENERAL: NAD, sitting comfortably in bed being fed yogurt by brother at bedside   HEAD: Atraumatic, normocephalic  EYES: EOMI b/l, conjunctiva and sclera clear  NECK: Supple   RESPIRATORY: Normal respiratory effort; mild upper airway stridor, poor aeration diffusely, however without wheezing, rhonchi or crackles.   CARDIOVASCULAR: Regular rate and rhythm, normal S1 and S2, no murmur/rub/gallop; No lower extremity edema  ABDOMEN: Nontender, normoactive bowel sounds, no rebound/guarding   MUSCULOSKELETAL:  no joint swelling or tenderness to palpation  NEURO: grossly nonfocal however cannot assess due to lethargy  SKIN: no rashes noted   PSYCH: A+O place; rest of mental status cannot be assessed due to lethargy. flat affect      LABS:                          9.1    5.02  )-----------( 101      ( 2023 05:56 )             27.5     02-01    134<L>  |  100  |  30<H>  ----------------------------<  91  5.6<H>   |  27  |  1.12    Ca    8.3<L>      2023 05:56  Phos  5.4     02-  Mg     1.70     02-01    TPro  5.6<L>  /  Alb  3.4  /  TBili  <0.2  /  DBili  <0.2  /  AST  39  /  ALT  69<H>  /  AlkPhos  96  01-31    PT/INR - ( 2023 16:45 )   PT: 11.2 sec;   INR: 0.97 ratio         PTT - ( 2023 16:45 )  PTT:45.9 sec  CARDIAC MARKERS ( 2023 09:07 )  x     / x     / 172 U/L / x     / 20.2 ng/mL  CARDIAC MARKERS ( 2023 03:45 )  x     / x     / 201 U/L / x     / 24.8 ng/mL      Urinalysis Basic - ( 2023 18:07 )    Color: Yellow / Appearance: Clear / S.021 / pH: x  Gluc: x / Ketone: Trace  / Bili: Negative / Urobili: <2 mg/dL   Blood: x / Protein: 30 mg/dL / Nitrite: Negative   Leuk Esterase: Negative / RBC: 1 /HPF / WBC 2 /HPF   Sq Epi: x / Non Sq Epi: 1 /HPF / Bacteria: Negative        Culture - Urine (collected 2023 18:53)  Source: Clean Catch Clean Catch (Midstream)  Final Report (2023 17:32):    <10,000 CFU/mL Normal Urogenital Mary    Culture - Blood (collected 2023 17:00)  Source: .Blood Blood  Preliminary Report (2023 20:01):    No growth to date.    Culture - Blood (collected 2023 16:48)  Source: .Blood Blood  Preliminary Report (2023 20:01):    No growth to date.          RADIOLOGY:    Consulted note reviewed  Reviewed Imaging personally

## 2023-02-01 NOTE — PROGRESS NOTE ADULT - PROBLEM SELECTOR PLAN 1
Baseline oriented 3, ambulates on own. Now oriented 1-2, ataxic, slurred speech, dysarthria.   Seen by neuro - impression: Confusion, mild to moderate dysarthria and prolonged speech latency with perseveration and halting quality (?transcortical motor aphasia or mixed aphasia), and truncal ataxia, consider ischemic stroke vs. toxic metabolic encephalopathy vs. nutritional/vitamin deficiency vs. unlikely progression of psychiatric disorders or autoimmune encephalitis given acuity of symptom onset.     Other considerations include sepsis though CXR clear, UA normal, no signs of skin infections.     Possible AI given bradycardia, hypotension and now hypothermia. Less likely myxedema coma give T4 normal and TSH only minimally elevated. TSH mildly elevated 4.91, A1C 6.4. Takes his levothyroxine as well. AM Cortisol elevated.   Trop flat CKMB elevated. TTE wnl. Cards thinks unlikely myocarditis. Ammonium mildly elevated 58 likely not delivered on ice, LFTs wnl. Patient is maintaining airway and responsive. Not lethargic, awake and alert. Follows commands.     - ICU contacted overnight, not candidate at this time; s/p 1x stress dose hydrocortisone 100mg to assess for hypothermia  - Endo c/s - unlikely to be AI. Will hold off on further steroids until infx r/o  - 1/30 BCx x2, UCx NGTD  - c/w broad spectrum vanc/zosyn for now given hypothermia  - Erythema to hands and bilateral legs, not palpable purpura or vesicular. No blistering or vesicles. ESR low. Will send ANCA, KIRTI, anti-dsDNA, C3, C4. Possible rheum c/s if rest of w/u unrevealing  - ctm on tele  - MRI brain noncontrast ordered  - neuro checks q4h  - c/w aspirin statin  - f/u lipid profile, B1, B6, B12, folate, ammonia, CK, ESR, CRP, HIV, lyme, copper, zinc, vitamin D, calcium, cortisol, alcohol level, Utox  - PT/OT/S&S eval - when can adequately follow commands and medically more stable. Bedrest for now Baseline oriented 3, ambulates on own. Now oriented 1-2, ataxic, slurred speech, dysarthria.   Seen by neuro - impression: Confusion, mild to moderate dysarthria and prolonged speech latency with perseveration and halting quality (?transcortical motor aphasia or mixed aphasia), and truncal ataxia, consider ischemic stroke vs. toxic metabolic encephalopathy vs. nutritional/vitamin deficiency vs. unlikely progression of psychiatric disorders or autoimmune encephalitis given acuity of symptom onset.   Other considerations include sepsis though CXR clear, UA normal, no signs of skin infections.   Possible AI given bradycardia, hypotension and now hypothermia. Less likely myxedema coma give T4 normal and TSH only minimally elevated. TSH mildly elevated 4.91, A1C 6.4. Takes his levothyroxine as well. AM Cortisol elevated.   Trop flat CKMB elevated. TTE wnl. Cards thinks unlikely myocarditis. Ammonium mildly elevated 58 likely not delivered on ice, LFTs wnl. Patient is maintaining airway and responsive. Not lethargic, awake and alert. Follows commands.     - ICU contacted overnight, not candidate at this time; s/p 1x stress dose hydrocortisone 100mg to assess for hypothermia  - Endo c/s - unlikely to be AI. Will hold off on further steroids until infx r/o  - 1/30 BCx x2, UCx NGTD  - c/w broad spectrum vanc/zosyn for now given hypothermia  - Erythema to hands and bilateral legs, not palpable purpura or vesicular. No blistering or vesicles. ESR low. Will send ANCA, KIRTI, anti-dsDNA, C3, C4. Possible rheum c/s if rest of w/u unrevealing  - ctm on tele  - MRI brain noncontrast ordered  - neuro checks q4h  - c/w aspirin statin  - f/u lipid profile, B1, B6, B12, folate, ammonia, CK, ESR, CRP, HIV, lyme, copper, zinc, vitamin D, calcium, cortisol, alcohol level, Utox  - PT/OT/S&S eval - when can adequately follow commands and medically more stable. Bedrest for now  (2/1)  > CT chest, abdomen, pelvis to evaluate for infection source  > MRI brain noncontrast

## 2023-02-01 NOTE — PROGRESS NOTE ADULT - PROBLEM SELECTOR PLAN 4
Plt 69 down from baseline 200s. Recently clozapine decreased one month ago. No other med changes    -discussed case with hematology on call given also slight schistocytes on diff and heme has overall low suspicion for TTP given normal bili, kidney function. However hemolysis labs recommended. No need for [id number] unless abnormal hemolysis labs  -LDH, retic, haptoglobin, blue top - neg for hemolysis  -heme c/s to see in AM and review peripheral smear.   -folate, b12, ferritin, iron studies - unremarkable  -no recent heparin. Low suspicion HIT  -repeat CBC plt 69--76 Now normothermic.  Temp in ED 98F but upon arrival to floor 90 and repeat 91.5 with bear hugger. MS waxes and wanes. Oriented x1-2    -warm IVF  -warming blanket  -discussed case with ICU and rec'd hydrocortisone as ordered but no further doses after for now and if no improvement then ICU consult  -Less likely myxedema coma give T4 normal and TSH only minimally elevated  -possible sepsis? f/u BCx x2, UCx, cover with vanc/zosyn for now  - f/u CT chest AP r/o infxn

## 2023-02-01 NOTE — PROGRESS NOTE ADULT - PROBLEM SELECTOR PLAN 4
Plt 69 down from baseline 200s. Recently clozapine decreased one month ago. No other med changes    -discussed case with hematology on call given also slight schistocytes on diff and heme has overall low suspicion for TTP given normal bili, kidney function. However hemolysis labs recommended. No need for [id number] unless abnormal hemolysis labs  -LDH, retic, haptoglobin, blue top - neg for hemolysis  -heme c/s to see in AM and review peripheral smear.   -folate, b12, ferritin, iron studies - unremarkable  -no recent heparin. Low suspicion HIT  -repeat CBC plt 69--76

## 2023-02-01 NOTE — PROGRESS NOTE ADULT - PROBLEM SELECTOR PLAN 3
Now normothermic.  Temp in ED 98F but upon arrival to floor 90 and repeat 91.5 with bear hugger. MS waxes and wanes. Oriented x1-2    -warm IVF  -warming blanket  -discussed case with ICU and rec'd hydrocortisone as ordered but no further doses after for now and if no improvement then ICU consult  -Less likely myxedema coma give T4 normal and TSH only minimally elevated  -possible sepsis? f/u BCx x2, UCx, cover with vanc/zosyn for now  - f/u CTAP r/o infxn

## 2023-02-01 NOTE — PROGRESS NOTE ADULT - PROBLEM SELECTOR PLAN 10
Holding clozapine given elevated CKMB and cannot take PO right now. Failed dysphagia screen     -psych c/s in AM for med management. Unclear if side effects from medications  -pending recs regarding restarting clozapine 25/100    #Hx of OCD  venlafaxine when can take PO    #Hx seizure  IV depakote On metformin 1g BID, januvia 100mg qd, glipizide 5mg qd - hold inpatient. A1C 6.4    - ISS

## 2023-02-01 NOTE — PROGRESS NOTE ADULT - PROBLEM SELECTOR PLAN 11
- restart home finasteride    Garcia placed for urinary retention >700cc urine    ToV overnight. garcia removed

## 2023-02-01 NOTE — PROGRESS NOTE ADULT - ATTENDING COMMENTS
67 M with above history, admitted with several days of encephalopathy, found to have pancytopenia, SIRS, hypothermia, hypotension, hyperkalemia.    Broad differential, including infectious/inflammatory/drug-induced/Endocrine.  Hypothermia/hypotension improved.  Encephalopathy persists    C/w Broad spectrum abx and infectious work-up for SIRS.  will obtain imaging for infectious source, pt with mild hypoxia overnight.    MRI pending to r/o CVA, neuro input appreciated    Endo input appreciated, unlikely AI or thyroid related.  S/p 1 dose steroids, will monitor off    Erythema on face/hands improving.     TTE with grossly Nl LV, trops OK, unlikely myocarditis.  Cards input appreciated.    Pancytopenia improved. 67 M with above history, admitted with several days of encephalopathy, found to have pancytopenia, SIRS, hypothermia, hypotension, hyperkalemia.    Broad differential, including infectious/inflammatory/drug-induced/Endocrine.  Hypothermia/hypotension improved.  Encephalopathy persists    C/w Broad spectrum abx and infectious work-up for SIRS.  will obtain imaging for infectious source, pt with mild hypoxia overnight.  ? Aspiration pneumonia?    Now with MARISSA, likely ATN in setting of sepsis/hypotension    MRI pending to r/o CVA, neuro input appreciated    Endo input appreciated, unlikely AI or thyroid related.  S/p 1 dose steroids, will monitor off    Erythema on face/hands improving.     TTE with grossly Nl LV, trops OK, unlikely myocarditis.  Cards input appreciated.    Pancytopenia improved.

## 2023-02-01 NOTE — PROGRESS NOTE ADULT - PROBLEM SELECTOR PLAN 2
desaturations x 2 to low 80s on room air while sleeping overnight.  could be due to sleep apnea  CXR obtained   currently on 1 L    - f/u CXR  - wean NC as tolerated Cr rise from 0.82 to 1.12    will continue to monitor  avoid nephrotoxic agents

## 2023-02-01 NOTE — PROGRESS NOTE ADULT - PROBLEM SELECTOR PLAN 12
Hospital Bundle    Fluids: NaCl gtt for meeting SIRS  Electrolytes: Replete K > 4, Mg > 2, Phos > 3  Nutrition: Diet CC DASH   PPX  ---VTE: hold iso pancytopenia  ---GI: hold home ppi  ---Resp: 1L NC   Access: PIV [place]  Code Status: FULL  Dispo: pending PT/OT upon clinical improvement - restart home finasteride    Garcia placed for urinary retention >700cc urine    ToV overnight. garcia removed  500 cc this morning -> straight cath  will repeat again in the afternoon

## 2023-02-01 NOTE — PROGRESS NOTE ADULT - SUBJECTIVE AND OBJECTIVE BOX
Chief Complaint/Follow-up on: hypothyroid     Subjective:    pt feels okay., HD stable     MEDICATIONS  (STANDING):  aspirin enteric coated 325 milliGRAM(s) Oral daily  atorvastatin 20 milliGRAM(s) Oral at bedtime  cholecalciferol 1000 Unit(s) Oral daily  cloZAPine 50 milliGRAM(s) Oral at bedtime  dextrose 5%. 1000 milliLiter(s) (50 mL/Hr) IV Continuous <Continuous>  dextrose 5%. 1000 milliLiter(s) (100 mL/Hr) IV Continuous <Continuous>  dextrose 50% Injectable 25 Gram(s) IV Push once  dextrose 50% Injectable 12.5 Gram(s) IV Push once  dextrose 50% Injectable 25 Gram(s) IV Push once  divalproex  milliGRAM(s) Oral at bedtime  ferrous    sulfate 325 milliGRAM(s) Oral at bedtime  finasteride 5 milliGRAM(s) Oral daily  glucagon  Injectable 1 milliGRAM(s) IntraMuscular once  insulin lispro (ADMELOG) corrective regimen sliding scale   SubCutaneous three times a day before meals  insulin lispro (ADMELOG) corrective regimen sliding scale   SubCutaneous at bedtime  latanoprost 0.005% Ophthalmic Solution 1 Drop(s) Both EYES at bedtime  levothyroxine 75 MICROGram(s) Oral daily  piperacillin/tazobactam IVPB.. 3.375 Gram(s) IV Intermittent every 8 hours  timolol 0.5% Solution 1 Drop(s) Both EYES two times a day  vancomycin  IVPB 1250 milliGRAM(s) IV Intermittent every 12 hours  venlafaxine XR. 75 milliGRAM(s) Oral at bedtime    MEDICATIONS  (PRN):  dextrose Oral Gel 15 Gram(s) Oral once PRN Blood Glucose LESS THAN 70 milliGRAM(s)/deciliter  OLANZapine Injectable 2.5 milliGRAM(s) IntraMuscular every 6 hours PRN agitation      PHYSICAL EXAM:  VITALS: T(C): 36.3 (02-01-23 @ 10:52)  T(F): 97.4 (02-01-23 @ 10:52), Max: 98.6 (01-31-23 @ 21:24)  HR: 85 (02-01-23 @ 10:52) (85 - 94)  BP: 163/93 (02-01-23 @ 10:52) (128/79 - 163/93)  RR:  (17 - 21)  SpO2:  (82% - 96%)  Wt(kg): --  GENERAL: NAD, well-groomed, well-developed  EYES: No proptosis, no injection  HEENT:  Atraumatic, Normocephalic, moist mucous membrane  RESPIRATORY: Clear to auscultation bilaterally; No rales, rhonchi, wheezing, or rubs  CARDIOVASCULAR: Regular rate and rhythm; No murmurs; no peripheral edema  GI: Soft, nontender, non distended, normal bowel sounds  CUSHING'S SIGNS: no striae    POCT Blood Glucose.: 94 mg/dL (02-01-23 @ 11:18)  POCT Blood Glucose.: 114 mg/dL (02-01-23 @ 07:18)  POCT Blood Glucose.: 101 mg/dL (02-01-23 @ 01:33)  POCT Blood Glucose.: 107 mg/dL (01-31-23 @ 21:53)  POCT Blood Glucose.: 124 mg/dL (01-31-23 @ 16:39)  POCT Blood Glucose.: 112 mg/dL (01-31-23 @ 11:19)  POCT Blood Glucose.: 118 mg/dL (01-31-23 @ 07:25)  POCT Blood Glucose.: 121 mg/dL (01-31-23 @ 06:20)  POCT Blood Glucose.: 95 mg/dL (01-31-23 @ 03:03)  POCT Blood Glucose.: 106 mg/dL (01-31-23 @ 02:06)  POCT Blood Glucose.: 108 mg/dL (01-30-23 @ 21:54)  POCT Blood Glucose.: 137 mg/dL (01-30-23 @ 13:58)    02-01    134<L>  |  100  |  30<H>  ----------------------------<  91  5.6<H>   |  27  |  1.12    eGFR: 72    Ca    8.3<L>      02-01  Mg     1.70     02-01  Phos  5.4     02-01    TPro  5.6<L>  /  Alb  3.4  /  TBili  <0.2  /  DBili  <0.2  /  AST  39  /  ALT  69<H>  /  AlkPhos  96  01-31          Thyroid Function Tests:  01-30 @ 16:49 TSH 4.91 FreeT4 -- T3 46 Anti TPO -- Anti Thyroglobulin Ab -- TSI --

## 2023-02-01 NOTE — PROGRESS NOTE ADULT - PROBLEM SELECTOR PLAN 6
K 5.1--5.6    -getting insulin D50  -repeat BMP  -creatinine function stable  -ensure bowel movements  -Pennington placement for urinary retention of >700cc urine  -nephro c/s if MARISSA or persistent hyperkalemia K 5.1--5.6    -getting insulin D50  -repeat BMP  -creatinine function stable  -ensure bowel movements  -Pennington placement for urinary retention of >700cc urine  -nephro c/s if MARISSA or persistent hyperkalemia    > Increasing BUN c/f MARISSA, likely ATN iso hypotension and dec renal perfusion  - Trend BUN/Cr  - Pt cleared for diet (1/31)- Eating and drinking  - No additional fluids at this time- Reevaluate if evidence aspiration

## 2023-02-01 NOTE — PROGRESS NOTE ADULT - PROBLEM SELECTOR PLAN 11
- restart home finasteride    Garcia placed for urinary retention >700cc urine    ToV overnight. garcia removed Holding clozapine given elevated CKMB and cannot take PO right now. Failed dysphagia screen     -psych consulted; recs appreciated       #Hx of OCD  venlafaxine when can take PO    #Hx seizure  IV depakote

## 2023-02-01 NOTE — PROGRESS NOTE ADULT - PROBLEM SELECTOR PLAN 8
Hgb 9.4 similar to baseline. Denies active bleeding. Had precancerous polyp removed 3 years ago per brother and had trouble getting repeat C-scope done since. Pancytopenia, possibly from clozapine?    -f/u outpatient PMD for C-scope  -LDH, retic, haptoglobin, blue top - neg for hemolysis  -heme c/s to see in AM  -folate, b12, ferritin, iron studies - unremarkable

## 2023-02-01 NOTE — PROGRESS NOTE ADULT - PROBLEM SELECTOR PLAN 7
Resume levothyroxine 75mcg qd  TSH mildly elevated, T3 low, T4 normal    - c/w home levothyroxine  - endo recs appreciated; avoid administered levothyroxine with ppi/ca/fe

## 2023-02-01 NOTE — PROGRESS NOTE ADULT - PROBLEM SELECTOR PLAN 5
SBP 80s in ED improved with IVF. No current dizziness    -hold antihypertensives iso hypotension  -cardiology consulted; recs appreciated    #Bradycardia  Improving 60-70s now  TSH mildly elevated on synthroid  Check cortisol, echo, tele

## 2023-02-01 NOTE — PROGRESS NOTE ADULT - PROBLEM SELECTOR PLAN 3
Now normothermic.  Temp in ED 98F but upon arrival to floor 90 and repeat 91.5 with bear hugger. MS waxes and wanes. Oriented x1-2    -warm IVF  -warming blanket  -discussed case with ICU and rec'd hydrocortisone as ordered but no further doses after for now and if no improvement then ICU consult  -Less likely myxedema coma give T4 normal and TSH only minimally elevated  -possible sepsis? f/u BCx x2, UCx, cover with vanc/zosyn for now  - f/u CTAP r/o infxn desaturations x 2 to low 80s on room air while sleeping overnight.  could be due to sleep apnea vs some component of aspiration pneumonia given his mental status change.   CXR obtained   currently on 1 L NC    - f/u CXR read  - f/u CT Chest   - wean NC as tolerated

## 2023-02-01 NOTE — PROGRESS NOTE ADULT - ASSESSMENT
67M with PMHx schizophrenia, hx seizure, bipolar d/o, HTN, DM2, hx SBO presenting with confusion, slurred speech and difficulty ambulating x4 days. History was obtained from brother David at bedside who lives with patient as patient is not answering questions completely with some word finding difficulty At baseline patient oriented x3, conversive and ambulates without difficulty.  inpt, pt has hypothermia and labs with hyponatremia and hyperkalemia  with concern for myocarditis, infection, workup underway    endocrine called for hypothyroidism, pt also with DM       1. hypothyroidism   TSH mildly elevated with nl total  T4   this is very mild TSH elevation, would not adjust home levothyroxien dosing for this level   pt is on PPI at home and if this is taken with levothyroxine than this can decrease levoT absorption and thus lead to TSH elevation   pt has been on levothyroxine 75mcg long term, continue this inpt   administer on epty stomach 4 hrs apart from iron.calcium/ppi- he is on iron inpt - please make sure this is timed correctly     2. Hypothermia/hyponatremia with hyperkalemia  this can be due to infedction and other metabolic etiologies   pt is currently on vanc and zosyn  rule out neurologic etiologies, infectious workup   cortisol returned at 50- which is indicative of adequate adrenal reserve and rules out AI      3.DM  a1c 6.4  pt has DM as well- on metformin, Glipzide and januvia    While inpatient  BG target 100-180 mg/dl,   - Admelog  Low dose Correction Scale Premeal & seperate low dose  Correction Scale Bedtime   - Hypoglycemia protocol in place   - Carb Consistent Diet   - Nutrition consult     dc planning;  pt follows with outpt endocrine- can resume care with them  please inform otupt endocrine of inpt admission and need to fu outpt   may want to consider decrease in glizpide given hypoglycemia at home   otherwise can resume home meds if no contraindications at dc (LFTS and GFR normal lactate normal)        Mary Allen MD  Attending Physician   Department of Endocrinology, Diabetes and Metabolism     weekdays: 9am to 5pm: email [facility] or [facility] and or TEAMS     Nights and weekends: [phone]  Please note that this patient may be followed by a different provider tomorrow.   If no answer or after hours, please contact [phone].  For final dc reccomendations, please call [phone] or page the endocrine fellow on call.

## 2023-02-01 NOTE — PROGRESS NOTE ADULT - PROBLEM SELECTOR PLAN 10
Holding clozapine given elevated CKMB and cannot take PO right now. Failed dysphagia screen     -psych c/s in AM for med management. Unclear if side effects from medications  -pending recs regarding restarting clozapine 25/100    #Hx of OCD  venlafaxine when can take PO    #Hx seizure  IV depakote Holding clozapine given elevated CKMB and cannot take PO right now. Failed dysphagia screen     -psych c/s in AM for med management. Unclear if side effects from medications  -pending recs regarding restarting clozapine 25/100    #Hx of OCD  venlafaxine when can take PO    #Hx seizure  IV depakote    > Clozapine, venlafaxine, depakote restarted  - Clozapine reduced to 50 at nighttime  - Depakote reduced d/t concerns over thrombocytopenia

## 2023-02-01 NOTE — CHART NOTE - NSCHARTNOTEFT_GEN_A_CORE
Notified by RN that patient is hypoxic on room air to 83% however saturating well on 1 L NC. Patient sleeping at this time. Patient p/w AMS and failed 1st S&S exam and passed most recently 1/31. Given initial presentation, concern for possible aspiration previously, CXray was ordered as signs of aspiration PNA can lag on imaging.     Plan:   f/u Cxray   ctm on pulse ox   consider CASSY as possible source of nighttime hypoxia

## 2023-02-01 NOTE — PROGRESS NOTE ADULT - PROBLEM SELECTOR PLAN 5
SBP 80s in ED improved with IVF. No current dizziness    -hold antihypertensives iso hypotension  -cardiology consulted; recs appreciated    #Bradycardia  Improving 60-70s now  TSH mildly elevated on synthroid  Check cortisol, echo, tele Plt 69 down from baseline 200s. Recently clozapine decreased one month ago.   today 101, improving     -discussed case with hematology on call given also slight schistocytes on diff and heme has overall low suspicion for TTP given normal bili, kidney function. However hemolysis labs recommended. No need for [id number] unless abnormal hemolysis labs  -LDH, retic, haptoglobin, blue top - neg for hemolysis  -heme c/s to see in AM and review peripheral smear.   -folate, b12, ferritin, iron studies - unremarkable  -no recent heparin. Low suspicion HIT  -repeat CBC plt 69--76

## 2023-02-01 NOTE — PROGRESS NOTE ADULT - PROBLEM SELECTOR PLAN 2
desaturations x 2 to low 80s on room air while sleeping overnight.  could be due to sleep apnea  CXR obtained   currently on 1 L    - f/u CXR  - wean NC as tolerated desaturations x 2 to low 80s on room air while sleeping overnight.  could be due to sleep apnea  CXR obtained   currently on 1 L    - wean NC as tolerated  - CXR shows pulmonary edema

## 2023-02-01 NOTE — PROGRESS NOTE ADULT - PROBLEM SELECTOR PLAN 8
Hgb 9.4 similar to baseline. Denies active bleeding. Had precancerous polyp removed 3 years ago per brother and had trouble getting repeat C-scope done since. Pancytopenia, possibly from clozapine?    -f/u outpatient PMD for C-scope  -LDH, retic, haptoglobin, blue top - neg for hemolysis  -heme c/s to see in AM  -folate, b12, ferritin, iron studies - unremarkable Resume levothyroxine 75mcg qd  TSH mildly elevated, T3 low, T4 normal    - c/w home levothyroxine  - endo recs appreciated; avoid administered levothyroxine with ppi/ca/fe

## 2023-02-01 NOTE — PROGRESS NOTE ADULT - PROBLEM SELECTOR PLAN 9
On metformin 1g BID, januvia 100mg qd, glipizide 5mg qd - hold inpatient. A1C 6.4    - ISS Hgb 9.4 similar to baseline. Denies active bleeding. Had precancerous polyp removed 3 years ago per brother and had trouble getting repeat C-scope done since. Pancytopenia, possibly from clozapine?    -f/u outpatient PMD for C-scope  -LDH, retic, haptoglobin, blue top - neg for hemolysis  -heme c/s to see in AM  -folate, b12, ferritin, iron studies - unremarkable

## 2023-02-01 NOTE — PROGRESS NOTE ADULT - PROBLEM SELECTOR PLAN 1
Baseline oriented 3, ambulates on own. Now oriented 1-2, ataxic, slurred speech, dysarthria.   Seen by neuro - impression: Confusion, mild to moderate dysarthria and prolonged speech latency with perseveration and halting quality (?transcortical motor aphasia or mixed aphasia), and truncal ataxia, consider ischemic stroke vs. toxic metabolic encephalopathy vs. nutritional/vitamin deficiency vs. unlikely progression of psychiatric disorders or autoimmune encephalitis given acuity of symptom onset.     Other considerations include sepsis though CXR clear, UA normal, no signs of skin infections.     Possible AI given bradycardia, hypotension and now hypothermia. Less likely myxedema coma give T4 normal and TSH only minimally elevated. TSH mildly elevated 4.91, A1C 6.4. Takes his levothyroxine as well. AM Cortisol elevated.   Trop flat CKMB elevated. TTE wnl. Cards thinks unlikely myocarditis. Ammonium mildly elevated 58 likely not delivered on ice, LFTs wnl. Patient is maintaining airway and responsive. Not lethargic, awake and alert. Follows commands.     - ICU contacted overnight, not candidate at this time; s/p 1x stress dose hydrocortisone 100mg to assess for hypothermia  - Endo c/s - unlikely to be AI. Will hold off on further steroids until infx r/o  - 1/30 BCx x2, UCx NGTD  - c/w broad spectrum vanc/zosyn for now given hypothermia  - Erythema to hands and bilateral legs, not palpable purpura or vesicular. No blistering or vesicles. ESR low. Will send ANCA, KIRTI, anti-dsDNA, C3, C4. Possible rheum c/s if rest of w/u unrevealing  - ctm on tele  - MRI brain noncontrast ordered  - neuro checks q4h  - c/w aspirin statin  - f/u lipid profile, B1, B6, B12, folate, ammonia, CK, ESR, CRP, HIV, lyme, copper, zinc, vitamin D, calcium, cortisol, alcohol level, Utox  - PT/OT/S&S eval - when can adequately follow commands and medically more stable. Bedrest for now Baseline oriented 3, ambulates on own. Now oriented 1-2, ataxic, slurred speech, dysarthria.   Seen by neuro - impression: Confusion, mild to moderate dysarthria and prolonged speech latency with perseveration and halting quality (?transcortical motor aphasia or mixed aphasia), and truncal ataxia, consider ischemic stroke vs. toxic metabolic encephalopathy vs. nutritional/vitamin deficiency vs. unlikely progression of psychiatric disorders or autoimmune encephalitis given acuity of symptom onset.   However, given patient has been on 125mg of clozapine since 1997 and recent reduction to 50mg may be contributing    Other considerations include sepsis though CXR clear, UA normal, no signs of skin infections.     Possible AI given bradycardia, hypotension and now hypothermia. Less likely myxedema coma give T4 normal and TSH only minimally elevated. TSH mildly elevated 4.91. Takes his levothyroxine as well. AM Cortisol elevated.   Trop flat CKMB elevated. TTE wnl. Cards thinks unlikely myocarditis. Ammonium mildly elevated 58 likely not delivered on ice, LFTs wnl. Patient is maintaining airway and responsive. Not lethargic, awake and alert. Follows commands.     - ICU contacted overnight, not candidate at this time; s/p 1x stress dose hydrocortisone 100mg to assess for hypothermia  - Endo c/s - unlikely to be AI. Will hold off on further steroids until infx r/o  - 1/30 BCx x2, UCx NGTD  - c/w broad spectrum vanc/zosyn for now given hypothermia  - Erythema to hands and bilateral legs, not palpable purpura or vesicular. No blistering or vesicles. ESR low. Will send ANCA, KIRTI, anti-dsDNA, C3, C4. Possible rheum c/s if rest of w/u unrevealing  - ctm on tele  - MRI brain noncontrast ordered  - c/w aspirin statin  - f/u lipid profile, B1, B6, B12, folate, ammonia, CK, ESR, CRP, HIV, lyme, copper, zinc, vitamin D, calcium, alcohol level, Utox  - PT/OT/S&S eval - when can adequately follow commands and medically more stable. Bedrest for now  - will followup with psych recs

## 2023-02-02 DIAGNOSIS — D61.818 OTHER PANCYTOPENIA: ICD-10-CM

## 2023-02-02 LAB
ALBUMIN SERPL ELPH-MCNC: 3.3 G/DL — SIGNIFICANT CHANGE UP (ref 3.3–5)
ALP SERPL-CCNC: 85 U/L — SIGNIFICANT CHANGE UP (ref 40–120)
ALT FLD-CCNC: 101 U/L — HIGH (ref 4–41)
ANA TITR SER: NEGATIVE — SIGNIFICANT CHANGE UP
ANION GAP SERPL CALC-SCNC: 7 MMOL/L — SIGNIFICANT CHANGE UP (ref 7–14)
AST SERPL-CCNC: 56 U/L — HIGH (ref 4–40)
AUTO DIFF PNL BLD: NEGATIVE — SIGNIFICANT CHANGE UP
BASE EXCESS BLDV CALC-SCNC: 5.3 MMOL/L — HIGH (ref -2–3)
BASOPHILS # BLD AUTO: 0.04 K/UL — SIGNIFICANT CHANGE UP (ref 0–0.2)
BASOPHILS NFR BLD AUTO: 0.7 % — SIGNIFICANT CHANGE UP (ref 0–2)
BILIRUB SERPL-MCNC: 0.3 MG/DL — SIGNIFICANT CHANGE UP (ref 0.2–1.2)
BUN SERPL-MCNC: 27 MG/DL — HIGH (ref 7–23)
C-ANCA SER-ACNC: NEGATIVE — SIGNIFICANT CHANGE UP
CA-I SERPL-SCNC: 1.27 MMOL/L — SIGNIFICANT CHANGE UP (ref 1.15–1.33)
CALCIUM SERPL-MCNC: 9 MG/DL — SIGNIFICANT CHANGE UP (ref 8.4–10.5)
CHLORIDE BLDV-SCNC: 100 MMOL/L — SIGNIFICANT CHANGE UP (ref 96–108)
CHLORIDE SERPL-SCNC: 96 MMOL/L — LOW (ref 98–107)
CO2 BLDV-SCNC: 33 MMOL/L — HIGH (ref 22–26)
CO2 SERPL-SCNC: 28 MMOL/L — SIGNIFICANT CHANGE UP (ref 22–31)
COPPER SERPL-MCNC: 100 UG/DL — SIGNIFICANT CHANGE UP (ref 69–132)
CREAT SERPL-MCNC: 1.05 MG/DL — SIGNIFICANT CHANGE UP (ref 0.5–1.3)
EGFR: 77 ML/MIN/1.73M2 — SIGNIFICANT CHANGE UP
EOSINOPHIL # BLD AUTO: 0.05 K/UL — SIGNIFICANT CHANGE UP (ref 0–0.5)
EOSINOPHIL NFR BLD AUTO: 0.9 % — SIGNIFICANT CHANGE UP (ref 0–6)
GAS PNL BLDV: 133 MMOL/L — LOW (ref 136–145)
GAS PNL BLDV: SIGNIFICANT CHANGE UP
GAS PNL BLDV: SIGNIFICANT CHANGE UP
GLUCOSE BLDV-MCNC: 85 MG/DL — SIGNIFICANT CHANGE UP (ref 70–99)
GLUCOSE SERPL-MCNC: 90 MG/DL — SIGNIFICANT CHANGE UP (ref 70–99)
HCO3 BLDV-SCNC: 31 MMOL/L — HIGH (ref 22–29)
HCT VFR BLD CALC: 31.5 % — LOW (ref 39–50)
HCT VFR BLDA CALC: 31 % — LOW (ref 39–51)
HGB BLD CALC-MCNC: 10.3 G/DL — LOW (ref 13–17)
HGB BLD-MCNC: 10.4 G/DL — LOW (ref 13–17)
IANC: 3.8 K/UL — SIGNIFICANT CHANGE UP (ref 1.8–7.4)
IMM GRANULOCYTES NFR BLD AUTO: 1.1 % — HIGH (ref 0–0.9)
LACTATE BLDV-MCNC: 0.6 MMOL/L — SIGNIFICANT CHANGE UP (ref 0.5–2)
LYMPHOCYTES # BLD AUTO: 0.84 K/UL — LOW (ref 1–3.3)
LYMPHOCYTES # BLD AUTO: 15.7 % — SIGNIFICANT CHANGE UP (ref 13–44)
MAGNESIUM SERPL-MCNC: 1.9 MG/DL — SIGNIFICANT CHANGE UP (ref 1.6–2.6)
MCHC RBC-ENTMCNC: 30.9 PG — SIGNIFICANT CHANGE UP (ref 27–34)
MCHC RBC-ENTMCNC: 33 GM/DL — SIGNIFICANT CHANGE UP (ref 32–36)
MCV RBC AUTO: 93.5 FL — SIGNIFICANT CHANGE UP (ref 80–100)
MONOCYTES # BLD AUTO: 0.57 K/UL — SIGNIFICANT CHANGE UP (ref 0–0.9)
MONOCYTES NFR BLD AUTO: 10.6 % — SIGNIFICANT CHANGE UP (ref 2–14)
NEUTROPHILS # BLD AUTO: 3.8 K/UL — SIGNIFICANT CHANGE UP (ref 1.8–7.4)
NEUTROPHILS NFR BLD AUTO: 71 % — SIGNIFICANT CHANGE UP (ref 43–77)
NRBC # BLD: 0 /100 WBCS — SIGNIFICANT CHANGE UP (ref 0–0)
NRBC # FLD: 0.04 K/UL — HIGH (ref 0–0)
P-ANCA SER-ACNC: NEGATIVE — SIGNIFICANT CHANGE UP
PCO2 BLDV: 53 MMHG — SIGNIFICANT CHANGE UP (ref 42–55)
PH BLDV: 7.38 — SIGNIFICANT CHANGE UP (ref 7.32–7.43)
PHOSPHATE SERPL-MCNC: 5.5 MG/DL — HIGH (ref 2.5–4.5)
PLATELET # BLD AUTO: 99 K/UL — LOW (ref 150–400)
PO2 BLDV: 70 MMHG — SIGNIFICANT CHANGE UP
POTASSIUM BLDV-SCNC: 5.1 MMOL/L — SIGNIFICANT CHANGE UP (ref 3.5–5.1)
POTASSIUM SERPL-MCNC: 5 MMOL/L — SIGNIFICANT CHANGE UP (ref 3.5–5.3)
POTASSIUM SERPL-SCNC: 5 MMOL/L — SIGNIFICANT CHANGE UP (ref 3.5–5.3)
PROCALCITONIN SERPL-MCNC: 0.04 NG/ML — SIGNIFICANT CHANGE UP (ref 0.02–0.1)
PROT SERPL-MCNC: 6 G/DL — SIGNIFICANT CHANGE UP (ref 6–8.3)
PYRIDOXAL PHOS SERPL-MCNC: 2.2 UG/L — LOW (ref 3.4–65.2)
RBC # BLD: 3.37 M/UL — LOW (ref 4.2–5.8)
RBC # FLD: 15.3 % — HIGH (ref 10.3–14.5)
SAO2 % BLDV: 94.1 % — SIGNIFICANT CHANGE UP
SODIUM SERPL-SCNC: 131 MMOL/L — LOW (ref 135–145)
VANCOMYCIN TROUGH SERPL-MCNC: 15.6 UG/ML — SIGNIFICANT CHANGE UP (ref 10–20)
VIT B1 SERPL-MCNC: 98.8 NMOL/L — SIGNIFICANT CHANGE UP (ref 66.5–200)
WBC # BLD: 5.36 K/UL — SIGNIFICANT CHANGE UP (ref 3.8–10.5)
WBC # FLD AUTO: 5.36 K/UL — SIGNIFICANT CHANGE UP (ref 3.8–10.5)
ZINC SERPL-MCNC: 44 UG/DL — SIGNIFICANT CHANGE UP (ref 44–115)

## 2023-02-02 PROCEDURE — 99232 SBSQ HOSP IP/OBS MODERATE 35: CPT

## 2023-02-02 PROCEDURE — 99233 SBSQ HOSP IP/OBS HIGH 50: CPT | Mod: GC

## 2023-02-02 RX ORDER — TAMSULOSIN HYDROCHLORIDE 0.4 MG/1
0.4 CAPSULE ORAL AT BEDTIME
Refills: 0 | Status: DISCONTINUED | OUTPATIENT
Start: 2023-02-02 | End: 2023-02-03

## 2023-02-02 RX ORDER — PYRIDOXINE HCL (VITAMIN B6) 100 MG
50 TABLET ORAL DAILY
Refills: 0 | Status: COMPLETED | OUTPATIENT
Start: 2023-02-02 | End: 2023-02-09

## 2023-02-02 RX ORDER — MULTIVIT-MIN/FERROUS GLUCONATE 9 MG/15 ML
15 LIQUID (ML) ORAL DAILY
Refills: 0 | Status: DISCONTINUED | OUTPATIENT
Start: 2023-02-02 | End: 2023-02-02

## 2023-02-02 RX ORDER — HEPARIN SODIUM 5000 [USP'U]/ML
5000 INJECTION INTRAVENOUS; SUBCUTANEOUS EVERY 8 HOURS
Refills: 0 | Status: DISCONTINUED | OUTPATIENT
Start: 2023-02-02 | End: 2023-02-04

## 2023-02-02 RX ADMIN — PIPERACILLIN AND TAZOBACTAM 25 GRAM(S): 4; .5 INJECTION, POWDER, LYOPHILIZED, FOR SOLUTION INTRAVENOUS at 21:28

## 2023-02-02 RX ADMIN — Medication 75 MICROGRAM(S): at 05:32

## 2023-02-02 RX ADMIN — Medication 2: at 11:43

## 2023-02-02 RX ADMIN — Medication 325 MILLIGRAM(S): at 21:30

## 2023-02-02 RX ADMIN — CLOZAPINE 50 MILLIGRAM(S): 150 TABLET, ORALLY DISINTEGRATING ORAL at 21:31

## 2023-02-02 RX ADMIN — ATORVASTATIN CALCIUM 20 MILLIGRAM(S): 80 TABLET, FILM COATED ORAL at 21:29

## 2023-02-02 RX ADMIN — Medication 75 MILLIGRAM(S): at 21:31

## 2023-02-02 RX ADMIN — FINASTERIDE 5 MILLIGRAM(S): 5 TABLET, FILM COATED ORAL at 11:45

## 2023-02-02 RX ADMIN — HEPARIN SODIUM 5000 UNIT(S): 5000 INJECTION INTRAVENOUS; SUBCUTANEOUS at 13:23

## 2023-02-02 RX ADMIN — TAMSULOSIN HYDROCHLORIDE 0.4 MILLIGRAM(S): 0.4 CAPSULE ORAL at 21:36

## 2023-02-02 RX ADMIN — PIPERACILLIN AND TAZOBACTAM 25 GRAM(S): 4; .5 INJECTION, POWDER, LYOPHILIZED, FOR SOLUTION INTRAVENOUS at 13:25

## 2023-02-02 RX ADMIN — Medication 1 TABLET(S): at 18:24

## 2023-02-02 RX ADMIN — Medication 166.67 MILLIGRAM(S): at 20:04

## 2023-02-02 RX ADMIN — Medication 3: at 16:42

## 2023-02-02 RX ADMIN — Medication 50 MILLIGRAM(S): at 18:26

## 2023-02-02 RX ADMIN — HEPARIN SODIUM 5000 UNIT(S): 5000 INJECTION INTRAVENOUS; SUBCUTANEOUS at 21:36

## 2023-02-02 RX ADMIN — Medication 166.67 MILLIGRAM(S): at 08:45

## 2023-02-02 RX ADMIN — PIPERACILLIN AND TAZOBACTAM 25 GRAM(S): 4; .5 INJECTION, POWDER, LYOPHILIZED, FOR SOLUTION INTRAVENOUS at 05:27

## 2023-02-02 RX ADMIN — DIVALPROEX SODIUM 750 MILLIGRAM(S): 500 TABLET, DELAYED RELEASE ORAL at 21:36

## 2023-02-02 RX ADMIN — Medication 1 DROP(S): at 18:27

## 2023-02-02 RX ADMIN — LATANOPROST 1 DROP(S): 0.05 SOLUTION/ DROPS OPHTHALMIC; TOPICAL at 21:29

## 2023-02-02 RX ADMIN — Medication 1 DROP(S): at 05:31

## 2023-02-02 RX ADMIN — Medication 1000 UNIT(S): at 11:45

## 2023-02-02 NOTE — PROGRESS NOTE ADULT - ATTENDING COMMENTS
67 M with above history, admitted with several days of encephalopathy, found to have pancytopenia, SIRS, hypothermia, hypotension, hyperkalemia, on admission, likely 2/2 sepsis 2/2 pneumonia.      Sepsis 2/2 pneumonia (CT scan showing likely pna). c/w broad spectrum abx (Aspiration & MRSA coverage), check MRSA swab.  Hypothermia/hypotension improved.  Will monitor mild hypoxia, may need pulm consult for lung findings    Now with MARISSA, likely ATN in setting of sepsis/hypotension, improved today    Encephalopathy improved but still with aphasia.  MRI negative for CVA, , neuro/psych to weigh in on aphasia.  ? Drug-induced vs primary psych in setting of med decrease as outpatient?    Endo input appreciated, hypothermia/hypotension unlikely AI or thyroid related.  S/p 1 dose steroids, will monitor off    Erythema on face/hands resolved.     Concern for clozapine-induced myocarditis, TTE with grossly Nl LV, trops OK, unlikely myocarditis.  Cards input appreciated.    Pancytopenia improved, likely in setting of sepsis + med-induced from outpatient    TOV ongoing, added 2nd agent for BPH this AM    Discussed at length with patient and brother at bedside.

## 2023-02-02 NOTE — PROGRESS NOTE ADULT - PROBLEM SELECTOR PLAN 8
Resume levothyroxine 75mcg qd  TSH mildly elevated, T3 low, T4 normal    - c/w home levothyroxine  - endo recs appreciated; avoid administered levothyroxine with ppi/ca/fe - restart home finasteride  - restart home tamsulosin  - continue TOV

## 2023-02-02 NOTE — PROGRESS NOTE ADULT - PROBLEM SELECTOR PLAN 3
desaturations x 2 to low 80s on room air while sleeping overnight.  could be due to sleep apnea vs some component of aspiration pneumonia given his mental status change.   CXR obtained   currently on 1 L NC    - f/u CXR read  - f/u CT Chest   - wean NC as tolerated

## 2023-02-02 NOTE — PHYSICAL THERAPY INITIAL EVALUATION ADULT - PHYSICAL ASSIST/NONPHYSICAL ASSIST: SUPINE/SIT, REHAB EVAL
Pt with poor trunk control noted. Initially requiring minimal assistance with maintaining upright position, then able to with contact guard assistance/verbal cues/nonverbal cues (demo/gestures)/1 person assist

## 2023-02-02 NOTE — PROGRESS NOTE ADULT - ASSESSMENT
67M with PMHx schizophrenia, hx seizure, bipolar d/o, HTN, DM2, hx SBO presenting with confusion, slurred speech and difficulty ambulating x4 days. Admitted for acute encephalopathy. Found to have hypotension and bradycardia that are improved. Now with hypothermia as well. 67M with PMHx schizophrenia, hx seizure, bipolar d/o, HTN, DM2, hx SBO presenting with confusion, slurred speech and difficulty ambulating x4 days. Admitted for acute encephalopathy. Found to have hypotension and bradycardia that are improved. Now with hypothermia as well. Since resolved.

## 2023-02-02 NOTE — PROGRESS NOTE ADULT - PROBLEM SELECTOR PLAN 4
Now normothermic.  Temp in ED 98F but upon arrival to floor 90 and repeat 91.5 with bear hugger. MS waxes and wanes. Oriented x1-2    -warm IVF  -warming blanket  -discussed case with ICU and rec'd hydrocortisone as ordered but no further doses after for now and if no improvement then ICU consult  -Less likely myxedema coma give T4 normal and TSH only minimally elevated  -possible sepsis? f/u BCx x2, UCx, cover with vanc/zosyn for now  - f/u CT chest AP r/o infxn

## 2023-02-02 NOTE — PROGRESS NOTE ADULT - ASSESSMENT
67M with PMHx schizophrenia, hx seizure, bipolar d/o, HTN, DM2, hx SBO presenting with confusion, slurred speech and difficulty ambulating x4 days. Admitted for acute encephalopathy. Found to have hypotension and bradycardia that are improved. Now with hypothermia as well. 67M with PMHx schizophrenia, hx seizure, bipolar d/o, HTN, DM2, hx SBO presenting with confusion, slurred speech and difficulty ambulating x4 days. Admitted for acute encephalopathy, sepsis 2/2 pneumonia

## 2023-02-02 NOTE — PROGRESS NOTE ADULT - ASSESSMENT
67M with PMHx schizophrenia, hx seizure, bipolar d/o, HTN, DM2, hx SBO presenting with confusion, slurred speech and difficulty ambulating x4 days. History was obtained from brother David at bedside who lives with patient as patient is not answering questions completely with some word finding difficulty At baseline patient oriented x3, conversive and ambulates without difficulty.  inpt, pt has hypothermia and labs with hyponatremia and hyperkalemia  with concern for myocarditis, infection, workup underway    endocrine called for hypothyroidism, pt also with DM       1. hypothyroidism   TSH mildly elevated with nl total  T4   this is very mild TSH elevation, would not adjust home levothyroxien dosing for this level   pt is on PPI at home and if this is taken with levothyroxine than this can decrease levoT absorption and thus lead to TSH elevation   pt has been on levothyroxine 75mcg long term, continue this inpt   administer on epty stomach 4 hrs apart from iron.calcium/ppi- he is on iron inpt - please make sure this is timed correctly     2. Hypothermia/hyponatremia with hyperkalemia  this can be due to infection and other metabolic etiologies   pt is currently on vanc and zosyn  rule out neurologic etiologies, infectious workup   cortisol returned at 50- which is indicative of adequate adrenal reserve and rules out AI      3.DM  a1c 6.4  pt has DM as well- on metformin, Glipzide and januvia    While inpatient  BG target 100-180 mg/dl,   now clinically improving and taking in more PO intake   - Admelog  Low dose Correction Scale Premeal & seperate low dose  Correction Scale Bedtime   -if glucose rises >200 X2 ---> can start Lantus 8 and ademelog 2 units premeals    - Hypoglycemia protocol in place   - Carb Consistent Diet   - Nutrition consult     dc planning;  pt follows with outpt endocrine- can resume care with them  please inform otupt endocrine of inpt admission and need to fu outpt   may want to consider decrease in glizpide given hypoglycemia at home   otherwise can resume home meds if no contraindications at dc (LFTS and GFR normal lactate normal)        Mary Allen MD  Attending Physician   Department of Endocrinology, Diabetes and Metabolism     weekdays: 9am to 5pm: [facility] or [facility] and or TEAMS     Nights and weekends: [phone]  Please note that this patient may be followed by a different provider tomorrow.   If no answer or after hours, please contact [phone].  For final dc reccomendations, please call [phone] or page the endocrine fellow on call.

## 2023-02-02 NOTE — PROGRESS NOTE ADULT - PROBLEM SELECTOR PLAN 12
- restart home finasteride    Garcia placed for urinary retention >700cc urine    ToV overnight. garcia removed  500 cc this morning -> straight cath  will repeat again in the afternoon - restart home finasteride  - restart home tamsulosin    Garcia placed for urinary retention >700cc urine    ToV overnight. garcia removed  500 cc this morning -> straight cath  will repeat again in the afternoon - restart home finasteride  - restart home tamsulosin  - continue TOV

## 2023-02-02 NOTE — PROGRESS NOTE ADULT - SUBJECTIVE AND OBJECTIVE BOX
Chief Complaint/Follow-up on: Hypothyroidism       Subjective:  pt is more awake and alert   he was speaking full sentances, knew his full name, and knew he was in the hospital   states he is hungry   brother at bedside   questions answered         MEDICATIONS  (STANDING):  atorvastatin 20 milliGRAM(s) Oral at bedtime  cholecalciferol 1000 Unit(s) Oral daily  cloZAPine 50 milliGRAM(s) Oral at bedtime  dextrose 5%. 1000 milliLiter(s) (50 mL/Hr) IV Continuous <Continuous>  dextrose 5%. 1000 milliLiter(s) (100 mL/Hr) IV Continuous <Continuous>  dextrose 50% Injectable 25 Gram(s) IV Push once  dextrose 50% Injectable 12.5 Gram(s) IV Push once  dextrose 50% Injectable 25 Gram(s) IV Push once  divalproex  milliGRAM(s) Oral at bedtime  ferrous    sulfate 325 milliGRAM(s) Oral at bedtime  finasteride 5 milliGRAM(s) Oral daily  glucagon  Injectable 1 milliGRAM(s) IntraMuscular once  heparin   Injectable 5000 Unit(s) SubCutaneous every 8 hours  insulin lispro (ADMELOG) corrective regimen sliding scale   SubCutaneous three times a day before meals  insulin lispro (ADMELOG) corrective regimen sliding scale   SubCutaneous at bedtime  latanoprost 0.005% Ophthalmic Solution 1 Drop(s) Both EYES at bedtime  levothyroxine 75 MICROGram(s) Oral daily  piperacillin/tazobactam IVPB.. 3.375 Gram(s) IV Intermittent every 8 hours  tamsulosin 0.4 milliGRAM(s) Oral at bedtime  timolol 0.5% Solution 1 Drop(s) Both EYES two times a day  vancomycin  IVPB 1250 milliGRAM(s) IV Intermittent every 12 hours  venlafaxine XR. 75 milliGRAM(s) Oral at bedtime    MEDICATIONS  (PRN):  dextrose Oral Gel 15 Gram(s) Oral once PRN Blood Glucose LESS THAN 70 milliGRAM(s)/deciliter  OLANZapine Injectable 2.5 milliGRAM(s) IntraMuscular every 6 hours PRN agitation      PHYSICAL EXAM:  VITALS: T(C): 36.8 (02-02-23 @ 13:55)  T(F): 98.2 (02-02-23 @ 13:55), Max: 98.9 (02-01-23 @ 18:00)  HR: 80 (02-02-23 @ 13:55) (77 - 89)  BP: 160/76 (02-02-23 @ 13:55) (141/74 - 165/74)  RR:  (17 - 19)  SpO2:  (87% - 96%)  Wt(kg): --  GENERAL: NAD, well-groomed, well-developed  EYES: No proptosis, no injection  HEENT:  Atraumatic, Normocephalic, moist mucous membranes  RESPIRATORY: Clear to auscultation bilaterally; No rales, rhonchi, wheezing, or rubs  CARDIOVASCULAR: Regular rate and rhythm; No murmurs; no peripheral edema  GI: Soft, nontender, non distended, normal bowel sounds    POCT Blood Glucose.: 216 mg/dL (02-02-23 @ 11:30)  POCT Blood Glucose.: 96 mg/dL (02-02-23 @ 07:19)  POCT Blood Glucose.: 96 mg/dL (02-01-23 @ 21:59)  POCT Blood Glucose.: 84 mg/dL (02-01-23 @ 16:43)  POCT Blood Glucose.: 94 mg/dL (02-01-23 @ 11:18)  POCT Blood Glucose.: 114 mg/dL (02-01-23 @ 07:18)  POCT Blood Glucose.: 101 mg/dL (02-01-23 @ 01:33)  POCT Blood Glucose.: 107 mg/dL (01-31-23 @ 21:53)  POCT Blood Glucose.: 124 mg/dL (01-31-23 @ 16:39)  POCT Blood Glucose.: 112 mg/dL (01-31-23 @ 11:19)  POCT Blood Glucose.: 118 mg/dL (01-31-23 @ 07:25)  POCT Blood Glucose.: 121 mg/dL (01-31-23 @ 06:20)  POCT Blood Glucose.: 95 mg/dL (01-31-23 @ 03:03)  POCT Blood Glucose.: 106 mg/dL (01-31-23 @ 02:06)  POCT Blood Glucose.: 108 mg/dL (01-30-23 @ 21:54)    02-02    131<L>  |  96<L>  |  27<H>  ----------------------------<  90  5.0   |  28  |  1.05    eGFR: 77    Ca    9.0      02-02  Mg     1.90     02-02  Phos  5.5     02-02    TPro  6.0  /  Alb  3.3  /  TBili  0.3  /  DBili  x   /  AST  56<H>  /  ALT  101<H>  /  AlkPhos  85  02-02          Thyroid Function Tests:  01-30 @ 16:49 TSH 4.91 FreeT4 -- T3 46 Anti TPO -- Anti Thyroglobulin Ab -- TSI --

## 2023-02-02 NOTE — PROGRESS NOTE ADULT - PROBLEM SELECTOR PLAN 1
Baseline oriented 3, ambulates on own. Now oriented 1-2 with confusion, dysarthria, prolonged speech latency (motor/mixed aphasia), truncal ataxia. Of note, patient has been on 125mg of clozapine since 1997 and recent reduction to 50mg may be contributing. Course c/b initial bradycardia, hypotension, hypothermia. Ddx incl ischemic stroke vs. toxic metabolic encephalopathy vs. sepsis vs. nutritional/vitamin deficiency vs. unlikely progression of psychiatric disorders or autoimmune encephalitis given acuity of symptom onset.     CXR clear, UA normal, no signs of skin infections, no leukocytosis, though elevated NLR. T4 normal, TSH 4.91 mildly elevated, compliant with levothyroxine dosing. AM Cortisol elevated, unlikely AI. Troponin flat with elevated CKMB, TTE wnl. Per cards, unlikely myocarditis. Ammonium 58 mildly elevated likely not delievered on ice, LFTs wnl, no clonus or asterixis. Rheum w/u negative. Nutritional deficiency w/u negative. Patient is maintaining airway and responsive. Not lethargic, awake and alert. Follows commands. Now requiring 1-2L NC.     - ICU consulted 1/31 for hypothermia, recommended stress dose hydrocortisone 100mg  - Endo consulted; recs appreciated. - unlikely to be AI. Will hold off on further steroids until infx r/o  - 1/30 BCx x2, UCx NGTD  - c/w broad spectrum vanc/zosyn  - ctm on tele  - pending MRI brain noncontrast (2/1) read  - f/u CTCAP r/o infxn  - c/w aspirin statin  - PT/OT/S&S eval - when can adequately follow commands and medically more stable. Bedrest for now  - psych consulted; recs appreciated Baseline oriented 3, ambulates on own. Now oriented 1-2 with confusion, dysarthria, prolonged speech latency (motor/mixed aphasia), truncal ataxia. Of note, patient has been on 125mg of clozapine since 1997 and recent reduction to 50mg may be contributing. Course c/b initial bradycardia, hypotension, hypothermia.     CXR clear, UA normal, no signs of skin infections, no leukocytosis, though elevated NLR. T4 normal, TSH 4.91 mildly elevated, compliant with levothyroxine dosing. AM Cortisol elevated, unlikely AI. Troponin flat with elevated CKMB, TTE wnl. Per cards, unlikely myocarditis. Ammonium 58 mildly elevated likely not delivered on ice, LFTs wnl, no clonus or asterixis. Rheum w/u negative. Nutritional deficiency w/u negative. Patient is maintaining airway and responsive. Not lethargic, awake and alert. Follows commands. Now requiring 1-2L NC. MRI MRA head showed microvascular disease but no new stroke. CTCAP showed diffuse anasarca with pleural effusion, atelectasis, edema w/ opacity c/f pneumonia, likely source of instability. Ddx incl toxic metabolic encephalopathy iso sepsis vs. progression of psychiatric disorders, likely mixture of both    - ICU consulted 1/31 for hypothermia, recommended stress dose hydrocortisone 100mg  - Endo consulted; recs appreciated. - unlikely to be AI. Will hold off on further steroids until infx r/o  - 1/30 BCx x2, UCx NGTD  - c/w broad spectrum vanc/zosyn; f/u MRSA swab to dc vanc  - ctm on tele  - c/w aspirin statin  - PT/OT/S&S eval - when can adequately follow commands and medically more stable. Bedrest for now  - psych consulted; recs appreciated -- is aphasia 2/2 psychomotor slowing? Baseline oriented 3, ambulates on own. Now oriented 1-2 with confusion, dysarthria, prolonged speech latency (motor/mixed aphasia), truncal ataxia. Of note, patient has been on 125mg of clozapine since 1997 and recent reduction to 50mg may be contributing. Course c/b initial bradycardia, hypotension, hypothermia.     CXR clear, UA normal, no signs of skin infections, no leukocytosis, though elevated NLR. T4 normal, TSH 4.91 mildly elevated, compliant with levothyroxine dosing. AM Cortisol elevated, unlikely AI. Troponin flat with elevated CKMB, TTE wnl. Per cards, unlikely myocarditis. Ammonium 58 mildly elevated likely not delivered on ice, LFTs wnl, no clonus or asterixis. Rheum w/u negative. Nutritional deficiency w/u negative. Patient is maintaining airway and responsive. Not lethargic, awake and alert. Follows commands. Now requiring 1-2L NC. MRI MRA head showed microvascular disease but no new stroke. CTCAP showed diffuse anasarca with pleural effusion, atelectasis, edema w/ opacity c/f pneumonia, likely source of instability. Ddx incl toxic metabolic encephalopathy iso sepsis vs. progression of psychiatric disorders.    - ICU consulted 1/31 for hypothermia, recommended stress dose hydrocortisone 100mg  - Endo consulted; recs appreciated. - unlikely to be AI. Will hold off on further steroids until infx r/o  - 1/30 BCx x2, UCx NGTD  - c/w broad spectrum vanc/zosyn; f/u MRSA swab to dc vanc  - ctm on tele  - c/w aspirin statin  - PT/OT/S&S eval - when can adequately follow commands and medically more stable. Bedrest for now  - psych consulted; recs appreciated -- aphasia/speech hesitancy 2/2 schizo/meds?

## 2023-02-02 NOTE — PROGRESS NOTE ADULT - PROBLEM SELECTOR PLAN 4
Now normothermic.  Temp in ED 98F but upon arrival to floor 90 and repeat 91.5 with bear hugger. MS waxes and wanes. Oriented x1-2    -warm IVF  -warming blanket  -discussed case with ICU and rec'd hydrocortisone as ordered but no further doses after for now and if no improvement then ICU consult  -Less likely myxedema coma give T4 normal and TSH only minimally elevated  -possible sepsis? f/u BCx x2, UCx, cover with vanc/zosyn for now  - f/u CT chest AP r/o infxn Anemia and thrombocytopenia noted on admission. Patient recently had clozapine dosage decreased for c/f agranulocytosis, WBC wnl on admission but may be falsely low. Negative LDH, retic, haptoglobin, blue top - neg for hemolysis. Folate, b12, ferritin, iron studies - unremarkable. No signs of active bleed, but of note had precancerous polyp removed 3 years ago per brother and had trouble getting repeat C-scope done since.     - heme consulted; recs appreciated  - f/u outpatient PMD for C-scope  - trend CBC, transfuse as necessary.

## 2023-02-02 NOTE — PROGRESS NOTE ADULT - PROBLEM SELECTOR PLAN 1
Baseline oriented 3, ambulates on own. Now oriented 1-2 with confusion, dysarthria, prolonged speech latency (motor/mixed aphasia), truncal ataxia. Of note, patient has been on 125mg of clozapine since 1997 and recent reduction to 50mg may be contributing. Course c/b initial bradycardia, hypotension, hypothermia. Ddx incl ischemic stroke vs. toxic metabolic encephalopathy vs. sepsis vs. nutritional/vitamin deficiency vs. unlikely progression of psychiatric disorders or autoimmune encephalitis given acuity of symptom onset.     CXR clear, UA normal, no signs of skin infections, no leukocytosis, though elevated NLR. T4 normal, TSH 4.91 mildly elevated, compliant with levothyroxine dosing. AM Cortisol elevated, unlikely AI. Troponin flat with elevated CKMB, TTE wnl. Per cards, unlikely myocarditis. Ammonium 58 mildly elevated likely not delievered on ice, LFTs wnl, no clonus or asterixis. Rheum w/u negative. Nutritional deficiency w/u negative. Patient is maintaining airway and responsive. Not lethargic, awake and alert. Follows commands. Now requiring 1-2L NC.     - ICU consulted 1/31 for hypothermia, recommended stress dose hydrocortisone 100mg  - Endo consulted; recs appreciated. - unlikely to be AI. Will hold off on further steroids until infx r/o  - 1/30 BCx x2, UCx NGTD  - c/w broad spectrum vanc/zosyn  - ctm on tele  - pending MRI brain noncontrast (2/1) read  - f/u CTCAP r/o infxn  - c/w aspirin statin  - PT/OT/S&S eval - when can adequately follow commands and medically more stable. Bedrest for now  - psych consulted; recs appreciated Baseline oriented 3, ambulates on own. Now oriented 1-2 with confusion, dysarthria, prolonged speech latency (motor/mixed aphasia), truncal ataxia. Of note, patient has been on 125mg of clozapine since 1997 and recent reduction to 50mg may be contributing. Course c/b initial bradycardia, hypotension, hypothermia. Ddx incl ischemic stroke vs. toxic metabolic encephalopathy vs. sepsis vs. nutritional/vitamin deficiency vs. unlikely progression of psychiatric disorders or autoimmune encephalitis given acuity of symptom onset.     CXR clear, UA normal, no signs of skin infections, no leukocytosis, though elevated NLR. T4 normal, TSH 4.91 mildly elevated, compliant with levothyroxine dosing. AM Cortisol elevated, unlikely AI. Troponin flat with elevated CKMB, TTE wnl. Per cards, unlikely myocarditis. Ammonium 58 mildly elevated likely not delievered on ice, LFTs wnl, no clonus or asterixis. Rheum w/u negative. Nutritional deficiency w/u negative. Patient is maintaining airway and responsive. Not lethargic, awake and alert. Follows commands. Now requiring 1-2L NC.     - ICU consulted 1/31 for hypothermia, recommended stress dose hydrocortisone 100mg  - Endo consulted; recs appreciated. - unlikely to be AI. Will hold off on further steroids until infx r/o  - 1/30 BCx x2, UCx NGTD  - c/w broad spectrum vanc/zosyn -- confirm MSSA status  - ctm on tele  - pending MRI brain noncontrast (2/1) read -- no acute hemorrhage or ischemia; microvascular changes  - f/u CTCAP r/o infxn -- bilateral pleural effusion, atelectasis, opacity c/f pneumonia  - c/w aspirin statin -- d/c aspirin (r/o stroke)  - PT/OT/S&S eval - when can adequately follow commands and medically more stable. Bedrest for now  - psych consulted; recs appreciated  - neuro consulted

## 2023-02-02 NOTE — PHYSICAL THERAPY INITIAL EVALUATION ADULT - LEVEL OF INDEPENDENCE: SIT/STAND, REHAB EVAL
Deferred at this time as pt has poor trunk control and unable to follow multistep commands at times/unable to perform

## 2023-02-02 NOTE — PHYSICAL THERAPY INITIAL EVALUATION ADULT - LEVEL OF INDEPENDENCE: SUPINE/SIT, REHAB EVAL
Pt with difficulty bringing trunk forward into upright position./moderate assist (50% patients effort)

## 2023-02-02 NOTE — PROGRESS NOTE ADULT - PROBLEM SELECTOR PLAN 5
Plt 69 down from baseline 200s. Recently clozapine decreased one month ago.   today 101, improving     -discussed case with hematology on call given also slight schistocytes on diff and heme has overall low suspicion for TTP given normal bili, kidney function. However hemolysis labs recommended. No need for [id number] unless abnormal hemolysis labs  -LDH, retic, haptoglobin, blue top - neg for hemolysis  -heme c/s to see in AM and review peripheral smear.   -folate, b12, ferritin, iron studies - unremarkable  -no recent heparin. Low suspicion HIT  -repeat CBC plt 69--76

## 2023-02-02 NOTE — PROGRESS NOTE ADULT - PROBLEM SELECTOR PLAN 12
- restart home finasteride    Garcia placed for urinary retention >700cc urine    ToV overnight. garcia removed  500 cc this morning -> straight cath  will repeat again in the afternoon - restart home finasteride    Garcia placed for urinary retention >700cc urine    ToV overnight. garcia removed  500 cc this morning -> straight cath  will repeat again in the afternoon    -- straight catch ON 600cc  -- restart home tamsulosin

## 2023-02-02 NOTE — PROGRESS NOTE ADULT - PROBLEM SELECTOR PLAN 2
Cr rise from 0.82 to 1.12    will continue to monitor  avoid nephrotoxic agents Cr rise from 0.82 to 1.12    will continue to monitor  avoid nephrotoxic agents    -- downtrending BUN, Cr

## 2023-02-02 NOTE — PHYSICAL THERAPY INITIAL EVALUATION ADULT - ADDITIONAL COMMENTS
History was obtained from brother, David, at bedside as patient has some word finding difficulty and is unable to answer questions completely.  Pt lives with his brother in a house with 3 steps to enter +ramp. Prior to admission, pt was independent with ADLs and ambulated using a rolling walker slowly. Pt has had multiple falls over the past several years according to brother.   Post PT evaluation, pt left semi-supine, alarm on, call bell and remote within reach, all precautions maintained, NAD. BRANDON Jolley aware. History was obtained from brother, David, at bedside as patient has some word finding difficulty and is unable to answer questions completely.  Pt lives with his brother in a house with 3 steps to enter +ramp. Prior to admission, pt was independent with ADLs and ambulated using a rolling walker slowly. Pt has had multiple falls over the past several years according to brother.   Pt noted to be aphasic with mild-moderate dysarthria. Appears to have word finding difficulty.  Post PT evaluation, pt left semi-supine, alarm on, call bell and remote within reach, all precautions maintained, NAD. BRANDON Jolley aware.

## 2023-02-02 NOTE — PROGRESS NOTE ADULT - PROBLEM SELECTOR PLAN 9
Hgb 9.4 similar to baseline. Denies active bleeding. Had precancerous polyp removed 3 years ago per brother and had trouble getting repeat C-scope done since. Pancytopenia, possibly from clozapine?    -f/u outpatient PMD for C-scope  -LDH, retic, haptoglobin, blue top - neg for hemolysis  -heme c/s to see in AM  -folate, b12, ferritin, iron studies - unremarkable Hospital Bundle    Fluids: po hydration  Electrolytes: Replete K > 4, Mg > 2, Phos > 3  Nutrition: Diet pureed  PPX  ---VTE: heparin subq given mild MARISSA  ---GI: n/a  ---Resp: 2L NC  Access: PIV    Code Status: FULL CODE  Dispo: pending clinical improvement.

## 2023-02-02 NOTE — PROGRESS NOTE ADULT - PROBLEM SELECTOR PLAN 5
Plt 69 down from baseline 200s. Recently clozapine decreased one month ago.   today 101, improving     -discussed case with hematology on call given also slight schistocytes on diff and heme has overall low suspicion for TTP given normal bili, kidney function. However hemolysis labs recommended. No need for [id number] unless abnormal hemolysis labs  -LDH, retic, haptoglobin, blue top - neg for hemolysis  -heme c/s to see in AM and review peripheral smear.   -folate, b12, ferritin, iron studies - unremarkable  -no recent heparin. Low suspicion HIT  -repeat CBC plt 69--76 Resume levothyroxine 75mcg qd. TSH mildly elevated, T3 low, T4 normal    - c/w home levothyroxine  - endo recs appreciated; avoid administered levothyroxine with PPI / Ca / Fe.

## 2023-02-02 NOTE — PHYSICAL THERAPY INITIAL EVALUATION ADULT - GENERAL OBSERVATIONS, REHAB EVAL
Pt received semi-supine in bed, with all lines intact, NAD, all precautions maintained, +brother by bedside

## 2023-02-02 NOTE — PROGRESS NOTE ADULT - NSPROGADDITIONALINFOA_GEN_ALL_CORE
Hospital Bundle    Fluids: po hydration  Electrolytes: Replete K > 4, Mg > 2, Phos > 3  Nutrition: Diet pureed  PPX  ---VTE: deferred d/t pancytopenia  ---GI: n/a  ---Resp: 2L NC  Access: PIV    Code Status: FULL CODE  Dispo: pending clinical improvement Hospital Bundle    Fluids: po hydration  Electrolytes: Replete K > 4, Mg > 2, Phos > 3  Nutrition: Diet pureed  PPX  ---VTE: heparin subq given mild MARISSA  ---GI: n/a  ---Resp: 2L NC  Access: PIV    Code Status: FULL CODE  Dispo: pending clinical improvement

## 2023-02-02 NOTE — PROGRESS NOTE ADULT - SUBJECTIVE AND OBJECTIVE BOX
***************************************************************  Miky Munguia, PGY1  Internal Medicine   pager:  VILMAJ: [phone] [facility]: [phone]  ***************************************************************    CURTIS DIAZ  67y  MRN: [id number]    Patient is a 67y old  Male who presents with a chief complaint of confusion, dizziness (01 Feb 2023 15:05)      Interval/Overnight Events: no events ON.   - bladder scan o/n 600cc, straight cath. If >300cc on next check, will need to place garcia for urinary retention    Subjective: Pt seen and examined at bedside. Denies fever, CP, SOB, abn pain, N/V, dysuria. Tolerating diet.      MEDICATIONS  (STANDING):  aspirin enteric coated 325 milliGRAM(s) Oral daily  atorvastatin 20 milliGRAM(s) Oral at bedtime  cholecalciferol 1000 Unit(s) Oral daily  cloZAPine 50 milliGRAM(s) Oral at bedtime  dextrose 5%. 1000 milliLiter(s) (50 mL/Hr) IV Continuous <Continuous>  dextrose 5%. 1000 milliLiter(s) (100 mL/Hr) IV Continuous <Continuous>  dextrose 50% Injectable 25 Gram(s) IV Push once  dextrose 50% Injectable 12.5 Gram(s) IV Push once  dextrose 50% Injectable 25 Gram(s) IV Push once  divalproex  milliGRAM(s) Oral at bedtime  ferrous    sulfate 325 milliGRAM(s) Oral at bedtime  finasteride 5 milliGRAM(s) Oral daily  glucagon  Injectable 1 milliGRAM(s) IntraMuscular once  insulin lispro (ADMELOG) corrective regimen sliding scale   SubCutaneous three times a day before meals  insulin lispro (ADMELOG) corrective regimen sliding scale   SubCutaneous at bedtime  latanoprost 0.005% Ophthalmic Solution 1 Drop(s) Both EYES at bedtime  levothyroxine 75 MICROGram(s) Oral daily  piperacillin/tazobactam IVPB.. 3.375 Gram(s) IV Intermittent every 8 hours  timolol 0.5% Solution 1 Drop(s) Both EYES two times a day  vancomycin  IVPB 1250 milliGRAM(s) IV Intermittent every 12 hours  venlafaxine XR. 75 milliGRAM(s) Oral at bedtime    MEDICATIONS  (PRN):  dextrose Oral Gel 15 Gram(s) Oral once PRN Blood Glucose LESS THAN 70 milliGRAM(s)/deciliter  OLANZapine Injectable 2.5 milliGRAM(s) IntraMuscular every 6 hours PRN agitation      Objective:    Vitals: Vital Signs Last 24 Hrs  T(C): 36.4 (02-02-23 @ 05:55), Max: 37.2 (02-01-23 @ 18:00)  T(F): 97.6 (02-02-23 @ 05:55), Max: 98.9 (02-01-23 @ 18:00)  HR: 85 (02-02-23 @ 05:55) (79 - 91)  BP: 158/88 (02-02-23 @ 05:55) (141/74 - 163/93)  BP(mean): --  RR: 17 (02-02-23 @ 05:55) (17 - 19)  SpO2: 95% (02-02-23 @ 05:55) (95% - 97%)                I&O's Summary    01 Feb 2023 07:01  -  02 Feb 2023 07:00  --------------------------------------------------------  IN: 0 mL / OUT: 910 mL / NET: -910 mL        PHYSICAL EXAM:  GENERAL: NAD  HEAD:  Atraumatic, Normocephalic  EYES: EOMI, conjunctiva and sclera clear  CHEST/LUNG: Clear to auscultation bilaterally; No rales, rhonchi, wheezing, or rubs  HEART: Regular rate and rhythm; No murmurs, rubs, or gallops  ABDOMEN: Soft, Nontender, Nondistended;   SKIN: No rashes or lesions  NERVOUS SYSTEM:  Alert & Oriented X3, no focal deficits    LABS:  02-02    131<L>  |  96<L>  |  27<H>  ----------------------------<  90  5.0   |  28  |  1.05  02-01    134<L>  |  100  |  30<H>  ----------------------------<  91  5.6<H>   |  27  |  1.12  01-31    133<L>  |  97<L>  |  28<H>  ----------------------------<  188<H>  5.2   |  25  |  0.81    Ca    9.0      02 Feb 2023 04:55  Ca    8.3<L>      01 Feb 2023 05:56  Ca    8.6      31 Jan 2023 13:32  Phos  5.5     02-02  Mg     1.90     02-02    TPro  6.0  /  Alb  3.3  /  TBili  0.3  /  DBili  x   /  AST  56<H>  /  ALT  101<H>  /  AlkPhos  85  02-02  TPro  5.6<L>  /  Alb  3.4  /  TBili  <0.2  /  DBili  <0.2  /  AST  39  /  ALT  69<H>  /  AlkPhos  96  01-31  TPro  5.3<L>  /  Alb  3.1<L>  /  TBili  <0.2  /  DBili  x   /  AST  36  /  ALT  65<H>  /  AlkPhos  104  01-30               10.4   5.36  )-----------( 99       ( 02 Feb 2023 04:55 )             31.5                         9.1    5.02  )-----------( 101      ( 01 Feb 2023 05:56 )             27.5                         10.0   5.31  )-----------( 101      ( 31 Jan 2023 13:32 )             30.7     CAPILLARY BLOOD GLUCOSE      POCT Blood Glucose.: 96 mg/dL (02 Feb 2023 07:19)  POCT Blood Glucose.: 96 mg/dL (01 Feb 2023 21:59)  POCT Blood Glucose.: 84 mg/dL (01 Feb 2023 16:43)  POCT Blood Glucose.: 94 mg/dL (01 Feb 2023 11:18)      RADIOLOGY & ADDITIONAL TESTS:    Imaging Personally Reviewed:  [x ] YES  [ ] NO    Consultants involved in case:   Consultant(s) Notes Reviewed:  [ x] YES  [ ] NO:   Care Discussed with Consultants/Other Providers [x ] YES  [ ] NO         ***************************************************************  Miky Munguia, PGY1  Internal Medicine   pager:  VILMAJ: [phone] [facility]: [phone]  ***************************************************************    CURTIS DIAZ  67y  MRN: [id number]    Patient is a 67y old  Male who presents with a chief complaint of confusion, dizziness (01 Feb 2023 15:05)      Interval/Overnight Events: no events ON.   - bladder scan o/n 600cc, straight cath. If >300cc on next check, will need to place garcia for urinary retention    Subjective: Pt seen and examined at bedside. Denies fever, CP, SOB, abn pain, N/V, dysuria. Tolerating diet.      MEDICATIONS  (STANDING):  aspirin enteric coated 325 milliGRAM(s) Oral daily  atorvastatin 20 milliGRAM(s) Oral at bedtime  cholecalciferol 1000 Unit(s) Oral daily  cloZAPine 50 milliGRAM(s) Oral at bedtime  dextrose 5%. 1000 milliLiter(s) (50 mL/Hr) IV Continuous <Continuous>  dextrose 5%. 1000 milliLiter(s) (100 mL/Hr) IV Continuous <Continuous>  dextrose 50% Injectable 25 Gram(s) IV Push once  dextrose 50% Injectable 12.5 Gram(s) IV Push once  dextrose 50% Injectable 25 Gram(s) IV Push once  divalproex  milliGRAM(s) Oral at bedtime  ferrous    sulfate 325 milliGRAM(s) Oral at bedtime  finasteride 5 milliGRAM(s) Oral daily  glucagon  Injectable 1 milliGRAM(s) IntraMuscular once  insulin lispro (ADMELOG) corrective regimen sliding scale   SubCutaneous three times a day before meals  insulin lispro (ADMELOG) corrective regimen sliding scale   SubCutaneous at bedtime  latanoprost 0.005% Ophthalmic Solution 1 Drop(s) Both EYES at bedtime  levothyroxine 75 MICROGram(s) Oral daily  piperacillin/tazobactam IVPB.. 3.375 Gram(s) IV Intermittent every 8 hours  timolol 0.5% Solution 1 Drop(s) Both EYES two times a day  vancomycin  IVPB 1250 milliGRAM(s) IV Intermittent every 12 hours  venlafaxine XR. 75 milliGRAM(s) Oral at bedtime    MEDICATIONS  (PRN):  dextrose Oral Gel 15 Gram(s) Oral once PRN Blood Glucose LESS THAN 70 milliGRAM(s)/deciliter  OLANZapine Injectable 2.5 milliGRAM(s) IntraMuscular every 6 hours PRN agitation      Objective:    Vitals: Vital Signs Last 24 Hrs  T(C): 36.4 (02-02-23 @ 05:55), Max: 37.2 (02-01-23 @ 18:00)  T(F): 97.6 (02-02-23 @ 05:55), Max: 98.9 (02-01-23 @ 18:00)  HR: 85 (02-02-23 @ 05:55) (79 - 91)  BP: 158/88 (02-02-23 @ 05:55) (141/74 - 163/93)  BP(mean): --  RR: 17 (02-02-23 @ 05:55) (17 - 19)  SpO2: 95% (02-02-23 @ 05:55) (95% - 97%)                I&O's Summary    01 Feb 2023 07:01  -  02 Feb 2023 07:00  --------------------------------------------------------  IN: 0 mL / OUT: 910 mL / NET: -910 mL        PHYSICAL EXAM:  GENERAL: NAD  HEAD:  Atraumatic, Normocephalic  EYES: pupils minimally reactive, constricted. EOMI, conjunctiva and sclera clear  CHEST/LUNG: upper airway coarse crackles, poor auscultation of lung sounds on posterior fields.  HEART: Regular rate and rhythm; No murmurs, rubs, or gallops  ABDOMEN: Somewhat distended, tympanic on percussion. Soft, Nontender  SKIN: No rashes or lesions  NERVOUS SYSTEM:  Alert & Oriented X1 to name, dysarthric, increased speech latency, no focal deficits    LABS:  02-02    131<L>  |  96<L>  |  27<H>  ----------------------------<  90  5.0   |  28  |  1.05  02-01    134<L>  |  100  |  30<H>  ----------------------------<  91  5.6<H>   |  27  |  1.12  01-31    133<L>  |  97<L>  |  28<H>  ----------------------------<  188<H>  5.2   |  25  |  0.81    Ca    9.0      02 Feb 2023 04:55  Ca    8.3<L>      01 Feb 2023 05:56  Ca    8.6      31 Jan 2023 13:32  Phos  5.5     02-02  Mg     1.90     02-02    TPro  6.0  /  Alb  3.3  /  TBili  0.3  /  DBili  x   /  AST  56<H>  /  ALT  101<H>  /  AlkPhos  85  02-02  TPro  5.6<L>  /  Alb  3.4  /  TBili  <0.2  /  DBili  <0.2  /  AST  39  /  ALT  69<H>  /  AlkPhos  96  01-31  TPro  5.3<L>  /  Alb  3.1<L>  /  TBili  <0.2  /  DBili  x   /  AST  36  /  ALT  65<H>  /  AlkPhos  104  01-30               10.4   5.36  )-----------( 99       ( 02 Feb 2023 04:55 )             31.5                         9.1    5.02  )-----------( 101      ( 01 Feb 2023 05:56 )             27.5                         10.0   5.31  )-----------( 101      ( 31 Jan 2023 13:32 )             30.7     CAPILLARY BLOOD GLUCOSE      POCT Blood Glucose.: 96 mg/dL (02 Feb 2023 07:19)  POCT Blood Glucose.: 96 mg/dL (01 Feb 2023 21:59)  POCT Blood Glucose.: 84 mg/dL (01 Feb 2023 16:43)  POCT Blood Glucose.: 94 mg/dL (01 Feb 2023 11:18)      RADIOLOGY & ADDITIONAL TESTS:    Imaging Personally Reviewed:  [x ] YES  [ ] NO    Consultants involved in case:   Consultant(s) Notes Reviewed:  [ x] YES  [ ] NO:   Care Discussed with Consultants/Other Providers [x ] YES  [ ] NO         ***************************************************************  Miky Munguia, PGY1  Internal Medicine   pager:  VILMAJ: [phone] [facility]: [phone]  ***************************************************************    CURTIS DIAZ  67y  MRN: [id number]    Patient is a 67y old  Male who presents with a chief complaint of confusion, dizziness (01 Feb 2023 15:05)      Interval/Overnight Events: no events ON.   - bladder scan o/n 600cc, straight cath. restarted flomax, will continue straight cath for now to avoid garcia  - chronic hyponatremia stable, will ctm  - MRI MRA head showed microvascular disease but no new stroke  - CTCAP showed diffuse anasarca with pleural effusion, atelectasis, edema w/ opacity c/f pneumonia  - pending MRSA swab to narrow abx    Subjective: Pt seen and examined at bedside. Denies fever, CP, SOB, abn pain, N/V, dysuria. Tolerating diet.      MEDICATIONS  (STANDING):  aspirin enteric coated 325 milliGRAM(s) Oral daily  atorvastatin 20 milliGRAM(s) Oral at bedtime  cholecalciferol 1000 Unit(s) Oral daily  cloZAPine 50 milliGRAM(s) Oral at bedtime  dextrose 5%. 1000 milliLiter(s) (50 mL/Hr) IV Continuous <Continuous>  dextrose 5%. 1000 milliLiter(s) (100 mL/Hr) IV Continuous <Continuous>  dextrose 50% Injectable 25 Gram(s) IV Push once  dextrose 50% Injectable 12.5 Gram(s) IV Push once  dextrose 50% Injectable 25 Gram(s) IV Push once  divalproex  milliGRAM(s) Oral at bedtime  ferrous    sulfate 325 milliGRAM(s) Oral at bedtime  finasteride 5 milliGRAM(s) Oral daily  glucagon  Injectable 1 milliGRAM(s) IntraMuscular once  insulin lispro (ADMELOG) corrective regimen sliding scale   SubCutaneous three times a day before meals  insulin lispro (ADMELOG) corrective regimen sliding scale   SubCutaneous at bedtime  latanoprost 0.005% Ophthalmic Solution 1 Drop(s) Both EYES at bedtime  levothyroxine 75 MICROGram(s) Oral daily  piperacillin/tazobactam IVPB.. 3.375 Gram(s) IV Intermittent every 8 hours  timolol 0.5% Solution 1 Drop(s) Both EYES two times a day  vancomycin  IVPB 1250 milliGRAM(s) IV Intermittent every 12 hours  venlafaxine XR. 75 milliGRAM(s) Oral at bedtime    MEDICATIONS  (PRN):  dextrose Oral Gel 15 Gram(s) Oral once PRN Blood Glucose LESS THAN 70 milliGRAM(s)/deciliter  OLANZapine Injectable 2.5 milliGRAM(s) IntraMuscular every 6 hours PRN agitation      Objective:    Vitals: Vital Signs Last 24 Hrs  T(C): 36.4 (02-02-23 @ 05:55), Max: 37.2 (02-01-23 @ 18:00)  T(F): 97.6 (02-02-23 @ 05:55), Max: 98.9 (02-01-23 @ 18:00)  HR: 85 (02-02-23 @ 05:55) (79 - 91)  BP: 158/88 (02-02-23 @ 05:55) (141/74 - 163/93)  BP(mean): --  RR: 17 (02-02-23 @ 05:55) (17 - 19)  SpO2: 95% (02-02-23 @ 05:55) (95% - 97%)                I&O's Summary    01 Feb 2023 07:01  -  02 Feb 2023 07:00  --------------------------------------------------------  IN: 0 mL / OUT: 910 mL / NET: -910 mL        PHYSICAL EXAM:  GENERAL: NAD  HEAD:  Atraumatic, Normocephalic  EYES: pupils minimally reactive, constricted. EOMI, conjunctiva and sclera clear  CHEST/LUNG: upper airway coarse crackles, poor auscultation of lung sounds on posterior fields.  HEART: Regular rate and rhythm; No murmurs, rubs, or gallops  ABDOMEN: Somewhat distended, tympanic on percussion. Soft, Nontender  SKIN: No rashes or lesions  NERVOUS SYSTEM:  Alert & Oriented X1 to name, dysarthric, increased speech latency, no focal deficits    LABS:  02-02    131<L>  |  96<L>  |  27<H>  ----------------------------<  90  5.0   |  28  |  1.05  02-01    134<L>  |  100  |  30<H>  ----------------------------<  91  5.6<H>   |  27  |  1.12  01-31    133<L>  |  97<L>  |  28<H>  ----------------------------<  188<H>  5.2   |  25  |  0.81    Ca    9.0      02 Feb 2023 04:55  Ca    8.3<L>      01 Feb 2023 05:56  Ca    8.6      31 Jan 2023 13:32  Phos  5.5     02-02  Mg     1.90     02-02    TPro  6.0  /  Alb  3.3  /  TBili  0.3  /  DBili  x   /  AST  56<H>  /  ALT  101<H>  /  AlkPhos  85  02-02  TPro  5.6<L>  /  Alb  3.4  /  TBili  <0.2  /  DBili  <0.2  /  AST  39  /  ALT  69<H>  /  AlkPhos  96  01-31  TPro  5.3<L>  /  Alb  3.1<L>  /  TBili  <0.2  /  DBili  x   /  AST  36  /  ALT  65<H>  /  AlkPhos  104  01-30               10.4   5.36  )-----------( 99       ( 02 Feb 2023 04:55 )             31.5                         9.1    5.02  )-----------( 101      ( 01 Feb 2023 05:56 )             27.5                         10.0   5.31  )-----------( 101      ( 31 Jan 2023 13:32 )             30.7     CAPILLARY BLOOD GLUCOSE      POCT Blood Glucose.: 96 mg/dL (02 Feb 2023 07:19)  POCT Blood Glucose.: 96 mg/dL (01 Feb 2023 21:59)  POCT Blood Glucose.: 84 mg/dL (01 Feb 2023 16:43)  POCT Blood Glucose.: 94 mg/dL (01 Feb 2023 11:18)      RADIOLOGY & ADDITIONAL TESTS:    Imaging Personally Reviewed:  [x ] YES  [ ] NO    Consultants involved in case:   Consultant(s) Notes Reviewed:  [ x] YES  [ ] NO:   Care Discussed with Consultants/Other Providers [x ] YES  [ ] NO

## 2023-02-02 NOTE — PROGRESS NOTE ADULT - PROBLEM SELECTOR PLAN 6
SBP 80s in ED improved with IVF. No current dizziness  now normotensive     -hold antihypertensives iso hypotension  -cardiology consulted; recs appreciated    #Bradycardia  resolved    TSH mildly elevated on synthroid  Check cortisol, echo, tele

## 2023-02-02 NOTE — PROGRESS NOTE ADULT - PROBLEM SELECTOR PLAN 7
K 5.1--5.6    -getting insulin D50  -repeat BMP  -ensure bowel movements  -renal diet  -Pennington placement for urinary retention of >700cc urine -- ToV - psych consulted; recs appreciated   - c/w clozapine to 50mg qHS, Depakote to 750mg qHS, if thrombocytopenia worsens or patient continually lethargic would consider reducing to 500mg unless objections from neuro  - c/w Effexor as ordered, if persistently hyponatremic would assess for SIADH and consider stopping   - PRN for agitation Zyprexa 2.5mg q6h PRN PO/IM.

## 2023-02-02 NOTE — PROGRESS NOTE ADULT - SUBJECTIVE AND OBJECTIVE BOX
PROGRESS NOTE:     Patient is a 67y old  Male who presents with a chief complaint of confusion, dizziness (02 Feb 2023 07:22)    SUBJECTIVE / OVERNIGHT EVENTS: Overnight, Pt straight catheterization 600cc. F/u bladder scan. This morning, attempt wean to RA desat to 87 and put back on 1L NC. Pt is sleepy but able to wake by calling name.    ADDITIONAL REVIEW OF SYSTEMS: Limited d/t AMS.    MEDICATIONS  (STANDING):  aspirin enteric coated 325 milliGRAM(s) Oral daily  atorvastatin 20 milliGRAM(s) Oral at bedtime  cholecalciferol 1000 Unit(s) Oral daily  cloZAPine 50 milliGRAM(s) Oral at bedtime  dextrose 5%. 1000 milliLiter(s) (50 mL/Hr) IV Continuous <Continuous>  dextrose 5%. 1000 milliLiter(s) (100 mL/Hr) IV Continuous <Continuous>  dextrose 50% Injectable 25 Gram(s) IV Push once  dextrose 50% Injectable 12.5 Gram(s) IV Push once  dextrose 50% Injectable 25 Gram(s) IV Push once  divalproex  milliGRAM(s) Oral at bedtime  ferrous    sulfate 325 milliGRAM(s) Oral at bedtime  finasteride 5 milliGRAM(s) Oral daily  glucagon  Injectable 1 milliGRAM(s) IntraMuscular once  insulin lispro (ADMELOG) corrective regimen sliding scale   SubCutaneous three times a day before meals  insulin lispro (ADMELOG) corrective regimen sliding scale   SubCutaneous at bedtime  latanoprost 0.005% Ophthalmic Solution 1 Drop(s) Both EYES at bedtime  levothyroxine 75 MICROGram(s) Oral daily  piperacillin/tazobactam IVPB.. 3.375 Gram(s) IV Intermittent every 8 hours  timolol 0.5% Solution 1 Drop(s) Both EYES two times a day  vancomycin  IVPB 1250 milliGRAM(s) IV Intermittent every 12 hours  venlafaxine XR. 75 milliGRAM(s) Oral at bedtime    MEDICATIONS  (PRN):  dextrose Oral Gel 15 Gram(s) Oral once PRN Blood Glucose LESS THAN 70 milliGRAM(s)/deciliter  OLANZapine Injectable 2.5 milliGRAM(s) IntraMuscular every 6 hours PRN agitation    CAPILLARY BLOOD GLUCOSE    POCT Blood Glucose.: 96 mg/dL (02 Feb 2023 07:19)  POCT Blood Glucose.: 96 mg/dL (01 Feb 2023 21:59)  POCT Blood Glucose.: 84 mg/dL (01 Feb 2023 16:43)  POCT Blood Glucose.: 94 mg/dL (01 Feb 2023 11:18)    I&O's Summary    01 Feb 2023 07:01  -  02 Feb 2023 07:00  --------------------------------------------------------  IN: 0 mL / OUT: 910 mL / NET: -910 mL    PHYSICAL EXAM:  Vital Signs Last 24 Hrs  T(C): 36.4 (02 Feb 2023 05:55), Max: 37.2 (01 Feb 2023 18:00)  T(F): 97.6 (02 Feb 2023 05:55), Max: 98.9 (01 Feb 2023 18:00)  HR: 85 (02 Feb 2023 05:55) (79 - 91)  BP: 158/88 (02 Feb 2023 05:55) (141/74 - 163/93)  BP(mean): --  RR: 17 (02 Feb 2023 07:32) (17 - 19)  SpO2: 91% (02 Feb 2023 07:32) (87% - 97%)    Parameters below as of 02 Feb 2023 07:32  Patient On (Oxygen Delivery Method): nasal cannula  O2 Flow (L/min): 1    GENERAL: NAD  HEAD: Atraumatic, normocephalic  NECK: Supple, No JVD, No LAD  RESPIRATORY: Normal respiratory effort; upper airway sounds  CARDIOVASCULAR: Regular rate and rhythm, normal S1 and S2, no murmur/rub/gallop; No lower extremity edema  ABDOMEN: Nontender, decreased bowel sounds, no rebound/guarding  NEURO: AOx1  SKIN: Erythema of hands and legs improving    LABS:                        10.4   5.36  )-----------( 99       ( 02 Feb 2023 04:55 )             31.5     02-02    131<L>  |  96<L>  |  27<H>  ----------------------------<  90  5.0   |  28  |  1.05    Ca    9.0      02 Feb 2023 04:55  Phos  5.5     02-02  Mg     1.90     02-02    TPro  6.0  /  Alb  3.3  /  TBili  0.3  /  DBili  x   /  AST  56<H>  /  ALT  101<H>  /  AlkPhos  85  02-02      CARDIAC MARKERS ( 31 Jan 2023 09:07 )  x     / x     / 172 U/L / x     / 20.2 ng/mL    Culture - Blood (collected 31 Jan 2023 04:56)  Source: .Blood Blood-Venous  Preliminary Report (01 Feb 2023 12:01):    No growth to date.    Culture - Blood (collected 31 Jan 2023 04:56)  Source: .Blood Blood-Venous  Preliminary Report (01 Feb 2023 12:01):    No growth to date.    Culture - Urine (collected 30 Jan 2023 18:53)  Source: Clean Catch Clean Catch (Midstream)  Final Report (31 Jan 2023 17:32):    <10,000 CFU/mL Normal Urogenital Mary    Culture - Blood (collected 30 Jan 2023 17:00)  Source: .Blood Blood  Preliminary Report (31 Jan 2023 20:01):    No growth to date.    Culture - Blood (collected 30 Jan 2023 16:48)  Source: .Blood Blood  Preliminary Report (31 Jan 2023 20:01):    No growth to date.    RADIOLOGY:    Consulted note reviewed  Reviewed Imaging personally

## 2023-02-02 NOTE — PROGRESS NOTE ADULT - PROBLEM SELECTOR PLAN 7
K 5.1--5.6    -getting insulin D50  -repeat BMP  -ensure bowel movements  -renal diet  -Pennington placement for urinary retention of >700cc urine -- ToV

## 2023-02-02 NOTE — PROGRESS NOTE ADULT - PROBLEM SELECTOR PLAN 3
desaturations x 2 to low 80s on room air while sleeping overnight.  could be due to sleep apnea vs some component of aspiration pneumonia given his mental status change.   CXR obtained   currently on 1 L NC    - f/u CXR read  - f/u CT Chest   - wean NC as tolerated desaturations x 2 to low 80s on room air while sleeping overnight. Could be due to sleep apnea vs some component of aspiration pneumonia given his mental status change. CT Chest showed pleural effusion b/l, atelectasis, consolidation c/f pna. Currently on 2 L NC    - wean NC as tolerated  - c/w abx as above Desaturations x 2 to low 80s on room air while sleeping overnight. Could be due to sleep apnea vs some component of aspiration pneumonia given his mental status change. CT Chest showed pleural effusion b/l, atelectasis, consolidation c/f pna. Currently on 2 L NC    - wean NC as tolerated  - c/w abx as above

## 2023-02-02 NOTE — PHYSICAL THERAPY INITIAL EVALUATION ADULT - IMPAIRMENTS FOUND, PT EVAL
arousal, attention, and cognition/gait, locomotion, and balance/muscle strength/neuromotor development and sensory integration/posture

## 2023-02-02 NOTE — PROGRESS NOTE ADULT - PROBLEM SELECTOR PLAN 11
- psych consulted; recs appreciated   - c/w clozapine to 50mg qHS, Depakote to 750mg qHS, if thrombocytopenia worsens or patient continually lethargic would consider reducing to 500mg unless objections from neuro  - OK to c/w Effexor as ordered, if persistently hyponatremic would assess for SIADH and consider stopping   - PRN for agitation Zyprexa 2.5mg q6h PRN PO/IM  - Serum ammonia wnl  - psych will follow  - neuro, cardiology f/u appreciated

## 2023-02-02 NOTE — PROGRESS NOTE ADULT - PROBLEM SELECTOR PLAN 2
Cr rise from 0.82 to 1.12    will continue to monitor  avoid nephrotoxic agents Cr rise from 0.82 to 1.12. Now resolving    - will continue to monitor  - avoid nephrotoxic agents.

## 2023-02-03 ENCOUNTER — TRANSCRIPTION ENCOUNTER (OUTPATIENT)
Age: 68
End: 2023-02-03

## 2023-02-03 DIAGNOSIS — I10 ESSENTIAL (PRIMARY) HYPERTENSION: ICD-10-CM

## 2023-02-03 DIAGNOSIS — J18.9 PNEUMONIA, UNSPECIFIED ORGANISM: ICD-10-CM

## 2023-02-03 LAB
ANION GAP SERPL CALC-SCNC: 6 MMOL/L — LOW (ref 7–14)
BUN SERPL-MCNC: 23 MG/DL — SIGNIFICANT CHANGE UP (ref 7–23)
CALCIUM SERPL-MCNC: 8.9 MG/DL — SIGNIFICANT CHANGE UP (ref 8.4–10.5)
CHLORIDE SERPL-SCNC: 97 MMOL/L — LOW (ref 98–107)
CO2 SERPL-SCNC: 31 MMOL/L — SIGNIFICANT CHANGE UP (ref 22–31)
CREAT SERPL-MCNC: 0.91 MG/DL — SIGNIFICANT CHANGE UP (ref 0.5–1.3)
EGFR: 92 ML/MIN/1.73M2 — SIGNIFICANT CHANGE UP
GLUCOSE SERPL-MCNC: 122 MG/DL — HIGH (ref 70–99)
HCT VFR BLD CALC: 28 % — LOW (ref 39–50)
HGB BLD-MCNC: 9.2 G/DL — LOW (ref 13–17)
MAGNESIUM SERPL-MCNC: 1.7 MG/DL — SIGNIFICANT CHANGE UP (ref 1.6–2.6)
MCHC RBC-ENTMCNC: 31.1 PG — SIGNIFICANT CHANGE UP (ref 27–34)
MCHC RBC-ENTMCNC: 32.9 GM/DL — SIGNIFICANT CHANGE UP (ref 32–36)
MCV RBC AUTO: 94.6 FL — SIGNIFICANT CHANGE UP (ref 80–100)
MRSA PCR RESULT.: SIGNIFICANT CHANGE UP
NRBC # BLD: 0 /100 WBCS — SIGNIFICANT CHANGE UP (ref 0–0)
NRBC # FLD: 0.02 K/UL — HIGH (ref 0–0)
PHOSPHATE SERPL-MCNC: 4.6 MG/DL — HIGH (ref 2.5–4.5)
PLATELET # BLD AUTO: 104 K/UL — LOW (ref 150–400)
POTASSIUM SERPL-MCNC: 5.3 MMOL/L — SIGNIFICANT CHANGE UP (ref 3.5–5.3)
POTASSIUM SERPL-SCNC: 5.3 MMOL/L — SIGNIFICANT CHANGE UP (ref 3.5–5.3)
RBC # BLD: 2.96 M/UL — LOW (ref 4.2–5.8)
RBC # FLD: 15 % — HIGH (ref 10.3–14.5)
S AUREUS DNA NOSE QL NAA+PROBE: SIGNIFICANT CHANGE UP
SODIUM SERPL-SCNC: 134 MMOL/L — LOW (ref 135–145)
WBC # BLD: 5.93 K/UL — SIGNIFICANT CHANGE UP (ref 3.8–10.5)
WBC # FLD AUTO: 5.93 K/UL — SIGNIFICANT CHANGE UP (ref 3.8–10.5)

## 2023-02-03 PROCEDURE — 99233 SBSQ HOSP IP/OBS HIGH 50: CPT | Mod: GC

## 2023-02-03 PROCEDURE — 99232 SBSQ HOSP IP/OBS MODERATE 35: CPT

## 2023-02-03 RX ORDER — CLOZAPINE 150 MG/1
100 TABLET, ORALLY DISINTEGRATING ORAL AT BEDTIME
Refills: 0 | Status: DISCONTINUED | OUTPATIENT
Start: 2023-02-05 | End: 2023-02-13

## 2023-02-03 RX ORDER — LOSARTAN POTASSIUM 100 MG/1
25 TABLET, FILM COATED ORAL DAILY
Refills: 0 | Status: DISCONTINUED | OUTPATIENT
Start: 2023-02-03 | End: 2023-02-06

## 2023-02-03 RX ORDER — TAMSULOSIN HYDROCHLORIDE 0.4 MG/1
0.8 CAPSULE ORAL AT BEDTIME
Refills: 0 | Status: DISCONTINUED | OUTPATIENT
Start: 2023-02-03 | End: 2023-02-13

## 2023-02-03 RX ORDER — CLOZAPINE 150 MG/1
75 TABLET, ORALLY DISINTEGRATING ORAL AT BEDTIME
Refills: 0 | Status: COMPLETED | OUTPATIENT
Start: 2023-02-03 | End: 2023-02-04

## 2023-02-03 RX ADMIN — Medication 50 MILLIGRAM(S): at 12:28

## 2023-02-03 RX ADMIN — Medication 166.67 MILLIGRAM(S): at 05:07

## 2023-02-03 RX ADMIN — Medication 75 MICROGRAM(S): at 05:06

## 2023-02-03 RX ADMIN — Medication 1: at 21:29

## 2023-02-03 RX ADMIN — Medication 1 DROP(S): at 17:02

## 2023-02-03 RX ADMIN — LOSARTAN POTASSIUM 25 MILLIGRAM(S): 100 TABLET, FILM COATED ORAL at 12:28

## 2023-02-03 RX ADMIN — ATORVASTATIN CALCIUM 20 MILLIGRAM(S): 80 TABLET, FILM COATED ORAL at 21:20

## 2023-02-03 RX ADMIN — LATANOPROST 1 DROP(S): 0.05 SOLUTION/ DROPS OPHTHALMIC; TOPICAL at 21:22

## 2023-02-03 RX ADMIN — Medication 1000 UNIT(S): at 12:27

## 2023-02-03 RX ADMIN — HEPARIN SODIUM 5000 UNIT(S): 5000 INJECTION INTRAVENOUS; SUBCUTANEOUS at 05:06

## 2023-02-03 RX ADMIN — PIPERACILLIN AND TAZOBACTAM 25 GRAM(S): 4; .5 INJECTION, POWDER, LYOPHILIZED, FOR SOLUTION INTRAVENOUS at 13:48

## 2023-02-03 RX ADMIN — TAMSULOSIN HYDROCHLORIDE 0.8 MILLIGRAM(S): 0.4 CAPSULE ORAL at 21:18

## 2023-02-03 RX ADMIN — Medication 1: at 12:02

## 2023-02-03 RX ADMIN — Medication 75 MILLIGRAM(S): at 21:20

## 2023-02-03 RX ADMIN — CLOZAPINE 75 MILLIGRAM(S): 150 TABLET, ORALLY DISINTEGRATING ORAL at 21:59

## 2023-02-03 RX ADMIN — Medication 1 DROP(S): at 05:06

## 2023-02-03 RX ADMIN — PIPERACILLIN AND TAZOBACTAM 25 GRAM(S): 4; .5 INJECTION, POWDER, LYOPHILIZED, FOR SOLUTION INTRAVENOUS at 05:07

## 2023-02-03 RX ADMIN — Medication 1: at 07:43

## 2023-02-03 RX ADMIN — PIPERACILLIN AND TAZOBACTAM 25 GRAM(S): 4; .5 INJECTION, POWDER, LYOPHILIZED, FOR SOLUTION INTRAVENOUS at 21:18

## 2023-02-03 RX ADMIN — HEPARIN SODIUM 5000 UNIT(S): 5000 INJECTION INTRAVENOUS; SUBCUTANEOUS at 13:49

## 2023-02-03 RX ADMIN — Medication 4: at 17:01

## 2023-02-03 RX ADMIN — Medication 1 TABLET(S): at 12:27

## 2023-02-03 RX ADMIN — Medication 325 MILLIGRAM(S): at 21:20

## 2023-02-03 RX ADMIN — HEPARIN SODIUM 5000 UNIT(S): 5000 INJECTION INTRAVENOUS; SUBCUTANEOUS at 21:19

## 2023-02-03 RX ADMIN — DIVALPROEX SODIUM 750 MILLIGRAM(S): 500 TABLET, DELAYED RELEASE ORAL at 21:19

## 2023-02-03 RX ADMIN — FINASTERIDE 5 MILLIGRAM(S): 5 TABLET, FILM COATED ORAL at 12:27

## 2023-02-03 NOTE — DISCHARGE NOTE PROVIDER - CARE PROVIDER_API CALL
Fabián Christina)  Psych  [facility]  [address] [place] [address]  Phone: [phone]  Fax: [phone]  Established Patient  Follow Up Time: 2 weeks

## 2023-02-03 NOTE — BH CONSULTATION LIAISON PROGRESS NOTE - CURRENT MEDICATION
MEDICATIONS  (STANDING):  atorvastatin 20 milliGRAM(s) Oral at bedtime  cholecalciferol 1000 Unit(s) Oral daily  cloZAPine 75 milliGRAM(s) Oral at bedtime  dextrose 5%. 1000 milliLiter(s) (50 mL/Hr) IV Continuous <Continuous>  dextrose 5%. 1000 milliLiter(s) (100 mL/Hr) IV Continuous <Continuous>  dextrose 50% Injectable 25 Gram(s) IV Push once  dextrose 50% Injectable 12.5 Gram(s) IV Push once  dextrose 50% Injectable 25 Gram(s) IV Push once  divalproex  milliGRAM(s) Oral at bedtime  ferrous    sulfate 325 milliGRAM(s) Oral at bedtime  finasteride 5 milliGRAM(s) Oral daily  glucagon  Injectable 1 milliGRAM(s) IntraMuscular once  heparin   Injectable 5000 Unit(s) SubCutaneous every 8 hours  insulin lispro (ADMELOG) corrective regimen sliding scale   SubCutaneous three times a day before meals  insulin lispro (ADMELOG) corrective regimen sliding scale   SubCutaneous at bedtime  latanoprost 0.005% Ophthalmic Solution 1 Drop(s) Both EYES at bedtime  levothyroxine 75 MICROGram(s) Oral daily  losartan 25 milliGRAM(s) Oral daily  multivitamin 1 Tablet(s) Oral daily  piperacillin/tazobactam IVPB.. 3.375 Gram(s) IV Intermittent every 8 hours  pyridoxine 50 milliGRAM(s) Oral daily  tamsulosin 0.8 milliGRAM(s) Oral at bedtime  timolol 0.5% Solution 1 Drop(s) Both EYES two times a day  venlafaxine XR. 75 milliGRAM(s) Oral at bedtime    MEDICATIONS  (PRN):  dextrose Oral Gel 15 Gram(s) Oral once PRN Blood Glucose LESS THAN 70 milliGRAM(s)/deciliter  OLANZapine Injectable 2.5 milliGRAM(s) IntraMuscular every 6 hours PRN agitation  
MEDICATIONS  (STANDING):  aspirin enteric coated 325 milliGRAM(s) Oral daily  atorvastatin 20 milliGRAM(s) Oral at bedtime  cholecalciferol 1000 Unit(s) Oral daily  cloZAPine 50 milliGRAM(s) Oral at bedtime  dextrose 5%. 1000 milliLiter(s) (50 mL/Hr) IV Continuous <Continuous>  dextrose 5%. 1000 milliLiter(s) (100 mL/Hr) IV Continuous <Continuous>  dextrose 50% Injectable 25 Gram(s) IV Push once  dextrose 50% Injectable 12.5 Gram(s) IV Push once  dextrose 50% Injectable 25 Gram(s) IV Push once  divalproex  milliGRAM(s) Oral at bedtime  ferrous    sulfate 325 milliGRAM(s) Oral at bedtime  finasteride 5 milliGRAM(s) Oral daily  glucagon  Injectable 1 milliGRAM(s) IntraMuscular once  insulin lispro (ADMELOG) corrective regimen sliding scale   SubCutaneous three times a day before meals  insulin lispro (ADMELOG) corrective regimen sliding scale   SubCutaneous at bedtime  latanoprost 0.005% Ophthalmic Solution 1 Drop(s) Both EYES at bedtime  levothyroxine 75 MICROGram(s) Oral daily  piperacillin/tazobactam IVPB.. 3.375 Gram(s) IV Intermittent every 8 hours  timolol 0.5% Solution 1 Drop(s) Both EYES two times a day  vancomycin  IVPB 1250 milliGRAM(s) IV Intermittent every 12 hours  venlafaxine XR. 75 milliGRAM(s) Oral at bedtime    MEDICATIONS  (PRN):  dextrose Oral Gel 15 Gram(s) Oral once PRN Blood Glucose LESS THAN 70 milliGRAM(s)/deciliter  OLANZapine Injectable 2.5 milliGRAM(s) IntraMuscular every 6 hours PRN agitation  
Fair

## 2023-02-03 NOTE — DISCHARGE NOTE PROVIDER - NSDCMRMEDTOKEN_GEN_ALL_CORE_FT
atorvastatin 20 mg oral tablet: 1 tab(s) orally once a day (at bedtime)  cloZAPine 100 mg oral tablet: orally once a day pm  cloZAPine 25 mg oral tablet: orally once a day, noon  divalproex sodium 500 mg oral tablet, extended release: 2 tab(s) orally once a day (at bedtime)  ferrous sulfate 325 mg (65 mg elemental iron) oral delayed release tablet: 1 tab(s) orally once a day  finasteride 5 mg oral tablet: 1 tab(s) orally once a day  glipiZIDE 5 mg oral tablet, extended release: 1 tab(s) orally once a day  Januvia 100 mg oral tablet: 1 tab(s) orally once a day, am  LEVOBUNOLOL 0.5% EYE DROPS: INSTILL 1 DROP INTO BOTH EYES TWICE A DAY  lisinopril 10 mg oral tablet: 1 tab(s) orally once a day  Lumigan 0.01% ophthalmic solution: 1 drop(s) to each affected eye once a day (in the evening)  metFORMIN 1000 mg oral tablet: 1 tab(s) orally 2 times a day  omeprazole 40 mg oral delayed release capsule: 1 cap(s) orally once a day  RHOPRESSA 0.02% OPHTH SOLUTION: INSTILL 1 DROP INTO BOTH EYES AT BEDTIME AS DIRECTED  Synthroid 75 mcg (0.075 mg) oral tablet: 1 tab(s) orally once a day, am  venlafaxine 75 mg oral tablet, extended release: 1 tab(s) orally once a day with dinner  Vitamin D3 25 mcg (1000 intl units) oral tablet: 1 tab(s) orally once a day   atorvastatin 40 mg oral tablet: 1 tab(s) orally once a day (at bedtime)  cloZAPine 100 mg oral tablet: 1 tab(s) orally once a day (at bedtime)  divalproex sodium 500 mg oral tablet, extended release: 2 tab(s) orally once a day (at bedtime)  ferrous sulfate 325 mg (65 mg elemental iron) oral delayed release tablet: 1 tab(s) orally once a day  finasteride 5 mg oral tablet: 1 tab(s) orally once a day  Januvia 100 mg oral tablet: 1 tab(s) orally once a day, am  LEVOBUNOLOL 0.5% EYE DROPS: INSTILL 1 DROP INTO BOTH EYES TWICE A DAY  Lumigan 0.01% ophthalmic solution: 1 drop(s) to each affected eye once a day (in the evening)  metFORMIN 1000 mg oral tablet: 1 tab(s) orally 2 times a day  omeprazole 40 mg oral delayed release capsule: 1 cap(s) orally once a day  pyridoxine 50 mg oral tablet: 1 tab(s) orally once a day  RHOPRESSA 0.02% OPHTH SOLUTION: INSTILL 1 DROP INTO BOTH EYES AT BEDTIME AS DIRECTED  Synthroid 75 mcg (0.075 mg) oral tablet: 1 tab(s) orally once a day, am  venlafaxine 75 mg oral tablet, extended release: 1 tab(s) orally once a day with dinner  Vitamin D3 25 mcg (1000 intl units) oral tablet: 1 tab(s) orally once a day

## 2023-02-03 NOTE — PROGRESS NOTE ADULT - ASSESSMENT
67M with PMHx schizophrenia, hx seizure, bipolar d/o, HTN, DM2, hx SBO presenting with confusion, slurred speech and difficulty ambulating x4 days. Admitted for acute encephalopathy, sepsis 2/2 pneumonia 67M with PMHx schizophrenia, hx seizure, bipolar d/o, HTN, DM2, hypothyroidism, hx SBO presenting with confusion, slurred speech and difficulty ambulating x4 days. Admitted for acute encephalopathy, sepsis 2/2 pneumonia

## 2023-02-03 NOTE — PROGRESS NOTE ADULT - PROBLEM SELECTOR PLAN 1
Baseline oriented 3, ambulates on own. Now oriented 1-2 with confusion, dysarthria, prolonged speech latency (motor/mixed aphasia), truncal ataxia. Of note, patient has been on 125mg of clozapine since 1997 and recent reduction to 50mg may be contributing. Course c/b initial bradycardia, hypotension, hypothermia.     CXR clear, UA normal, no signs of skin infections, no leukocytosis, though elevated NLR. T4 normal, TSH 4.91 mildly elevated, compliant with levothyroxine dosing. AM Cortisol elevated, unlikely AI. Troponin flat with elevated CKMB, TTE wnl. Per cards, unlikely myocarditis. Ammonium 58 mildly elevated likely not delivered on ice, LFTs wnl, no clonus or asterixis. Rheum w/u negative. Nutritional deficiency w/u negative. Patient is maintaining airway and responsive. Not lethargic, awake and alert. Follows commands. Now requiring 1-2L NC. MRI MRA head showed microvascular disease but no new stroke. CTCAP showed diffuse anasarca with pleural effusion, atelectasis, edema w/ opacity c/f pneumonia, likely source of instability. Ddx incl toxic metabolic encephalopathy iso sepsis vs. progression of psychiatric disorders.    - ICU consulted 1/31 for hypothermia, recommended stress dose hydrocortisone 100mg  - Endo consulted; recs appreciated. - unlikely to be AI. Will hold off on further steroids until infx r/o  - 1/30 BCx x2, UCx NGTD  - c/w broad spectrum vanc/zosyn; f/u MRSA swab to dc vanc  - ctm on tele  - c/w aspirin statin  - PT/OT/S&S eval - when can adequately follow commands and medically more stable. Bedrest for now  - psych consulted; recs appreciated -- aphasia/speech hesitancy 2/2 schizo/meds? Baseline oriented 3, ambulates on own. Now oriented 1-2 with confusion, dysarthria, prolonged speech latency (motor/mixed aphasia), truncal ataxia. Of note, patient has been on 125mg of clozapine since 1997 and recent reduction to 50mg may be contributing. Course c/b initial bradycardia, hypotension, hypothermia.     CXR clear, UA normal, no signs of skin infections, no leukocytosis, though elevated NLR. T4 normal, TSH 4.91 mildly elevated, compliant with levothyroxine dosing. AM Cortisol elevated, unlikely AI. Troponin flat with elevated CKMB, TTE wnl. Per cards, unlikely myocarditis. Ammonium 58 mildly elevated likely not delivered on ice, LFTs wnl, no clonus or asterixis. Rheum w/u negative. Nutritional deficiency w/u negative. Patient is maintaining airway and responsive. Not lethargic, awake and alert. Follows commands. Now requiring 1-2L NC. MRI MRA head showed microvascular disease but no new stroke. CTCAP showed diffuse anasarca with pleural effusion, atelectasis, edema w/ opacity c/f pneumonia, likely source of instability. Ddx incl toxic metabolic encephalopathy iso sepsis vs. progression of psychiatric disorders.    - ICU consulted 1/31 for hypothermia, recommended stress dose hydrocortisone 100mg  - Endo consulted; recs appreciated. - unlikely to be AI. Will hold off on further steroids until infx r/o  - 1/30 BCx x2, UCx NGTD  - c/w broad spectrum vanc/zosyn (Day 4); f/u MRSA swab to dc vanc  - ctm on tele  - c/w aspirin statin -- d/c aspirin (r/o stroke)  - PT/OT/S&S eval - when can adequately follow commands and medically more stable. Bedrest for now -- consider re-eval if improved mental status continues  - psych consulted; recs appreciated -- aphasia/speech hesitancy 2/2 schizophrenia/meds?

## 2023-02-03 NOTE — OCCUPATIONAL THERAPY INITIAL EVALUATION ADULT - LIVES WITH, PROFILE
Pt. reports he lives with his brother in a house with 3 steps or ramp available to enter. Once inside, pt. reports bedroom and bathroom are located on the main level. Per pt., he has a shower stall in his bathroom.

## 2023-02-03 NOTE — OCCUPATIONAL THERAPY INITIAL EVALUATION ADULT - MD ORDER
Occupational Therapy (OT) to evaluate and treat. Per Covering BRANDON Daly for BRANDON Jolley, pt is okay to participate in OT evaluation and perform activity as tolerated.

## 2023-02-03 NOTE — PROGRESS NOTE ADULT - PROBLEM SELECTOR PLAN 11
Hospital Bundle    Fluids: po hydration  Electrolytes: Replete K > 4, Mg > 2, Phos > 3  Nutrition: Diet pureed  PPX  ---VTE: heparin subq given mild MARISSA  ---GI: n/a  ---Resp: 2L NC  Access: PIV    Code Status: FULL CODE  Dispo: pending clinical improvement. Hospital Bundle    Fluids: po hydration  Electrolytes: Replete K > 4, Mg > 2, Phos > 3  Nutrition: Diet pureed  PPX  ---VTE: heparin subq given mild MARISSA  ---GI: n/a  ---Resp: ra  Access: PIV    Code Status: FULL CODE  Dispo: pending clinical improvement.

## 2023-02-03 NOTE — PROGRESS NOTE ADULT - SUBJECTIVE AND OBJECTIVE BOX
PROGRESS NOTE:     Patient is a 67y old  Male who presents with a chief complaint of confusion, dizziness (02 Feb 2023 16:09)    SUBJECTIVE / OVERNIGHT EVENTS: No acute events overnight.    ADDITIONAL REVIEW OF SYSTEMS:    MEDICATIONS  (STANDING):  atorvastatin 20 milliGRAM(s) Oral at bedtime  cholecalciferol 1000 Unit(s) Oral daily  cloZAPine 50 milliGRAM(s) Oral at bedtime  dextrose 5%. 1000 milliLiter(s) (50 mL/Hr) IV Continuous <Continuous>  dextrose 5%. 1000 milliLiter(s) (100 mL/Hr) IV Continuous <Continuous>  dextrose 50% Injectable 25 Gram(s) IV Push once  dextrose 50% Injectable 12.5 Gram(s) IV Push once  dextrose 50% Injectable 25 Gram(s) IV Push once  divalproex  milliGRAM(s) Oral at bedtime  ferrous    sulfate 325 milliGRAM(s) Oral at bedtime  finasteride 5 milliGRAM(s) Oral daily  glucagon  Injectable 1 milliGRAM(s) IntraMuscular once  heparin   Injectable 5000 Unit(s) SubCutaneous every 8 hours  insulin lispro (ADMELOG) corrective regimen sliding scale   SubCutaneous three times a day before meals  insulin lispro (ADMELOG) corrective regimen sliding scale   SubCutaneous at bedtime  latanoprost 0.005% Ophthalmic Solution 1 Drop(s) Both EYES at bedtime  levothyroxine 75 MICROGram(s) Oral daily  multivitamin 1 Tablet(s) Oral daily  piperacillin/tazobactam IVPB.. 3.375 Gram(s) IV Intermittent every 8 hours  pyridoxine 50 milliGRAM(s) Oral daily  tamsulosin 0.4 milliGRAM(s) Oral at bedtime  timolol 0.5% Solution 1 Drop(s) Both EYES two times a day  vancomycin  IVPB 1250 milliGRAM(s) IV Intermittent every 12 hours  venlafaxine XR. 75 milliGRAM(s) Oral at bedtime    MEDICATIONS  (PRN):  dextrose Oral Gel 15 Gram(s) Oral once PRN Blood Glucose LESS THAN 70 milliGRAM(s)/deciliter  OLANZapine Injectable 2.5 milliGRAM(s) IntraMuscular every 6 hours PRN agitation    CAPILLARY BLOOD GLUCOSE    POCT Blood Glucose.: 171 mg/dL (03 Feb 2023 07:19)  POCT Blood Glucose.: 134 mg/dL (02 Feb 2023 21:49)  POCT Blood Glucose.: 269 mg/dL (02 Feb 2023 16:36)  POCT Blood Glucose.: 216 mg/dL (02 Feb 2023 11:30)    I&O's Summary    02 Feb 2023 07:01  -  03 Feb 2023 07:00  --------------------------------------------------------  IN: 175 mL / OUT: 500 mL / NET: -325 mL    PHYSICAL EXAM:  Vital Signs Last 24 Hrs  T(C): 36.7 (03 Feb 2023 05:40), Max: 37 (02 Feb 2023 09:55)  T(F): 98 (03 Feb 2023 05:40), Max: 98.6 (02 Feb 2023 09:55)  HR: 70 (03 Feb 2023 05:40) (70 - 80)  BP: 169/84 (03 Feb 2023 05:40) (160/76 - 175/75)  BP(mean): --  RR: 17 (03 Feb 2023 05:40) (17 - 17)  SpO2: 95% (03 Feb 2023 05:40) (87% - 96%)    Parameters below as of 03 Feb 2023 05:40  Patient On (Oxygen Delivery Method): room air    GENERAL: NAD, lying comfortably in bed  HEAD: Atraumatic, normocephalic  EYES: EOMI b/l, conjunctiva and sclera clear  NECK: Supple, No JVD, No LAD  RESPIRATORY: Normal respiratory effort; lungs are clear to auscultation bilaterally  CARDIOVASCULAR: Regular rate and rhythm, normal S1 and S2, no murmur/rub/gallop; No lower extremity edema  ABDOMEN: Nontender, normoactive bowel sounds, no rebound/guarding; No hepatosplenomegaly  MUSCULOSKELETAL: no clubbing or cyanosis of digits; no joint swelling or tenderness to palpation  NEURO: Non focal   SKIN:   PSYCH: A+O to person, place, and time; affect appropriate    LABS:                        9.2    5.93  )-----------( 104      ( 03 Feb 2023 07:00 )             28.0     02-02    131<L>  |  96<L>  |  27<H>  ----------------------------<  90  5.0   |  28  |  1.05    Ca    9.0      02 Feb 2023 04:55  Phos  5.5     02-02  Mg     1.90     02-02    TPro  6.0  /  Alb  3.3  /  TBili  0.3  /  DBili  x   /  AST  56<H>  /  ALT  101<H>  /  AlkPhos  85  02-02    RADIOLOGY:    Consulted note reviewed  Reviewed Imaging personally   PROGRESS NOTE:     Patient is a 67y old  Male who presents with a chief complaint of confusion, dizziness (02 Feb 2023 16:09)    SUBJECTIVE / OVERNIGHT EVENTS: No acute events overnight. This morning, Pt is alert and awake. He is AOx3 (person, place, time). He describes his reason for admission as "respiration pneumonia." He reports he does not remember anything from yesterday. Today, he feels well rested and asks for breakfast. He reports mild dyspnea. Pt was on 2L NC (desat ON w/o), weaned to RA and sat 91. Bedside bladder scan shows 453cc retained. Pt is intermittently voiding.    ADDITIONAL REVIEW OF SYSTEMS: Negative for HA, fever, chest pain, palpitations. Positive for intermittent cough, dyspnea. Negative for NA/vomiting, abdominal pain, BMs.    MEDICATIONS  (STANDING):  atorvastatin 20 milliGRAM(s) Oral at bedtime  cholecalciferol 1000 Unit(s) Oral daily  cloZAPine 50 milliGRAM(s) Oral at bedtime  dextrose 5%. 1000 milliLiter(s) (50 mL/Hr) IV Continuous <Continuous>  dextrose 5%. 1000 milliLiter(s) (100 mL/Hr) IV Continuous <Continuous>  dextrose 50% Injectable 25 Gram(s) IV Push once  dextrose 50% Injectable 12.5 Gram(s) IV Push once  dextrose 50% Injectable 25 Gram(s) IV Push once  divalproex  milliGRAM(s) Oral at bedtime  ferrous    sulfate 325 milliGRAM(s) Oral at bedtime  finasteride 5 milliGRAM(s) Oral daily  glucagon  Injectable 1 milliGRAM(s) IntraMuscular once  heparin   Injectable 5000 Unit(s) SubCutaneous every 8 hours  insulin lispro (ADMELOG) corrective regimen sliding scale   SubCutaneous three times a day before meals  insulin lispro (ADMELOG) corrective regimen sliding scale   SubCutaneous at bedtime  latanoprost 0.005% Ophthalmic Solution 1 Drop(s) Both EYES at bedtime  levothyroxine 75 MICROGram(s) Oral daily  multivitamin 1 Tablet(s) Oral daily  piperacillin/tazobactam IVPB.. 3.375 Gram(s) IV Intermittent every 8 hours  pyridoxine 50 milliGRAM(s) Oral daily  tamsulosin 0.4 milliGRAM(s) Oral at bedtime  timolol 0.5% Solution 1 Drop(s) Both EYES two times a day  vancomycin  IVPB 1250 milliGRAM(s) IV Intermittent every 12 hours  venlafaxine XR. 75 milliGRAM(s) Oral at bedtime    MEDICATIONS  (PRN):  dextrose Oral Gel 15 Gram(s) Oral once PRN Blood Glucose LESS THAN 70 milliGRAM(s)/deciliter  OLANZapine Injectable 2.5 milliGRAM(s) IntraMuscular every 6 hours PRN agitation    CAPILLARY BLOOD GLUCOSE    POCT Blood Glucose.: 171 mg/dL (03 Feb 2023 07:19)  POCT Blood Glucose.: 134 mg/dL (02 Feb 2023 21:49)  POCT Blood Glucose.: 269 mg/dL (02 Feb 2023 16:36)  POCT Blood Glucose.: 216 mg/dL (02 Feb 2023 11:30)    I&O's Summary    02 Feb 2023 07:01  -  03 Feb 2023 07:00  --------------------------------------------------------  IN: 175 mL / OUT: 500 mL / NET: -325 mL    PHYSICAL EXAM:  Vital Signs Last 24 Hrs  T(C): 36.7 (03 Feb 2023 05:40), Max: 37 (02 Feb 2023 09:55)  T(F): 98 (03 Feb 2023 05:40), Max: 98.6 (02 Feb 2023 09:55)  HR: 70 (03 Feb 2023 05:40) (70 - 80)  BP: 169/84 (03 Feb 2023 05:40) (160/76 - 175/75)  BP(mean): --  RR: 17 (03 Feb 2023 05:40) (17 - 17)  SpO2: 95% (03 Feb 2023 05:40) (87% - 96%)    Parameters below as of 03 Feb 2023 05:40  Patient On (Oxygen Delivery Method): room air    GENERAL: NAD, lying comfortably in bed  HEAD: Atraumatic, normocephalic  EYES: EOMI b/l, conjunctiva and sclera clear  NECK: Supple, No JVD, No LAD  RESPIRATORY: Normal respiratory effort; Upper airway sounds, decreased air movement in posterior lung fields  CARDIOVASCULAR: Regular rate and rhythm, normal S1 and S2, no murmur/rub/gallop; No lower extremity edema  ABDOMEN: Nontender, mildly distended; normoactive bowel sounds, no rebound/guarding  NEURO: AOx3, Non focal, improved dysarthria and word finding difficulty  SKIN: No rush  PSYCH: Affect appropriate    LABS:                        9.2    5.93  )-----------( 104      ( 03 Feb 2023 07:00 )             28.0     02-02    131<L>  |  96<L>  |  27<H>  ----------------------------<  90  5.0   |  28  |  1.05    Ca    9.0      02 Feb 2023 04:55  Phos  5.5     02-02  Mg     1.90     02-02    TPro  6.0  /  Alb  3.3  /  TBili  0.3  /  DBili  x   /  AST  56<H>  /  ALT  101<H>  /  AlkPhos  85  02-02    VITAMIN B6 2.2    RADIOLOGY:    Consulted note reviewed  Reviewed Imaging personally

## 2023-02-03 NOTE — PROGRESS NOTE ADULT - PROBLEM SELECTOR PLAN 7
- psych consulted; recs appreciated   - c/w clozapine to 50mg qHS, Depakote to 750mg qHS, if thrombocytopenia worsens or patient continually lethargic would consider reducing to 500mg unless objections from neuro  - c/w Effexor as ordered, if persistently hyponatremic would assess for SIADH and consider stopping   - PRN for agitation Zyprexa 2.5mg q6h PRN PO/IM. Resume levothyroxine 75mcg qd. TSH mildly elevated, T3 low, T4 normal    - c/w home levothyroxine  - endo recs appreciated; avoid administered levothyroxine with PPI / Ca / Fe.

## 2023-02-03 NOTE — PROGRESS NOTE ADULT - PROBLEM SELECTOR PLAN 4
Anemia and thrombocytopenia noted on admission. Patient recently had clozapine dosage decreased for c/f agranulocytosis, WBC wnl on admission but may be falsely low. Negative LDH, retic, haptoglobin, blue top - neg for hemolysis. Folate, b12, ferritin, iron studies - unremarkable. No signs of active bleed, but of note had precancerous polyp removed 3 years ago per brother and had trouble getting repeat C-scope done since.     - heme consulted; recs appreciated  - f/u outpatient PMD for C-scope  - trend CBC, transfuse as necessary.

## 2023-02-03 NOTE — OCCUPATIONAL THERAPY INITIAL EVALUATION ADULT - GENERAL OBSERVATIONS, REHAB EVAL
Pt. received semisupine in bed. No acute distress. Patient agreed to evaluation from Occupational Therapist. +IV, O2 via nasal cannula.

## 2023-02-03 NOTE — PROGRESS NOTE ADULT - SUBJECTIVE AND OBJECTIVE BOX
***************************************************************  Miky Munguia, PGY1  Internal Medicine   pager:  [facility]: [phone] [facility]: [phone]  ***************************************************************    CURTIS DIAZ  67y  MRN: [id number]    Patient is a 67y old  Male who presents with a chief complaint of confusion, dizziness (03 Feb 2023 07:26)    Interval/Overnight Events: no events ON.     Subjective: Pt seen and examined at bedside. Denies fever, CP, SOB, abn pain, N/V, dysuria. Tolerating diet.      MEDICATIONS  (STANDING):  atorvastatin 20 milliGRAM(s) Oral at bedtime  cholecalciferol 1000 Unit(s) Oral daily  cloZAPine 50 milliGRAM(s) Oral at bedtime  dextrose 5%. 1000 milliLiter(s) (50 mL/Hr) IV Continuous <Continuous>  dextrose 5%. 1000 milliLiter(s) (100 mL/Hr) IV Continuous <Continuous>  dextrose 50% Injectable 25 Gram(s) IV Push once  dextrose 50% Injectable 12.5 Gram(s) IV Push once  dextrose 50% Injectable 25 Gram(s) IV Push once  divalproex  milliGRAM(s) Oral at bedtime  ferrous    sulfate 325 milliGRAM(s) Oral at bedtime  finasteride 5 milliGRAM(s) Oral daily  glucagon  Injectable 1 milliGRAM(s) IntraMuscular once  heparin   Injectable 5000 Unit(s) SubCutaneous every 8 hours  insulin lispro (ADMELOG) corrective regimen sliding scale   SubCutaneous three times a day before meals  insulin lispro (ADMELOG) corrective regimen sliding scale   SubCutaneous at bedtime  latanoprost 0.005% Ophthalmic Solution 1 Drop(s) Both EYES at bedtime  levothyroxine 75 MICROGram(s) Oral daily  multivitamin 1 Tablet(s) Oral daily  piperacillin/tazobactam IVPB.. 3.375 Gram(s) IV Intermittent every 8 hours  pyridoxine 50 milliGRAM(s) Oral daily  tamsulosin 0.4 milliGRAM(s) Oral at bedtime  timolol 0.5% Solution 1 Drop(s) Both EYES two times a day  vancomycin  IVPB 1250 milliGRAM(s) IV Intermittent every 12 hours  venlafaxine XR. 75 milliGRAM(s) Oral at bedtime    MEDICATIONS  (PRN):  dextrose Oral Gel 15 Gram(s) Oral once PRN Blood Glucose LESS THAN 70 milliGRAM(s)/deciliter  OLANZapine Injectable 2.5 milliGRAM(s) IntraMuscular every 6 hours PRN agitation    Objective:    Vitals: Vital Signs Last 24 Hrs  T(C): 36.7 (02-03-23 @ 05:40), Max: 37 (02-02-23 @ 09:55)  T(F): 98 (02-03-23 @ 05:40), Max: 98.6 (02-02-23 @ 09:55)  HR: 70 (02-03-23 @ 05:40) (70 - 80)  BP: 169/84 (02-03-23 @ 05:40) (160/76 - 175/75)  BP(mean): --  RR: 17 (02-03-23 @ 05:40) (17 - 17)  SpO2: 95% (02-03-23 @ 05:40) (93% - 96%)                I&O's Summary    02 Feb 2023 07:01  -  03 Feb 2023 07:00  --------------------------------------------------------  IN: 175 mL / OUT: 500 mL / NET: -325 mL    PHYSICAL EXAM:  GENERAL: NAD  HEAD:  Atraumatic, Normocephalic  EYES: EOMI, conjunctiva and sclera clear  CHEST/LUNG: Clear to auscultation bilaterally; No rales, rhonchi, wheezing, or rubs  HEART: Regular rate and rhythm; No murmurs, rubs, or gallops  ABDOMEN: Soft, Nontender, Nondistended;   SKIN: No rashes or lesions  NERVOUS SYSTEM:  Alert & Oriented X3, no focal deficits    LABS:  02-02    131<L>  |  96<L>  |  27<H>  ----------------------------<  90  5.0   |  28  |  1.05  02-01    134<L>  |  100  |  30<H>  ----------------------------<  91  5.6<H>   |  27  |  1.12  01-31    133<L>  |  97<L>  |  28<H>  ----------------------------<  188<H>  5.2   |  25  |  0.81    Ca    9.0      02 Feb 2023 04:55  Ca    8.3<L>      01 Feb 2023 05:56  Ca    8.6      31 Jan 2023 13:32  Phos  5.5     02-02  Mg     1.90     02-02    TPro  6.0  /  Alb  3.3  /  TBili  0.3  /  DBili  x   /  AST  56<H>  /  ALT  101<H>  /  AlkPhos  85  02-02                        9.2    5.93  )-----------( 104      ( 03 Feb 2023 07:00 )             28.0                         10.4   5.36  )-----------( 99       ( 02 Feb 2023 04:55 )             31.5                         9.1    5.02  )-----------( 101      ( 01 Feb 2023 05:56 )             27.5     CAPILLARY BLOOD GLUCOSE    POCT Blood Glucose.: 171 mg/dL (03 Feb 2023 07:19)  POCT Blood Glucose.: 134 mg/dL (02 Feb 2023 21:49)  POCT Blood Glucose.: 269 mg/dL (02 Feb 2023 16:36)  POCT Blood Glucose.: 216 mg/dL (02 Feb 2023 11:30)      RADIOLOGY & ADDITIONAL TESTS:    Imaging Personally Reviewed:  [x ] YES  [ ] NO    Consultants involved in case:   Consultant(s) Notes Reviewed:  [ x] YES  [ ] NO:   Care Discussed with Consultants/Other Providers [x ] YES  [ ] NO         ***************************************************************  Miky Munguia, PGY1  Internal Medicine   pager:  [facility]: [phone] [facility]: [phone]  ***************************************************************    CURTIS DIAZ  67y  MRN: [id number]    Patient is a 67y old  Male who presents with a chief complaint of confusion, dizziness (03 Feb 2023 07:26)    Interval/Overnight Events: no events ON.     Subjective: Pt seen and examined at bedside. Denies fever, CP, SOB, abn pain, N/V, dysuria. Tolerating diet.      MEDICATIONS  (STANDING):  atorvastatin 20 milliGRAM(s) Oral at bedtime  cholecalciferol 1000 Unit(s) Oral daily  cloZAPine 50 milliGRAM(s) Oral at bedtime  dextrose 5%. 1000 milliLiter(s) (50 mL/Hr) IV Continuous <Continuous>  dextrose 5%. 1000 milliLiter(s) (100 mL/Hr) IV Continuous <Continuous>  dextrose 50% Injectable 25 Gram(s) IV Push once  dextrose 50% Injectable 12.5 Gram(s) IV Push once  dextrose 50% Injectable 25 Gram(s) IV Push once  divalproex  milliGRAM(s) Oral at bedtime  ferrous    sulfate 325 milliGRAM(s) Oral at bedtime  finasteride 5 milliGRAM(s) Oral daily  glucagon  Injectable 1 milliGRAM(s) IntraMuscular once  heparin   Injectable 5000 Unit(s) SubCutaneous every 8 hours  insulin lispro (ADMELOG) corrective regimen sliding scale   SubCutaneous three times a day before meals  insulin lispro (ADMELOG) corrective regimen sliding scale   SubCutaneous at bedtime  latanoprost 0.005% Ophthalmic Solution 1 Drop(s) Both EYES at bedtime  levothyroxine 75 MICROGram(s) Oral daily  multivitamin 1 Tablet(s) Oral daily  piperacillin/tazobactam IVPB.. 3.375 Gram(s) IV Intermittent every 8 hours  pyridoxine 50 milliGRAM(s) Oral daily  tamsulosin 0.4 milliGRAM(s) Oral at bedtime  timolol 0.5% Solution 1 Drop(s) Both EYES two times a day  vancomycin  IVPB 1250 milliGRAM(s) IV Intermittent every 12 hours  venlafaxine XR. 75 milliGRAM(s) Oral at bedtime    MEDICATIONS  (PRN):  dextrose Oral Gel 15 Gram(s) Oral once PRN Blood Glucose LESS THAN 70 milliGRAM(s)/deciliter  OLANZapine Injectable 2.5 milliGRAM(s) IntraMuscular every 6 hours PRN agitation    Objective:    Vitals: Vital Signs Last 24 Hrs  T(C): 36.7 (02-03-23 @ 05:40), Max: 37 (02-02-23 @ 09:55)  T(F): 98 (02-03-23 @ 05:40), Max: 98.6 (02-02-23 @ 09:55)  HR: 70 (02-03-23 @ 05:40) (70 - 80)  BP: 169/84 (02-03-23 @ 05:40) (160/76 - 175/75)  BP(mean): --  RR: 17 (02-03-23 @ 05:40) (17 - 17)  SpO2: 95% (02-03-23 @ 05:40) (93% - 96%)                I&O's Summary    02 Feb 2023 07:01  -  03 Feb 2023 07:00  --------------------------------------------------------  IN: 175 mL / OUT: 500 mL / NET: -325 mL    PHYSICAL EXAM:  GENERAL: NAD  HEAD:  Atraumatic, Normocephalic  EYES: pupils minimally reactive, constricted. EOMI, conjunctiva and sclera clear  CHEST/LUNG: upper airway coarse crackles, poor auscultation of lung sounds on posterior fields.  HEART: Regular rate and rhythm; No murmurs, rubs, or gallops  ABDOMEN: Somewhat distended, tympanic on percussion. Soft, Nontender  SKIN: No rashes or lesions  NERVOUS SYSTEM:  Alert & Oriented X2, still dysarthric, increased speech latency, no focal deficits but weak    LABS:  02-02    131<L>  |  96<L>  |  27<H>  ----------------------------<  90  5.0   |  28  |  1.05  02-01    134<L>  |  100  |  30<H>  ----------------------------<  91  5.6<H>   |  27  |  1.12  01-31    133<L>  |  97<L>  |  28<H>  ----------------------------<  188<H>  5.2   |  25  |  0.81    Ca    9.0      02 Feb 2023 04:55  Ca    8.3<L>      01 Feb 2023 05:56  Ca    8.6      31 Jan 2023 13:32  Phos  5.5     02-02  Mg     1.90     02-02    TPro  6.0  /  Alb  3.3  /  TBili  0.3  /  DBili  x   /  AST  56<H>  /  ALT  101<H>  /  AlkPhos  85  02-02                        9.2    5.93  )-----------( 104      ( 03 Feb 2023 07:00 )             28.0                         10.4   5.36  )-----------( 99       ( 02 Feb 2023 04:55 )             31.5                         9.1    5.02  )-----------( 101      ( 01 Feb 2023 05:56 )             27.5     CAPILLARY BLOOD GLUCOSE    POCT Blood Glucose.: 171 mg/dL (03 Feb 2023 07:19)  POCT Blood Glucose.: 134 mg/dL (02 Feb 2023 21:49)  POCT Blood Glucose.: 269 mg/dL (02 Feb 2023 16:36)  POCT Blood Glucose.: 216 mg/dL (02 Feb 2023 11:30)      RADIOLOGY & ADDITIONAL TESTS:    Imaging Personally Reviewed:  [x ] YES  [ ] NO    Consultants involved in case:   Consultant(s) Notes Reviewed:  [ x] YES  [ ] NO:   Care Discussed with Consultants/Other Providers [x ] YES  [ ] NO         ***************************************************************  Miky Munguia, PGY1  Internal Medicine   pager:  [facility]: [phone] [facility]: [phone]  ***************************************************************    CURTIS DIAZ  67y  MRN: [id number]    Patient is a 67y old  Male who presents with a chief complaint of confusion, dizziness (03 Feb 2023 07:26)    Interval/Overnight Events: no events ON.  - pending PT reevaluation     Subjective: Pt seen and examined at bedside. Denies fever, CP, SOB, abn pain, N/V, dysuria. Tolerating diet.  Brother at bedside updated, reports that pt is substantially improved but still not at baseline as pt normally is able to speak and carry conversations without issue, no delay or hesitancy at all. Brother okay with rehab if deemed necessary.    MEDICATIONS  (STANDING):  atorvastatin 20 milliGRAM(s) Oral at bedtime  cholecalciferol 1000 Unit(s) Oral daily  cloZAPine 50 milliGRAM(s) Oral at bedtime  dextrose 5%. 1000 milliLiter(s) (50 mL/Hr) IV Continuous <Continuous>  dextrose 5%. 1000 milliLiter(s) (100 mL/Hr) IV Continuous <Continuous>  dextrose 50% Injectable 25 Gram(s) IV Push once  dextrose 50% Injectable 12.5 Gram(s) IV Push once  dextrose 50% Injectable 25 Gram(s) IV Push once  divalproex  milliGRAM(s) Oral at bedtime  ferrous    sulfate 325 milliGRAM(s) Oral at bedtime  finasteride 5 milliGRAM(s) Oral daily  glucagon  Injectable 1 milliGRAM(s) IntraMuscular once  heparin   Injectable 5000 Unit(s) SubCutaneous every 8 hours  insulin lispro (ADMELOG) corrective regimen sliding scale   SubCutaneous three times a day before meals  insulin lispro (ADMELOG) corrective regimen sliding scale   SubCutaneous at bedtime  latanoprost 0.005% Ophthalmic Solution 1 Drop(s) Both EYES at bedtime  levothyroxine 75 MICROGram(s) Oral daily  multivitamin 1 Tablet(s) Oral daily  piperacillin/tazobactam IVPB.. 3.375 Gram(s) IV Intermittent every 8 hours  pyridoxine 50 milliGRAM(s) Oral daily  tamsulosin 0.4 milliGRAM(s) Oral at bedtime  timolol 0.5% Solution 1 Drop(s) Both EYES two times a day  vancomycin  IVPB 1250 milliGRAM(s) IV Intermittent every 12 hours  venlafaxine XR. 75 milliGRAM(s) Oral at bedtime    MEDICATIONS  (PRN):  dextrose Oral Gel 15 Gram(s) Oral once PRN Blood Glucose LESS THAN 70 milliGRAM(s)/deciliter  OLANZapine Injectable 2.5 milliGRAM(s) IntraMuscular every 6 hours PRN agitation    Objective:    Vitals: Vital Signs Last 24 Hrs  T(C): 36.7 (02-03-23 @ 05:40), Max: 37 (02-02-23 @ 09:55)  T(F): 98 (02-03-23 @ 05:40), Max: 98.6 (02-02-23 @ 09:55)  HR: 70 (02-03-23 @ 05:40) (70 - 80)  BP: 169/84 (02-03-23 @ 05:40) (160/76 - 175/75)  BP(mean): --  RR: 17 (02-03-23 @ 05:40) (17 - 17)  SpO2: 95% (02-03-23 @ 05:40) (93% - 96%)                I&O's Summary    02 Feb 2023 07:01  -  03 Feb 2023 07:00  --------------------------------------------------------  IN: 175 mL / OUT: 500 mL / NET: -325 mL    PHYSICAL EXAM:  GENERAL: NAD  HEAD:  Atraumatic, Normocephalic  EYES: pupils minimally reactive, constricted. EOMI, conjunctiva and sclera clear  CHEST/LUNG: upper airway coarse crackles, poor auscultation of lung sounds on posterior fields.  HEART: Regular rate and rhythm; No murmurs, rubs, or gallops  ABDOMEN: Somewhat distended, tympanic on percussion. Soft, Nontender  SKIN: No rashes or lesions  NERVOUS SYSTEM:  Alert & Oriented X4, much improved but still mildly dysarthric, increased speech latency, no focal deficits but weak.     LABS:  02-02    131<L>  |  96<L>  |  27<H>  ----------------------------<  90  5.0   |  28  |  1.05  02-01    134<L>  |  100  |  30<H>  ----------------------------<  91  5.6<H>   |  27  |  1.12  01-31    133<L>  |  97<L>  |  28<H>  ----------------------------<  188<H>  5.2   |  25  |  0.81    Ca    9.0      02 Feb 2023 04:55  Ca    8.3<L>      01 Feb 2023 05:56  Ca    8.6      31 Jan 2023 13:32  Phos  5.5     02-02  Mg     1.90     02-02    TPro  6.0  /  Alb  3.3  /  TBili  0.3  /  DBili  x   /  AST  56<H>  /  ALT  101<H>  /  AlkPhos  85  02-02                        9.2    5.93  )-----------( 104      ( 03 Feb 2023 07:00 )             28.0                         10.4   5.36  )-----------( 99       ( 02 Feb 2023 04:55 )             31.5                         9.1    5.02  )-----------( 101      ( 01 Feb 2023 05:56 )             27.5     CAPILLARY BLOOD GLUCOSE    POCT Blood Glucose.: 171 mg/dL (03 Feb 2023 07:19)  POCT Blood Glucose.: 134 mg/dL (02 Feb 2023 21:49)  POCT Blood Glucose.: 269 mg/dL (02 Feb 2023 16:36)  POCT Blood Glucose.: 216 mg/dL (02 Feb 2023 11:30)      RADIOLOGY & ADDITIONAL TESTS:    Imaging Personally Reviewed:  [x ] YES  [ ] NO    Consultants involved in case:   Consultant(s) Notes Reviewed:  [ x] YES  [ ] NO:   Care Discussed with Consultants/Other Providers [x ] YES  [ ] NO         ***************************************************************  Miky Munguia, PGY1  Internal Medicine   pager:  [facility]: [phone] [facility]: [phone]  ***************************************************************    CURTIS DIAZ  67y  MRN: [id number]    Patient is a 67y old  Male who presents with a chief complaint of confusion, dizziness (03 Feb 2023 07:26)    Interval/Overnight Events: no events ON.  - pending PT reevaluation   - MRSA negative, dc vanc    Subjective: Pt seen and examined at bedside. Denies fever, CP, SOB, abn pain, N/V, dysuria. Tolerating diet.  Brother at bedside updated, reports that pt is substantially improved but still not at baseline as pt normally is able to speak and carry conversations without issue, no delay or hesitancy at all. Brother okay with rehab if deemed necessary.    MEDICATIONS  (STANDING):  atorvastatin 20 milliGRAM(s) Oral at bedtime  cholecalciferol 1000 Unit(s) Oral daily  cloZAPine 50 milliGRAM(s) Oral at bedtime  dextrose 5%. 1000 milliLiter(s) (50 mL/Hr) IV Continuous <Continuous>  dextrose 5%. 1000 milliLiter(s) (100 mL/Hr) IV Continuous <Continuous>  dextrose 50% Injectable 25 Gram(s) IV Push once  dextrose 50% Injectable 12.5 Gram(s) IV Push once  dextrose 50% Injectable 25 Gram(s) IV Push once  divalproex  milliGRAM(s) Oral at bedtime  ferrous    sulfate 325 milliGRAM(s) Oral at bedtime  finasteride 5 milliGRAM(s) Oral daily  glucagon  Injectable 1 milliGRAM(s) IntraMuscular once  heparin   Injectable 5000 Unit(s) SubCutaneous every 8 hours  insulin lispro (ADMELOG) corrective regimen sliding scale   SubCutaneous three times a day before meals  insulin lispro (ADMELOG) corrective regimen sliding scale   SubCutaneous at bedtime  latanoprost 0.005% Ophthalmic Solution 1 Drop(s) Both EYES at bedtime  levothyroxine 75 MICROGram(s) Oral daily  multivitamin 1 Tablet(s) Oral daily  piperacillin/tazobactam IVPB.. 3.375 Gram(s) IV Intermittent every 8 hours  pyridoxine 50 milliGRAM(s) Oral daily  tamsulosin 0.4 milliGRAM(s) Oral at bedtime  timolol 0.5% Solution 1 Drop(s) Both EYES two times a day  vancomycin  IVPB 1250 milliGRAM(s) IV Intermittent every 12 hours  venlafaxine XR. 75 milliGRAM(s) Oral at bedtime    MEDICATIONS  (PRN):  dextrose Oral Gel 15 Gram(s) Oral once PRN Blood Glucose LESS THAN 70 milliGRAM(s)/deciliter  OLANZapine Injectable 2.5 milliGRAM(s) IntraMuscular every 6 hours PRN agitation    Objective:    Vitals: Vital Signs Last 24 Hrs  T(C): 36.7 (02-03-23 @ 05:40), Max: 37 (02-02-23 @ 09:55)  T(F): 98 (02-03-23 @ 05:40), Max: 98.6 (02-02-23 @ 09:55)  HR: 70 (02-03-23 @ 05:40) (70 - 80)  BP: 169/84 (02-03-23 @ 05:40) (160/76 - 175/75)  BP(mean): --  RR: 17 (02-03-23 @ 05:40) (17 - 17)  SpO2: 95% (02-03-23 @ 05:40) (93% - 96%)                I&O's Summary    02 Feb 2023 07:01  -  03 Feb 2023 07:00  --------------------------------------------------------  IN: 175 mL / OUT: 500 mL / NET: -325 mL    PHYSICAL EXAM:  GENERAL: NAD  HEAD:  Atraumatic, Normocephalic  EYES: pupils minimally reactive, constricted. EOMI, conjunctiva and sclera clear  CHEST/LUNG: upper airway coarse crackles, poor auscultation of lung sounds on posterior fields.  HEART: Regular rate and rhythm; No murmurs, rubs, or gallops  ABDOMEN: Somewhat distended, tympanic on percussion. Soft, Nontender  SKIN: No rashes or lesions  NERVOUS SYSTEM:  Alert & Oriented X4, much improved but still mildly dysarthric, increased speech latency, no focal deficits but weak.     LABS:  02-02    131<L>  |  96<L>  |  27<H>  ----------------------------<  90  5.0   |  28  |  1.05  02-01    134<L>  |  100  |  30<H>  ----------------------------<  91  5.6<H>   |  27  |  1.12  01-31    133<L>  |  97<L>  |  28<H>  ----------------------------<  188<H>  5.2   |  25  |  0.81    Ca    9.0      02 Feb 2023 04:55  Ca    8.3<L>      01 Feb 2023 05:56  Ca    8.6      31 Jan 2023 13:32  Phos  5.5     02-02  Mg     1.90     02-02    TPro  6.0  /  Alb  3.3  /  TBili  0.3  /  DBili  x   /  AST  56<H>  /  ALT  101<H>  /  AlkPhos  85  02-02                        9.2    5.93  )-----------( 104      ( 03 Feb 2023 07:00 )             28.0                         10.4   5.36  )-----------( 99       ( 02 Feb 2023 04:55 )             31.5                         9.1    5.02  )-----------( 101      ( 01 Feb 2023 05:56 )             27.5     CAPILLARY BLOOD GLUCOSE    POCT Blood Glucose.: 171 mg/dL (03 Feb 2023 07:19)  POCT Blood Glucose.: 134 mg/dL (02 Feb 2023 21:49)  POCT Blood Glucose.: 269 mg/dL (02 Feb 2023 16:36)  POCT Blood Glucose.: 216 mg/dL (02 Feb 2023 11:30)      RADIOLOGY & ADDITIONAL TESTS:    Imaging Personally Reviewed:  [x ] YES  [ ] NO    Consultants involved in case:   Consultant(s) Notes Reviewed:  [ x] YES  [ ] NO:   Care Discussed with Consultants/Other Providers [x ] YES  [ ] NO

## 2023-02-03 NOTE — PROGRESS NOTE ADULT - ASSESSMENT
67M with PMHx schizophrenia, hx seizure, bipolar d/o, HTN, DM2, hx SBO presenting with confusion, slurred speech and difficulty ambulating x4 days. History was obtained from brother David at bedside who lives with patient as patient is not answering questions completely with some word finding difficulty At baseline patient oriented x3, conversive and ambulates without difficulty.  inpt, pt has hypothermia and labs with hyponatremia and hyperkalemia  with concern for myocarditis, infection, workup underway  endocrine called for hypothyroidism, pt also with DM     1. hypothyroidism   TSH mildly elevated with nl total  T4   this is very mild TSH elevation, would not adjust home levothyroxine dosing for this level   pt is on PPI at home and if this is taken with levothyroxine than this can decrease levoT absorption and thus lead to TSH elevation   pt has been on levothyroxine 75mcg long term, continue this inpt and on discharge  administer on epty stomach 4 hrs apart from iron.calcium/ppi- he is on iron inpt - please make sure this is timed correctly     2. Hypothermia/hyponatremia with hyperkalemia  this can be due to infection and other metabolic etiologies   pt is currently on vanc and zosyn  rule out neurologic etiologies, infectious workup   cortisol returned at 50- which is indicative of adequate adrenal reserve and rules out AI      3.DM  a1c 6.4  pt has DM as well- on metformin, Glipzide and januvia    While inpatient  BG target 100-180 mg/dl,   now clinically improving and taking in more PO intake   - Admelog  Low dose Correction Scale Premeal & seperate low dose  Correction Scale Bedtime   -if glucose rises >200 X2 ---> can start Lantus 8 and ademelog 2 units premeals    - Hypoglycemia protocol in place   - Carb Consistent Diet   - Nutrition consult     dc planning;  pt follows with outpt endocrine- can resume care with them  please inform otupt endocrine of inpt admission and need to fu outpt   may want to consider stopping glipizide given hypoglycemia at home   otherwise can resume home meds if no contraindications at dc (LFTS and GFR normal lactate normal)    Lisa Mancera MD  Attending Physician   Department of Endocrinology, Diabetes and Metabolism   Expect Labs Teams on 02-03-23 @ 11:38    If before 9AM or after 5PM, or on weekends/holidays, please call the Endocrine answering service for assistance ([phone]).  For nonurgent matters, please email [email or web address] for assistance.

## 2023-02-03 NOTE — DISCHARGE NOTE PROVIDER - NSDCCPCAREPLAN_GEN_ALL_CORE_FT
PRINCIPAL DISCHARGE DIAGNOSIS  Diagnosis: Acute encephalopathy  Assessment and Plan of Treatment:        PRINCIPAL DISCHARGE DIAGNOSIS  Diagnosis: Acute encephalopathy  Assessment and Plan of Treatment: You were admitted with confusion and we found that you had an infection in your lungs. After starting treatment with antibiotics your confusion resolved. You have finished your antibiotics during this hospitalization. Please follow up with your primary care provider within 2 weeks after discharge.  Contact a health care provider if you:  •Have a fever.  •Are coughing or choking while eating or drinking.  •Continue to have signs or symptoms of aspiration pneumonia.  Get help right away if you have:  •Worsening shortness of breath, wheezing, or difficulty breathing.  •Chest pain.         PRINCIPAL DISCHARGE DIAGNOSIS  Diagnosis: Acute encephalopathy  Assessment and Plan of Treatment: You were admitted with confusion and we found that you had an infection in your lungs. After starting treatment with antibiotics your confusion resolved. You have finished your antibiotics during this hospitalization. Please follow up with your primary care provider within 2 weeks after discharge.  Contact a health care provider if you:  •Have a fever.  •Are coughing or choking while eating or drinking.  •Continue to have signs or symptoms of aspiration pneumonia.  Get help right away if you have:  •Worsening shortness of breath, wheezing, or difficulty breathing.  •Chest pain.        SECONDARY DISCHARGE DIAGNOSES  Diagnosis: Pneumonia, aspiration  Assessment and Plan of Treatment: You were found with pneumonia, or an infection of your lungs, likely due to aspiration. Chest CT was performed and you were found with bilateral pleural effusions. Please follow up with your PCP and pulmonology for further care.

## 2023-02-03 NOTE — PROGRESS NOTE ADULT - PROBLEM SELECTOR PLAN 6
On metformin 1g BID, januvia 100mg qd, glipizide 5mg qd - hold inpatient. A1C 6.4    - ISS on lisinopril 10mg at home    - start losartan 25mg po qd

## 2023-02-03 NOTE — DISCHARGE NOTE PROVIDER - DISCHARGE DIET
Regular Diet - No restrictions/DASH Diet/Consistent Carbohydrate Diabetic Diets Regular Diet - No restrictions/DASH Diet/Consistent Carbohydrate Diabetic Diets/Nutrition Supplements

## 2023-02-03 NOTE — DISCHARGE NOTE PROVIDER - NSFOLLOWUPCLINICS_GEN_ALL_ED_FT
[facility]  Urology  [address] [place] [address] - [address] [place] [address]  Phone: [phone]  Fax:   Follow Up Time: 2 weeks     [facility]  Pulmonology  [address] [place] [address], [place] [address]  Phone: [phone]  Fax:   Follow Up Time: 1 month

## 2023-02-03 NOTE — PROGRESS NOTE ADULT - PROBLEM SELECTOR PLAN 2
Cr rise from 0.82 to 1.12. Now resolving    - will continue to monitor  - avoid nephrotoxic agents. Cr rise from 0.82 to 1.12. Now resolving    - will continue to monitor  - avoid nephrotoxic agents.    -- BUN/Cr 23/0.91. MARISSA resolving

## 2023-02-03 NOTE — PROGRESS NOTE ADULT - PROBLEM SELECTOR PLAN 5
Resume levothyroxine 75mcg qd. TSH mildly elevated, T3 low, T4 normal    - c/w home levothyroxine  - endo recs appreciated; avoid administered levothyroxine with PPI / Ca / Fe.

## 2023-02-03 NOTE — PROGRESS NOTE ADULT - PROBLEM SELECTOR PLAN 1
Baseline oriented 3, ambulates on own. Now oriented 1-2 with confusion, dysarthria, prolonged speech latency (motor/mixed aphasia), truncal ataxia. Of note, patient has been on 125mg of clozapine since 1997 and recent reduction to 50mg may be contributing. Course c/b initial bradycardia, hypotension, hypothermia.     CXR clear, UA normal, no signs of skin infections, no leukocytosis, though elevated NLR. T4 normal, TSH 4.91 mildly elevated, compliant with levothyroxine dosing. AM Cortisol elevated, unlikely AI. Troponin flat with elevated CKMB, TTE wnl. Per cards, unlikely myocarditis. Ammonium 58 mildly elevated likely not delivered on ice, LFTs wnl, no clonus or asterixis. Rheum w/u negative. Nutritional deficiency w/u negative. Patient is maintaining airway and responsive. Not lethargic, awake and alert. Follows commands. Now requiring 1-2L NC. MRI MRA head showed microvascular disease but no new stroke. CTCAP showed diffuse anasarca with pleural effusion, atelectasis, edema w/ opacity c/f pneumonia, likely source of instability. Ddx incl toxic metabolic encephalopathy iso sepsis vs. progression of psychiatric disorders.    - ICU consulted 1/31 for hypothermia, recommended stress dose hydrocortisone 100mg  - Endo consulted; recs appreciated. - unlikely to be AI. Will hold off on further steroids until infx r/o  - 1/30 BCx x2, UCx NGTD  - c/w broad spectrum vanc/zosyn; f/u MRSA swab to dc vanc  - ctm on tele  - c/w aspirin statin  - PT/OT/S&S eval - when can adequately follow commands and medically more stable. Bedrest for now  - psych consulted; recs appreciated -- aphasia/speech hesitancy 2/2 schizo/meds? Baseline oriented 3, ambulates on own. Now oriented 1-2 with confusion, dysarthria, prolonged speech latency (motor/mixed aphasia), truncal ataxia. Of note, patient has been on 125mg of clozapine since 1997 and recent reduction to 50mg may be contributing. Course c/b initial bradycardia, hypotension, hypothermia.     CXR clear, UA normal, no signs of skin infections, no leukocytosis, though elevated NLR. T4 normal, TSH 4.91 mildly elevated, compliant with levothyroxine dosing. AM Cortisol elevated, unlikely AI. Troponin flat with elevated CKMB, TTE wnl. Per cards, unlikely myocarditis. Ammonium 58 mildly elevated likely not delivered on ice, LFTs wnl, no clonus or asterixis. Rheum w/u negative. Nutritional deficiency w/u negative. Patient is maintaining airway and responsive. Not lethargic, awake and alert. Follows commands. Now requiring 1-2L NC. MRI MRA head showed microvascular disease but no new stroke. CTCAP showed diffuse anasarca with pleural effusion, atelectasis, edema w/ opacity c/f pneumonia, likely source of instability. Ddx incl toxic metabolic encephalopathy iso sepsis vs. progression of psychiatric disorders.    - ICU consulted 1/31 for hypothermia, recommended stress dose hydrocortisone 100mg  - Endo consulted; recs appreciated. - unlikely to be AI. Will hold off on further steroids until infx r/o  - 1/30 BCx x2, UCx NGTD  - c/w vanc/zosyn for 7 day course (1/31 - ) ; f/u MRSA swab to dc vanc  - ctm on tele  - c/w aspirin statin  - PT/OT/S&S eval - when can adequately follow commands and medically more stable. Bedrest for now  - psych consulted; recs appreciated -- aphasia/speech hesitancy 2/2 schizo/meds? Baseline oriented 3, ambulates on own. Now oriented 1-2 with confusion, dysarthria, prolonged speech latency (motor/mixed aphasia), truncal ataxia. Of note, patient has been on 125mg of clozapine since 1997 and recent reduction to 50mg may be contributing. Course c/b initial bradycardia, hypotension, hypothermia.     CXR clear, UA normal, no signs of skin infections, no leukocytosis, though elevated NLR. T4 normal, TSH 4.91 mildly elevated, compliant with levothyroxine dosing. AM Cortisol elevated, unlikely AI. Troponin flat with elevated CKMB, TTE wnl. Per cards, unlikely myocarditis. Ammonium 58 mildly elevated likely not delivered on ice, LFTs wnl, no clonus or asterixis. Rheum w/u negative. Nutritional deficiency w/u negative. Patient is maintaining airway and responsive. Not lethargic, awake and alert. Follows commands. Now requiring 1-2L NC. MRI MRA head showed microvascular disease but no new stroke. CTCAP showed diffuse anasarca with pleural effusion, atelectasis, edema w/ opacity c/f pneumonia, likely source of instability. Ddx incl toxic metabolic encephalopathy iso sepsis vs. progression of psychiatric disorders.    - ICU consulted 1/31 for hypothermia, recommended stress dose hydrocortisone 100mg  - Endo consulted; recs appreciated. - unlikely to be AI. Will hold off on further steroids until infx r/o  - abx as below  - ctm on tele  - c/w statin  - PT/OT/S&S eval - when can adequately follow commands and medically more stable. Bedrest for now  - psych consulted; recs appreciated

## 2023-02-03 NOTE — PROGRESS NOTE ADULT - PROBLEM SELECTOR PLAN 2
Cr rise from 0.82 to 1.12. Now resolving    - will continue to monitor  - avoid nephrotoxic agents. CT Chest w/ diffuse anasarca with pleural effusion, atelectasis, edema w/ opacity c/f pneumonia, likely source of instability. Course c/b hypothermia, bradycardia, hypotension on 1/31. 1/30 BCx x2, UCx NGTD. MRSA negative, s/p vanc 1/31-2/3.    - c/w zosyn for 7 day course (1/31 - )  - satting well on room air

## 2023-02-03 NOTE — PROGRESS NOTE ADULT - PROBLEM SELECTOR PLAN 9
Hospital Bundle    Fluids: po hydration  Electrolytes: Replete K > 4, Mg > 2, Phos > 3  Nutrition: Diet pureed  PPX  ---VTE: heparin subq given mild MARISSA  ---GI: n/a  ---Resp: 2L NC  Access: PIV    Code Status: FULL CODE  Dispo: pending clinical improvement.

## 2023-02-03 NOTE — PROGRESS NOTE ADULT - PROBLEM SELECTOR PLAN 8
- restart home finasteride  - restart home tamsulosin  - continue TOV - restart home finasteride  - restart home tamsulosin  - continue TOV    -- Pt is intermittently voiding but still retaining. intermittent straight cath as needed

## 2023-02-03 NOTE — BH CONSULTATION LIAISON PROGRESS NOTE - NSBHCHARTREVIEWVS_PSY_A_CORE FT
Vital Signs Last 24 Hrs  T(C): 36.3 (03 Feb 2023 09:40), Max: 36.8 (02 Feb 2023 13:55)  T(F): 97.4 (03 Feb 2023 09:40), Max: 98.2 (02 Feb 2023 13:55)  HR: 79 (03 Feb 2023 09:40) (70 - 80)  BP: 152/87 (03 Feb 2023 09:40) (152/87 - 175/75)  BP(mean): --  RR: 17 (03 Feb 2023 09:40) (17 - 17)  SpO2: 91% (03 Feb 2023 09:40) (91% - 96%)    Parameters below as of 03 Feb 2023 09:40  Patient On (Oxygen Delivery Method): room air    
Vital Signs Last 24 Hrs  T(C): 36.3 (01 Feb 2023 10:52), Max: 37 (31 Jan 2023 21:24)  T(F): 97.4 (01 Feb 2023 10:52), Max: 98.6 (31 Jan 2023 21:24)  HR: 85 (01 Feb 2023 10:52) (85 - 94)  BP: 163/93 (01 Feb 2023 10:52) (118/61 - 163/93)  BP(mean): --  RR: 17 (01 Feb 2023 10:52) (17 - 21)  SpO2: 95% (01 Feb 2023 10:52) (82% - 96%)    Parameters below as of 01 Feb 2023 10:52  Patient On (Oxygen Delivery Method): nasal cannula

## 2023-02-03 NOTE — DISCHARGE NOTE PROVIDER - NSDCFUSCHEDAPPT_GEN_ALL_CORE_FT
Marilynn Butcher  [facility]  [address]  Scheduled Appointment: 03/09/2023    Nehal Eckert  [facility]  [address]  Scheduled Appointment: 03/17/2023

## 2023-02-03 NOTE — PROGRESS NOTE ADULT - PROBLEM SELECTOR PLAN 7
- psych consulted; recs appreciated   - c/w clozapine to 50mg qHS, Depakote to 750mg qHS, if thrombocytopenia worsens or patient continually lethargic would consider reducing to 500mg unless objections from neuro  - c/w Effexor as ordered, if persistently hyponatremic would assess for SIADH and consider stopping   - PRN for agitation Zyprexa 2.5mg q6h PRN PO/IM. - psych consulted; recs appreciated   - c/w clozapine to 50mg qHS, Depakote to 750mg qHS, if thrombocytopenia worsens or patient continually lethargic would consider reducing to 500mg unless objections from neuro  - c/w Effexor as ordered, if persistently hyponatremic would assess for SIADH and consider stopping   - PRN for agitation Zyprexa 2.5mg q6h PRN PO/IM.    -- f/u psych- possible inc to 75mg at night

## 2023-02-03 NOTE — PROGRESS NOTE ADULT - PROBLEM SELECTOR PLAN 4
Anemia and thrombocytopenia noted on admission. Patient recently had clozapine dosage decreased for c/f agranulocytosis, WBC wnl on admission but may be falsely low. Negative LDH, retic, haptoglobin, blue top - neg for hemolysis. Folate, b12, ferritin, iron studies - unremarkable. No signs of active bleed, but of note had precancerous polyp removed 3 years ago per brother and had trouble getting repeat C-scope done since.     - heme consulted; recs appreciated  - f/u outpatient PMD for C-scope  - trend CBC, transfuse as necessary. Desaturations x 2 to low 80s on room air while sleeping overnight. Could be due to sleep apnea vs some component of aspiration pneumonia given his mental status change. CT Chest showed pleural effusion b/l, atelectasis, consolidation c/f pna. Currently on 2 L NC    - wean NC as tolerated  - c/w abx as above

## 2023-02-03 NOTE — PROGRESS NOTE ADULT - ATTENDING COMMENTS
67 M with schizophrenia admitted with several days of encephalopathy, found to have pancytopenia, SIRS, hypothermia, hypotension, hyperkalemia, on admission, likely 2/2 sepsis 2/2 pneumonia.      Sepsis 2/2 pneumonia (CT scan showing likely pna). c/w broad spectrum abx (Aspiration & MRSA coverage) MRSA neg, can d/c Vanc. c/w Zosyn.   Hypothermia/hypotension now resolved   MARISSA, likely ATN in setting of sepsis/hypotension, improving    Encephalopathy improving, pt speaking now, still not at baseline per brother     Concern for clozapine-induced myocarditis, TTE with grossly Nl LV, trops OK, unlikely myocarditis.  Cards input appreciated.    Pancytopenia improved, likely in setting of sepsis + med-induced from outpatient    f/u PT recs     Discussed at length with patient and brother at bedside.

## 2023-02-03 NOTE — PROGRESS NOTE ADULT - PROBLEM SELECTOR PROBLEM 8
BPH (benign prostatic hyperplasia) Type 2 diabetes mellitus with hyperglycemia, without long-term current use of insulin

## 2023-02-03 NOTE — OCCUPATIONAL THERAPY INITIAL EVALUATION ADULT - DIAGNOSIS, OT EVAL
Acute encephalopathy secondary to sepsis from PNA; AAO x2; Decreased standing balance; Decreased functional mobility; Decreased ADL management

## 2023-02-03 NOTE — BH CONSULTATION LIAISON PROGRESS NOTE - MSE UNSTRUCTURED FT
limited exam    no catalepsy, mild jerking rigidity, no clonus b/l in arms but making purposeless movements at times, shakes hand and appropriately extinguishes on command, no negativism noted, notable head movements, exaggerated blinking and grimacing improved
Mental Status Exam:  Miguel: well groomed, fair hygiene     Behavior: calm, cooperative, no psychomotor retardation/agitation  Motor: no tremors at rest, EPS, some dysmetria in arms still remains worse on left than right   Gait: did not assess, pt in bed  Speech: normal rate, rhythm, prosedy and volume   Mood: "okay"  Affect: euthymic, congruent  Thought process: clear, goal directed   Thought Content: denies paranoia, delusions   Perception: denies AH/VH  SI: denies  HI: denies  Insight: fair  Judgment: fair    Cognitive Exam:  Orientation: AOx3  Attention improving

## 2023-02-03 NOTE — PROGRESS NOTE ADULT - PROBLEM SELECTOR PLAN 8
- restart home finasteride  - restart home tamsulosin  - continue TOV On metformin 1g BID, januvia 100mg qd, glipizide 5mg qd - hold inpatient. A1C 6.4    - ISS

## 2023-02-03 NOTE — DISCHARGE NOTE PROVIDER - HOSPITAL COURSE
Discharge Summary     Admit Date: 01-30-23  Discharge Date:    Admission diagnoses:   Ataxia  Pneumonia  Pleural Effusion  Toxic Metabolic Encephalopathy    Hospital Course:   For full details, please see H&P, progress notes, consult notes and ancillary notes. Briefly, CURTIS DIAZ is a 67y Male with PMHx schizophrenia, hx seizure, bipolar d/o, HTN, DM2, hx SBO presenting with confusion, slurred speech and difficulty ambulating x4 days. Admitted for acute encephalopathy 2/2 sepsis from PNA. In the ED, patient was found with slurred speech and altered sensorium. Stroke workup was initiated but CTA head and subsequent MR head showed no acute intracranial hemorrhages or evidence of acute ischemia. Night of admission, patient had an episode of hypothermia to 90F, hypotension, and bradycardia. MICU was consulted and recommended stress dose steroid x1. Psych was consulted as well and recommended holding all antipsychotics including clozaril. Subsequent evaluations by psych ruled out catatonia and clozaril was gradually restarted. Initial CXR was clear but repeat CXR 48h after showed bilateral diffuse symmetrical opacities. CT chest abdomen pelvis was done revealing bilateral moderate pleural effusions with near complete right lower lobe and left lower neha collapse, mild pulmonary edema c/f PNA, as well as diffuse anasarca. Given development of pneumonia, likely source of AMS. Patient was treated with a 7 day course of zosyn with progressive clinical improvement. Patient required 2LPM of NC O2, likely given development of pulmonary effusions.  Repeat bladder scans and straight caths showed patient retaining urine despite reinitiation of finasteride and flomax; Pennington was placed 2/4/2023. Speech therapy cleared patient for regular diet with thin liquids. Physical, occupational therapy consults obtained and recommended LINDSEY.    On day of discharge, patient is clinically stable with no new exam findings or acute symptoms compared to prior. The patient was seen by the attending physician on the date of discharge and deemed stable and acceptable for discharge. The patient's chronic medical conditions were treated accordingly per the patient's home medication regimen. The patient's medication reconciliation (with changes made to chronic medications), follow up appointments, discharge orders, instructions, and significant lab and diagnostic studies are as noted.     Discharge follow up action items:     1. Follow up with PCP in 1-2 weeks. Follow up with urologist in 1-2 weeks for Pennington removal. Follow up with psychiatry for further care.  2. Follow up final BCx    Patient's ordered code status: FULL    Patient disposition: LINDSEY      Discharge Summary     Admit Date: 01-30-23  Discharge Date: 02-13-23    Admission diagnoses:   Ataxia  Pneumonia  Pleural Effusion  Toxic Metabolic Encephalopathy    Hospital Course:   For full details, please see H&P, progress notes, consult notes and ancillary notes. Briefly, CURTIS DIAZ is a 67y Male with PMHx schizophrenia, hx seizure, bipolar d/o, HTN, DM2, hx SBO presenting with confusion, slurred speech and difficulty ambulating x4 days. Admitted for acute encephalopathy 2/2 sepsis from PNA. In the ED, patient was found with slurred speech and altered sensorium. Stroke workup was initiated but CTA head and subsequent MR head showed no acute intracranial hemorrhages or evidence of acute ischemia. Night of admission, patient had an episode of hypothermia to 90F, hypotension, and bradycardia. MICU was consulted and recommended stress dose steroid x1. Psych was consulted as well and recommended holding all antipsychotics including clozaril. Subsequent evaluations by psych ruled out catatonia and clozaril was gradually restarted. Initial CXR was clear but repeat CXR 48h after showed bilateral diffuse symmetrical opacities. CT chest abdomen pelvis was done revealing bilateral moderate pleural effusions with near complete right lower lobe and left lower neha collapse, mild pulmonary edema c/f PNA, as well as diffuse anasarca. Given development of pneumonia, likely source of AMS. Patient was treated with a 7 day course of zosyn with progressive clinical improvement. Patient required 2LPM of NC O2, likely given development of pulmonary effusions.  Repeat bladder scans and straight caths showed patient retaining urine despite reinitiation of finasteride and flomax; Pennington was placed 2/4/2023, ToV successful and Pennington removed. Speech therapy cleared patient for regular diet with thin liquids. Physical, occupational therapy consults obtained and recommended LINDSEY.    On day of discharge, patient is clinically stable with no new exam findings or acute symptoms compared to prior. The patient was seen by the attending physician on the date of discharge and deemed stable and acceptable for discharge. The patient's chronic medical conditions were treated accordingly per the patient's home medication regimen. The patient's medication reconciliation (with changes made to chronic medications), follow up appointments, discharge orders, instructions, and significant lab and diagnostic studies are as noted.     Discharge follow up action items:     1. Follow up with PCP in 1-2 weeks. Follow up with psychiatry for further care.  2. Follow up with pulmonology in 4-6 weeks and obtain repeat CT Chest to follow up bilateral pleural effusions.    Patient's ordered code status: FULL    Patient disposition: LINDSEY      Admit Date: 01-30-23  Discharge Date: 02-13-23    Admission diagnoses:   Ataxia  Pneumonia  Pleural Effusion  Toxic Metabolic Encephalopathy    Hospital Course:   For full details, please see H&P, progress notes, consult notes and ancillary notes. Briefly, CURTIS DIAZ is a 67y Male with PMHx schizophrenia, hx seizure, bipolar d/o, HTN, DM2, hx SBO presenting with confusion, slurred speech and difficulty ambulating x4 days. Admitted for acute encephalopathy 2/2 sepsis from PNA. In the ED, patient was found with slurred speech and altered sensorium. Stroke workup was initiated but CTA head and subsequent MR head showed no acute intracranial hemorrhages or evidence of acute ischemia. Night of admission, patient had an episode of hypothermia to 90F, hypotension, and bradycardia. MICU was consulted and recommended stress dose steroid x1. Psych was consulted as well and recommended holding all antipsychotics including clozaril. Subsequent evaluations by psych ruled out catatonia and clozaril was gradually restarted. Initial CXR was clear but repeat CXR 48h after showed bilateral diffuse symmetrical opacities. CT chest abdomen pelvis was done revealing bilateral moderate pleural effusions with near complete right lower lobe and left lower neha collapse, mild pulmonary edema c/f PNA, as well as diffuse anasarca. Given development of pneumonia, likely source of AMS. Patient was treated with a 7 day course of zosyn with progressive clinical improvement. Patient required 2LPM of NC O2, likely given development of pulmonary effusions.  Repeat bladder scans and straight caths showed patient retaining urine despite reinitiation of finasteride and flomax; Pennington was placed 2/4/2023, ToV successful and Pennington removed. Speech therapy cleared patient for regular diet with thin liquids. Physical, occupational therapy consults obtained and recommended LINDSEY.    On day of discharge, patient is clinically stable with no new exam findings or acute symptoms compared to prior. The patient was seen by the attending physician on the date of discharge and deemed stable and acceptable for discharge. The patient's chronic medical conditions were treated accordingly per the patient's home medication regimen. The patient's medication reconciliation (with changes made to chronic medications), follow up appointments, discharge orders, instructions, and significant lab and diagnostic studies are as noted.     Discharge follow up action items:     1. Follow up with PCP in 1-2 weeks. Follow up with psychiatry for further care.  2. Follow up with pulmonology in 4-6 weeks and obtain repeat CT Chest to follow up bilateral pleural effusions.    Patient's ordered code status: FULL    Patient disposition: LINDSEY

## 2023-02-03 NOTE — OCCUPATIONAL THERAPY INITIAL EVALUATION ADULT - PERTINENT HX OF CURRENT PROBLEM, REHAB EVAL
Pt is a 67 year old male with hx of schizophrenia, seizure, bipolar disorder, HTN, DM2, & SBO, who presented to [facility] on 1/30/23 with confusion, slurred speech and difficulty ambulating x4 days. Pt admitted for acute encephalopathy secondary to sepsis from PNA. CT Head on 1/30/23 displayed no acute intracranial mass effect, hemorrhage, midline shift or extra-axial fluid collection. CT Chest on 2/1/23 displayed Bilateral moderate pleural effusion with adjacent lobar atelectasis with near complete right lower lobe and partial left lower lobe collapse. Mild pulmonary edema pattern with regions of patchy airspace opacity concerning for pneumonia. Diffuse anasarca. No evidence of acute abnormality in the abdomen and pelvis. MR Head No Contrast on 2/1/23 displayed no acute intracranial hemorrhage or evidence of acute ischemia. Multiple nonspecific abnormal white matter foci of T2/FLAIR prolongation statistically favoring microvascular disease. Xray of Chest ton 2/1/23 displayed Bilateral diffuse symmetrical opacities likely pulmonary edema.

## 2023-02-03 NOTE — DISCHARGE NOTE PROVIDER - NSDCCAREPROVSEEN_GEN_ALL_CORE_FT
[facility] Medicine, Team 8    José Luis Larsen [facility] Medicine, Team 8

## 2023-02-03 NOTE — PROGRESS NOTE ADULT - ASSESSMENT
67M with PMHx schizophrenia, hx seizure, bipolar d/o, HTN, DM2, hx SBO presenting with confusion, slurred speech and difficulty ambulating x4 days. Admitted for acute encephalopathy, sepsis 2/2 pneumonia 67M with PMHx schizophrenia, hx seizure, bipolar d/o, HTN, DM2, hx SBO presenting with confusion, slurred speech and difficulty ambulating x4 days. Admitted for acute encephalopathy, sepsis 2/2 pneumonia.

## 2023-02-03 NOTE — PROGRESS NOTE ADULT - PROBLEM SELECTOR PLAN 9
Hospital Bundle    Fluids: po hydration  Electrolytes: Replete K > 4, Mg > 2, Phos > 3  Nutrition: Diet pureed  PPX  ---VTE: heparin subq given mild MARISSA  ---GI: n/a  ---Resp: 2L NC  Access: PIV    Code Status: FULL CODE  Dispo: pending clinical improvement. - psych consulted; recs appreciated   - c/w clozapine to 50mg qHS, Depakote to 750mg qHS, if thrombocytopenia worsens or patient continually lethargic would consider reducing to 500mg unless objections from neuro  - c/w Effexor as ordered, if persistently hyponatremic would assess for SIADH and consider stopping   - PRN for agitation Zyprexa 2.5mg q6h PRN PO/IM. - psych consulted; recs appreciated   - increased clozapine to 75mg qHS, then 100mg qhs on Sunday 2/5.   - c/w Depakote to 750mg qHS, if thrombocytopenia worsens or patient continually lethargic would consider reducing to 500mg unless objections from neuro  - c/w Effexor as ordered, if persistently hyponatremic would assess for SIADH and consider stopping   - PRN for agitation Zyprexa 2.5mg q6h PRN PO/IM.

## 2023-02-03 NOTE — PROGRESS NOTE ADULT - PROBLEM SELECTOR PLAN 3
Desaturations x 2 to low 80s on room air while sleeping overnight. Could be due to sleep apnea vs some component of aspiration pneumonia given his mental status change. CT Chest showed pleural effusion b/l, atelectasis, consolidation c/f pna. Currently on 2 L NC    - wean NC as tolerated  - c/w abx as above Cr rise from 0.82 to 1.12. Now resolving    - will continue to monitor  - avoid nephrotoxic agents.

## 2023-02-03 NOTE — PROGRESS NOTE ADULT - PROBLEM SELECTOR PLAN 3
Desaturations x 2 to low 80s on room air while sleeping overnight. Could be due to sleep apnea vs some component of aspiration pneumonia given his mental status change. CT Chest showed pleural effusion b/l, atelectasis, consolidation c/f pna. Currently on 2 L NC    - wean NC as tolerated  - c/w abx as above Desaturations x 2 to low 80s on room air while sleeping overnight. Could be due to sleep apnea vs some component of aspiration pneumonia given his mental status change. CT Chest showed pleural effusion b/l, atelectasis, consolidation c/f pna. Currently on 2 L NC    - wean NC as tolerated  - c/w abx as above    -- desat ON w/o NC, tolerates RA during day. possible CASSY contributing factor w/ongoing infection

## 2023-02-03 NOTE — PROGRESS NOTE ADULT - SUBJECTIVE AND OBJECTIVE BOX
HPI:  67M with PMHx schizophrenia, hx seizure, bipolar d/o, HTN, DM2, hx SBO presenting with confusion, slurred speech and difficulty ambulating x4 days. History was obtained from brother David at bedside who lives with patient as patient is not answering questions completely with some word finding difficulty.  inpt, pt has hypothermia and labs with hyponatremia and hyperkalemia  with concern for myocarditis, infection, workup underway  endocrine called for hypothyroidism, pt also with DM     interval hx  patient denies complaints  glucoses in goal range  appetite ok    MEDICATIONS  (STANDING):  atorvastatin 20 milliGRAM(s) Oral at bedtime  cholecalciferol 1000 Unit(s) Oral daily  cloZAPine 50 milliGRAM(s) Oral at bedtime  dextrose 5%. 1000 milliLiter(s) (50 mL/Hr) IV Continuous <Continuous>  dextrose 5%. 1000 milliLiter(s) (100 mL/Hr) IV Continuous <Continuous>  dextrose 50% Injectable 25 Gram(s) IV Push once  dextrose 50% Injectable 12.5 Gram(s) IV Push once  dextrose 50% Injectable 25 Gram(s) IV Push once  divalproex  milliGRAM(s) Oral at bedtime  ferrous    sulfate 325 milliGRAM(s) Oral at bedtime  finasteride 5 milliGRAM(s) Oral daily  glucagon  Injectable 1 milliGRAM(s) IntraMuscular once  heparin   Injectable 5000 Unit(s) SubCutaneous every 8 hours  insulin lispro (ADMELOG) corrective regimen sliding scale   SubCutaneous three times a day before meals  insulin lispro (ADMELOG) corrective regimen sliding scale   SubCutaneous at bedtime  latanoprost 0.005% Ophthalmic Solution 1 Drop(s) Both EYES at bedtime  levothyroxine 75 MICROGram(s) Oral daily  losartan 25 milliGRAM(s) Oral daily  multivitamin 1 Tablet(s) Oral daily  piperacillin/tazobactam IVPB.. 3.375 Gram(s) IV Intermittent every 8 hours  pyridoxine 50 milliGRAM(s) Oral daily  tamsulosin 0.8 milliGRAM(s) Oral at bedtime  timolol 0.5% Solution 1 Drop(s) Both EYES two times a day  vancomycin  IVPB 1250 milliGRAM(s) IV Intermittent every 12 hours  venlafaxine XR. 75 milliGRAM(s) Oral at bedtime    MEDICATIONS  (PRN):  dextrose Oral Gel 15 Gram(s) Oral once PRN Blood Glucose LESS THAN 70 milliGRAM(s)/deciliter  OLANZapine Injectable 2.5 milliGRAM(s) IntraMuscular every 6 hours PRN agitation      Allergies    No Known Allergies    Intolerances        Review of Systems:  Constitutional: No fever, good appetite/po intake  Eyes: No blurry vision, diplopia  Neuro: No tremors  HEENT: No pain  Cardiovascular: No chest pain, palpitations  Respiratory: No SOB, no cough  GI: No nausea, vomiting,   : No dysuria, hematuria  Skin: no rash  Psych: no depression  Endocrine: no polyuria, polydipsia  Hem/lymph: no swelling  Osteoporosis: no fractures    ALL OTHER SYSTEMS REVIEWED AND NEGATIVE    PHYSICAL EXAM:  VITALS: T(C): 36.3 (02-03-23 @ 09:40)  T(F): 97.4 (02-03-23 @ 09:40), Max: 98.2 (02-02-23 @ 13:55)  HR: 79 (02-03-23 @ 09:40) (70 - 80)  BP: 152/87 (02-03-23 @ 09:40) (152/87 - 175/75)  RR:  (17 - 17)  SpO2:  (91% - 96%)  Wt(kg): --  GENERAL: NAD, well-groomed, well-developed  EYES: No proptosis, no lid lag, anicteric  HEENT:  Atraumatic, normocephalic, moist mucous membranes  RESPIRATORY: nonlabored respirations, no wheezing  PSYCH: Alert and oriented x 3, normal affect, normal mood    POCT Blood Glucose.: 158 mg/dL (02-03-23 @ 11:22)  POCT Blood Glucose.: 171 mg/dL (02-03-23 @ 07:19)  POCT Blood Glucose.: 134 mg/dL (02-02-23 @ 21:49)  POCT Blood Glucose.: 269 mg/dL (02-02-23 @ 16:36)  POCT Blood Glucose.: 216 mg/dL (02-02-23 @ 11:30)  POCT Blood Glucose.: 96 mg/dL (02-02-23 @ 07:19)  POCT Blood Glucose.: 96 mg/dL (02-01-23 @ 21:59)  POCT Blood Glucose.: 84 mg/dL (02-01-23 @ 16:43)  POCT Blood Glucose.: 94 mg/dL (02-01-23 @ 11:18)  POCT Blood Glucose.: 114 mg/dL (02-01-23 @ 07:18)  POCT Blood Glucose.: 101 mg/dL (02-01-23 @ 01:33)  POCT Blood Glucose.: 107 mg/dL (01-31-23 @ 21:53)  POCT Blood Glucose.: 124 mg/dL (01-31-23 @ 16:39)                            9.2    5.93  )-----------( 104      ( 03 Feb 2023 07:00 )             28.0       02-03    134<L>  |  97<L>  |  23  ----------------------------<  122<H>  5.3   |  31  |  0.91    eGFR: 92    Ca    8.9      02-03  Mg     1.70     02-03  Phos  4.6     02-03    TPro  6.0  /  Alb  3.3  /  TBili  0.3  /  DBili  x   /  AST  56<H>  /  ALT  101<H>  /  AlkPhos  85  02-02      Thyroid Function Tests:  01-30 @ 16:49 TSH 4.91 FreeT4 -- T3 46 Anti TPO -- Anti Thyroglobulin Ab -- TSI --      01-31 Chol 89 Direct LDL -- LDL calculated 23 HDL 53 Trig 64    Radiology:

## 2023-02-03 NOTE — BH CONSULTATION LIAISON PROGRESS NOTE - NSBHFUPINTERVALHXFT_PSY_A_CORE
patient seen last night and this AM, is awake, alert, has less odd orofacial movements, tremors. Today patient AOx3, able to follow complex commands (opening book to specific page, decanting water from one vessel to another),  denies AH/VH delusions or paranoia, denies mood disturbances  discussed with Dr. Carter and Brother- OK with increasing clozapine, risks/benefits discussed
patient lethargic on exam but able to  be awoken, has less errant purposeless movements, able to shake hands, no negativism, rigidity, catalepsy, AOx1 at most able to whisper that he is in hospital    discussed with Dr. Carter and Brother- would keep clozapine same for now so as to avoid oversedation

## 2023-02-03 NOTE — PROGRESS NOTE ADULT - PROBLEM SELECTOR PLAN 5
Resume levothyroxine 75mcg qd. TSH mildly elevated, T3 low, T4 normal    - c/w home levothyroxine  - endo recs appreciated; avoid administered levothyroxine with PPI / Ca / Fe. Anemia and thrombocytopenia noted on admission. Patient recently had clozapine dosage decreased for c/f agranulocytosis, WBC wnl on admission but may be falsely low. Negative LDH, retic, haptoglobin, blue top - neg for hemolysis. Folate, b12, ferritin, iron studies - unremarkable. No signs of active bleed, but of note had precancerous polyp removed 3 years ago per brother and had trouble getting repeat C-scope done since.     - heme consulted; recs appreciated  - f/u outpatient PMD for C-scope  - trend CBC, transfuse as necessary.

## 2023-02-03 NOTE — PROGRESS NOTE ADULT - PROBLEM SELECTOR PLAN 10
- restart home finasteride  - restart home tamsulosin  - continue TOV - restart home finasteride  - restart home tamsulosin -- increased to 0.8mg po qhs  - continue TOV

## 2023-02-03 NOTE — BH CONSULTATION LIAISON PROGRESS NOTE - NSBHASSESSMENTFT_PSY_ALL_CORE
67 M with PPH of schizophrenia on clozapine, PMH of HTN, DMII, SBO, seizures, adm for confusion, slurred speech, psych c/s for med mgmt.     Patient with AMS of unclear etiology, currently undergoing workup for sepsis and also for neurologic causes. There is some concern for catatonia though this would not occur abruptly nor would one expect hypotension, may have some features of this in context of delirium. Would reduce depakote given intermittent thrombocytopenia, would not stop given history of seizures. Would reduce clozapine given (low) concern for myocarditis, would not stop abruptly as this can cause malignant catatonia which is on ddx.     2/1 - cardiology workup appreciated, low suspicion for myocarditis. as patient very lethargic would continue to hold at lower dose of clozapine for now so as to avoid oversedation. Less concern for catatonia today, appears delirious with waxing/waning arousal. Plt unchanged.     Recs:  - C/w clozapine to 50mg qHS, DC the AM dose  - C/w Depakote to 750mg qHS, if thrombocytopenia worsens or patient continually lethargic would consider reducing to 500mg unless objections from neuro  - OK to c/w Effexor as ordered, if persistantly hyponatremic would assess for SIADH and consider stopping   - PRN for agitation Zyprexa 2.5mg q6h PRN PO/IM  - please trend LFTs, ammonia  - if serum ammonia still elevated would consider L-carnitine IV  - psych will follow  - neuro, cardiology f/u appreciated  
67 M with PPH of schizophrenia on clozapine, PMH of HTN, DMII, SBO, seizures, adm for confusion, slurred speech, psych c/s for med mgmt.     Patient with AMS of unclear etiology, currently undergoing workup for sepsis and also for neurologic causes. There is some concern for catatonia though this would not occur abruptly nor would one expect hypotension, may have some features of this in context of delirium. Would reduce depakote given intermittent thrombocytopenia, would not stop given history of seizures. Would reduce clozapine given (low) concern for myocarditis, would not stop abruptly as this can cause malignant catatonia which is on ddx.     2/1 - cardiology workup appreciated, low suspicion for myocarditis. as patient very lethargic would continue to hold at lower dose of clozapine for now so as to avoid oversedation. Less concern for catatonia today, appears delirious with waxing/waning arousal. Plt unchanged.    2/2-3 - much more awake, no concern for catatonia, no dystarthia, delirium clearing, would increase clozapine to 75mg qHS for 2d then increase to 100mg, would keep depakote the same until thrombocytopenia resolves     Recs:  - Increase clozapine to 75mg qHS for 2d, then increase to 100mg qHS  - C/w Depakote to 750mg qHS, if thrombocytopenia worsens or patient continually lethargic would consider reducing to 500mg unless objections from neuro  - OK to c/w Effexor as ordered, if persistantly hyponatremic would assess for SIADH and consider stopping   - PRN for agitation Zyprexa 2.5mg q6h PRN PO/IM  - please trend LFTs, ammonia  - if serum ammonia still elevated would consider L-carnitine IV  - psych will follow  - neuro, cardiology f/u appreciated  - no psychiatric contraindications to discharge, can f/u with Dr. Carter [phone]

## 2023-02-03 NOTE — BH CONSULTATION LIAISON PROGRESS NOTE - NSBHATTESTBILLING_PSY_A_CORE
44145-Fffoagplih OBS or IP - moderate complexity OR 35-49 mins
92473-Zuuuywuugx OBS or IP - high complexity OR 50-79 mins

## 2023-02-04 LAB
ANION GAP SERPL CALC-SCNC: 7 MMOL/L — SIGNIFICANT CHANGE UP (ref 7–14)
BUN SERPL-MCNC: 20 MG/DL — SIGNIFICANT CHANGE UP (ref 7–23)
CALCIUM SERPL-MCNC: 9.1 MG/DL — SIGNIFICANT CHANGE UP (ref 8.4–10.5)
CHLORIDE SERPL-SCNC: 98 MMOL/L — SIGNIFICANT CHANGE UP (ref 98–107)
CO2 SERPL-SCNC: 32 MMOL/L — HIGH (ref 22–31)
CREAT SERPL-MCNC: 0.9 MG/DL — SIGNIFICANT CHANGE UP (ref 0.5–1.3)
CULTURE RESULTS: SIGNIFICANT CHANGE UP
CULTURE RESULTS: SIGNIFICANT CHANGE UP
EGFR: 93 ML/MIN/1.73M2 — SIGNIFICANT CHANGE UP
GLUCOSE SERPL-MCNC: 131 MG/DL — HIGH (ref 70–99)
HCT VFR BLD CALC: 30.9 % — LOW (ref 39–50)
HGB BLD-MCNC: 10 G/DL — LOW (ref 13–17)
MAGNESIUM SERPL-MCNC: 1.5 MG/DL — LOW (ref 1.6–2.6)
MCHC RBC-ENTMCNC: 30.6 PG — SIGNIFICANT CHANGE UP (ref 27–34)
MCHC RBC-ENTMCNC: 32.4 GM/DL — SIGNIFICANT CHANGE UP (ref 32–36)
MCV RBC AUTO: 94.5 FL — SIGNIFICANT CHANGE UP (ref 80–100)
NRBC # BLD: 0 /100 WBCS — SIGNIFICANT CHANGE UP (ref 0–0)
NRBC # FLD: 0 K/UL — SIGNIFICANT CHANGE UP (ref 0–0)
PHOSPHATE SERPL-MCNC: 4.4 MG/DL — SIGNIFICANT CHANGE UP (ref 2.5–4.5)
PLATELET # BLD AUTO: 110 K/UL — LOW (ref 150–400)
POTASSIUM SERPL-MCNC: 5.3 MMOL/L — SIGNIFICANT CHANGE UP (ref 3.5–5.3)
POTASSIUM SERPL-SCNC: 5.3 MMOL/L — SIGNIFICANT CHANGE UP (ref 3.5–5.3)
RBC # BLD: 3.27 M/UL — LOW (ref 4.2–5.8)
RBC # FLD: 14.6 % — HIGH (ref 10.3–14.5)
SODIUM SERPL-SCNC: 137 MMOL/L — SIGNIFICANT CHANGE UP (ref 135–145)
SPECIMEN SOURCE: SIGNIFICANT CHANGE UP
SPECIMEN SOURCE: SIGNIFICANT CHANGE UP
WBC # BLD: 5.67 K/UL — SIGNIFICANT CHANGE UP (ref 3.8–10.5)
WBC # FLD AUTO: 5.67 K/UL — SIGNIFICANT CHANGE UP (ref 3.8–10.5)

## 2023-02-04 PROCEDURE — 99232 SBSQ HOSP IP/OBS MODERATE 35: CPT

## 2023-02-04 RX ORDER — MAGNESIUM SULFATE 500 MG/ML
1 VIAL (ML) INJECTION
Refills: 0 | Status: DISCONTINUED | OUTPATIENT
Start: 2023-02-04 | End: 2023-02-04

## 2023-02-04 RX ORDER — INSULIN LISPRO 100/ML
2 VIAL (ML) SUBCUTANEOUS
Refills: 0 | Status: DISCONTINUED | OUTPATIENT
Start: 2023-02-04 | End: 2023-02-06

## 2023-02-04 RX ORDER — MAGNESIUM SULFATE 500 MG/ML
2 VIAL (ML) INJECTION ONCE
Refills: 0 | Status: COMPLETED | OUTPATIENT
Start: 2023-02-04 | End: 2023-02-04

## 2023-02-04 RX ORDER — SENNA PLUS 8.6 MG/1
2 TABLET ORAL AT BEDTIME
Refills: 0 | Status: DISCONTINUED | OUTPATIENT
Start: 2023-02-04 | End: 2023-02-07

## 2023-02-04 RX ORDER — POLYETHYLENE GLYCOL 3350 17 G/17G
17 POWDER, FOR SOLUTION ORAL
Refills: 0 | Status: DISCONTINUED | OUTPATIENT
Start: 2023-02-04 | End: 2023-02-13

## 2023-02-04 RX ORDER — INSULIN GLARGINE 100 [IU]/ML
5 INJECTION, SOLUTION SUBCUTANEOUS AT BEDTIME
Refills: 0 | Status: DISCONTINUED | OUTPATIENT
Start: 2023-02-04 | End: 2023-02-13

## 2023-02-04 RX ORDER — ENOXAPARIN SODIUM 100 MG/ML
40 INJECTION SUBCUTANEOUS EVERY 24 HOURS
Refills: 0 | Status: DISCONTINUED | OUTPATIENT
Start: 2023-02-04 | End: 2023-02-13

## 2023-02-04 RX ADMIN — Medication 1: at 07:54

## 2023-02-04 RX ADMIN — DIVALPROEX SODIUM 750 MILLIGRAM(S): 500 TABLET, DELAYED RELEASE ORAL at 21:30

## 2023-02-04 RX ADMIN — Medication 25 GRAM(S): at 10:26

## 2023-02-04 RX ADMIN — POLYETHYLENE GLYCOL 3350 17 GRAM(S): 17 POWDER, FOR SOLUTION ORAL at 17:57

## 2023-02-04 RX ADMIN — LATANOPROST 1 DROP(S): 0.05 SOLUTION/ DROPS OPHTHALMIC; TOPICAL at 21:29

## 2023-02-04 RX ADMIN — Medication 1 DROP(S): at 05:03

## 2023-02-04 RX ADMIN — PIPERACILLIN AND TAZOBACTAM 25 GRAM(S): 4; .5 INJECTION, POWDER, LYOPHILIZED, FOR SOLUTION INTRAVENOUS at 05:05

## 2023-02-04 RX ADMIN — Medication 325 MILLIGRAM(S): at 21:30

## 2023-02-04 RX ADMIN — FINASTERIDE 5 MILLIGRAM(S): 5 TABLET, FILM COATED ORAL at 11:34

## 2023-02-04 RX ADMIN — TAMSULOSIN HYDROCHLORIDE 0.8 MILLIGRAM(S): 0.4 CAPSULE ORAL at 21:29

## 2023-02-04 RX ADMIN — INSULIN GLARGINE 5 UNIT(S): 100 INJECTION, SOLUTION SUBCUTANEOUS at 22:20

## 2023-02-04 RX ADMIN — Medication 75 MILLIGRAM(S): at 21:30

## 2023-02-04 RX ADMIN — Medication 2 UNIT(S): at 16:41

## 2023-02-04 RX ADMIN — HEPARIN SODIUM 5000 UNIT(S): 5000 INJECTION INTRAVENOUS; SUBCUTANEOUS at 05:03

## 2023-02-04 RX ADMIN — CLOZAPINE 75 MILLIGRAM(S): 150 TABLET, ORALLY DISINTEGRATING ORAL at 21:29

## 2023-02-04 RX ADMIN — PIPERACILLIN AND TAZOBACTAM 25 GRAM(S): 4; .5 INJECTION, POWDER, LYOPHILIZED, FOR SOLUTION INTRAVENOUS at 21:28

## 2023-02-04 RX ADMIN — Medication 3: at 16:39

## 2023-02-04 RX ADMIN — Medication 2: at 11:32

## 2023-02-04 RX ADMIN — LOSARTAN POTASSIUM 25 MILLIGRAM(S): 100 TABLET, FILM COATED ORAL at 05:04

## 2023-02-04 RX ADMIN — Medication 1 DROP(S): at 17:58

## 2023-02-04 RX ADMIN — ATORVASTATIN CALCIUM 20 MILLIGRAM(S): 80 TABLET, FILM COATED ORAL at 21:29

## 2023-02-04 RX ADMIN — Medication 50 MILLIGRAM(S): at 11:34

## 2023-02-04 RX ADMIN — ENOXAPARIN SODIUM 40 MILLIGRAM(S): 100 INJECTION SUBCUTANEOUS at 11:35

## 2023-02-04 RX ADMIN — Medication 1 TABLET(S): at 11:34

## 2023-02-04 RX ADMIN — PIPERACILLIN AND TAZOBACTAM 25 GRAM(S): 4; .5 INJECTION, POWDER, LYOPHILIZED, FOR SOLUTION INTRAVENOUS at 13:29

## 2023-02-04 RX ADMIN — Medication 75 MICROGRAM(S): at 05:05

## 2023-02-04 RX ADMIN — Medication 1000 UNIT(S): at 11:34

## 2023-02-04 NOTE — PROGRESS NOTE ADULT - ASSESSMENT
67M with PMHx schizophrenia, hx seizure, bipolar d/o, HTN, DM2, hx SBO presenting with confusion, slurred speech and difficulty ambulating x4 days. History was obtained from brother David at bedside who lives with patient as patient is not answering questions completely with some word finding difficulty At baseline patient oriented x3, conversive and ambulates without difficulty.  inpt, pt has hypothermia and labs with hyponatremia and hyperkalemia  with concern for myocarditis, infection, workup underway  endocrine called for hypothyroidism, pt also with DM     1. hypothyroidism   TSH mildly elevated with nl total  T4   this is very mild TSH elevation, would not adjust home levothyroxine dosing for this level   pt is on PPI at home and if this is taken with levothyroxine than this can decrease levoT absorption and thus lead to TSH elevation   pt has been on levothyroxine 75mcg long term, continue this inpt and on discharge  administer on epty stomach 4 hrs apart from iron.calcium/ppi- he is on iron inpt - please make sure this is timed correctly     2. Hypothermia/hyponatremia with hyperkalemia  this can be due to infection and other metabolic etiologies   pt is currently on vanc and zosyn  rule out neurologic etiologies, infectious workup   cortisol returned at 50- which is indicative of adequate adrenal reserve and rules out AI      3.DM  a1c 6.4  pt has DM as well- on metformin, Glipzide and januvia    While inpatient  BG target 100-180 mg/dl,   now clinically improving and taking in more PO intake   - Admelog  Low dose Correction Scale Premeal & seperate low dose  Correction Scale Bedtime   -if glucose rises >200 X2 ---> can start Lantus 8 and ademelog 2 units premeals    - Hypoglycemia protocol in place   - Carb Consistent Diet   - Nutrition consult     dc planning;  pt follows with outpt endocrine- can resume care with them  please inform otupt endocrine of inpt admission and need to fu outpt   may want to consider stopping glipizide given hypoglycemia at home   otherwise can resume home meds if no contraindications at dc (LFTS and GFR normal lactate normal) 67M with PMHx schizophrenia, hx seizure, bipolar d/o, HTN, DM2, hx SBO presenting with confusion, slurred speech and difficulty ambulating x4 days. History was obtained from brother David at bedside who lives with patient as patient is not answering questions completely with some word finding difficulty At baseline patient oriented x3, conversive and ambulates without difficulty.  inpt, pt has hypothermia and labs with hyponatremia and hyperkalemia  with concern for myocarditis, infection, workup underway  endocrine called for hypothyroidism, pt also with DM     1. Hypothyroidism   TSH mildly elevated with nl total  T4   pt has been on levothyroxine 75mcg long term, continue this inpt and on discharge  administer on empty stomach 4 hrs apart from iron.calcium/ppi- he is on iron inpt - please make sure this is timed correctly       2.DM  a1c 6.4  pt has DM as well- on metformin, Glipzide and januvia    While inpatient  BG target 100-180 mg/dl,   now clinically improving and taking in more PO intake   -Start Lantus 5 units HS   -Start Admelog 2 units TID with meals   - Hypoglycemia protocol in place   - Carb Consistent Diet   - Nutrition consult     D/C recs:  please inform outpt endocrine of inpt admission and need to fu outpt ( Seeing Dr. Farfan outpatient)   may want to consider stopping glipizide given hypoglycemia at home   otherwise can resume home meds if no contraindications at dc (LFTS and GFR normal lactate normal)

## 2023-02-04 NOTE — PROGRESS NOTE ADULT - PROBLEM SELECTOR PLAN 7
Resume levothyroxine 75mcg qd. TSH mildly elevated, T3 low, T4 normal    - c/w home levothyroxine  - endo recs appreciated; avoid administered levothyroxine with PPI / Ca / Fe. On metformin 1g BID, januvia 100mg qd, glipizide 5mg qd - hold inpatient. A1C 6.4    - ISS

## 2023-02-04 NOTE — PROGRESS NOTE ADULT - SUBJECTIVE AND OBJECTIVE BOX
***************************************************************  Miky Munguia, PGY1  Internal Medicine   pager:  [facility]: [phone] [facility]: [phone]  ***************************************************************    CURTIS DIAZ  67y  MRN: [id number]    Patient is a 67y old  Male who presents with a chief complaint of confusion, dizziness (03 Feb 2023 14:05)      Interval/Overnight Events: no events ON.   - pending PT re-eval given improved mentation  - OT rec rehab    Subjective: Pt seen and examined at bedside. Denies fever, CP, SOB, abn pain, N/V, dysuria. Tolerating diet.      MEDICATIONS  (STANDING):  atorvastatin 20 milliGRAM(s) Oral at bedtime  cholecalciferol 1000 Unit(s) Oral daily  cloZAPine 75 milliGRAM(s) Oral at bedtime  dextrose 5%. 1000 milliLiter(s) (50 mL/Hr) IV Continuous <Continuous>  dextrose 5%. 1000 milliLiter(s) (100 mL/Hr) IV Continuous <Continuous>  dextrose 50% Injectable 25 Gram(s) IV Push once  dextrose 50% Injectable 12.5 Gram(s) IV Push once  dextrose 50% Injectable 25 Gram(s) IV Push once  divalproex  milliGRAM(s) Oral at bedtime  ferrous    sulfate 325 milliGRAM(s) Oral at bedtime  finasteride 5 milliGRAM(s) Oral daily  glucagon  Injectable 1 milliGRAM(s) IntraMuscular once  heparin   Injectable 5000 Unit(s) SubCutaneous every 8 hours  insulin glargine Injectable (LANTUS) 5 Unit(s) SubCutaneous at bedtime  insulin lispro (ADMELOG) corrective regimen sliding scale   SubCutaneous three times a day before meals  insulin lispro (ADMELOG) corrective regimen sliding scale   SubCutaneous at bedtime  latanoprost 0.005% Ophthalmic Solution 1 Drop(s) Both EYES at bedtime  levothyroxine 75 MICROGram(s) Oral daily  losartan 25 milliGRAM(s) Oral daily  magnesium sulfate  IVPB 2 Gram(s) IV Intermittent once  multivitamin 1 Tablet(s) Oral daily  piperacillin/tazobactam IVPB.. 3.375 Gram(s) IV Intermittent every 8 hours  pyridoxine 50 milliGRAM(s) Oral daily  tamsulosin 0.8 milliGRAM(s) Oral at bedtime  timolol 0.5% Solution 1 Drop(s) Both EYES two times a day  venlafaxine XR. 75 milliGRAM(s) Oral at bedtime    MEDICATIONS  (PRN):  dextrose Oral Gel 15 Gram(s) Oral once PRN Blood Glucose LESS THAN 70 milliGRAM(s)/deciliter  OLANZapine Injectable 2.5 milliGRAM(s) IntraMuscular every 6 hours PRN agitation      Objective:    Vitals: Vital Signs Last 24 Hrs  T(C): 36.3 (02-04-23 @ 05:05), Max: 36.4 (02-03-23 @ 21:40)  T(F): 97.4 (02-04-23 @ 05:05), Max: 97.6 (02-03-23 @ 21:40)  HR: 78 (02-04-23 @ 05:05) (70 - 81)  BP: 152/73 (02-04-23 @ 05:05) (128/86 - 155/76)  BP(mean): --  RR: 17 (02-04-23 @ 05:05) (17 - 17)  SpO2: 97% (02-04-23 @ 05:05) (91% - 97%)                I&O's Summary    03 Feb 2023 07:01  -  04 Feb 2023 07:00  --------------------------------------------------------  IN: 356 mL / OUT: 1710 mL / NET: -1354 mL        PHYSICAL EXAM:  GENERAL: NAD  HEAD:  Atraumatic, Normocephalic  EYES: pupils minimally reactive, constricted. EOMI, conjunctiva and sclera clear  CHEST/LUNG: upper airway coarse crackles, poor auscultation of lung sounds on posterior fields.  HEART: Regular rate and rhythm; No murmurs, rubs, or gallops  ABDOMEN: Somewhat distended, tympanic on percussion. Soft, Nontender  SKIN: No rashes or lesions  NERVOUS SYSTEM:  Alert & Oriented X4, much improved but still mildly dysarthric, increased speech latency, no focal deficits but weak.     LABS:  02-04    137  |  98  |  20  ----------------------------<  131<H>  5.3   |  32<H>  |  0.90  02-03    134<L>  |  97<L>  |  23  ----------------------------<  122<H>  5.3   |  31  |  0.91  02-02    131<L>  |  96<L>  |  27<H>  ----------------------------<  90  5.0   |  28  |  1.05    Ca    9.1      04 Feb 2023 04:50  Ca    8.9      03 Feb 2023 07:00  Ca    9.0      02 Feb 2023 04:55  Phos  4.4     02-04  Mg     1.50     02-04    TPro  6.0  /  Alb  3.3  /  TBili  0.3  /  DBili  x   /  AST  56<H>  /  ALT  101<H>  /  AlkPhos  85  02-02                        10.0   5.67  )-----------( 110      ( 04 Feb 2023 04:50 )             30.9                         9.2    5.93  )-----------( 104      ( 03 Feb 2023 07:00 )             28.0                         10.4   5.36  )-----------( 99       ( 02 Feb 2023 04:55 )             31.5     CAPILLARY BLOOD GLUCOSE      POCT Blood Glucose.: 153 mg/dL (04 Feb 2023 07:44)  POCT Blood Glucose.: 271 mg/dL (03 Feb 2023 21:14)  POCT Blood Glucose.: 303 mg/dL (03 Feb 2023 16:39)  POCT Blood Glucose.: 158 mg/dL (03 Feb 2023 11:22)      RADIOLOGY & ADDITIONAL TESTS:    Imaging Personally Reviewed:  [x ] YES  [ ] NO    Consultants involved in case:   Consultant(s) Notes Reviewed:  [ x] YES  [ ] NO:   Care Discussed with Consultants/Other Providers [x ] YES  [ ] NO         ***************************************************************  Miky Munguia, PGY1  Internal Medicine   pager:  [facility]: [phone] [facility]: [phone]  ***************************************************************    CURTIS DIAZ  67y  MRN: [id number]    Patient is a 67y old  Male who presents with a chief complaint of confusion, dizziness (03 Feb 2023 14:05)    Interval/Overnight Events: no events ON.   - pending PT re-eval given improved mentation  - reobtain S&S eval given improved mentation  - OT rec rehab    Subjective: Pt seen and examined at bedside. Denies fever, CP, SOB, abn pain, N/V, dysuria. Tolerating diet.  States he is feeling much better, less confused. Reports no BM in 48hours.    MEDICATIONS  (STANDING):  atorvastatin 20 milliGRAM(s) Oral at bedtime  cholecalciferol 1000 Unit(s) Oral daily  cloZAPine 75 milliGRAM(s) Oral at bedtime  dextrose 5%. 1000 milliLiter(s) (50 mL/Hr) IV Continuous <Continuous>  dextrose 5%. 1000 milliLiter(s) (100 mL/Hr) IV Continuous <Continuous>  dextrose 50% Injectable 25 Gram(s) IV Push once  dextrose 50% Injectable 12.5 Gram(s) IV Push once  dextrose 50% Injectable 25 Gram(s) IV Push once  divalproex  milliGRAM(s) Oral at bedtime  ferrous    sulfate 325 milliGRAM(s) Oral at bedtime  finasteride 5 milliGRAM(s) Oral daily  glucagon  Injectable 1 milliGRAM(s) IntraMuscular once  heparin   Injectable 5000 Unit(s) SubCutaneous every 8 hours  insulin glargine Injectable (LANTUS) 5 Unit(s) SubCutaneous at bedtime  insulin lispro (ADMELOG) corrective regimen sliding scale   SubCutaneous three times a day before meals  insulin lispro (ADMELOG) corrective regimen sliding scale   SubCutaneous at bedtime  latanoprost 0.005% Ophthalmic Solution 1 Drop(s) Both EYES at bedtime  levothyroxine 75 MICROGram(s) Oral daily  losartan 25 milliGRAM(s) Oral daily  magnesium sulfate  IVPB 2 Gram(s) IV Intermittent once  multivitamin 1 Tablet(s) Oral daily  piperacillin/tazobactam IVPB.. 3.375 Gram(s) IV Intermittent every 8 hours  pyridoxine 50 milliGRAM(s) Oral daily  tamsulosin 0.8 milliGRAM(s) Oral at bedtime  timolol 0.5% Solution 1 Drop(s) Both EYES two times a day  venlafaxine XR. 75 milliGRAM(s) Oral at bedtime    MEDICATIONS  (PRN):  dextrose Oral Gel 15 Gram(s) Oral once PRN Blood Glucose LESS THAN 70 milliGRAM(s)/deciliter  OLANZapine Injectable 2.5 milliGRAM(s) IntraMuscular every 6 hours PRN agitation    Objective:    Vitals: Vital Signs Last 24 Hrs  T(C): 36.3 (02-04-23 @ 05:05), Max: 36.4 (02-03-23 @ 21:40)  T(F): 97.4 (02-04-23 @ 05:05), Max: 97.6 (02-03-23 @ 21:40)  HR: 78 (02-04-23 @ 05:05) (70 - 81)  BP: 152/73 (02-04-23 @ 05:05) (128/86 - 155/76)  BP(mean): --  RR: 17 (02-04-23 @ 05:05) (17 - 17)  SpO2: 97% (02-04-23 @ 05:05) (91% - 97%)                I&O's Summary    03 Feb 2023 07:01  -  04 Feb 2023 07:00  --------------------------------------------------------  IN: 356 mL / OUT: 1710 mL / NET: -1354 mL    PHYSICAL EXAM:  GENERAL: NAD  HEAD:  Atraumatic, Normocephalic  EYES: pupils minimally reactive, constricted. EOMI, conjunctiva and sclera clear  CHEST/LUNG: upper airway coarse crackles, poor auscultation of lung sounds on posterior fields.  HEART: Regular rate and rhythm; No murmurs, rubs, or gallops  ABDOMEN: Somewhat distended, tympanic on percussion. Soft, Nontender  SKIN: No rashes or lesions  NERVOUS SYSTEM:  Alert & Oriented X4, much improved dysarthria and speech latency, no focal deficits but weak.     LABS:  02-04    137  |  98  |  20  ----------------------------<  131<H>  5.3   |  32<H>  |  0.90  02-03    134<L>  |  97<L>  |  23  ----------------------------<  122<H>  5.3   |  31  |  0.91  02-02    131<L>  |  96<L>  |  27<H>  ----------------------------<  90  5.0   |  28  |  1.05    Ca    9.1      04 Feb 2023 04:50  Ca    8.9      03 Feb 2023 07:00  Ca    9.0      02 Feb 2023 04:55  Phos  4.4     02-04  Mg     1.50     02-04    TPro  6.0  /  Alb  3.3  /  TBili  0.3  /  DBili  x   /  AST  56<H>  /  ALT  101<H>  /  AlkPhos  85  02-02                        10.0   5.67  )-----------( 110      ( 04 Feb 2023 04:50 )             30.9                         9.2    5.93  )-----------( 104      ( 03 Feb 2023 07:00 )             28.0                         10.4   5.36  )-----------( 99       ( 02 Feb 2023 04:55 )             31.5     CAPILLARY BLOOD GLUCOSE      POCT Blood Glucose.: 153 mg/dL (04 Feb 2023 07:44)  POCT Blood Glucose.: 271 mg/dL (03 Feb 2023 21:14)  POCT Blood Glucose.: 303 mg/dL (03 Feb 2023 16:39)  POCT Blood Glucose.: 158 mg/dL (03 Feb 2023 11:22)      RADIOLOGY & ADDITIONAL TESTS:    Imaging Personally Reviewed:  [x ] YES  [ ] NO    Consultants involved in case:   Consultant(s) Notes Reviewed:  [ x] YES  [ ] NO:   Care Discussed with Consultants/Other Providers [x ] YES  [ ] NO         ***************************************************************  Miky Munguia, PGY1  Internal Medicine   pager:  [facility]: [phone] [facility]: [phone]  ***************************************************************    CURTIS DIAZ  67y  MRN: [id number]    Patient is a 67y old  Male who presents with a chief complaint of confusion, dizziness (03 Feb 2023 14:05)    Interval/Overnight Events: no events ON.   - pending PT re-eval given improved mentation  - reobtain S&S eval given improved mentation  - OT rec rehab  - [place] placed o/n for persistently high PVRs    Subjective: Pt seen and examined at bedside. Denies fever, CP, SOB, abn pain, N/V, dysuria. Tolerating diet.  States he is feeling much better, less confused. Reports no BM in 48hours.    MEDICATIONS  (STANDING):  atorvastatin 20 milliGRAM(s) Oral at bedtime  cholecalciferol 1000 Unit(s) Oral daily  cloZAPine 75 milliGRAM(s) Oral at bedtime  dextrose 5%. 1000 milliLiter(s) (50 mL/Hr) IV Continuous <Continuous>  dextrose 5%. 1000 milliLiter(s) (100 mL/Hr) IV Continuous <Continuous>  dextrose 50% Injectable 25 Gram(s) IV Push once  dextrose 50% Injectable 12.5 Gram(s) IV Push once  dextrose 50% Injectable 25 Gram(s) IV Push once  divalproex  milliGRAM(s) Oral at bedtime  ferrous    sulfate 325 milliGRAM(s) Oral at bedtime  finasteride 5 milliGRAM(s) Oral daily  glucagon  Injectable 1 milliGRAM(s) IntraMuscular once  heparin   Injectable 5000 Unit(s) SubCutaneous every 8 hours  insulin glargine Injectable (LANTUS) 5 Unit(s) SubCutaneous at bedtime  insulin lispro (ADMELOG) corrective regimen sliding scale   SubCutaneous three times a day before meals  insulin lispro (ADMELOG) corrective regimen sliding scale   SubCutaneous at bedtime  latanoprost 0.005% Ophthalmic Solution 1 Drop(s) Both EYES at bedtime  levothyroxine 75 MICROGram(s) Oral daily  losartan 25 milliGRAM(s) Oral daily  magnesium sulfate  IVPB 2 Gram(s) IV Intermittent once  multivitamin 1 Tablet(s) Oral daily  piperacillin/tazobactam IVPB.. 3.375 Gram(s) IV Intermittent every 8 hours  pyridoxine 50 milliGRAM(s) Oral daily  tamsulosin 0.8 milliGRAM(s) Oral at bedtime  timolol 0.5% Solution 1 Drop(s) Both EYES two times a day  venlafaxine XR. 75 milliGRAM(s) Oral at bedtime    MEDICATIONS  (PRN):  dextrose Oral Gel 15 Gram(s) Oral once PRN Blood Glucose LESS THAN 70 milliGRAM(s)/deciliter  OLANZapine Injectable 2.5 milliGRAM(s) IntraMuscular every 6 hours PRN agitation    Objective:    Vitals: Vital Signs Last 24 Hrs  T(C): 36.3 (02-04-23 @ 05:05), Max: 36.4 (02-03-23 @ 21:40)  T(F): 97.4 (02-04-23 @ 05:05), Max: 97.6 (02-03-23 @ 21:40)  HR: 78 (02-04-23 @ 05:05) (70 - 81)  BP: 152/73 (02-04-23 @ 05:05) (128/86 - 155/76)  BP(mean): --  RR: 17 (02-04-23 @ 05:05) (17 - 17)  SpO2: 97% (02-04-23 @ 05:05) (91% - 97%)                I&O's Summary    03 Feb 2023 07:01  -  04 Feb 2023 07:00  --------------------------------------------------------  IN: 356 mL / OUT: 1710 mL / NET: -1354 mL    PHYSICAL EXAM:  GENERAL: NAD  HEAD:  Atraumatic, Normocephalic  EYES: pupils minimally reactive, constricted. EOMI, conjunctiva and sclera clear  CHEST/LUNG: upper airway coarse crackles, poor auscultation of lung sounds on posterior fields.  HEART: Regular rate and rhythm; No murmurs, rubs, or gallops  ABDOMEN: Somewhat distended, tympanic on percussion. Soft, Nontender  SKIN: No rashes or lesions  NERVOUS SYSTEM:  Alert & Oriented X4, much improved dysarthria and speech latency, no focal deficits but weak.     LABS:  02-04    137  |  98  |  20  ----------------------------<  131<H>  5.3   |  32<H>  |  0.90  02-03    134<L>  |  97<L>  |  23  ----------------------------<  122<H>  5.3   |  31  |  0.91  02-02    131<L>  |  96<L>  |  27<H>  ----------------------------<  90  5.0   |  28  |  1.05    Ca    9.1      04 Feb 2023 04:50  Ca    8.9      03 Feb 2023 07:00  Ca    9.0      02 Feb 2023 04:55  Phos  4.4     02-04  Mg     1.50     02-04    TPro  6.0  /  Alb  3.3  /  TBili  0.3  /  DBili  x   /  AST  56<H>  /  ALT  101<H>  /  AlkPhos  85  02-02                        10.0   5.67  )-----------( 110      ( 04 Feb 2023 04:50 )             30.9                         9.2    5.93  )-----------( 104      ( 03 Feb 2023 07:00 )             28.0                         10.4   5.36  )-----------( 99       ( 02 Feb 2023 04:55 )             31.5     CAPILLARY BLOOD GLUCOSE      POCT Blood Glucose.: 153 mg/dL (04 Feb 2023 07:44)  POCT Blood Glucose.: 271 mg/dL (03 Feb 2023 21:14)  POCT Blood Glucose.: 303 mg/dL (03 Feb 2023 16:39)  POCT Blood Glucose.: 158 mg/dL (03 Feb 2023 11:22)      RADIOLOGY & ADDITIONAL TESTS:    Imaging Personally Reviewed:  [x ] YES  [ ] NO    Consultants involved in case:   Consultant(s) Notes Reviewed:  [ x] YES  [ ] NO:   Care Discussed with Consultants/Other Providers [x ] YES  [ ] NO         ***************************************************************  Miky Munguia, PGY1  Internal Medicine   pager:  [facility]: [phone] [facility]: [phone]  ***************************************************************    CURTIS DIAZ  67y  MRN: [id number]    Patient is a 67y old  Male who presents with a chief complaint of confusion, dizziness (03 Feb 2023 14:05)    Interval/Overnight Events: no events ON.   - pending PT re-eval given improved mentation  - reobtain S&S eval given improved mentation  - OT rec rehab  - [place] placed o/n for persistently high PVRs  - switched back to lovenox subq given resolution of MARISSA    Subjective: Pt seen and examined at bedside. Denies fever, CP, SOB, abn pain, N/V, dysuria. Tolerating diet.  States he is feeling much better, less confused. Reports no BM in 48hours.    MEDICATIONS  (STANDING):  atorvastatin 20 milliGRAM(s) Oral at bedtime  cholecalciferol 1000 Unit(s) Oral daily  cloZAPine 75 milliGRAM(s) Oral at bedtime  dextrose 5%. 1000 milliLiter(s) (50 mL/Hr) IV Continuous <Continuous>  dextrose 5%. 1000 milliLiter(s) (100 mL/Hr) IV Continuous <Continuous>  dextrose 50% Injectable 25 Gram(s) IV Push once  dextrose 50% Injectable 12.5 Gram(s) IV Push once  dextrose 50% Injectable 25 Gram(s) IV Push once  divalproex  milliGRAM(s) Oral at bedtime  ferrous    sulfate 325 milliGRAM(s) Oral at bedtime  finasteride 5 milliGRAM(s) Oral daily  glucagon  Injectable 1 milliGRAM(s) IntraMuscular once  heparin   Injectable 5000 Unit(s) SubCutaneous every 8 hours  insulin glargine Injectable (LANTUS) 5 Unit(s) SubCutaneous at bedtime  insulin lispro (ADMELOG) corrective regimen sliding scale   SubCutaneous three times a day before meals  insulin lispro (ADMELOG) corrective regimen sliding scale   SubCutaneous at bedtime  latanoprost 0.005% Ophthalmic Solution 1 Drop(s) Both EYES at bedtime  levothyroxine 75 MICROGram(s) Oral daily  losartan 25 milliGRAM(s) Oral daily  magnesium sulfate  IVPB 2 Gram(s) IV Intermittent once  multivitamin 1 Tablet(s) Oral daily  piperacillin/tazobactam IVPB.. 3.375 Gram(s) IV Intermittent every 8 hours  pyridoxine 50 milliGRAM(s) Oral daily  tamsulosin 0.8 milliGRAM(s) Oral at bedtime  timolol 0.5% Solution 1 Drop(s) Both EYES two times a day  venlafaxine XR. 75 milliGRAM(s) Oral at bedtime    MEDICATIONS  (PRN):  dextrose Oral Gel 15 Gram(s) Oral once PRN Blood Glucose LESS THAN 70 milliGRAM(s)/deciliter  OLANZapine Injectable 2.5 milliGRAM(s) IntraMuscular every 6 hours PRN agitation    Objective:    Vitals: Vital Signs Last 24 Hrs  T(C): 36.3 (02-04-23 @ 05:05), Max: 36.4 (02-03-23 @ 21:40)  T(F): 97.4 (02-04-23 @ 05:05), Max: 97.6 (02-03-23 @ 21:40)  HR: 78 (02-04-23 @ 05:05) (70 - 81)  BP: 152/73 (02-04-23 @ 05:05) (128/86 - 155/76)  BP(mean): --  RR: 17 (02-04-23 @ 05:05) (17 - 17)  SpO2: 97% (02-04-23 @ 05:05) (91% - 97%)                I&O's Summary    03 Feb 2023 07:01  -  04 Feb 2023 07:00  --------------------------------------------------------  IN: 356 mL / OUT: 1710 mL / NET: -1354 mL    PHYSICAL EXAM:  GENERAL: NAD  HEAD:  Atraumatic, Normocephalic  EYES: pupils minimally reactive, constricted. EOMI, conjunctiva and sclera clear  CHEST/LUNG: upper airway coarse crackles, poor auscultation of lung sounds on posterior fields.  HEART: Regular rate and rhythm; No murmurs, rubs, or gallops  ABDOMEN: Somewhat distended, tympanic on percussion. Soft, Nontender  SKIN: No rashes or lesions  NERVOUS SYSTEM:  Alert & Oriented X4, much improved dysarthria and speech latency, no focal deficits but weak.     LABS:  02-04    137  |  98  |  20  ----------------------------<  131<H>  5.3   |  32<H>  |  0.90  02-03    134<L>  |  97<L>  |  23  ----------------------------<  122<H>  5.3   |  31  |  0.91  02-02    131<L>  |  96<L>  |  27<H>  ----------------------------<  90  5.0   |  28  |  1.05    Ca    9.1      04 Feb 2023 04:50  Ca    8.9      03 Feb 2023 07:00  Ca    9.0      02 Feb 2023 04:55  Phos  4.4     02-04  Mg     1.50     02-04    TPro  6.0  /  Alb  3.3  /  TBili  0.3  /  DBili  x   /  AST  56<H>  /  ALT  101<H>  /  AlkPhos  85  02-02                        10.0   5.67  )-----------( 110      ( 04 Feb 2023 04:50 )             30.9                         9.2    5.93  )-----------( 104      ( 03 Feb 2023 07:00 )             28.0                         10.4   5.36  )-----------( 99       ( 02 Feb 2023 04:55 )             31.5     CAPILLARY BLOOD GLUCOSE      POCT Blood Glucose.: 153 mg/dL (04 Feb 2023 07:44)  POCT Blood Glucose.: 271 mg/dL (03 Feb 2023 21:14)  POCT Blood Glucose.: 303 mg/dL (03 Feb 2023 16:39)  POCT Blood Glucose.: 158 mg/dL (03 Feb 2023 11:22)      RADIOLOGY & ADDITIONAL TESTS:    Imaging Personally Reviewed:  [x ] YES  [ ] NO    Consultants involved in case:   Consultant(s) Notes Reviewed:  [ x] YES  [ ] NO:   Care Discussed with Consultants/Other Providers [x ] YES  [ ] NO

## 2023-02-04 NOTE — PROGRESS NOTE ADULT - PROBLEM SELECTOR PROBLEM 7
Hypothyroidism Type 2 diabetes mellitus with hyperglycemia, without long-term current use of insulin

## 2023-02-04 NOTE — PROGRESS NOTE ADULT - PROBLEM SELECTOR PLAN 10
- restart home finasteride  - restart home tamsulosin -- increased to 0.8mg po qhs  - garcia placed 2/3/23, f/u o/p urology for ToV [facility]    Fluids: po hydration  Electrolytes: Replete K > 4, Mg > 2, Phos > 3  Nutrition: Diet pureed  PPX  ---VTE: heparin subq given mild MARISSA  ---GI: bowel regimen for 1BM/day  ---Resp: ra  Access: PIV    Code Status: FULL CODE  Dispo: Rehab upon clinical improvement Hospital [facility]    Fluids: po hydration  Electrolytes: Replete K > 4, Mg > 2, Phos > 3  Nutrition: Diet pureed  PPX  ---VTE: lovenx subq  ---GI: bowel regimen for 1BM/day  ---Resp: ra  Access: PIV    Code Status: FULL CODE  Dispo: Rehab upon clinical improvement

## 2023-02-04 NOTE — PROGRESS NOTE ADULT - PROBLEM SELECTOR PLAN 2
CT Chest w/ diffuse anasarca with pleural effusion, atelectasis, edema w/ opacity c/f pneumonia, likely source of instability. Course c/b hypothermia, bradycardia, hypotension on 1/31. 1/30 BCx x2, UCx NGTD. MRSA negative, s/p vanc 1/31-2/3.    - c/w zosyn for 7 day course (1/31 - )  - satting well on room air CT Chest w/ diffuse anasarca with pleural effusion, atelectasis, edema w/ opacity c/f pneumonia, likely source of instability. Course c/b hypothermia, bradycardia, hypotension on 1/31. 1/30 BCx x2, UCx NGTD. MRSA negative, s/p vanc 1/31-2/3.    - c/w zosyn for 7 day course (1/31 - 2/6)  - satting well on room air

## 2023-02-04 NOTE — PROGRESS NOTE ADULT - PROBLEM SELECTOR PLAN 9
- psych consulted; recs appreciated   - increased clozapine to 75mg qHS, then 100mg qhs on Sunday 2/5.   - c/w Depakote to 750mg qHS, if thrombocytopenia worsens or patient continually lethargic would consider reducing to 500mg unless objections from neuro  - c/w Effexor as ordered, if persistently hyponatremic would assess for SIADH and consider stopping   - PRN for agitation Zyprexa 2.5mg q6h PRN PO/IM. - restart home finasteride  - restart home tamsulosin -- increased to 0.8mg po qhs  - garcia placed 2/3/23, f/u o/p urology for ToV

## 2023-02-04 NOTE — PROGRESS NOTE ADULT - SUBJECTIVE AND OBJECTIVE BOX
Chief Complaint:     History:    MEDICATIONS  (STANDING):  atorvastatin 20 milliGRAM(s) Oral at bedtime  cholecalciferol 1000 Unit(s) Oral daily  cloZAPine 75 milliGRAM(s) Oral at bedtime  dextrose 5%. 1000 milliLiter(s) (50 mL/Hr) IV Continuous <Continuous>  dextrose 5%. 1000 milliLiter(s) (100 mL/Hr) IV Continuous <Continuous>  dextrose 50% Injectable 25 Gram(s) IV Push once  dextrose 50% Injectable 12.5 Gram(s) IV Push once  dextrose 50% Injectable 25 Gram(s) IV Push once  divalproex  milliGRAM(s) Oral at bedtime  enoxaparin Injectable 40 milliGRAM(s) SubCutaneous every 24 hours  ferrous    sulfate 325 milliGRAM(s) Oral at bedtime  finasteride 5 milliGRAM(s) Oral daily  glucagon  Injectable 1 milliGRAM(s) IntraMuscular once  insulin glargine Injectable (LANTUS) 5 Unit(s) SubCutaneous at bedtime  insulin lispro (ADMELOG) corrective regimen sliding scale   SubCutaneous three times a day before meals  insulin lispro (ADMELOG) corrective regimen sliding scale   SubCutaneous at bedtime  latanoprost 0.005% Ophthalmic Solution 1 Drop(s) Both EYES at bedtime  levothyroxine 75 MICROGram(s) Oral daily  losartan 25 milliGRAM(s) Oral daily  multivitamin 1 Tablet(s) Oral daily  piperacillin/tazobactam IVPB.. 3.375 Gram(s) IV Intermittent every 8 hours  polyethylene glycol 3350 17 Gram(s) Oral two times a day  pyridoxine 50 milliGRAM(s) Oral daily  senna 2 Tablet(s) Oral at bedtime  tamsulosin 0.8 milliGRAM(s) Oral at bedtime  timolol 0.5% Solution 1 Drop(s) Both EYES two times a day  venlafaxine XR. 75 milliGRAM(s) Oral at bedtime    MEDICATIONS  (PRN):  bisacodyl Suppository 10 milliGRAM(s) Rectal daily PRN Constipation  dextrose Oral Gel 15 Gram(s) Oral once PRN Blood Glucose LESS THAN 70 milliGRAM(s)/deciliter  OLANZapine Injectable 2.5 milliGRAM(s) IntraMuscular every 6 hours PRN agitation      Allergies    No Known Allergies    Intolerances      Review of Systems:  Constitutional: No fever  Eyes: No blurry vision  Neuro: No tremors  HEENT: No pain  Cardiovascular: No chest pain, palpitations  Respiratory: No SOB, no cough  GI: No nausea, vomiting, abdominal pain  : No dysuria  Skin: no rash  Psych: no depression  Endocrine: no polyuria, polydipsia  Hem/lymph: no swelling  Osteoporosis: no fractures    ALL OTHER SYSTEMS REVIEWED AND NEGATIVE    UNABLE TO OBTAIN    PHYSICAL EXAM:  VITALS: T(C): 36.3 (02-04-23 @ 05:05)  T(F): 97.4 (02-04-23 @ 05:05), Max: 97.6 (02-03-23 @ 21:40)  HR: 78 (02-04-23 @ 05:05) (70 - 81)  BP: 152/73 (02-04-23 @ 05:05) (128/86 - 155/76)  RR:  (17 - 17)  SpO2:  (94% - 97%)  Wt(kg): --  GENERAL: NAD, well-groomed, well-developed  EYES: No proptosis, no lid lag, anicteric  HEENT:  Atraumatic, Normocephalic, moist mucous membranes  THYROID: Normal size, no palpable nodules  RESPIRATORY: Clear to auscultation bilaterally; No rales, rhonchi, wheezing, or rubs  CARDIOVASCULAR: Regular rate and rhythm; No murmurs; no peripheral edema  GI: Soft, nontender, non distended, normal bowel sounds  SKIN: Dry, intact, No rashes or lesions  MUSCULOSKELETAL: Full range of motion, normal strength  NEURO: sensation intact, extraocular movements intact, no tremor, normal reflexes  PSYCH: Alert and oriented x 3, normal affect, normal mood  CUSHING'S SIGNS: no striae    POCT Blood Glucose.: 249 mg/dL (02-04-23 @ 11:23)  POCT Blood Glucose.: 153 mg/dL (02-04-23 @ 07:44)  POCT Blood Glucose.: 271 mg/dL (02-03-23 @ 21:14)  POCT Blood Glucose.: 303 mg/dL (02-03-23 @ 16:39)  POCT Blood Glucose.: 158 mg/dL (02-03-23 @ 11:22)  POCT Blood Glucose.: 171 mg/dL (02-03-23 @ 07:19)  POCT Blood Glucose.: 134 mg/dL (02-02-23 @ 21:49)  POCT Blood Glucose.: 269 mg/dL (02-02-23 @ 16:36)  POCT Blood Glucose.: 216 mg/dL (02-02-23 @ 11:30)  POCT Blood Glucose.: 96 mg/dL (02-02-23 @ 07:19)  POCT Blood Glucose.: 96 mg/dL (02-01-23 @ 21:59)  POCT Blood Glucose.: 84 mg/dL (02-01-23 @ 16:43)      02-04    137  |  98  |  20  ----------------------------<  131<H>  5.3   |  32<H>  |  0.90    eGFR: 93    Ca    9.1      02-04  Mg     1.50     02-04  Phos  4.4     02-04    TPro  6.0  /  Alb  3.3  /  TBili  0.3  /  DBili  x   /  AST  56<H>  /  ALT  101<H>  /  AlkPhos  85  02-02          Thyroid Function Tests:  01-30 @ 16:49 TSH 4.91 FreeT4 -- T3 46 Anti TPO -- Anti Thyroglobulin Ab -- TSI --                         History:  No acute overnight events  Reported having a good appetite  +constipation long standing       MEDICATIONS  (STANDING):  atorvastatin 20 milliGRAM(s) Oral at bedtime  cholecalciferol 1000 Unit(s) Oral daily  cloZAPine 75 milliGRAM(s) Oral at bedtime  dextrose 5%. 1000 milliLiter(s) (50 mL/Hr) IV Continuous <Continuous>  dextrose 5%. 1000 milliLiter(s) (100 mL/Hr) IV Continuous <Continuous>  dextrose 50% Injectable 25 Gram(s) IV Push once  dextrose 50% Injectable 12.5 Gram(s) IV Push once  dextrose 50% Injectable 25 Gram(s) IV Push once  divalproex  milliGRAM(s) Oral at bedtime  enoxaparin Injectable 40 milliGRAM(s) SubCutaneous every 24 hours  ferrous    sulfate 325 milliGRAM(s) Oral at bedtime  finasteride 5 milliGRAM(s) Oral daily  glucagon  Injectable 1 milliGRAM(s) IntraMuscular once  insulin glargine Injectable (LANTUS) 5 Unit(s) SubCutaneous at bedtime  insulin lispro (ADMELOG) corrective regimen sliding scale   SubCutaneous three times a day before meals  insulin lispro (ADMELOG) corrective regimen sliding scale   SubCutaneous at bedtime  latanoprost 0.005% Ophthalmic Solution 1 Drop(s) Both EYES at bedtime  levothyroxine 75 MICROGram(s) Oral daily  losartan 25 milliGRAM(s) Oral daily  multivitamin 1 Tablet(s) Oral daily  piperacillin/tazobactam IVPB.. 3.375 Gram(s) IV Intermittent every 8 hours  polyethylene glycol 3350 17 Gram(s) Oral two times a day  pyridoxine 50 milliGRAM(s) Oral daily  senna 2 Tablet(s) Oral at bedtime  tamsulosin 0.8 milliGRAM(s) Oral at bedtime  timolol 0.5% Solution 1 Drop(s) Both EYES two times a day  venlafaxine XR. 75 milliGRAM(s) Oral at bedtime    MEDICATIONS  (PRN):  bisacodyl Suppository 10 milliGRAM(s) Rectal daily PRN Constipation  dextrose Oral Gel 15 Gram(s) Oral once PRN Blood Glucose LESS THAN 70 milliGRAM(s)/deciliter  OLANZapine Injectable 2.5 milliGRAM(s) IntraMuscular every 6 hours PRN agitation      Allergies    No Known Allergies    Intolerances      Review of Systems:  Constitutional: No fever +Increased coughing   Eyes: No blurry vision  Neuro: No tremors  HEENT: No pain  Cardiovascular: No chest pain, palpitations  Respiratory: No SOB, no cough  GI: No nausea, vomiting, abdominal pain  : No dysuria  Skin: no rash  Psych: no depression  Endocrine: no polyuria, polydipsia  Hem/lymph: no swelling  Osteoporosis: no fractures    ALL OTHER SYSTEMS REVIEWED AND NEGATIVE    UNABLE TO OBTAIN    PHYSICAL EXAM:  VITALS: T(C): 36.3 (02-04-23 @ 05:05)  T(F): 97.4 (02-04-23 @ 05:05), Max: 97.6 (02-03-23 @ 21:40)  HR: 78 (02-04-23 @ 05:05) (70 - 81)  BP: 152/73 (02-04-23 @ 05:05) (128/86 - 155/76)  RR:  (17 - 17)  SpO2:  (94% - 97%)    GENERAL: NAD  EYES: No proptosis, no lid lag, anicteric  HEENT:  Atraumatic, Normocephalic, moist mucous membranes  RESPIRATORY: Clear to auscultation bilaterally; No rales, rhonchi, wheezing, or rubs  CARDIOVASCULAR: Regular rate and rhythm; No murmurs; no peripheral edema  GI: Soft, nontender, non distended, normal bowel sounds  SKIN: Dry, intact, No rashes or lesions  MUSCULOSKELETAL: Full range of motion, normal strength  NEURO: sensation intact, extraocular movements intact, no tremor, normal reflexes  PSYCH: Alert and oriented x 3, normal affect, normal mood      POCT Blood Glucose.: 249 mg/dL (02-04-23 @ 11:23)  POCT Blood Glucose.: 153 mg/dL (02-04-23 @ 07:44)  POCT Blood Glucose.: 271 mg/dL (02-03-23 @ 21:14)  POCT Blood Glucose.: 303 mg/dL (02-03-23 @ 16:39)  POCT Blood Glucose.: 158 mg/dL (02-03-23 @ 11:22)  POCT Blood Glucose.: 171 mg/dL (02-03-23 @ 07:19)  POCT Blood Glucose.: 134 mg/dL (02-02-23 @ 21:49)  POCT Blood Glucose.: 269 mg/dL (02-02-23 @ 16:36)  POCT Blood Glucose.: 216 mg/dL (02-02-23 @ 11:30)  POCT Blood Glucose.: 96 mg/dL (02-02-23 @ 07:19)  POCT Blood Glucose.: 96 mg/dL (02-01-23 @ 21:59)  POCT Blood Glucose.: 84 mg/dL (02-01-23 @ 16:43)      02-04    137  |  98  |  20  ----------------------------<  131<H>  5.3   |  32<H>  |  0.90    eGFR: 93    Ca    9.1      02-04  Mg     1.50     02-04  Phos  4.4     02-04    TPro  6.0  /  Alb  3.3  /  TBili  0.3  /  DBili  x   /  AST  56<H>  /  ALT  101<H>  /  AlkPhos  85  02-02          Thyroid Function Tests:  01-30 @ 16:49 TSH 4.91 FreeT4 -- T3 46 Anti TPO -- Anti Thyroglobulin Ab -- TSI --

## 2023-02-04 NOTE — PROGRESS NOTE ADULT - PROBLEM SELECTOR PLAN 11
Hospital Bundle    Fluids: po hydration  Electrolytes: Replete K > 4, Mg > 2, Phos > 3  Nutrition: Diet pureed  PPX  ---VTE: heparin subq given mild MARISSA  ---GI: n/a  ---Resp: ra  Access: PIV    Code Status: FULL CODE  Dispo: Rehab upon clinical improvement Hospital Bundle    Fluids: po hydration  Electrolytes: Replete K > 4, Mg > 2, Phos > 3  Nutrition: Diet pureed  PPX  ---VTE: heparin subq given mild MARISSA  ---GI: bowel regimen for 1BM/day  ---Resp: ra  Access: PIV    Code Status: FULL CODE  Dispo: Rehab upon clinical improvement

## 2023-02-04 NOTE — PROGRESS NOTE ADULT - PROBLEM SELECTOR PLAN 6
on lisinopril 10mg at home    - start losartan 25mg po qd Resume levothyroxine 75mcg qd. TSH mildly elevated, T3 low, T4 normal    - c/w home levothyroxine  - endo recs appreciated; avoid administered levothyroxine with PPI / Ca / Fe.

## 2023-02-04 NOTE — PROGRESS NOTE ADULT - PROBLEM SELECTOR PLAN 3
Cr rise from 0.82 to 1.12. Now resolving    - will continue to monitor  - avoid nephrotoxic agents. Desaturations x 2 to low 80s on room air while sleeping overnight. Could be due to sleep apnea vs some component of aspiration pneumonia given his mental status change. CT Chest showed pleural effusion b/l, atelectasis, consolidation c/f pna. Currently on 2 L NC    - wean NC as tolerated  - c/w abx as above

## 2023-02-04 NOTE — PROGRESS NOTE ADULT - PROBLEM SELECTOR PLAN 5
Anemia and thrombocytopenia noted on admission. Patient recently had clozapine dosage decreased for c/f agranulocytosis, WBC wnl on admission but may be falsely low. Negative LDH, retic, haptoglobin, blue top - neg for hemolysis. Folate, b12, ferritin, iron studies - unremarkable. No signs of active bleed, but of note had precancerous polyp removed 3 years ago per brother and had trouble getting repeat C-scope done since.     - heme consulted; recs appreciated  - f/u outpatient PMD for C-scope  - trend CBC, transfuse as necessary. on lisinopril 10mg at home    - start losartan 25mg po qd

## 2023-02-04 NOTE — PROGRESS NOTE ADULT - ASSESSMENT
67M with PMHx schizophrenia, hx seizure, bipolar d/o, HTN, DM2, hx SBO presenting with confusion, slurred speech and difficulty ambulating x4 days. Admitted for acute encephalopathy, sepsis 2/2 pneumonia.

## 2023-02-04 NOTE — PROGRESS NOTE ADULT - PROBLEM SELECTOR PLAN 8
On metformin 1g BID, januvia 100mg qd, glipizide 5mg qd - hold inpatient. A1C 6.4    - ISS - psych consulted; recs appreciated   - increased clozapine to 75mg qHS, then 100mg qhs on Sunday 2/5.   - c/w Depakote to 750mg qHS, if thrombocytopenia worsens or patient continually lethargic would consider reducing to 500mg unless objections from neuro  - c/w Effexor as ordered, if persistently hyponatremic would assess for SIADH and consider stopping   - PRN for agitation Zyprexa 2.5mg q6h PRN PO/IM.

## 2023-02-04 NOTE — PROGRESS NOTE ADULT - PROBLEM SELECTOR PLAN 4
Desaturations x 2 to low 80s on room air while sleeping overnight. Could be due to sleep apnea vs some component of aspiration pneumonia given his mental status change. CT Chest showed pleural effusion b/l, atelectasis, consolidation c/f pna. Currently on 2 L NC    - wean NC as tolerated  - c/w abx as above Anemia Anemia Anemia Anemia Anemia Anemia Anemia Atrial fibrillation Atrial fibrillation Cellulitis Anemia Hypertension Atrial fibrillation Anemia and thrombocytopenia noted on admission. Patient recently had clozapine dosage decreased for c/f agranulocytosis, WBC wnl on admission but may be falsely low. Negative LDH, retic, haptoglobin, blue top - neg for hemolysis. Folate, b12, ferritin, iron studies - unremarkable. No signs of active bleed, but of note had precancerous polyp removed 3 years ago per brother and had trouble getting repeat C-scope done since.     - heme consulted; recs appreciated  - f/u outpatient PMD for C-scope  - trend CBC, transfuse as necessary.

## 2023-02-04 NOTE — PROGRESS NOTE ADULT - ATTENDING COMMENTS
67 M with schizophrenia admitted with several days of encephalopathy, found to have pancytopenia, SIRS, hypothermia, hypotension, hyperkalemia, on admission, likely 2/2 sepsis 2/2 pneumonia.      Sepsis 2/2 pneumonia (CT scan showing likely PNA with b/l pleural effusions) - c/w broad spectrum abx (Aspiration & MRSA coverage) MRSA neg, stopped Vanc. c/w Zosyn.   Hypothermia/hypotension now resolved   MARISSA, likely ATN in setting of sepsis/hypotension, improving    Encephalopathy improving, pt speaking now, still not at baseline per brother     Concern for clozapine-induced myocarditis, TTE with grossly Nl LV, trops OK, unlikely myocarditis.  Cards input appreciated.    Pancytopenia improved, likely in setting of sepsis + med-induced from outpatient    f/u PT recs; OT rec rehab    Discussed at length with patient

## 2023-02-04 NOTE — PROGRESS NOTE ADULT - PROBLEM SELECTOR PLAN 1
Baseline oriented 3, ambulates on own. Now oriented 1-2 with confusion, dysarthria, prolonged speech latency (motor/mixed aphasia), truncal ataxia. Of note, patient has been on 125mg of clozapine since 1997 and recent reduction to 50mg may be contributing. Course c/b initial bradycardia, hypotension, hypothermia.     CXR clear, UA normal, no signs of skin infections, no leukocytosis, though elevated NLR. T4 normal, TSH 4.91 mildly elevated, compliant with levothyroxine dosing. AM Cortisol elevated, unlikely AI. Troponin flat with elevated CKMB, TTE wnl. Per cards, unlikely myocarditis. Ammonium 58 mildly elevated likely not delivered on ice, LFTs wnl, no clonus or asterixis. Rheum w/u negative. Nutritional deficiency w/u negative. Patient is maintaining airway and responsive. Not lethargic, awake and alert. Follows commands. Now requiring 1-2L NC. MRI MRA head showed microvascular disease but no new stroke. CTCAP showed diffuse anasarca with pleural effusion, atelectasis, edema w/ opacity c/f pneumonia, likely source of instability. Ddx incl toxic metabolic encephalopathy iso sepsis vs. progression of psychiatric disorders.    - ICU consulted 1/31 for hypothermia, recommended stress dose hydrocortisone 100mg  - Endo consulted; recs appreciated. - unlikely to be AI. Will hold off on further steroids until infx r/o  - abx as below  - ctm on tele  - c/w statin  - PT/OT/S&S eval - when can adequately follow commands and medically more stable. Bedrest for now  - psych consulted; recs appreciated

## 2023-02-05 LAB
ANION GAP SERPL CALC-SCNC: 5 MMOL/L — LOW (ref 7–14)
BUN SERPL-MCNC: 16 MG/DL — SIGNIFICANT CHANGE UP (ref 7–23)
CALCIUM SERPL-MCNC: 9 MG/DL — SIGNIFICANT CHANGE UP (ref 8.4–10.5)
CHLORIDE SERPL-SCNC: 97 MMOL/L — LOW (ref 98–107)
CO2 SERPL-SCNC: 31 MMOL/L — SIGNIFICANT CHANGE UP (ref 22–31)
CREAT SERPL-MCNC: 0.74 MG/DL — SIGNIFICANT CHANGE UP (ref 0.5–1.3)
CULTURE RESULTS: SIGNIFICANT CHANGE UP
CULTURE RESULTS: SIGNIFICANT CHANGE UP
EGFR: 99 ML/MIN/1.73M2 — SIGNIFICANT CHANGE UP
GLUCOSE SERPL-MCNC: 128 MG/DL — HIGH (ref 70–99)
HCT VFR BLD CALC: 28.5 % — LOW (ref 39–50)
HGB BLD-MCNC: 9.4 G/DL — LOW (ref 13–17)
MAGNESIUM SERPL-MCNC: 1.6 MG/DL — SIGNIFICANT CHANGE UP (ref 1.6–2.6)
MCHC RBC-ENTMCNC: 30.7 PG — SIGNIFICANT CHANGE UP (ref 27–34)
MCHC RBC-ENTMCNC: 33 GM/DL — SIGNIFICANT CHANGE UP (ref 32–36)
MCV RBC AUTO: 93.1 FL — SIGNIFICANT CHANGE UP (ref 80–100)
NRBC # BLD: 0 /100 WBCS — SIGNIFICANT CHANGE UP (ref 0–0)
NRBC # FLD: 0 K/UL — SIGNIFICANT CHANGE UP (ref 0–0)
PHOSPHATE SERPL-MCNC: 4 MG/DL — SIGNIFICANT CHANGE UP (ref 2.5–4.5)
PLATELET # BLD AUTO: 108 K/UL — LOW (ref 150–400)
POTASSIUM SERPL-MCNC: 5 MMOL/L — SIGNIFICANT CHANGE UP (ref 3.5–5.3)
POTASSIUM SERPL-SCNC: 5 MMOL/L — SIGNIFICANT CHANGE UP (ref 3.5–5.3)
RBC # BLD: 3.06 M/UL — LOW (ref 4.2–5.8)
RBC # FLD: 14.7 % — HIGH (ref 10.3–14.5)
SODIUM SERPL-SCNC: 133 MMOL/L — LOW (ref 135–145)
SPECIMEN SOURCE: SIGNIFICANT CHANGE UP
SPECIMEN SOURCE: SIGNIFICANT CHANGE UP
WBC # BLD: 5.46 K/UL — SIGNIFICANT CHANGE UP (ref 3.8–10.5)
WBC # FLD AUTO: 5.46 K/UL — SIGNIFICANT CHANGE UP (ref 3.8–10.5)

## 2023-02-05 PROCEDURE — 99232 SBSQ HOSP IP/OBS MODERATE 35: CPT

## 2023-02-05 RX ORDER — FUROSEMIDE 40 MG
40 TABLET ORAL ONCE
Refills: 0 | Status: COMPLETED | OUTPATIENT
Start: 2023-02-05 | End: 2023-02-05

## 2023-02-05 RX ADMIN — CLOZAPINE 100 MILLIGRAM(S): 150 TABLET, ORALLY DISINTEGRATING ORAL at 22:31

## 2023-02-05 RX ADMIN — DIVALPROEX SODIUM 750 MILLIGRAM(S): 500 TABLET, DELAYED RELEASE ORAL at 22:32

## 2023-02-05 RX ADMIN — LATANOPROST 1 DROP(S): 0.05 SOLUTION/ DROPS OPHTHALMIC; TOPICAL at 22:30

## 2023-02-05 RX ADMIN — Medication 50 MILLIGRAM(S): at 11:38

## 2023-02-05 RX ADMIN — Medication 1 TABLET(S): at 11:38

## 2023-02-05 RX ADMIN — PIPERACILLIN AND TAZOBACTAM 25 GRAM(S): 4; .5 INJECTION, POWDER, LYOPHILIZED, FOR SOLUTION INTRAVENOUS at 15:49

## 2023-02-05 RX ADMIN — ENOXAPARIN SODIUM 40 MILLIGRAM(S): 100 INJECTION SUBCUTANEOUS at 11:37

## 2023-02-05 RX ADMIN — POLYETHYLENE GLYCOL 3350 17 GRAM(S): 17 POWDER, FOR SOLUTION ORAL at 05:15

## 2023-02-05 RX ADMIN — ATORVASTATIN CALCIUM 20 MILLIGRAM(S): 80 TABLET, FILM COATED ORAL at 22:28

## 2023-02-05 RX ADMIN — Medication 325 MILLIGRAM(S): at 22:29

## 2023-02-05 RX ADMIN — Medication 1: at 16:41

## 2023-02-05 RX ADMIN — PIPERACILLIN AND TAZOBACTAM 25 GRAM(S): 4; .5 INJECTION, POWDER, LYOPHILIZED, FOR SOLUTION INTRAVENOUS at 22:28

## 2023-02-05 RX ADMIN — Medication 2 UNIT(S): at 07:50

## 2023-02-05 RX ADMIN — Medication 2 UNIT(S): at 11:36

## 2023-02-05 RX ADMIN — TAMSULOSIN HYDROCHLORIDE 0.8 MILLIGRAM(S): 0.4 CAPSULE ORAL at 22:29

## 2023-02-05 RX ADMIN — Medication 2: at 11:36

## 2023-02-05 RX ADMIN — LOSARTAN POTASSIUM 25 MILLIGRAM(S): 100 TABLET, FILM COATED ORAL at 05:16

## 2023-02-05 RX ADMIN — SENNA PLUS 2 TABLET(S): 8.6 TABLET ORAL at 22:28

## 2023-02-05 RX ADMIN — Medication 2 UNIT(S): at 16:42

## 2023-02-05 RX ADMIN — Medication 1 DROP(S): at 05:12

## 2023-02-05 RX ADMIN — Medication 75 MILLIGRAM(S): at 22:32

## 2023-02-05 RX ADMIN — Medication 40 MILLIGRAM(S): at 16:43

## 2023-02-05 RX ADMIN — INSULIN GLARGINE 5 UNIT(S): 100 INJECTION, SOLUTION SUBCUTANEOUS at 22:28

## 2023-02-05 RX ADMIN — FINASTERIDE 5 MILLIGRAM(S): 5 TABLET, FILM COATED ORAL at 11:37

## 2023-02-05 RX ADMIN — Medication 1 DROP(S): at 16:44

## 2023-02-05 RX ADMIN — PIPERACILLIN AND TAZOBACTAM 25 GRAM(S): 4; .5 INJECTION, POWDER, LYOPHILIZED, FOR SOLUTION INTRAVENOUS at 05:15

## 2023-02-05 RX ADMIN — Medication 1000 UNIT(S): at 11:38

## 2023-02-05 RX ADMIN — Medication 75 MICROGRAM(S): at 05:16

## 2023-02-05 NOTE — PROGRESS NOTE ADULT - PROBLEM SELECTOR PLAN 3
Desaturations x 2 to low 80s on room air while sleeping overnight. Could be due to sleep apnea vs some component of aspiration pneumonia given his mental status change. CT Chest showed pleural effusion b/l, atelectasis, consolidation c/f pna. Currently on 2 L NC    - wean NC as tolerated  - c/w abx as above Desaturations x 2 to low 80s on room air while sleeping overnight. Could be due to sleep apnea vs some component of aspiration pneumonia given his mental status change. CT Chest showed pleural effusion b/l, atelectasis, consolidation c/f pna. Currently on 2 L NC    - wean NC as tolerated  - c/w abx as above  - trial of IV furosemide 40mg x1 today given ongoing wheeze, noted pleural effusions and pulmonary edema from CT chest  - offer incentive spirometer

## 2023-02-05 NOTE — SWALLOW BEDSIDE ASSESSMENT ADULT - SWALLOW EVAL: DIAGNOSIS
1. Functional oral stage for puree, regular solids, mildly thick liquids and thin liquids marked by adequate oral acceptance, chewing/collection and transfer. 2. Functional pharyngeal phase for puree, regular solids and mildly thick liquids marked by a present pharyngeal swallow trigger with hyolaryngeal elevation noted upon digital palpation without evidence of airway penetration/aspiration. 3. Severe pharyngeal dysphagia for thin liquids marked by a suspected delayed pharyngeal swallow trigger with hyolaryngeal elevation noted upon digital palpation suggestive of evidence of airway penetration/aspiration.
1. Functional oral phase for puree, regular solids, mildly-thick liquids and thin liquids marked by adequate accpetance and containment, adequate mastication of solids, adequate oral transit and adequate oral clearance. 2. Functional pharyngeal phase for aforementioned consistencies marked by hyolaryngeal excursion present upon palpation and no overt s/s of penetration/aspiration evidenced across trials.

## 2023-02-05 NOTE — PROGRESS NOTE ADULT - ATTENDING COMMENTS
67 M with schizophrenia admitted with several days of encephalopathy, found to have pancytopenia, SIRS, hypothermia, hypotension, hyperkalemia, on admission, likely 2/2 sepsis 2/2 pneumonia.      Sepsis 2/2 pneumonia (CT scan showing likely PNA with b/l pleural effusions) - c/w broad spectrum abx (Aspiration & MRSA coverage) MRSA neg, stopped Vanc. c/w Zosyn.   Hypothermia/hypotension now resolved   MARISSA, likely ATN in setting of sepsis/hypotension, improving  Encephalopathy improving, pt speaking now - almost at baseline, per his brother  Concern for clozapine-induced myocarditis, TTE with grossly Nl LV, trops OK, unlikely myocarditis. Cards input appreciated.  Pancytopenia improved, likely in setting of sepsis + med-induced from outpatient    Trial of IV lasix 40mg x1 today for suspected cardiac wheeze, effusions, and pulmonary edema. ICS.    Dispo: rehab placement    Discussed at length with patient and with brother at bedside. Brother says the patient's mental status and speech is almost back to his baseline.

## 2023-02-05 NOTE — SWALLOW BEDSIDE ASSESSMENT ADULT - SWALLOW EVAL: RECOMMENDED FEEDING/EATING TECHNIQUES
allow for swallow between intakes/alternate food with liquid/hard swallow w/ each bite or sip/maintain upright posture during/after eating for 30 mins/oral hygiene/position upright (90 degrees)/small sips/bites

## 2023-02-05 NOTE — SWALLOW BEDSIDE ASSESSMENT ADULT - ASR SWALLOW RECOMMEND DIAG
Objective testing NOT warranted given patient exhibits overt signs of penetration/aspiration and CXR is clear.
Objective testing is NOT indicated given functional swallow for regular solids and thin liquids with no overt s/s of penetration/aspiration

## 2023-02-05 NOTE — PROGRESS NOTE ADULT - PROBLEM SELECTOR PLAN 8
- psych consulted; recs appreciated   - increased clozapine to 75mg qHS, then 100mg qhs on Sunday 2/5.   - c/w Depakote to 750mg qHS, if thrombocytopenia worsens or patient continually lethargic would consider reducing to 500mg unless objections from neuro  - c/w Effexor as ordered, if persistently hyponatremic would assess for SIADH and consider stopping   - PRN for agitation Zyprexa 2.5mg q6h PRN PO/IM.

## 2023-02-05 NOTE — SWALLOW BEDSIDE ASSESSMENT ADULT - ADDITIONAL RECOMMENDATIONS
1. This service to follow up as schedule permits for diet tolerance. 2. Medical team advised to reconsult this service if change in medical status and/or patient's tolerance to recommended PO diet.
1. This service to follow up as schedule permits for diet tolerance. 2. Medical team further advised to reconsult this department with any change in medical status and/or observed change in tolerance of recommended PO diet.

## 2023-02-05 NOTE — SWALLOW BEDSIDE ASSESSMENT ADULT - COMMENTS
As per Internal Medicine note dated 2/5/23, "67M with PMHx schizophrenia, hx seizure, bipolar d/o, HTN, DM2, hx SBO presenting with confusion, slurred speech and difficulty ambulating x4 days. Admitted for acute encephalopathy, sepsis 2/2 pneumonia."    CXR 2/1/23 "IMPRESSION: Bilateral diffuse symmetrical opacities likely pulmonary   edema."    Patient is known to this service, last seen on 2/1/23, with recommendation to continue regular solids with mildly-thick liquids (see note for details).     Patient visited at bedside for clinical swallow evaluation. Patient presents as awake and alert, sitting upright in chair. Patient is able to follow basic commands and make basic wants/needs known.
Internal Medicine 1/31 "67M with PMHx schizophrenia, hx seizure, bipolar d/o, HTN, DM2, hx SBO presenting with confusion, slurred speech and difficulty ambulating x4 days. Admitted for acute encephalopathy. Found to have hypotension and bradycardia that are improved. Now with hypothermia as well."    CXR 1/30 "Clear lungs."    Patient seen at bedside this AM for an initial assessment of the swallow function. Patient's brother present during bedside swallow assessment and reporting patient eats/drinks without difficulty at baseline. patient able to verbalize wants/needs and follows simple directives.

## 2023-02-05 NOTE — PROGRESS NOTE ADULT - PROBLEM SELECTOR PLAN 5
on lisinopril 10mg at home    - start losartan 25mg po qd on lisinopril 10mg at home    - c/w losartan 25mg po qd

## 2023-02-05 NOTE — PROGRESS NOTE ADULT - SUBJECTIVE AND OBJECTIVE BOX
Patient is a 67y old  Male who presents with a chief complaint of confusion, dizziness (04 Feb 2023 13:13)      INTERVAL HPI/OVERNIGHT EVENTS:      REVIEW OF SYSTEMS:  CONSTITUTIONAL: No fever, weight loss, or fatigue  EYES: No eye pain, visual disturbances, or discharge  ENMT:  No difficulty hearing, tinnitus, vertigo; No sinus or throat pain  NECK: No pain or stiffness  BREASTS: No pain, masses, or nipple discharge  RESPIRATORY: No cough, wheezing, chills or hemoptysis; No shortness of breath  CARDIOVASCULAR: No chest pain, palpitations, dizziness, or leg swelling  GASTROINTESTINAL: No abdominal or epigastric pain. No nausea, vomiting, or hematemesis; No diarrhea or constipation. No melena or hematochezia.  GENITOURINARY: No dysuria, frequency, hematuria, or incontinence  NEUROLOGICAL: No headaches, memory loss, loss of strength, numbness, or tremors  SKIN: No itching, burning, rashes, or lesions   LYMPH NODES: No enlarged glands  ENDOCRINE: No heat or cold intolerance; No hair loss  MUSCULOSKELETAL: No joint pain or swelling; No muscle, back, or extremity pain  PSYCHIATRIC: No depression, anxiety, mood swings, or difficulty sleeping  HEME/LYMPH: No easy bruising, or bleeding gums  ALLERY AND IMMUNOLOGIC: No hives or eczema  FAMILY HISTORY:  Family history of diabetes mellitus (DM) (Mother)    Family history of heart disease  mother had 3 heart attacks    Family history of diabetes mellitus in brother  both brothers      T(C): 37.1 (02-05-23 @ 05:45), Max: 37.1 (02-05-23 @ 05:45)  HR: 69 (02-05-23 @ 05:45) (65 - 74)  BP: 175/94 (02-05-23 @ 05:45) (123/57 - 178/85)  RR: 18 (02-05-23 @ 05:45) (17 - 18)  SpO2: 98% (02-05-23 @ 05:45) (97% - 99%)  Wt(kg): --Vital Signs Last 24 Hrs  T(C): 37.1 (05 Feb 2023 05:45), Max: 37.1 (05 Feb 2023 05:45)  T(F): 98.7 (05 Feb 2023 05:45), Max: 98.7 (05 Feb 2023 05:45)  HR: 69 (05 Feb 2023 05:45) (65 - 74)  BP: 175/94 (05 Feb 2023 05:45) (123/57 - 178/85)  BP(mean): --  RR: 18 (05 Feb 2023 05:45) (17 - 18)  SpO2: 98% (05 Feb 2023 05:45) (97% - 99%)    Parameters below as of 05 Feb 2023 05:45  Patient On (Oxygen Delivery Method): nasal cannula  O2 Flow (L/min): 2      PHYSICAL EXAM:  GENERAL: NAD, well-groomed, well-developed  HEAD:  Atraumatic, Normocephalic  EYES: EOMI, PERRLA, conjunctiva and sclera clear  ENMT: No tonsillar erythema, exudates, or enlargement; Moist mucous membranes, Good dentition, No lesions  NECK: Supple, No JVD, Normal thyroid  NERVOUS SYSTEM:  Alert & Oriented X3, Good concentration; Motor Strength 5/5 B/L upper and lower extremities; DTRs 2+ intact and symmetric  CHEST/LUNG: Clear to percussion bilaterally; No rales, rhonchi, wheezing, or rubs  HEART: Regular rate and rhythm; No murmurs, rubs, or gallops  ABDOMEN: Soft, Nontender, Nondistended; Bowel sounds present  EXTREMITIES:  2+ Peripheral Pulses, No clubbing, cyanosis, or edema  LYMPH: No lymphadenopathy noted  SKIN: No rashes or lesions    Consultant(s) Notes Reviewed:  [x ] YES  [ ] NO  Care Discussed with Consultants/Other Providers [ x] YES  [ ] NO    LABS:          RADIOLOGY & ADDITIONAL TESTS:    Imaging Personally Reviewed:  [ ] YES  [ ] NO  atorvastatin 20 milliGRAM(s) Oral at bedtime  bisacodyl Suppository 10 milliGRAM(s) Rectal daily PRN  cholecalciferol 1000 Unit(s) Oral daily  cloZAPine 100 milliGRAM(s) Oral at bedtime  dextrose 5%. 1000 milliLiter(s) IV Continuous <Continuous>  dextrose 5%. 1000 milliLiter(s) IV Continuous <Continuous>  dextrose 50% Injectable 25 Gram(s) IV Push once  dextrose 50% Injectable 12.5 Gram(s) IV Push once  dextrose 50% Injectable 25 Gram(s) IV Push once  dextrose Oral Gel 15 Gram(s) Oral once PRN  divalproex  milliGRAM(s) Oral at bedtime  enoxaparin Injectable 40 milliGRAM(s) SubCutaneous every 24 hours  ferrous    sulfate 325 milliGRAM(s) Oral at bedtime  finasteride 5 milliGRAM(s) Oral daily  glucagon  Injectable 1 milliGRAM(s) IntraMuscular once  insulin glargine Injectable (LANTUS) 5 Unit(s) SubCutaneous at bedtime  insulin lispro (ADMELOG) corrective regimen sliding scale   SubCutaneous three times a day before meals  insulin lispro (ADMELOG) corrective regimen sliding scale   SubCutaneous at bedtime  insulin lispro Injectable (ADMELOG) 2 Unit(s) SubCutaneous three times a day before meals  latanoprost 0.005% Ophthalmic Solution 1 Drop(s) Both EYES at bedtime  levothyroxine 75 MICROGram(s) Oral daily  losartan 25 milliGRAM(s) Oral daily  multivitamin 1 Tablet(s) Oral daily  OLANZapine Injectable 2.5 milliGRAM(s) IntraMuscular every 6 hours PRN  piperacillin/tazobactam IVPB.. 3.375 Gram(s) IV Intermittent every 8 hours  polyethylene glycol 3350 17 Gram(s) Oral two times a day  pyridoxine 50 milliGRAM(s) Oral daily  senna 2 Tablet(s) Oral at bedtime  tamsulosin 0.8 milliGRAM(s) Oral at bedtime  timolol 0.5% Solution 1 Drop(s) Both EYES two times a day  venlafaxine XR. 75 milliGRAM(s) Oral at bedtime      HEALTH ISSUES - PROBLEM Dx:  Acute encephalopathy    Thrombocytopenia    Anemia    Hypotension    Type 2 diabetes mellitus with hyperglycemia, without long-term current use of insulin    Schizophrenia    Need for prophylactic measure    Hypothyroidism    BPH (benign prostatic hyperplasia)    Hypothermia not due to cold exposure    Hyperkalemia    Acute respiratory failure with hypoxia    Acute kidney injury    MARISSA (acute kidney injury)    Pancytopenia    Pneumonia    Hypertension           Patient is a 67y old  Male who presents with a chief complaint of confusion, dizziness (04 Feb 2023 13:13)      INTERVAL HPI/OVERNIGHT EVENTS:  No acute events overnight.  Patient says he feels well.  Brother at bedside says his mental status and speech are almost back to his baseline.      REVIEW OF SYSTEMS:  CONSTITUTIONAL: No fever, weight loss, or fatigue  EYES: No eye pain, visual disturbances, or discharge  ENMT:  No difficulty hearing, tinnitus, vertigo; No sinus or throat pain  NECK: No pain or stiffness  BREASTS: No pain, masses, or nipple discharge  RESPIRATORY: No cough, wheezing, chills or hemoptysis; No shortness of breath  CARDIOVASCULAR: No chest pain, palpitations, dizziness, or leg swelling  GASTROINTESTINAL: No abdominal or epigastric pain. No nausea, vomiting, or hematemesis; No diarrhea or constipation. No melena or hematochezia.  GENITOURINARY: No dysuria, frequency, hematuria, or incontinence  NEUROLOGICAL: No headaches, memory loss, loss of strength, numbness, or tremors  SKIN: No itching, burning, rashes, or lesions   LYMPH NODES: No enlarged glands  ENDOCRINE: No heat or cold intolerance; No hair loss  MUSCULOSKELETAL: No joint pain or swelling; No muscle, back, or extremity pain  PSYCHIATRIC: No depression, anxiety, mood swings, or difficulty sleeping  HEME/LYMPH: No easy bruising, or bleeding gums  ALLERY AND IMMUNOLOGIC: No hives or eczema  FAMILY HISTORY:  Family history of diabetes mellitus (DM) (Mother)    Family history of heart disease  mother had 3 heart attacks    Family history of diabetes mellitus in brother  both brothers      T(C): 37.1 (02-05-23 @ 05:45), Max: 37.1 (02-05-23 @ 05:45)  HR: 69 (02-05-23 @ 05:45) (65 - 74)  BP: 175/94 (02-05-23 @ 05:45) (123/57 - 178/85)  RR: 18 (02-05-23 @ 05:45) (17 - 18)  SpO2: 98% (02-05-23 @ 05:45) (97% - 99%)  Wt(kg): --Vital Signs Last 24 Hrs  T(C): 37.1 (05 Feb 2023 05:45), Max: 37.1 (05 Feb 2023 05:45)  T(F): 98.7 (05 Feb 2023 05:45), Max: 98.7 (05 Feb 2023 05:45)  HR: 69 (05 Feb 2023 05:45) (65 - 74)  BP: 175/94 (05 Feb 2023 05:45) (123/57 - 178/85)  BP(mean): --  RR: 18 (05 Feb 2023 05:45) (17 - 18)  SpO2: 98% (05 Feb 2023 05:45) (97% - 99%)    Parameters below as of 05 Feb 2023 05:45  Patient On (Oxygen Delivery Method): nasal cannula  O2 Flow (L/min): 2      PHYSICAL EXAM:  GENERAL: NAD, well-groomed, well-developed  HEAD:  Atraumatic, Normocephalic  EYES: EOMI, PERRLA, conjunctiva and sclera clear; wearing nasal cannula  ENMT: No tonsillar erythema, exudates, or enlargement; Moist mucous membranes, Good dentition, No lesions  NECK: Supple, No JVD, Normal thyroid  NERVOUS SYSTEM:  Alert & Oriented X3, Good concentration; Motor Strength 5/5 B/L upper and lower extremities; DTRs 2+ intact and symmetric  CHEST/LUNG: Clear to percussion bilaterally; No rales, rhonchi, or rubs; b/l lung bases with decreased breath sound posteriorly; right lung base with mild wheezes  HEART: Regular rate and rhythm; No murmurs, rubs, or gallops  ABDOMEN: Soft, Nontender, Nondistended; Bowel sounds present  EXTREMITIES:  2+ Peripheral Pulses, No clubbing, cyanosis, or edema  LYMPH: No lymphadenopathy noted  SKIN: No rashes or lesions  MSK: increased thoracic kyphosis    Consultant(s) Notes Reviewed:  [x ] YES  [ ] NO  Care Discussed with Consultants/Other Providers [ x] YES  [ ] NO    LABS:          RADIOLOGY & ADDITIONAL TESTS:    Imaging Personally Reviewed:  [ ] YES  [ ] NO  atorvastatin 20 milliGRAM(s) Oral at bedtime  bisacodyl Suppository 10 milliGRAM(s) Rectal daily PRN  cholecalciferol 1000 Unit(s) Oral daily  cloZAPine 100 milliGRAM(s) Oral at bedtime  dextrose 5%. 1000 milliLiter(s) IV Continuous <Continuous>  dextrose 5%. 1000 milliLiter(s) IV Continuous <Continuous>  dextrose 50% Injectable 25 Gram(s) IV Push once  dextrose 50% Injectable 12.5 Gram(s) IV Push once  dextrose 50% Injectable 25 Gram(s) IV Push once  dextrose Oral Gel 15 Gram(s) Oral once PRN  divalproex  milliGRAM(s) Oral at bedtime  enoxaparin Injectable 40 milliGRAM(s) SubCutaneous every 24 hours  ferrous    sulfate 325 milliGRAM(s) Oral at bedtime  finasteride 5 milliGRAM(s) Oral daily  glucagon  Injectable 1 milliGRAM(s) IntraMuscular once  insulin glargine Injectable (LANTUS) 5 Unit(s) SubCutaneous at bedtime  insulin lispro (ADMELOG) corrective regimen sliding scale   SubCutaneous three times a day before meals  insulin lispro (ADMELOG) corrective regimen sliding scale   SubCutaneous at bedtime  insulin lispro Injectable (ADMELOG) 2 Unit(s) SubCutaneous three times a day before meals  latanoprost 0.005% Ophthalmic Solution 1 Drop(s) Both EYES at bedtime  levothyroxine 75 MICROGram(s) Oral daily  losartan 25 milliGRAM(s) Oral daily  multivitamin 1 Tablet(s) Oral daily  OLANZapine Injectable 2.5 milliGRAM(s) IntraMuscular every 6 hours PRN  piperacillin/tazobactam IVPB.. 3.375 Gram(s) IV Intermittent every 8 hours  polyethylene glycol 3350 17 Gram(s) Oral two times a day  pyridoxine 50 milliGRAM(s) Oral daily  senna 2 Tablet(s) Oral at bedtime  tamsulosin 0.8 milliGRAM(s) Oral at bedtime  timolol 0.5% Solution 1 Drop(s) Both EYES two times a day  venlafaxine XR. 75 milliGRAM(s) Oral at bedtime      HEALTH ISSUES - PROBLEM Dx:  Acute encephalopathy    Thrombocytopenia    Anemia    Hypotension    Type 2 diabetes mellitus with hyperglycemia, without long-term current use of insulin    Schizophrenia    Need for prophylactic measure    Hypothyroidism    BPH (benign prostatic hyperplasia)    Hypothermia not due to cold exposure    Hyperkalemia    Acute respiratory failure with hypoxia    Acute kidney injury    MARISSA (acute kidney injury)    Pancytopenia    Pneumonia    Hypertension

## 2023-02-05 NOTE — PROGRESS NOTE ADULT - PROBLEM SELECTOR PLAN 10
Hospital Bundle    Fluids: po hydration  Electrolytes: Replete K > 4, Mg > 2, Phos > 3  Nutrition: Diet pureed  PPX  ---VTE: lovenx subq  ---GI: bowel regimen for 1BM/day  ---Resp: ra  Access: PIV    Code Status: FULL CODE  Dispo: Rehab upon clinical improvement

## 2023-02-05 NOTE — PROGRESS NOTE ADULT - PROBLEM SELECTOR PLAN 9
- restart home finasteride  - restart home tamsulosin -- increased to 0.8mg po qhs  - garcia placed 2/3/23, f/u o/p urology for ToV

## 2023-02-05 NOTE — PROGRESS NOTE ADULT - PROBLEM SELECTOR PLAN 2
CT Chest w/ diffuse anasarca with pleural effusion, atelectasis, edema w/ opacity c/f pneumonia, likely source of instability. Course c/b hypothermia, bradycardia, hypotension on 1/31. 1/30 BCx x2, UCx NGTD. MRSA negative, s/p vanc 1/31-2/3.    - c/w zosyn for 7 day course (1/31 - 2/6)  - satting well on room air CT Chest w/ diffuse anasarca with pleural effusion, atelectasis, edema w/ opacity c/f pneumonia, likely source of instability. Course c/b hypothermia, bradycardia, hypotension on 1/31. 1/30 BCx x2, UCx NGTD. MRSA negative, s/p vanc 1/31-2/3.    - c/w zosyn for 7 day course (1/31 - 2/6)  - requiring supplemental O2 via 2L NC

## 2023-02-06 LAB
ANION GAP SERPL CALC-SCNC: 9 MMOL/L — SIGNIFICANT CHANGE UP (ref 7–14)
BUN SERPL-MCNC: 18 MG/DL — SIGNIFICANT CHANGE UP (ref 7–23)
CALCIUM SERPL-MCNC: 9 MG/DL — SIGNIFICANT CHANGE UP (ref 8.4–10.5)
CHLORIDE SERPL-SCNC: 95 MMOL/L — LOW (ref 98–107)
CO2 SERPL-SCNC: 30 MMOL/L — SIGNIFICANT CHANGE UP (ref 22–31)
CREAT SERPL-MCNC: 0.99 MG/DL — SIGNIFICANT CHANGE UP (ref 0.5–1.3)
EGFR: 83 ML/MIN/1.73M2 — SIGNIFICANT CHANGE UP
GLUCOSE BLDC GLUCOMTR-MCNC: 131 MG/DL — HIGH (ref 70–99)
GLUCOSE BLDC GLUCOMTR-MCNC: 180 MG/DL — HIGH (ref 70–99)
GLUCOSE SERPL-MCNC: 145 MG/DL — HIGH (ref 70–99)
HCT VFR BLD CALC: 27.8 % — LOW (ref 39–50)
HGB BLD-MCNC: 8.9 G/DL — LOW (ref 13–17)
MAGNESIUM SERPL-MCNC: 1.5 MG/DL — LOW (ref 1.6–2.6)
MCHC RBC-ENTMCNC: 30 PG — SIGNIFICANT CHANGE UP (ref 27–34)
MCHC RBC-ENTMCNC: 32 GM/DL — SIGNIFICANT CHANGE UP (ref 32–36)
MCV RBC AUTO: 93.6 FL — SIGNIFICANT CHANGE UP (ref 80–100)
NRBC # BLD: 0 /100 WBCS — SIGNIFICANT CHANGE UP (ref 0–0)
NRBC # FLD: 0 K/UL — SIGNIFICANT CHANGE UP (ref 0–0)
PHOSPHATE SERPL-MCNC: 4.1 MG/DL — SIGNIFICANT CHANGE UP (ref 2.5–4.5)
PLATELET # BLD AUTO: 126 K/UL — LOW (ref 150–400)
POTASSIUM SERPL-MCNC: 5.1 MMOL/L — SIGNIFICANT CHANGE UP (ref 3.5–5.3)
POTASSIUM SERPL-SCNC: 5.1 MMOL/L — SIGNIFICANT CHANGE UP (ref 3.5–5.3)
RBC # BLD: 2.97 M/UL — LOW (ref 4.2–5.8)
RBC # FLD: 14.6 % — HIGH (ref 10.3–14.5)
SODIUM SERPL-SCNC: 134 MMOL/L — LOW (ref 135–145)
WBC # BLD: 5.68 K/UL — SIGNIFICANT CHANGE UP (ref 3.8–10.5)
WBC # FLD AUTO: 5.68 K/UL — SIGNIFICANT CHANGE UP (ref 3.8–10.5)

## 2023-02-06 PROCEDURE — 99232 SBSQ HOSP IP/OBS MODERATE 35: CPT

## 2023-02-06 PROCEDURE — 99232 SBSQ HOSP IP/OBS MODERATE 35: CPT | Mod: GC

## 2023-02-06 RX ORDER — AMLODIPINE BESYLATE 2.5 MG/1
5 TABLET ORAL DAILY
Refills: 0 | Status: DISCONTINUED | OUTPATIENT
Start: 2023-02-06 | End: 2023-02-07

## 2023-02-06 RX ORDER — INSULIN LISPRO 100/ML
3 VIAL (ML) SUBCUTANEOUS
Refills: 0 | Status: DISCONTINUED | OUTPATIENT
Start: 2023-02-06 | End: 2023-02-07

## 2023-02-06 RX ORDER — ATORVASTATIN CALCIUM 80 MG/1
40 TABLET, FILM COATED ORAL AT BEDTIME
Refills: 0 | Status: DISCONTINUED | OUTPATIENT
Start: 2023-02-06 | End: 2023-02-13

## 2023-02-06 RX ORDER — GUAIFENESIN/DEXTROMETHORPHAN 600MG-30MG
10 TABLET, EXTENDED RELEASE 12 HR ORAL EVERY 6 HOURS
Refills: 0 | Status: DISCONTINUED | OUTPATIENT
Start: 2023-02-06 | End: 2023-02-13

## 2023-02-06 RX ORDER — TAMSULOSIN HYDROCHLORIDE 0.4 MG/1
2 CAPSULE ORAL
Qty: 60 | Refills: 0
Start: 2023-02-06 | End: 2023-03-07

## 2023-02-06 RX ORDER — CLOZAPINE 150 MG/1
1 TABLET, ORALLY DISINTEGRATING ORAL
Qty: 0 | Refills: 0 | DISCHARGE
Start: 2023-02-06

## 2023-02-06 RX ORDER — ATORVASTATIN CALCIUM 80 MG/1
1 TABLET, FILM COATED ORAL
Qty: 0 | Refills: 0 | DISCHARGE
Start: 2023-02-06

## 2023-02-06 RX ORDER — AMLODIPINE BESYLATE 2.5 MG/1
1 TABLET ORAL
Qty: 30 | Refills: 0
Start: 2023-02-06 | End: 2023-03-07

## 2023-02-06 RX ORDER — LOSARTAN POTASSIUM 100 MG/1
1 TABLET, FILM COATED ORAL
Qty: 0 | Refills: 0 | DISCHARGE
Start: 2023-02-06

## 2023-02-06 RX ORDER — CLOZAPINE 150 MG/1
0 TABLET, ORALLY DISINTEGRATING ORAL
Qty: 0 | Refills: 0 | DISCHARGE

## 2023-02-06 RX ORDER — PYRIDOXINE HCL (VITAMIN B6) 100 MG
1 TABLET ORAL
Qty: 0 | Refills: 0 | DISCHARGE
Start: 2023-02-06

## 2023-02-06 RX ORDER — LISINOPRIL 2.5 MG/1
1 TABLET ORAL
Qty: 0 | Refills: 0 | DISCHARGE

## 2023-02-06 RX ORDER — POLYETHYLENE GLYCOL 3350 17 G/17G
17 POWDER, FOR SOLUTION ORAL
Qty: 1020 | Refills: 0
Start: 2023-02-06 | End: 2023-03-07

## 2023-02-06 RX ORDER — SENNA PLUS 8.6 MG/1
2 TABLET ORAL
Qty: 60 | Refills: 0
Start: 2023-02-06 | End: 2023-03-07

## 2023-02-06 RX ORDER — ATORVASTATIN CALCIUM 80 MG/1
1 TABLET, FILM COATED ORAL
Qty: 0 | Refills: 0 | DISCHARGE

## 2023-02-06 RX ORDER — MAGNESIUM SULFATE 500 MG/ML
2 VIAL (ML) INJECTION ONCE
Refills: 0 | Status: COMPLETED | OUTPATIENT
Start: 2023-02-06 | End: 2023-02-06

## 2023-02-06 RX ADMIN — Medication 25 GRAM(S): at 09:54

## 2023-02-06 RX ADMIN — Medication 2 UNIT(S): at 11:26

## 2023-02-06 RX ADMIN — PIPERACILLIN AND TAZOBACTAM 25 GRAM(S): 4; .5 INJECTION, POWDER, LYOPHILIZED, FOR SOLUTION INTRAVENOUS at 13:04

## 2023-02-06 RX ADMIN — Medication 3 UNIT(S): at 16:52

## 2023-02-06 RX ADMIN — AMLODIPINE BESYLATE 5 MILLIGRAM(S): 2.5 TABLET ORAL at 16:53

## 2023-02-06 RX ADMIN — TAMSULOSIN HYDROCHLORIDE 0.8 MILLIGRAM(S): 0.4 CAPSULE ORAL at 22:42

## 2023-02-06 RX ADMIN — Medication 50 MILLIGRAM(S): at 12:05

## 2023-02-06 RX ADMIN — PIPERACILLIN AND TAZOBACTAM 25 GRAM(S): 4; .5 INJECTION, POWDER, LYOPHILIZED, FOR SOLUTION INTRAVENOUS at 05:14

## 2023-02-06 RX ADMIN — SENNA PLUS 2 TABLET(S): 8.6 TABLET ORAL at 22:43

## 2023-02-06 RX ADMIN — Medication 1: at 07:38

## 2023-02-06 RX ADMIN — Medication 1 TABLET(S): at 12:05

## 2023-02-06 RX ADMIN — LATANOPROST 1 DROP(S): 0.05 SOLUTION/ DROPS OPHTHALMIC; TOPICAL at 22:41

## 2023-02-06 RX ADMIN — Medication 2 UNIT(S): at 07:38

## 2023-02-06 RX ADMIN — POLYETHYLENE GLYCOL 3350 17 GRAM(S): 17 POWDER, FOR SOLUTION ORAL at 17:13

## 2023-02-06 RX ADMIN — INSULIN GLARGINE 5 UNIT(S): 100 INJECTION, SOLUTION SUBCUTANEOUS at 23:23

## 2023-02-06 RX ADMIN — ENOXAPARIN SODIUM 40 MILLIGRAM(S): 100 INJECTION SUBCUTANEOUS at 12:27

## 2023-02-06 RX ADMIN — Medication 75 MICROGRAM(S): at 05:14

## 2023-02-06 RX ADMIN — DIVALPROEX SODIUM 750 MILLIGRAM(S): 500 TABLET, DELAYED RELEASE ORAL at 22:42

## 2023-02-06 RX ADMIN — Medication 1 DROP(S): at 17:13

## 2023-02-06 RX ADMIN — ATORVASTATIN CALCIUM 40 MILLIGRAM(S): 80 TABLET, FILM COATED ORAL at 22:51

## 2023-02-06 RX ADMIN — CLOZAPINE 100 MILLIGRAM(S): 150 TABLET, ORALLY DISINTEGRATING ORAL at 22:43

## 2023-02-06 RX ADMIN — Medication 1 DROP(S): at 06:11

## 2023-02-06 RX ADMIN — LOSARTAN POTASSIUM 25 MILLIGRAM(S): 100 TABLET, FILM COATED ORAL at 07:08

## 2023-02-06 RX ADMIN — FINASTERIDE 5 MILLIGRAM(S): 5 TABLET, FILM COATED ORAL at 12:05

## 2023-02-06 RX ADMIN — POLYETHYLENE GLYCOL 3350 17 GRAM(S): 17 POWDER, FOR SOLUTION ORAL at 05:14

## 2023-02-06 RX ADMIN — Medication 3: at 11:26

## 2023-02-06 RX ADMIN — PIPERACILLIN AND TAZOBACTAM 25 GRAM(S): 4; .5 INJECTION, POWDER, LYOPHILIZED, FOR SOLUTION INTRAVENOUS at 22:51

## 2023-02-06 RX ADMIN — Medication 75 MILLIGRAM(S): at 22:42

## 2023-02-06 RX ADMIN — Medication 325 MILLIGRAM(S): at 22:41

## 2023-02-06 RX ADMIN — Medication 1000 UNIT(S): at 12:05

## 2023-02-06 NOTE — PROGRESS NOTE ADULT - ATTENDING COMMENTS
Mr. Olmstead is a 68 yo M with PMH of schizophrenia, hx seizure, bipolar d/o, HTN, hypothyroidism and DM2 who a/w encephalopathy likely 2/2 sepsis 2/2 pneumonia and acute hypoxic respiratory failure requiring supplemental O2. Now overall improving and awaiting placement at [facility] [place].  - Sepsis 2/2 pneumonia - CT scan showing likely PNA - s/p IV Zosyn x 7 days  - Acute hypoxic respiratory failure - requiring supplemental O2; wean off as tolerated  - MARISSA likely ATN in setting of sepsis/hypotension - resolved  -encephalopathy improved and now back to baseline   - Endo f/u appreciated - c/w levothyroxine  - urinary retention - failed TOV; will need outpt f/u with urology  - SW/CM for care coordination for d/c planning to [facility] [place] Mr. Olmstead is a 66 yo M with PMH of schizophrenia, hx seizure, bipolar d/o, HTN, hypothyroidism and DM2 who a/w encephalopathy likely 2/2 sepsis 2/2 pneumonia and acute hypoxic respiratory failure requiring supplemental O2. Now overall improving and awaiting placement at [facility].  - Sepsis 2/2 pneumonia - CT scan showing likely PNA - s/p IV Zosyn x 7 days  - Acute hypoxic respiratory failure - requiring supplemental O2; wean off as tolerated  - MARISSA likely ATN in setting of sepsis/hypotension - resolved  -encephalopathy improved and now back to baseline   - Endo f/u appreciated - c/w levothyroxine for hypothyroidism; Lantus 5 units HS and Admelog 2 units TID with meals for DM2  - urinary retention - failed TOV; will need outpt f/u with urology  - SW/LULY for care coordination for d/c planning to [facility]

## 2023-02-06 NOTE — DIETITIAN INITIAL EVALUATION ADULT - PERTINENT MEDS FT
Lovenox, Insulin (Glargine), Insulin (Lispro), Dulcolax (PRN), Vit D3, Miralax, Multivitamin, Senna, Vit B6, Lipitor, Fe-Sulfate,

## 2023-02-06 NOTE — PROGRESS NOTE ADULT - PROBLEM SELECTOR PLAN 1
Baseline oriented 3, ambulates on own. Now oriented 1-2 with confusion, dysarthria, prolonged speech latency (motor/mixed aphasia), truncal ataxia. Of note, patient has been on 125mg of clozapine since 1997 and recent reduction to 50mg may be contributing. Course c/b initial bradycardia, hypotension, hypothermia.     CXR clear, UA normal, no signs of skin infections, no leukocytosis, though elevated NLR. T4 normal, TSH 4.91 mildly elevated, compliant with levothyroxine dosing. AM Cortisol elevated, unlikely AI. Troponin flat with elevated CKMB, TTE wnl. Per cards, unlikely myocarditis. Ammonium 58 mildly elevated likely not delivered on ice, LFTs wnl, no clonus or asterixis. Rheum w/u negative. Nutritional deficiency w/u negative. Patient is maintaining airway and responsive. Not lethargic, awake and alert. Follows commands. Now requiring 1-2L NC. MRI MRA head showed microvascular disease but no new stroke. CTCAP showed diffuse anasarca with pleural effusion, atelectasis, edema w/ opacity c/f pneumonia, likely source of instability. Ddx incl toxic metabolic encephalopathy iso sepsis vs. progression of psychiatric disorders.    - ICU consulted 1/31 for hypothermia, recommended stress dose hydrocortisone 100mg  - Endo consulted; recs appreciated. - unlikely to be AI. Will hold off on further steroids until infx r/o    > ABx as below- Likely septic encephalopathy 2/2 PNA  - ctm on tele  - incr atorvastatin 40mg  - PT/OT/S&S eval - dispo [facility] d/t weakness endorsed by Pt  - psych consulted; recs appreciated - clozapine inc to 100; pre-admission dosage 125

## 2023-02-06 NOTE — PROGRESS NOTE ADULT - SUBJECTIVE AND OBJECTIVE BOX
History:  No acute overnight events  Reported having a good appetite  denies n/v.  No hypoglycemia         MEDICATIONS  (STANDING):  atorvastatin 20 milliGRAM(s) Oral at bedtime  cholecalciferol 1000 Unit(s) Oral daily  cloZAPine 75 milliGRAM(s) Oral at bedtime  dextrose 5%. 1000 milliLiter(s) (50 mL/Hr) IV Continuous <Continuous>  dextrose 5%. 1000 milliLiter(s) (100 mL/Hr) IV Continuous <Continuous>  dextrose 50% Injectable 25 Gram(s) IV Push once  dextrose 50% Injectable 12.5 Gram(s) IV Push once  dextrose 50% Injectable 25 Gram(s) IV Push once  divalproex  milliGRAM(s) Oral at bedtime  enoxaparin Injectable 40 milliGRAM(s) SubCutaneous every 24 hours  ferrous    sulfate 325 milliGRAM(s) Oral at bedtime  finasteride 5 milliGRAM(s) Oral daily  glucagon  Injectable 1 milliGRAM(s) IntraMuscular once  insulin glargine Injectable (LANTUS) 5 Unit(s) SubCutaneous at bedtime  insulin lispro (ADMELOG) corrective regimen sliding scale   SubCutaneous three times a day before meals  insulin lispro (ADMELOG) corrective regimen sliding scale   SubCutaneous at bedtime  latanoprost 0.005% Ophthalmic Solution 1 Drop(s) Both EYES at bedtime  levothyroxine 75 MICROGram(s) Oral daily  losartan 25 milliGRAM(s) Oral daily  multivitamin 1 Tablet(s) Oral daily  piperacillin/tazobactam IVPB.. 3.375 Gram(s) IV Intermittent every 8 hours  polyethylene glycol 3350 17 Gram(s) Oral two times a day  pyridoxine 50 milliGRAM(s) Oral daily  senna 2 Tablet(s) Oral at bedtime  tamsulosin 0.8 milliGRAM(s) Oral at bedtime  timolol 0.5% Solution 1 Drop(s) Both EYES two times a day  venlafaxine XR. 75 milliGRAM(s) Oral at bedtime    MEDICATIONS  (PRN):  bisacodyl Suppository 10 milliGRAM(s) Rectal daily PRN Constipation  dextrose Oral Gel 15 Gram(s) Oral once PRN Blood Glucose LESS THAN 70 milliGRAM(s)/deciliter  OLANZapine Injectable 2.5 milliGRAM(s) IntraMuscular every 6 hours PRN agitation      Allergies    No Known Allergies    Intolerances      Review of Systems:  Constitutional: No fever +Increased coughing   Eyes: No blurry vision    ALL OTHER SYSTEMS REVIEWED AND NEGATIVE    Vital Signs Last 24 Hrs  T(C): 36.6 (06 Feb 2023 12:37), Max: 36.7 (06 Feb 2023 04:30)  T(F): 97.8 (06 Feb 2023 12:37), Max: 98.1 (06 Feb 2023 04:30)  HR: 84 (06 Feb 2023 12:37) (74 - 88)  BP: 146/83 (06 Feb 2023 12:37) (146/83 - 162/81)  BP(mean): --  RR: 18 (06 Feb 2023 12:37) (17 - 18)  SpO2: 95% (06 Feb 2023 12:37) (89% - 98%)    Parameters below as of 06 Feb 2023 12:37  Patient On (Oxygen Delivery Method): nasal cannula  O2 Flow (L/min): 2    GENERAL: NAD  EYES: No proptosis, no lid lag, anicteric  GI: Soft, nontender, non distended, normal bowel sounds  SKIN: Dry, intact, No rashes or lesions  PSYCH: Alert and oriented x 3, normal affect, normal mood      POCT Blood Glucose.: 249 mg/dL (02-04-23 @ 11:23)  POCT Blood Glucose.: 153 mg/dL (02-04-23 @ 07:44)  POCT Blood Glucose.: 271 mg/dL (02-03-23 @ 21:14)  POCT Blood Glucose.: 303 mg/dL (02-03-23 @ 16:39)  POCT Blood Glucose.: 158 mg/dL (02-03-23 @ 11:22)  POCT Blood Glucose.: 171 mg/dL (02-03-23 @ 07:19)  POCT Blood Glucose.: 134 mg/dL (02-02-23 @ 21:49)  POCT Blood Glucose.: 269 mg/dL (02-02-23 @ 16:36)  POCT Blood Glucose.: 216 mg/dL (02-02-23 @ 11:30)  POCT Blood Glucose.: 96 mg/dL (02-02-23 @ 07:19)  POCT Blood Glucose.: 96 mg/dL (02-01-23 @ 21:59)  POCT Blood Glucose.: 84 mg/dL (02-01-23 @ 16:43)    02-06    134<L>  |  95<L>  |  18  ----------------------------<  145<H>  5.1   |  30  |  0.99    Ca    9.0      06 Feb 2023 06:30  Phos  4.1     02-06  Mg     1.50     02-06        Thyroid Function Tests:  01-30 @ 16:49 TSH 4.91 FreeT4 -- T3 46 Anti TPO -- Anti Thyroglobulin Ab -- TSI --

## 2023-02-06 NOTE — DIETITIAN INITIAL EVALUATION ADULT - DIET TYPE
consistent carbohydrate (no snacks)/DASH/TLC (sodium and cholesterol restricted diet)/regular/supplement (specify)

## 2023-02-06 NOTE — PROGRESS NOTE ADULT - PROBLEM SELECTOR PLAN 2
CT Chest w/ diffuse anasarca with pleural effusion, atelectasis, edema w/ opacity c/f pneumonia, likely source of instability. Course c/b hypothermia, bradycardia, hypotension on 1/31. 1/30 BCx x2, UCx NGTD. MRSA negative, s/p vanc 1/31-2/3. s/p zosyn for 7 day course (1/31 - 2/6)    - requiring supplemental O2 via 2L NC  - likely d/c on O2, wean at [facility] if possible  - o/p pulm f/u for pleural effusions, repeat CT chest

## 2023-02-06 NOTE — PROGRESS NOTE ADULT - SUBJECTIVE AND OBJECTIVE BOX
PROGRESS NOTE:     Patient is a 67y old  Male who presents with a chief complaint of confusion, dizziness (06 Feb 2023 07:53)    SUBJECTIVE / OVERNIGHT EVENTS: No acute events overnight. Today, Pt is s/p trial IV Lasix for R lower wheezing iso pulmonary edema. Pt reports non-worsening, persistent, non-productive cough. Pt remains on 2L NC. Attempt to wean to RA desat to 89. Pt is back on 2L NC. According to brother at bedside, Pt is now at baseline for speech and mental status. Pt has been cleared for regular diet, continuing thickened liquids. [place] change this morning d/t failed ToV output 600cc.    ADDITIONAL REVIEW OF SYSTEMS: Negative for fever, HA, chest pain, palpitations, abdominal pain, NA/vomiting.    MEDICATIONS  (STANDING):  atorvastatin 40 milliGRAM(s) Oral at bedtime  cholecalciferol 1000 Unit(s) Oral daily  cloZAPine 100 milliGRAM(s) Oral at bedtime  dextrose 5%. 1000 milliLiter(s) (100 mL/Hr) IV Continuous <Continuous>  dextrose 5%. 1000 milliLiter(s) (50 mL/Hr) IV Continuous <Continuous>  dextrose 50% Injectable 25 Gram(s) IV Push once  dextrose 50% Injectable 12.5 Gram(s) IV Push once  dextrose 50% Injectable 25 Gram(s) IV Push once  divalproex  milliGRAM(s) Oral at bedtime  enoxaparin Injectable 40 milliGRAM(s) SubCutaneous every 24 hours  ferrous    sulfate 325 milliGRAM(s) Oral at bedtime  finasteride 5 milliGRAM(s) Oral daily  glucagon  Injectable 1 milliGRAM(s) IntraMuscular once  insulin glargine Injectable (LANTUS) 5 Unit(s) SubCutaneous at bedtime  insulin lispro (ADMELOG) corrective regimen sliding scale   SubCutaneous three times a day before meals  insulin lispro (ADMELOG) corrective regimen sliding scale   SubCutaneous at bedtime  insulin lispro Injectable (ADMELOG) 2 Unit(s) SubCutaneous three times a day before meals  latanoprost 0.005% Ophthalmic Solution 1 Drop(s) Both EYES at bedtime  levothyroxine 75 MICROGram(s) Oral daily  losartan 25 milliGRAM(s) Oral daily  multivitamin 1 Tablet(s) Oral daily  piperacillin/tazobactam IVPB.. 3.375 Gram(s) IV Intermittent every 8 hours  polyethylene glycol 3350 17 Gram(s) Oral two times a day  pyridoxine 50 milliGRAM(s) Oral daily  senna 2 Tablet(s) Oral at bedtime  tamsulosin 0.8 milliGRAM(s) Oral at bedtime  timolol 0.5% Solution 1 Drop(s) Both EYES two times a day  venlafaxine XR. 75 milliGRAM(s) Oral at bedtime    MEDICATIONS  (PRN):  bisacodyl Suppository 10 milliGRAM(s) Rectal daily PRN Constipation  dextrose Oral Gel 15 Gram(s) Oral once PRN Blood Glucose LESS THAN 70 milliGRAM(s)/deciliter  OLANZapine Injectable 2.5 milliGRAM(s) IntraMuscular every 6 hours PRN agitation    CAPILLARY BLOOD GLUCOSE    POCT Blood Glucose.: 269 mg/dL (06 Feb 2023 11:20)  POCT Blood Glucose.: 154 mg/dL (06 Feb 2023 07:21)  POCT Blood Glucose.: 195 mg/dL (05 Feb 2023 22:24)  POCT Blood Glucose.: 198 mg/dL (05 Feb 2023 16:36)    I&O's Summary    05 Feb 2023 07:01  -  06 Feb 2023 07:00  --------------------------------------------------------  IN: 0 mL / OUT: 3500 mL / NET: -3500 mL    06 Feb 2023 07:01  -  06 Feb 2023 12:01  --------------------------------------------------------  IN: 0 mL / OUT: 650 mL / NET: -650 mL    PHYSICAL EXAM:  Vital Signs Last 24 Hrs  T(C): 36.7 (06 Feb 2023 04:30), Max: 36.7 (05 Feb 2023 13:07)  T(F): 98.1 (06 Feb 2023 04:30), Max: 98.1 (06 Feb 2023 04:30)  HR: 88 (06 Feb 2023 04:30) (74 - 88)  BP: 156/82 (06 Feb 2023 04:30) (154/85 - 162/81)  BP(mean): --  RR: 18 (06 Feb 2023 08:08) (17 - 18)  SpO2: 89% (06 Feb 2023 08:08) (89% - 98%)    Parameters below as of 06 Feb 2023 08:08  Patient On (Oxygen Delivery Method): room air    GENERAL: NAD  HEAD: Atraumatic, normocephalic  EYES: Minimally reactive pupils, EOMI b/l, conjunctiva and sclera clear  NECK: Supple, No JVD, No LAD  RESPIRATORY: Normal respiratory effort; upper airway gurgling; decreased breath sounds in posterior lungs bilaterally; no appreciated wheezing  CARDIOVASCULAR: Regular rate and rhythm, normal S1 and S2, no murmur/rub/gallop; No lower extremity edema  ABDOMEN: Nontender, mildly distended, normoactive bowel sounds, no rebound/guarding  NEURO: Non focal   PSYCH: AOx4    LABS:                        8.9    5.68  )-----------( 126      ( 06 Feb 2023 06:30 )             27.8     02-06    134<L>  |  95<L>  |  18  ----------------------------<  145<H>  5.1   |  30  |  0.99    Ca    9.0      06 Feb 2023 06:30  Phos  4.1     02-06  Mg     1.50     02-06    RADIOLOGY:    Consulted note reviewed  Reviewed Imaging personally

## 2023-02-06 NOTE — DIETITIAN INITIAL EVALUATION ADULT - PERTINENT LABORATORY DATA
02-06    134<L>  |  95<L>  |  18  ----------------------------<  145<H>  5.1   |  30  |  0.99    Ca    9.0      06 Feb 2023 06:30  Phos  4.1     02-06  Mg     1.50     02-06    POCT Blood Glucose.: 269 mg/dL (02-06-23 @ 11:20)  A1C with Estimated Average Glucose Result: 6.4 % (01-30-23 @ 16:45)

## 2023-02-06 NOTE — PROGRESS NOTE ADULT - PROBLEM SELECTOR PLAN 3
Desaturations x 2 to low 80s on room air while sleeping overnight. Could be due to sleep apnea vs some component of aspiration pneumonia given his mental status change. CT Chest showed pleural effusion b/l, atelectasis, consolidation c/f pna. Currently on 2 L NC    - wean NC as tolerated  - c/w abx as above  - offer incentive spirometer

## 2023-02-06 NOTE — DIETITIAN INITIAL EVALUATION ADULT - NS FNS DIET ORDER
Regular: Consistent Carbohydrate {Evening Snack} (CSTCHOSN) DASH/TLC {Sodium & Cholesterol Restricted} (DASH)  Mildly Thick Liquids (MILDTHICKLIQS) No Concentrated Potassium No Concentrated Phosphorus Kosher (02-01-23 @ 09:54) [Active]

## 2023-02-06 NOTE — PROGRESS NOTE ADULT - PROBLEM SELECTOR PLAN 5
on lisinopril 10mg at home    - c/w losartan 25mg po qd    > Given high/borderline potassium, consider switch to Norvasc

## 2023-02-06 NOTE — PROGRESS NOTE ADULT - ASSESSMENT
67 year old right-handed man with schizophrenia, bipolar disorder, T2DM, HTN, glaucoma, BPH presents to [facility] ED brought in by his brother with whom he lives with due to confusion, gait instability and dysarthria that has developed since 1/27/23 at 4:00PM. LKN 1/27/23 unknown time, preMRS 0, NIHSS 3-4. Patient outside window for intervention (TNK and thrombectomy). Neuro exam demonstrates alert patient, prolonged speech latency and perseveration, stuttering, moderate dysarthria, impaired attention/concentration, no drift on motor exam, +dyskinesia of head likely adverse effect of clozapine, +retropulsion (did not participate in dysmetria testing).   CTH, CTA H/N did not reveal any acute intracranial pathology.  MRI brain neg for acute infarct, smallv essel disease noted   A1C 6.4 LDL 23   TTE done 1/31     Impression: changes in mental status improved likely toxic metabolic encephalopathy vs. nutritional/vitamin deficiency/medication effect vs. unlikely progression of psychiatric disorders      Recommendations:  - please ensure patient receives all home medications that he has missed  - 81mg daily  - Start atorvastatin 40mg  - Continue aspirin 81mg   - psych f/.uy   - spoke with primary team intern   Pt to f/u with Dr. Jonathan Orozco after discharge:  [address] [place] [address]  [phone]    Jonathan Orozco MD  Vascular Neurology

## 2023-02-06 NOTE — PROGRESS NOTE ADULT - PROBLEM SELECTOR PLAN 9
- restart home finasteride  - restart home tamsulosin -- increased to 0.8mg po qhs  - garcia placed 2/3/23, changed 2/6/23, f/u o/p urology for ToV

## 2023-02-06 NOTE — PROGRESS NOTE ADULT - PROBLEM SELECTOR PLAN 1
Baseline oriented 3, ambulates on own. Now oriented 1-2 with confusion, dysarthria, prolonged speech latency (motor/mixed aphasia), truncal ataxia. Of note, patient has been on 125mg of clozapine since 1997 and recent reduction to 50mg may be contributing. Course c/b initial bradycardia, hypotension, hypothermia.     CXR clear, UA normal, no signs of skin infections, no leukocytosis, though elevated NLR. T4 normal, TSH 4.91 mildly elevated, compliant with levothyroxine dosing. AM Cortisol elevated, unlikely AI. Troponin flat with elevated CKMB, TTE wnl. Per cards, unlikely myocarditis. Ammonium 58 mildly elevated likely not delivered on ice, LFTs wnl, no clonus or asterixis. Rheum w/u negative. Nutritional deficiency w/u negative. Patient is maintaining airway and responsive. Not lethargic, awake and alert. Follows commands. Now requiring 1-2L NC. MRI MRA head showed microvascular disease but no new stroke. CTCAP showed diffuse anasarca with pleural effusion, atelectasis, edema w/ opacity c/f pneumonia, likely source of instability. Ddx incl toxic metabolic encephalopathy iso sepsis vs. progression of psychiatric disorders.    - ICU consulted 1/31 for hypothermia, recommended stress dose hydrocortisone 100mg  - Endo consulted; recs appreciated. - unlikely to be AI. Will hold off on further steroids until infx r/o  - abx as below  - ctm on tele  - c/w statin  - PT/OT/S&S eval - when can adequately follow commands and medically more stable. Bedrest for now  - psych consulted; recs appreciated Baseline oriented 3, ambulates on own. Now oriented 1-2 with confusion, dysarthria, prolonged speech latency (motor/mixed aphasia), truncal ataxia. Of note, patient has been on 125mg of clozapine since 1997 and recent reduction to 50mg may be contributing. Course c/b initial bradycardia, hypotension, hypothermia.     CXR clear, UA normal, no signs of skin infections, no leukocytosis, though elevated NLR. T4 normal, TSH 4.91 mildly elevated, compliant with levothyroxine dosing. AM Cortisol elevated, unlikely AI. Troponin flat with elevated CKMB, TTE wnl. Per cards, unlikely myocarditis. Ammonium 58 mildly elevated likely not delivered on ice, LFTs wnl, no clonus or asterixis. Rheum w/u negative. Nutritional deficiency w/u negative. Patient is maintaining airway and responsive. Not lethargic, awake and alert. Follows commands. Now requiring 1-2L NC. MRI MRA head showed microvascular disease but no new stroke. CTCAP showed diffuse anasarca with pleural effusion, atelectasis, edema w/ opacity c/f pneumonia, likely source of instability. Ddx incl toxic metabolic encephalopathy iso sepsis vs. progression of psychiatric disorders.    - ICU consulted 1/31 for hypothermia, recommended stress dose hydrocortisone 100mg  - Endo consulted; recs appreciated. - unlikely to be AI. Will hold off on further steroids until infx r/o  - abx as below  - ctm on tele  - incr atorvastatin 40mg  - PT/OT/S&S eval - when can adequately follow commands and medically more stable. Bedrest for now  - psych consulted; recs appreciated

## 2023-02-06 NOTE — PROGRESS NOTE ADULT - PROBLEM SELECTOR PLAN 2
CT Chest w/ diffuse anasarca with pleural effusion, atelectasis, edema w/ opacity c/f pneumonia, likely source of instability. Course c/b hypothermia, bradycardia, hypotension on 1/31. 1/30 BCx x2, UCx NGTD. MRSA negative, s/p vanc 1/31-2/3.    - c/w zosyn for 7 day course (1/31 - 2/6)  - requiring supplemental O2 via 2L NC CT Chest w/ diffuse anasarca with pleural effusion, atelectasis, edema w/ opacity c/f pneumonia, likely source of instability. Course c/b hypothermia, bradycardia, hypotension on 1/31. 1/30 BCx x2, UCx NGTD. MRSA negative, s/p vanc 1/31-2/3. s/p zosyn for 7 day course (1/31 - 2/6)    - requiring supplemental O2 via 2L NC  - likely d/c on O2, wean at [facility] if possible  - o/p pulm f/u for pleural effusions, repeat CT chest

## 2023-02-06 NOTE — PROGRESS NOTE ADULT - SUBJECTIVE AND OBJECTIVE BOX
***************************************************************  Miky Munguia, PGY1  Internal Medicine   pager:  [facility]: [phone] [facility]: [phone]  ***************************************************************    CURTIS DIAZ  67y  MRN: [id number]    Patient is a 67y old  Male who presents with a chief complaint of confusion, dizziness (05 Feb 2023 07:21)    Interval/Overnight Events: no events ON.   - trial of IV lasix 40mg for persistent wheeze iso pulm edema; no change in O2 requirment  - mental status/speech improved from my last exam    Subjective: Pt seen and examined at bedside. Denies fever, CP, SOB, abn pain, N/V, dysuria. Tolerating diet.      MEDICATIONS  (STANDING):  atorvastatin 20 milliGRAM(s) Oral at bedtime  cholecalciferol 1000 Unit(s) Oral daily  cloZAPine 100 milliGRAM(s) Oral at bedtime  dextrose 5%. 1000 milliLiter(s) (50 mL/Hr) IV Continuous <Continuous>  dextrose 5%. 1000 milliLiter(s) (100 mL/Hr) IV Continuous <Continuous>  dextrose 50% Injectable 25 Gram(s) IV Push once  dextrose 50% Injectable 12.5 Gram(s) IV Push once  dextrose 50% Injectable 25 Gram(s) IV Push once  divalproex  milliGRAM(s) Oral at bedtime  enoxaparin Injectable 40 milliGRAM(s) SubCutaneous every 24 hours  ferrous    sulfate 325 milliGRAM(s) Oral at bedtime  finasteride 5 milliGRAM(s) Oral daily  glucagon  Injectable 1 milliGRAM(s) IntraMuscular once  insulin glargine Injectable (LANTUS) 5 Unit(s) SubCutaneous at bedtime  insulin lispro (ADMELOG) corrective regimen sliding scale   SubCutaneous three times a day before meals  insulin lispro (ADMELOG) corrective regimen sliding scale   SubCutaneous at bedtime  insulin lispro Injectable (ADMELOG) 2 Unit(s) SubCutaneous three times a day before meals  latanoprost 0.005% Ophthalmic Solution 1 Drop(s) Both EYES at bedtime  levothyroxine 75 MICROGram(s) Oral daily  losartan 25 milliGRAM(s) Oral daily  multivitamin 1 Tablet(s) Oral daily  piperacillin/tazobactam IVPB.. 3.375 Gram(s) IV Intermittent every 8 hours  polyethylene glycol 3350 17 Gram(s) Oral two times a day  pyridoxine 50 milliGRAM(s) Oral daily  senna 2 Tablet(s) Oral at bedtime  tamsulosin 0.8 milliGRAM(s) Oral at bedtime  timolol 0.5% Solution 1 Drop(s) Both EYES two times a day  venlafaxine XR. 75 milliGRAM(s) Oral at bedtime    MEDICATIONS  (PRN):  bisacodyl Suppository 10 milliGRAM(s) Rectal daily PRN Constipation  dextrose Oral Gel 15 Gram(s) Oral once PRN Blood Glucose LESS THAN 70 milliGRAM(s)/deciliter  OLANZapine Injectable 2.5 milliGRAM(s) IntraMuscular every 6 hours PRN agitation      Objective:    Vitals: Vital Signs Last 24 Hrs  T(C): 36.7 (02-06-23 @ 04:30), Max: 36.7 (02-05-23 @ 13:07)  T(F): 98.1 (02-06-23 @ 04:30), Max: 98.1 (02-06-23 @ 04:30)  HR: 88 (02-06-23 @ 04:30) (73 - 88)  BP: 156/82 (02-06-23 @ 04:30) (140/79 - 162/81)  BP(mean): --  RR: 18 (02-06-23 @ 04:30) (17 - 18)  SpO2: 98% (02-06-23 @ 04:30) (97% - 98%)                I&O's Summary    05 Feb 2023 07:01  -  06 Feb 2023 07:00  --------------------------------------------------------  IN: 0 mL / OUT: 3500 mL / NET: -3500 mL        PHYSICAL EXAM:  GENERAL: NAD  HEAD:  Atraumatic, Normocephalic; wearing nasal cannula  EYES: pupils minimally reactive, constricted. EOMI, conjunctiva and sclera clear  CHEST/LUNG: upper airway coarse crackles, poor auscultation of lung sounds on posterior fields.  HEART: Regular rate and rhythm; No murmurs, rubs, or gallops  ABDOMEN: Somewhat distended, tympanic on percussion. Soft, Nontender  MSK: increased thoracic kyphosis  SKIN: No rashes or lesions  NERVOUS SYSTEM:  Alert & Oriented X3, much improved dysarthria and speech latency, no focal deficits but weak.     LABS:  02-05    133<L>  |  97<L>  |  16  ----------------------------<  128<H>  5.0   |  31  |  0.74  02-04    137  |  98  |  20  ----------------------------<  131<H>  5.3   |  32<H>  |  0.90    Ca    9.0      05 Feb 2023 05:00  Ca    9.1      04 Feb 2023 04:50  Phos  4.0     02-05  Mg     1.60     02-05                        8.9    5.68  )-----------( 126      ( 06 Feb 2023 06:30 )             27.8                         9.4    5.46  )-----------( 108      ( 05 Feb 2023 05:00 )             28.5                         10.0   5.67  )-----------( 110      ( 04 Feb 2023 04:50 )             30.9     CAPILLARY BLOOD GLUCOSE      POCT Blood Glucose.: 154 mg/dL (06 Feb 2023 07:21)  POCT Blood Glucose.: 195 mg/dL (05 Feb 2023 22:24)  POCT Blood Glucose.: 198 mg/dL (05 Feb 2023 16:36)  POCT Blood Glucose.: 233 mg/dL (05 Feb 2023 11:17)      RADIOLOGY & ADDITIONAL TESTS:    Imaging Personally Reviewed:  [x ] YES  [ ] NO    Consultants involved in case:   Consultant(s) Notes Reviewed:  [ x] YES  [ ] NO:   Care Discussed with Consultants/Other Providers [x ] YES  [ ] NO         ***************************************************************  Miky Munguia, PGY1  Internal Medicine   pager:  [facility]: [phone] [facility]: [phone]  ***************************************************************    CURTIS DIAZ  67y  MRN: [id number]    Patient is a 67y old  Male who presents with a chief complaint of confusion, dizziness (05 Feb 2023 07:21)    Interval/Overnight Events: no events ON.   - trial of IV lasix 40mg for persistent wheeze iso pulm edema; no change in O2 requirment  - mental status/speech improved from my last exam  - SLP cleared for regular diet with thin liquids    Subjective: Pt seen and examined at bedside. Denies fever, CP, SOB, abn pain, N/V, dysuria. Tolerating diet.      MEDICATIONS  (STANDING):  atorvastatin 20 milliGRAM(s) Oral at bedtime  cholecalciferol 1000 Unit(s) Oral daily  cloZAPine 100 milliGRAM(s) Oral at bedtime  dextrose 5%. 1000 milliLiter(s) (50 mL/Hr) IV Continuous <Continuous>  dextrose 5%. 1000 milliLiter(s) (100 mL/Hr) IV Continuous <Continuous>  dextrose 50% Injectable 25 Gram(s) IV Push once  dextrose 50% Injectable 12.5 Gram(s) IV Push once  dextrose 50% Injectable 25 Gram(s) IV Push once  divalproex  milliGRAM(s) Oral at bedtime  enoxaparin Injectable 40 milliGRAM(s) SubCutaneous every 24 hours  ferrous    sulfate 325 milliGRAM(s) Oral at bedtime  finasteride 5 milliGRAM(s) Oral daily  glucagon  Injectable 1 milliGRAM(s) IntraMuscular once  insulin glargine Injectable (LANTUS) 5 Unit(s) SubCutaneous at bedtime  insulin lispro (ADMELOG) corrective regimen sliding scale   SubCutaneous three times a day before meals  insulin lispro (ADMELOG) corrective regimen sliding scale   SubCutaneous at bedtime  insulin lispro Injectable (ADMELOG) 2 Unit(s) SubCutaneous three times a day before meals  latanoprost 0.005% Ophthalmic Solution 1 Drop(s) Both EYES at bedtime  levothyroxine 75 MICROGram(s) Oral daily  losartan 25 milliGRAM(s) Oral daily  multivitamin 1 Tablet(s) Oral daily  piperacillin/tazobactam IVPB.. 3.375 Gram(s) IV Intermittent every 8 hours  polyethylene glycol 3350 17 Gram(s) Oral two times a day  pyridoxine 50 milliGRAM(s) Oral daily  senna 2 Tablet(s) Oral at bedtime  tamsulosin 0.8 milliGRAM(s) Oral at bedtime  timolol 0.5% Solution 1 Drop(s) Both EYES two times a day  venlafaxine XR. 75 milliGRAM(s) Oral at bedtime    MEDICATIONS  (PRN):  bisacodyl Suppository 10 milliGRAM(s) Rectal daily PRN Constipation  dextrose Oral Gel 15 Gram(s) Oral once PRN Blood Glucose LESS THAN 70 milliGRAM(s)/deciliter  OLANZapine Injectable 2.5 milliGRAM(s) IntraMuscular every 6 hours PRN agitation      Objective:    Vitals: Vital Signs Last 24 Hrs  T(C): 36.7 (02-06-23 @ 04:30), Max: 36.7 (02-05-23 @ 13:07)  T(F): 98.1 (02-06-23 @ 04:30), Max: 98.1 (02-06-23 @ 04:30)  HR: 88 (02-06-23 @ 04:30) (73 - 88)  BP: 156/82 (02-06-23 @ 04:30) (140/79 - 162/81)  BP(mean): --  RR: 18 (02-06-23 @ 04:30) (17 - 18)  SpO2: 98% (02-06-23 @ 04:30) (97% - 98%)                I&O's Summary    05 Feb 2023 07:01  -  06 Feb 2023 07:00  --------------------------------------------------------  IN: 0 mL / OUT: 3500 mL / NET: -3500 mL        PHYSICAL EXAM:  GENERAL: NAD  HEAD:  Atraumatic, Normocephalic; wearing nasal cannula  EYES: pupils minimally reactive, constricted. EOMI, conjunctiva and sclera clear  CHEST/LUNG: upper airway coarse crackles, poor auscultation of lung sounds on posterior fields.  HEART: Regular rate and rhythm; No murmurs, rubs, or gallops  ABDOMEN: Somewhat distended, tympanic on percussion. Soft, Nontender  MSK: increased thoracic kyphosis  SKIN: No rashes or lesions  NERVOUS SYSTEM:  Alert & Oriented X3, much improved dysarthria and speech latency, no focal deficits but weak.     LABS:  02-05    133<L>  |  97<L>  |  16  ----------------------------<  128<H>  5.0   |  31  |  0.74  02-04    137  |  98  |  20  ----------------------------<  131<H>  5.3   |  32<H>  |  0.90    Ca    9.0      05 Feb 2023 05:00  Ca    9.1      04 Feb 2023 04:50  Phos  4.0     02-05  Mg     1.60     02-05                        8.9    5.68  )-----------( 126      ( 06 Feb 2023 06:30 )             27.8                         9.4    5.46  )-----------( 108      ( 05 Feb 2023 05:00 )             28.5                         10.0   5.67  )-----------( 110      ( 04 Feb 2023 04:50 )             30.9     CAPILLARY BLOOD GLUCOSE      POCT Blood Glucose.: 154 mg/dL (06 Feb 2023 07:21)  POCT Blood Glucose.: 195 mg/dL (05 Feb 2023 22:24)  POCT Blood Glucose.: 198 mg/dL (05 Feb 2023 16:36)  POCT Blood Glucose.: 233 mg/dL (05 Feb 2023 11:17)      RADIOLOGY & ADDITIONAL TESTS:    Imaging Personally Reviewed:  [x ] YES  [ ] NO    Consultants involved in case:   Consultant(s) Notes Reviewed:  [ x] YES  [ ] NO:   Care Discussed with Consultants/Other Providers [x ] YES  [ ] NO

## 2023-02-06 NOTE — DIETITIAN INITIAL EVALUATION ADULT - ADD RECOMMEND
1. Encourage & assist Pt with meals; Monitor PO diet tolerance;   2. Honor food preferences;   3. Monitor labs, hydration status;

## 2023-02-06 NOTE — PROGRESS NOTE ADULT - SUBJECTIVE AND OBJECTIVE BOX
Neurology Progress Note    S: Patient seen and examined. No new events overnight. patient denied CP, SOB, HA or pain.  sitting in chare     Medication:  atorvastatin 20 milliGRAM(s) Oral at bedtime  bisacodyl Suppository 10 milliGRAM(s) Rectal daily PRN  cholecalciferol 1000 Unit(s) Oral daily  cloZAPine 100 milliGRAM(s) Oral at bedtime  dextrose 5%. 1000 milliLiter(s) IV Continuous <Continuous>  dextrose 5%. 1000 milliLiter(s) IV Continuous <Continuous>  dextrose 50% Injectable 25 Gram(s) IV Push once  dextrose 50% Injectable 12.5 Gram(s) IV Push once  dextrose 50% Injectable 25 Gram(s) IV Push once  dextrose Oral Gel 15 Gram(s) Oral once PRN  divalproex  milliGRAM(s) Oral at bedtime  enoxaparin Injectable 40 milliGRAM(s) SubCutaneous every 24 hours  ferrous    sulfate 325 milliGRAM(s) Oral at bedtime  finasteride 5 milliGRAM(s) Oral daily  glucagon  Injectable 1 milliGRAM(s) IntraMuscular once  insulin glargine Injectable (LANTUS) 5 Unit(s) SubCutaneous at bedtime  insulin lispro (ADMELOG) corrective regimen sliding scale   SubCutaneous three times a day before meals  insulin lispro (ADMELOG) corrective regimen sliding scale   SubCutaneous at bedtime  insulin lispro Injectable (ADMELOG) 2 Unit(s) SubCutaneous three times a day before meals  latanoprost 0.005% Ophthalmic Solution 1 Drop(s) Both EYES at bedtime  levothyroxine 75 MICROGram(s) Oral daily  losartan 25 milliGRAM(s) Oral daily  magnesium sulfate  IVPB 2 Gram(s) IV Intermittent once  multivitamin 1 Tablet(s) Oral daily  OLANZapine Injectable 2.5 milliGRAM(s) IntraMuscular every 6 hours PRN  piperacillin/tazobactam IVPB.. 3.375 Gram(s) IV Intermittent every 8 hours  polyethylene glycol 3350 17 Gram(s) Oral two times a day  pyridoxine 50 milliGRAM(s) Oral daily  senna 2 Tablet(s) Oral at bedtime  tamsulosin 0.8 milliGRAM(s) Oral at bedtime  timolol 0.5% Solution 1 Drop(s) Both EYES two times a day  venlafaxine XR. 75 milliGRAM(s) Oral at bedtime      Vitals:  Vital Signs Last 24 Hrs  T(C): 36.7 (06 Feb 2023 04:30), Max: 36.7 (05 Feb 2023 13:07)  T(F): 98.1 (06 Feb 2023 04:30), Max: 98.1 (06 Feb 2023 04:30)  HR: 88 (06 Feb 2023 04:30) (73 - 88)  BP: 156/82 (06 Feb 2023 04:30) (140/79 - 162/81)  BP(mean): --  RR: 18 (06 Feb 2023 08:08) (17 - 18)  SpO2: 89% (06 Feb 2023 08:08) (89% - 98%)    Parameters below as of 06 Feb 2023 08:08  Patient On (Oxygen Delivery Method): room air        General Exam:   General Appearance: Appropriately dressed and in no acute distress       Head: Normocephalic, atraumatic and no dysmorphic features  Ear, Nose, and Throat: Moist mucous membranes  CVS: S1S2+  Resp: No SOB, no wheeze or rhonchi  Abd: soft NTND  Extremities: No edema, no cyanosis  Skin: No bruises, no rashes     Neurological Exam:  Mental Status: Awake, alert and oriented x 2.  Able to follow simple and complex verbal commands. Able to name and repeat. fluent speech. No obvious aphasia or dysarthria noted.   Cranial Nerves: PERRL, EOMI, VFFC, sensation V1-V3 intact,  no obvious facial asymmetry , equal elevation of palate, scm/trap 5/5, tongue is midline on protrusion. no obvious papilledema on fundoscopic exam. Hearing is grossly intact.   Motor: Normal bulk, tone and strength throughout. some increase tone and cogwheeling.  Fine finger movements were intact and symmetric. no tremors or drift noted.    Sensation: Intact to light touch and pinprick throughout. no right/left confusion. no extinction to tactile on DSS. Romberg was negative.   Reflexes: 1+ throughout at biceps, brachioradialis, triceps, patellars and ankles bilaterally and equal. No clonus. R toe and L toe were both downgoing.  Coordination: No dysmetria on FNF   Gait: deferred     I personally reviewed the below data/images/labs:      CBC Full  -  ( 06 Feb 2023 06:30 )  WBC Count : 5.68 K/uL  RBC Count : 2.97 M/uL  Hemoglobin : 8.9 g/dL  Hematocrit : 27.8 %  Platelet Count - Automated : 126 K/uL  Mean Cell Volume : 93.6 fL  Mean Cell Hemoglobin : 30.0 pg  Mean Cell Hemoglobin Concentration : 32.0 gm/dL  Auto Neutrophil # : x  Auto Lymphocyte # : x  Auto Monocyte # : x  Auto Eosinophil # : x  Auto Basophil # : x  Auto Neutrophil % : x  Auto Lymphocyte % : x  Auto Monocyte % : x  Auto Eosinophil % : x  Auto Basophil % : x    02-06    134<L>  |  95<L>  |  18  ----------------------------<  145<H>  5.1   |  30  |  0.99    Ca    9.0      06 Feb 2023 06:30  Phos  4.1     02-06  Mg     1.50     02-06      CT Angio Head w/ IV Cont (01.30.23 @ 20:00) >  IMPRESSION:  CT BRAIN:  No acute intracranial mass effect, hemorrhage, midline shift or   extra-axial fluid collection.    CT ANGIOGRAPHY NECK:  No evidence of hemodynamically significant stenosis using NASCET   criteria. Patent vertebral arteries. No evidence of vascular dissection.    CT ANGIOGRAPHY BRAIN:  No major vessel occlusion or proximal stenosis.      < from: MR Head No Cont (02.01.23 @ 21:14) >    ACC: [id number] EXAM:  MR BRAIN   ORDERED BY: AN BELLA     PROCEDURE DATE:  02/01/2023          INTERPRETATION:  .    CLINICAL INFORMATION: Slurred speech. Dysarthria.    TECHNIQUE: Multiplanar multisequential MRI of the brain was acquired   without the administration of IV gadolinium.    COMPARISON: Prior CT study of the head performed on 1/30/2020. Prior CT   angiogram studies of the head and neck performed on 1/30/2023.    FINDINGS: Multiple nonspecific foci of T2/FLAIR hyperintensity are noted   throughout the deep and periventricular white matter of the cerebral   hemispheres. There is no associated mass effect. There is no evidence of   acute ischemia on the diffusion-weighted images.    There is diffuse cerebral volume loss with prominence of the sulci,   fissures, and cisternal spaces which is normal for the patient's age.   Ventricular size and configuration is unremarkable. Flow-voids are noted   throughout the major intracranial vessels, on the T2 weighted images,   consistent with their patency. The sellar region and posterior fossa   appear unremarkable.    Polypoidal soft tissue is seen within the right sphenoid sinus.   Otherwise, the paranasal sinuses and mastoid air cells are clear.   Calvarial signal is within normal limits. The orbits appear unremarkable.    IMPRESSION: No acute intracranial hemorrhage or evidence of acute   ischemia.    Multiple nonspecific abnormal white matter foci of T2/FLAIR prolongation   statistically favoring microvascular disease.    --- End of Report ---            ELLIE BINGHAM MD; Attending Radiologist  This document has been electronically signed. Feb 2 2023  8:30AM    < end of copied text >

## 2023-02-06 NOTE — PROGRESS NOTE ADULT - PROBLEM SELECTOR PLAN 3
Desaturations x 2 to low 80s on room air while sleeping overnight. Could be due to sleep apnea vs some component of aspiration pneumonia given his mental status change. CT Chest showed pleural effusion b/l, atelectasis, consolidation c/f pna. Currently on 2 L NC    - wean NC as tolerated  - c/w abx as above  - trial of IV furosemide 40mg x1 today given ongoing wheeze, noted pleural effusions and pulmonary edema from CT chest  - offer incentive spirometer Desaturations x 2 to low 80s on room air while sleeping overnight. Could be due to sleep apnea vs some component of aspiration pneumonia given his mental status change. CT Chest showed pleural effusion b/l, atelectasis, consolidation c/f pna. Currently on 2 L NC    - wean NC as tolerated  - c/w abx as above  - offer incentive spirometer

## 2023-02-06 NOTE — DIETITIAN INITIAL EVALUATION ADULT - OTHER INFO
Pt 68 yo male with PMHx of schizophrenia, hx seizure, bipolar d/o, HTN, DM2, hx SBO presented with confusion, slurred speech, difficulty ambulating - per chart review.     At time of visit, Pt awake, alert. Per Pt his appetite good; no nausea, vomiting or diarrhea @ this time. Pt passed Swallow Bedside Assessment Adult, SLP rec: Regular solids with thin liquids (2/5). Food preferences discussed.     Per Pt, his height: ~67.5" & his weight: ~178#; Pt with weight loss PTA, but unable to quantify. Of note, Pt's weights: 81.6 kg (1/31/23), 76.2 kg (HIE - 12/14/22), 77.1 kg (HIE - 11/11/21). Rec to add PO supplement: Glucerna - 2x daily to diet rx. Of note, Pt's HbA1c level 6.4% (1/30). Pt usually avoids regular sugar, honey reported. No other food related concern voiced @ present. RDN remains available.

## 2023-02-06 NOTE — PROGRESS NOTE ADULT - ASSESSMENT
67M with PMHx schizophrenia, hx seizure, bipolar d/o, HTN, DM2, hx SBO presenting with confusion, slurred speech and difficulty ambulating x4 days. History was obtained from brother David at bedside who lives with patient as patient is not answering questions completely with some word finding difficulty At baseline patient oriented x3, conversive and ambulates without difficulty.  inpt, pt has hypothermia and labs with hyponatremia and hyperkalemia  with concern for myocarditis, infection, workup underway  endocrine called for hypothyroidism, pt also with DM     1. Hypothyroidism   TSH mildly elevated with nl total  T4   pt has been on levothyroxine 75mcg long term, continue this inpt and on discharge  administer on empty stomach 4 hrs apart from iron.calcium/ppi- he is on iron inpt - please make sure this is timed correctly       2.DM  a1c 6.4  pt has DM as well- on metformin, Glipzide and januvia    While inpatient  BG target 100-180 mg/dl,   now clinically improving and taking in more PO intake   -Start Lantus 5 units HS   -Start Admelog 2 units TID with meals   - Hypoglycemia protocol in place   - Carb Consistent Diet   - Nutrition consult     D/C recs:  Tess Madrigal  Nurse Practitioner  Division of Endocrinology & Diabetes  Available via  Teams      If after 6PM or before 9AM, or on weekends/holidays, please call endocrine answering service for assistance ([phone]).  For nonurgent matters email [email or web address] for assistance.endorses having occasional hypoglycemia at home  please inform outpt endocrine of inpt admission and need to fu outpt ( Seeing Dr. Farfan outpatient)   may want to consider stopping glipizide given hypoglycemia at home   otherwise can resume home meds if no contraindications at dc (LFTS and GFR normal lactate normal)  resume metformin and januvia

## 2023-02-07 LAB
ANION GAP SERPL CALC-SCNC: 6 MMOL/L — LOW (ref 7–14)
BUN SERPL-MCNC: 25 MG/DL — HIGH (ref 7–23)
CALCIUM SERPL-MCNC: 9.2 MG/DL — SIGNIFICANT CHANGE UP (ref 8.4–10.5)
CHLORIDE SERPL-SCNC: 97 MMOL/L — LOW (ref 98–107)
CO2 SERPL-SCNC: 30 MMOL/L — SIGNIFICANT CHANGE UP (ref 22–31)
CREAT SERPL-MCNC: 1.04 MG/DL — SIGNIFICANT CHANGE UP (ref 0.5–1.3)
EGFR: 78 ML/MIN/1.73M2 — SIGNIFICANT CHANGE UP
GLUCOSE BLDC GLUCOMTR-MCNC: 137 MG/DL — HIGH (ref 70–99)
GLUCOSE BLDC GLUCOMTR-MCNC: 184 MG/DL — HIGH (ref 70–99)
GLUCOSE BLDC GLUCOMTR-MCNC: 194 MG/DL — HIGH (ref 70–99)
GLUCOSE BLDC GLUCOMTR-MCNC: 233 MG/DL — HIGH (ref 70–99)
GLUCOSE SERPL-MCNC: 120 MG/DL — HIGH (ref 70–99)
HCT VFR BLD CALC: 28.4 % — LOW (ref 39–50)
HGB BLD-MCNC: 9.2 G/DL — LOW (ref 13–17)
MAGNESIUM SERPL-MCNC: 1.8 MG/DL — SIGNIFICANT CHANGE UP (ref 1.6–2.6)
MCHC RBC-ENTMCNC: 30.2 PG — SIGNIFICANT CHANGE UP (ref 27–34)
MCHC RBC-ENTMCNC: 32.4 GM/DL — SIGNIFICANT CHANGE UP (ref 32–36)
MCV RBC AUTO: 93.1 FL — SIGNIFICANT CHANGE UP (ref 80–100)
NRBC # BLD: 0 /100 WBCS — SIGNIFICANT CHANGE UP (ref 0–0)
NRBC # FLD: 0 K/UL — SIGNIFICANT CHANGE UP (ref 0–0)
PHOSPHATE SERPL-MCNC: 3.8 MG/DL — SIGNIFICANT CHANGE UP (ref 2.5–4.5)
PLATELET # BLD AUTO: 144 K/UL — LOW (ref 150–400)
POTASSIUM SERPL-MCNC: 5.2 MMOL/L — SIGNIFICANT CHANGE UP (ref 3.5–5.3)
POTASSIUM SERPL-SCNC: 5.2 MMOL/L — SIGNIFICANT CHANGE UP (ref 3.5–5.3)
RBC # BLD: 3.05 M/UL — LOW (ref 4.2–5.8)
RBC # FLD: 14.6 % — HIGH (ref 10.3–14.5)
SODIUM SERPL-SCNC: 133 MMOL/L — LOW (ref 135–145)
WBC # BLD: 7.51 K/UL — SIGNIFICANT CHANGE UP (ref 3.8–10.5)
WBC # FLD AUTO: 7.51 K/UL — SIGNIFICANT CHANGE UP (ref 3.8–10.5)

## 2023-02-07 PROCEDURE — 99232 SBSQ HOSP IP/OBS MODERATE 35: CPT

## 2023-02-07 PROCEDURE — 99232 SBSQ HOSP IP/OBS MODERATE 35: CPT | Mod: GC

## 2023-02-07 RX ORDER — AMLODIPINE BESYLATE 2.5 MG/1
10 TABLET ORAL DAILY
Refills: 0 | Status: DISCONTINUED | OUTPATIENT
Start: 2023-02-07 | End: 2023-02-08

## 2023-02-07 RX ORDER — INSULIN LISPRO 100/ML
4 VIAL (ML) SUBCUTANEOUS
Refills: 0 | Status: DISCONTINUED | OUTPATIENT
Start: 2023-02-07 | End: 2023-02-09

## 2023-02-07 RX ORDER — SENNA PLUS 8.6 MG/1
2 TABLET ORAL
Refills: 0 | Status: DISCONTINUED | OUTPATIENT
Start: 2023-02-07 | End: 2023-02-13

## 2023-02-07 RX ADMIN — Medication 75 MILLIGRAM(S): at 21:24

## 2023-02-07 RX ADMIN — Medication 3 UNIT(S): at 11:27

## 2023-02-07 RX ADMIN — POLYETHYLENE GLYCOL 3350 17 GRAM(S): 17 POWDER, FOR SOLUTION ORAL at 17:06

## 2023-02-07 RX ADMIN — Medication 3 UNIT(S): at 07:56

## 2023-02-07 RX ADMIN — LATANOPROST 1 DROP(S): 0.05 SOLUTION/ DROPS OPHTHALMIC; TOPICAL at 21:24

## 2023-02-07 RX ADMIN — Medication 75 MICROGRAM(S): at 05:11

## 2023-02-07 RX ADMIN — ENOXAPARIN SODIUM 40 MILLIGRAM(S): 100 INJECTION SUBCUTANEOUS at 11:20

## 2023-02-07 RX ADMIN — Medication 1 TABLET(S): at 11:21

## 2023-02-07 RX ADMIN — INSULIN GLARGINE 5 UNIT(S): 100 INJECTION, SOLUTION SUBCUTANEOUS at 21:40

## 2023-02-07 RX ADMIN — Medication 1 DROP(S): at 17:06

## 2023-02-07 RX ADMIN — AMLODIPINE BESYLATE 5 MILLIGRAM(S): 2.5 TABLET ORAL at 05:16

## 2023-02-07 RX ADMIN — DIVALPROEX SODIUM 750 MILLIGRAM(S): 500 TABLET, DELAYED RELEASE ORAL at 21:25

## 2023-02-07 RX ADMIN — Medication 50 MILLIGRAM(S): at 11:21

## 2023-02-07 RX ADMIN — CLOZAPINE 100 MILLIGRAM(S): 150 TABLET, ORALLY DISINTEGRATING ORAL at 21:25

## 2023-02-07 RX ADMIN — Medication 1 DROP(S): at 05:11

## 2023-02-07 RX ADMIN — POLYETHYLENE GLYCOL 3350 17 GRAM(S): 17 POWDER, FOR SOLUTION ORAL at 05:12

## 2023-02-07 RX ADMIN — Medication 1: at 17:07

## 2023-02-07 RX ADMIN — Medication 4 UNIT(S): at 17:07

## 2023-02-07 RX ADMIN — Medication 2: at 11:26

## 2023-02-07 RX ADMIN — Medication 10 MILLIGRAM(S): at 14:01

## 2023-02-07 RX ADMIN — ATORVASTATIN CALCIUM 40 MILLIGRAM(S): 80 TABLET, FILM COATED ORAL at 21:24

## 2023-02-07 RX ADMIN — Medication 1000 UNIT(S): at 11:21

## 2023-02-07 RX ADMIN — FINASTERIDE 5 MILLIGRAM(S): 5 TABLET, FILM COATED ORAL at 11:20

## 2023-02-07 RX ADMIN — SENNA PLUS 2 TABLET(S): 8.6 TABLET ORAL at 17:05

## 2023-02-07 RX ADMIN — TAMSULOSIN HYDROCHLORIDE 0.8 MILLIGRAM(S): 0.4 CAPSULE ORAL at 21:24

## 2023-02-07 RX ADMIN — Medication 325 MILLIGRAM(S): at 21:25

## 2023-02-07 NOTE — PROGRESS NOTE ADULT - SUBJECTIVE AND OBJECTIVE BOX
***************************************************************  Miky Munguia, PGY1  Internal Medicine   pager:  [facility]: [phone] [facility]: [phone]  ***************************************************************    CURTIS DIAZ  67y  MRN: [id number]    Patient is a 67y old  Male who presents with a chief complaint of confusion, dizziness (06 Feb 2023 15:35)      Interval/Overnight Events: no events ON.     Subjective: Pt seen and examined at bedside. Denies fever, CP, SOB, abn pain, N/V, dysuria. Tolerating diet.      MEDICATIONS  (STANDING):  amLODIPine   Tablet 5 milliGRAM(s) Oral daily  atorvastatin 40 milliGRAM(s) Oral at bedtime  cholecalciferol 1000 Unit(s) Oral daily  cloZAPine 100 milliGRAM(s) Oral at bedtime  dextrose 5%. 1000 milliLiter(s) (100 mL/Hr) IV Continuous <Continuous>  dextrose 5%. 1000 milliLiter(s) (50 mL/Hr) IV Continuous <Continuous>  dextrose 50% Injectable 25 Gram(s) IV Push once  dextrose 50% Injectable 12.5 Gram(s) IV Push once  dextrose 50% Injectable 25 Gram(s) IV Push once  divalproex  milliGRAM(s) Oral at bedtime  enoxaparin Injectable 40 milliGRAM(s) SubCutaneous every 24 hours  ferrous    sulfate 325 milliGRAM(s) Oral at bedtime  finasteride 5 milliGRAM(s) Oral daily  glucagon  Injectable 1 milliGRAM(s) IntraMuscular once  insulin glargine Injectable (LANTUS) 5 Unit(s) SubCutaneous at bedtime  insulin lispro (ADMELOG) corrective regimen sliding scale   SubCutaneous three times a day before meals  insulin lispro (ADMELOG) corrective regimen sliding scale   SubCutaneous at bedtime  insulin lispro Injectable (ADMELOG) 3 Unit(s) SubCutaneous three times a day before meals  latanoprost 0.005% Ophthalmic Solution 1 Drop(s) Both EYES at bedtime  levothyroxine 75 MICROGram(s) Oral daily  multivitamin 1 Tablet(s) Oral daily  polyethylene glycol 3350 17 Gram(s) Oral two times a day  pyridoxine 50 milliGRAM(s) Oral daily  senna 2 Tablet(s) Oral at bedtime  tamsulosin 0.8 milliGRAM(s) Oral at bedtime  timolol 0.5% Solution 1 Drop(s) Both EYES two times a day  venlafaxine XR. 75 milliGRAM(s) Oral at bedtime    MEDICATIONS  (PRN):  bisacodyl Suppository 10 milliGRAM(s) Rectal daily PRN Constipation  dextrose Oral Gel 15 Gram(s) Oral once PRN Blood Glucose LESS THAN 70 milliGRAM(s)/deciliter  guaifenesin/dextromethorphan Oral Liquid 10 milliLiter(s) Oral every 6 hours PRN Cough  OLANZapine Injectable 2.5 milliGRAM(s) IntraMuscular every 6 hours PRN agitation      Objective:    Vitals: Vital Signs Last 24 Hrs  T(C): 36.7 (02-07-23 @ 05:00), Max: 37.1 (02-06-23 @ 23:14)  T(F): 98 (02-07-23 @ 05:00), Max: 98.7 (02-06-23 @ 23:14)  HR: 86 (02-07-23 @ 05:00) (80 - 86)  BP: 165/70 (02-07-23 @ 05:00) (145/80 - 165/70)  BP(mean): --  RR: 18 (02-07-23 @ 05:00) (17 - 18)  SpO2: 95% (02-07-23 @ 05:00) (89% - 100%)                I&O's Summary    06 Feb 2023 07:01  -  07 Feb 2023 07:00  --------------------------------------------------------  IN: 0 mL / OUT: 2750 mL / NET: -2750 mL    PHYSICAL EXAM:  GENERAL: NAD  HEAD:  Atraumatic, Normocephalic; wearing nasal cannula  EYES: pupils minimally reactive, constricted. EOMI, conjunctiva and sclera clear  CHEST/LUNG: upper airway coarse crackles, poor auscultation of lung sounds on posterior fields.  HEART: Regular rate and rhythm; No murmurs, rubs, or gallops  ABDOMEN: Somewhat distended, tympanic on percussion. Soft, Nontender  MSK: increased thoracic kyphosis  SKIN: No rashes or lesions  NERVOUS SYSTEM:  Alert & Oriented X3, speech appears back to baseline. no focal deficits but overall weak.     LABS:  02-07    133<L>  |  97<L>  |  25<H>  ----------------------------<  120<H>  5.2   |  30  |  1.04  02-06    134<L>  |  95<L>  |  18  ----------------------------<  145<H>  5.1   |  30  |  0.99  02-05    133<L>  |  97<L>  |  16  ----------------------------<  128<H>  5.0   |  31  |  0.74    Ca    9.2      07 Feb 2023 05:08  Ca    9.0      06 Feb 2023 06:30  Ca    9.0      05 Feb 2023 05:00  Phos  3.8     02-07  Mg     1.80     02-07                                                9.2    7.51  )-----------( 144      ( 07 Feb 2023 05:08 )             28.4                         8.9    5.68  )-----------( 126      ( 06 Feb 2023 06:30 )             27.8                         9.4    5.46  )-----------( 108      ( 05 Feb 2023 05:00 )             28.5     CAPILLARY BLOOD GLUCOSE      POCT Blood Glucose.: 180 mg/dL (06 Feb 2023 22:10)  POCT Blood Glucose.: 131 mg/dL (06 Feb 2023 16:48)  POCT Blood Glucose.: 269 mg/dL (06 Feb 2023 11:20)      RADIOLOGY & ADDITIONAL TESTS:    Imaging Personally Reviewed:  [x ] YES  [ ] NO    Consultants involved in case:   Consultant(s) Notes Reviewed:  [ x] YES  [ ] NO:   Care Discussed with Consultants/Other Providers [x ] YES  [ ] NO         ***************************************************************  Miky Munguia, PGY1  Internal Medicine   pager:  [facility]: [phone] [facility]: [phone]  ***************************************************************    CURTIS DIAZ  67y  MRN: [id number]    Patient is a 67y old  Male who presents with a chief complaint of confusion, dizziness (06 Feb 2023 15:35)    Interval/Overnight Events: no events ON.   - pending LINDSEY choices    Subjective: Pt seen and examined at bedside. Denies fever, CP, SOB, abn pain, N/V, dysuria. Tolerating diet. States he is feeling close to normal. Was without NC during interview without desat    MEDICATIONS  (STANDING):  amLODIPine   Tablet 5 milliGRAM(s) Oral daily  atorvastatin 40 milliGRAM(s) Oral at bedtime  cholecalciferol 1000 Unit(s) Oral daily  cloZAPine 100 milliGRAM(s) Oral at bedtime  dextrose 5%. 1000 milliLiter(s) (100 mL/Hr) IV Continuous <Continuous>  dextrose 5%. 1000 milliLiter(s) (50 mL/Hr) IV Continuous <Continuous>  dextrose 50% Injectable 25 Gram(s) IV Push once  dextrose 50% Injectable 12.5 Gram(s) IV Push once  dextrose 50% Injectable 25 Gram(s) IV Push once  divalproex  milliGRAM(s) Oral at bedtime  enoxaparin Injectable 40 milliGRAM(s) SubCutaneous every 24 hours  ferrous    sulfate 325 milliGRAM(s) Oral at bedtime  finasteride 5 milliGRAM(s) Oral daily  glucagon  Injectable 1 milliGRAM(s) IntraMuscular once  insulin glargine Injectable (LANTUS) 5 Unit(s) SubCutaneous at bedtime  insulin lispro (ADMELOG) corrective regimen sliding scale   SubCutaneous three times a day before meals  insulin lispro (ADMELOG) corrective regimen sliding scale   SubCutaneous at bedtime  insulin lispro Injectable (ADMELOG) 3 Unit(s) SubCutaneous three times a day before meals  latanoprost 0.005% Ophthalmic Solution 1 Drop(s) Both EYES at bedtime  levothyroxine 75 MICROGram(s) Oral daily  multivitamin 1 Tablet(s) Oral daily  polyethylene glycol 3350 17 Gram(s) Oral two times a day  pyridoxine 50 milliGRAM(s) Oral daily  senna 2 Tablet(s) Oral at bedtime  tamsulosin 0.8 milliGRAM(s) Oral at bedtime  timolol 0.5% Solution 1 Drop(s) Both EYES two times a day  venlafaxine XR. 75 milliGRAM(s) Oral at bedtime    MEDICATIONS  (PRN):  bisacodyl Suppository 10 milliGRAM(s) Rectal daily PRN Constipation  dextrose Oral Gel 15 Gram(s) Oral once PRN Blood Glucose LESS THAN 70 milliGRAM(s)/deciliter  guaifenesin/dextromethorphan Oral Liquid 10 milliLiter(s) Oral every 6 hours PRN Cough  OLANZapine Injectable 2.5 milliGRAM(s) IntraMuscular every 6 hours PRN agitation    Objective:    Vitals: Vital Signs Last 24 Hrs  T(C): 36.7 (02-07-23 @ 05:00), Max: 37.1 (02-06-23 @ 23:14)  T(F): 98 (02-07-23 @ 05:00), Max: 98.7 (02-06-23 @ 23:14)  HR: 86 (02-07-23 @ 05:00) (80 - 86)  BP: 165/70 (02-07-23 @ 05:00) (145/80 - 165/70)  BP(mean): --  RR: 18 (02-07-23 @ 05:00) (17 - 18)  SpO2: 95% (02-07-23 @ 05:00) (89% - 100%)                I&O's Summary    06 Feb 2023 07:01  -  07 Feb 2023 07:00  --------------------------------------------------------  IN: 0 mL / OUT: 2750 mL / NET: -2750 mL    PHYSICAL EXAM:  GENERAL: NAD  HEAD:  Atraumatic, Normocephalic; wearing nasal cannula  EYES: pupils minimally reactive, constricted. EOMI, conjunctiva and sclera clear  CHEST/LUNG: upper airway coarse crackles, poor auscultation of lung sounds on posterior fields.  HEART: Regular rate and rhythm; No murmurs, rubs, or gallops  ABDOMEN: Somewhat distended, tympanic on percussion. Soft, Nontender  MSK: increased thoracic kyphosis  SKIN: No rashes or lesions  NERVOUS SYSTEM:  Alert & Oriented X3, speech appears back to baseline. no focal deficits but overall weak.     LABS:  02-07    133<L>  |  97<L>  |  25<H>  ----------------------------<  120<H>  5.2   |  30  |  1.04  02-06    134<L>  |  95<L>  |  18  ----------------------------<  145<H>  5.1   |  30  |  0.99  02-05    133<L>  |  97<L>  |  16  ----------------------------<  128<H>  5.0   |  31  |  0.74    Ca    9.2      07 Feb 2023 05:08  Ca    9.0      06 Feb 2023 06:30  Ca    9.0      05 Feb 2023 05:00  Phos  3.8     02-07  Mg     1.80     02-07                                                9.2    7.51  )-----------( 144      ( 07 Feb 2023 05:08 )             28.4                         8.9    5.68  )-----------( 126      ( 06 Feb 2023 06:30 )             27.8                         9.4    5.46  )-----------( 108      ( 05 Feb 2023 05:00 )             28.5     CAPILLARY BLOOD GLUCOSE      POCT Blood Glucose.: 180 mg/dL (06 Feb 2023 22:10)  POCT Blood Glucose.: 131 mg/dL (06 Feb 2023 16:48)  POCT Blood Glucose.: 269 mg/dL (06 Feb 2023 11:20)      RADIOLOGY & ADDITIONAL TESTS:    Imaging Personally Reviewed:  [x ] YES  [ ] NO    Consultants involved in case:   Consultant(s) Notes Reviewed:  [ x] YES  [ ] NO:   Care Discussed with Consultants/Other Providers [x ] YES  [ ] NO

## 2023-02-07 NOTE — PROGRESS NOTE ADULT - SUBJECTIVE AND OBJECTIVE BOX
History:  No acute overnight events  Reported having a good appetite  denies n/v.  No hypoglycemia         MEDICATIONS  (STANDING):  atorvastatin 20 milliGRAM(s) Oral at bedtime  cholecalciferol 1000 Unit(s) Oral daily  cloZAPine 75 milliGRAM(s) Oral at bedtime  dextrose 5%. 1000 milliLiter(s) (50 mL/Hr) IV Continuous <Continuous>  dextrose 5%. 1000 milliLiter(s) (100 mL/Hr) IV Continuous <Continuous>  dextrose 50% Injectable 25 Gram(s) IV Push once  dextrose 50% Injectable 12.5 Gram(s) IV Push once  dextrose 50% Injectable 25 Gram(s) IV Push once  divalproex  milliGRAM(s) Oral at bedtime  enoxaparin Injectable 40 milliGRAM(s) SubCutaneous every 24 hours  ferrous    sulfate 325 milliGRAM(s) Oral at bedtime  finasteride 5 milliGRAM(s) Oral daily  glucagon  Injectable 1 milliGRAM(s) IntraMuscular once  insulin glargine Injectable (LANTUS) 5 Unit(s) SubCutaneous at bedtime  insulin lispro (ADMELOG) corrective regimen sliding scale   SubCutaneous three times a day before meals  insulin lispro (ADMELOG) corrective regimen sliding scale   SubCutaneous at bedtime  latanoprost 0.005% Ophthalmic Solution 1 Drop(s) Both EYES at bedtime  levothyroxine 75 MICROGram(s) Oral daily  losartan 25 milliGRAM(s) Oral daily  multivitamin 1 Tablet(s) Oral daily  piperacillin/tazobactam IVPB.. 3.375 Gram(s) IV Intermittent every 8 hours  polyethylene glycol 3350 17 Gram(s) Oral two times a day  pyridoxine 50 milliGRAM(s) Oral daily  senna 2 Tablet(s) Oral at bedtime  tamsulosin 0.8 milliGRAM(s) Oral at bedtime  timolol 0.5% Solution 1 Drop(s) Both EYES two times a day  venlafaxine XR. 75 milliGRAM(s) Oral at bedtime    MEDICATIONS  (PRN):  bisacodyl Suppository 10 milliGRAM(s) Rectal daily PRN Constipation  dextrose Oral Gel 15 Gram(s) Oral once PRN Blood Glucose LESS THAN 70 milliGRAM(s)/deciliter  OLANZapine Injectable 2.5 milliGRAM(s) IntraMuscular every 6 hours PRN agitation      Allergies    No Known Allergies    Intolerances      Review of Systems:  Constitutional: No fever +Increased coughing   Eyes: No blurry vision    ALL OTHER SYSTEMS REVIEWED AND NEGATIVE    Vital Signs Last 24 Hrs  T(C): 36.8 (07 Feb 2023 12:27), Max: 37.1 (06 Feb 2023 23:14)  T(F): 98.2 (07 Feb 2023 12:27), Max: 98.7 (06 Feb 2023 23:14)  HR: 84 (07 Feb 2023 12:27) (80 - 86)  BP: 109/72 (07 Feb 2023 12:27) (109/72 - 165/70)  BP(mean): --  RR: 18 (07 Feb 2023 12:27) (17 - 18)  SpO2: 98% (07 Feb 2023 12:27) (95% - 100%)    Parameters below as of 07 Feb 2023 12:27  Patient On (Oxygen Delivery Method): nasal cannula  O2 Flow (L/min): 2    GENERAL: NAD  EYES: No proptosis, no lid lag, anicteric  GI: Soft, nontender, non distended, normal bowel sounds  SKIN: Dry, intact, No rashes or lesions  PSYCH: Alert and oriented x 3, normal affect, normal mood    CAPILLARY BLOOD GLUCOSE  POCT Blood Glucose.: 233 mg/dL (07 Feb 2023 11:18)  POCT Blood Glucose.: 137 mg/dL (07 Feb 2023 07:30)  POCT Blood Glucose.: 180 mg/dL (06 Feb 2023 22:10)  POCT Blood Glucose.: 131 mg/dL (06 Feb 2023 16:48)  POCT Blood Glucose.: 249 mg/dL (02-04-23 @ 11:23)  POCT Blood Glucose.: 153 mg/dL (02-04-23 @ 07:44)  POCT Blood Glucose.: 271 mg/dL (02-03-23 @ 21:14)  POCT Blood Glucose.: 303 mg/dL (02-03-23 @ 16:39)  POCT Blood Glucose.: 158 mg/dL (02-03-23 @ 11:22)  POCT Blood Glucose.: 171 mg/dL (02-03-23 @ 07:19)  POCT Blood Glucose.: 134 mg/dL (02-02-23 @ 21:49)  POCT Blood Glucose.: 269 mg/dL (02-02-23 @ 16:36)  POCT Blood Glucose.: 216 mg/dL (02-02-23 @ 11:30)  POCT Blood Glucose.: 96 mg/dL (02-02-23 @ 07:19)  POCT Blood Glucose.: 96 mg/dL (02-01-23 @ 21:59)  POCT Blood Glucose.: 84 mg/dL (02-01-23 @ 16:43)    02-07    133<L>  |  97<L>  |  25<H>  ----------------------------<  120<H>  5.2   |  30  |  1.04    Ca    9.2      07 Feb 2023 05:08  Phos  3.8     02-07  Mg     1.80     02-07        Thyroid Function Tests:  01-30 @ 16:49 TSH 4.91 FreeT4 -- T3 46 Anti TPO -- Anti Thyroglobulin Ab -- TSI --    A1C with Estimated Average Glucose (01.30.23 @ 16:45)    A1C with Estimated Average Glucose Result: 6.4 %    Estimated Average Glucose: 137

## 2023-02-07 NOTE — PROGRESS NOTE ADULT - ATTENDING COMMENTS
66 yo M with PMH of schizophrenia, hx seizure, bipolar d/o, HTN, hypothyroidism and DM2 who a/w encephalopathy likely 2/2 sepsis 2/2 pneumonia and acute hypoxic respiratory failure requiring supplemental O2. Now overall improving and awaiting placement at [facility].  - Sepsis 2/2 pneumonia - CT scan showing likely PNA - s/p IV Zosyn x 7 days  - Acute hypoxic respiratory failure - requiring supplemental O2; attempting to wean off as tolerated  - MARISSA likely ATN in setting of sepsis/hypotension - resolved  - encephalopathy improved and now back to baseline   - Endo f/u appreciated - c/w levothyroxine for hypothyroidism; Lantus 5 units HS and Admelog 4 units TID with meals for DM2  - urinary retention - failed TOV; will re-attempt TOV tomorrow; will need outpt f/u with urology  - SW/CM f/u for d/c care coordination to [facility]

## 2023-02-07 NOTE — PROGRESS NOTE ADULT - PROBLEM SELECTOR PLAN 1
Baseline oriented 3, ambulates on own. Now oriented 1-2 with confusion, dysarthria, prolonged speech latency (motor/mixed aphasia), truncal ataxia. Of note, patient has been on 125mg of clozapine since 1997 and recent reduction to 50mg may be contributing. Course c/b initial bradycardia, hypotension, hypothermia.     CXR clear, UA normal, no signs of skin infections, no leukocytosis, though elevated NLR. T4 normal, TSH 4.91 mildly elevated, compliant with levothyroxine dosing. AM Cortisol elevated, unlikely AI. Troponin flat with elevated CKMB, TTE wnl. Per cards, unlikely myocarditis. Ammonium 58 mildly elevated likely not delivered on ice, LFTs wnl, no clonus or asterixis. Rheum w/u negative. Nutritional deficiency w/u negative. Patient is maintaining airway and responsive. Not lethargic, awake and alert. Follows commands. Now requiring 1-2L NC. MRI MRA head showed microvascular disease but no new stroke. CTCAP showed diffuse anasarca with pleural effusion, atelectasis, edema w/ opacity c/f pneumonia, likely source of instability. Ddx incl toxic metabolic encephalopathy iso sepsis vs. progression of psychiatric disorders.    - ICU consulted 1/31 for hypothermia, recommended stress dose hydrocortisone 100mg  - Endo consulted; recs appreciated. - unlikely to be AI. Will hold off on further steroids until infx r/o  - c/w atorvastatin 40mg  - psych consulted; recs appreciated Baseline oriented 3, ambulates on own. Now oriented 1-2 with confusion, dysarthria, prolonged speech latency (motor/mixed aphasia), truncal ataxia. Of note, patient has been on 125mg of clozapine since 1997 and recent reduction to 50mg may be contributing. Course c/b initial bradycardia, hypotension, hypothermia. CXR clear, UA normal, no signs of skin infections, no leukocytosis, though elevated NLR. T4 normal, TSH 4.91 mildly elevated, compliant with levothyroxine dosing. AM Cortisol elevated, unlikely AI. Troponin flat with elevated CKMB, TTE wnl. Per cards, unlikely myocarditis. Ammonium 58 mildly elevated likely not delivered on ice, LFTs wnl, no clonus or asterixis. Rheum w/u negative. Nutritional deficiency w/u negative. Patient is maintaining airway and responsive. Not lethargic, awake and alert. Follows commands. Now requiring 1-2L NC. MRI MRA head showed microvascular disease but no new stroke. CTCAP showed diffuse anasarca with pleural effusion, atelectasis, edema w/ opacity c/f pneumonia, likely source of instability. Ddx incl toxic metabolic encephalopathy iso sepsis vs. progression of psychiatric disorders.    - ICU consulted 1/31 for hypothermia, recommended stress dose hydrocortisone 100mg  - Endo consulted; recs appreciated. - unlikely to be AI. Will hold off on further steroids until infx r/o  - c/w atorvastatin 40mg  - psych consulted; recs appreciated

## 2023-02-07 NOTE — PROGRESS NOTE ADULT - PROBLEM SELECTOR PLAN 8
- psych consulted; recs appreciated   - increased clozapine to 75mg qHS, then 100mg qhs on Sunday 2/5.   - c/w Depakote to 750mg qHS, if thrombocytopenia worsens or patient continually lethargic would consider reducing to 500mg unless objections from neuro  - c/w Effexor as ordered, if persistently hyponatremic would assess for SIADH and consider stopping   - PRN for agitation Zyprexa 2.5mg q6h PRN PO/IM. - psych consulted; recs appreciated   - increased clozapine to 75mg qHS, then 100mg qhs on Sunday 2/5.   - c/w Depakote to 750mg qHS, if thrombocytopenia worsens or patient continually lethargic would consider reducing to 500mg unless objections from neuro  - c/w Effexor as ordered, if persistently hyponatremic would assess for SIADH and consider stopping   - PRN for agitation Zyprexa 2.5mg q6h PRN PO/IM.    > o/p f/u psych

## 2023-02-07 NOTE — PROGRESS NOTE ADULT - PROBLEM SELECTOR PROBLEM 2
[facility] [address] [address]  Phone#  [phone]  Fax#  [phone]                                               Cardiology Office Consult  Akshat Bahena, 57 y o  male  YOB: 1961  MRN: [id number] Encounter: [id number]      PCP - Diamante Akhtar PA-C  Referring Provider - Alfredo Weaver MD    Chief Complaint   Patient presents with    Cardiac Arrest     New dual chamber ICD after STEMI        Assessment  Multivessel coronary artery disease  LHC - 6/29/22 - mLAD 50%, dLAD 70%, D1 60%, D2 60%, OM1 50%, OM3 99%, mRCA 60%, RPL3 60%  Ischemic cardiomyopathy, EF 44%  Echo - 6/29/22 - LVEF 44%, GLS -13%, Grade 1 DD, akinesis of apical segments  Vfib cardiac arrest, status post ICD placement  In setting of STEMI, but 100% lesion was found  Hyperlipidemia  Current smoker  Overweight, Body mass index is 28 5 kg/m²  Plan  Multivessel coronary artery disease  He does have moderate-severe, residual multivessel disease, but has not had any further complains of anginal chest pain, despite being quite active  Distal LAD and OM3 were felt to be small, and not amenable to PCI  Apical walls were akinetic on echo as well  I discussed cardiac rehab to help with these remaining blockages, but he states that "he does not have the time for that, with his work schedule"  Continue aspirin, ticagrelor for at least 1 year  Continue metoprolol succinate 50 mg daily  I have emphasized to him the need to quit smoking completely      He understands and is working on it and has cut down dramatically from 3+ packs to about 1 pack per day    Ischemic cardiomyopathy, EF 44%  Has akinetic apical wall  No evidence of volume overload/heart failure  Continue medical therapy with metoprolol succinate 50 mg daily, lisinopril 10 mg daily  Not much room for up titration of medical therapy  Already has ICD in place for secondary prevention after his VFib arrest    S/p ICD, for VFib cardiac arrest  Appears to have been in the setting of inferior STEMI - severe distal lad/om 3 disease, which could not be reached vascularized  S/p ICD placement --> no further ICD shocks  Continue metoprolol XL 50 mg daily  Established with device clinic    Current smoker  He used to smoke 3-4 packs a day until his cardiac arrest episode, and has been cutting down since then  He still smokes 1-1 5 packs a day  I have counseled him that he has multiple vessel coronary artery disease and has already suffered a MI and cardiac arrest, and is at high risk of having future recurrences of the same, especially if he continues to smoke  Counseled him extensively regarding need to quit completely and provided educational material and options to help with the same  He is motivated to try to quit completely and is going to gradually cut down and quit over the short term    No results found for this visit on 08/04/22  Orders Placed This Encounter   Procedures    Lipid Panel with Direct LDL reflex     No follow-ups on file  History of Present Illness   57 y o  male , [employer] , comes in for establishment of care after recent hospitalization involving a cardiac arrest     On 06/29/2022, patient was at work at [employer], and he was feeling unwell  He himself does not have full recollection of the events that led up to his hospitalization, but per available chart/EMS documentation he was apparently having chest pain for 3 days, which was recurring on the day of presentation  He apparently went upstairs in to another office and EMS was called  On arrival of EMS, he reported numbness in his left arm and was otherwise feeling well  Initial ECG did not reveal a STEMI and he did not have chest pain at that time    He was subsequently taken to [facility] ED, but on arrival to the ED, he went in to [facility] cardiac arrest and received several shocks with the assistance of the ED physician  He received CPR/ACLS and was initiated on vasopressor support with return of ROSC  Post this, he was found to have junctional rhythm with inferior STEMI and an MI alert was initiated  He was found to have multivessel coronary artery disease, but no 100% stenosis could be found  He did have severe lesions in distal LAD and OM3, which were felt to be small blood vessels - not amenable to PCI  A balloon pump was placed and he was transferred to [facility] ICU for further care  He was subsequently evaluated by the heart failure service and vasopressors/IABP were weaned off and he did well on medical therapy  He underwent a secondary prevention ICD placement and was eventually discharged with medical therapy  Since discharge from the hospital he has not had any further recurrences of chest pain, although he does report continued mild symptoms of heartburn at the end of the day, for which he takes Tums with resolution of symptoms  He followed up with his PCP and is now here to see me for establishment of care  I am meeting him today for the 1st time  He remains compliant with medical therapy, and has been working on cutting down on his smoking  No further complains of palpitations, dizziness/light-headedness, near syncope or syncope      Historical Information   Past Medical History:   Diagnosis Date    PERCY [facility]acute kidney [facility]) ([facility] [address] [facility]     Bilateral inguinal hernia     Cardiac arrest with ventricular fibrillation ([facility] [address] [facility] )     Dizziness 02/02/2021    isolated incident of dizziness, sweating & disorientation    Elevated LFTs     History of left heart catheterization     and IABP, ICD PLACEMENT, DUAL CHAMBER, [employer]    Hyperglycemia     Hypertension     Pneumonia     STEMI (ST elevation myocardial infarction) [facility])      Past Surgical History:   Procedure Laterality Date    CARDIAC CATHETERIZATION N/A 6/29/2022    Procedure: Cardiac pci;  Surgeon: Erika Gallardo MD;  [facility]; Service: Cardiology    CARDIAC CATHETERIZATION N/A 6/29/2022    Procedure: Cardiac iabp; Surgeon: Michelle Curry MD;  [facility]; Service: Cardiology    CARDIAC ELECTROPHYSIOLOGY PROCEDURE N/A 7/5/2022    Procedure: Cardiac icd implant;  Surgeon: Beryle Mares, MD;  Location: [facility]; Service: Cardiology    AK REPAIR ING HERNIA,5+Y/O,REDUCIBL Bilateral 2/9/2021    Procedure: INGUINAL HERNIA REPAIR with mesh;  Surgeon: Yuli Craft MD;  Location: [address] [place] [address] MAIN OR;  Service: General    TONSILLECTOMY       Family History   Problem Relation Age of Onset    Diabetes Maternal Grandfather      Current Outpatient Medications on File Prior to Visit   Medication Sig Dispense Refill    [DISCONTINUED] sacubitril-valsartan (Entresto) 24-26 MG TABS Take 1 tablet by mouth 2 (two) times a day 60 tablet 2     No current facility-administered medications on file prior to visit  No Known Allergies  Social History     Socioeconomic History    Marital status: /Civil Union     Spouse name: None    Number of children: None    Years of education: None    Highest education level: None   Occupational History    None   Tobacco Use    Smoking status: Current Every Day Smoker     Packs/day: 0 50     Types: Cigarettes    Smokeless tobacco: Never Used   Vaping Use    Vaping Use: Never used   Substance and Sexual Activity    Alcohol use: Not Currently    Drug use: Not Currently    Sexual activity: Not Currently     Partners: Female   Other Topics Concern    None   Social History Narrative    None     Social Determinants of Health     Financial Resource Strain: Not on file   Food Insecurity: No Food Insecurity    Worried About Running Out of Food in the Last Year: Never true    Zackery of Food in the Last Year: Never true   Transportation Needs: No Transportation Needs    Lack of Transportation (Medical):  No    Lack of Transportation (Non-Medical): No   Physical Activity: Not on file   Stress: Not on file   Social Connections: Not on file   Intimate Partner Violence: Not on file   Housing Stability: Low Risk     Unable to Pay for Housing in the Last Year: No    Number of Places Lived in the Last Year: 1    Unstable Housing in the Last Year: No        Review of Systems   All other systems reviewed and are negative  Vitals:  Vitals:    08/04/22 1325   BP: 120/68   BP Location: Right arm   Cuff Size: Large   Pulse: 80   Weight: 101 kg (222 lb)   Height: 6' 2" (1 88 m)     BMI - Body mass index is 28 5 kg/m²  Wt Readings from Last 7 Encounters:   08/04/22 101 kg (222 lb)   07/15/22 101 kg (222 lb)   07/05/22 99 3 kg (218 lb 14 7 oz)   03/09/22 107 kg (235 lb)   02/22/22 107 kg (235 lb 3 2 oz)   10/01/21 104 kg (230 lb)   08/11/21 107 kg (236 lb)       Physical Exam  Vitals and nursing note reviewed  Constitutional:       General: He is not in acute distress  Appearance: Normal appearance  He is well-developed  He is not ill-appearing or diaphoretic  HENT:      Head: Normocephalic and atraumatic  Nose: No congestion  Eyes:      General: No scleral icterus  Conjunctiva/sclera: Conjunctivae normal    Neck:      Vascular: No carotid bruit or JVD  Cardiovascular:      Rate and Rhythm: Normal rate and regular rhythm  Heart sounds: Normal heart sounds  No murmur heard  No friction rub  No gallop  Comments: Left upper chest wall cardiac device noted in situ  Pulmonary:      Effort: Pulmonary effort is normal  No respiratory distress  Breath sounds: Normal breath sounds  No wheezing or rales  Chest:      Chest wall: No tenderness  Abdominal:      General: There is no distension  Palpations: Abdomen is soft  Tenderness: There is no abdominal tenderness  Musculoskeletal:         General: No swelling, tenderness or deformity  Cervical back: Neck supple  No muscular tenderness        Right lower leg: No edema  Left lower leg: No edema  Skin:     General: Skin is warm  Neurological:      General: No focal deficit present  Mental Status: He is alert and oriented to person, place, and time  Mental status is at baseline  Psychiatric:         Mood and Affect: Mood normal          Behavior: Behavior normal          Thought Content: Thought content normal            Labs:  CBC:   Lab Results   Component Value Date    WBC 12 51 (H) 07/05/2022    RBC 4 83 07/05/2022    HGB 14 5 07/05/2022    HCT 44 5 07/05/2022    MCV 92 07/05/2022     07/05/2022    RDW 12 4 07/05/2022       CMP:   Lab Results   Component Value Date    K 3 4 (L) 07/05/2022     07/05/2022    CO2 23 07/05/2022    BUN 24 07/05/2022    CREATININE 1 00 07/05/2022    EGFR 80 07/05/2022    GLUCOSE 275 (H) 06/29/2022    CALCIUM 9 4 07/05/2022    AST 59 (H) 07/04/2022    ALT 70 07/04/2022    ALKPHOS 90 07/04/2022       Magnesium:  Lab Results   Component Value Date    MG 2 3 07/03/2022       Lipid Profile:   Lab Results   Component Value Date    HDL 22 (L) 06/30/2022    TRIG 359 (H) 07/02/2022    [address]  06/30/2022      Comment:      Calculated LDL invalid, triglycerides >400 mg/dl       Thyroid Studies: No results found for: [id number], T3FREE, FREET4, Z3CIOWO, F1VJSUI    A1c:  No components found for: HGA1C    INR:  Lab Results   Component Value Date    INR 0 95 07/05/2022    INR 1 08 06/30/2022    INR 1 06 06/30/2022   5    Imaging: Cardiac EP device report    Result Date: 7/20/2022  Narrative: MDT DUAL ICD/ ACTIVE SYSTEM IS MRI CONDITIONAL DEVICE INTERROGATED IN THE [place] OFFICE  BATTERY VOLTAGE ADEQUATE (13 YRS)  AP-5%, -0 5%  ALL LEAD PARAMETERS WITHIN NORMAL LIMITS  57  BRIEF NSVT & 15 HIGH RATE-NS EPISODES >200 BPM, MOST RECENT FOR 5 BEATS, AVG CL~210MS  PT ASYMPTOMATIC W/ EPISODES  PT WAS TAKING BOTH METOPROLOL TARTRATE & SUCCINATE IN ERROR  PT INFORMED NOT TO TAKE TARTRATE, ONLY SUCCINATE  NEW RX GIVEN   OPTIVOL INITIALIZING  NORMAL DEVICE FUNCTION  WOUND CHECK: INCISION CLEAN AND DRY WITH EDGES APPROXIMATED; WOUND CARE AND RESTRICTIONS REVIEWED WITH PATIENT  GV       Cardiac testing:   No results found for this or any previous visit  No results found for this or any previous visit  No results found for this or any previous visit  No results found for this or any previous visit  Cardiac EP device report  MDT DUAL ICD/ ACTIVE SYSTEM IS MRI CONDITIONAL  DEVICE INTERROGATED IN THE [place] OFFICE  BATTERY VOLTAGE ADEQUATE (13 YRS)  AP-5%, -0 5%  ALL LEAD PARAMETERS WITHIN NORMAL LIMITS  57  BRIEF NSVT & 15 HIGH RATE-NS EPISODES >200 BPM, MOST RECENT FOR 5 BEATS, AVG CL~210MS  PT ASYMPTOMATIC W/ EPISODES  PT WAS TAKING BOTH METOPROLOL TARTRATE & SUCCINATE IN ERROR  PT INFORMED NOT TO TAKE TARTRATE, ONLY SUCCINATE  NEW RX GIVEN  OPTIVOL INITIALIZING  NORMAL DEVICE FUNCTION  WOUND CHECK: INCISION CLEAN AND DRY WITH EDGES APPROXIMATED; WOUND CARE AND RESTRICTIONS REVIEWED WITH PATIENT   GV Hypertension

## 2023-02-07 NOTE — PROGRESS NOTE ADULT - PROBLEM SELECTOR PLAN 7
On metformin 1g BID, januvia 100mg qd, glipizide 5mg qd - hold inpatient. A1C 6.4    - Basal/Bolus 5U / 3U + low ISS; titrate as needed.

## 2023-02-07 NOTE — PROGRESS NOTE ADULT - PROBLEM SELECTOR PLAN 5
on lisinopril 10mg at home, holding d/t cough    - c/w norvasc; spoke w/ PCP, never tried before. on lisinopril 10mg at home, holding d/t cough, transitioned to losartan    - c/w norvasc; spoke w/ PCP, never tried before.

## 2023-02-07 NOTE — PROGRESS NOTE ADULT - ASSESSMENT
67M with PMHx schizophrenia, hx seizure, bipolar d/o, HTN, DM2, hx SBO presenting with confusion, slurred speech and difficulty ambulating x4 days. History was obtained from brother David at bedside who lives with patient as patient is not answering questions completely with some word finding difficulty At baseline patient oriented x3, conversive and ambulates without difficulty.  inpt, pt has hypothermia and labs with hyponatremia and hyperkalemia  with concern for myocarditis, infection, workup underway  endocrine called for hypothyroidism, pt also with DM     1. Hypothyroidism   TSH mildly elevated with nl total  T4   pt has been on levothyroxine 75mcg long term, continue this inpt and on discharge  administer on empty stomach 4 hrs apart from iron.calcium/ppi- he is on iron inpt - please make sure this is timed correctly       2.DM 2  a1c 6.4  pt has DM as well- on metformin, Glipzide and januvia    While inpatient  BG target 100-180 mg/dl,   now clinically improving and taking in more PO intake   -Continue Lantus 5 units HS   -Increased Admelog to 4 units TID with meals   - Hypoglycemia protocol in place   - Carb Consistent Diet   - Nutrition consult     D/C recs: endorses having occasional hypoglycemia at home  please inform outpt endocrine of inpt admission and need to fu outpt ( Seeing Dr. Farfan outpatient)   Stop glipizide, risk for hypoglycemia  otherwise can resume home meds if no contraindications at dc (LFTS and GFR normal lactate normal)  resume metformin at 1000mg po bid and januvia 100 mg po daily    Tess Madrigal  Nurse Practitioner  Division of Endocrinology & Diabetes  Available via  Teams      If after 6PM or before 9AM, or on weekends/holidays, please call endocrine answering service for assistance ([phone]).  For nonurgent matters email [email or web address] for assistance.

## 2023-02-07 NOTE — PROGRESS NOTE ADULT - PROBLEM SELECTOR PLAN 1
Baseline oriented 3, ambulates on own. Now oriented 1-2 with confusion, dysarthria, prolonged speech latency (motor/mixed aphasia), truncal ataxia. Of note, patient has been on 125mg of clozapine since 1997 and recent reduction to 50mg may be contributing. Course c/b initial bradycardia, hypotension, hypothermia.     CXR clear, UA normal, no signs of skin infections, no leukocytosis, though elevated NLR. T4 normal, TSH 4.91 mildly elevated, compliant with levothyroxine dosing. AM Cortisol elevated, unlikely AI. Troponin flat with elevated CKMB, TTE wnl. Per cards, unlikely myocarditis. Ammonium 58 mildly elevated likely not delivered on ice, LFTs wnl, no clonus or asterixis. Rheum w/u negative. Nutritional deficiency w/u negative. Patient is maintaining airway and responsive. Not lethargic, awake and alert. Follows commands. Now requiring 1-2L NC. MRI MRA head showed microvascular disease but no new stroke. CTCAP showed diffuse anasarca with pleural effusion, atelectasis, edema w/ opacity c/f pneumonia, likely source of instability. Ddx incl toxic metabolic encephalopathy iso sepsis vs. progression of psychiatric disorders.    - ICU consulted 1/31 for hypothermia, recommended stress dose hydrocortisone 100mg  - Endo consulted; recs appreciated. - unlikely to be AI. Will hold off on further steroids until infx r/o  - c/w atorvastatin 40mg  - psych consulted; recs appreciated Baseline oriented 3, ambulates on own. Now oriented 1-2 with confusion, dysarthria, prolonged speech latency (motor/mixed aphasia), truncal ataxia. Of note, patient has been on 125mg of clozapine since 1997 and recent reduction to 50mg may be contributing. Course c/b initial bradycardia, hypotension, hypothermia. ICU consulted 1/31 for hypothermia, recommended stress dose hydrocortisone 100mg. Endo f/u unlikely to be AI.    CXR clear, UA normal, no signs of skin infections, no leukocytosis, though elevated NLR. T4 normal, TSH 4.91 mildly elevated, compliant with levothyroxine dosing. AM Cortisol elevated, unlikely AI. Troponin flat with elevated CKMB, TTE wnl. Per cards, unlikely myocarditis. Ammonium 58 mildly elevated likely not delivered on ice, LFTs wnl, no clonus or asterixis. Rheum w/u negative. Nutritional deficiency w/u negative. Patient is maintaining airway and responsive. Not lethargic, awake and alert. Follows commands. Now requiring 1-2L NC. MRI MRA head showed microvascular disease but no new stroke. CTCAP showed diffuse anasarca with pleural effusion, atelectasis, edema w/ opacity c/f pneumonia, likely source of instability. Ddx incl toxic metabolic encephalopathy iso sepsis vs. progression of psychiatric disorders. Psych consulted; recs appreciated.    > Acute encephalopathy likely from sepsis 2/2 PNA  - s/p vanc (1/31-2/3) (MRSA-), Zosyn (1/31-2/6)  - consider d/c tele

## 2023-02-07 NOTE — PROGRESS NOTE ADULT - PROBLEM SELECTOR PLAN 10
Hospital Bundle    Fluids: po hydration  Electrolytes: Replete K > 4, Mg > 2, Phos > 3  Nutrition: Diet pureed  PPX  ---VTE: lovenx subq  ---GI: bowel regimen for 1BM/day  ---Resp: ra  Access: PIV    Code Status: FULL CODE  Dispo: Rehab upon clinical improvement Hospital Bundle    Fluids: po hydration  Electrolytes: Replete K > 4, Mg > 2, Phos > 3  Nutrition: seen by nutrition- added Glucerna to regular diet  PPX  ---VTE: lovenx subq  ---GI: bowel regimen for 1BM/day  ---Resp: ra  Access: PIV    Code Status: FULL CODE  Dispo: Rehab upon clinical improvement

## 2023-02-07 NOTE — PROGRESS NOTE ADULT - PROBLEM SELECTOR PLAN 7
On metformin 1g BID, januvia 100mg qd, glipizide 5mg qd - hold inpatient. A1C 6.4    - Basal/Bolus 5U / 3U + low ISS; titrate as needed. On metformin 1g BID, januvia 100mg qd, glipizide 5mg qd - hold inpatient. A1C 6.4    - Basal/Bolus 5U / 3U + low ISS; titrate as needed.    > consider holding glipizide  - o/p f/u endo

## 2023-02-07 NOTE — PROGRESS NOTE ADULT - SUBJECTIVE AND OBJECTIVE BOX
PROGRESS NOTE:     Patient is a 67y old  Male who presents with a chief complaint of confusion, dizziness (07 Feb 2023 07:30)    SUBJECTIVE / OVERNIGHT EVENTS: No acute events overnight.    ADDITIONAL REVIEW OF SYSTEMS:    MEDICATIONS  (STANDING):  amLODIPine   Tablet 5 milliGRAM(s) Oral daily  atorvastatin 40 milliGRAM(s) Oral at bedtime  cholecalciferol 1000 Unit(s) Oral daily  cloZAPine 100 milliGRAM(s) Oral at bedtime  dextrose 5%. 1000 milliLiter(s) (100 mL/Hr) IV Continuous <Continuous>  dextrose 5%. 1000 milliLiter(s) (50 mL/Hr) IV Continuous <Continuous>  dextrose 50% Injectable 25 Gram(s) IV Push once  dextrose 50% Injectable 12.5 Gram(s) IV Push once  dextrose 50% Injectable 25 Gram(s) IV Push once  divalproex  milliGRAM(s) Oral at bedtime  enoxaparin Injectable 40 milliGRAM(s) SubCutaneous every 24 hours  ferrous    sulfate 325 milliGRAM(s) Oral at bedtime  finasteride 5 milliGRAM(s) Oral daily  glucagon  Injectable 1 milliGRAM(s) IntraMuscular once  insulin glargine Injectable (LANTUS) 5 Unit(s) SubCutaneous at bedtime  insulin lispro (ADMELOG) corrective regimen sliding scale   SubCutaneous three times a day before meals  insulin lispro (ADMELOG) corrective regimen sliding scale   SubCutaneous at bedtime  insulin lispro Injectable (ADMELOG) 3 Unit(s) SubCutaneous three times a day before meals  latanoprost 0.005% Ophthalmic Solution 1 Drop(s) Both EYES at bedtime  levothyroxine 75 MICROGram(s) Oral daily  multivitamin 1 Tablet(s) Oral daily  polyethylene glycol 3350 17 Gram(s) Oral two times a day  pyridoxine 50 milliGRAM(s) Oral daily  senna 2 Tablet(s) Oral at bedtime  tamsulosin 0.8 milliGRAM(s) Oral at bedtime  timolol 0.5% Solution 1 Drop(s) Both EYES two times a day  venlafaxine XR. 75 milliGRAM(s) Oral at bedtime    MEDICATIONS  (PRN):  bisacodyl Suppository 10 milliGRAM(s) Rectal daily PRN Constipation  dextrose Oral Gel 15 Gram(s) Oral once PRN Blood Glucose LESS THAN 70 milliGRAM(s)/deciliter  guaifenesin/dextromethorphan Oral Liquid 10 milliLiter(s) Oral every 6 hours PRN Cough  OLANZapine Injectable 2.5 milliGRAM(s) IntraMuscular every 6 hours PRN agitation    CAPILLARY BLOOD GLUCOSE    POCT Blood Glucose.: 137 mg/dL (07 Feb 2023 07:30)  POCT Blood Glucose.: 180 mg/dL (06 Feb 2023 22:10)  POCT Blood Glucose.: 131 mg/dL (06 Feb 2023 16:48)  POCT Blood Glucose.: 269 mg/dL (06 Feb 2023 11:20)    I&O's Summary    06 Feb 2023 07:01  -  07 Feb 2023 07:00  --------------------------------------------------------  IN: 0 mL / OUT: 2750 mL / NET: -2750 mL    PHYSICAL EXAM:  Vital Signs Last 24 Hrs  T(C): 36.7 (07 Feb 2023 05:00), Max: 37.1 (06 Feb 2023 23:14)  T(F): 98 (07 Feb 2023 05:00), Max: 98.7 (06 Feb 2023 23:14)  HR: 86 (07 Feb 2023 05:00) (80 - 86)  BP: 165/70 (07 Feb 2023 05:00) (145/80 - 165/70)  BP(mean): --  RR: 18 (07 Feb 2023 05:00) (17 - 18)  SpO2: 95% (07 Feb 2023 05:00) (89% - 100%)    Parameters below as of 07 Feb 2023 05:00  Patient On (Oxygen Delivery Method): room air    GENERAL: NAD, lying comfortably in bed  HEAD: Atraumatic, normocephalic  EYES: EOMI b/l, conjunctiva and sclera clear  NECK: Supple, No JVD, No LAD  RESPIRATORY: Normal respiratory effort; lungs are clear to auscultation bilaterally  CARDIOVASCULAR: Regular rate and rhythm, normal S1 and S2, no murmur/rub/gallop; No lower extremity edema  ABDOMEN: Nontender, normoactive bowel sounds, no rebound/guarding; No hepatosplenomegaly  MUSCULOSKELETAL: no clubbing or cyanosis of digits; no joint swelling or tenderness to palpation  NEURO: Non focal   SKIN:   PSYCH: A+O to person, place, and time; affect appropriate    LABS:                        9.2    7.51  )-----------( 144      ( 07 Feb 2023 05:08 )             28.4     02-07    133<L>  |  97<L>  |  25<H>  ----------------------------<  120<H>  5.2   |  30  |  1.04    Ca    9.2      07 Feb 2023 05:08  Phos  3.8     02-07  Mg     1.80     02-07            RADIOLOGY:    Consulted note reviewed  Reviewed Imaging personally   PROGRESS NOTE:     Patient is a 67y old  Male who presents with a chief complaint of confusion, dizziness (07 Feb 2023 07:30)    SUBJECTIVE / OVERNIGHT EVENTS: No acute events overnight. This morning, Pt reports persistent cough. He denies shortness of breath or chest pain. Pt is on 2L NC. [place] in place.    ADDITIONAL REVIEW OF SYSTEMS: Negative for fever, HA, NA/vomiting, constipation/diarrhea. No recent BM.    MEDICATIONS  (STANDING):  amLODIPine   Tablet 5 milliGRAM(s) Oral daily  atorvastatin 40 milliGRAM(s) Oral at bedtime  cholecalciferol 1000 Unit(s) Oral daily  cloZAPine 100 milliGRAM(s) Oral at bedtime  dextrose 5%. 1000 milliLiter(s) (100 mL/Hr) IV Continuous <Continuous>  dextrose 5%. 1000 milliLiter(s) (50 mL/Hr) IV Continuous <Continuous>  dextrose 50% Injectable 25 Gram(s) IV Push once  dextrose 50% Injectable 12.5 Gram(s) IV Push once  dextrose 50% Injectable 25 Gram(s) IV Push once  divalproex  milliGRAM(s) Oral at bedtime  enoxaparin Injectable 40 milliGRAM(s) SubCutaneous every 24 hours  ferrous    sulfate 325 milliGRAM(s) Oral at bedtime  finasteride 5 milliGRAM(s) Oral daily  glucagon  Injectable 1 milliGRAM(s) IntraMuscular once  insulin glargine Injectable (LANTUS) 5 Unit(s) SubCutaneous at bedtime  insulin lispro (ADMELOG) corrective regimen sliding scale   SubCutaneous three times a day before meals  insulin lispro (ADMELOG) corrective regimen sliding scale   SubCutaneous at bedtime  insulin lispro Injectable (ADMELOG) 3 Unit(s) SubCutaneous three times a day before meals  latanoprost 0.005% Ophthalmic Solution 1 Drop(s) Both EYES at bedtime  levothyroxine 75 MICROGram(s) Oral daily  multivitamin 1 Tablet(s) Oral daily  polyethylene glycol 3350 17 Gram(s) Oral two times a day  pyridoxine 50 milliGRAM(s) Oral daily  senna 2 Tablet(s) Oral at bedtime  tamsulosin 0.8 milliGRAM(s) Oral at bedtime  timolol 0.5% Solution 1 Drop(s) Both EYES two times a day  venlafaxine XR. 75 milliGRAM(s) Oral at bedtime    MEDICATIONS  (PRN):  bisacodyl Suppository 10 milliGRAM(s) Rectal daily PRN Constipation  dextrose Oral Gel 15 Gram(s) Oral once PRN Blood Glucose LESS THAN 70 milliGRAM(s)/deciliter  guaifenesin/dextromethorphan Oral Liquid 10 milliLiter(s) Oral every 6 hours PRN Cough  OLANZapine Injectable 2.5 milliGRAM(s) IntraMuscular every 6 hours PRN agitation    CAPILLARY BLOOD GLUCOSE    POCT Blood Glucose.: 137 mg/dL (07 Feb 2023 07:30)  POCT Blood Glucose.: 180 mg/dL (06 Feb 2023 22:10)  POCT Blood Glucose.: 131 mg/dL (06 Feb 2023 16:48)  POCT Blood Glucose.: 269 mg/dL (06 Feb 2023 11:20)    I&O's Summary    06 Feb 2023 07:01  -  07 Feb 2023 07:00  --------------------------------------------------------  IN: 0 mL / OUT: 2750 mL / NET: -2750 mL    PHYSICAL EXAM:  Vital Signs Last 24 Hrs  T(C): 36.7 (07 Feb 2023 05:00), Max: 37.1 (06 Feb 2023 23:14)  T(F): 98 (07 Feb 2023 05:00), Max: 98.7 (06 Feb 2023 23:14)  HR: 86 (07 Feb 2023 05:00) (80 - 86)  BP: 165/70 (07 Feb 2023 05:00) (145/80 - 165/70)  BP(mean): --  RR: 18 (07 Feb 2023 05:00) (17 - 18)  SpO2: 95% (07 Feb 2023 05:00) (89% - 100%)    Parameters below as of 07 Feb 2023 05:00  Patient On (Oxygen Delivery Method): 2L NC    GENERAL: NAD, sitting comfortably in chair  HEAD: Atraumatic, normocephalic  EYES: Minimally reactive pupils, EOMI b/l, conjunctiva and sclera clear  NECK: Supple, No JVD, No LAD  RESPIRATORY: Normal respiratory effort; upper airway crackling, decreased but improved air movement in posterior lungs  CARDIOVASCULAR: Regular rate and rhythm, normal S1 and S2, no murmur/rub/gallop; No lower extremity edema  ABDOMEN: Nontender, nondistended, normoactive bowel sounds, no rebound/guarding  MUSCULOSKELETAL: Thoracic kyphosis  NEURO: Non focal   SKIN: No rashes  PSYCH: AOx4    LABS:                        9.2    7.51  )-----------( 144      ( 07 Feb 2023 05:08 )             28.4     02-07    133<L>  |  97<L>  |  25<H>  ----------------------------<  120<H>  5.2   |  30  |  1.04    Ca    9.2      07 Feb 2023 05:08  Phos  3.8     02-07  Mg     1.80     02-07    RADIOLOGY:    Consulted note reviewed  Reviewed Imaging personally

## 2023-02-07 NOTE — PROGRESS NOTE ADULT - PROBLEM SELECTOR PLAN 3
Desaturations x 2 to low 80s on room air while sleeping overnight. Could be due to sleep apnea vs some component of aspiration pneumonia given his mental status change. CT Chest showed pleural effusion b/l, atelectasis, consolidation c/f pna. Currently on 2 L NC    - wean NC as tolerated  - c/w abx as above  - offer incentive spirometer Desaturations x 2 to low 80s on room air while sleeping overnight. Could be due to sleep apnea vs some component of aspiration pneumonia given his mental status change. CT Chest showed pleural effusion b/l, atelectasis, consolidation c/f PNA. Currently on 2 L NC.    - wean NC as tolerated  - c/w abx as above  - offer incentive spirometer

## 2023-02-07 NOTE — PROGRESS NOTE ADULT - PROBLEM SELECTOR PLAN 2
CT Chest w/ diffuse anasarca with pleural effusion, atelectasis, edema w/ opacity c/f pneumonia, likely source of instability. Course c/b hypothermia, bradycardia, hypotension on 1/31. 1/30 BCx x2, UCx NGTD. MRSA negative, s/p vanc 1/31-2/3. s/p zosyn for 7 day course (1/31 - 2/6)    - requiring supplemental O2 via 2L NC  - likely d/c on O2, wean at [facility] if possible  - o/p pulm f/u for pleural effusions, repeat CT chest CT Chest w/ diffuse anasarca with pleural effusion, atelectasis, edema w/ opacity c/f pneumonia, likely source of instability. Course c/b hypothermia, bradycardia, hypotension on 1/31. 1/30 BCx x2, UCx NGTD. MRSA negative, s/p vanc 1/31-2/3. s/p zosyn for 7 day course (1/31 - 2/6)    - requiring supplemental O2 via 2L NC; able to be weaned to RA currently  - likely d/c on O2, wean at [facility] if possible  - o/p pulm f/u for pleural effusions, repeat CT chest

## 2023-02-08 LAB
ALBUMIN SERPL ELPH-MCNC: 3.5 G/DL — SIGNIFICANT CHANGE UP (ref 3.3–5)
ALP SERPL-CCNC: 91 U/L — SIGNIFICANT CHANGE UP (ref 40–120)
ALT FLD-CCNC: 44 U/L — HIGH (ref 4–41)
ANION GAP SERPL CALC-SCNC: 12 MMOL/L — SIGNIFICANT CHANGE UP (ref 7–14)
ANION GAP SERPL CALC-SCNC: 13 MMOL/L — SIGNIFICANT CHANGE UP (ref 7–14)
ANION GAP SERPL CALC-SCNC: 9 MMOL/L — SIGNIFICANT CHANGE UP (ref 7–14)
APPEARANCE UR: ABNORMAL
AST SERPL-CCNC: 21 U/L — SIGNIFICANT CHANGE UP (ref 4–40)
BACTERIA # UR AUTO: ABNORMAL
BASE EXCESS BLDV CALC-SCNC: -1.9 MMOL/L — SIGNIFICANT CHANGE UP (ref -2–3)
BASOPHILS # BLD AUTO: 0.02 K/UL — SIGNIFICANT CHANGE UP (ref 0–0.2)
BASOPHILS NFR BLD AUTO: 0.1 % — SIGNIFICANT CHANGE UP (ref 0–2)
BILIRUB SERPL-MCNC: 0.3 MG/DL — SIGNIFICANT CHANGE UP (ref 0.2–1.2)
BILIRUB UR-MCNC: NEGATIVE — SIGNIFICANT CHANGE UP
BLOOD GAS VENOUS COMPREHENSIVE RESULT: SIGNIFICANT CHANGE UP
BUN SERPL-MCNC: 30 MG/DL — HIGH (ref 7–23)
BUN SERPL-MCNC: 40 MG/DL — HIGH (ref 7–23)
BUN SERPL-MCNC: 47 MG/DL — HIGH (ref 7–23)
CALCIUM SERPL-MCNC: 9 MG/DL — SIGNIFICANT CHANGE UP (ref 8.4–10.5)
CHLORIDE BLDV-SCNC: 98 MMOL/L — SIGNIFICANT CHANGE UP (ref 96–108)
CHLORIDE SERPL-SCNC: 94 MMOL/L — LOW (ref 98–107)
CHLORIDE SERPL-SCNC: 95 MMOL/L — LOW (ref 98–107)
CHLORIDE SERPL-SCNC: 97 MMOL/L — LOW (ref 98–107)
CHLORIDE UR-SCNC: 21 MMOL/L — SIGNIFICANT CHANGE UP
CO2 BLDV-SCNC: 25 MMOL/L — SIGNIFICANT CHANGE UP (ref 22–26)
CO2 SERPL-SCNC: 23 MMOL/L — SIGNIFICANT CHANGE UP (ref 22–31)
CO2 SERPL-SCNC: 26 MMOL/L — SIGNIFICANT CHANGE UP (ref 22–31)
CO2 SERPL-SCNC: 29 MMOL/L — SIGNIFICANT CHANGE UP (ref 22–31)
COLOR SPEC: YELLOW — SIGNIFICANT CHANGE UP
CORTIS SERPL-MCNC: 15 UG/DL — SIGNIFICANT CHANGE UP
CREAT ?TM UR-MCNC: 173 MG/DL — SIGNIFICANT CHANGE UP
CREAT SERPL-MCNC: 1.03 MG/DL — SIGNIFICANT CHANGE UP (ref 0.5–1.3)
CREAT SERPL-MCNC: 1.46 MG/DL — HIGH (ref 0.5–1.3)
CREAT SERPL-MCNC: 1.47 MG/DL — HIGH (ref 0.5–1.3)
DIFF PNL FLD: ABNORMAL
EGFR: 52 ML/MIN/1.73M2 — LOW
EGFR: 52 ML/MIN/1.73M2 — LOW
EGFR: 79 ML/MIN/1.73M2 — SIGNIFICANT CHANGE UP
EOSINOPHIL # BLD AUTO: 0.04 K/UL — SIGNIFICANT CHANGE UP (ref 0–0.5)
EOSINOPHIL NFR BLD AUTO: 0.3 % — SIGNIFICANT CHANGE UP (ref 0–6)
GAS PNL BLDV: 129 MMOL/L — LOW (ref 136–145)
GLUCOSE BLDC GLUCOMTR-MCNC: 150 MG/DL — HIGH (ref 70–99)
GLUCOSE BLDC GLUCOMTR-MCNC: 152 MG/DL — HIGH (ref 70–99)
GLUCOSE BLDC GLUCOMTR-MCNC: 157 MG/DL — HIGH (ref 70–99)
GLUCOSE BLDC GLUCOMTR-MCNC: 169 MG/DL — HIGH (ref 70–99)
GLUCOSE BLDC GLUCOMTR-MCNC: 174 MG/DL — HIGH (ref 70–99)
GLUCOSE BLDC GLUCOMTR-MCNC: 176 MG/DL — HIGH (ref 70–99)
GLUCOSE BLDC GLUCOMTR-MCNC: 208 MG/DL — HIGH (ref 70–99)
GLUCOSE BLDC GLUCOMTR-MCNC: 294 MG/DL — HIGH (ref 70–99)
GLUCOSE BLDV-MCNC: 150 MG/DL — HIGH (ref 70–99)
GLUCOSE SERPL-MCNC: 139 MG/DL — HIGH (ref 70–99)
GLUCOSE SERPL-MCNC: 155 MG/DL — HIGH (ref 70–99)
GLUCOSE SERPL-MCNC: 167 MG/DL — HIGH (ref 70–99)
GLUCOSE UR QL: NEGATIVE — SIGNIFICANT CHANGE UP
HCO3 BLDV-SCNC: 24 MMOL/L — SIGNIFICANT CHANGE UP (ref 22–29)
HCT VFR BLD CALC: 29.6 % — LOW (ref 39–50)
HCT VFR BLDA CALC: 30 % — LOW (ref 39–51)
HGB BLD CALC-MCNC: 10 G/DL — LOW (ref 13–17)
HGB BLD-MCNC: 9.6 G/DL — LOW (ref 13–17)
IANC: 12.01 K/UL — HIGH (ref 1.8–7.4)
IMM GRANULOCYTES NFR BLD AUTO: 0.5 % — SIGNIFICANT CHANGE UP (ref 0–0.9)
KETONES UR-MCNC: ABNORMAL
LACTATE BLDV-MCNC: 2.6 MMOL/L — HIGH (ref 0.5–2)
LEUKOCYTE ESTERASE UR-ACNC: ABNORMAL
LYMPHOCYTES # BLD AUTO: 0.73 K/UL — LOW (ref 1–3.3)
LYMPHOCYTES # BLD AUTO: 5.3 % — LOW (ref 13–44)
MAGNESIUM SERPL-MCNC: 1.7 MG/DL — SIGNIFICANT CHANGE UP (ref 1.6–2.6)
MAGNESIUM SERPL-MCNC: 1.9 MG/DL — SIGNIFICANT CHANGE UP (ref 1.6–2.6)
MAGNESIUM SERPL-MCNC: 2 MG/DL — SIGNIFICANT CHANGE UP (ref 1.6–2.6)
MCHC RBC-ENTMCNC: 30.4 PG — SIGNIFICANT CHANGE UP (ref 27–34)
MCHC RBC-ENTMCNC: 32.4 GM/DL — SIGNIFICANT CHANGE UP (ref 32–36)
MCV RBC AUTO: 93.7 FL — SIGNIFICANT CHANGE UP (ref 80–100)
MONOCYTES # BLD AUTO: 0.92 K/UL — HIGH (ref 0–0.9)
MONOCYTES NFR BLD AUTO: 6.7 % — SIGNIFICANT CHANGE UP (ref 2–14)
NEUTROPHILS # BLD AUTO: 12.01 K/UL — HIGH (ref 1.8–7.4)
NEUTROPHILS NFR BLD AUTO: 87.1 % — HIGH (ref 43–77)
NITRITE UR-MCNC: NEGATIVE — SIGNIFICANT CHANGE UP
NRBC # BLD: 0 /100 WBCS — SIGNIFICANT CHANGE UP (ref 0–0)
NRBC # FLD: 0 K/UL — SIGNIFICANT CHANGE UP (ref 0–0)
OSMOLALITY UR: 393 MOSM/KG — SIGNIFICANT CHANGE UP (ref 50–1200)
PCO2 BLDV: 43 MMHG — SIGNIFICANT CHANGE UP (ref 42–55)
PH BLDV: 7.35 — SIGNIFICANT CHANGE UP (ref 7.32–7.43)
PH UR: 5.5 — SIGNIFICANT CHANGE UP (ref 5–8)
PHOSPHATE SERPL-MCNC: 3.9 MG/DL — SIGNIFICANT CHANGE UP (ref 2.5–4.5)
PHOSPHATE SERPL-MCNC: 4.3 MG/DL — SIGNIFICANT CHANGE UP (ref 2.5–4.5)
PHOSPHATE SERPL-MCNC: 4.3 MG/DL — SIGNIFICANT CHANGE UP (ref 2.5–4.5)
PLATELET # BLD AUTO: 200 K/UL — SIGNIFICANT CHANGE UP (ref 150–400)
PO2 BLDV: 67 MMHG — SIGNIFICANT CHANGE UP
POTASSIUM BLDV-SCNC: 6 MMOL/L — HIGH (ref 3.5–5.1)
POTASSIUM SERPL-MCNC: 5.1 MMOL/L — SIGNIFICANT CHANGE UP (ref 3.5–5.3)
POTASSIUM SERPL-MCNC: 5.3 MMOL/L — SIGNIFICANT CHANGE UP (ref 3.5–5.3)
POTASSIUM SERPL-MCNC: 6 MMOL/L — HIGH (ref 3.5–5.3)
POTASSIUM SERPL-SCNC: 5.1 MMOL/L — SIGNIFICANT CHANGE UP (ref 3.5–5.3)
POTASSIUM SERPL-SCNC: 5.3 MMOL/L — SIGNIFICANT CHANGE UP (ref 3.5–5.3)
POTASSIUM SERPL-SCNC: 6 MMOL/L — HIGH (ref 3.5–5.3)
POTASSIUM UR-SCNC: 73.9 MMOL/L — SIGNIFICANT CHANGE UP
PROCALCITONIN SERPL-MCNC: 0.09 NG/ML — SIGNIFICANT CHANGE UP (ref 0.02–0.1)
PROT SERPL-MCNC: 6.4 G/DL — SIGNIFICANT CHANGE UP (ref 6–8.3)
PROT UR-MCNC: ABNORMAL
RBC # BLD: 3.16 M/UL — LOW (ref 4.2–5.8)
RBC # FLD: 14.9 % — HIGH (ref 10.3–14.5)
RBC CASTS # UR COMP ASSIST: >50 /HPF — SIGNIFICANT CHANGE UP (ref 0–4)
SAO2 % BLDV: 92.9 % — SIGNIFICANT CHANGE UP
SODIUM SERPL-SCNC: 132 MMOL/L — LOW (ref 135–145)
SODIUM SERPL-SCNC: 132 MMOL/L — LOW (ref 135–145)
SODIUM SERPL-SCNC: 134 MMOL/L — LOW (ref 135–145)
SODIUM UR-SCNC: 30 MMOL/L — SIGNIFICANT CHANGE UP
SP GR SPEC: 1.02 — SIGNIFICANT CHANGE UP (ref 1.01–1.05)
UROBILINOGEN FLD QL: ABNORMAL
WBC # BLD: 13.79 K/UL — HIGH (ref 3.8–10.5)
WBC # FLD AUTO: 13.79 K/UL — HIGH (ref 3.8–10.5)
WBC UR QL: SIGNIFICANT CHANGE UP /HPF (ref 0–5)

## 2023-02-08 PROCEDURE — 99232 SBSQ HOSP IP/OBS MODERATE 35: CPT

## 2023-02-08 PROCEDURE — 99232 SBSQ HOSP IP/OBS MODERATE 35: CPT | Mod: GC

## 2023-02-08 PROCEDURE — 71045 X-RAY EXAM CHEST 1 VIEW: CPT | Mod: 26

## 2023-02-08 RX ORDER — SODIUM ZIRCONIUM CYCLOSILICATE 10 G/10G
10 POWDER, FOR SUSPENSION ORAL ONCE
Refills: 0 | Status: COMPLETED | OUTPATIENT
Start: 2023-02-08 | End: 2023-02-08

## 2023-02-08 RX ORDER — CALCIUM GLUCONATE 100 MG/ML
1 VIAL (ML) INTRAVENOUS ONCE
Refills: 0 | Status: COMPLETED | OUTPATIENT
Start: 2023-02-08 | End: 2023-02-08

## 2023-02-08 RX ORDER — INSULIN HUMAN 100 [IU]/ML
5 INJECTION, SOLUTION SUBCUTANEOUS ONCE
Refills: 0 | Status: COMPLETED | OUTPATIENT
Start: 2023-02-08 | End: 2023-02-08

## 2023-02-08 RX ORDER — ONDANSETRON 8 MG/1
4 TABLET, FILM COATED ORAL
Refills: 0 | Status: DISCONTINUED | OUTPATIENT
Start: 2023-02-08 | End: 2023-02-13

## 2023-02-08 RX ORDER — DEXTROSE 50 % IN WATER 50 %
25 SYRINGE (ML) INTRAVENOUS ONCE
Refills: 0 | Status: COMPLETED | OUTPATIENT
Start: 2023-02-08 | End: 2023-02-08

## 2023-02-08 RX ADMIN — TAMSULOSIN HYDROCHLORIDE 0.8 MILLIGRAM(S): 0.4 CAPSULE ORAL at 22:10

## 2023-02-08 RX ADMIN — Medication 1 DROP(S): at 17:17

## 2023-02-08 RX ADMIN — Medication 1: at 07:54

## 2023-02-08 RX ADMIN — SENNA PLUS 2 TABLET(S): 8.6 TABLET ORAL at 05:12

## 2023-02-08 RX ADMIN — Medication 1 DROP(S): at 05:11

## 2023-02-08 RX ADMIN — SODIUM ZIRCONIUM CYCLOSILICATE 10 GRAM(S): 10 POWDER, FOR SUSPENSION ORAL at 17:17

## 2023-02-08 RX ADMIN — Medication 4 UNIT(S): at 07:54

## 2023-02-08 RX ADMIN — Medication 25 MILLILITER(S): at 16:39

## 2023-02-08 RX ADMIN — Medication 3: at 16:50

## 2023-02-08 RX ADMIN — Medication 100 GRAM(S): at 22:05

## 2023-02-08 RX ADMIN — Medication 50 MILLIGRAM(S): at 11:06

## 2023-02-08 RX ADMIN — Medication 4 UNIT(S): at 11:56

## 2023-02-08 RX ADMIN — CLOZAPINE 100 MILLIGRAM(S): 150 TABLET, ORALLY DISINTEGRATING ORAL at 22:09

## 2023-02-08 RX ADMIN — Medication 30 MILLILITER(S): at 17:25

## 2023-02-08 RX ADMIN — ATORVASTATIN CALCIUM 40 MILLIGRAM(S): 80 TABLET, FILM COATED ORAL at 22:11

## 2023-02-08 RX ADMIN — INSULIN GLARGINE 5 UNIT(S): 100 INJECTION, SOLUTION SUBCUTANEOUS at 22:56

## 2023-02-08 RX ADMIN — Medication 75 MICROGRAM(S): at 05:13

## 2023-02-08 RX ADMIN — LATANOPROST 1 DROP(S): 0.05 SOLUTION/ DROPS OPHTHALMIC; TOPICAL at 22:09

## 2023-02-08 RX ADMIN — AMLODIPINE BESYLATE 10 MILLIGRAM(S): 2.5 TABLET ORAL at 05:30

## 2023-02-08 RX ADMIN — Medication 1000 UNIT(S): at 11:07

## 2023-02-08 RX ADMIN — ENOXAPARIN SODIUM 40 MILLIGRAM(S): 100 INJECTION SUBCUTANEOUS at 11:07

## 2023-02-08 RX ADMIN — DIVALPROEX SODIUM 750 MILLIGRAM(S): 500 TABLET, DELAYED RELEASE ORAL at 22:10

## 2023-02-08 RX ADMIN — Medication 75 MILLIGRAM(S): at 22:09

## 2023-02-08 RX ADMIN — FINASTERIDE 5 MILLIGRAM(S): 5 TABLET, FILM COATED ORAL at 11:07

## 2023-02-08 RX ADMIN — Medication 1: at 11:56

## 2023-02-08 RX ADMIN — Medication 325 MILLIGRAM(S): at 22:10

## 2023-02-08 RX ADMIN — Medication 1 TABLET(S): at 11:07

## 2023-02-08 RX ADMIN — INSULIN HUMAN 5 UNIT(S): 100 INJECTION, SOLUTION SUBCUTANEOUS at 16:40

## 2023-02-08 RX ADMIN — POLYETHYLENE GLYCOL 3350 17 GRAM(S): 17 POWDER, FOR SOLUTION ORAL at 05:13

## 2023-02-08 NOTE — PROGRESS NOTE ADULT - ATTENDING COMMENTS
68 yo M with PMH of schizophrenia, hx seizure, bipolar d/o, HTN, hypothyroidism and DM2 who a/w encephalopathy likely 2/2 sepsis 2/2 pneumonia and acute hypoxic respiratory failure requiring supplemental O2.   - Sepsis 2/2 pneumonia - CT scan showing bilateral moderate pleural effusions and scattered bilateral airspace opacities possibly PNA - s/p IV Zosyn x 7 days; repeat CT chest as outpatient  - Acute hypoxic respiratory failure - still requiring supplemental O2; attempting to wean off as tolerated  - HTN - started on amlodipine however BP 90-100s today and patient was diaphoretic with general malaise - hold amlodipine and monitor  - MARISSA likely ATN in setting of sepsis/hypotension - improved however persistent mild hyperkalemia, held ACEi; today with K to 6.0 requiring lokelma; nephrology consult  - encephalopathy improved and now back to baseline   - Endo f/u appreciated - c/w levothyroxine for hypothyroidism; Lantus 5 units HS and Admelog 4 units TID with meals for DM2  - urinary retention - failed TOV previously; will re-attempt TOV tomorrow

## 2023-02-08 NOTE — PROGRESS NOTE ADULT - SUBJECTIVE AND OBJECTIVE BOX
History:  No acute overnight events  Reported having a good appetite  denies n/v.  No hypoglycemia         MEDICATIONS  (STANDING):  atorvastatin 20 milliGRAM(s) Oral at bedtime  cholecalciferol 1000 Unit(s) Oral daily  cloZAPine 75 milliGRAM(s) Oral at bedtime  dextrose 5%. 1000 milliLiter(s) (50 mL/Hr) IV Continuous <Continuous>  dextrose 5%. 1000 milliLiter(s) (100 mL/Hr) IV Continuous <Continuous>  dextrose 50% Injectable 25 Gram(s) IV Push once  dextrose 50% Injectable 12.5 Gram(s) IV Push once  dextrose 50% Injectable 25 Gram(s) IV Push once  divalproex  milliGRAM(s) Oral at bedtime  enoxaparin Injectable 40 milliGRAM(s) SubCutaneous every 24 hours  ferrous    sulfate 325 milliGRAM(s) Oral at bedtime  finasteride 5 milliGRAM(s) Oral daily  glucagon  Injectable 1 milliGRAM(s) IntraMuscular once  insulin glargine Injectable (LANTUS) 5 Unit(s) SubCutaneous at bedtime  insulin lispro (ADMELOG) corrective regimen sliding scale   SubCutaneous three times a day before meals  insulin lispro (ADMELOG) corrective regimen sliding scale   SubCutaneous at bedtime  latanoprost 0.005% Ophthalmic Solution 1 Drop(s) Both EYES at bedtime  levothyroxine 75 MICROGram(s) Oral daily  losartan 25 milliGRAM(s) Oral daily  multivitamin 1 Tablet(s) Oral daily  piperacillin/tazobactam IVPB.. 3.375 Gram(s) IV Intermittent every 8 hours  polyethylene glycol 3350 17 Gram(s) Oral two times a day  pyridoxine 50 milliGRAM(s) Oral daily  senna 2 Tablet(s) Oral at bedtime  tamsulosin 0.8 milliGRAM(s) Oral at bedtime  timolol 0.5% Solution 1 Drop(s) Both EYES two times a day  venlafaxine XR. 75 milliGRAM(s) Oral at bedtime    MEDICATIONS  (PRN):  bisacodyl Suppository 10 milliGRAM(s) Rectal daily PRN Constipation  dextrose Oral Gel 15 Gram(s) Oral once PRN Blood Glucose LESS THAN 70 milliGRAM(s)/deciliter  OLANZapine Injectable 2.5 milliGRAM(s) IntraMuscular every 6 hours PRN agitation      Allergies    No Known Allergies    Intolerances      Review of Systems:  Constitutional: No fever +Increased coughing   Eyes: No blurry vision    ALL OTHER SYSTEMS REVIEWED AND NEGATIVE    Vital Signs Last 24 Hrs  T(C): 36.8 (08 Feb 2023 05:50), Max: 36.8 (08 Feb 2023 05:50)  T(F): 98.2 (08 Feb 2023 05:50), Max: 98.2 (08 Feb 2023 05:50)  HR: 83 (08 Feb 2023 05:50) (83 - 85)  BP: 138/63 (08 Feb 2023 05:50) (129/69 - 138/63)  BP(mean): --  RR: 18 (08 Feb 2023 05:50) (18 - 18)  SpO2: 99% (08 Feb 2023 05:50) (99% - 100%)    Parameters below as of 08 Feb 2023 05:50  Patient On (Oxygen Delivery Method): nasal cannula  O2 Flow (L/min): 2    GENERAL: NAD  EYES: No proptosis, no lid lag, anicteric  GI: Soft, nontender, non distended, normal bowel sounds  SKIN: Dry, intact, No rashes or lesions  PSYCH: Alert and oriented x 3, normal affect, normal mood    CAPILLARY BLOOD GLUCOSE    POCT Blood Glucose.: 176 mg/dL (08 Feb 2023 13:27)  POCT Blood Glucose.: 152 mg/dL (08 Feb 2023 11:36)  POCT Blood Glucose.: 174 mg/dL (08 Feb 2023 07:23)  POCT Blood Glucose.: 194 mg/dL (07 Feb 2023 21:39)  POCT Blood Glucose.: 184 mg/dL (07 Feb 2023 16:36)  POCT Blood Glucose.: 233 mg/dL (07 Feb 2023 11:18)  POCT Blood Glucose.: 137 mg/dL (07 Feb 2023 07:30)  POCT Blood Glucose.: 180 mg/dL (06 Feb 2023 22:10)  POCT Blood Glucose.: 131 mg/dL (06 Feb 2023 16:48)  POCT Blood Glucose.: 249 mg/dL (02-04-23 @ 11:23)  POCT Blood Glucose.: 153 mg/dL (02-04-23 @ 07:44)  POCT Blood Glucose.: 271 mg/dL (02-03-23 @ 21:14)  POCT Blood Glucose.: 303 mg/dL (02-03-23 @ 16:39)  POCT Blood Glucose.: 158 mg/dL (02-03-23 @ 11:22)  POCT Blood Glucose.: 171 mg/dL (02-03-23 @ 07:19)  POCT Blood Glucose.: 134 mg/dL (02-02-23 @ 21:49)  POCT Blood Glucose.: 269 mg/dL (02-02-23 @ 16:36)  POCT Blood Glucose.: 216 mg/dL (02-02-23 @ 11:30)  POCT Blood Glucose.: 96 mg/dL (02-02-23 @ 07:19)  POCT Blood Glucose.: 96 mg/dL (02-01-23 @ 21:59)  POCT Blood Glucose.: 84 mg/dL (02-01-23 @ 16:43)    02-08    132<L>  |  94<L>  |  30<H>  ----------------------------<  139<H>  5.3   |  29  |  1.03    Ca    9.0      08 Feb 2023 06:15  Phos  3.9     02-08  Mg     1.70     02-08        Thyroid Function Tests:  01-30 @ 16:49 TSH 4.91 FreeT4 -- T3 46 Anti TPO -- Anti Thyroglobulin Ab -- TSI --    A1C with Estimated Average Glucose (01.30.23 @ 16:45)    A1C with Estimated Average Glucose Result: 6.4 %    Estimated Average Glucose: 137

## 2023-02-08 NOTE — PROGRESS NOTE ADULT - PROBLEM SELECTOR PLAN 2
CT Chest w/ diffuse anasarca with pleural effusion, atelectasis, edema w/ opacity c/f pneumonia, likely source of instability. Course c/b hypothermia, bradycardia, hypotension on 1/31. 1/30 BCx x2, UCx NGTD. MRSA negative, s/p vanc 1/31-2/3. s/p zosyn for 7 day course (1/31 - 2/6)    - requiring supplemental O2 via 2L NC; able to be weaned to [facility] currently  - likely d/c on O2, wean at [facility] if possible  - o/p pulm f/u for pleural effusions, repeat CT chest

## 2023-02-08 NOTE — PROGRESS NOTE ADULT - PROBLEM SELECTOR PLAN 3
Desaturations x 2 to low 80s on room air while sleeping overnight. Could be due to sleep apnea vs some component of aspiration pneumonia given his mental status change. CT Chest showed pleural effusion b/l, atelectasis, consolidation c/f PNA. Currently on 2 L NC.    - wean NC as tolerated  - c/w abx as above  - offer incentive spirometer

## 2023-02-08 NOTE — PROGRESS NOTE ADULT - ASSESSMENT
67M with PMHx schizophrenia, hx seizure, bipolar d/o, HTN, DM2, hx SBO presenting with confusion, slurred speech and difficulty ambulating x4 days. History was obtained from brother David at bedside who lives with patient as patient is not answering questions completely with some word finding difficulty At baseline patient oriented x3, conversive and ambulates without difficulty.  inpt, pt has hypothermia and labs with hyponatremia and hyperkalemia  with concern for myocarditis, infection, workup underway  endocrine called for hypothyroidism, pt also with DM     1. Hypothyroidism   TSH mildly elevated with nl total  T4   pt has been on levothyroxine 75mcg long term, continue this inpt and on discharge  administer on empty stomach 4 hrs apart from iron.calcium/ppi- he is on iron inpt - please make sure this is timed correctly       2.DM 2  a1c 6.4  pt has DM as well- on metformin, Glipzide and januvia    While inpatient  BG target 100-180 mg/dl,   now clinically improving and taking in more PO intake   -Continue Lantus 5 units HS   -Continue Admelog to 4 units TID with meals   - Hypoglycemia protocol in place   - Carb Consistent Diet   - Nutrition consult     D/C recs: endorses having occasional hypoglycemia at home  please inform outpt endocrine of inpt admission and need to fu outpt ( Seeing Dr. Farfan outpatient)   Stop glipizide, risk for hypoglycemia  otherwise can resume home meds if no contraindications at dc (LFTS and GFR normal lactate normal)  resume metformin at 1000mg po bid and januvia 100 mg po daily, and start Prandin 0.5 mg po tid ac- hold if skips meal    Tess Madrigal  Nurse Practitioner  Division of Endocrinology & Diabetes  Available via  Teams      If after 6PM or before 9AM, or on weekends/holidays, please call endocrine answering service for assistance ([phone]).  For nonurgent matters email [email or web address] for assistance.

## 2023-02-08 NOTE — PROGRESS NOTE ADULT - SUBJECTIVE AND OBJECTIVE BOX
Neurology Progress Note    S: Patient seen and examined. No new events overnight. patient denied CP, SOB, HA or pain. eating breakfast on own     Medication:  MEDICATIONS  (STANDING):  amLODIPine   Tablet 10 milliGRAM(s) Oral daily  atorvastatin 40 milliGRAM(s) Oral at bedtime  bisacodyl Suppository 10 milliGRAM(s) Rectal daily  cholecalciferol 1000 Unit(s) Oral daily  cloZAPine 100 milliGRAM(s) Oral at bedtime  dextrose 5%. 1000 milliLiter(s) (50 mL/Hr) IV Continuous <Continuous>  dextrose 5%. 1000 milliLiter(s) (100 mL/Hr) IV Continuous <Continuous>  dextrose 50% Injectable 25 Gram(s) IV Push once  dextrose 50% Injectable 12.5 Gram(s) IV Push once  dextrose 50% Injectable 25 Gram(s) IV Push once  divalproex  milliGRAM(s) Oral at bedtime  enoxaparin Injectable 40 milliGRAM(s) SubCutaneous every 24 hours  ferrous    sulfate 325 milliGRAM(s) Oral at bedtime  finasteride 5 milliGRAM(s) Oral daily  glucagon  Injectable 1 milliGRAM(s) IntraMuscular once  insulin glargine Injectable (LANTUS) 5 Unit(s) SubCutaneous at bedtime  insulin lispro (ADMELOG) corrective regimen sliding scale   SubCutaneous three times a day before meals  insulin lispro (ADMELOG) corrective regimen sliding scale   SubCutaneous at bedtime  insulin lispro Injectable (ADMELOG) 4 Unit(s) SubCutaneous three times a day before meals  latanoprost 0.005% Ophthalmic Solution 1 Drop(s) Both EYES at bedtime  levothyroxine 75 MICROGram(s) Oral daily  multivitamin 1 Tablet(s) Oral daily  polyethylene glycol 3350 17 Gram(s) Oral two times a day  pyridoxine 50 milliGRAM(s) Oral daily  senna 2 Tablet(s) Oral two times a day  sorbitol 70%/mineral oil/magnesium hydroxide/glycerin Enema 120 milliLiter(s) Rectal once  tamsulosin 0.8 milliGRAM(s) Oral at bedtime  timolol 0.5% Solution 1 Drop(s) Both EYES two times a day  venlafaxine XR. 75 milliGRAM(s) Oral at bedtime    MEDICATIONS  (PRN):  dextrose Oral Gel 15 Gram(s) Oral once PRN Blood Glucose LESS THAN 70 milliGRAM(s)/deciliter  guaifenesin/dextromethorphan Oral Liquid 10 milliLiter(s) Oral every 6 hours PRN Cough  OLANZapine Injectable 2.5 milliGRAM(s) IntraMuscular every 6 hours PRN agitation      Vitals:  Vital Signs Last 24 Hrs  T(C): 36.8 (02-08-23 @ 05:50), Max: 36.8 (02-07-23 @ 12:27)  T(F): 98.2 (02-08-23 @ 05:50), Max: 98.2 (02-07-23 @ 12:27)  HR: 83 (02-08-23 @ 05:50) (83 - 85)  BP: 138/63 (02-08-23 @ 05:50) (109/72 - 138/63)  BP(mean): --  RR: 18 (02-08-23 @ 05:50) (18 - 18)  SpO2: 99% (02-08-23 @ 05:50) (98% - 100%)        General Exam:   General Appearance: Appropriately dressed and in no acute distress       Head: Normocephalic, atraumatic and no dysmorphic features  Ear, Nose, and Throat: Moist mucous membranes  CVS: S1S2+  Resp: No SOB, no wheeze or rhonchi  Abd: soft NTND  Extremities: No edema, no cyanosis  Skin: No bruises, no rashes     Neurological Exam:  Mental Status: Awake, alert and oriented x 2.  Able to follow simple and complex verbal commands. Able to name and repeat. fluent speech. No obvious aphasia or dysarthria noted.   Cranial Nerves: PERRL, EOMI, VFFC, sensation V1-V3 intact,  no obvious facial asymmetry , equal elevation of palate, scm/trap 5/5, tongue is midline on protrusion. no obvious papilledema on fundoscopic exam. Hearing is grossly intact.   Motor: Normal bulk, tone and strength throughout. some increase tone and cogwheeling.  Fine finger movements were intact and symmetric. no tremors or drift noted.    Sensation: Intact to light touch and pinprick throughout. no right/left confusion. no extinction to tactile on DSS. Romberg was negative.   Reflexes: 1+ throughout at biceps, brachioradialis, triceps, patellars and ankles bilaterally and equal. No clonus. R toe and L toe were both downgoing.  Coordination: No dysmetria on FNF   Gait: deferred     I personally reviewed the below data/images/labs:    CBC Full  -  ( 07 Feb 2023 05:08 )  WBC Count : 7.51 K/uL  RBC Count : 3.05 M/uL  Hemoglobin : 9.2 g/dL  Hematocrit : 28.4 %  Platelet Count - Automated : 144 K/uL  Mean Cell Volume : 93.1 fL  Mean Cell Hemoglobin : 30.2 pg  Mean Cell Hemoglobin Concentration : 32.4 gm/dL  Auto Neutrophil # : x  Auto Lymphocyte # : x  Auto Monocyte # : x  Auto Eosinophil # : x  Auto Basophil # : x  Auto Neutrophil % : x  Auto Lymphocyte % : x  Auto Monocyte % : x  Auto Eosinophil % : x  Auto Basophil % : x    02-08    132<L>  |  94<L>  |  30<H>  ----------------------------<  139<H>  5.3   |  29  |  1.03    Ca    9.0      08 Feb 2023 06:15  Phos  3.9     02-08  Mg     1.70     02-08        CT Angio Head w/ IV Cont (01.30.23 @ 20:00) >  IMPRESSION:  CT BRAIN:  No acute intracranial mass effect, hemorrhage, midline shift or   extra-axial fluid collection.    CT ANGIOGRAPHY NECK:  No evidence of hemodynamically significant stenosis using NASCET   criteria. Patent vertebral arteries. No evidence of vascular dissection.    CT ANGIOGRAPHY BRAIN:  No major vessel occlusion or proximal stenosis.      < from: MR Head No Cont (02.01.23 @ 21:14) >    ACC: [id number] EXAM:  MR BRAIN   ORDERED BY: AN BELLA     PROCEDURE DATE:  02/01/2023          INTERPRETATION:  .    CLINICAL INFORMATION: Slurred speech. Dysarthria.    TECHNIQUE: Multiplanar multisequential MRI of the brain was acquired   without the administration of IV gadolinium.    COMPARISON: Prior CT study of the head performed on 1/30/2020. Prior CT   angiogram studies of the head and neck performed on 1/30/2023.    FINDINGS: Multiple nonspecific foci of T2/FLAIR hyperintensity are noted   throughout the deep and periventricular white matter of the cerebral   hemispheres. There is no associated mass effect. There is no evidence of   acute ischemia on the diffusion-weighted images.    There is diffuse cerebral volume loss with prominence of the sulci,   fissures, and cisternal spaces which is normal for the patient's age.   Ventricular size and configuration is unremarkable. Flow-voids are noted   throughout the major intracranial vessels, on the T2 weighted images,   consistent with their patency. The sellar region and posterior fossa   appear unremarkable.    Polypoidal soft tissue is seen within the right sphenoid sinus.   Otherwise, the paranasal sinuses and mastoid air cells are clear.   Calvarial signal is within normal limits. The orbits appear unremarkable.    IMPRESSION: No acute intracranial hemorrhage or evidence of acute   ischemia.    Multiple nonspecific abnormal white matter foci of T2/FLAIR prolongation   statistically favoring microvascular disease.    --- End of Report ---            ELLIE BINGHAM MD; Attending Radiologist  This document has been electronically signed. Feb 2 2023  8:30AM    < end of copied text >

## 2023-02-08 NOTE — PROGRESS NOTE ADULT - PROBLEM SELECTOR PLAN 1
Baseline oriented 3, ambulates on own. Now oriented 1-2 with confusion, dysarthria, prolonged speech latency (motor/mixed aphasia), truncal ataxia. Of note, patient has been on 125mg of clozapine since 1997 and recent reduction to 50mg may be contributing. Course c/b initial bradycardia, hypotension, hypothermia. CXR clear, UA normal, no signs of skin infections, no leukocytosis, though elevated NLR. T4 normal, TSH 4.91 mildly elevated, compliant with levothyroxine dosing. AM Cortisol elevated, unlikely AI. Troponin flat with elevated CKMB, TTE wnl. Per cards, unlikely myocarditis. Ammonium 58 mildly elevated likely not delivered on ice, LFTs wnl, no clonus or asterixis. Rheum w/u negative. Nutritional deficiency w/u negative. Patient is maintaining airway and responsive. Not lethargic, awake and alert. Follows commands. Now requiring 1-2L NC. MRI MRA head showed microvascular disease but no new stroke. CTCAP showed diffuse anasarca with pleural effusion, atelectasis, edema w/ opacity c/f pneumonia, likely source of instability. Ddx incl toxic metabolic encephalopathy iso sepsis vs. progression of psychiatric disorders.    - ICU consulted 1/31 for hypothermia, recommended stress dose hydrocortisone 100mg  - Endo consulted; recs appreciated. - unlikely to be AI. Will hold off on further steroids until infx r/o  - c/w atorvastatin 40mg  - psych consulted; recs appreciated

## 2023-02-08 NOTE — CHART NOTE - NSCHARTNOTEFT_GEN_A_CORE
Brother of patient notified for increased diaphoresis, lethargy, and general malaise complaints from patient. Patient denied any fever, chill, chest pain, palpitation SOB, abdominal pain, headache, but replied that he was not feeling well.    Vitals repeated: T 98F (oral), HR 84 BP 97/53, SPo2 95% on 2L NC. POCT glucose 176    PHYSICAL EXAM:  GENERAL: lethargic appearing, able to stay awake, diaphoretic   HEART: Regular rate and rhythm; No murmurs, rubs, or gallops  RESPIRATORY: Coarse upper airway crackles, breathing sounds heard bilaterally, no increased WOB, no dyspnea. Largely unchanged from AM exam  ABDOMEN: Mildly distended appearing but still soft, nontender. Pt had liquid BM during exam.  NEUROLOGY: A&Ox3, nonfocal, moving all extremities  EXTREMITIES:  2+ Peripheral Pulses, No clubbing, cyanosis, or edema  SKIN: diaphoretic, some pallor, warm.    [place] output appears normal.    Given prior h/o b/l pleural effusions and PNA, will obtain stat labs, procal, lactate, CXR. Will obtain UA given [place]. Patient had no cardiac complaints, satting well w/o increased O2 demands, no leg swelling and on DVT ppx, less likely MI/PE. Vitals q4h, trend fever curve. Hold off on abx BCx UCx for now until labs result. Brother of patient notified for increased diaphoresis, lethargy, and general malaise complaints from patient. Patient denied any fever, chill, chest pain, palpitation SOB, abdominal pain, headache, but replied that he was not feeling well.    Vitals repeated: T 98F (oral), HR 84 BP 97/53, SPo2 95% on 2L NC. POCT glucose 176    PHYSICAL EXAM:  GENERAL: lethargic appearing, able to stay awake, diaphoretic   HEART: Regular rate and rhythm; No murmurs, rubs, or gallops  RESPIRATORY: Coarse upper airway crackles, breathing sounds heard bilaterally, no increased WOB, no dyspnea. Largely unchanged from AM exam  ABDOMEN: Mildly distended appearing but still soft, nontender, no rebound no guarding. Pt had liquid BM during exam.  NEUROLOGY: A&Ox3, nonfocal, moving all extremities  EXTREMITIES:  2+ Peripheral Pulses, No clubbing, cyanosis, or edema  SKIN: diaphoretic, some pallor, warm.    [place] output appears normal.    Given prior h/o b/l pleural effusions and PNA, will obtain stat labs, procal, lactate, CXR. Will obtain UA given [place]. Patient had no cardiac complaints, satting well w/o increased O2 demands, no leg swelling and on DVT ppx, less likely MI/PE. Vitals q4h, trend fever curve. Hold off on abx BCx UCx for now until labs result. Brother of patient notified for increased diaphoresis, lethargy, and general malaise complaints from patient. Patient denied any fever, chill, chest pain, palpitation SOB, abdominal pain, headache, but replied that he was not feeling well.    Vitals repeated: T 98F (oral), HR 84 BP 97/53, SPo2 95% on 2L NC. POCT glucose 176    PHYSICAL EXAM:  GENERAL: lethargic appearing, able to stay awake, diaphoretic   HEART: Regular rate and rhythm; No murmurs, rubs, or gallops  RESPIRATORY: Coarse upper airway crackles, breathing sounds heard bilaterally, no increased WOB, no dyspnea. Largely unchanged from AM exam  ABDOMEN: Mildly distended appearing but still soft, nontender, no rebound no guarding. Pt had liquid BM during exam.  NEUROLOGY: A&Ox3, nonfocal, moving all extremities  EXTREMITIES:  2+ Peripheral Pulses, No clubbing, cyanosis, or edema  SKIN: diaphoretic, some pallor, warm.    [place] output appears normal.    Given prior h/o b/l pleural effusions and PNA, will obtain stat labs, procal, lactate, CXR. Will obtain UA given [place]. Patient had no cardiac complaints, satting well w/o increased O2 demands, no leg swelling and on DVT ppx, less likely MI/PE. Vitals q4h, trend fever curve. Hold off on abx BCx UCx for now until labs result.    Update 4:30PM labs and imaging reviewed showing gaseous distention of stomach, labs notable for K 6.0 nonhemolyzed. Ordered for EKG, 5u insulin with D50, lokelma 10mg, maalox. Will recheck BMP for improvement.

## 2023-02-08 NOTE — PROGRESS NOTE ADULT - ASSESSMENT
67 year old right-handed man with schizophrenia, bipolar disorder, T2DM, HTN, glaucoma, BPH presents to [facility] ED brought in by his brother with whom he lives with due to confusion, gait instability and dysarthria that has developed since 1/27/23 at 4:00PM. LKN 1/27/23 unknown time, preMRS 0, NIHSS 3-4. Patient outside window for intervention (TNK and thrombectomy). Neuro exam demonstrates alert patient, prolonged speech latency and perseveration, stuttering, moderate dysarthria, impaired attention/concentration, no drift on motor exam, +dyskinesia of head likely adverse effect of clozapine, +retropulsion (did not participate in dysmetria testing).   CTH, CTA H/N did not reveal any acute intracranial pathology.  MRI brain neg for acute infarct, smallv essel disease noted   A1C 6.4 LDL 23   TTE done 1/31     Impression: changes in mental status improved likely toxic metabolic encephalopathy vs. nutritional/vitamin deficiency/medication effect vs. unlikely progression of psychiatric disorders      Recommendations:     - ASA 81mg daily  -  atorvastatin 40mg  - Continue aspirin 81mg   - psych f/.u --> VPA and clozapine. also venlafaxine   - no objection to Vit B6   - zyprexa PRN if QTC < 500   -- d/c planning awaiting auth     Pt to f/u with Dr. Jonathan Orozco after discharge:  [address] [place] [address]  [phone]

## 2023-02-08 NOTE — PROGRESS NOTE ADULT - SUBJECTIVE AND OBJECTIVE BOX
***************************************************************  Miky Munguia, PGY1  Internal Medicine   pager:  [facility]: [phone] [facility]: [phone]  ***************************************************************    CURTIS DIAZ  67y  MRN: [id number]    Patient is a 67y old  Male who presents with a chief complaint of confusion, dizziness (07 Feb 2023 15:40)    Interval/Overnight Events: no events ON.   - pending LINDSEY placement    Subjective: Pt seen and examined at bedside. Denies fever, CP, SOB, abn pain, N/V, dysuria. Tolerating diet.      MEDICATIONS  (STANDING):  amLODIPine   Tablet 10 milliGRAM(s) Oral daily  atorvastatin 40 milliGRAM(s) Oral at bedtime  bisacodyl Suppository 10 milliGRAM(s) Rectal daily  cholecalciferol 1000 Unit(s) Oral daily  cloZAPine 100 milliGRAM(s) Oral at bedtime  dextrose 5%. 1000 milliLiter(s) (50 mL/Hr) IV Continuous <Continuous>  dextrose 5%. 1000 milliLiter(s) (100 mL/Hr) IV Continuous <Continuous>  dextrose 50% Injectable 25 Gram(s) IV Push once  dextrose 50% Injectable 12.5 Gram(s) IV Push once  dextrose 50% Injectable 25 Gram(s) IV Push once  divalproex  milliGRAM(s) Oral at bedtime  enoxaparin Injectable 40 milliGRAM(s) SubCutaneous every 24 hours  ferrous    sulfate 325 milliGRAM(s) Oral at bedtime  finasteride 5 milliGRAM(s) Oral daily  glucagon  Injectable 1 milliGRAM(s) IntraMuscular once  insulin glargine Injectable (LANTUS) 5 Unit(s) SubCutaneous at bedtime  insulin lispro (ADMELOG) corrective regimen sliding scale   SubCutaneous at bedtime  insulin lispro (ADMELOG) corrective regimen sliding scale   SubCutaneous three times a day before meals  insulin lispro Injectable (ADMELOG) 4 Unit(s) SubCutaneous three times a day before meals  latanoprost 0.005% Ophthalmic Solution 1 Drop(s) Both EYES at bedtime  levothyroxine 75 MICROGram(s) Oral daily  multivitamin 1 Tablet(s) Oral daily  polyethylene glycol 3350 17 Gram(s) Oral two times a day  pyridoxine 50 milliGRAM(s) Oral daily  senna 2 Tablet(s) Oral two times a day  sorbitol 70%/mineral oil/magnesium hydroxide/glycerin Enema 120 milliLiter(s) Rectal once  tamsulosin 0.8 milliGRAM(s) Oral at bedtime  timolol 0.5% Solution 1 Drop(s) Both EYES two times a day  venlafaxine XR. 75 milliGRAM(s) Oral at bedtime    MEDICATIONS  (PRN):  dextrose Oral Gel 15 Gram(s) Oral once PRN Blood Glucose LESS THAN 70 milliGRAM(s)/deciliter  guaifenesin/dextromethorphan Oral Liquid 10 milliLiter(s) Oral every 6 hours PRN Cough  OLANZapine Injectable 2.5 milliGRAM(s) IntraMuscular every 6 hours PRN agitation    Objective:    Vitals: Vital Signs Last 24 Hrs  T(C): 36.8 (02-08-23 @ 05:50), Max: 36.8 (02-07-23 @ 12:27)  T(F): 98.2 (02-08-23 @ 05:50), Max: 98.2 (02-07-23 @ 12:27)  HR: 83 (02-08-23 @ 05:50) (83 - 85)  BP: 138/63 (02-08-23 @ 05:50) (109/72 - 138/63)  BP(mean): --  RR: 18 (02-08-23 @ 05:50) (18 - 18)  SpO2: 99% (02-08-23 @ 05:50) (98% - 100%)                I&O's Summary    07 Feb 2023 07:01  -  08 Feb 2023 07:00  --------------------------------------------------------  IN: 0 mL / OUT: 1800 mL / NET: -1800 mL    PHYSICAL EXAM:  GENERAL: NAD  HEAD:  Atraumatic, Normocephalic; wearing nasal cannula  EYES: pupils minimally reactive, constricted. EOMI, conjunctiva and sclera clear  CHEST/LUNG: upper airway coarse crackles, poor auscultation of lung sounds on posterior fields.  HEART: Regular rate and rhythm; No murmurs, rubs, or gallops  ABDOMEN: Somewhat distended, tympanic on percussion. Soft, Nontender  MSK: increased thoracic kyphosis  SKIN: No rashes or lesions  NERVOUS SYSTEM:  Alert & Oriented X3, normal speech. no focal deficits but overall weak.     LABS:  02-08    132<L>  |  94<L>  |  30<H>  ----------------------------<  139<H>  5.3   |  29  |  1.03  02-07    133<L>  |  97<L>  |  25<H>  ----------------------------<  120<H>  5.2   |  30  |  1.04  02-06    134<L>  |  95<L>  |  18  ----------------------------<  145<H>  5.1   |  30  |  0.99    Ca    9.0      08 Feb 2023 06:15  Ca    9.2      07 Feb 2023 05:08  Ca    9.0      06 Feb 2023 06:30  Phos  3.9     02-08  Mg     1.70     02-08                        9.2    7.51  )-----------( 144      ( 07 Feb 2023 05:08 )             28.4                         8.9    5.68  )-----------( 126      ( 06 Feb 2023 06:30 )             27.8     CAPILLARY BLOOD GLUCOSE      POCT Blood Glucose.: 174 mg/dL (08 Feb 2023 07:23)  POCT Blood Glucose.: 194 mg/dL (07 Feb 2023 21:39)  POCT Blood Glucose.: 184 mg/dL (07 Feb 2023 16:36)  POCT Blood Glucose.: 233 mg/dL (07 Feb 2023 11:18)      RADIOLOGY & ADDITIONAL TESTS:    Imaging Personally Reviewed:  [x ] YES  [ ] NO    Consultants involved in case:   Consultant(s) Notes Reviewed:  [ x] YES  [ ] NO:   Care Discussed with Consultants/Other Providers [x ] YES  [ ] NO

## 2023-02-09 DIAGNOSIS — A41.9 SEPSIS, UNSPECIFIED ORGANISM: ICD-10-CM

## 2023-02-09 DIAGNOSIS — E78.5 HYPERLIPIDEMIA, UNSPECIFIED: ICD-10-CM

## 2023-02-09 LAB
ANION GAP SERPL CALC-SCNC: 10 MMOL/L — SIGNIFICANT CHANGE UP (ref 7–14)
ANION GAP SERPL CALC-SCNC: 11 MMOL/L — SIGNIFICANT CHANGE UP (ref 7–14)
B PERT DNA SPEC QL NAA+PROBE: SIGNIFICANT CHANGE UP
B PERT+PARAPERT DNA PNL SPEC NAA+PROBE: SIGNIFICANT CHANGE UP
BASE EXCESS BLDV CALC-SCNC: 7.2 MMOL/L — HIGH (ref -2–3)
BILIRUB DIRECT SERPL-MCNC: <0.2 MG/DL — SIGNIFICANT CHANGE UP (ref 0–0.3)
BILIRUB INDIRECT FLD-MCNC: >0.1 MG/DL — SIGNIFICANT CHANGE UP (ref 0–1)
BILIRUB SERPL-MCNC: 0.3 MG/DL — SIGNIFICANT CHANGE UP (ref 0.2–1.2)
BORDETELLA PARAPERTUSSIS (RAPRVP): SIGNIFICANT CHANGE UP
BUN SERPL-MCNC: 52 MG/DL — HIGH (ref 7–23)
BUN SERPL-MCNC: 53 MG/DL — HIGH (ref 7–23)
C PNEUM DNA SPEC QL NAA+PROBE: SIGNIFICANT CHANGE UP
CA-I SERPL-SCNC: 1.2 MMOL/L — SIGNIFICANT CHANGE UP (ref 1.15–1.33)
CALCIUM SERPL-MCNC: 8.7 MG/DL — SIGNIFICANT CHANGE UP (ref 8.4–10.5)
CALCIUM SERPL-MCNC: 9 MG/DL — SIGNIFICANT CHANGE UP (ref 8.4–10.5)
CHLORIDE BLDV-SCNC: 97 MMOL/L — SIGNIFICANT CHANGE UP (ref 96–108)
CHLORIDE SERPL-SCNC: 94 MMOL/L — LOW (ref 98–107)
CHLORIDE SERPL-SCNC: 95 MMOL/L — LOW (ref 98–107)
CO2 BLDV-SCNC: 33.4 MMOL/L — HIGH (ref 22–26)
CO2 SERPL-SCNC: 27 MMOL/L — SIGNIFICANT CHANGE UP (ref 22–31)
CO2 SERPL-SCNC: 27 MMOL/L — SIGNIFICANT CHANGE UP (ref 22–31)
CREAT SERPL-MCNC: 1.27 MG/DL — SIGNIFICANT CHANGE UP (ref 0.5–1.3)
CREAT SERPL-MCNC: 1.35 MG/DL — HIGH (ref 0.5–1.3)
EGFR: 57 ML/MIN/1.73M2 — LOW
EGFR: 62 ML/MIN/1.73M2 — SIGNIFICANT CHANGE UP
FLUAV SUBTYP SPEC NAA+PROBE: SIGNIFICANT CHANGE UP
FLUBV RNA SPEC QL NAA+PROBE: SIGNIFICANT CHANGE UP
GAS PNL BLDV: 128 MMOL/L — LOW (ref 136–145)
GAS PNL BLDV: SIGNIFICANT CHANGE UP
GLUCOSE BLDC GLUCOMTR-MCNC: 187 MG/DL — HIGH (ref 70–99)
GLUCOSE BLDC GLUCOMTR-MCNC: 195 MG/DL — HIGH (ref 70–99)
GLUCOSE BLDC GLUCOMTR-MCNC: 212 MG/DL — HIGH (ref 70–99)
GLUCOSE BLDC GLUCOMTR-MCNC: 248 MG/DL — HIGH (ref 70–99)
GLUCOSE BLDV-MCNC: 168 MG/DL — HIGH (ref 70–99)
GLUCOSE SERPL-MCNC: 166 MG/DL — HIGH (ref 70–99)
GLUCOSE SERPL-MCNC: 208 MG/DL — HIGH (ref 70–99)
HADV DNA SPEC QL NAA+PROBE: SIGNIFICANT CHANGE UP
HAPTOGLOB SERPL-MCNC: 184 MG/DL — SIGNIFICANT CHANGE UP (ref 34–200)
HCO3 BLDV-SCNC: 32 MMOL/L — HIGH (ref 22–29)
HCOV 229E RNA SPEC QL NAA+PROBE: SIGNIFICANT CHANGE UP
HCOV HKU1 RNA SPEC QL NAA+PROBE: SIGNIFICANT CHANGE UP
HCOV NL63 RNA SPEC QL NAA+PROBE: SIGNIFICANT CHANGE UP
HCOV OC43 RNA SPEC QL NAA+PROBE: SIGNIFICANT CHANGE UP
HCT VFR BLD CALC: 23.7 % — LOW (ref 39–50)
HCT VFR BLD CALC: 24.1 % — LOW (ref 39–50)
HCT VFR BLDA CALC: 27 % — LOW (ref 39–51)
HGB BLD CALC-MCNC: 9.1 G/DL — LOW (ref 13–17)
HGB BLD-MCNC: 7.6 G/DL — LOW (ref 13–17)
HGB BLD-MCNC: 7.9 G/DL — LOW (ref 13–17)
HMPV RNA SPEC QL NAA+PROBE: SIGNIFICANT CHANGE UP
HPIV1 RNA SPEC QL NAA+PROBE: SIGNIFICANT CHANGE UP
HPIV2 RNA SPEC QL NAA+PROBE: SIGNIFICANT CHANGE UP
HPIV3 RNA SPEC QL NAA+PROBE: SIGNIFICANT CHANGE UP
HPIV4 RNA SPEC QL NAA+PROBE: SIGNIFICANT CHANGE UP
LACTATE BLDV-MCNC: 1.3 MMOL/L — SIGNIFICANT CHANGE UP (ref 0.5–2)
LDH SERPL L TO P-CCNC: 239 U/L — HIGH (ref 135–225)
M PNEUMO DNA SPEC QL NAA+PROBE: SIGNIFICANT CHANGE UP
MAGNESIUM SERPL-MCNC: 2 MG/DL — SIGNIFICANT CHANGE UP (ref 1.6–2.6)
MAGNESIUM SERPL-MCNC: 2 MG/DL — SIGNIFICANT CHANGE UP (ref 1.6–2.6)
MCHC RBC-ENTMCNC: 30.4 PG — SIGNIFICANT CHANGE UP (ref 27–34)
MCHC RBC-ENTMCNC: 30.7 PG — SIGNIFICANT CHANGE UP (ref 27–34)
MCHC RBC-ENTMCNC: 32.1 GM/DL — SIGNIFICANT CHANGE UP (ref 32–36)
MCHC RBC-ENTMCNC: 32.8 GM/DL — SIGNIFICANT CHANGE UP (ref 32–36)
MCV RBC AUTO: 93.8 FL — SIGNIFICANT CHANGE UP (ref 80–100)
MCV RBC AUTO: 94.8 FL — SIGNIFICANT CHANGE UP (ref 80–100)
NRBC # BLD: 0 /100 WBCS — SIGNIFICANT CHANGE UP (ref 0–0)
NRBC # BLD: 0 /100 WBCS — SIGNIFICANT CHANGE UP (ref 0–0)
NRBC # FLD: 0 K/UL — SIGNIFICANT CHANGE UP (ref 0–0)
NRBC # FLD: 0 K/UL — SIGNIFICANT CHANGE UP (ref 0–0)
PCO2 BLDV: 46 MMHG — SIGNIFICANT CHANGE UP (ref 42–55)
PH BLDV: 7.45 — HIGH (ref 7.32–7.43)
PHOSPHATE SERPL-MCNC: 4.1 MG/DL — SIGNIFICANT CHANGE UP (ref 2.5–4.5)
PHOSPHATE SERPL-MCNC: 4.5 MG/DL — SIGNIFICANT CHANGE UP (ref 2.5–4.5)
PLATELET # BLD AUTO: 204 K/UL — SIGNIFICANT CHANGE UP (ref 150–400)
PLATELET # BLD AUTO: 239 K/UL — SIGNIFICANT CHANGE UP (ref 150–400)
PO2 BLDV: 107 MMHG — SIGNIFICANT CHANGE UP
POTASSIUM BLDV-SCNC: 6.7 MMOL/L — CRITICAL HIGH (ref 3.5–5.1)
POTASSIUM SERPL-MCNC: 4.9 MMOL/L — SIGNIFICANT CHANGE UP (ref 3.5–5.3)
POTASSIUM SERPL-MCNC: 5.1 MMOL/L — SIGNIFICANT CHANGE UP (ref 3.5–5.3)
POTASSIUM SERPL-SCNC: 4.9 MMOL/L — SIGNIFICANT CHANGE UP (ref 3.5–5.3)
POTASSIUM SERPL-SCNC: 5.1 MMOL/L — SIGNIFICANT CHANGE UP (ref 3.5–5.3)
RAPID RVP RESULT: SIGNIFICANT CHANGE UP
RBC # BLD: 2.5 M/UL — LOW (ref 4.2–5.8)
RBC # BLD: 2.57 M/UL — LOW (ref 4.2–5.8)
RBC # FLD: 14.7 % — HIGH (ref 10.3–14.5)
RBC # FLD: 14.9 % — HIGH (ref 10.3–14.5)
RETICS #: 26.9 K/UL — SIGNIFICANT CHANGE UP (ref 25–125)
RETICS/RBC NFR: 1.1 % — SIGNIFICANT CHANGE UP (ref 0.5–2.5)
RSV RNA SPEC QL NAA+PROBE: SIGNIFICANT CHANGE UP
RV+EV RNA SPEC QL NAA+PROBE: SIGNIFICANT CHANGE UP
SAO2 % BLDV: 98.1 % — SIGNIFICANT CHANGE UP
SARS-COV-2 RNA SPEC QL NAA+PROBE: SIGNIFICANT CHANGE UP
SODIUM SERPL-SCNC: 132 MMOL/L — LOW (ref 135–145)
SODIUM SERPL-SCNC: 132 MMOL/L — LOW (ref 135–145)
WBC # BLD: 10.86 K/UL — HIGH (ref 3.8–10.5)
WBC # BLD: 8.94 K/UL — SIGNIFICANT CHANGE UP (ref 3.8–10.5)
WBC # FLD AUTO: 10.86 K/UL — HIGH (ref 3.8–10.5)
WBC # FLD AUTO: 8.94 K/UL — SIGNIFICANT CHANGE UP (ref 3.8–10.5)

## 2023-02-09 PROCEDURE — 99232 SBSQ HOSP IP/OBS MODERATE 35: CPT

## 2023-02-09 PROCEDURE — 99232 SBSQ HOSP IP/OBS MODERATE 35: CPT | Mod: GC

## 2023-02-09 RX ORDER — PIPERACILLIN AND TAZOBACTAM 4; .5 G/20ML; G/20ML
3.38 INJECTION, POWDER, LYOPHILIZED, FOR SOLUTION INTRAVENOUS ONCE
Refills: 0 | Status: COMPLETED | OUTPATIENT
Start: 2023-02-09 | End: 2023-02-09

## 2023-02-09 RX ORDER — INSULIN LISPRO 100/ML
5 VIAL (ML) SUBCUTANEOUS
Refills: 0 | Status: DISCONTINUED | OUTPATIENT
Start: 2023-02-09 | End: 2023-02-10

## 2023-02-09 RX ORDER — PIPERACILLIN AND TAZOBACTAM 4; .5 G/20ML; G/20ML
3.38 INJECTION, POWDER, LYOPHILIZED, FOR SOLUTION INTRAVENOUS EVERY 8 HOURS
Refills: 0 | Status: DISCONTINUED | OUTPATIENT
Start: 2023-02-09 | End: 2023-02-12

## 2023-02-09 RX ADMIN — Medication 1: at 11:45

## 2023-02-09 RX ADMIN — PIPERACILLIN AND TAZOBACTAM 25 GRAM(S): 4; .5 INJECTION, POWDER, LYOPHILIZED, FOR SOLUTION INTRAVENOUS at 13:29

## 2023-02-09 RX ADMIN — Medication 50 MILLIGRAM(S): at 11:13

## 2023-02-09 RX ADMIN — Medication 4 UNIT(S): at 11:45

## 2023-02-09 RX ADMIN — Medication 75 MICROGRAM(S): at 05:23

## 2023-02-09 RX ADMIN — Medication 75 MILLIGRAM(S): at 21:41

## 2023-02-09 RX ADMIN — Medication 325 MILLIGRAM(S): at 21:42

## 2023-02-09 RX ADMIN — Medication 5 UNIT(S): at 16:55

## 2023-02-09 RX ADMIN — INSULIN GLARGINE 5 UNIT(S): 100 INJECTION, SOLUTION SUBCUTANEOUS at 21:47

## 2023-02-09 RX ADMIN — Medication 1 DROP(S): at 17:35

## 2023-02-09 RX ADMIN — Medication 2: at 16:54

## 2023-02-09 RX ADMIN — ATORVASTATIN CALCIUM 40 MILLIGRAM(S): 80 TABLET, FILM COATED ORAL at 21:42

## 2023-02-09 RX ADMIN — PIPERACILLIN AND TAZOBACTAM 200 GRAM(S): 4; .5 INJECTION, POWDER, LYOPHILIZED, FOR SOLUTION INTRAVENOUS at 02:36

## 2023-02-09 RX ADMIN — PIPERACILLIN AND TAZOBACTAM 25 GRAM(S): 4; .5 INJECTION, POWDER, LYOPHILIZED, FOR SOLUTION INTRAVENOUS at 05:23

## 2023-02-09 RX ADMIN — Medication 1 DROP(S): at 05:24

## 2023-02-09 RX ADMIN — DIVALPROEX SODIUM 750 MILLIGRAM(S): 500 TABLET, DELAYED RELEASE ORAL at 21:41

## 2023-02-09 RX ADMIN — Medication 1: at 08:11

## 2023-02-09 RX ADMIN — Medication 1 TABLET(S): at 11:13

## 2023-02-09 RX ADMIN — TAMSULOSIN HYDROCHLORIDE 0.8 MILLIGRAM(S): 0.4 CAPSULE ORAL at 21:41

## 2023-02-09 RX ADMIN — CLOZAPINE 100 MILLIGRAM(S): 150 TABLET, ORALLY DISINTEGRATING ORAL at 21:41

## 2023-02-09 RX ADMIN — Medication 4 UNIT(S): at 08:12

## 2023-02-09 RX ADMIN — ENOXAPARIN SODIUM 40 MILLIGRAM(S): 100 INJECTION SUBCUTANEOUS at 11:13

## 2023-02-09 RX ADMIN — FINASTERIDE 5 MILLIGRAM(S): 5 TABLET, FILM COATED ORAL at 11:13

## 2023-02-09 RX ADMIN — PIPERACILLIN AND TAZOBACTAM 25 GRAM(S): 4; .5 INJECTION, POWDER, LYOPHILIZED, FOR SOLUTION INTRAVENOUS at 21:41

## 2023-02-09 RX ADMIN — Medication 1000 UNIT(S): at 11:14

## 2023-02-09 NOTE — PROGRESS NOTE ADULT - ATTENDING COMMENTS
68 yo M with PMH of schizophrenia, hx seizure, bipolar d/o, HTN, hypothyroidism and DM2 who a/w encephalopathy likely 2/2 sepsis 2/2 pneumonia and acute hypoxic respiratory failure requiring supplemental O2. CT chest showed bilateral moderate pleural effusions and scattered bilateral airspace opacities possibly PNA - completed 7 days IV Zosyn. Overnight he was febrile and earlier in the day he was also slightly hypotensive and diaphoretic. Evaluating for sepsis again. F/u blood and urine cultures; c/w zosyn for now.  -Acute hypoxic respiratory failure - still requiring supplemental O2; attempting to wean off as tolerated  - HTN - held amlodipine due to soft BP yesterday; monitoring  - MARISSA likely ATN in setting of sepsis/hypotension - improved however persistent mild hyperkalemia, held ACEi; today with K to 6.0 requiring lokelma; nephrology consult  - encephalopathy improved and now back to baseline   - Endo f/u appreciated - c/w levothyroxine for hypothyroidism; Lantus 5 units HS and Admelog 4 units TID with meals for DM2

## 2023-02-09 NOTE — PROGRESS NOTE ADULT - SUBJECTIVE AND OBJECTIVE BOX
Chief Complaint: DM 2    History: Patient seen at bedside, eating lunch at time of visit, tolerating. Denies n/v, denies s/s of hypoglycemia     MEDICATIONS  (STANDING):  atorvastatin 40 milliGRAM(s) Oral at bedtime  bisacodyl Suppository 10 milliGRAM(s) Rectal daily  cholecalciferol 1000 Unit(s) Oral daily  cloZAPine 100 milliGRAM(s) Oral at bedtime  dextrose 5%. 1000 milliLiter(s) (100 mL/Hr) IV Continuous <Continuous>  dextrose 5%. 1000 milliLiter(s) (50 mL/Hr) IV Continuous <Continuous>  dextrose 50% Injectable 25 Gram(s) IV Push once  dextrose 50% Injectable 12.5 Gram(s) IV Push once  dextrose 50% Injectable 25 Gram(s) IV Push once  divalproex  milliGRAM(s) Oral at bedtime  enoxaparin Injectable 40 milliGRAM(s) SubCutaneous every 24 hours  ferrous    sulfate 325 milliGRAM(s) Oral at bedtime  finasteride 5 milliGRAM(s) Oral daily  glucagon  Injectable 1 milliGRAM(s) IntraMuscular once  insulin glargine Injectable (LANTUS) 5 Unit(s) SubCutaneous at bedtime  insulin lispro (ADMELOG) corrective regimen sliding scale   SubCutaneous three times a day before meals  insulin lispro (ADMELOG) corrective regimen sliding scale   SubCutaneous at bedtime  insulin lispro Injectable (ADMELOG) 4 Unit(s) SubCutaneous three times a day before meals  latanoprost 0.005% Ophthalmic Solution 1 Drop(s) Both EYES at bedtime  levothyroxine 75 MICROGram(s) Oral daily  multivitamin 1 Tablet(s) Oral daily  piperacillin/tazobactam IVPB.. 3.375 Gram(s) IV Intermittent every 8 hours  polyethylene glycol 3350 17 Gram(s) Oral two times a day  senna 2 Tablet(s) Oral two times a day  tamsulosin 0.8 milliGRAM(s) Oral at bedtime  timolol 0.5% Solution 1 Drop(s) Both EYES two times a day  venlafaxine XR. 75 milliGRAM(s) Oral at bedtime    MEDICATIONS  (PRN):  dextrose Oral Gel 15 Gram(s) Oral once PRN Blood Glucose LESS THAN 70 milliGRAM(s)/deciliter  guaifenesin/dextromethorphan Oral Liquid 10 milliLiter(s) Oral every 6 hours PRN Cough  OLANZapine Injectable 2.5 milliGRAM(s) IntraMuscular every 6 hours PRN agitation  ondansetron Injectable 4 milliGRAM(s) IV Push four times a day PRN Nausea and/or Vomiting    No Known Allergies    Review of Systems:  HEENT: No pain  Cardiovascular: No chest pain  Respiratory: No SOB  GI: No nausea, vomiting    PHYSICAL EXAM:  VITALS: T(C): 37 (02-09-23 @ 13:30)  T(F): 98.6 (02-09-23 @ 13:30), Max: 100.4 (02-09-23 @ 01:30)  HR: 80 (02-09-23 @ 13:30) (80 - 98)  BP: 93/43 (02-09-23 @ 13:30) (93/43 - 133/70)  RR:  (16 - 18)  SpO2:  (95% - 97%)  Wt(kg): --  GENERAL: NAD  EYES: No proptosis, no lid lag, anicteric  HEENT:  Atraumatic, Normocephalic, moist mucous membranes  RESPIRATORY: unlabored respirations    CAPILLARY BLOOD GLUCOSE    POCT Blood Glucose.: 187 mg/dL (09 Feb 2023 11:20)  POCT Blood Glucose.: 195 mg/dL (09 Feb 2023 07:35)  POCT Blood Glucose.: 169 mg/dL (08 Feb 2023 21:41)  POCT Blood Glucose.: 208 mg/dL (08 Feb 2023 17:23)  POCT Blood Glucose.: 294 mg/dL (08 Feb 2023 16:47)  POCT Blood Glucose.: 157 mg/dL (08 Feb 2023 16:34)      02-09    132<L>  |  94<L>  |  52<H>  ----------------------------<  208<H>  4.9   |  27  |  1.27    eGFR: 62    Ca    8.7      02-09  Mg     2.00     02-09  Phos  4.1     02-09    TPro  x   /  Alb  x   /  TBili  0.3  /  DBili  <0.2  /  AST  x   /  ALT  x   /  AlkPhos  x   02-09      Thyroid Function Tests:  01-30 @ 16:49 TSH 4.91 FreeT4 -- T3 46 Anti TPO -- Anti Thyroglobulin Ab -- TSI --      A1C with Estimated Average Glucose Result: 6.4 % (01-30-23 @ 16:45)    Diet, Regular:   Consistent Carbohydrate Evening Snack (CSTCHOSN)  DASH/TLC Sodium & Cholesterol Restricted (DASH)  No Concentrated Potassium  Kosher  Supplement Feeding Modality:  Oral  Glucerna Shake Cans or Servings Per Day:  2       Frequency:  Daily (02-08-23 @ 07:33) [Active]         Chief Complaint: DM 2, hypothyroidism    History: Patient seen at bedside, eating lunch at time of visit, tolerating. Denies n/v, denies s/s of hypoglycemia     MEDICATIONS  (STANDING):  atorvastatin 40 milliGRAM(s) Oral at bedtime  bisacodyl Suppository 10 milliGRAM(s) Rectal daily  cholecalciferol 1000 Unit(s) Oral daily  cloZAPine 100 milliGRAM(s) Oral at bedtime  dextrose 5%. 1000 milliLiter(s) (100 mL/Hr) IV Continuous <Continuous>  dextrose 5%. 1000 milliLiter(s) (50 mL/Hr) IV Continuous <Continuous>  dextrose 50% Injectable 25 Gram(s) IV Push once  dextrose 50% Injectable 12.5 Gram(s) IV Push once  dextrose 50% Injectable 25 Gram(s) IV Push once  divalproex  milliGRAM(s) Oral at bedtime  enoxaparin Injectable 40 milliGRAM(s) SubCutaneous every 24 hours  ferrous    sulfate 325 milliGRAM(s) Oral at bedtime  finasteride 5 milliGRAM(s) Oral daily  glucagon  Injectable 1 milliGRAM(s) IntraMuscular once  insulin glargine Injectable (LANTUS) 5 Unit(s) SubCutaneous at bedtime  insulin lispro (ADMELOG) corrective regimen sliding scale   SubCutaneous three times a day before meals  insulin lispro (ADMELOG) corrective regimen sliding scale   SubCutaneous at bedtime  insulin lispro Injectable (ADMELOG) 4 Unit(s) SubCutaneous three times a day before meals  latanoprost 0.005% Ophthalmic Solution 1 Drop(s) Both EYES at bedtime  levothyroxine 75 MICROGram(s) Oral daily  multivitamin 1 Tablet(s) Oral daily  piperacillin/tazobactam IVPB.. 3.375 Gram(s) IV Intermittent every 8 hours  polyethylene glycol 3350 17 Gram(s) Oral two times a day  senna 2 Tablet(s) Oral two times a day  tamsulosin 0.8 milliGRAM(s) Oral at bedtime  timolol 0.5% Solution 1 Drop(s) Both EYES two times a day  venlafaxine XR. 75 milliGRAM(s) Oral at bedtime    MEDICATIONS  (PRN):  dextrose Oral Gel 15 Gram(s) Oral once PRN Blood Glucose LESS THAN 70 milliGRAM(s)/deciliter  guaifenesin/dextromethorphan Oral Liquid 10 milliLiter(s) Oral every 6 hours PRN Cough  OLANZapine Injectable 2.5 milliGRAM(s) IntraMuscular every 6 hours PRN agitation  ondansetron Injectable 4 milliGRAM(s) IV Push four times a day PRN Nausea and/or Vomiting    No Known Allergies    Review of Systems:  HEENT: No pain  Cardiovascular: No chest pain  Respiratory: No SOB  GI: No nausea, vomiting    PHYSICAL EXAM:  VITALS: T(C): 37 (02-09-23 @ 13:30)  T(F): 98.6 (02-09-23 @ 13:30), Max: 100.4 (02-09-23 @ 01:30)  HR: 80 (02-09-23 @ 13:30) (80 - 98)  BP: 93/43 (02-09-23 @ 13:30) (93/43 - 133/70)  RR:  (16 - 18)  SpO2:  (95% - 97%)  Wt(kg): --  GENERAL: NAD  EYES: No proptosis, no lid lag, anicteric  HEENT:  Atraumatic, Normocephalic, moist mucous membranes  RESPIRATORY: unlabored respirations    CAPILLARY BLOOD GLUCOSE    POCT Blood Glucose.: 187 mg/dL (09 Feb 2023 11:20)  POCT Blood Glucose.: 195 mg/dL (09 Feb 2023 07:35)  POCT Blood Glucose.: 169 mg/dL (08 Feb 2023 21:41)  POCT Blood Glucose.: 208 mg/dL (08 Feb 2023 17:23)  POCT Blood Glucose.: 294 mg/dL (08 Feb 2023 16:47)  POCT Blood Glucose.: 157 mg/dL (08 Feb 2023 16:34)      02-09    132<L>  |  94<L>  |  52<H>  ----------------------------<  208<H>  4.9   |  27  |  1.27    eGFR: 62    Ca    8.7      02-09  Mg     2.00     02-09  Phos  4.1     02-09    TPro  x   /  Alb  x   /  TBili  0.3  /  DBili  <0.2  /  AST  x   /  ALT  x   /  AlkPhos  x   02-09      Thyroid Function Tests:  01-30 @ 16:49 TSH 4.91 FreeT4 -- T3 46 Anti TPO -- Anti Thyroglobulin Ab -- TSI --      A1C with Estimated Average Glucose Result: 6.4 % (01-30-23 @ 16:45)    Diet, Regular:   Consistent Carbohydrate Evening Snack (CSTCHOSN)  DASH/TLC Sodium & Cholesterol Restricted (DASH)  No Concentrated Potassium  Kosher  Supplement Feeding Modality:  Oral  Glucerna Shake Cans or Servings Per Day:  2       Frequency:  Daily (02-08-23 @ 07:33) [Active]

## 2023-02-09 NOTE — PROGRESS NOTE ADULT - PROBLEM SELECTOR PLAN 4
Anemia and thrombocytopenia noted on admission. Patient recently had clozapine dosage decreased for c/f agranulocytosis, WBC wnl on admission but may be falsely low. Negative LDH, retic, haptoglobin, blue top - neg for hemolysis. Folate, b12, ferritin, iron studies - unremarkable. No signs of active bleed, but of note had precancerous polyp removed 3 years ago per brother and had trouble getting repeat C-scope done since.     - heme consulted; recs appreciated  - f/u outpatient PMD for C-scope  - trend CBC, transfuse as necessary. Desaturations x 2 to low 80s on room air while sleeping overnight. Could be due to sleep apnea vs some component of aspiration pneumonia given his mental status change. CT Chest showed pleural effusion b/l, atelectasis, consolidation c/f PNA. Currently on 2 L NC.    - wean NC as tolerated  - c/w abx as above  - offer incentive spirometer CT Chest w/ diffuse anasarca with pleural effusion, atelectasis, edema w/ opacity c/f pneumonia, likely source of instability. Course c/b hypothermia, bradycardia, hypotension on 1/31. 1/30 BCx x2, UCx NGTD. MRSA negative, s/p vanc 1/31-2/3. s/p zosyn for 7 day course (1/31 - 2/6)    - requiring supplemental O2 via 2L NC; unable to be weaned to RA currently  - likely d/c on O2, wean at [facility] if possible  - o/p pulm f/u for pleural effusions, repeat CT chest

## 2023-02-09 NOTE — PROGRESS NOTE ADULT - PROBLEM SELECTOR PLAN 6
Resume levothyroxine 75mcg qd. TSH mildly elevated, T3 low, T4 normal    - c/w home levothyroxine  - endo recs appreciated; avoid administered levothyroxine with PPI / Ca / Fe. on lisinopril 10mg at home, holding d/t cough    - switched to norvasc 10 iso hyperkalemia, however held 2/8 as BPs soft out of ca Anemia and thrombocytopenia noted on admission. Patient recently had clozapine dosage decreased for c/f agranulocytosis, WBC wnl on admission but may be falsely low. Negative LDH, retic, haptoglobin, blue top - neg for hemolysis. Folate, b12, ferritin, iron studies - unremarkable. No signs of active bleed, but of note had precancerous polyp removed 3 years ago per brother and had trouble getting repeat C-scope done since.     - heme consulted; recs appreciated  - f/u outpatient PMD for C-scope  - trend CBC, transfuse as necessary.

## 2023-02-09 NOTE — PROGRESS NOTE ADULT - PROBLEM SELECTOR PLAN 11
Hospital Bundle    Fluids: po hydration  Electrolytes: Replete K > 4, Mg > 2, Phos > 3  Nutrition: Diet pureed  PPX  ---VTE: lovenx subq  ---GI: bowel regimen for 1BM/day  ---Resp: ra  Access: PIV    Code Status: FULL CODE  Dispo: Rehab upon clinical improvement - restart home finasteride  - restart home tamsulosin -- increased to 0.8mg po qhs  - garcia placed 2/3/23, f/u o/p urology for ToV

## 2023-02-09 NOTE — PROGRESS NOTE ADULT - PROBLEM SELECTOR PLAN 5
on lisinopril 10mg at home, holding d/t cough    - c/w norvasc; spoke w/ PCP, never tried before. Anemia and thrombocytopenia noted on admission. Patient recently had clozapine dosage decreased for c/f agranulocytosis, WBC wnl on admission but may be falsely low. Negative LDH, retic, haptoglobin, blue top - neg for hemolysis. Folate, b12, ferritin, iron studies - unremarkable. No signs of active bleed, but of note had precancerous polyp removed 3 years ago per brother and had trouble getting repeat C-scope done since.     - heme consulted; recs appreciated  - f/u outpatient PMD for C-scope  - trend CBC, transfuse as necessary. Desaturations x 2 to low 80s on room air while sleeping overnight. Could be due to sleep apnea vs some component of aspiration pneumonia given his mental status change. CT Chest showed pleural effusion b/l, atelectasis, consolidation c/f PNA. Currently on 2 L NC.    - wean NC as tolerated  - c/w abx as above  - offer incentive spirometer

## 2023-02-09 NOTE — PROVIDER CONTACT NOTE (CRITICAL VALUE NOTIFICATION) - BACKGROUND
67M with PMHx schizophrenia, hx seizure, bipolar d/o, HTN, DM2, hx SBO presenting with confusion, slurred speech and difficulty ambulating x4 days. Admitted for acute encephalopathy 2/2 sepsis from PNA. Mental status improving

## 2023-02-09 NOTE — CHART NOTE - NSCHARTNOTEFT_GEN_A_CORE
Notified by RN patient with rectal temp to 100.4. Blood culture, urine culture and RVP ordered. UA mildly concerning for positive UTI. Patient was discontinued from Zosyn on 2/7. Given that patient had repeat fever after discontinuation of zosyn, I restarted zosyn for now after obtaining cultures.     Patient also noted to be hyperkalemic on VBG to 6.7 at 2 AM. Patient was previously hyperkalemic ~ 3 PM to 6.0. At that time patient was shifted with insulin/D50 and EKG concerning for peaked T waves, so I additionally administered Calcium gluconate for stabilization of membranes. AM labs obtained early at 2 AM to corroborate VBG results. AM BMP Potassium wnl.     AM CBC noted to have Hb of 7.6 however patient without signs of bleeding. Hemolysis labs ordered in setting of possible infection and hyperkalemia; labs not concerning for hemolysis. Likely AM CBC falsely low as the Hb on the AM VBG was at baseline (9.1). Recommend primary team to consider repeating CBC later in the day.     Audra Acosta PGY-1

## 2023-02-09 NOTE — PROGRESS NOTE ADULT - PROBLEM SELECTOR PLAN 3
Desaturations x 2 to low 80s on room air while sleeping overnight. Could be due to sleep apnea vs some component of aspiration pneumonia given his mental status change. CT Chest showed pleural effusion b/l, atelectasis, consolidation c/f PNA. Currently on 2 L NC.    - wean NC as tolerated  - c/w abx as above  - offer incentive spirometer CT Chest w/ diffuse anasarca with pleural effusion, atelectasis, edema w/ opacity c/f pneumonia, likely source of instability. Course c/b hypothermia, bradycardia, hypotension on 1/31. 1/30 BCx x2, UCx NGTD. MRSA negative, s/p vanc 1/31-2/3. s/p zosyn for 7 day course (1/31 - 2/6)    - requiring supplemental O2 via 2L NC; unable to be weaned to RA currently  - likely d/c on O2, wean at [facility] if possible  - o/p pulm f/u for pleural effusions, repeat CT chest ***resolved  -suspect toxic/metabolic encephalopathy iso sepsis, PNA. Now improved s/p abx and pt now back to baseline Aox3

## 2023-02-09 NOTE — PROGRESS NOTE ADULT - PROBLEM SELECTOR PLAN 8
- psych consulted; recs appreciated   - increased clozapine to 75mg qHS, then 100mg qhs on Sunday 2/5.   - c/w Depakote to 750mg qHS, if thrombocytopenia worsens or patient continually lethargic would consider reducing to 500mg unless objections from neuro  - c/w Effexor as ordered, if persistently hyponatremic would assess for SIADH and consider stopping   - PRN for agitation Zyprexa 2.5mg q6h PRN PO/IM. On metformin 1g BID, januvia 100mg qd, glipizide 5mg qd - hold inpatient. A1C 6.4    - Basal/Bolus 5U / 3U + low ISS; titrate as needed. Resume levothyroxine 75mcg qd. TSH mildly elevated, T3 low, T4 normal    - c/w home levothyroxine  - endo recs appreciated; avoid administered levothyroxine with PPI / Ca / Fe.

## 2023-02-09 NOTE — PROGRESS NOTE ADULT - SUBJECTIVE AND OBJECTIVE BOX
***************************************************************  Serena Mays, PGY2  Internal Medicine   pager: [facility]: [phone], [facility]: [phone]  ***************************************************************    CURTIS DIAZ  67y  MRN: [id number]    Patient is a 67y old  Male who presents with a chief complaint of confusion, dizziness (2023 13:33)      Subjective: Yesterday evening pt more lethargic, had abominal bloating but no pain, still aox3. Repeat labs showing jump in WBC, hyperkalemia s/p calcium gluconate, lokelma, insulin d50. BPs soft, norvasc dc;d    MEDICATIONS  (STANDING):  atorvastatin 40 milliGRAM(s) Oral at bedtime  bisacodyl Suppository 10 milliGRAM(s) Rectal daily  cholecalciferol 1000 Unit(s) Oral daily  cloZAPine 100 milliGRAM(s) Oral at bedtime  dextrose 5%. 1000 milliLiter(s) (50 mL/Hr) IV Continuous <Continuous>  dextrose 5%. 1000 milliLiter(s) (100 mL/Hr) IV Continuous <Continuous>  dextrose 50% Injectable 25 Gram(s) IV Push once  dextrose 50% Injectable 12.5 Gram(s) IV Push once  dextrose 50% Injectable 25 Gram(s) IV Push once  divalproex  milliGRAM(s) Oral at bedtime  enoxaparin Injectable 40 milliGRAM(s) SubCutaneous every 24 hours  ferrous    sulfate 325 milliGRAM(s) Oral at bedtime  finasteride 5 milliGRAM(s) Oral daily  glucagon  Injectable 1 milliGRAM(s) IntraMuscular once  insulin glargine Injectable (LANTUS) 5 Unit(s) SubCutaneous at bedtime  insulin lispro (ADMELOG) corrective regimen sliding scale   SubCutaneous three times a day before meals  insulin lispro (ADMELOG) corrective regimen sliding scale   SubCutaneous at bedtime  insulin lispro Injectable (ADMELOG) 4 Unit(s) SubCutaneous three times a day before meals  latanoprost 0.005% Ophthalmic Solution 1 Drop(s) Both EYES at bedtime  levothyroxine 75 MICROGram(s) Oral daily  multivitamin 1 Tablet(s) Oral daily  piperacillin/tazobactam IVPB.. 3.375 Gram(s) IV Intermittent every 8 hours  polyethylene glycol 3350 17 Gram(s) Oral two times a day  pyridoxine 50 milliGRAM(s) Oral daily  senna 2 Tablet(s) Oral two times a day  tamsulosin 0.8 milliGRAM(s) Oral at bedtime  timolol 0.5% Solution 1 Drop(s) Both EYES two times a day  venlafaxine XR. 75 milliGRAM(s) Oral at bedtime    MEDICATIONS  (PRN):  dextrose Oral Gel 15 Gram(s) Oral once PRN Blood Glucose LESS THAN 70 milliGRAM(s)/deciliter  guaifenesin/dextromethorphan Oral Liquid 10 milliLiter(s) Oral every 6 hours PRN Cough  OLANZapine Injectable 2.5 milliGRAM(s) IntraMuscular every 6 hours PRN agitation  ondansetron Injectable 4 milliGRAM(s) IV Push four times a day PRN Nausea and/or Vomiting      Objective:    Vitals: Vital Signs Last 24 Hrs  T(C): 37.2 (23 @ 05:30), Max: 38 (23 @ 01:30)  T(F): 98.9 (23 @ 05:30), Max: 100.4 (23 @ 01:30)  HR: 92 (23 @ 05:30) (84 - 98)  BP: 133/70 (23 @ 05:30) (97/53 - 133/70)  BP(mean): --  RR: 16 (23 @ 05:30) (16 - 18)  SpO2: 96% (23 @ 05:30) (95% - 96%)            I&O's Summary    2023 07:01  -  2023 07:00  --------------------------------------------------------  IN: 150 mL / OUT: 1500 mL / NET: -1350 mL        PHYSICAL EXAM:  GENERAL: NAD  HEAD:  Atraumatic, Normocephalic  EYES: EOMI, conjunctiva and sclera clear  CHEST/LUNG: Clear to auscultation bilaterally; No rales, rhonchi, wheezing, or rubs  HEART: Regular rate and rhythm; No murmurs, rubs, or gallops  ABDOMEN: Soft, Nontender, Nondistended;   SKIN: No rashes or lesions  NERVOUS SYSTEM:  Alert & Oriented X3, no focal deficits    LABS:      132<L>  |  95<L>  |  53<H>  ----------------------------<  166<H>  5.1   |  27  |  1.35<H>      134<L>  |  95<L>  |  47<H>  ----------------------------<  155<H>  5.1   |  26  |  1.47<H>      132<L>  |  97<L>  |  40<H>  ----------------------------<  167<H>  6.0<H>   |  23  |  1.46<H>    Ca    9.0      2023 03:00  Ca    9.0      2023 20:52  Ca    9.0      2023 15:20  Phos  4.5       Mg     2.00         TPro  x   /  Alb  x   /  TBili  0.3  /  DBili  <0.2  /  AST  x   /  ALT  x   /  AlkPhos  x     TPro  6.4  /  Alb  3.5  /  TBili  0.3  /  DBili  x   /  AST  21  /  ALT  44<H>  /  AlkPhos  91                      Urinalysis Basic - ( 2023 17:55 )    Color: Yellow / Appearance: Turbid / S.022 / pH: x  Gluc: x / Ketone: Trace  / Bili: Negative / Urobili: 3 mg/dL   Blood: x / Protein: 100 mg/dL / Nitrite: Negative   Leuk Esterase: Small / RBC: >50 /HPF / WBC 5-10 /HPF   Sq Epi: x / Non Sq Epi: x / Bacteria: Few                              7.6    10.86 )-----------( 239      ( 2023 03:00 )             23.7                         9.6    13.79 )-----------( 200      ( 2023 15:20 )             29.6                         9.2    7.51  )-----------( 144      ( 2023 05:08 )             28.4     CAPILLARY BLOOD GLUCOSE      POCT Blood Glucose.: 169 mg/dL (2023 21:41)  POCT Blood Glucose.: 208 mg/dL (2023 17:23)  POCT Blood Glucose.: 294 mg/dL (2023 16:47)  POCT Blood Glucose.: 157 mg/dL (2023 16:34)  POCT Blood Glucose.: 150 mg/dL (2023 14:44)  POCT Blood Glucose.: 176 mg/dL (2023 13:27)  POCT Blood Glucose.: 152 mg/dL (2023 11:36)      RADIOLOGY & ADDITIONAL TESTS:    Imaging Personally Reviewed:  [x ] YES  [ ] NO    Consultants involved in case:   Consultant(s) Notes Reviewed:  [ x] YES  [ ] NO:   Care Discussed with Consultants/Other Providers [x ] YES  [ ] NO         ***************************************************************  Serena Mays, PGY2  Internal Medicine   pager: [facility]: [phone], [facility]: [phone]  ***************************************************************    CURTIS DIAZ  67y  MRN: [id number]    Patient is a 67y old  Male who presents with a chief complaint of confusion, dizziness (2023 13:33)      Subjective: Yesterday evening pt more lethargic, had abominal bloating but no pain, still aox3. Repeat labs showing jump in WBC, hyperkalemia s/p calcium gluconate, lokelma, insulin d50. BPs soft, norvasc dc'd. This AM working with PT, able to stand. Having incontinent liquid stools which pt states has been ongoing for the past year. No abdominal pain, just feeling a bit down.     MEDICATIONS  (STANDING):  atorvastatin 40 milliGRAM(s) Oral at bedtime  bisacodyl Suppository 10 milliGRAM(s) Rectal daily  cholecalciferol 1000 Unit(s) Oral daily  cloZAPine 100 milliGRAM(s) Oral at bedtime  dextrose 5%. 1000 milliLiter(s) (50 mL/Hr) IV Continuous <Continuous>  dextrose 5%. 1000 milliLiter(s) (100 mL/Hr) IV Continuous <Continuous>  dextrose 50% Injectable 25 Gram(s) IV Push once  dextrose 50% Injectable 12.5 Gram(s) IV Push once  dextrose 50% Injectable 25 Gram(s) IV Push once  divalproex  milliGRAM(s) Oral at bedtime  enoxaparin Injectable 40 milliGRAM(s) SubCutaneous every 24 hours  ferrous    sulfate 325 milliGRAM(s) Oral at bedtime  finasteride 5 milliGRAM(s) Oral daily  glucagon  Injectable 1 milliGRAM(s) IntraMuscular once  insulin glargine Injectable (LANTUS) 5 Unit(s) SubCutaneous at bedtime  insulin lispro (ADMELOG) corrective regimen sliding scale   SubCutaneous three times a day before meals  insulin lispro (ADMELOG) corrective regimen sliding scale   SubCutaneous at bedtime  insulin lispro Injectable (ADMELOG) 4 Unit(s) SubCutaneous three times a day before meals  latanoprost 0.005% Ophthalmic Solution 1 Drop(s) Both EYES at bedtime  levothyroxine 75 MICROGram(s) Oral daily  multivitamin 1 Tablet(s) Oral daily  piperacillin/tazobactam IVPB.. 3.375 Gram(s) IV Intermittent every 8 hours  polyethylene glycol 3350 17 Gram(s) Oral two times a day  pyridoxine 50 milliGRAM(s) Oral daily  senna 2 Tablet(s) Oral two times a day  tamsulosin 0.8 milliGRAM(s) Oral at bedtime  timolol 0.5% Solution 1 Drop(s) Both EYES two times a day  venlafaxine XR. 75 milliGRAM(s) Oral at bedtime    MEDICATIONS  (PRN):  dextrose Oral Gel 15 Gram(s) Oral once PRN Blood Glucose LESS THAN 70 milliGRAM(s)/deciliter  guaifenesin/dextromethorphan Oral Liquid 10 milliLiter(s) Oral every 6 hours PRN Cough  OLANZapine Injectable 2.5 milliGRAM(s) IntraMuscular every 6 hours PRN agitation  ondansetron Injectable 4 milliGRAM(s) IV Push four times a day PRN Nausea and/or Vomiting      Objective:    Vitals: Vital Signs Last 24 Hrs  T(C): 37.2 (23 @ 05:30), Max: 38 (23 @ 01:30)  T(F): 98.9 (23 @ 05:30), Max: 100.4 (23 @ 01:30)  HR: 92 (23 @ 05:30) (84 - 98)  BP: 133/70 (23 @ 05:30) (97/53 - 133/70)  BP(mean): --  RR: 16 (23 @ 05:30) (16 - 18)  SpO2: 96% (23 @ 05:30) (95% - 96%)            I&O's Summary    2023 07:01  -  2023 07:00  --------------------------------------------------------  IN: 150 mL / OUT: 1500 mL / NET: -1350 mL        PHYSICAL EXAM:  GENERAL: NAD  HEAD:  Atraumatic, Normocephalic  EYES: EOMI, conjunctiva and sclera clear  CHEST/LUNG: Clear to auscultation bilaterally; No rales, rhonchi, wheezing, or rubs  HEART: Regular rate and rhythm; No murmurs, rubs, or gallops  ABDOMEN: Soft, Nontender, +tympanic to percussion, +moderate distention  SKIN: No rashes or lesions  NERVOUS SYSTEM:  Alert & Oriented X3, no focal deficits    LABS:      132<L>  |  95<L>  |  53<H>  ----------------------------<  166<H>  5.1   |  27  |  1.35<H>      134<L>  |  95<L>  |  47<H>  ----------------------------<  155<H>  5.1   |  26  |  1.47<H>      132<L>  |  97<L>  |  40<H>  ----------------------------<  167<H>  6.0<H>   |  23  |  1.46<H>    Ca    9.0      2023 03:00  Ca    9.0      2023 20:52  Ca    9.0      2023 15:20  Phos  4.5       Mg     2.00         TPro  x   /  Alb  x   /  TBili  0.3  /  DBili  <0.2  /  AST  x   /  ALT  x   /  AlkPhos  x     TPro  6.4  /  Alb  3.5  /  TBili  0.3  /  DBili  x   /  AST  21  /  ALT  44<H>  /  AlkPhos  91                      Urinalysis Basic - ( 2023 17:55 )    Color: Yellow / Appearance: Turbid / S.022 / pH: x  Gluc: x / Ketone: Trace  / Bili: Negative / Urobili: 3 mg/dL   Blood: x / Protein: 100 mg/dL / Nitrite: Negative   Leuk Esterase: Small / RBC: >50 /HPF / WBC 5-10 /HPF   Sq Epi: x / Non Sq Epi: x / Bacteria: Few                              7.6    10.86 )-----------( 239      ( 2023 03:00 )             23.7                         9.6    13.79 )-----------( 200      ( 2023 15:20 )             29.6                         9.2    7.51  )-----------( 144      ( 2023 05:08 )             28.4     CAPILLARY BLOOD GLUCOSE      POCT Blood Glucose.: 169 mg/dL (2023 21:41)  POCT Blood Glucose.: 208 mg/dL (2023 17:23)  POCT Blood Glucose.: 294 mg/dL (2023 16:47)  POCT Blood Glucose.: 157 mg/dL (2023 16:34)  POCT Blood Glucose.: 150 mg/dL (2023 14:44)  POCT Blood Glucose.: 176 mg/dL (2023 13:27)  POCT Blood Glucose.: 152 mg/dL (2023 11:36)      RADIOLOGY & ADDITIONAL TESTS:    Imaging Personally Reviewed:  [x ] YES  [ ] NO    Consultants involved in case:   Consultant(s) Notes Reviewed:  [ x] YES  [ ] NO:   Care Discussed with Consultants/Other Providers [x ] YES  [ ] NO

## 2023-02-09 NOTE — PROGRESS NOTE ADULT - PROBLEM SELECTOR PROBLEM 8
Schizophrenia Type 2 diabetes mellitus with hyperglycemia, without long-term current use of insulin Hypothyroidism

## 2023-02-09 NOTE — PROGRESS NOTE ADULT - PROBLEM SELECTOR PROBLEM 7
Type 2 diabetes mellitus with hyperglycemia, without long-term current use of insulin Hypothyroidism Hypertension

## 2023-02-09 NOTE — PROGRESS NOTE ADULT - PROBLEM SELECTOR PROBLEM 9
BPH (benign prostatic hyperplasia) Schizophrenia Type 2 diabetes mellitus with hyperglycemia, without long-term current use of insulin

## 2023-02-09 NOTE — PROGRESS NOTE ADULT - PROBLEM SELECTOR PLAN 2
CT Chest w/ diffuse anasarca with pleural effusion, atelectasis, edema w/ opacity c/f pneumonia, likely source of instability. Course c/b hypothermia, bradycardia, hypotension on 1/31. 1/30 BCx x2, UCx NGTD. MRSA negative, s/p vanc 1/31-2/3. s/p zosyn for 7 day course (1/31 - 2/6)    - requiring supplemental O2 via 2L NC; able to be weaned to RA currently  - likely d/c on O2, wean at [facility] if possible  - o/p pulm f/u for pleural effusions, repeat CT chest ***resolved  -suspect toxic/metabolic encephalopathy iso sepsis, PNA. Now improved s/p abx and pt now back to baseline Aox3 -Initially presented with MARISSA which resolved, then developed a new MARISSA 2/8 Cr 1.47 from ~1.08 baseline  -FeNa 0.2% pre-renal, and Cr uptrended since pt received lasix diuresis 2/5  -discussed with nephrology, suspect initial hyperkalemia was related to lisinopril/losartan and current hyperK to 5 related to MARISSA  -no urgent HD  -trend Cr, monitor BMPs  -bladder scans, strict i/o, tov ongoing as garcia is out

## 2023-02-09 NOTE — PROGRESS NOTE ADULT - ASSESSMENT
67M with PMHx schizophrenia, hx seizure, bipolar d/o, HTN, DM2, hx SBO presenting with confusion, slurred speech and difficulty ambulating x4 days. History was obtained from brother David at bedside who lives with patient as patient is not answering questions completely with some word finding difficulty At baseline patient oriented x3, conversive and ambulates without difficulty. Inpatient, patient has hypothermia and labs with hyponatremia and hyperkalemia with concern for myocarditis, infection, workup underway. Endocrine called for hypothyroidism, pt also with DM 2    1. Hypothyroidism   TSH mildly elevated   Normal T4, would check FREE T4 (will order for tomorrow AM)  On Levothyroxine 75mcg long term, would continue this dose for now  Administer on empty stomach in the AM, 30-60 minutes before food/other medications and 4 hrs apart from iron/calcium/PPI- he is on iron inpatient - please make sure this is timed correctly (it is currently scheduled for bedtime which is fine)     2. DM 2  A1c 6.4%  On Metformin 1000 mg PO BID, Glipizide 5 mg daily and Januvia 100 mg daily  Endorsing occasional hypoglycemia at home    While inpatient:  BG target 100-180 mg/dl   Continue Lantus 5 units SQ qHS  Increase Admelog to 5 units SQ TID before meals (Hold if NPO/skips meal)   Admelog LOW dose correctional scale before meals, low dose at bedtime  Check BG before meals and bedtime  Carb Consistent Diet   Nutrition consult   Hypoglycemia protocol    Discharge Plan:   STOP Glipizide   Anticipated discharge plan is resume Metformin 1000 mg PO BID, Januvia 100 mg daily  Consider adding Prandin 0.5 mg PO TID before meals - TBD based on requirements inpatient  Followup with outpatient endocrinologist Dr. Farfan     3. HTN   BP target 130/80  Recommend outpatient microalbumin/creat ratio     4. HLD  Continue Atorvastatin 40 mg daily if no contraindications     Anni Porter  Nurse Practitioner  Division of Endocrinology & Diabetes  In house pager #[phone]/long range pager #[phone]    If before 9AM or after 6PM, or on weekends/holidays, please call endocrine answering service for assistance ([phone]).  For nonurgent matters email [email or web address] for assistance.  67M with PMHx schizophrenia, hx seizure, bipolar d/o, HTN, DM2, hx SBO presenting with confusion, slurred speech and difficulty ambulating x4 days. History was obtained from brother David at bedside who lives with patient as patient is not answering questions completely with some word finding difficulty At baseline patient oriented x3, conversive and ambulates without difficulty. Inpatient, patient has hypothermia and labs with hyponatremia and hyperkalemia with concern for myocarditis, infection, workup underway. Endocrine called for hypothyroidism, pt also with DM 2    1. Hypothyroidism   TSH mildly elevated   Normal T4, would check FREE T4 (will order for tomorrow AM)  On Levothyroxine 75mcg long term, would continue this dose for now  Administer on empty stomach in the AM, 30-60 minutes before food/other medications and 4 hrs apart from iron/calcium/PPI- he is on iron inpatient - please make sure this is timed correctly (it is currently scheduled for bedtime which is fine)     2. DM 2  A1c 6.4%  On Metformin 1000 mg PO BID, Glipizide 5 mg daily and Januvia 100 mg daily  Endorsing occasional hypoglycemia at home    While inpatient:  BG target 100-180 mg/dl   Continue Lantus 5 units SQ qHS  Increase Admelog to 5 units SQ TID before meals (Hold if NPO/skips meal)   Admelog LOW dose correctional scale before meals, low dose at bedtime  Check BG before meals and bedtime  Carb Consistent Diet   Nutrition consult   Hypoglycemia protocol    Discharge Plan:   STOP Glipizide   Anticipated discharge plan is resume Metformin 1000 mg PO BID, Januvia 100 mg daily  EGFR currently 62, would recheck prior to discharge but at present, level is ok for resuming both medications above  Consider adding Prandin 0.5 mg PO TID before meals - TBD based on requirements inpatient  Followup with outpatient endocrinologist Dr. Farfan     3. HTN   BP target 130/80  Recommend outpatient microalbumin/creat ratio     4. HLD  Continue Atorvastatin 40 mg daily if no contraindications     Anni Porter  Nurse Practitioner  Division of Endocrinology & Diabetes  In house pager #[phone]/long range pager #[phone]    If before 9AM or after 6PM, or on weekends/holidays, please call endocrine answering service for assistance ([phone]).  For nonurgent matters email [email or web address] for assistance.

## 2023-02-09 NOTE — PROGRESS NOTE ADULT - PROBLEM SELECTOR PLAN 1
Baseline oriented 3, ambulates on own. Now oriented 1-2 with confusion, dysarthria, prolonged speech latency (motor/mixed aphasia), truncal ataxia. Of note, patient has been on 125mg of clozapine since 1997 and recent reduction to 50mg may be contributing. Course c/b initial bradycardia, hypotension, hypothermia. CXR clear, UA normal, no signs of skin infections, no leukocytosis, though elevated NLR. T4 normal, TSH 4.91 mildly elevated, compliant with levothyroxine dosing. AM Cortisol elevated, unlikely AI. Troponin flat with elevated CKMB, TTE wnl. Per cards, unlikely myocarditis. Ammonium 58 mildly elevated likely not delivered on ice, LFTs wnl, no clonus or asterixis. Rheum w/u negative. Nutritional deficiency w/u negative. Patient is maintaining airway and responsive. Not lethargic, awake and alert. Follows commands. Now requiring 1-2L NC. MRI MRA head showed microvascular disease but no new stroke. CTCAP showed diffuse anasarca with pleural effusion, atelectasis, edema w/ opacity c/f pneumonia, likely source of instability. Ddx incl toxic metabolic encephalopathy iso sepsis vs. progression of psychiatric disorders.    - ICU consulted 1/31 for hypothermia, recommended stress dose hydrocortisone 100mg  - Endo consulted; recs appreciated. - unlikely to be AI. Will hold off on further steroids until infx r/o  - c/w atorvastatin 40mg  - psych consulted; recs appreciated Unclear source, possible another episode of aspiration pneumonitis vs UTI  -Febrile 2/9 3AM so restarted on Zosyn  -holding norvasc given softish bps  -UCx, BCx pending  -c/w zosyn pending infectious workup

## 2023-02-09 NOTE — PROGRESS NOTE ADULT - PROBLEM SELECTOR PLAN 7
On metformin 1g BID, januvia 100mg qd, glipizide 5mg qd - hold inpatient. A1C 6.4    - Basal/Bolus 5U / 3U + low ISS; titrate as needed. Resume levothyroxine 75mcg qd. TSH mildly elevated, T3 low, T4 normal    - c/w home levothyroxine  - endo recs appreciated; avoid administered levothyroxine with PPI / Ca / Fe. on lisinopril 10mg at home, holding d/t cough    - switched to norvasc 10 iso hyperkalemia, however held 2/8 as BPs soft out of ca

## 2023-02-09 NOTE — PROGRESS NOTE ADULT - PROBLEM SELECTOR PLAN 10
Hospital Bundle    Fluids: po hydration  Electrolytes: Replete K > 4, Mg > 2, Phos > 3  Nutrition: Diet pureed  PPX  ---VTE: lovenx subq  ---GI: bowel regimen for 1BM/day  ---Resp: ra  Access: PIV    Code Status: FULL CODE  Dispo: Rehab upon clinical improvement - restart home finasteride  - restart home tamsulosin -- increased to 0.8mg po qhs  - garcia placed 2/3/23, f/u o/p urology for ToV - psych consulted; recs appreciated   - increased clozapine to 75mg qHS, then 100mg qhs on Sunday 2/5.   - c/w Depakote to 750mg qHS, if thrombocytopenia worsens or patient continually lethargic would consider reducing to 500mg unless objections from neuro  - c/w Effexor as ordered, if persistently hyponatremic would assess for SIADH and consider stopping   - PRN for agitation Zyprexa 2.5mg q6h PRN PO/IM.

## 2023-02-09 NOTE — PROGRESS NOTE ADULT - PROBLEM SELECTOR PLAN 9
- restart home finasteride  - restart home tamsulosin -- increased to 0.8mg po qhs  - garcia placed 2/3/23, f/u o/p urology for ToV - psych consulted; recs appreciated   - increased clozapine to 75mg qHS, then 100mg qhs on Sunday 2/5.   - c/w Depakote to 750mg qHS, if thrombocytopenia worsens or patient continually lethargic would consider reducing to 500mg unless objections from neuro  - c/w Effexor as ordered, if persistently hyponatremic would assess for SIADH and consider stopping   - PRN for agitation Zyprexa 2.5mg q6h PRN PO/IM. On metformin 1g BID, januvia 100mg qd, glipizide 5mg qd - hold inpatient. A1C 6.4    - Basal/Bolus 5U / 3U + low ISS; titrate as needed.

## 2023-02-10 LAB
ANION GAP SERPL CALC-SCNC: 7 MMOL/L — SIGNIFICANT CHANGE UP (ref 7–14)
BUN SERPL-MCNC: 48 MG/DL — HIGH (ref 7–23)
CALCIUM SERPL-MCNC: 8.7 MG/DL — SIGNIFICANT CHANGE UP (ref 8.4–10.5)
CHLORIDE SERPL-SCNC: 93 MMOL/L — LOW (ref 98–107)
CO2 SERPL-SCNC: 30 MMOL/L — SIGNIFICANT CHANGE UP (ref 22–31)
CREAT SERPL-MCNC: 1.2 MG/DL — SIGNIFICANT CHANGE UP (ref 0.5–1.3)
EGFR: 66 ML/MIN/1.73M2 — SIGNIFICANT CHANGE UP
GLUCOSE BLDC GLUCOMTR-MCNC: 166 MG/DL — HIGH (ref 70–99)
GLUCOSE BLDC GLUCOMTR-MCNC: 197 MG/DL — HIGH (ref 70–99)
GLUCOSE BLDC GLUCOMTR-MCNC: 260 MG/DL — HIGH (ref 70–99)
GLUCOSE BLDC GLUCOMTR-MCNC: 295 MG/DL — HIGH (ref 70–99)
GLUCOSE SERPL-MCNC: 169 MG/DL — HIGH (ref 70–99)
HCT VFR BLD CALC: 25.4 % — LOW (ref 39–50)
HGB BLD-MCNC: 8.2 G/DL — LOW (ref 13–17)
MAGNESIUM SERPL-MCNC: 2 MG/DL — SIGNIFICANT CHANGE UP (ref 1.6–2.6)
MCHC RBC-ENTMCNC: 30.1 PG — SIGNIFICANT CHANGE UP (ref 27–34)
MCHC RBC-ENTMCNC: 32.3 GM/DL — SIGNIFICANT CHANGE UP (ref 32–36)
MCV RBC AUTO: 93.4 FL — SIGNIFICANT CHANGE UP (ref 80–100)
NRBC # BLD: 0 /100 WBCS — SIGNIFICANT CHANGE UP (ref 0–0)
NRBC # FLD: 0 K/UL — SIGNIFICANT CHANGE UP (ref 0–0)
PHOSPHATE SERPL-MCNC: 3.4 MG/DL — SIGNIFICANT CHANGE UP (ref 2.5–4.5)
PLATELET # BLD AUTO: 232 K/UL — SIGNIFICANT CHANGE UP (ref 150–400)
POTASSIUM SERPL-MCNC: 4.6 MMOL/L — SIGNIFICANT CHANGE UP (ref 3.5–5.3)
POTASSIUM SERPL-SCNC: 4.6 MMOL/L — SIGNIFICANT CHANGE UP (ref 3.5–5.3)
RBC # BLD: 2.72 M/UL — LOW (ref 4.2–5.8)
RBC # FLD: 14.7 % — HIGH (ref 10.3–14.5)
SODIUM SERPL-SCNC: 130 MMOL/L — LOW (ref 135–145)
T4 FREE SERPL-MCNC: 1 NG/DL — SIGNIFICANT CHANGE UP (ref 0.9–1.8)
WBC # BLD: 7.46 K/UL — SIGNIFICANT CHANGE UP (ref 3.8–10.5)
WBC # FLD AUTO: 7.46 K/UL — SIGNIFICANT CHANGE UP (ref 3.8–10.5)

## 2023-02-10 PROCEDURE — 99232 SBSQ HOSP IP/OBS MODERATE 35: CPT

## 2023-02-10 PROCEDURE — 99232 SBSQ HOSP IP/OBS MODERATE 35: CPT | Mod: GC

## 2023-02-10 RX ORDER — INSULIN LISPRO 100/ML
7 VIAL (ML) SUBCUTANEOUS
Refills: 0 | Status: DISCONTINUED | OUTPATIENT
Start: 2023-02-10 | End: 2023-02-13

## 2023-02-10 RX ADMIN — INSULIN GLARGINE 5 UNIT(S): 100 INJECTION, SOLUTION SUBCUTANEOUS at 22:12

## 2023-02-10 RX ADMIN — ENOXAPARIN SODIUM 40 MILLIGRAM(S): 100 INJECTION SUBCUTANEOUS at 11:24

## 2023-02-10 RX ADMIN — Medication 5 UNIT(S): at 08:30

## 2023-02-10 RX ADMIN — Medication 7 UNIT(S): at 16:32

## 2023-02-10 RX ADMIN — Medication 3: at 16:32

## 2023-02-10 RX ADMIN — PIPERACILLIN AND TAZOBACTAM 25 GRAM(S): 4; .5 INJECTION, POWDER, LYOPHILIZED, FOR SOLUTION INTRAVENOUS at 13:02

## 2023-02-10 RX ADMIN — Medication 75 MILLIGRAM(S): at 22:18

## 2023-02-10 RX ADMIN — Medication 1000 UNIT(S): at 11:25

## 2023-02-10 RX ADMIN — LATANOPROST 1 DROP(S): 0.05 SOLUTION/ DROPS OPHTHALMIC; TOPICAL at 22:12

## 2023-02-10 RX ADMIN — Medication 75 MICROGRAM(S): at 05:33

## 2023-02-10 RX ADMIN — Medication 1 TABLET(S): at 11:25

## 2023-02-10 RX ADMIN — DIVALPROEX SODIUM 750 MILLIGRAM(S): 500 TABLET, DELAYED RELEASE ORAL at 22:16

## 2023-02-10 RX ADMIN — FINASTERIDE 5 MILLIGRAM(S): 5 TABLET, FILM COATED ORAL at 11:24

## 2023-02-10 RX ADMIN — TAMSULOSIN HYDROCHLORIDE 0.8 MILLIGRAM(S): 0.4 CAPSULE ORAL at 22:13

## 2023-02-10 RX ADMIN — Medication 1 DROP(S): at 05:32

## 2023-02-10 RX ADMIN — LATANOPROST 1 DROP(S): 0.05 SOLUTION/ DROPS OPHTHALMIC; TOPICAL at 01:44

## 2023-02-10 RX ADMIN — Medication 5 UNIT(S): at 11:48

## 2023-02-10 RX ADMIN — Medication 1 DROP(S): at 17:01

## 2023-02-10 RX ADMIN — Medication 1: at 08:30

## 2023-02-10 RX ADMIN — CLOZAPINE 100 MILLIGRAM(S): 150 TABLET, ORALLY DISINTEGRATING ORAL at 22:13

## 2023-02-10 RX ADMIN — Medication 325 MILLIGRAM(S): at 22:14

## 2023-02-10 RX ADMIN — Medication 3: at 11:47

## 2023-02-10 RX ADMIN — PIPERACILLIN AND TAZOBACTAM 25 GRAM(S): 4; .5 INJECTION, POWDER, LYOPHILIZED, FOR SOLUTION INTRAVENOUS at 05:32

## 2023-02-10 RX ADMIN — SENNA PLUS 2 TABLET(S): 8.6 TABLET ORAL at 05:33

## 2023-02-10 RX ADMIN — SENNA PLUS 2 TABLET(S): 8.6 TABLET ORAL at 17:02

## 2023-02-10 RX ADMIN — POLYETHYLENE GLYCOL 3350 17 GRAM(S): 17 POWDER, FOR SOLUTION ORAL at 17:01

## 2023-02-10 RX ADMIN — PIPERACILLIN AND TAZOBACTAM 25 GRAM(S): 4; .5 INJECTION, POWDER, LYOPHILIZED, FOR SOLUTION INTRAVENOUS at 22:11

## 2023-02-10 RX ADMIN — ATORVASTATIN CALCIUM 40 MILLIGRAM(S): 80 TABLET, FILM COATED ORAL at 22:18

## 2023-02-10 NOTE — PROVIDER CONTACT NOTE (OTHER) - ASSESSMENT
AOx 1
Patient AOx1  rectal temp 91.5. Patient states he is cold but not under distress
Patient AOx2 123/70 64 bpm 17 breaths/min 91 spO2. Unable to get rectal temp. Patient cool to touch
Pt was asked questions based on his orientation. Pt unable to answer and unable to form any words or sentences. 141/79 91bpm 97.3 temp 17 breaths/min 91 spO2
Patient 83% on room air. Pt sleeping and comfortable
O2 82 on 2L NC. pt asymptomatic and resting
Patient AOx2  rectal temp 90.4 114/55 67 bpm 17 breaths/min 92 spO2

## 2023-02-10 NOTE — PROGRESS NOTE ADULT - PROBLEM SELECTOR PLAN 1
Unclear source, possible another episode of aspiration pneumonitis vs UTI  -Febrile 2/9 3AM so restarted on Zosyn  -holding norvasc given softish bps  -UCx, BCx pending  -c/w zosyn pending infectious workup

## 2023-02-10 NOTE — PROGRESS NOTE ADULT - SUBJECTIVE AND OBJECTIVE BOX
***************************************************************  Miky Munguia, PGY1  Internal Medicine   pager:  [facility]: [phone] [facility]: [phone]  ***************************************************************    CURTIS DIAZ  67y  MRN: [id number]    Patient is a 67y old  Male who presents with a chief complaint of confusion, dizziness (2023 16:01)      Interval/Overnight Events: no events ON.   - 2 BM / 24h  - BP improved this AM  - insulin incr to 5/5 per endo  - HyperK, MARISSA improved  - c/w zosyn    Subjective: Pt seen and examined at bedside. Denies fever, CP, SOB, abn pain, N/V, dysuria. Tolerating diet.      MEDICATIONS  (STANDING):  atorvastatin 40 milliGRAM(s) Oral at bedtime  bisacodyl Suppository 10 milliGRAM(s) Rectal daily  cholecalciferol 1000 Unit(s) Oral daily  cloZAPine 100 milliGRAM(s) Oral at bedtime  dextrose 5%. 1000 milliLiter(s) (100 mL/Hr) IV Continuous <Continuous>  dextrose 5%. 1000 milliLiter(s) (50 mL/Hr) IV Continuous <Continuous>  dextrose 50% Injectable 25 Gram(s) IV Push once  dextrose 50% Injectable 12.5 Gram(s) IV Push once  dextrose 50% Injectable 25 Gram(s) IV Push once  divalproex  milliGRAM(s) Oral at bedtime  enoxaparin Injectable 40 milliGRAM(s) SubCutaneous every 24 hours  ferrous    sulfate 325 milliGRAM(s) Oral at bedtime  finasteride 5 milliGRAM(s) Oral daily  glucagon  Injectable 1 milliGRAM(s) IntraMuscular once  insulin glargine Injectable (LANTUS) 5 Unit(s) SubCutaneous at bedtime  insulin lispro (ADMELOG) corrective regimen sliding scale   SubCutaneous three times a day before meals  insulin lispro (ADMELOG) corrective regimen sliding scale   SubCutaneous at bedtime  insulin lispro Injectable (ADMELOG) 5 Unit(s) SubCutaneous three times a day before meals  latanoprost 0.005% Ophthalmic Solution 1 Drop(s) Both EYES at bedtime  levothyroxine 75 MICROGram(s) Oral daily  multivitamin 1 Tablet(s) Oral daily  piperacillin/tazobactam IVPB.. 3.375 Gram(s) IV Intermittent every 8 hours  polyethylene glycol 3350 17 Gram(s) Oral two times a day  senna 2 Tablet(s) Oral two times a day  tamsulosin 0.8 milliGRAM(s) Oral at bedtime  timolol 0.5% Solution 1 Drop(s) Both EYES two times a day  venlafaxine XR. 75 milliGRAM(s) Oral at bedtime    MEDICATIONS  (PRN):  dextrose Oral Gel 15 Gram(s) Oral once PRN Blood Glucose LESS THAN 70 milliGRAM(s)/deciliter  guaifenesin/dextromethorphan Oral Liquid 10 milliLiter(s) Oral every 6 hours PRN Cough  OLANZapine Injectable 2.5 milliGRAM(s) IntraMuscular every 6 hours PRN agitation  ondansetron Injectable 4 milliGRAM(s) IV Push four times a day PRN Nausea and/or Vomiting      Objective:    Vitals: Vital Signs Last 24 Hrs  T(C): 36.8 (02-10-23 @ 05:30), Max: 37.1 (23 @ 09:30)  T(F): 98.2 (02-10-23 @ 05:30), Max: 98.8 (23 @ 09:30)  HR: 88 (02-10-23 @ 05:30) (77 - 91)  BP: 128/58 (02-10-23 @ 05:30) (93/43 - 131/59)  BP(mean): --  RR: 18 (02-10-23 @ 05:30) (16 - 18)  SpO2: 100% (02-10-23 @ 05:30) (96% - 100%)                I&O's Summary    2023 07:01  -  10 Feb 2023 07:00  --------------------------------------------------------  IN: 0 mL / OUT: 825 mL / NET: -825 mL        PHYSICAL EXAM:  GENERAL: NAD  HEAD:  Atraumatic, Normocephalic  EYES: EOMI, conjunctiva and sclera clear  CHEST/LUNG: Clear to auscultation bilaterally; No rales, rhonchi, wheezing, or rubs  HEART: Regular rate and rhythm; No murmurs, rubs, or gallops  ABDOMEN: Soft, Nontender, +tympanic to percussion, +moderate distention  SKIN: No rashes or lesions  NERVOUS SYSTEM:  Alert & Oriented X3, no focal deficits    LABS:  02-10    130<L>  |  93<L>  |  48<H>  ----------------------------<  169<H>  4.6   |  30  |  1.20      132<L>  |  94<L>  |  52<H>  ----------------------------<  208<H>  4.9   |  27  |  1.27      132<L>  |  95<L>  |  53<H>  ----------------------------<  166<H>  5.1   |  27  |  1.35<H>    Ca    8.7      10 Feb 2023 05:09  Ca    8.7      2023 15:00  Ca    9.0      2023 03:00  Phos  3.4     -10  Mg     2.00     10    TPro  x   /  Alb  x   /  TBili  0.3  /  DBili  <0.2  /  AST  x   /  ALT  x   /  AlkPhos  x     TPro  6.4  /  Alb  3.5  /  TBili  0.3  /  DBili  x   /  AST  21  /  ALT  44<H>  /  AlkPhos  91  08                    Urinalysis Basic - ( 2023 17:55 )    Color: Yellow / Appearance: Turbid / S.022 / pH: x  Gluc: x / Ketone: Trace  / Bili: Negative / Urobili: 3 mg/dL   Blood: x / Protein: 100 mg/dL / Nitrite: Negative   Leuk Esterase: Small / RBC: >50 /HPF / WBC 5-10 /HPF   Sq Epi: x / Non Sq Epi: x / Bacteria: Few                              8.2    7.46  )-----------( 232      ( 10 Feb 2023 05:09 )             25.4                         7.9    8.94  )-----------( 204      ( 2023 17:43 )             24.1                         7.6    10.86 )-----------( 239      ( 2023 03:00 )             23.7     CAPILLARY BLOOD GLUCOSE      POCT Blood Glucose.: 212 mg/dL (2023 21:16)  POCT Blood Glucose.: 248 mg/dL (2023 16:36)  POCT Blood Glucose.: 187 mg/dL (2023 11:20)  POCT Blood Glucose.: 195 mg/dL (2023 07:35)      RADIOLOGY & ADDITIONAL TESTS:    Imaging Personally Reviewed:  [x ] YES  [ ] NO    Consultants involved in case:   Consultant(s) Notes Reviewed:  [ x] YES  [ ] NO:   Care Discussed with Consultants/Other Providers [x ] YES  [ ] NO         ***************************************************************  Miky Munguia, PGY1  Internal Medicine   pager:  [facility]: [phone] [facility]: [phone]  ***************************************************************    CURTIS DIAZ  67y  MRN: [id number]    Patient is a 67y old  Male who presents with a chief complaint of confusion, dizziness (2023 16:01)      Interval/Overnight Events: no events ON.   - 2 BM / 24h  - BP improved this AM  - insulin incr to 5/5 per endo  - HyperK, AMRISSA improved  - c/w zosyn    Subjective: Pt seen and examined at bedside. Denies fever, CP, SOB, abn pain, N/V, dysuria. Tolerating diet.      MEDICATIONS  (STANDING):  atorvastatin 40 milliGRAM(s) Oral at bedtime  bisacodyl Suppository 10 milliGRAM(s) Rectal daily  cholecalciferol 1000 Unit(s) Oral daily  cloZAPine 100 milliGRAM(s) Oral at bedtime  dextrose 5%. 1000 milliLiter(s) (100 mL/Hr) IV Continuous <Continuous>  dextrose 5%. 1000 milliLiter(s) (50 mL/Hr) IV Continuous <Continuous>  dextrose 50% Injectable 25 Gram(s) IV Push once  dextrose 50% Injectable 12.5 Gram(s) IV Push once  dextrose 50% Injectable 25 Gram(s) IV Push once  divalproex  milliGRAM(s) Oral at bedtime  enoxaparin Injectable 40 milliGRAM(s) SubCutaneous every 24 hours  ferrous    sulfate 325 milliGRAM(s) Oral at bedtime  finasteride 5 milliGRAM(s) Oral daily  glucagon  Injectable 1 milliGRAM(s) IntraMuscular once  insulin glargine Injectable (LANTUS) 5 Unit(s) SubCutaneous at bedtime  insulin lispro (ADMELOG) corrective regimen sliding scale   SubCutaneous three times a day before meals  insulin lispro (ADMELOG) corrective regimen sliding scale   SubCutaneous at bedtime  insulin lispro Injectable (ADMELOG) 5 Unit(s) SubCutaneous three times a day before meals  latanoprost 0.005% Ophthalmic Solution 1 Drop(s) Both EYES at bedtime  levothyroxine 75 MICROGram(s) Oral daily  multivitamin 1 Tablet(s) Oral daily  piperacillin/tazobactam IVPB.. 3.375 Gram(s) IV Intermittent every 8 hours  polyethylene glycol 3350 17 Gram(s) Oral two times a day  senna 2 Tablet(s) Oral two times a day  tamsulosin 0.8 milliGRAM(s) Oral at bedtime  timolol 0.5% Solution 1 Drop(s) Both EYES two times a day  venlafaxine XR. 75 milliGRAM(s) Oral at bedtime    MEDICATIONS  (PRN):  dextrose Oral Gel 15 Gram(s) Oral once PRN Blood Glucose LESS THAN 70 milliGRAM(s)/deciliter  guaifenesin/dextromethorphan Oral Liquid 10 milliLiter(s) Oral every 6 hours PRN Cough  OLANZapine Injectable 2.5 milliGRAM(s) IntraMuscular every 6 hours PRN agitation  ondansetron Injectable 4 milliGRAM(s) IV Push four times a day PRN Nausea and/or Vomiting      Objective:    Vitals: Vital Signs Last 24 Hrs  T(C): 36.8 (02-10-23 @ 05:30), Max: 37.1 (23 @ 09:30)  T(F): 98.2 (02-10-23 @ 05:30), Max: 98.8 (23 @ 09:30)  HR: 88 (02-10-23 @ 05:30) (77 - 91)  BP: 128/58 (02-10-23 @ 05:30) (93/43 - 131/59)  BP(mean): --  RR: 18 (02-10-23 @ 05:30) (16 - 18)  SpO2: 100% (02-10-23 @ 05:30) (96% - 100%)                I&O's Summary    2023 07:01  -  10 Feb 2023 07:00  --------------------------------------------------------  IN: 0 mL / OUT: 825 mL / NET: -825 mL        PHYSICAL EXAM:  GENERAL: NAD  HEAD:  Atraumatic, Normocephalic  EYES: EOMI, conjunctiva and sclera clear  CHEST/LUNG: Clear to auscultation bilaterally; No rales, rhonchi, wheezing, or rubs  HEART: Regular rate and rhythm; No murmurs, rubs, or gallops  ABDOMEN: Soft, Nontender, +tympanic to percussion, +moderate distention  SKIN: No rashes or lesions  NERVOUS SYSTEM:  Alert & Oriented X3, less lethargic than previously    LABS:  02-10    130<L>  |  93<L>  |  48<H>  ----------------------------<  169<H>  4.6   |  30  |  1.20      132<L>  |  94<L>  |  52<H>  ----------------------------<  208<H>  4.9   |  27  |  1.27      132<L>  |  95<L>  |  53<H>  ----------------------------<  166<H>  5.1   |  27  |  1.35<H>    Ca    8.7      10 Feb 2023 05:09  Ca    8.7      2023 15:00  Ca    9.0      2023 03:00  Phos  3.4     02-10  Mg     2.00     02-10    TPro  x   /  Alb  x   /  TBili  0.3  /  DBili  <0.2  /  AST  x   /  ALT  x   /  AlkPhos  x     TPro  6.4  /  Alb  3.5  /  TBili  0.3  /  DBili  x   /  AST  21  /  ALT  44<H>  /  AlkPhos  91  -08                    Urinalysis Basic - ( 2023 17:55 )    Color: Yellow / Appearance: Turbid / S.022 / pH: x  Gluc: x / Ketone: Trace  / Bili: Negative / Urobili: 3 mg/dL   Blood: x / Protein: 100 mg/dL / Nitrite: Negative   Leuk Esterase: Small / RBC: >50 /HPF / WBC 5-10 /HPF   Sq Epi: x / Non Sq Epi: x / Bacteria: Few                              8.2    7.46  )-----------( 232      ( 10 Feb 2023 05:09 )             25.4                         7.9    8.94  )-----------( 204      ( 2023 17:43 )             24.1                         7.6    10.86 )-----------( 239      ( 2023 03:00 )             23.7     CAPILLARY BLOOD GLUCOSE      POCT Blood Glucose.: 212 mg/dL (2023 21:16)  POCT Blood Glucose.: 248 mg/dL (2023 16:36)  POCT Blood Glucose.: 187 mg/dL (2023 11:20)  POCT Blood Glucose.: 195 mg/dL (2023 07:35)      RADIOLOGY & ADDITIONAL TESTS:    Imaging Personally Reviewed:  [x ] YES  [ ] NO    Consultants involved in case:   Consultant(s) Notes Reviewed:  [ x] YES  [ ] NO:   Care Discussed with Consultants/Other Providers [x ] YES  [ ] NO

## 2023-02-10 NOTE — PROVIDER CONTACT NOTE (OTHER) - BACKGROUND
Patient with pmhx of schizophrenia, diabetes, bipolar admitted for altered mental status
Patient with pmhx of schizophrenia, diabetes, bipolar admitted for altered mental status
Pt admitted for ataxia
67M admitted for ataxia. A&O-3. pt in 2L NC and .
Patient with pmhx of schizophrenia, diabetes, bipolar admitted for altered mental status

## 2023-02-10 NOTE — PROVIDER CONTACT NOTE (OTHER) - RECOMMENDATIONS
Provider made aware

## 2023-02-10 NOTE — PROGRESS NOTE ADULT - ASSESSMENT
67M with PMHx schizophrenia, hx seizure, bipolar d/o, HTN, DM2, hx SBO presenting with confusion, slurred speech and difficulty ambulating x4 days. History was obtained from brother David at bedside who lives with patient as patient is not answering questions completely with some word finding difficulty At baseline patient oriented x3, conversive and ambulates without difficulty. Inpatient, patient has hypothermia and labs with hyponatremia and hyperkalemia with concern for myocarditis, infection, workup underway. Endocrine called for hypothyroidism, pt also with DM 2    1. Hypothyroidism   TSH mildly elevated   Normal T4, Free T 4 1.0, EDL  On Levothyroxine 75mcg long term, would continue this dose for now  Administer on empty stomach in the AM, 30-60 minutes before food/other medications and 4 hrs apart from iron/calcium/PPI- he is on iron inpatient - please make sure this is timed correctly (it is currently scheduled for bedtime which is fine)   Repeat TFT's as outpatient     2. DM 2  A1c 6.4%  On Metformin 1000 mg PO BID, Glipizide 5 mg daily and Januvia 100 mg daily  Endorsing occasional hypoglycemia at home    While inpatient:  BG target 100-180 mg/dl   Continue Lantus 5 units SQ qHS  Increase Admelog to 6 units SQ TID before meals (Hold if NPO/skips meal)   Admelog LOW dose correctional scale before meals, low dose at bedtime  Check BG before meals and bedtime  Carb Consistent Diet   Defer RD consult, A1c tightly controlled   Hypoglycemia protocol    Discharge Plan:   STOP Glipizide   Anticipated discharge plan is resume Metformin 1000 mg PO BID, Januvia 100 mg daily  EGFR currently 62, would recheck prior to discharge but at present, level is ok for resuming both medications above  Consider adding Prandin 0.5 mg PO TID before meals - TBD based on requirements inpatient  Followup with outpatient endocrinologist Dr. Farfan     3. HTN   BP target 130/80  Recommend outpatient microalbumin/creat ratio     4. HLD  Continue Atorvastatin 40 mg daily if no contraindications     XI Solano-BC  Nurse Practitioner   Division of Endocrinology  Pager: [facility] pager [phone]    If out of hospital/unavailable when paged, please note: patient will be cared for by another provider on the endocrine service.  Please call the endocrine answering service for assistance to reach covering provider ([phone]). For non-urgent matters, please email [email or web address] for assistance.      67M with PMHx schizophrenia, hx seizure, bipolar d/o, HTN, DM2, hx SBO presenting with confusion, slurred speech and difficulty ambulating x4 days. History was obtained from brother David at bedside who lives with patient as patient is not answering questions completely with some word finding difficulty At baseline patient oriented x3, conversive and ambulates without difficulty. Inpatient, patient has hypothermia and labs with hyponatremia and hyperkalemia with concern for myocarditis, infection, workup underway. Endocrine called for hypothyroidism, pt also with DM 2    1. Hypothyroidism   TSH mildly elevated   Normal T4, Free T 4 1.0, EDL  On Levothyroxine 75mcg long term, would continue this dose for now  Administer on empty stomach in the AM, 30-60 minutes before food/other medications and 4 hrs apart from iron/calcium/PPI- he is on iron inpatient - please make sure this is timed correctly (it is currently scheduled for bedtime which is fine)   Repeat TFT's as outpatient     2. DM 2  A1c 6.4%  On Metformin 1000 mg PO BID, Glipizide 5 mg daily and Januvia 100 mg daily  Endorsing occasional hypoglycemia at home    While inpatient:  BG target 100-180 mg/dl   Continue Lantus 5 units SQ qHS  Increase Admelog to 7 units SQ TID before meals (Hold if NPO/skips meal)   Admelog LOW dose correctional scale before meals, low dose at bedtime  Check BG before meals and bedtime  Carb Consistent Diet   Defer RD consult, A1c tightly controlled   Hypoglycemia protocol    Discharge Plan:   STOP Glipizide   Anticipated discharge plan is resume Metformin 1000 mg PO BID, Januvia 100 mg daily  EGFR currently 62, would recheck prior to discharge but at present, level is ok for resuming both medications above  Consider adding Prandin 0.5 mg PO TID before meals - TBD based on requirements inpatient  Followup with outpatient endocrinologist Dr. Farfan     3. HTN   BP target 130/80  Recommend outpatient microalbumin/creat ratio     4. HLD  Continue Atorvastatin 40 mg daily if no contraindications     XI Solano-BC  Nurse Practitioner   Division of Endocrinology  Pager: [facility] pager [phone]    If out of hospital/unavailable when paged, please note: patient will be cared for by another provider on the endocrine service.  Please call the endocrine answering service for assistance to reach covering provider ([phone]). For non-urgent matters, please email [email or web address] for assistance.

## 2023-02-10 NOTE — PROGRESS NOTE ADULT - PROBLEM SELECTOR PLAN 2
-Initially presented with MARISSA which resolved, then developed a new MARISSA 2/8 Cr 1.47 from ~1.08 baseline  -FeNa 0.2% pre-renal, and Cr uptrended since pt received lasix diuresis 2/5  -discussed with nephrology, suspect initial hyperkalemia was related to lisinopril/losartan and current hyperK to 5 related to MARISSA  -no urgent HD  -trend Cr, monitor BMPs  -bladder scans, strict i/o, tov ongoing as garcia is out

## 2023-02-10 NOTE — PROGRESS NOTE ADULT - ATTENDING COMMENTS
66 yo M with PMH of schizophrenia, hx seizure, bipolar d/o, HTN, hypothyroidism and DM2 who a/w encephalopathy likely 2/2 sepsis 2/2 pneumonia and acute hypoxic respiratory failure requiring supplemental O2. CT chest showed bilateral moderate pleural effusions and scattered bilateral airspace opacities possibly PNA - completed 7 days IV Zosyn. Yesterday patient was febrile, slightly hypotensive and diaphoretic. Evaluating for sepsis again. Now symptomatically improved and BP stable.  -F/u blood and urine cultures; c/w zosyn for now but if blood and urine culture negative can likely d/c abx tomorrow and observe.   -Acute hypoxic respiratory failure - still requiring supplemental O2; wean off as tolerated; repeat CT chest as outpt for followup.  - HTN - held amlodipine due to soft BP; monitor off and can consider restart amlodipine 5mg (was on lisinopril 10mg daily for HTN previously) if needed for BP control. ACEi/ARBs held due to hyperkalemia.  - MARISSA likely ATN in setting of sepsis/hypotension - improved however persistent mild hyperkalemia, held ACEi; hyperkalemia to 6.0 requiring lokelma; now K WNL. Monitoring electrolytes.  - encephalopathy improved and now back to baseline  - Endo f/u appreciated - c/w levothyroxine 75mcg daily for hypothyroidism; Lantus 5 units HS and Admelog  units TID with meals for DM2   - d/c planning -  for d/c care coordination to [facility]

## 2023-02-10 NOTE — PROGRESS NOTE ADULT - SUBJECTIVE AND OBJECTIVE BOX
Chief Complaint: DM 2, hypothyroidism    History: Patient seen at bedside, eating lunch at time of visit, tolerating. Denies n/v, denies s/s of hypoglycemia     MEDICATIONS  (STANDING):  atorvastatin 40 milliGRAM(s) Oral at bedtime  bisacodyl Suppository 10 milliGRAM(s) Rectal daily  cholecalciferol 1000 Unit(s) Oral daily  cloZAPine 100 milliGRAM(s) Oral at bedtime  dextrose 5%. 1000 milliLiter(s) (100 mL/Hr) IV Continuous <Continuous>  dextrose 5%. 1000 milliLiter(s) (50 mL/Hr) IV Continuous <Continuous>  dextrose 50% Injectable 25 Gram(s) IV Push once  dextrose 50% Injectable 12.5 Gram(s) IV Push once  dextrose 50% Injectable 25 Gram(s) IV Push once  divalproex  milliGRAM(s) Oral at bedtime  enoxaparin Injectable 40 milliGRAM(s) SubCutaneous every 24 hours  ferrous    sulfate 325 milliGRAM(s) Oral at bedtime  finasteride 5 milliGRAM(s) Oral daily  glucagon  Injectable 1 milliGRAM(s) IntraMuscular once  insulin glargine Injectable (LANTUS) 5 Unit(s) SubCutaneous at bedtime  insulin lispro (ADMELOG) corrective regimen sliding scale   SubCutaneous three times a day before meals  insulin lispro (ADMELOG) corrective regimen sliding scale   SubCutaneous at bedtime  insulin lispro Injectable (ADMELOG) 5 Unit(s) SubCutaneous three times a day before meals  latanoprost 0.005% Ophthalmic Solution 1 Drop(s) Both EYES at bedtime  levothyroxine 75 MICROGram(s) Oral daily  multivitamin 1 Tablet(s) Oral daily  piperacillin/tazobactam IVPB.. 3.375 Gram(s) IV Intermittent every 8 hours  polyethylene glycol 3350 17 Gram(s) Oral two times a day  senna 2 Tablet(s) Oral two times a day  tamsulosin 0.8 milliGRAM(s) Oral at bedtime  timolol 0.5% Solution 1 Drop(s) Both EYES two times a day  venlafaxine XR. 75 milliGRAM(s) Oral at bedtime      No Known Allergies    Review of Systems:  HEENT: No pain  Cardiovascular: No chest pain  Respiratory: No SOB  GI: No nausea, vomiting    PHYSICAL EXAM:  Vital Signs Last 24 Hrs  T(C): 36.4 (10 Feb 2023 13:59), Max: 37.1 (09 Feb 2023 17:30)  T(F): 97.6 (10 Feb 2023 13:59), Max: 98.8 (10 Feb 2023 01:30)  HR: 67 (10 Feb 2023 13:59) (67 - 88)  BP: 104/43 (10 Feb 2023 13:59) (103/54 - 131/59)  BP(mean): --  RR: 17 (10 Feb 2023 13:59) (17 - 18)  SpO2: 99% (10 Feb 2023 13:59) (96% - 100%)    Parameters below as of 10 Feb 2023 13:59  Patient On (Oxygen Delivery Method): nasal cannula  O2 Flow (L/min): 2    GENERAL: NAD  EYES: No proptosis, no lid lag, anicteric  HEENT:  Atraumatic, Normocephalic, moist mucous membranes  RESPIRATORY: unlabored respirations    CAPILLARY BLOOD GLUCOSE      POCT Blood Glucose.: 260 mg/dL (10 Feb 2023 11:43)  POCT Blood Glucose.: 166 mg/dL (10 Feb 2023 08:27)  POCT Blood Glucose.: 212 mg/dL (09 Feb 2023 21:16)  POCT Blood Glucose.: 248 mg/dL (09 Feb 2023 16:36)      02-10    130<L>  |  93<L>  |  48<H>  ----------------------------<  169<H>  4.6   |  30  |  1.20    Ca    8.7      10 Feb 2023 05:09  Phos  3.4     02-10  Mg     2.00     02-10    TPro  x   /  Alb  x   /  TBili  0.3  /  DBili  <0.2  /  AST  x   /  ALT  x   /  AlkPhos  x   02-09        Thyroid Function Tests:  01-30 @ 16:49 TSH 4.91 FreeT4 -- T3 46 Anti TPO -- Anti Thyroglobulin Ab -- TSI --      A1C with Estimated Average Glucose Result: 6.4 % (01-30-23 @ 16:45)    Diet, Regular:   Consistent Carbohydrate Evening Snack (CSTCHOSN)  DASH/TLC Sodium & Cholesterol Restricted (DASH)  No Concentrated Potassium  Kosher  Supplement Feeding Modality:  Oral  Glucerna Shake Cans or Servings Per Day:  2       Frequency:  Daily (02-08-23 @ 07:33)

## 2023-02-10 NOTE — PROGRESS NOTE ADULT - PROBLEM SELECTOR PLAN 4
CT Chest w/ diffuse anasarca with pleural effusion, atelectasis, edema w/ opacity c/f pneumonia, likely source of instability. Course c/b hypothermia, bradycardia, hypotension on 1/31. 1/30 BCx x2, UCx NGTD. MRSA negative, s/p vanc 1/31-2/3. s/p zosyn for 7 day course (1/31 - 2/6)    - requiring supplemental O2 via 2L NC; unable to be weaned to RA currently  - likely d/c on O2, wean at [facility] if possible  - o/p pulm f/u for pleural effusions, repeat CT chest

## 2023-02-10 NOTE — PROVIDER CONTACT NOTE (OTHER) - SITUATION
Patient hypothermic upon VS assessment
Pt desatting to 82 on 2L NC
Patient hypothermic upon VS assessment
Patient hypoxic on room air
Pt A&Ox0 after being A&Ox1 and able to speak
Unable to assess patient rectal temp
patient needs to be on a kosher diet and a representative from the [facility] office was concerned that he was being fed non kosher meals.

## 2023-02-10 NOTE — PROVIDER CONTACT NOTE (OTHER) - REASON
change in O2 saturation
patient hypothermic
Kosher diet request
Unable to get rectal temperature
altered mental status
patient hypothermic
patient O2 low

## 2023-02-10 NOTE — PROGRESS NOTE ADULT - PROBLEM SELECTOR PLAN 7
on lisinopril 10mg at home, holding d/t cough    - switched to norvasc 10 iso hyperkalemia, however held 2/8 as BPs soft out of ca

## 2023-02-10 NOTE — PROVIDER CONTACT NOTE (OTHER) - ACTION/TREATMENT ORDERED:
Provider made aware. Bear hugger continued. Reassess in 30 minutes per provider.
provider made aware.  ordered and 1L NC administered. Will continue to monitor
provider made aware, will continue to monitor
Provider made aware.
Provider made aware. Bear hugger ordered.
provider made aware.
provider made aware.

## 2023-02-10 NOTE — PROVIDER CONTACT NOTE (OTHER) - DATE AND TIME:
10-Feb-2023 23:45
31-Jan-2023 03:30
31-Jan-2023 05:17
31-Jan-2023 19:29
31-Jan-2023 22:38
01-Feb-2023 06:42
31-Jan-2023 04:28

## 2023-02-10 NOTE — PROGRESS NOTE ADULT - PROBLEM SELECTOR PLAN 3
***resolved  -suspect toxic/metabolic encephalopathy iso sepsis, PNA. Now improved s/p abx and pt now back to baseline Aox3

## 2023-02-11 LAB
ANION GAP SERPL CALC-SCNC: 9 MMOL/L — SIGNIFICANT CHANGE UP (ref 7–14)
BUN SERPL-MCNC: 38 MG/DL — HIGH (ref 7–23)
CALCIUM SERPL-MCNC: 9 MG/DL — SIGNIFICANT CHANGE UP (ref 8.4–10.5)
CHLORIDE SERPL-SCNC: 97 MMOL/L — LOW (ref 98–107)
CO2 SERPL-SCNC: 27 MMOL/L — SIGNIFICANT CHANGE UP (ref 22–31)
CREAT SERPL-MCNC: 0.87 MG/DL — SIGNIFICANT CHANGE UP (ref 0.5–1.3)
CULTURE RESULTS: SIGNIFICANT CHANGE UP
EGFR: 94 ML/MIN/1.73M2 — SIGNIFICANT CHANGE UP
GLUCOSE BLDC GLUCOMTR-MCNC: 174 MG/DL — HIGH (ref 70–99)
GLUCOSE BLDC GLUCOMTR-MCNC: 190 MG/DL — HIGH (ref 70–99)
GLUCOSE BLDC GLUCOMTR-MCNC: 196 MG/DL — HIGH (ref 70–99)
GLUCOSE BLDC GLUCOMTR-MCNC: 257 MG/DL — HIGH (ref 70–99)
GLUCOSE BLDC GLUCOMTR-MCNC: 357 MG/DL — HIGH (ref 70–99)
GLUCOSE BLDC GLUCOMTR-MCNC: 373 MG/DL — HIGH (ref 70–99)
GLUCOSE BLDC GLUCOMTR-MCNC: 520 MG/DL — CRITICAL HIGH (ref 70–99)
GLUCOSE SERPL-MCNC: 196 MG/DL — HIGH (ref 70–99)
HCT VFR BLD CALC: 25.6 % — LOW (ref 39–50)
HGB BLD-MCNC: 8.3 G/DL — LOW (ref 13–17)
MAGNESIUM SERPL-MCNC: 1.9 MG/DL — SIGNIFICANT CHANGE UP (ref 1.6–2.6)
MCHC RBC-ENTMCNC: 30.6 PG — SIGNIFICANT CHANGE UP (ref 27–34)
MCHC RBC-ENTMCNC: 32.4 GM/DL — SIGNIFICANT CHANGE UP (ref 32–36)
MCV RBC AUTO: 94.5 FL — SIGNIFICANT CHANGE UP (ref 80–100)
NRBC # BLD: 0 /100 WBCS — SIGNIFICANT CHANGE UP (ref 0–0)
NRBC # FLD: 0 K/UL — SIGNIFICANT CHANGE UP (ref 0–0)
PHOSPHATE SERPL-MCNC: 3.1 MG/DL — SIGNIFICANT CHANGE UP (ref 2.5–4.5)
PLATELET # BLD AUTO: 280 K/UL — SIGNIFICANT CHANGE UP (ref 150–400)
POTASSIUM SERPL-MCNC: 4.8 MMOL/L — SIGNIFICANT CHANGE UP (ref 3.5–5.3)
POTASSIUM SERPL-SCNC: 4.8 MMOL/L — SIGNIFICANT CHANGE UP (ref 3.5–5.3)
RBC # BLD: 2.71 M/UL — LOW (ref 4.2–5.8)
RBC # FLD: 14.6 % — HIGH (ref 10.3–14.5)
SODIUM SERPL-SCNC: 133 MMOL/L — LOW (ref 135–145)
SPECIMEN SOURCE: SIGNIFICANT CHANGE UP
WBC # BLD: 8.54 K/UL — SIGNIFICANT CHANGE UP (ref 3.8–10.5)
WBC # FLD AUTO: 8.54 K/UL — SIGNIFICANT CHANGE UP (ref 3.8–10.5)

## 2023-02-11 PROCEDURE — 99233 SBSQ HOSP IP/OBS HIGH 50: CPT | Mod: GC

## 2023-02-11 RX ADMIN — Medication 1 TABLET(S): at 11:58

## 2023-02-11 RX ADMIN — PIPERACILLIN AND TAZOBACTAM 25 GRAM(S): 4; .5 INJECTION, POWDER, LYOPHILIZED, FOR SOLUTION INTRAVENOUS at 21:40

## 2023-02-11 RX ADMIN — SENNA PLUS 2 TABLET(S): 8.6 TABLET ORAL at 06:19

## 2023-02-11 RX ADMIN — CLOZAPINE 100 MILLIGRAM(S): 150 TABLET, ORALLY DISINTEGRATING ORAL at 21:33

## 2023-02-11 RX ADMIN — Medication 1: at 08:18

## 2023-02-11 RX ADMIN — Medication 1 DROP(S): at 06:18

## 2023-02-11 RX ADMIN — Medication 10 MILLILITER(S): at 22:36

## 2023-02-11 RX ADMIN — DIVALPROEX SODIUM 750 MILLIGRAM(S): 500 TABLET, DELAYED RELEASE ORAL at 21:32

## 2023-02-11 RX ADMIN — ENOXAPARIN SODIUM 40 MILLIGRAM(S): 100 INJECTION SUBCUTANEOUS at 11:57

## 2023-02-11 RX ADMIN — Medication 3: at 17:09

## 2023-02-11 RX ADMIN — Medication 5: at 12:02

## 2023-02-11 RX ADMIN — INSULIN GLARGINE 5 UNIT(S): 100 INJECTION, SOLUTION SUBCUTANEOUS at 22:35

## 2023-02-11 RX ADMIN — PIPERACILLIN AND TAZOBACTAM 25 GRAM(S): 4; .5 INJECTION, POWDER, LYOPHILIZED, FOR SOLUTION INTRAVENOUS at 06:39

## 2023-02-11 RX ADMIN — Medication 75 MILLIGRAM(S): at 21:32

## 2023-02-11 RX ADMIN — ATORVASTATIN CALCIUM 40 MILLIGRAM(S): 80 TABLET, FILM COATED ORAL at 21:33

## 2023-02-11 RX ADMIN — POLYETHYLENE GLYCOL 3350 17 GRAM(S): 17 POWDER, FOR SOLUTION ORAL at 06:23

## 2023-02-11 RX ADMIN — Medication 7 UNIT(S): at 12:03

## 2023-02-11 RX ADMIN — Medication 1000 UNIT(S): at 11:57

## 2023-02-11 RX ADMIN — Medication 7 UNIT(S): at 08:19

## 2023-02-11 RX ADMIN — LATANOPROST 1 DROP(S): 0.05 SOLUTION/ DROPS OPHTHALMIC; TOPICAL at 21:31

## 2023-02-11 RX ADMIN — TAMSULOSIN HYDROCHLORIDE 0.8 MILLIGRAM(S): 0.4 CAPSULE ORAL at 21:32

## 2023-02-11 RX ADMIN — PIPERACILLIN AND TAZOBACTAM 25 GRAM(S): 4; .5 INJECTION, POWDER, LYOPHILIZED, FOR SOLUTION INTRAVENOUS at 13:30

## 2023-02-11 RX ADMIN — Medication 7 UNIT(S): at 17:09

## 2023-02-11 RX ADMIN — Medication 325 MILLIGRAM(S): at 21:34

## 2023-02-11 RX ADMIN — Medication 75 MICROGRAM(S): at 06:19

## 2023-02-11 RX ADMIN — FINASTERIDE 5 MILLIGRAM(S): 5 TABLET, FILM COATED ORAL at 11:57

## 2023-02-11 RX ADMIN — Medication 1 DROP(S): at 17:11

## 2023-02-11 NOTE — PROGRESS NOTE ADULT - SUBJECTIVE AND OBJECTIVE BOX
***************************************************************  Miky Munguia, PGY1  Internal Medicine   pager:  [facility]: [phone] [facility]: [phone]  ***************************************************************    CURTIS DIAZ  67y  MRN: [id number]    Patient is a 67y old  Male who presents with a chief complaint of confusion, dizziness (10 Feb 2023 15:52)    Interval/Overnight Events:   - endo increased insulin basal/bolus to 5/7  - desat o/n on 2L, stable on 3L NC    Subjective: Pt seen and examined at bedside. Denies fever, CP, SOB, abn pain, N/V, dysuria. Tolerating diet.      MEDICATIONS  (STANDING):  atorvastatin 40 milliGRAM(s) Oral at bedtime  bisacodyl Suppository 10 milliGRAM(s) Rectal daily  cholecalciferol 1000 Unit(s) Oral daily  cloZAPine 100 milliGRAM(s) Oral at bedtime  dextrose 5%. 1000 milliLiter(s) (50 mL/Hr) IV Continuous <Continuous>  dextrose 5%. 1000 milliLiter(s) (100 mL/Hr) IV Continuous <Continuous>  dextrose 50% Injectable 25 Gram(s) IV Push once  dextrose 50% Injectable 12.5 Gram(s) IV Push once  dextrose 50% Injectable 25 Gram(s) IV Push once  divalproex  milliGRAM(s) Oral at bedtime  enoxaparin Injectable 40 milliGRAM(s) SubCutaneous every 24 hours  ferrous    sulfate 325 milliGRAM(s) Oral at bedtime  finasteride 5 milliGRAM(s) Oral daily  glucagon  Injectable 1 milliGRAM(s) IntraMuscular once  insulin glargine Injectable (LANTUS) 5 Unit(s) SubCutaneous at bedtime  insulin lispro (ADMELOG) corrective regimen sliding scale   SubCutaneous three times a day before meals  insulin lispro (ADMELOG) corrective regimen sliding scale   SubCutaneous at bedtime  insulin lispro Injectable (ADMELOG) 7 Unit(s) SubCutaneous three times a day before meals  latanoprost 0.005% Ophthalmic Solution 1 Drop(s) Both EYES at bedtime  levothyroxine 75 MICROGram(s) Oral daily  multivitamin 1 Tablet(s) Oral daily  piperacillin/tazobactam IVPB.. 3.375 Gram(s) IV Intermittent every 8 hours  polyethylene glycol 3350 17 Gram(s) Oral two times a day  senna 2 Tablet(s) Oral two times a day  tamsulosin 0.8 milliGRAM(s) Oral at bedtime  timolol 0.5% Solution 1 Drop(s) Both EYES two times a day  venlafaxine XR. 75 milliGRAM(s) Oral at bedtime    MEDICATIONS  (PRN):  dextrose Oral Gel 15 Gram(s) Oral once PRN Blood Glucose LESS THAN 70 milliGRAM(s)/deciliter  guaifenesin/dextromethorphan Oral Liquid 10 milliLiter(s) Oral every 6 hours PRN Cough  OLANZapine Injectable 2.5 milliGRAM(s) IntraMuscular every 6 hours PRN agitation  ondansetron Injectable 4 milliGRAM(s) IV Push four times a day PRN Nausea and/or Vomiting    Objective:    Vitals: Vital Signs Last 24 Hrs  T(C): 36.3 (02-11-23 @ 04:30), Max: 36.7 (02-10-23 @ 09:30)  T(F): 97.4 (02-11-23 @ 04:30), Max: 98.1 (02-10-23 @ 09:30)  HR: 73 (02-11-23 @ 04:30) (67 - 86)  BP: 148/71 (02-11-23 @ 04:30) (104/43 - 148/71)  BP(mean): 90 (02-11-23 @ 04:30) (90 - 90)  RR: 18 (02-11-23 @ 04:30) (17 - 18)  SpO2: 94% (02-11-23 @ 04:30) (94% - 99%)                I&O's Summary    10 Feb 2023 07:01  -  11 Feb 2023 07:00  --------------------------------------------------------  IN: 0 mL / OUT: 1400 mL / NET: -1400 mL    PHYSICAL EXAM:  GENERAL: NAD  HEAD:  Atraumatic, Normocephalic; wearing NC  EYES: EOMI, conjunctiva and sclera clear  CHEST/LUNG: upper airway coarse crackles, poor auscultation of lung sounds on posterior fields  HEART: Regular rate and rhythm; No murmurs, rubs, or gallops  ABDOMEN: Soft, Nontender, +tympanic to percussion, +moderate distention  MSK: increased thoracic kyphosis  SKIN: No rashes or lesions  NERVOUS SYSTEM:  Alert & Oriented X3, at baseline, no focal deficits    LABS:  02-11    133<L>  |  97<L>  |  38<H>  ----------------------------<  196<H>  4.8   |  27  |  0.87  02-10    130<L>  |  93<L>  |  48<H>  ----------------------------<  169<H>  4.6   |  30  |  1.20  02-09    132<L>  |  94<L>  |  52<H>  ----------------------------<  208<H>  4.9   |  27  |  1.27    Ca    9.0      11 Feb 2023 05:19  Ca    8.7      10 Feb 2023 05:09  Ca    8.7      09 Feb 2023 15:00  Phos  3.1     02-11  Mg     1.90     02-11    TPro  x   /  Alb  x   /  TBili  0.3  /  DBili  <0.2  /  AST  x   /  ALT  x   /  AlkPhos  x   02-09  TPro  6.4  /  Alb  3.5  /  TBili  0.3  /  DBili  x   /  AST  21  /  ALT  44<H>  /  AlkPhos  91  02-08                        8.3    8.54  )-----------( 280      ( 11 Feb 2023 05:19 )             25.6                         8.2    7.46  )-----------( 232      ( 10 Feb 2023 05:09 )             25.4                         7.9    8.94  )-----------( 204      ( 09 Feb 2023 17:43 )             24.1     CAPILLARY BLOOD GLUCOSE      POCT Blood Glucose.: 196 mg/dL (11 Feb 2023 07:22)  POCT Blood Glucose.: 197 mg/dL (10 Feb 2023 21:37)  POCT Blood Glucose.: 295 mg/dL (10 Feb 2023 16:26)  POCT Blood Glucose.: 260 mg/dL (10 Feb 2023 11:43)  POCT Blood Glucose.: 166 mg/dL (10 Feb 2023 08:27)      RADIOLOGY & ADDITIONAL TESTS:    Imaging Personally Reviewed:  [x ] YES  [ ] NO    Consultants involved in case:   Consultant(s) Notes Reviewed:  [ x] YES  [ ] NO:   Care Discussed with Consultants/Other Providers [x ] YES  [ ] NO

## 2023-02-11 NOTE — CHART NOTE - NSCHARTNOTEFT_GEN_A_CORE
DM follow up   See last progress note for complete plan of care, including discharge planning   Isolated hyperglycemia mid day  RN unsure if patient ate anything additional this AM  Patient reporting each breakfast items provided to him on tray   Had Khmer toast and syrup for breakfast as per nurse   Would continue with insulin as currently ordered at this time   If hyperglycemic trend noted, insulin will need to be increased   Continue with:  insulin glargine Injectable (LANTUS) 5 Unit(s) SubCutaneous at bedtime  insulin lispro (ADMELOG) corrective regimen sliding scale   SubCutaneous three times a day before meals  insulin lispro (ADMELOG) corrective regimen sliding scale   SubCutaneous at bedtime  insulin lispro Injectable (ADMELOG) 7 Unit(s) SubCutaneous three times a day before meals  Endocrine      MEDICATIONS  (STANDING):  atorvastatin 40 milliGRAM(s) Oral at bedtime  bisacodyl Suppository 10 milliGRAM(s) Rectal daily  cholecalciferol 1000 Unit(s) Oral daily  cloZAPine 100 milliGRAM(s) Oral at bedtime  dextrose 5%. 1000 milliLiter(s) (100 mL/Hr) IV Continuous <Continuous>  dextrose 5%. 1000 milliLiter(s) (50 mL/Hr) IV Continuous <Continuous>  dextrose 50% Injectable 25 Gram(s) IV Push once  dextrose 50% Injectable 12.5 Gram(s) IV Push once  dextrose 50% Injectable 25 Gram(s) IV Push once  divalproex  milliGRAM(s) Oral at bedtime  enoxaparin Injectable 40 milliGRAM(s) SubCutaneous every 24 hours  ferrous    sulfate 325 milliGRAM(s) Oral at bedtime  finasteride 5 milliGRAM(s) Oral daily  glucagon  Injectable 1 milliGRAM(s) IntraMuscular once  insulin glargine Injectable (LANTUS) 5 Unit(s) SubCutaneous at bedtime  insulin lispro (ADMELOG) corrective regimen sliding scale   SubCutaneous three times a day before meals  insulin lispro (ADMELOG) corrective regimen sliding scale   SubCutaneous at bedtime  insulin lispro Injectable (ADMELOG) 7 Unit(s) SubCutaneous three times a day before meals  latanoprost 0.005% Ophthalmic Solution 1 Drop(s) Both EYES at bedtime  levothyroxine 75 MICROGram(s) Oral daily  multivitamin 1 Tablet(s) Oral daily  piperacillin/tazobactam IVPB.. 3.375 Gram(s) IV Intermittent every 8 hours  polyethylene glycol 3350 17 Gram(s) Oral two times a day  senna 2 Tablet(s) Oral two times a day  tamsulosin 0.8 milliGRAM(s) Oral at bedtime  timolol 0.5% Solution 1 Drop(s) Both EYES two times a day  venlafaxine XR. 75 milliGRAM(s) Oral at bedtime      CAPILLARY BLOOD GLUCOSE      POCT Blood Glucose.: 357 mg/dL (11 Feb 2023 11:55)  POCT Blood Glucose.: 373 mg/dL (11 Feb 2023 11:24)  POCT Blood Glucose.: 520 mg/dL (11 Feb 2023 11:23)  POCT Blood Glucose.: 196 mg/dL (11 Feb 2023 07:22)  POCT Blood Glucose.: 197 mg/dL (10 Feb 2023 21:37)

## 2023-02-11 NOTE — PROGRESS NOTE ADULT - ATTENDING COMMENTS
67M with sepsis secondary to aspiration pneumonia, with MARISSA and acute TME. patient clinically improved, mental status back to baseline. pending d/c to [facility]. found to be febrile 2 days ago, restarted on abx and cultures pending. remain stable on 2L NC. no resp distress. lung exam decrease breath sound in the lung bases. plan to hold abx if Bcx neg after 48hrs. f/u SW for [facility] placement.

## 2023-02-12 LAB
GLUCOSE BLDC GLUCOMTR-MCNC: 157 MG/DL — HIGH (ref 70–99)
GLUCOSE BLDC GLUCOMTR-MCNC: 233 MG/DL — HIGH (ref 70–99)
GLUCOSE BLDC GLUCOMTR-MCNC: 268 MG/DL — HIGH (ref 70–99)
GLUCOSE BLDC GLUCOMTR-MCNC: 284 MG/DL — HIGH (ref 70–99)

## 2023-02-12 PROCEDURE — 99233 SBSQ HOSP IP/OBS HIGH 50: CPT | Mod: GC

## 2023-02-12 RX ORDER — IPRATROPIUM/ALBUTEROL SULFATE 18-103MCG
3 AEROSOL WITH ADAPTER (GRAM) INHALATION ONCE
Refills: 0 | Status: DISCONTINUED | OUTPATIENT
Start: 2023-02-12 | End: 2023-02-13

## 2023-02-12 RX ADMIN — Medication 10 MILLIGRAM(S): at 14:43

## 2023-02-12 RX ADMIN — Medication 2: at 11:42

## 2023-02-12 RX ADMIN — POLYETHYLENE GLYCOL 3350 17 GRAM(S): 17 POWDER, FOR SOLUTION ORAL at 05:02

## 2023-02-12 RX ADMIN — Medication 1000 UNIT(S): at 11:15

## 2023-02-12 RX ADMIN — DIVALPROEX SODIUM 750 MILLIGRAM(S): 500 TABLET, DELAYED RELEASE ORAL at 21:38

## 2023-02-12 RX ADMIN — INSULIN GLARGINE 5 UNIT(S): 100 INJECTION, SOLUTION SUBCUTANEOUS at 22:15

## 2023-02-12 RX ADMIN — SENNA PLUS 2 TABLET(S): 8.6 TABLET ORAL at 05:02

## 2023-02-12 RX ADMIN — CLOZAPINE 100 MILLIGRAM(S): 150 TABLET, ORALLY DISINTEGRATING ORAL at 22:15

## 2023-02-12 RX ADMIN — PIPERACILLIN AND TAZOBACTAM 25 GRAM(S): 4; .5 INJECTION, POWDER, LYOPHILIZED, FOR SOLUTION INTRAVENOUS at 05:03

## 2023-02-12 RX ADMIN — Medication 75 MILLIGRAM(S): at 21:39

## 2023-02-12 RX ADMIN — ENOXAPARIN SODIUM 40 MILLIGRAM(S): 100 INJECTION SUBCUTANEOUS at 11:14

## 2023-02-12 RX ADMIN — Medication 1 DROP(S): at 05:01

## 2023-02-12 RX ADMIN — Medication 7 UNIT(S): at 08:11

## 2023-02-12 RX ADMIN — POLYETHYLENE GLYCOL 3350 17 GRAM(S): 17 POWDER, FOR SOLUTION ORAL at 17:40

## 2023-02-12 RX ADMIN — SENNA PLUS 2 TABLET(S): 8.6 TABLET ORAL at 17:42

## 2023-02-12 RX ADMIN — Medication 1: at 22:09

## 2023-02-12 RX ADMIN — Medication 7 UNIT(S): at 11:43

## 2023-02-12 RX ADMIN — TAMSULOSIN HYDROCHLORIDE 0.8 MILLIGRAM(S): 0.4 CAPSULE ORAL at 21:39

## 2023-02-12 RX ADMIN — FINASTERIDE 5 MILLIGRAM(S): 5 TABLET, FILM COATED ORAL at 11:14

## 2023-02-12 RX ADMIN — Medication 1 DROP(S): at 17:40

## 2023-02-12 RX ADMIN — Medication 3: at 16:46

## 2023-02-12 RX ADMIN — Medication 325 MILLIGRAM(S): at 21:38

## 2023-02-12 RX ADMIN — Medication 1 TABLET(S): at 11:14

## 2023-02-12 RX ADMIN — Medication 75 MICROGRAM(S): at 05:02

## 2023-02-12 RX ADMIN — ATORVASTATIN CALCIUM 40 MILLIGRAM(S): 80 TABLET, FILM COATED ORAL at 21:39

## 2023-02-12 RX ADMIN — Medication 7 UNIT(S): at 16:46

## 2023-02-12 RX ADMIN — LATANOPROST 1 DROP(S): 0.05 SOLUTION/ DROPS OPHTHALMIC; TOPICAL at 22:09

## 2023-02-12 NOTE — PROGRESS NOTE ADULT - PROBLEM/PLAN-12
DISPLAY PLAN FREE TEXT
Yes
DISPLAY PLAN FREE TEXT

## 2023-02-12 NOTE — PROGRESS NOTE ADULT - PROBLEM SELECTOR PLAN 7
on lisinopril 10mg at home, holding d/t cough    - switched to norvasc 10 iso hyperkalemia, however held 2/8 as BPs soft out of ca on lisinopril 10mg at home, holding d/t cough    - c/w norvasc 10 iso hyperkalemia, however held 2/8 as BPs soft

## 2023-02-12 NOTE — PROGRESS NOTE ADULT - PROBLEM SELECTOR PLAN 1
Unclear source, possible another episode of aspiration pneumonitis vs UTI  -Febrile 2/9 3AM so restarted on Zosyn  -holding norvasc given softish bps  -UCx, BCx pending  -c/w zosyn pending infectious workup *** resolved  Unclear source, possible another episode of aspiration pneumonitis vs UTI  -Febrile 2/9 3AM so restarted on Zosyn  -holding norvasc given softish bps  -UCx, BCx pending  -c/w zosyn pending infectious workup

## 2023-02-12 NOTE — PROGRESS NOTE ADULT - TIME BILLING
reviewing laboratory data, consultants' recommendations, documentation in [place], performing medically appropriate examinations/evaluations, discussion with patient/RN/ACP and interdisciplinary staff (such as , social workers, etc), counseling patient/family/care giver, ordering medically appropriate medication, tests, or procedures. Interventions were performed as documented above.
reviewing laboratory data, consultants' recommendations, documentation in [place], performing medically appropriate examinations/evaluations, discussion with patient/RN/ACP and interdisciplinary staff (such as , social workers, etc), counseling patient/family/care giver, ordering medically appropriate medication, tests, or procedures. Interventions were performed as documented above.
review of prior records, discussion with consultants, ordering/reviewing tests/imaging, discussion with family at bedside.

## 2023-02-12 NOTE — PROGRESS NOTE ADULT - PROBLEM SELECTOR PLAN 2
-Initially presented with MARISSA which resolved, then developed a new MARISSA 2/8 Cr 1.47 from ~1.08 baseline  -FeNa 0.2% pre-renal, and Cr uptrended since pt received lasix diuresis 2/5  -discussed with nephrology, suspect initial hyperkalemia was related to lisinopril/losartan and current hyperK to 5 related to MARISSA  -no urgent HD  -trend Cr, monitor BMPs  -bladder scans, strict i/o, tov ongoing as garcia is out Initially presented with MARISSA which resolved, then developed a new MARISSA 2/8 Cr 1.47 from ~1.08 baseline  -FeNa 0.2% pre-renal, and Cr uptrended since pt received lasix diuresis 2/5  -discussed with nephrology, suspect initial hyperkalemia was related to lisinopril/losartan and current hyperK to 5 related to MARISSA  -no urgent HD  -trend Cr, monitor BMPs  -bladder scans, strict i/o, tov ongoing as garcia is out

## 2023-02-12 NOTE — PROGRESS NOTE ADULT - PROBLEM SELECTOR PLAN 4
CT Chest w/ diffuse anasarca with pleural effusion, atelectasis, edema w/ opacity c/f pneumonia, likely source of instability. Course c/b hypothermia, bradycardia, hypotension on 1/31. 1/30 BCx x2, UCx NGTD. MRSA negative, s/p vanc 1/31-2/3. s/p zosyn for 7 day course (1/31 - 2/6)    - requiring supplemental O2 via 2L NC; unable to be weaned to RA currently  - likely d/c on O2, wean at [facility] if possible  - o/p pulm f/u for pleural effusions, repeat CT chest CT Chest w/ diffuse anasarca with pleural effusion, atelectasis, edema w/ opacity c/f pneumonia, likely source of instability. Course c/b hypothermia, bradycardia, hypotension on 1/31. 1/30 BCx x2, UCx NGTD. MRSA negative, s/p vanc 1/31-2/3. s/p zosyn for 7 day course (1/31 - 2/6) (2/9-12)    - requiring supplemental O2 via 2L NC; unable to be weaned to RA currently  - likely d/c on O2, wean at [facility] if possible  - o/p pulm f/u for pleural effusions, repeat CT chest

## 2023-02-12 NOTE — PROGRESS NOTE ADULT - ATTENDING COMMENTS
patient with above medical history presented with AMS iso aspiration pneumonia. mental status remain improved at baseline. completed abx course. repeat cultures were negative, further abx were d/c'd yesterday. Patient with faint rhonchi on lung exam. ?underline airway disease. trial of bronchodilators. remain stable for d/c to [facility]. f/u SW for placement. spoke to patient and brother at bedside.

## 2023-02-12 NOTE — PROGRESS NOTE ADULT - SUBJECTIVE AND OBJECTIVE BOX
***************************************************************  Miky Munguia, PGY1  Internal Medicine   pager:  [facility]: [phone] [facility]: [phone]  ***************************************************************    CURTIS DIAZ  67y  MRN: [id number]    Patient is a 67y old  Male who presents with a chief complaint of confusion, dizziness (11 Feb 2023 07:24)      Interval/Overnight Events: no events ON.     Subjective: Pt seen and examined at bedside. Denies fever, CP, SOB, abn pain, N/V, dysuria. Tolerating diet.      MEDICATIONS  (STANDING):  atorvastatin 40 milliGRAM(s) Oral at bedtime  bisacodyl Suppository 10 milliGRAM(s) Rectal daily  cholecalciferol 1000 Unit(s) Oral daily  cloZAPine 100 milliGRAM(s) Oral at bedtime  dextrose 5%. 1000 milliLiter(s) (100 mL/Hr) IV Continuous <Continuous>  dextrose 5%. 1000 milliLiter(s) (50 mL/Hr) IV Continuous <Continuous>  dextrose 50% Injectable 25 Gram(s) IV Push once  dextrose 50% Injectable 12.5 Gram(s) IV Push once  dextrose 50% Injectable 25 Gram(s) IV Push once  divalproex  milliGRAM(s) Oral at bedtime  enoxaparin Injectable 40 milliGRAM(s) SubCutaneous every 24 hours  ferrous    sulfate 325 milliGRAM(s) Oral at bedtime  finasteride 5 milliGRAM(s) Oral daily  glucagon  Injectable 1 milliGRAM(s) IntraMuscular once  insulin glargine Injectable (LANTUS) 5 Unit(s) SubCutaneous at bedtime  insulin lispro (ADMELOG) corrective regimen sliding scale   SubCutaneous three times a day before meals  insulin lispro (ADMELOG) corrective regimen sliding scale   SubCutaneous at bedtime  insulin lispro Injectable (ADMELOG) 7 Unit(s) SubCutaneous three times a day before meals  latanoprost 0.005% Ophthalmic Solution 1 Drop(s) Both EYES at bedtime  levothyroxine 75 MICROGram(s) Oral daily  multivitamin 1 Tablet(s) Oral daily  polyethylene glycol 3350 17 Gram(s) Oral two times a day  senna 2 Tablet(s) Oral two times a day  tamsulosin 0.8 milliGRAM(s) Oral at bedtime  timolol 0.5% Solution 1 Drop(s) Both EYES two times a day  venlafaxine XR. 75 milliGRAM(s) Oral at bedtime    MEDICATIONS  (PRN):  dextrose Oral Gel 15 Gram(s) Oral once PRN Blood Glucose LESS THAN 70 milliGRAM(s)/deciliter  guaifenesin/dextromethorphan Oral Liquid 10 milliLiter(s) Oral every 6 hours PRN Cough  OLANZapine Injectable 2.5 milliGRAM(s) IntraMuscular every 6 hours PRN agitation  ondansetron Injectable 4 milliGRAM(s) IV Push four times a day PRN Nausea and/or Vomiting      Objective:    Vitals: Vital Signs Last 24 Hrs  T(C): 36.4 (02-12-23 @ 04:30), Max: 36.7 (02-11-23 @ 17:58)  T(F): 97.5 (02-12-23 @ 04:30), Max: 98 (02-11-23 @ 17:58)  HR: 68 (02-12-23 @ 04:30) (65 - 68)  BP: 107/48 (02-12-23 @ 04:30) (102/49 - 112/56)  BP(mean): --  RR: 18 (02-12-23 @ 04:30) (17 - 18)  SpO2: 95% (02-12-23 @ 04:30) (95% - 95%)                I&O's Summary    11 Feb 2023 07:01  -  12 Feb 2023 07:00  --------------------------------------------------------  IN: 400 mL / OUT: 800 mL / NET: -400 mL        PHYSICAL EXAM:  GENERAL: NAD  HEAD:  Atraumatic, Normocephalic; wearing NC  EYES: EOMI, conjunctiva and sclera clear  CHEST/LUNG: upper airway coarse crackles, poor auscultation of lung sounds on posterior fields  HEART: Regular rate and rhythm; No murmurs, rubs, or gallops  ABDOMEN: Soft, Nontender, +tympanic to percussion, +moderate distention  MSK: increased thoracic kyphosis  SKIN: No rashes or lesions  NERVOUS SYSTEM:  Alert & Oriented X3, at baseline, no focal deficits      LABS:  02-11    133<L>  |  97<L>  |  38<H>  ----------------------------<  196<H>  4.8   |  27  |  0.87  02-10    130<L>  |  93<L>  |  48<H>  ----------------------------<  169<H>  4.6   |  30  |  1.20  02-09    132<L>  |  94<L>  |  52<H>  ----------------------------<  208<H>  4.9   |  27  |  1.27    Ca    9.0      11 Feb 2023 05:19  Ca    8.7      10 Feb 2023 05:09  Ca    8.7      09 Feb 2023 15:00  Phos  3.1     02-11  Mg     1.90     02-11                                                8.3    8.54  )-----------( 280      ( 11 Feb 2023 05:19 )             25.6                         8.2    7.46  )-----------( 232      ( 10 Feb 2023 05:09 )             25.4                         7.9    8.94  )-----------( 204      ( 09 Feb 2023 17:43 )             24.1     CAPILLARY BLOOD GLUCOSE      POCT Blood Glucose.: 157 mg/dL (12 Feb 2023 07:24)  POCT Blood Glucose.: 174 mg/dL (11 Feb 2023 22:29)  POCT Blood Glucose.: 190 mg/dL (11 Feb 2023 20:29)  POCT Blood Glucose.: 257 mg/dL (11 Feb 2023 16:35)  POCT Blood Glucose.: 357 mg/dL (11 Feb 2023 11:55)  POCT Blood Glucose.: 373 mg/dL (11 Feb 2023 11:24)  POCT Blood Glucose.: 520 mg/dL (11 Feb 2023 11:23)      RADIOLOGY & ADDITIONAL TESTS:    Imaging Personally Reviewed:  [x ] YES  [ ] NO    Consultants involved in case:   Consultant(s) Notes Reviewed:  [ x] YES  [ ] NO:   Care Discussed with Consultants/Other Providers [x ] YES  [ ] NO         ***************************************************************  Miky Munguia, PGY1  Internal Medicine   pager:  [facility]: [phone] [facility]: [phone]  ***************************************************************    CURTIS DIAZ  67y  MRN: [id number]    Patient is a 67y old  Male who presents with a chief complaint of confusion, dizziness (11 Feb 2023 07:24)      Interval/Overnight Events: no events ON.   - off zosyn now.   - pending LINDSEY placement    Subjective: Pt seen and examined at bedside. Denies fever, CP, SOB, abn pain, N/V, dysuria. Tolerating diet.      MEDICATIONS  (STANDING):  atorvastatin 40 milliGRAM(s) Oral at bedtime  bisacodyl Suppository 10 milliGRAM(s) Rectal daily  cholecalciferol 1000 Unit(s) Oral daily  cloZAPine 100 milliGRAM(s) Oral at bedtime  dextrose 5%. 1000 milliLiter(s) (100 mL/Hr) IV Continuous <Continuous>  dextrose 5%. 1000 milliLiter(s) (50 mL/Hr) IV Continuous <Continuous>  dextrose 50% Injectable 25 Gram(s) IV Push once  dextrose 50% Injectable 12.5 Gram(s) IV Push once  dextrose 50% Injectable 25 Gram(s) IV Push once  divalproex  milliGRAM(s) Oral at bedtime  enoxaparin Injectable 40 milliGRAM(s) SubCutaneous every 24 hours  ferrous    sulfate 325 milliGRAM(s) Oral at bedtime  finasteride 5 milliGRAM(s) Oral daily  glucagon  Injectable 1 milliGRAM(s) IntraMuscular once  insulin glargine Injectable (LANTUS) 5 Unit(s) SubCutaneous at bedtime  insulin lispro (ADMELOG) corrective regimen sliding scale   SubCutaneous three times a day before meals  insulin lispro (ADMELOG) corrective regimen sliding scale   SubCutaneous at bedtime  insulin lispro Injectable (ADMELOG) 7 Unit(s) SubCutaneous three times a day before meals  latanoprost 0.005% Ophthalmic Solution 1 Drop(s) Both EYES at bedtime  levothyroxine 75 MICROGram(s) Oral daily  multivitamin 1 Tablet(s) Oral daily  polyethylene glycol 3350 17 Gram(s) Oral two times a day  senna 2 Tablet(s) Oral two times a day  tamsulosin 0.8 milliGRAM(s) Oral at bedtime  timolol 0.5% Solution 1 Drop(s) Both EYES two times a day  venlafaxine XR. 75 milliGRAM(s) Oral at bedtime    MEDICATIONS  (PRN):  dextrose Oral Gel 15 Gram(s) Oral once PRN Blood Glucose LESS THAN 70 milliGRAM(s)/deciliter  guaifenesin/dextromethorphan Oral Liquid 10 milliLiter(s) Oral every 6 hours PRN Cough  OLANZapine Injectable 2.5 milliGRAM(s) IntraMuscular every 6 hours PRN agitation  ondansetron Injectable 4 milliGRAM(s) IV Push four times a day PRN Nausea and/or Vomiting      Objective:    Vitals: Vital Signs Last 24 Hrs  T(C): 36.4 (02-12-23 @ 04:30), Max: 36.7 (02-11-23 @ 17:58)  T(F): 97.5 (02-12-23 @ 04:30), Max: 98 (02-11-23 @ 17:58)  HR: 68 (02-12-23 @ 04:30) (65 - 68)  BP: 107/48 (02-12-23 @ 04:30) (102/49 - 112/56)  BP(mean): --  RR: 18 (02-12-23 @ 04:30) (17 - 18)  SpO2: 95% (02-12-23 @ 04:30) (95% - 95%)                I&O's Summary    11 Feb 2023 07:01  -  12 Feb 2023 07:00  --------------------------------------------------------  IN: 400 mL / OUT: 800 mL / NET: -400 mL        PHYSICAL EXAM:  GENERAL: NAD  HEAD:  Atraumatic, Normocephalic; wearing NC  EYES: EOMI, conjunctiva and sclera clear  CHEST/LUNG: upper airway coarse crackles, poor auscultation of lung sounds on posterior fields  HEART: Regular rate and rhythm; No murmurs, rubs, or gallops  ABDOMEN: Soft, Nontender, +tympanic to percussion, +moderate distention  MSK: increased thoracic kyphosis  SKIN: No rashes or lesions  NERVOUS SYSTEM:  Alert & Oriented X3, at baseline, no focal deficits      LABS:  02-11    133<L>  |  97<L>  |  38<H>  ----------------------------<  196<H>  4.8   |  27  |  0.87  02-10    130<L>  |  93<L>  |  48<H>  ----------------------------<  169<H>  4.6   |  30  |  1.20  02-09    132<L>  |  94<L>  |  52<H>  ----------------------------<  208<H>  4.9   |  27  |  1.27    Ca    9.0      11 Feb 2023 05:19  Ca    8.7      10 Feb 2023 05:09  Ca    8.7      09 Feb 2023 15:00  Phos  3.1     02-11  Mg     1.90     02-11                                                8.3    8.54  )-----------( 280      ( 11 Feb 2023 05:19 )             25.6                         8.2    7.46  )-----------( 232      ( 10 Feb 2023 05:09 )             25.4                         7.9    8.94  )-----------( 204      ( 09 Feb 2023 17:43 )             24.1     CAPILLARY BLOOD GLUCOSE      POCT Blood Glucose.: 157 mg/dL (12 Feb 2023 07:24)  POCT Blood Glucose.: 174 mg/dL (11 Feb 2023 22:29)  POCT Blood Glucose.: 190 mg/dL (11 Feb 2023 20:29)  POCT Blood Glucose.: 257 mg/dL (11 Feb 2023 16:35)  POCT Blood Glucose.: 357 mg/dL (11 Feb 2023 11:55)  POCT Blood Glucose.: 373 mg/dL (11 Feb 2023 11:24)  POCT Blood Glucose.: 520 mg/dL (11 Feb 2023 11:23)      RADIOLOGY & ADDITIONAL TESTS:    Imaging Personally Reviewed:  [x ] YES  [ ] NO    Consultants involved in case:   Consultant(s) Notes Reviewed:  [ x] YES  [ ] NO:   Care Discussed with Consultants/Other Providers [x ] YES  [ ] NO         ***************************************************************  Miky Munguia, PGY1  Internal Medicine   pager:  [facility]: [phone] [facility]: [phone]  ***************************************************************    CURTIS DIAZ  67y  MRN: [id number]    Patient is a 67y old  Male who presents with a chief complaint of confusion, dizziness (11 Feb 2023 07:24)      Interval/Overnight Events: no events ON.   - off zosyn now.   - pending LINDSEY placement  - will do suppository today, if still distended, enema    Subjective: Pt seen and examined at bedside. Denies fever, CP, SOB, abn pain, N/V, dysuria. Tolerating diet.      MEDICATIONS  (STANDING):  atorvastatin 40 milliGRAM(s) Oral at bedtime  bisacodyl Suppository 10 milliGRAM(s) Rectal daily  cholecalciferol 1000 Unit(s) Oral daily  cloZAPine 100 milliGRAM(s) Oral at bedtime  dextrose 5%. 1000 milliLiter(s) (100 mL/Hr) IV Continuous <Continuous>  dextrose 5%. 1000 milliLiter(s) (50 mL/Hr) IV Continuous <Continuous>  dextrose 50% Injectable 25 Gram(s) IV Push once  dextrose 50% Injectable 12.5 Gram(s) IV Push once  dextrose 50% Injectable 25 Gram(s) IV Push once  divalproex  milliGRAM(s) Oral at bedtime  enoxaparin Injectable 40 milliGRAM(s) SubCutaneous every 24 hours  ferrous    sulfate 325 milliGRAM(s) Oral at bedtime  finasteride 5 milliGRAM(s) Oral daily  glucagon  Injectable 1 milliGRAM(s) IntraMuscular once  insulin glargine Injectable (LANTUS) 5 Unit(s) SubCutaneous at bedtime  insulin lispro (ADMELOG) corrective regimen sliding scale   SubCutaneous three times a day before meals  insulin lispro (ADMELOG) corrective regimen sliding scale   SubCutaneous at bedtime  insulin lispro Injectable (ADMELOG) 7 Unit(s) SubCutaneous three times a day before meals  latanoprost 0.005% Ophthalmic Solution 1 Drop(s) Both EYES at bedtime  levothyroxine 75 MICROGram(s) Oral daily  multivitamin 1 Tablet(s) Oral daily  polyethylene glycol 3350 17 Gram(s) Oral two times a day  senna 2 Tablet(s) Oral two times a day  tamsulosin 0.8 milliGRAM(s) Oral at bedtime  timolol 0.5% Solution 1 Drop(s) Both EYES two times a day  venlafaxine XR. 75 milliGRAM(s) Oral at bedtime    MEDICATIONS  (PRN):  dextrose Oral Gel 15 Gram(s) Oral once PRN Blood Glucose LESS THAN 70 milliGRAM(s)/deciliter  guaifenesin/dextromethorphan Oral Liquid 10 milliLiter(s) Oral every 6 hours PRN Cough  OLANZapine Injectable 2.5 milliGRAM(s) IntraMuscular every 6 hours PRN agitation  ondansetron Injectable 4 milliGRAM(s) IV Push four times a day PRN Nausea and/or Vomiting      Objective:    Vitals: Vital Signs Last 24 Hrs  T(C): 36.4 (02-12-23 @ 04:30), Max: 36.7 (02-11-23 @ 17:58)  T(F): 97.5 (02-12-23 @ 04:30), Max: 98 (02-11-23 @ 17:58)  HR: 68 (02-12-23 @ 04:30) (65 - 68)  BP: 107/48 (02-12-23 @ 04:30) (102/49 - 112/56)  BP(mean): --  RR: 18 (02-12-23 @ 04:30) (17 - 18)  SpO2: 95% (02-12-23 @ 04:30) (95% - 95%)                I&O's Summary    11 Feb 2023 07:01  -  12 Feb 2023 07:00  --------------------------------------------------------  IN: 400 mL / OUT: 800 mL / NET: -400 mL        PHYSICAL EXAM:  GENERAL: NAD  HEAD:  Atraumatic, Normocephalic; wearing NC  EYES: EOMI, conjunctiva and sclera clear  CHEST/LUNG: upper airway coarse crackles, poor auscultation of lung sounds on posterior fields  HEART: Regular rate and rhythm; No murmurs, rubs, or gallops  ABDOMEN: Soft, Nontender, +tympanic to percussion, +moderate distention  MSK: increased thoracic kyphosis  SKIN: No rashes or lesions  NERVOUS SYSTEM:  Alert & Oriented X3, at baseline, no focal deficits      LABS:  02-11    133<L>  |  97<L>  |  38<H>  ----------------------------<  196<H>  4.8   |  27  |  0.87  02-10    130<L>  |  93<L>  |  48<H>  ----------------------------<  169<H>  4.6   |  30  |  1.20  02-09    132<L>  |  94<L>  |  52<H>  ----------------------------<  208<H>  4.9   |  27  |  1.27    Ca    9.0      11 Feb 2023 05:19  Ca    8.7      10 Feb 2023 05:09  Ca    8.7      09 Feb 2023 15:00  Phos  3.1     02-11  Mg     1.90     02-11                                                8.3    8.54  )-----------( 280      ( 11 Feb 2023 05:19 )             25.6                         8.2    7.46  )-----------( 232      ( 10 Feb 2023 05:09 )             25.4                         7.9    8.94  )-----------( 204      ( 09 Feb 2023 17:43 )             24.1     CAPILLARY BLOOD GLUCOSE      POCT Blood Glucose.: 157 mg/dL (12 Feb 2023 07:24)  POCT Blood Glucose.: 174 mg/dL (11 Feb 2023 22:29)  POCT Blood Glucose.: 190 mg/dL (11 Feb 2023 20:29)  POCT Blood Glucose.: 257 mg/dL (11 Feb 2023 16:35)  POCT Blood Glucose.: 357 mg/dL (11 Feb 2023 11:55)  POCT Blood Glucose.: 373 mg/dL (11 Feb 2023 11:24)  POCT Blood Glucose.: 520 mg/dL (11 Feb 2023 11:23)      RADIOLOGY & ADDITIONAL TESTS:    Imaging Personally Reviewed:  [x ] YES  [ ] NO    Consultants involved in case:   Consultant(s) Notes Reviewed:  [ x] YES  [ ] NO:   Care Discussed with Consultants/Other Providers [x ] YES  [ ] NO

## 2023-02-12 NOTE — PROGRESS NOTE ADULT - PROBLEM SELECTOR PLAN 11
- restart home finasteride  - restart home tamsulosin -- increased to 0.8mg po qhs  - garcia placed 2/3/23, f/u o/p urology for ToV - c/w finasteride  - c/w tamsulosin -- increased to 0.8mg po qhs  - garcia placed 2/3/23, f/u o/p urology for ToV

## 2023-02-13 ENCOUNTER — TRANSCRIPTION ENCOUNTER (OUTPATIENT)
Age: 68
End: 2023-02-13

## 2023-02-13 VITALS
DIASTOLIC BLOOD PRESSURE: 56 MMHG | OXYGEN SATURATION: 97 % | RESPIRATION RATE: 17 BRPM | TEMPERATURE: 98 F | SYSTOLIC BLOOD PRESSURE: 107 MMHG | HEART RATE: 104 BPM

## 2023-02-13 LAB
ANION GAP SERPL CALC-SCNC: 7 MMOL/L — SIGNIFICANT CHANGE UP (ref 7–14)
BUN SERPL-MCNC: 30 MG/DL — HIGH (ref 7–23)
CALCIUM SERPL-MCNC: 8.9 MG/DL — SIGNIFICANT CHANGE UP (ref 8.4–10.5)
CHLORIDE SERPL-SCNC: 99 MMOL/L — SIGNIFICANT CHANGE UP (ref 98–107)
CO2 SERPL-SCNC: 28 MMOL/L — SIGNIFICANT CHANGE UP (ref 22–31)
CREAT SERPL-MCNC: 0.89 MG/DL — SIGNIFICANT CHANGE UP (ref 0.5–1.3)
EGFR: 93 ML/MIN/1.73M2 — SIGNIFICANT CHANGE UP
GLUCOSE BLDC GLUCOMTR-MCNC: 172 MG/DL — HIGH (ref 70–99)
GLUCOSE BLDC GLUCOMTR-MCNC: 173 MG/DL — HIGH (ref 70–99)
GLUCOSE BLDC GLUCOMTR-MCNC: 227 MG/DL — HIGH (ref 70–99)
GLUCOSE SERPL-MCNC: 186 MG/DL — HIGH (ref 70–99)
HCT VFR BLD CALC: 24.8 % — LOW (ref 39–50)
HGB BLD-MCNC: 8.1 G/DL — LOW (ref 13–17)
MAGNESIUM SERPL-MCNC: 2 MG/DL — SIGNIFICANT CHANGE UP (ref 1.6–2.6)
MCHC RBC-ENTMCNC: 30.7 PG — SIGNIFICANT CHANGE UP (ref 27–34)
MCHC RBC-ENTMCNC: 32.7 GM/DL — SIGNIFICANT CHANGE UP (ref 32–36)
MCV RBC AUTO: 93.9 FL — SIGNIFICANT CHANGE UP (ref 80–100)
NRBC # BLD: 0 /100 WBCS — SIGNIFICANT CHANGE UP (ref 0–0)
NRBC # FLD: 0 K/UL — SIGNIFICANT CHANGE UP (ref 0–0)
PHOSPHATE SERPL-MCNC: 3.6 MG/DL — SIGNIFICANT CHANGE UP (ref 2.5–4.5)
PLATELET # BLD AUTO: 353 K/UL — SIGNIFICANT CHANGE UP (ref 150–400)
POTASSIUM SERPL-MCNC: 5.1 MMOL/L — SIGNIFICANT CHANGE UP (ref 3.5–5.3)
POTASSIUM SERPL-SCNC: 5.1 MMOL/L — SIGNIFICANT CHANGE UP (ref 3.5–5.3)
RBC # BLD: 2.64 M/UL — LOW (ref 4.2–5.8)
RBC # FLD: 14.6 % — HIGH (ref 10.3–14.5)
SARS-COV-2 RNA SPEC QL NAA+PROBE: SIGNIFICANT CHANGE UP
SODIUM SERPL-SCNC: 134 MMOL/L — LOW (ref 135–145)
WBC # BLD: 6.59 K/UL — SIGNIFICANT CHANGE UP (ref 3.8–10.5)
WBC # FLD AUTO: 6.59 K/UL — SIGNIFICANT CHANGE UP (ref 3.8–10.5)

## 2023-02-13 PROCEDURE — 99239 HOSP IP/OBS DSCHRG MGMT >30: CPT | Mod: GC

## 2023-02-13 RX ADMIN — ENOXAPARIN SODIUM 40 MILLIGRAM(S): 100 INJECTION SUBCUTANEOUS at 11:17

## 2023-02-13 RX ADMIN — POLYETHYLENE GLYCOL 3350 17 GRAM(S): 17 POWDER, FOR SOLUTION ORAL at 05:08

## 2023-02-13 RX ADMIN — Medication 1: at 08:40

## 2023-02-13 RX ADMIN — Medication 1 DROP(S): at 17:31

## 2023-02-13 RX ADMIN — Medication 75 MICROGRAM(S): at 05:09

## 2023-02-13 RX ADMIN — Medication 1 TABLET(S): at 11:19

## 2023-02-13 RX ADMIN — Medication 1000 UNIT(S): at 11:18

## 2023-02-13 RX ADMIN — Medication 7 UNIT(S): at 08:40

## 2023-02-13 RX ADMIN — Medication 2: at 11:47

## 2023-02-13 RX ADMIN — Medication 1 DROP(S): at 05:08

## 2023-02-13 RX ADMIN — Medication 7 UNIT(S): at 16:41

## 2023-02-13 RX ADMIN — FINASTERIDE 5 MILLIGRAM(S): 5 TABLET, FILM COATED ORAL at 11:17

## 2023-02-13 RX ADMIN — SENNA PLUS 2 TABLET(S): 8.6 TABLET ORAL at 05:11

## 2023-02-13 RX ADMIN — Medication 7 UNIT(S): at 11:47

## 2023-02-13 RX ADMIN — Medication 1: at 16:40

## 2023-02-13 NOTE — PROGRESS NOTE ADULT - SUBJECTIVE AND OBJECTIVE BOX
***************************************************************  Miky Munguia, PGY1  Internal Medicine   pager:  [facility]: [phone] [facility]: [phone]  ***************************************************************    CURTIS DIAZ  67y  MRN: [id number]    Patient is a 67y old  Male who presents with a chief complaint of confusion, dizziness (12 Feb 2023 08:01)    Interval/Overnight Events: no events ON.   - pending [facility]    Subjective: Pt seen and examined at bedside. Denies fever, CP, SOB, abn pain, N/V, dysuria. Tolerating diet.      MEDICATIONS  (STANDING):  albuterol/ipratropium for Nebulization. 3 milliLiter(s) Nebulizer once  atorvastatin 40 milliGRAM(s) Oral at bedtime  bisacodyl Suppository 10 milliGRAM(s) Rectal daily  cholecalciferol 1000 Unit(s) Oral daily  cloZAPine 100 milliGRAM(s) Oral at bedtime  dextrose 5%. 1000 milliLiter(s) (50 mL/Hr) IV Continuous <Continuous>  dextrose 5%. 1000 milliLiter(s) (100 mL/Hr) IV Continuous <Continuous>  dextrose 50% Injectable 25 Gram(s) IV Push once  dextrose 50% Injectable 12.5 Gram(s) IV Push once  dextrose 50% Injectable 25 Gram(s) IV Push once  divalproex  milliGRAM(s) Oral at bedtime  enoxaparin Injectable 40 milliGRAM(s) SubCutaneous every 24 hours  ferrous    sulfate 325 milliGRAM(s) Oral at bedtime  finasteride 5 milliGRAM(s) Oral daily  glucagon  Injectable 1 milliGRAM(s) IntraMuscular once  insulin glargine Injectable (LANTUS) 5 Unit(s) SubCutaneous at bedtime  insulin lispro (ADMELOG) corrective regimen sliding scale   SubCutaneous three times a day before meals  insulin lispro (ADMELOG) corrective regimen sliding scale   SubCutaneous at bedtime  insulin lispro Injectable (ADMELOG) 7 Unit(s) SubCutaneous three times a day before meals  latanoprost 0.005% Ophthalmic Solution 1 Drop(s) Both EYES at bedtime  levothyroxine 75 MICROGram(s) Oral daily  multivitamin 1 Tablet(s) Oral daily  polyethylene glycol 3350 17 Gram(s) Oral two times a day  senna 2 Tablet(s) Oral two times a day  tamsulosin 0.8 milliGRAM(s) Oral at bedtime  timolol 0.5% Solution 1 Drop(s) Both EYES two times a day  venlafaxine XR. 75 milliGRAM(s) Oral at bedtime    MEDICATIONS  (PRN):  dextrose Oral Gel 15 Gram(s) Oral once PRN Blood Glucose LESS THAN 70 milliGRAM(s)/deciliter  guaifenesin/dextromethorphan Oral Liquid 10 milliLiter(s) Oral every 6 hours PRN Cough  OLANZapine Injectable 2.5 milliGRAM(s) IntraMuscular every 6 hours PRN agitation  ondansetron Injectable 4 milliGRAM(s) IV Push four times a day PRN Nausea and/or Vomiting    Objective:    Vitals: Vital Signs Last 24 Hrs  T(C): 36.8 (02-13-23 @ 04:30), Max: 36.9 (02-12-23 @ 22:14)  T(F): 98.2 (02-13-23 @ 04:30), Max: 98.4 (02-12-23 @ 22:14)  HR: 95 (02-13-23 @ 04:30) (70 - 95)  BP: 117/76 (02-13-23 @ 04:30) (101/51 - 117/76)  BP(mean): --  RR: 16 (02-13-23 @ 04:30) (16 - 17)  SpO2: 99% (02-13-23 @ 04:30) (94% - 100%)                I&O's Summary    12 Feb 2023 07:01  -  13 Feb 2023 07:00  --------------------------------------------------------  IN: 240 mL / OUT: 1000 mL / NET: -760 mL    PHYSICAL EXAM:  GENERAL: NAD  HEAD:  Atraumatic, Normocephalic  EYES: EOMI, conjunctiva and sclera clear  CHEST/LUNG: Clear to auscultation bilaterally; No rales, rhonchi, wheezing, or rubs  HEART: Regular rate and rhythm; No murmurs, rubs, or gallops  ABDOMEN: mildly distended, otherwise soft, Nontender  SKIN: No rashes or lesions  NERVOUS SYSTEM:  Alert & Oriented X3, no focal deficits    LABS:  02-13    134<L>  |  99  |  30<H>  ----------------------------<  186<H>  5.1   |  28  |  0.89  02-11    133<L>  |  97<L>  |  38<H>  ----------------------------<  196<H>  4.8   |  27  |  0.87    Ca    8.9      13 Feb 2023 04:45  Ca    9.0      11 Feb 2023 05:19  Phos  3.6     02-13  Mg     2.00     02-13                        8.1    6.59  )-----------( 353      ( 13 Feb 2023 04:45 )             24.8                         8.3    8.54  )-----------( 280      ( 11 Feb 2023 05:19 )             25.6     CAPILLARY BLOOD GLUCOSE      POCT Blood Glucose.: 268 mg/dL (12 Feb 2023 21:35)  POCT Blood Glucose.: 284 mg/dL (12 Feb 2023 16:39)  POCT Blood Glucose.: 233 mg/dL (12 Feb 2023 11:36)      RADIOLOGY & ADDITIONAL TESTS:    Imaging Personally Reviewed:  [x ] YES  [ ] NO    Consultants involved in case:   Consultant(s) Notes Reviewed:  [ x] YES  [ ] NO:   Care Discussed with Consultants/Other Providers [x ] YES  [ ] NO         ***************************************************************  Miky Munguia, PGY1  Internal Medicine   pager:  [facility]: [phone] [facility]: [phone]  ***************************************************************    CURTIS DIAZ  67y  MRN: [id number]    Patient is a 67y old  Male who presents with a chief complaint of confusion, dizziness (12 Feb 2023 08:01)  Interval/Overnight Events: no events ON.   - pending [facility]    Subjective: Pt seen and examined at bedside. Denies fever, CP, SOB, abn pain, N/V, dysuria. Tolerating diet.      MEDICATIONS  (STANDING):  albuterol/ipratropium for Nebulization. 3 milliLiter(s) Nebulizer once  atorvastatin 40 milliGRAM(s) Oral at bedtime  bisacodyl Suppository 10 milliGRAM(s) Rectal daily  cholecalciferol 1000 Unit(s) Oral daily  cloZAPine 100 milliGRAM(s) Oral at bedtime  dextrose 5%. 1000 milliLiter(s) (50 mL/Hr) IV Continuous <Continuous>  dextrose 5%. 1000 milliLiter(s) (100 mL/Hr) IV Continuous <Continuous>  dextrose 50% Injectable 25 Gram(s) IV Push once  dextrose 50% Injectable 12.5 Gram(s) IV Push once  dextrose 50% Injectable 25 Gram(s) IV Push once  divalproex  milliGRAM(s) Oral at bedtime  enoxaparin Injectable 40 milliGRAM(s) SubCutaneous every 24 hours  ferrous    sulfate 325 milliGRAM(s) Oral at bedtime  finasteride 5 milliGRAM(s) Oral daily  glucagon  Injectable 1 milliGRAM(s) IntraMuscular once  insulin glargine Injectable (LANTUS) 5 Unit(s) SubCutaneous at bedtime  insulin lispro (ADMELOG) corrective regimen sliding scale   SubCutaneous three times a day before meals  insulin lispro (ADMELOG) corrective regimen sliding scale   SubCutaneous at bedtime  insulin lispro Injectable (ADMELOG) 7 Unit(s) SubCutaneous three times a day before meals  latanoprost 0.005% Ophthalmic Solution 1 Drop(s) Both EYES at bedtime  levothyroxine 75 MICROGram(s) Oral daily  multivitamin 1 Tablet(s) Oral daily  polyethylene glycol 3350 17 Gram(s) Oral two times a day  senna 2 Tablet(s) Oral two times a day  tamsulosin 0.8 milliGRAM(s) Oral at bedtime  timolol 0.5% Solution 1 Drop(s) Both EYES two times a day  venlafaxine XR. 75 milliGRAM(s) Oral at bedtime    MEDICATIONS  (PRN):  dextrose Oral Gel 15 Gram(s) Oral once PRN Blood Glucose LESS THAN 70 milliGRAM(s)/deciliter  guaifenesin/dextromethorphan Oral Liquid 10 milliLiter(s) Oral every 6 hours PRN Cough  OLANZapine Injectable 2.5 milliGRAM(s) IntraMuscular every 6 hours PRN agitation  ondansetron Injectable 4 milliGRAM(s) IV Push four times a day PRN Nausea and/or Vomiting    Objective:    Vitals: Vital Signs Last 24 Hrs  T(C): 36.8 (02-13-23 @ 04:30), Max: 36.9 (02-12-23 @ 22:14)  T(F): 98.2 (02-13-23 @ 04:30), Max: 98.4 (02-12-23 @ 22:14)  HR: 95 (02-13-23 @ 04:30) (70 - 95)  BP: 117/76 (02-13-23 @ 04:30) (101/51 - 117/76)  BP(mean): --  RR: 16 (02-13-23 @ 04:30) (16 - 17)  SpO2: 99% (02-13-23 @ 04:30) (94% - 100%)                I&O's Summary    12 Feb 2023 07:01  -  13 Feb 2023 07:00  --------------------------------------------------------  IN: 240 mL / OUT: 1000 mL / NET: -760 mL    PHYSICAL EXAM:  GENERAL: NAD  HEAD:  Atraumatic, Normocephalic  EYES: EOMI, conjunctiva and sclera clear  CHEST/LUNG: Clear to auscultation bilaterally; No rales, rhonchi, wheezing, or rubs  HEART: Regular rate and rhythm; No murmurs, rubs, or gallops  ABDOMEN: mildly distended, otherwise soft, Nontender  SKIN: No rashes or lesions  NERVOUS SYSTEM:  Alert & Oriented X3, no focal deficits    LABS:  02-13    134<L>  |  99  |  30<H>  ----------------------------<  186<H>  5.1   |  28  |  0.89  02-11    133<L>  |  97<L>  |  38<H>  ----------------------------<  196<H>  4.8   |  27  |  0.87    Ca    8.9      13 Feb 2023 04:45  Ca    9.0      11 Feb 2023 05:19  Phos  3.6     02-13  Mg     2.00     02-13                        8.1    6.59  )-----------( 353      ( 13 Feb 2023 04:45 )             24.8                         8.3    8.54  )-----------( 280      ( 11 Feb 2023 05:19 )             25.6     CAPILLARY BLOOD GLUCOSE      POCT Blood Glucose.: 268 mg/dL (12 Feb 2023 21:35)  POCT Blood Glucose.: 284 mg/dL (12 Feb 2023 16:39)  POCT Blood Glucose.: 233 mg/dL (12 Feb 2023 11:36)      RADIOLOGY & ADDITIONAL TESTS:    Imaging Personally Reviewed:  [x ] YES  [ ] NO    Consultants involved in case:   Consultant(s) Notes Reviewed:  [ x] YES  [ ] NO:   Care Discussed with Consultants/Other Providers [x ] YES  [ ] NO         ***************************************************************  Miky Munguia, PGY1  Internal Medicine   pager:  [facility]: [phone] [facility]: [phone]  ***************************************************************    CURTIS DIAZ  67y  MRN: [id number]    Patient is a 67y old  Male who presents with a chief complaint of confusion, dizziness (12 Feb 2023 08:01)    Interval/Overnight Events: no events ON.   - dc [facility] today    Subjective: Pt seen and examined at bedside. Denies fever, CP, SOB, abn pain, N/V, dysuria. Tolerating diet.      MEDICATIONS  (STANDING):  albuterol/ipratropium for Nebulization. 3 milliLiter(s) Nebulizer once  atorvastatin 40 milliGRAM(s) Oral at bedtime  bisacodyl Suppository 10 milliGRAM(s) Rectal daily  cholecalciferol 1000 Unit(s) Oral daily  cloZAPine 100 milliGRAM(s) Oral at bedtime  dextrose 5%. 1000 milliLiter(s) (50 mL/Hr) IV Continuous <Continuous>  dextrose 5%. 1000 milliLiter(s) (100 mL/Hr) IV Continuous <Continuous>  dextrose 50% Injectable 25 Gram(s) IV Push once  dextrose 50% Injectable 12.5 Gram(s) IV Push once  dextrose 50% Injectable 25 Gram(s) IV Push once  divalproex  milliGRAM(s) Oral at bedtime  enoxaparin Injectable 40 milliGRAM(s) SubCutaneous every 24 hours  ferrous    sulfate 325 milliGRAM(s) Oral at bedtime  finasteride 5 milliGRAM(s) Oral daily  glucagon  Injectable 1 milliGRAM(s) IntraMuscular once  insulin glargine Injectable (LANTUS) 5 Unit(s) SubCutaneous at bedtime  insulin lispro (ADMELOG) corrective regimen sliding scale   SubCutaneous three times a day before meals  insulin lispro (ADMELOG) corrective regimen sliding scale   SubCutaneous at bedtime  insulin lispro Injectable (ADMELOG) 7 Unit(s) SubCutaneous three times a day before meals  latanoprost 0.005% Ophthalmic Solution 1 Drop(s) Both EYES at bedtime  levothyroxine 75 MICROGram(s) Oral daily  multivitamin 1 Tablet(s) Oral daily  polyethylene glycol 3350 17 Gram(s) Oral two times a day  senna 2 Tablet(s) Oral two times a day  tamsulosin 0.8 milliGRAM(s) Oral at bedtime  timolol 0.5% Solution 1 Drop(s) Both EYES two times a day  venlafaxine XR. 75 milliGRAM(s) Oral at bedtime    MEDICATIONS  (PRN):  dextrose Oral Gel 15 Gram(s) Oral once PRN Blood Glucose LESS THAN 70 milliGRAM(s)/deciliter  guaifenesin/dextromethorphan Oral Liquid 10 milliLiter(s) Oral every 6 hours PRN Cough  OLANZapine Injectable 2.5 milliGRAM(s) IntraMuscular every 6 hours PRN agitation  ondansetron Injectable 4 milliGRAM(s) IV Push four times a day PRN Nausea and/or Vomiting    Objective:    Vitals: Vital Signs Last 24 Hrs  T(C): 36.8 (02-13-23 @ 04:30), Max: 36.9 (02-12-23 @ 22:14)  T(F): 98.2 (02-13-23 @ 04:30), Max: 98.4 (02-12-23 @ 22:14)  HR: 95 (02-13-23 @ 04:30) (70 - 95)  BP: 117/76 (02-13-23 @ 04:30) (101/51 - 117/76)  BP(mean): --  RR: 16 (02-13-23 @ 04:30) (16 - 17)  SpO2: 99% (02-13-23 @ 04:30) (94% - 100%)                I&O's Summary    12 Feb 2023 07:01  -  13 Feb 2023 07:00  --------------------------------------------------------  IN: 240 mL / OUT: 1000 mL / NET: -760 mL    PHYSICAL EXAM:  GENERAL: NAD  HEAD:  Atraumatic, Normocephalic  EYES: EOMI, conjunctiva and sclera clear  CHEST/LUNG: Clear to auscultation bilaterally; No rales, rhonchi, wheezing, or rubs  HEART: Regular rate and rhythm; No murmurs, rubs, or gallops  ABDOMEN: mildly distended, otherwise soft, Nontender  SKIN: No rashes or lesions  NERVOUS SYSTEM:  Alert & Oriented X3, no focal deficits    LABS:  02-13    134<L>  |  99  |  30<H>  ----------------------------<  186<H>  5.1   |  28  |  0.89  02-11    133<L>  |  97<L>  |  38<H>  ----------------------------<  196<H>  4.8   |  27  |  0.87    Ca    8.9      13 Feb 2023 04:45  Ca    9.0      11 Feb 2023 05:19  Phos  3.6     02-13  Mg     2.00     02-13                        8.1    6.59  )-----------( 353      ( 13 Feb 2023 04:45 )             24.8                         8.3    8.54  )-----------( 280      ( 11 Feb 2023 05:19 )             25.6     CAPILLARY BLOOD GLUCOSE      POCT Blood Glucose.: 268 mg/dL (12 Feb 2023 21:35)  POCT Blood Glucose.: 284 mg/dL (12 Feb 2023 16:39)  POCT Blood Glucose.: 233 mg/dL (12 Feb 2023 11:36)      RADIOLOGY & ADDITIONAL TESTS:    Imaging Personally Reviewed:  [x ] YES  [ ] NO    Consultants involved in case:   Consultant(s) Notes Reviewed:  [ x] YES  [ ] NO:   Care Discussed with Consultants/Other Providers [x ] YES  [ ] NO

## 2023-02-13 NOTE — DISCHARGE NOTE NURSING/CASE MANAGEMENT/SOCIAL WORK - PATIENT PORTAL LINK FT
You can access the FollowMyHealth Patient Portal offered by [facility] by registering at the following website: [email or web address]. By joining [facility]’s FollowMyHealth portal, you will also be able to view your health information using other applications (apps) compatible with our system.

## 2023-02-13 NOTE — PROGRESS NOTE ADULT - PROBLEM SELECTOR PLAN 6
On metformin 1g BID, januvia 100mg qd, glipizide 5mg qd - hold inpatient. A1C 6.4    - Basal/Bolus 5U / 3U + low ISS; titrate as needed. On metformin 1g BID, januvia 100mg qd, glipizide 5mg qd - hold inpatient. A1C 6.4    - Basal/Bolus 5U / 7U + low ISS; titrate as needed.

## 2023-02-13 NOTE — PROGRESS NOTE ADULT - PROBLEM SELECTOR PLAN 1
CT Chest w/ diffuse anasarca with pleural effusion, atelectasis, edema w/ opacity c/f pneumonia, likely source of instability. Course c/b hypothermia, bradycardia, hypotension on 1/31. 1/30 BCx x2, UCx NGTD. MRSA negative, s/p vanc 1/31-2/3. s/p zosyn for 7 day course (1/31 - 2/6) (2/9-12)    - requiring supplemental O2 via 2L NC; unable to be weaned to RA currently  - likely d/c on O2, wean at [facility] if possible  - o/p pulm f/u for pleural effusions, repeat CT chest CT Chest w/ diffuse anasarca with pleural effusion, atelectasis, edema w/ opacity c/f pneumonia, likely source of instability. Course c/b hypothermia, bradycardia, hypotension on 1/31. 1/30 BCx x2, UCx NGTD. MRSA negative, s/p vanc 1/31-2/3. s/p zosyn for 7 day course (1/31 - 2/6) (2/9-12)    - requiring supplemental O2 via 2-3L NC; unable to be weaned to RA currently  - likely d/c on O2, wean at [facility] if possible  - o/p pulm f/u for pleural effusions, repeat CT chest

## 2023-02-13 NOTE — DISCHARGE NOTE NURSING/CASE MANAGEMENT/SOCIAL WORK - NSDCVIVACCINE_GEN_ALL_CORE_FT
Tdap; 22-Dec-2020 14:05; Macy Yanez (BRANDON); [employer]; [id number] (Exp. Date: 31-Jul-2022); IntraMuscular; Deltoid Left.; 0.5 milliLiter(s); VIS (VIS Published: 09-May-2013, VIS Presented: 22-Dec-2020);

## 2023-02-13 NOTE — PROGRESS NOTE ADULT - PROVIDER SPECIALTY LIST ADULT
Endocrinology
Internal Medicine
Neurology
Endocrinology
Neurology
Internal Medicine
Endocrinology
Internal Medicine

## 2023-02-13 NOTE — PROGRESS NOTE ADULT - PROBLEM SELECTOR PLAN 2
Desaturations x 2 to low 80s on room air while sleeping overnight. Could be due to sleep apnea vs some component of aspiration pneumonia given his mental status change. CT Chest showed pleural effusion b/l, atelectasis, consolidation c/f PNA. Currently on 2 L NC.    - wean NC as tolerated  - c/w abx as above  - offer incentive spirometer Desaturations x 2 to low 80s on room air while sleeping overnight. Could be due to sleep apnea vs some component of aspiration pneumonia given his mental status change. CT Chest showed pleural effusion b/l, atelectasis, consolidation c/f PNA. Currently on 2 L NC.    - wean NC as tolerated  - c/w abx as above  - c/w incentive spirometer

## 2023-02-13 NOTE — PROGRESS NOTE ADULT - PROBLEM/PLAN-5
DISPLAY PLAN FREE TEXT
unknown
DISPLAY PLAN FREE TEXT

## 2023-02-13 NOTE — PROGRESS NOTE ADULT - PROBLEM SELECTOR PLAN 3
Anemia and thrombocytopenia noted on admission. Patient recently had clozapine dosage decreased for c/f agranulocytosis, WBC wnl on admission but may be falsely low. Negative LDH, retic, haptoglobin, blue top - neg for hemolysis. Folate, b12, ferritin, iron studies - unremarkable. No signs of active bleed, but of note had precancerous polyp removed 3 years ago per brother and had trouble getting repeat C-scope done since.     - heme consulted; recs appreciated  - f/u outpatient PMD for C-scope  - trend CBC, transfuse as necessary

## 2023-02-13 NOTE — PROGRESS NOTE ADULT - PROBLEM SELECTOR PLAN 7
- psych consulted; recs appreciated   - increased clozapine to 75mg qHS, then 100mg qhs on Sunday 2/5.   - c/w Depakote to 750mg qHS, if thrombocytopenia worsens or patient continually lethargic would consider reducing to 500mg unless objections from neuro  - c/w Effexor as ordered, if persistently hyponatremic would assess for SIADH and consider stopping   - PRN for agitation Zyprexa 2.5mg q6h PRN PO/IM. - psych consulted; recs appreciated   - c/w clozapine 100mg qhs  - c/w Depakote to 750mg qHS, if thrombocytopenia worsens or patient continually lethargic would consider reducing to 500mg unless objections from neuro  - c/w Effexor as ordered, if persistently hyponatremic would assess for SIADH and consider stopping   - PRN for agitation Zyprexa 2.5mg q6h PRN PO/IM.

## 2023-02-13 NOTE — PROGRESS NOTE ADULT - ATTENDING COMMENTS
patient with above medical history presented with AMS iso aspiration pneumonia. mental status remain improved at baseline. completed abx course. repeat cultures were negative, further abx were d/c'd. Patient with faint rhonchi on lung exam. ?underline airway disease. trial of bronchodilators. remains stable for d/c to [facility]. f/u SW for placement. patient with above medical history presented with AMS iso aspiration pneumonia. mental status remain improved at baseline. completed abx course. repeat cultures were negative, further abx were d/c'd. Patient with faint rhonchi on lung exam. ?underline airway disease. trial of bronchodilators. remains stable for d/c to [facility]. f/u SW for placement.    I spent 35 minutes counseling this patient and coordinating their discharge.    I examined this patient and discussed their management with house staff on Feb 13, 2023.

## 2023-02-13 NOTE — PROGRESS NOTE ADULT - REASON FOR ADMISSION
confusion, dizziness

## 2023-02-13 NOTE — PROGRESS NOTE ADULT - PROBLEM SELECTOR PLAN 4
on lisinopril 10mg at home, holding d/t cough    - c/w norvasc 10 iso hyperkalemia, however held 2/8 as BPs soft on lisinopril 10mg at home, holding d/t cough    - holding BP meds given soft bp

## 2023-02-13 NOTE — PROGRESS NOTE ADULT - PROBLEM SELECTOR PLAN 8
- c/w finasteride  - c/w tamsulosin -- increased to 0.8mg po qhs  - garcia placed 2/3/23, f/u o/p urology for ToV - c/w finasteride  - c/w tamsulosin -- increased to 0.8mg po qhs - c/w finasteride  - c/w tamsulosin

## 2023-02-14 ENCOUNTER — NON-APPOINTMENT (OUTPATIENT)
Age: 68
End: 2023-02-14

## 2023-02-14 LAB
CULTURE RESULTS: SIGNIFICANT CHANGE UP
CULTURE RESULTS: SIGNIFICANT CHANGE UP
SPECIMEN SOURCE: SIGNIFICANT CHANGE UP
SPECIMEN SOURCE: SIGNIFICANT CHANGE UP

## 2023-02-22 NOTE — BH CONSULTATION LIAISON ASSESSMENT NOTE - PATIENT'S CHIEF COMPLAINT
It hurts"

## 2023-03-01 ENCOUNTER — INPATIENT (INPATIENT)
Facility: HOSPITAL | Age: 68
LOS: 6 days | Discharge: HOME | End: 2023-03-08
Attending: STUDENT IN AN ORGANIZED HEALTH CARE EDUCATION/TRAINING PROGRAM | Admitting: STUDENT IN AN ORGANIZED HEALTH CARE EDUCATION/TRAINING PROGRAM
Payer: MEDICARE

## 2023-03-01 VITALS
HEART RATE: 85 BPM | RESPIRATION RATE: 18 BRPM | SYSTOLIC BLOOD PRESSURE: 120 MMHG | TEMPERATURE: 98 F | OXYGEN SATURATION: 98 % | HEIGHT: 66 IN | DIASTOLIC BLOOD PRESSURE: 80 MMHG

## 2023-03-01 DIAGNOSIS — B99.9 UNSPECIFIED INFECTIOUS DISEASE: ICD-10-CM

## 2023-03-01 DIAGNOSIS — E87.1 HYPO-OSMOLALITY AND HYPONATREMIA: ICD-10-CM

## 2023-03-01 DIAGNOSIS — H40.9 UNSPECIFIED GLAUCOMA: ICD-10-CM

## 2023-03-01 DIAGNOSIS — F20.9 SCHIZOPHRENIA, UNSPECIFIED: ICD-10-CM

## 2023-03-01 DIAGNOSIS — G92.8 OTHER TOXIC ENCEPHALOPATHY: ICD-10-CM

## 2023-03-01 DIAGNOSIS — I10 ESSENTIAL (PRIMARY) HYPERTENSION: ICD-10-CM

## 2023-03-01 DIAGNOSIS — E03.9 HYPOTHYROIDISM, UNSPECIFIED: ICD-10-CM

## 2023-03-01 DIAGNOSIS — A41.9 SEPSIS, UNSPECIFIED ORGANISM: ICD-10-CM

## 2023-03-01 DIAGNOSIS — E11.9 TYPE 2 DIABETES MELLITUS WITHOUT COMPLICATIONS: ICD-10-CM

## 2023-03-01 DIAGNOSIS — Z29.9 ENCOUNTER FOR PROPHYLACTIC MEASURES, UNSPECIFIED: ICD-10-CM

## 2023-03-01 LAB
ALBUMIN SERPL ELPH-MCNC: 4 G/DL — SIGNIFICANT CHANGE UP (ref 3.3–5)
ALP SERPL-CCNC: 107 U/L — SIGNIFICANT CHANGE UP (ref 40–120)
ALT FLD-CCNC: 28 U/L — SIGNIFICANT CHANGE UP (ref 4–41)
ANION GAP SERPL CALC-SCNC: 10 MMOL/L — SIGNIFICANT CHANGE UP (ref 7–14)
ANION GAP SERPL CALC-SCNC: 11 MMOL/L — SIGNIFICANT CHANGE UP (ref 7–14)
APPEARANCE UR: CLEAR — SIGNIFICANT CHANGE UP
APTT BLD: 35.4 SEC — SIGNIFICANT CHANGE UP (ref 27–36.3)
AST SERPL-CCNC: 19 U/L — SIGNIFICANT CHANGE UP (ref 4–40)
B PERT DNA SPEC QL NAA+PROBE: SIGNIFICANT CHANGE UP
B PERT+PARAPERT DNA PNL SPEC NAA+PROBE: SIGNIFICANT CHANGE UP
BASE EXCESS BLDV CALC-SCNC: -1.4 MMOL/L — SIGNIFICANT CHANGE UP (ref -2–3)
BASOPHILS # BLD AUTO: 0.03 K/UL — SIGNIFICANT CHANGE UP (ref 0–0.2)
BASOPHILS NFR BLD AUTO: 0.3 % — SIGNIFICANT CHANGE UP (ref 0–2)
BILIRUB SERPL-MCNC: 0.2 MG/DL — SIGNIFICANT CHANGE UP (ref 0.2–1.2)
BILIRUB UR-MCNC: NEGATIVE — SIGNIFICANT CHANGE UP
BORDETELLA PARAPERTUSSIS (RAPRVP): SIGNIFICANT CHANGE UP
BUN SERPL-MCNC: 22 MG/DL — SIGNIFICANT CHANGE UP (ref 7–23)
BUN SERPL-MCNC: 23 MG/DL — SIGNIFICANT CHANGE UP (ref 7–23)
C PNEUM DNA SPEC QL NAA+PROBE: SIGNIFICANT CHANGE UP
CA-I SERPL-SCNC: 1.31 MMOL/L — SIGNIFICANT CHANGE UP (ref 1.15–1.33)
CALCIUM SERPL-MCNC: 8.8 MG/DL — SIGNIFICANT CHANGE UP (ref 8.4–10.5)
CALCIUM SERPL-MCNC: 9.4 MG/DL — SIGNIFICANT CHANGE UP (ref 8.4–10.5)
CHLORIDE BLDV-SCNC: 97 MMOL/L — SIGNIFICANT CHANGE UP (ref 96–108)
CHLORIDE SERPL-SCNC: 100 MMOL/L — SIGNIFICANT CHANGE UP (ref 98–107)
CHLORIDE SERPL-SCNC: 93 MMOL/L — LOW (ref 98–107)
CO2 BLDV-SCNC: 28.3 MMOL/L — HIGH (ref 22–26)
CO2 SERPL-SCNC: 22 MMOL/L — SIGNIFICANT CHANGE UP (ref 22–31)
CO2 SERPL-SCNC: 24 MMOL/L — SIGNIFICANT CHANGE UP (ref 22–31)
COLOR SPEC: SIGNIFICANT CHANGE UP
CREAT SERPL-MCNC: 0.65 MG/DL — SIGNIFICANT CHANGE UP (ref 0.5–1.3)
CREAT SERPL-MCNC: 0.75 MG/DL — SIGNIFICANT CHANGE UP (ref 0.5–1.3)
DIFF PNL FLD: NEGATIVE — SIGNIFICANT CHANGE UP
EGFR: 103 ML/MIN/1.73M2 — SIGNIFICANT CHANGE UP
EGFR: 98 ML/MIN/1.73M2 — SIGNIFICANT CHANGE UP
EOSINOPHIL # BLD AUTO: 0.04 K/UL — SIGNIFICANT CHANGE UP (ref 0–0.5)
EOSINOPHIL NFR BLD AUTO: 0.5 % — SIGNIFICANT CHANGE UP (ref 0–6)
FLUAV SUBTYP SPEC NAA+PROBE: SIGNIFICANT CHANGE UP
FLUBV RNA SPEC QL NAA+PROBE: SIGNIFICANT CHANGE UP
GAS PNL BLDV: 129 MMOL/L — LOW (ref 136–145)
GAS PNL BLDV: SIGNIFICANT CHANGE UP
GAS PNL BLDV: SIGNIFICANT CHANGE UP
GLUCOSE BLDV-MCNC: 91 MG/DL — SIGNIFICANT CHANGE UP (ref 70–99)
GLUCOSE SERPL-MCNC: 120 MG/DL — HIGH (ref 70–99)
GLUCOSE SERPL-MCNC: 213 MG/DL — HIGH (ref 70–99)
GLUCOSE UR QL: NEGATIVE — SIGNIFICANT CHANGE UP
HADV DNA SPEC QL NAA+PROBE: SIGNIFICANT CHANGE UP
HCO3 BLDV-SCNC: 26 MMOL/L — SIGNIFICANT CHANGE UP (ref 22–29)
HCOV 229E RNA SPEC QL NAA+PROBE: SIGNIFICANT CHANGE UP
HCOV HKU1 RNA SPEC QL NAA+PROBE: SIGNIFICANT CHANGE UP
HCOV NL63 RNA SPEC QL NAA+PROBE: SIGNIFICANT CHANGE UP
HCOV OC43 RNA SPEC QL NAA+PROBE: SIGNIFICANT CHANGE UP
HCT VFR BLD CALC: 31.5 % — LOW (ref 39–50)
HCT VFR BLDA CALC: 33 % — LOW (ref 39–51)
HGB BLD CALC-MCNC: 11.1 G/DL — LOW (ref 12.6–17.4)
HGB BLD-MCNC: 10.6 G/DL — LOW (ref 13–17)
HMPV RNA SPEC QL NAA+PROBE: SIGNIFICANT CHANGE UP
HPIV1 RNA SPEC QL NAA+PROBE: SIGNIFICANT CHANGE UP
HPIV2 RNA SPEC QL NAA+PROBE: SIGNIFICANT CHANGE UP
HPIV3 RNA SPEC QL NAA+PROBE: SIGNIFICANT CHANGE UP
HPIV4 RNA SPEC QL NAA+PROBE: SIGNIFICANT CHANGE UP
IANC: 7.42 K/UL — HIGH (ref 1.8–7.4)
IMM GRANULOCYTES NFR BLD AUTO: 0.5 % — SIGNIFICANT CHANGE UP (ref 0–0.9)
INR BLD: 0.97 RATIO — SIGNIFICANT CHANGE UP (ref 0.88–1.16)
KETONES UR-MCNC: NEGATIVE — SIGNIFICANT CHANGE UP
LACTATE BLDV-MCNC: 2 MMOL/L — SIGNIFICANT CHANGE UP (ref 0.5–2)
LEUKOCYTE ESTERASE UR-ACNC: NEGATIVE — SIGNIFICANT CHANGE UP
LYMPHOCYTES # BLD AUTO: 0.81 K/UL — LOW (ref 1–3.3)
LYMPHOCYTES # BLD AUTO: 9.1 % — LOW (ref 13–44)
M PNEUMO DNA SPEC QL NAA+PROBE: SIGNIFICANT CHANGE UP
MCHC RBC-ENTMCNC: 31.6 PG — SIGNIFICANT CHANGE UP (ref 27–34)
MCHC RBC-ENTMCNC: 33.7 GM/DL — SIGNIFICANT CHANGE UP (ref 32–36)
MCV RBC AUTO: 94 FL — SIGNIFICANT CHANGE UP (ref 80–100)
MONOCYTES # BLD AUTO: 0.54 K/UL — SIGNIFICANT CHANGE UP (ref 0–0.9)
MONOCYTES NFR BLD AUTO: 6.1 % — SIGNIFICANT CHANGE UP (ref 2–14)
NEUTROPHILS # BLD AUTO: 7.42 K/UL — HIGH (ref 1.8–7.4)
NEUTROPHILS NFR BLD AUTO: 83.5 % — HIGH (ref 43–77)
NITRITE UR-MCNC: NEGATIVE — SIGNIFICANT CHANGE UP
NRBC # BLD: 0 /100 WBCS — SIGNIFICANT CHANGE UP (ref 0–0)
NRBC # FLD: 0 K/UL — SIGNIFICANT CHANGE UP (ref 0–0)
PCO2 BLDV: 59 MMHG — HIGH (ref 42–55)
PH BLDV: 7.26 — LOW (ref 7.32–7.43)
PH UR: 6 — SIGNIFICANT CHANGE UP (ref 5–8)
PLATELET # BLD AUTO: 198 K/UL — SIGNIFICANT CHANGE UP (ref 150–400)
PO2 BLDV: 26 MMHG — SIGNIFICANT CHANGE UP (ref 25–45)
POTASSIUM BLDV-SCNC: 5 MMOL/L — SIGNIFICANT CHANGE UP (ref 3.5–5.1)
POTASSIUM SERPL-MCNC: 4.8 MMOL/L — SIGNIFICANT CHANGE UP (ref 3.5–5.3)
POTASSIUM SERPL-MCNC: 4.9 MMOL/L — SIGNIFICANT CHANGE UP (ref 3.5–5.3)
POTASSIUM SERPL-SCNC: 4.8 MMOL/L — SIGNIFICANT CHANGE UP (ref 3.5–5.3)
POTASSIUM SERPL-SCNC: 4.9 MMOL/L — SIGNIFICANT CHANGE UP (ref 3.5–5.3)
PROT SERPL-MCNC: 6.4 G/DL — SIGNIFICANT CHANGE UP (ref 6–8.3)
PROT UR-MCNC: ABNORMAL
PROTHROM AB SERPL-ACNC: 11.2 SEC — SIGNIFICANT CHANGE UP (ref 10.5–13.4)
RAPID RVP RESULT: SIGNIFICANT CHANGE UP
RBC # BLD: 3.35 M/UL — LOW (ref 4.2–5.8)
RBC # FLD: 14.8 % — HIGH (ref 10.3–14.5)
RSV RNA SPEC QL NAA+PROBE: SIGNIFICANT CHANGE UP
RV+EV RNA SPEC QL NAA+PROBE: SIGNIFICANT CHANGE UP
SAO2 % BLDV: 33.6 % — LOW (ref 67–88)
SARS-COV-2 RNA SPEC QL NAA+PROBE: SIGNIFICANT CHANGE UP
SODIUM SERPL-SCNC: 128 MMOL/L — LOW (ref 135–145)
SODIUM SERPL-SCNC: 132 MMOL/L — LOW (ref 135–145)
SP GR SPEC: 1.03 — SIGNIFICANT CHANGE UP (ref 1.01–1.05)
TROPONIN T, HIGH SENSITIVITY RESULT: 14 NG/L — SIGNIFICANT CHANGE UP
TROPONIN T, HIGH SENSITIVITY RESULT: 18 NG/L — SIGNIFICANT CHANGE UP
TSH SERPL-MCNC: 8.67 UIU/ML — HIGH (ref 0.27–4.2)
UROBILINOGEN FLD QL: SIGNIFICANT CHANGE UP
WBC # BLD: 8.88 K/UL — SIGNIFICANT CHANGE UP (ref 3.8–10.5)
WBC # FLD AUTO: 8.88 K/UL — SIGNIFICANT CHANGE UP (ref 3.8–10.5)

## 2023-03-01 PROCEDURE — 70498 CT ANGIOGRAPHY NECK: CPT | Mod: 26,MA

## 2023-03-01 PROCEDURE — 99223 1ST HOSP IP/OBS HIGH 75: CPT | Mod: GC

## 2023-03-01 PROCEDURE — 71045 X-RAY EXAM CHEST 1 VIEW: CPT | Mod: 26

## 2023-03-01 PROCEDURE — 99285 EMERGENCY DEPT VISIT HI MDM: CPT

## 2023-03-01 PROCEDURE — 70496 CT ANGIOGRAPHY HEAD: CPT | Mod: 26,MA

## 2023-03-01 RX ORDER — ATORVASTATIN CALCIUM 80 MG/1
40 TABLET, FILM COATED ORAL AT BEDTIME
Refills: 0 | Status: DISCONTINUED | OUTPATIENT
Start: 2023-03-01 | End: 2023-03-08

## 2023-03-01 RX ORDER — DEXTROSE 50 % IN WATER 50 %
15 SYRINGE (ML) INTRAVENOUS ONCE
Refills: 0 | Status: DISCONTINUED | OUTPATIENT
Start: 2023-03-01 | End: 2023-03-08

## 2023-03-01 RX ORDER — SODIUM CHLORIDE 9 MG/ML
1000 INJECTION INTRAMUSCULAR; INTRAVENOUS; SUBCUTANEOUS ONCE
Refills: 0 | Status: COMPLETED | OUTPATIENT
Start: 2023-03-01 | End: 2023-03-01

## 2023-03-01 RX ORDER — LEVOTHYROXINE SODIUM 125 MCG
75 TABLET ORAL DAILY
Refills: 0 | Status: DISCONTINUED | OUTPATIENT
Start: 2023-03-01 | End: 2023-03-02

## 2023-03-01 RX ORDER — GLUCAGON INJECTION, SOLUTION 0.5 MG/.1ML
1 INJECTION, SOLUTION SUBCUTANEOUS ONCE
Refills: 0 | Status: DISCONTINUED | OUTPATIENT
Start: 2023-03-01 | End: 2023-03-08

## 2023-03-01 RX ORDER — DEXTROSE 50 % IN WATER 50 %
25 SYRINGE (ML) INTRAVENOUS ONCE
Refills: 0 | Status: DISCONTINUED | OUTPATIENT
Start: 2023-03-01 | End: 2023-03-08

## 2023-03-01 RX ORDER — VANCOMYCIN HCL 1 G
1000 VIAL (EA) INTRAVENOUS EVERY 12 HOURS
Refills: 0 | Status: DISCONTINUED | OUTPATIENT
Start: 2023-03-02 | End: 2023-03-03

## 2023-03-01 RX ORDER — SODIUM CHLORIDE 9 MG/ML
1000 INJECTION, SOLUTION INTRAVENOUS
Refills: 0 | Status: DISCONTINUED | OUTPATIENT
Start: 2023-03-01 | End: 2023-03-08

## 2023-03-01 RX ORDER — DIVALPROEX SODIUM 500 MG/1
500 TABLET, DELAYED RELEASE ORAL EVERY 12 HOURS
Refills: 0 | Status: DISCONTINUED | OUTPATIENT
Start: 2023-03-01 | End: 2023-03-01

## 2023-03-01 RX ORDER — LATANOPROST 0.05 MG/ML
1 SOLUTION/ DROPS OPHTHALMIC; TOPICAL AT BEDTIME
Refills: 0 | Status: DISCONTINUED | OUTPATIENT
Start: 2023-03-01 | End: 2023-03-08

## 2023-03-01 RX ORDER — INSULIN LISPRO 100/ML
VIAL (ML) SUBCUTANEOUS AT BEDTIME
Refills: 0 | Status: DISCONTINUED | OUTPATIENT
Start: 2023-03-01 | End: 2023-03-08

## 2023-03-01 RX ORDER — DEXTROSE 50 % IN WATER 50 %
12.5 SYRINGE (ML) INTRAVENOUS ONCE
Refills: 0 | Status: DISCONTINUED | OUTPATIENT
Start: 2023-03-01 | End: 2023-03-08

## 2023-03-01 RX ORDER — ACETAMINOPHEN 500 MG
650 TABLET ORAL EVERY 6 HOURS
Refills: 0 | Status: DISCONTINUED | OUTPATIENT
Start: 2023-03-01 | End: 2023-03-08

## 2023-03-01 RX ORDER — PIPERACILLIN AND TAZOBACTAM 4; .5 G/20ML; G/20ML
3.38 INJECTION, POWDER, LYOPHILIZED, FOR SOLUTION INTRAVENOUS ONCE
Refills: 0 | Status: COMPLETED | OUTPATIENT
Start: 2023-03-01 | End: 2023-03-01

## 2023-03-01 RX ORDER — DIVALPROEX SODIUM 500 MG/1
1000 TABLET, DELAYED RELEASE ORAL AT BEDTIME
Refills: 0 | Status: DISCONTINUED | OUTPATIENT
Start: 2023-03-01 | End: 2023-03-08

## 2023-03-01 RX ORDER — INSULIN LISPRO 100/ML
VIAL (ML) SUBCUTANEOUS
Refills: 0 | Status: DISCONTINUED | OUTPATIENT
Start: 2023-03-01 | End: 2023-03-08

## 2023-03-01 RX ORDER — VANCOMYCIN HCL 1 G
1000 VIAL (EA) INTRAVENOUS ONCE
Refills: 0 | Status: COMPLETED | OUTPATIENT
Start: 2023-03-01 | End: 2023-03-01

## 2023-03-01 RX ORDER — CLOZAPINE 150 MG/1
100 TABLET, ORALLY DISINTEGRATING ORAL AT BEDTIME
Refills: 0 | Status: DISCONTINUED | OUTPATIENT
Start: 2023-03-01 | End: 2023-03-08

## 2023-03-01 RX ADMIN — Medication 250 MILLIGRAM(S): at 18:55

## 2023-03-01 RX ADMIN — PIPERACILLIN AND TAZOBACTAM 200 GRAM(S): 4; .5 INJECTION, POWDER, LYOPHILIZED, FOR SOLUTION INTRAVENOUS at 18:19

## 2023-03-01 RX ADMIN — SODIUM CHLORIDE 1000 MILLILITER(S): 9 INJECTION INTRAMUSCULAR; INTRAVENOUS; SUBCUTANEOUS at 15:33

## 2023-03-01 NOTE — ED PROVIDER NOTE - HIV OFFER
----- Message from Sushant Kumar sent at 10/7/2022  3:07 PM CDT -----  Contact: [phone]  Pt is calling back from a missed call.  Pt would like a call back at your earliest convenience.  Pt can be reached at. [phone]      
Contacted the patient. Patient was informed that the clinic [facility] snot accept Medicaid. Patient was understanding and call was disconnected.   
Previously Negative (within the last year)

## 2023-03-01 NOTE — H&P ADULT - PROBLEM SELECTOR PLAN 5
Last A1c 6.4, on metformin 1000mg bid and januvia at home  -low ISS while inpatient  -previously inpatient required lantus 5 units and admelog 7 units tidac, uptitrate as needed  -carb consistent diet Pt with no S/S of decompensation.   -c/w home meds clozapine 100mg qd, depakote 1000mg qd, venlafaxine ER 75mg qd  -psych consult to be called in AM  -f/u clozapine and depakote levels  -monitor CBC with diff while on clozapine  -no indication for constant observation at this time

## 2023-03-01 NOTE — H&P ADULT - NSHPPHYSICALEXAM_GEN_ALL_CORE
PHYSICAL EXAM:  GENERAL: NAD, well-groomed, well-developed  HEAD:  Atraumatic, Normocephalic  EYES: EOMI, PERRLA, conjunctiva and sclera clear  ENMT: No tonsillar erythema, exudates, or enlargement; Moist mucous membranes  NECK: Supple, No JVD, Normal thyroid  HEART: Regular rate and rhythm; No murmurs, rubs, or gallops  RESPIRATORY: CTA B/L, No W/R/R  ABDOMEN: Soft, Nontender, Nondistended; Bowel sounds present  NEUROLOGY: A&Ox3, nonfocal, moving all extremities  EXTREMITIES:  2+ Peripheral Pulses, No clubbing, cyanosis, or edema  SKIN: warm, dry, normal color, no rash Vital Signs Last 24 Hrs  T(C): 36.8 (02 Mar 2023 06:35), Max: 37.1 (01 Mar 2023 22:17)  T(F): 98.2 (02 Mar 2023 06:35), Max: 98.8 (01 Mar 2023 22:17)  HR: 94 (02 Mar 2023 06:35) (63 - 95)  BP: 120/55 (02 Mar 2023 06:35) (114/50 - 141/69)  BP(mean): --  RR: 17 (02 Mar 2023 06:35) (16 - 20)  SpO2: 95% (02 Mar 2023 06:35) (95% - 100%)    PHYSICAL EXAM:  General: Sleepy but easily arousable.  No acute distress.  Intermittently coughing.  Head: Normocephalic, atraumatic.    Eyes: PERRL.  EOMI.  No scleral icterus.  No conjunctival pallor.  Mouth: Moist MM.  No oropharyngeal exudates.    Neck: Supple.  Full range of motion.  No JVD.  No LAD.  No thyromegaly.  Trachea midline.    Heart: RRR.  Normal S1 and S2.  No murmurs, rubs, or gallops.  No LE edema b/l.   Lungs: Nonlabored breathing.  Good inspiratory effort.  CTAB.  No wheezes, crackles, or rhonchi.    Abdomen: BS+, soft, nontender with no rebound or guarding, nondistended.  No hepatomegaly.   Skin: Warm and dry.  No rashes.  Extremities: No cyanosis.  2+ peripheral pulses b/l.  Musculoskeletal: No joint deformities.  No spinal or paraspinal tenderness.  Neuro: A&Ox2 (not to time).  CN II-XII intact.  5/5 motor strength in UE and LE b/l.  Tactile sensation intact in UE and LE b/l.  No focal deficits.  Psychiatric: Blunted affect, cooperative, denies any SI/HI or hallucinations.

## 2023-03-01 NOTE — H&P ADULT - NSHPSOCIALHISTORY_GEN_ALL_CORE
past smoker, quit more than 10 yrs ago, no alcohol or illicit drug use. Lives with brother Past smoker, quit more than 10 yrs ago, no alcohol or illicit drug use. Lives with brother.

## 2023-03-01 NOTE — H&P ADULT - HISTORY OF PRESENT ILLNESS
67M PMH of schizophrenia, bipolar, diabetes, hypertension, hypothyroidism presents for evaluation for a brief period of dizziness, lightheadedness, confusion.  Code stroke initiated due to initial concern for dysdiadochokinesia on exam.  He denies any changes in gait, or focal weakness at this time.  He reports his symptoms have improved.  Denies any fevers, chills, or known sick contacts. 67M PMH of schizophrenia, bipolar, diabetes, hypertension, hypothyroidism presents for evaluation for a brief period of dizziness, lightheadedness, confusion today.  Initially code stroke called due to initial concern for dysdiadochokinesia on exam. CT angio brain and neck negative for occlusion, has mild chronic white matter changes. Found to be hypothermic 95F rectally, endorses chronic cough, denies diarrhea, abdominal pain, dysuria, n/v. Had recent hospitalization 1/30-2/13 for TME 2/2 sepsis from pna, treated with 7 days zosyn.    In the ED, T95.1, HR 81, /69 satting well on RA. Given vanc and zosyn, 1L NS 68 yo M PMH of schizophrenia, bipolar disorder, type 2 DM, hypertension, hypothyroidism, BPH, glaucoma, and SBO (in 2017) presents for evaluation for a brief period of dizziness, lightheadedness, and confusion today. Initially code stroke called due to concern for dysdiadochokinesia on exam. CT angio brain and neck negative for occlusion, has mild chronic white matter changes. Found to be hypothermic to 95F rectally, endorses chronic cough, denies fevers, chills, diarrhea, abdominal pain, dysuria, nausea, or vomiting. Had recent hospitalization 1/30-2/13 for toxic-metabolic encephalopathy 2/2 sepsis from presumed PNA, treated with 7 days of zosyn.    In the ED, T95.1, HR 81, /69 satting well on RA. Given vanc and zosyn, 1L NS

## 2023-03-01 NOTE — ED ADULT TRIAGE NOTE - CHIEF COMPLAINT QUOTE
p/t living with DM type2, hx bipolar , schizophrenia c/o of sudden onset of weakness and disorientation this afternoon onset 11:30 am, p/t is awake and responsive, appears restless p/t living with DM type2, hx bipolar , schizophrenia c/o of sudden onset of weakness and disorientation this afternoon onset 11:30 am, p/t is awake and responsive, appears restless, p/t evaluated by Dr. Moe leal stroke initiated

## 2023-03-01 NOTE — H&P ADULT - PROBLEM SELECTOR PLAN 1
p/w brief period of dizziness and confusion, negative for CVA on CT. TME most likely 2/2 infection, recent hospitalization with similar presentation of AMS and hypothermia, found to have sepsis 2/2 pna. Only infectious symptom per patient so far is cough, but is chronic cough that has improved after stopping ACEinhibitor. Denies diarrhea or dysuria, n/v. CXR clear, RVP and covid neg. Lactate 2.0. VBG with respiratory acidosis, currently satting well on RA  -F/u BCx, UCx, MRSA  -c/w vanc and zosyn  -low B6 last admission, repeat   -f/u ammonia, other causes of TME including B12, hiv, copper deficiency, lyme ruled out last admission p/w brief period of dizziness and confusion, negative for CVA on CT. TME most likely 2/2 infection, recent hospitalization with similar presentation of AMS and hypothermia, found to have sepsis 2/2 pna. Only infectious symptom per patient so far is cough, but is chronic cough that has improved after stopping ACEinhibitor. Denies diarrhea or dysuria, n/v. CXR clear, RVP and covid neg. Lactate 2.0. VBG with respiratory acidosis, currently satting well on RA  -F/u BCx, UCx, MRSA  -c/w vanc and zosyn  -f/u procalcitonin  -f/u CT chest  -low B6 last admission, repeat level  -f/u ammonia, other causes of TME including B12, hiv, copper deficiency, lyme ruled out last admission  -bladder scan to check for urinary retention Pt p/w brief period of dizziness, lightheadedness, and confusion, negative for CVA on CT/CTA imaging. TME most likely 2/2 infection, recent hospitalization with similar presentation of AMS and hypothermia, found to have sepsis 2/2 presumed PNA. Only infectious symptom per patient so far is cough, but is chronic cough that has improved after stopping ACE inhibitor. Denies diarrhea, dysuria, nausea, or vomiting. CXR clear, RVP and COVID neg. Lactate 2.0. VBG with respiratory acidosis, currently satting well on RA.  -F/u BCx, UCx, MRSA/MSSA swab  -c/w vanc and zosyn for now  -f/u procalcitonin  -f/u CT chest  -low B6 last admission, repeat level  -f/u ammonia, other causes of TME including B12, HIV, copper deficiency, lyme ruled out last admission  -bladder scan to check for urinary retention

## 2023-03-01 NOTE — CONSULT NOTE ADULT - SUBJECTIVE AND OBJECTIVE BOX
**STROKE CODE CONSULT NOTE**    Last known well time/Time of onset of symptoms: 2200 3/1/2023    HPI: 67 year old right-handed man with past medical history schizophrenia, bipolar disorder, T2DM, HTN, glaucoma, SBO in 2017, BPH, and COVID in 10/2021 presents to [facility] ED brought in by his brother with whom he lives with due to dizziness, lightheadedness, confusion, and rocking back and forth. In the morning, he expressed to his brother that he was not feeling well and felt that his symptoms got worse by lunchtime. LKN 2200 on 2/28. Due to worsening symptoms, it was decided to bring the patient to the hospital. CODE STROKE called at 1229 for suspected dysdiadochokinesia. mRS 1 NIHSS 2. CT head and CT angio were negative. Patient outside window for tenecteplase. Patient not candidate for thrombectomy because no LVO.      Of note, patient follows with psychiatry and dose of clozapine was changed 3 month ago due to downtrending WBC counts. Patient used to live in assisted living facility, but moved in with his brother in 2013. He completes ADLs independently (toileting, eating, etc), however does not assist with chores.       PAST MEDICAL & SURGICAL HISTORY:  Diabetes      Hypothyroid      Hypertension      Glaucoma      Schizophrenia      Bipolar disorder      Seborrheic dermatitis      Personal history of other diseases of the digestive system      No significant past surgical history          FAMILY HISTORY:  Family history of diabetes mellitus (DM) (Mother)    Family history of heart disease  mother had 3 heart attacks    Family history of diabetes mellitus in brother  both brothers        SOCIAL HISTORY:  Smoking Cesation: Discussed    ROS:  Neurological: As per HPI      MEDICATIONS  (STANDING):    MEDICATIONS  (PRN):      Allergies    No Known Allergies    Intolerances        Vital Signs Last 24 Hrs  T(C): 36.7 (01 Mar 2023 12:17), Max: 36.7 (01 Mar 2023 12:17)  T(F): 98 (01 Mar 2023 12:17), Max: 98 (01 Mar 2023 12:17)  HR: 85 (01 Mar 2023 12:17) (85 - 85)  BP: 120/80 (01 Mar 2023 12:17) (120/80 - 120/80)  BP(mean): --  RR: 18 (01 Mar 2023 12:17) (18 - 18)  SpO2: 98% (01 Mar 2023 12:17) (98% - 98%)    Parameters below as of 01 Mar 2023 12:17  Patient On (Oxygen Delivery Method): room air        PHYSICAL EXAM:  Constitutional: WDWN; NAD  Neurologic:  Mental status: Awake, alert and oriented x2.  Minor dysarthria, no aphasia.  Fund of knowledge appropriate.    Cranial nerves: pupils equally round and reactive to light, visual fields full, no nystagmus, extraocular muscles intact, V1 through V3 intact bilaterally and symmetric, face symmetric, hearing intact to finger rub, palate elevation symmetric, tongue was midline, sternocleidomastoid/shoulder shrug strength bilaterally 5/5.    Motor:  Normal bulk and tone, strength 5/5 in bilateral upper and lower extremities.   strength 5/5.   Sensation: Intact to light touch. Demonstrating some inattentiveness    Coordination: No dysmetria on finger-to-nose and heel-to-shin.  No clumsiness.  Reflexes: 2+ in upper and lower extremities, downgoing toes bilaterally  Gait: cautious gait     NIHSS: 2    Fingerstick Blood Glucose: CAPILLARY BLOOD GLUCOSE  230 (01 Mar 2023 12:53)      POCT Blood Glucose.: 230 mg/dL (01 Mar 2023 12:18)       LABS:                        10.6   8.88  )-----------( 198      ( 01 Mar 2023 12:42 )             31.5           PT/INR - ( 01 Mar 2023 12:42 )   PT: 11.2 sec;   INR: 0.97 ratio         PTT - ( 01 Mar 2023 12:42 )  PTT:35.4 sec          RADIOLOGY & ADDITIONAL STUDIES:  < from: CT Brain Stroke Protocol (03.01.23 @ 12:59) >  IMPRESSION: No acute intracranial hemorrhage, mass effect, or shift of   the midline structures.    Similar-appearing mild chronic white matter microvascular type changes.    < end of copied text >    IV-tenecteplase(Y/N):     N                              Bolus time:  Reason IV-tenecteplase not given: outside window

## 2023-03-01 NOTE — ED PROVIDER NOTE - PHYSICAL EXAMINATION
Gen: No acute distress  HEENT: NCAT, EOMI, PERRLA,  mucous membranes moist  CV: RRR, S1S2  Resp: CTAB  GI: Abdomen soft, NT, ND. No rebound, no guarding.  : No CVAT  Neuro: Staggering gait; without change in baseline per brother whom he lives with. A&Ox3, no new focal motor or sensory deficits.   MSK: No gross abnormalities. No midline spinal tenderness.  Skin: Warm, dry intact. No rashes.

## 2023-03-01 NOTE — ED ADULT NURSE REASSESSMENT NOTE - NS ED NURSE REASSESS COMMENT FT1
Break Coverage RN: Repeat blood work drawn and sent. Pt tolerated procedure well. No acute distress noted. Safety maintained.

## 2023-03-01 NOTE — H&P ADULT - ASSESSMENT
67M PMH of schizophrenia, bipolar, diabetes, hypertension, hypothyroidism p/w TME, negative for CVA, found to be hypothermic most likely 2/2 infectious etiology 66 yo M PMH of schizophrenia, bipolar disorder, type 2 DM, hypertension, hypothyroidism, BPH, glaucoma, and SBO (in 2017) presents for evaluation for a brief period of dizziness, lightheadedness, and confusion today, negative for CVA, found to be hypothermic most likely 2/2 infectious etiology.

## 2023-03-01 NOTE — ED PROVIDER NOTE - OBJECTIVE STATEMENT
67-year-old man with history of schizophrenia, bipolar, diabetes, hypertension, presents for evaluation for a brief period of dizziness, lightheadedness, confusion.  Code stroke initiated due to initial concern for dysdiadochokinesia on exam.  He denies any changes in gait, or focal weakness at this time.  He reports his symptoms have improved.  Denies any fevers, chills, or known sick contacts.

## 2023-03-01 NOTE — ED ADULT NURSE REASSESSMENT NOTE - NS ED NURSE REASSESS COMMENT FT1
Break Coverage RN: Pt A&Ox4, ambulatory. Respirations equal and unlabored. Pt resting in stretcher at this time with no complaints. IV patent. VSS. No acute distress noted. Bed in lowest position, side rails raised, call bell in reach. Awaiting further plan.

## 2023-03-01 NOTE — CONSULT NOTE ADULT - ASSESSMENT
ASSESSMENT   67 year old right-handed man with past medical history schizophrenia, bipolar disorder, T2DM, HTN, glaucoma, SBO in 2017, BPH, and COVID in 10/2021 presents to [facility] ED brought in by his brother with whom he lives with due to dizziness, lightheadedness, confusion, and rocking back and forth. In the morning, he expressed to his brother that he was not feeling well and felt that his symptoms got worse by lunchtime. LKN 2200 on 2/28. Due to worsening symptoms, it was decided to bring the patient to the hospital. CODE STROKE called at 1229 for suspected dysdiadochokinesia. mRS 1 NIHSS 2. CT head and CT angio were negative. Patient outside window for tenecteplase. Patient not candidate for thrombectomy because no LVO.      IMPRESSION     Dysarthria, lightheadedness and dizziness likely encephalopathy with toxic, metabolic, vs infectious cause, less likely ischemic stroke given recent imaging     RECOMMENDATION   [] Start aspirin 81mg daily   [] Atorvastatin 80 mg daily titrated per LDL < 70  [] No MRI needed as patient received had it done   [] No neurological contraindication to discharge as patient is otherwise stable      - Tight glucose control (long-term goal HgbA1c < 6%)  - Stroke education and counseling  - Neuro-checks and VS q4h  - Permissive HTN up to 220/120 for 24-48h from symptom onset  - Dysphagia screen. If fails, speech/swallow eval  - aspiration, fall precautions  - STAT CT head non-contrast for change in neuro exam.   - PT/ OT / DVT ppx per primary team     Discussed with stroke fellow Keith Brock      and under supervision of attending Cholo Shepard        regarding decision against candidacy for tenecteplase/ thrombectomy. Will be formally staffed on morning rounds with attending. Recommendations will be complete once signed by attending.      Patient to be seen by team and attending. Note finalized upon attending attestation.  ASSESSMENT   67 year old right-handed man with past medical history schizophrenia, bipolar disorder, T2DM, HTN, glaucoma, SBO in 2017, BPH, and COVID in 10/2021 presents to [facility] ED brought in by his brother with whom he lives with due to dizziness, lightheadedness, confusion, and rocking back and forth. In the morning, he expressed to his brother that he was not feeling well and felt that his symptoms got worse by lunchtime. LKN 2200 on 2/28. Due to worsening symptoms, it was decided to bring the patient to the hospital. CODE STROKE called at 1229 for suspected dysdiadochokinesia. mRS 1 NIHSS 2. CT head and CT angio were negative. Patient outside window for tenecteplase. Patient not candidate for thrombectomy because no LVO.      IMPRESSION     Dysarthria, lightheadedness and dizziness likely encephalopathy with toxic, metabolic, vs infectious cause, less likely ischemic stroke given recent imaging     RECOMMENDATION   [] Start aspirin 81mg daily   [] Atorvastatin 80 mg daily titrated per LDL < 70  [] No MRI needed as patient received it recently   [] Infectious workup as per primary team   [] No neurological contraindication to discharge as patient is otherwise stable      - Tight glucose control (long-term goal HgbA1c < 6%)  - Stroke education and counseling  - Neuro-checks and VS q4h  - Permissive HTN up to 220/120 for 24-48h from symptom onset  - Dysphagia screen. If fails, speech/swallow eval  - aspiration, fall precautions  - STAT CT head non-contrast for change in neuro exam.   - PT/ OT / DVT ppx per primary team     Discussed with stroke fellow Keith Brock and under supervision of attending Cholo Shepard   regarding decision against candidacy for tenecteplase/ thrombectomy. Will be formally staffed on morning rounds with attending. Recommendations will be complete once signed by attending.      Patient to be seen by team and attending. Note finalized upon attending attestation.  ASSESSMENT   67 year old right-handed man with past medical history schizophrenia, bipolar disorder, T2DM, HTN, glaucoma, SBO in 2017, BPH, and COVID in 10/2021 presents to [facility] ED brought in by his brother with whom he lives with due to dizziness, lightheadedness, confusion, and rocking back and forth. In the morning, he expressed to his brother that he was not feeling well and felt that his symptoms got worse by lunchtime. LKN 2200 on 2/28. Due to worsening symptoms, it was decided to bring the patient to the hospital. CODE STROKE called at 1229 for suspected dysdiadochokinesia. mRS 1 NIHSS 2. CT head and CT angio were negative. Patient outside window for tenecteplase. Patient not candidate for thrombectomy because no LVO.      IMPRESSION     Dysarthria, lightheadedness and dizziness likely encephalopathy with toxic, metabolic, vs infectious cause, less likely ischemic stroke given recent imaging     RECOMMENDATION   [] Start aspirin 81mg daily   [] Atorvastatin 80 mg daily titrated per LDL < 70  [] No MRI needed as patient received it recently   [] Infectious workup as per primary team   [] No neurological contraindication to discharge as patient is otherwise stable  Pt to f/u with Dr. Jonathan Orozco after discharge:  [address], [place] [id number] [phone]        - Tight glucose control (long-term goal HgbA1c < 6%)  - Stroke education and counseling  - Neuro-checks and VS q4h  - Permissive HTN up to 220/120 for 24-48h from symptom onset  - Dysphagia screen. If fails, speech/swallow eval  - aspiration, fall precautions  - STAT CT head non-contrast for change in neuro exam.   - PT/ OT / DVT ppx per primary team     Discussed with stroke fellow Keith Brock and under supervision of attending Cholo Shepard   regarding decision against candidacy for tenecteplase/ thrombectomy. Will be formally staffed on morning rounds with attending. Recommendations will be complete once signed by attending.      Patient to be seen by team and attending. Note finalized upon attending attestation.

## 2023-03-01 NOTE — ED ADULT NURSE NOTE - OBJECTIVE STATEMENT
Stroke RN: 68 yo male, hx of DM, HTN, hypothyroidism, glaucoma, bipolar disorder, schizophrenia, c/o "generalized weakness", dizziness as of 1130 this morn. pt states " I have a dr appt this afternoon and I feel too weak to walk 4 blocks to it". pt appears tremulous, pts baseline as per brother at bedside. pt denies headache, chest pain. 20g PIV to R forearm, labs sent. report given to primary RN.

## 2023-03-01 NOTE — ED ADULT NURSE REASSESSMENT NOTE - NS ED NURSE REASSESS COMMENT FT1
Report received from [facility] BRANDON Barker. Patient resting in stretcher, respirations even and unlabored, continues on hyperthermic blanket. Patient denies any complaints at this time. Awaiting bed assignment. Stretcher in lowest position, wheels locked, appropriate side rails in place, call bell in reach.

## 2023-03-01 NOTE — ED PROVIDER NOTE - PROGRESS NOTE DETAILS
On reassessment, patient's brother reports that they are still not at baseline speech. Rectal temp found to be 93. Geremias gaspar ordered, alongside blood cultures and empiric antibiotics. Plan for admission.

## 2023-03-01 NOTE — H&P ADULT - ATTENDING COMMENTS
66 yo M PMH of schizophrenia, bipolar disorder, type 2 DM, hypertension, hypothyroidism, BPH, glaucoma, and SBO (in 2017) presents for evaluation for a brief period of dizziness, lightheadedness, and confusion today, negative for CVA, found to be transiently hypothermic to 95.1F rectal, most likely 2/2 infectious etiology. Pt with cough, but no other localizing S/S. Check CT chest and procalcitonin. C/w broad-spectrum abx with Vanc and Zosyn pending cx results, de-escalate as necessary.

## 2023-03-01 NOTE — H&P ADULT - TIME BILLING
I had a face to face encounter with this patient. I spent 76 total minutes on chart review, bedside interview and physical exam, orders, interpretation of results, documentation, and coordination of care for this patient.

## 2023-03-01 NOTE — ED ADULT NURSE NOTE - CHIEF COMPLAINT QUOTE
p/t living with DM type2, hx bipolar , schizophrenia c/o of sudden onset of weakness and disorientation this afternoon onset 11:30 am, p/t is awake and responsive, appears restless, p/t evaluated by Dr. Moe leal stroke initiated

## 2023-03-01 NOTE — ED ADULT NURSE REASSESSMENT NOTE - NS ED NURSE REASSESS COMMENT FT1
Patient resting in stretcher, respirations even and unlabored, vitals improved, patient removed off hyperthermic blanket. Awaiting bed assignment. Stretcher in lowest position, wheels locked, appropriate side rails in place, call bell in reach.

## 2023-03-01 NOTE — ED PROVIDER NOTE - ATTENDING CONTRIBUTION TO CARE
Marcellus Rosa DO:  patient seen and evaluated with the Fellow. I was present for key portions of the History & Physical, and I agree with the Impression & Plan. 66 yo m pmh hizophrenia, hx seizure, bipolar d/o, HTN, DM2, pw Resolved AMS.  Presents with brother bedside provides collateral.  He is caretaker of patient's.  States that approximate 1130 today patient had sudden onset confusion, fatigue, malaise, shaking, reported dizziness.  Patient reports feeling lightheaded.  Reports much vertigo.  Denies N/V, vision changes, paresthesias, CP.  Of note patient was admitted to this hospital in January for aspiration pneumonia.  Patient arrives HDS, grossly neurovascular intact.  However does have mild wide-based gait and mild dysdiadochokinesia noted with finger-to-nose testing.  Was asked to evaluate patient and triage for code stroke.  Reports was initiated by me.  Met neuro at bedside.  Labs and imaging ordered.  Will reassess.  Afebrile do not suspect infectious etiology for initial eval.  Patient's blood pressure 120/80.

## 2023-03-01 NOTE — H&P ADULT - PROBLEM SELECTOR PLAN 7
c/w levobutolol bid, rhophressa and lumigan qd  -ask family to bring in eye drops -c/w tamsulosin 0.4mg qd and finasteride 5mg qd  -check bladder scan, as pt with urinary retention during last admission

## 2023-03-01 NOTE — H&P ADULT - PROBLEM SELECTOR PLAN 4
c/w home meds clozapine 100mg qd, depakote 1000mg qd, venlafaxine c/w home meds clozapine 100mg qd, depakote 1000mg qd, venlafaxine  -psych consult am  -f/u depakote and clozapine levels Last A1c 6.4%, on Metformin 1000mg bid and Januvia at home.  -hold oral hypoglycemics while inpatient  -start low-dose ISS and FS checks qAC TID and qHS while inpatient  -during previous admission, required lantus 5 units and admelog 7 units qAC, start basal-bolus regimen if needed and uptitrate as necessary  -carb consistent diet

## 2023-03-01 NOTE — H&P ADULT - PROBLEM SELECTOR PLAN 8
lovenox DVT ppx  CC DASH diet Kosher -c/w levobutolol bid, rhophressa and lumigan qd  -ask family to bring in eye drops

## 2023-03-01 NOTE — H&P ADULT - PROBLEM SELECTOR PLAN 6
c/w amlodipine 5mg qd BPs in acceptable range despite hypothermia.  -c/w amlodipine 5mg qd  -monitor VS q4hrs

## 2023-03-01 NOTE — H&P ADULT - PROBLEM SELECTOR PLAN 2
Hypothermic to 95 rectally  -f/u infectious workup  -c/w abx as above  -not hypoglycemic  -TSH elevated, f/u T4 and cortisol level Hypothermic to 95 rectally  -f/u infectious workup  -c/w abx as above  -not hypoglycemic  -TSH elevated, but T4 normal  -f/u cortisol am Pt hypothermic to 95F rectally.  -f/u infectious workup as above  -c/w vanc and zosyn for now  -not hypoglycemic  -TSH elevated to 8.67, but free T4 normal  -f/u AM cortisol level   -monitor VS q4hrs  -if pt with recurrent hypothermia or if pt develops fever, ID consult

## 2023-03-01 NOTE — H&P ADULT - NSHPREVIEWOFSYSTEMS_GEN_ALL_CORE
REVIEW OF SYSTEMS:    CONSTITUTIONAL: +weakness, no fevers or chills  EYES/ENT: No visual changes;  No vertigo or throat pain   NECK: No pain or stiffness  RESPIRATORY: +cough, no wheezing, hemoptysis; No shortness of breath  CARDIOVASCULAR: No chest pain or palpitations  GASTROINTESTINAL: No abdominal or epigastric pain. No nausea, vomiting, or hematemesis; No diarrhea or constipation. No melena or hematochezia.  GENITOURINARY: No dysuria, frequency or hematuria  NEUROLOGICAL: No numbness or weakness  SKIN: No itching, rashes Constitutional: +Generalized weakness. No fevers, chills, or weight loss.  Eyes: No visual changes, double vision, or eye pain  Ears, Nose, Mouth, Throat: No runny nose, sinus pain, ear pain, tinnitus, sore throat, dysphagia, or odynophagia  Cardiovascular: No chest pain, palpitations, or LE edema  Respiratory: +Cough. No wheezing, hemoptysis, or shortness of breath.  Gastrointestinal: No abdominal pain, nausea/vomiting, diarrhea/constipation, hematemesis, melena, or BRBPR  Genitourinary: No dysuria, frequency, urgency, or hematuria  Musculoskeletal: No joint pain, joint swelling, or decreased ROM  Skin: No pruritus, rashes, lesions, or wounds  Neurologic: +Dizziness, lightheadedness, and confusion. No syncope, seizures, headache, paresthesias, numbness, or limb weakness.  Psychiatric: No depression, anxiety, agitation, SI/HI, or hallucinations  Endocrine: No heat/cold intolerance, mood swings, sweats, polydipsia, or polyuria  Hematologic/lymphatic: No purpura, petechia, or prolonged or excessive bleeding after dental extraction / injury  Allergic/Immunologic: No anaphylaxis or allergic response to materials, foods, animals    Positives and pertinent negatives noted and all other systems negative.

## 2023-03-01 NOTE — ED ADULT NURSE REASSESSMENT NOTE - NS ED NURSE REASSESS COMMENT FT1
Patient is A&Ox4, awake and alert. breathing spontaneous and alert. denies SOB and chest pain. denies any discomfort or pain at the moment. Will continue to monitor. Patient is A&Ox4, awake and alert. breathing spontaneous and alert. denies SOB and chest pain. denies any discomfort or pain at the moment. blood cultures to be drawn, to be placed on beer hugger for low temp. Will continue to monitor.

## 2023-03-01 NOTE — ED PROVIDER NOTE - CLINICAL SUMMARY MEDICAL DECISION MAKING FREE TEXT BOX
67-year-old man with history of schizophrenia, bipolar, diabetes, hypertension, presents for evaluation for a brief period of dizziness, lightheadedness, confusion. Code stroke called due to initial concern for dysdiadochokinesia on exam, without any ongoing neurodeficits at this outside of his baseline.  Stroke CTs negative for any acute changes at this time.  We will follow-up neurology recommendations, as well as blood work, with disposition pending neurology recommendations, labs, reassessment.

## 2023-03-01 NOTE — H&P ADULT - NSHPLABSRESULTS_GEN_ALL_CORE
10.6   8.88  )-----------( 198      ( 01 Mar 2023 12:42 )             31.5           132<L>  |  100  |  22  ----------------------------<  120<H>  4.9   |  22  |  0.65    Ca    8.8      01 Mar 2023 16:24    TPro  6.4  /  Alb  4.0  /  TBili  0.2  /  DBili  x   /  AST  19  /  ALT  28  /  AlkPhos  107  03-              Urinalysis Basic - ( 01 Mar 2023 15:34 )    Color: Light Yellow / Appearance: Clear / S.030 / pH: x  Gluc: x / Ketone: Negative  / Bili: Negative / Urobili: <2 mg/dL   Blood: x / Protein: Trace / Nitrite: Negative   Leuk Esterase: Negative / RBC: x / WBC x   Sq Epi: x / Non Sq Epi: x / Bacteria: x        PT/INR - ( 01 Mar 2023 12:42 )   PT: 11.2 sec;   INR: 0.97 ratio         PTT - ( 01 Mar 2023 12:42 )  PTT:35.4 sec    Lactate Trend            CAPILLARY BLOOD GLUCOSE  230 (01 Mar 2023 12:53)      POCT Blood Glucose.: 119 mg/dL (01 Mar 2023 22:00)        Culture Results:   <10,000 CFU/mL Normal Urogenital Mary (02-10 @ 15:41)  Culture Results:   No Growth Final ( @ 02:12)  Culture Results:   No Growth Final ( @ 02:12)  Culture Results:   No Growth Final ( @ 04:56)  Culture Results:   No Growth Final ( @ 04:56)    TSH 8.67  Blood Gas Profile - Venous (23 @ 18:08)    pH, Venous: 7.26    pCO2, Venous: 59 mmHg    pO2, Venous: 26 mmHg    HCO3, Venous: 26 mmol/L    Base Excess, Venous: -1.4 mmol/L    Oxygen Saturation, Venous: 33.6 %    Total CO2, Venous: 28.3 mmol/L    Blood Gas Source Venous: Venous    < from: CT Angio Neck Stroke Protocol w/ IV Cont (23 @ 13:00) >    IMPRESSION: No large vessel occlusion or major stenosis.    < end of copied text > EKG personally reviewed.  NSR at 92 bpm, RBBB with QRS duration 132 ms, QTc 482 ms.    Labs personally reviewed.                        10.6   8.88  )-----------( 198      ( 01 Mar 2023 12:42 )             31.5         132<L>  |  100  |  22  ----------------------------<  120<H>  4.9   |  22  |  0.65    Ca    8.8      01 Mar 2023 16:24    TPro  6.4  /  Alb  4.0  /  TBili  0.2  /  DBili  x   /  AST  19  /  ALT  28  /  AlkPhos  107      Urinalysis Basic - ( 01 Mar 2023 15:34 )  Color: Light Yellow / Appearance: Clear / S.030 / pH: x  Gluc: x / Ketone: Negative  / Bili: Negative / Urobili: <2 mg/dL   Blood: x / Protein: Trace / Nitrite: Negative   Leuk Esterase: Negative / RBC: x / WBC x   Sq Epi: x / Non Sq Epi: x / Bacteria: x    PT/INR - ( 01 Mar 2023 12:42 )   PT: 11.2 sec;   INR: 0.97 ratio    PTT - ( 01 Mar 2023 12:42 )  PTT:35.4 sec    Culture Results:   <10,000 CFU/mL Normal Urogenital Mary (02-10 @ 15:41)  Culture Results:   No Growth Final ( @ 02:12)  Culture Results:   No Growth Final ( @ 02:12)  Culture Results:   No Growth Final ( @ 04:56)  Culture Results:   No Growth Final ( @ 04:56)    TSH 8.67  Blood Gas Profile - Venous (23 @ 18:08)    pH, Venous: 7.26    pCO2, Venous: 59 mmHg    pO2, Venous: 26 mmHg    HCO3, Venous: 26 mmol/L    Base Excess, Venous: -1.4 mmol/L    Oxygen Saturation, Venous: 33.6 %    Total CO2, Venous: 28.3 mmol/L    Blood Gas Source Venous: Venous    Imaging personally reviewed.  ACC: [id number] EXAM:  XR CHEST PORTABLE URGENT 1V   ORDERED BY: LARRY SCHMITZ   PROCEDURE DATE:  2023    IMPRESSION:  No acute pulmonary disease.    ACC: [id number] EXAM:  CT ANGIO BRAIN STROKE PROTC IC   ORDERED BY: LARRY SCHMITZ   ACC: [id number] EXAM:  CT ANGIO NECK STROKE PROTCL IC   ORDERED BY: LARRY SCHMITZ   PROCEDURE DATE:  2023    IMPRESSION: No large vessel occlusion or major stenosis.

## 2023-03-02 DIAGNOSIS — N40.0 BENIGN PROSTATIC HYPERPLASIA WITHOUT LOWER URINARY TRACT SYMPTOMS: ICD-10-CM

## 2023-03-02 DIAGNOSIS — E11.9 TYPE 2 DIABETES MELLITUS WITHOUT COMPLICATIONS: ICD-10-CM

## 2023-03-02 DIAGNOSIS — R68.0 HYPOTHERMIA, NOT ASSOCIATED WITH LOW ENVIRONMENTAL TEMPERATURE: ICD-10-CM

## 2023-03-02 LAB
-  STAPHYLOCOCCUS EPIDERMIDIS, METHICILLIN RESISTANT: SIGNIFICANT CHANGE UP
AMMONIA BLD-MCNC: 42 UMOL/L — SIGNIFICANT CHANGE UP (ref 11–55)
AMPHET UR-MCNC: NEGATIVE — SIGNIFICANT CHANGE UP
ANION GAP SERPL CALC-SCNC: 8 MMOL/L — SIGNIFICANT CHANGE UP (ref 7–14)
BARBITURATES UR SCN-MCNC: NEGATIVE — SIGNIFICANT CHANGE UP
BASE EXCESS BLDV CALC-SCNC: -3.1 MMOL/L — LOW (ref -2–3)
BASOPHILS # BLD AUTO: 0.03 K/UL — SIGNIFICANT CHANGE UP (ref 0–0.2)
BASOPHILS NFR BLD AUTO: 0.6 % — SIGNIFICANT CHANGE UP (ref 0–2)
BENZODIAZ UR-MCNC: NEGATIVE — SIGNIFICANT CHANGE UP
BLOOD GAS VENOUS COMPREHENSIVE RESULT: SIGNIFICANT CHANGE UP
BUN SERPL-MCNC: 26 MG/DL — HIGH (ref 7–23)
CALCIUM SERPL-MCNC: 8.2 MG/DL — LOW (ref 8.4–10.5)
CHLORIDE BLDV-SCNC: 99 MMOL/L — SIGNIFICANT CHANGE UP (ref 96–108)
CHLORIDE SERPL-SCNC: 101 MMOL/L — SIGNIFICANT CHANGE UP (ref 98–107)
CO2 BLDV-SCNC: 24 MMOL/L — SIGNIFICANT CHANGE UP (ref 22–26)
CO2 SERPL-SCNC: 21 MMOL/L — LOW (ref 22–31)
COCAINE METAB.OTHER UR-MCNC: NEGATIVE — SIGNIFICANT CHANGE UP
CORTIS AM PEAK SERPL-MCNC: 5.3 UG/DL — LOW (ref 6–18.4)
CREAT SERPL-MCNC: 0.95 MG/DL — SIGNIFICANT CHANGE UP (ref 0.5–1.3)
CREATININE URINE RESULT, DAU: 58 MG/DL — SIGNIFICANT CHANGE UP
CULTURE RESULTS: SIGNIFICANT CHANGE UP
EGFR: 87 ML/MIN/1.73M2 — SIGNIFICANT CHANGE UP
EOSINOPHIL # BLD AUTO: 0.03 K/UL — SIGNIFICANT CHANGE UP (ref 0–0.5)
EOSINOPHIL NFR BLD AUTO: 0.6 % — SIGNIFICANT CHANGE UP (ref 0–6)
GAS PNL BLDV: 128 MMOL/L — LOW (ref 136–145)
GLUCOSE BLDC GLUCOMTR-MCNC: 223 MG/DL — HIGH (ref 70–99)
GLUCOSE BLDC GLUCOMTR-MCNC: 245 MG/DL — HIGH (ref 70–99)
GLUCOSE BLDC GLUCOMTR-MCNC: 95 MG/DL — SIGNIFICANT CHANGE UP (ref 70–99)
GLUCOSE BLDV-MCNC: 171 MG/DL — HIGH (ref 70–99)
GLUCOSE SERPL-MCNC: 179 MG/DL — HIGH (ref 70–99)
GRAM STN FLD: SIGNIFICANT CHANGE UP
HCO3 BLDV-SCNC: 23 MMOL/L — SIGNIFICANT CHANGE UP (ref 22–29)
HCT VFR BLD CALC: 26.7 % — LOW (ref 39–50)
HCT VFR BLDA CALC: 27 % — LOW (ref 39–51)
HGB BLD CALC-MCNC: 9 G/DL — LOW (ref 12.6–17.4)
HGB BLD-MCNC: 8.6 G/DL — LOW (ref 13–17)
IANC: 3.62 K/UL — SIGNIFICANT CHANGE UP (ref 1.8–7.4)
IMM GRANULOCYTES NFR BLD AUTO: 0.2 % — SIGNIFICANT CHANGE UP (ref 0–0.9)
LACTATE BLDV-MCNC: 1.2 MMOL/L — SIGNIFICANT CHANGE UP (ref 0.5–2)
LYMPHOCYTES # BLD AUTO: 1.08 K/UL — SIGNIFICANT CHANGE UP (ref 1–3.3)
LYMPHOCYTES # BLD AUTO: 20.7 % — SIGNIFICANT CHANGE UP (ref 13–44)
MAGNESIUM SERPL-MCNC: 1.4 MG/DL — LOW (ref 1.6–2.6)
MCHC RBC-ENTMCNC: 30.4 PG — SIGNIFICANT CHANGE UP (ref 27–34)
MCHC RBC-ENTMCNC: 32.2 GM/DL — SIGNIFICANT CHANGE UP (ref 32–36)
MCV RBC AUTO: 94.3 FL — SIGNIFICANT CHANGE UP (ref 80–100)
METHADONE UR-MCNC: NEGATIVE — SIGNIFICANT CHANGE UP
METHOD TYPE: SIGNIFICANT CHANGE UP
MONOCYTES # BLD AUTO: 0.44 K/UL — SIGNIFICANT CHANGE UP (ref 0–0.9)
MONOCYTES NFR BLD AUTO: 8.4 % — SIGNIFICANT CHANGE UP (ref 2–14)
MRSA PCR RESULT.: SIGNIFICANT CHANGE UP
NEUTROPHILS # BLD AUTO: 3.62 K/UL — SIGNIFICANT CHANGE UP (ref 1.8–7.4)
NEUTROPHILS NFR BLD AUTO: 69.5 % — SIGNIFICANT CHANGE UP (ref 43–77)
NRBC # BLD: 0 /100 WBCS — SIGNIFICANT CHANGE UP (ref 0–0)
NRBC # FLD: 0 K/UL — SIGNIFICANT CHANGE UP (ref 0–0)
OPIATES UR-MCNC: NEGATIVE — SIGNIFICANT CHANGE UP
OSMOLALITY UR: 361 MOSM/KG — SIGNIFICANT CHANGE UP (ref 50–1200)
OXYCODONE UR-MCNC: NEGATIVE — SIGNIFICANT CHANGE UP
PCO2 BLDV: 43 MMHG — SIGNIFICANT CHANGE UP (ref 42–55)
PCP SPEC-MCNC: SIGNIFICANT CHANGE UP
PCP UR-MCNC: NEGATIVE — SIGNIFICANT CHANGE UP
PH BLDV: 7.33 — SIGNIFICANT CHANGE UP (ref 7.32–7.43)
PHOSPHATE SERPL-MCNC: 4.2 MG/DL — SIGNIFICANT CHANGE UP (ref 2.5–4.5)
PLATELET # BLD AUTO: 171 K/UL — SIGNIFICANT CHANGE UP (ref 150–400)
PO2 BLDV: 119 MMHG — HIGH (ref 25–45)
POTASSIUM BLDV-SCNC: 5 MMOL/L — SIGNIFICANT CHANGE UP (ref 3.5–5.1)
POTASSIUM SERPL-MCNC: 5 MMOL/L — SIGNIFICANT CHANGE UP (ref 3.5–5.3)
POTASSIUM SERPL-SCNC: 5 MMOL/L — SIGNIFICANT CHANGE UP (ref 3.5–5.3)
RBC # BLD: 2.83 M/UL — LOW (ref 4.2–5.8)
RBC # FLD: 14.8 % — HIGH (ref 10.3–14.5)
S AUREUS DNA NOSE QL NAA+PROBE: SIGNIFICANT CHANGE UP
SAO2 % BLDV: 95.4 % — HIGH (ref 67–88)
SODIUM SERPL-SCNC: 130 MMOL/L — LOW (ref 135–145)
SODIUM UR-SCNC: 37 MMOL/L — SIGNIFICANT CHANGE UP
SPECIMEN SOURCE: SIGNIFICANT CHANGE UP
SPECIMEN SOURCE: SIGNIFICANT CHANGE UP
THC UR QL: NEGATIVE — SIGNIFICANT CHANGE UP
WBC # BLD: 5.21 K/UL — SIGNIFICANT CHANGE UP (ref 3.8–10.5)
WBC # FLD AUTO: 5.21 K/UL — SIGNIFICANT CHANGE UP (ref 3.8–10.5)

## 2023-03-02 PROCEDURE — 99222 1ST HOSP IP/OBS MODERATE 55: CPT

## 2023-03-02 PROCEDURE — 93010 ELECTROCARDIOGRAM REPORT: CPT

## 2023-03-02 PROCEDURE — 99233 SBSQ HOSP IP/OBS HIGH 50: CPT | Mod: GC

## 2023-03-02 RX ORDER — LEVOBUNOLOL HCL 0.5 %
0 DROPS OPHTHALMIC (EYE)
Qty: 0 | Refills: 1 | DISCHARGE

## 2023-03-02 RX ORDER — CHOLECALCIFEROL (VITAMIN D3) 125 MCG
1 CAPSULE ORAL
Qty: 0 | Refills: 0 | DISCHARGE

## 2023-03-02 RX ORDER — TAMSULOSIN HYDROCHLORIDE 0.4 MG/1
0.4 CAPSULE ORAL AT BEDTIME
Refills: 0 | Status: DISCONTINUED | OUTPATIENT
Start: 2023-03-02 | End: 2023-03-08

## 2023-03-02 RX ORDER — FINASTERIDE 5 MG/1
5 TABLET, FILM COATED ORAL DAILY
Refills: 0 | Status: DISCONTINUED | OUTPATIENT
Start: 2023-03-02 | End: 2023-03-08

## 2023-03-02 RX ORDER — PANTOPRAZOLE SODIUM 20 MG/1
40 TABLET, DELAYED RELEASE ORAL
Refills: 0 | Status: DISCONTINUED | OUTPATIENT
Start: 2023-03-02 | End: 2023-03-03

## 2023-03-02 RX ORDER — OMEPRAZOLE 10 MG/1
1 CAPSULE, DELAYED RELEASE ORAL
Qty: 0 | Refills: 0 | DISCHARGE

## 2023-03-02 RX ORDER — FINASTERIDE 5 MG/1
1 TABLET, FILM COATED ORAL
Qty: 0 | Refills: 0 | DISCHARGE

## 2023-03-02 RX ORDER — LEVOTHYROXINE SODIUM 125 MCG
88 TABLET ORAL DAILY
Refills: 0 | Status: DISCONTINUED | OUTPATIENT
Start: 2023-03-02 | End: 2023-03-03

## 2023-03-02 RX ORDER — MAGNESIUM SULFATE 500 MG/ML
2 VIAL (ML) INJECTION ONCE
Refills: 0 | Status: COMPLETED | OUTPATIENT
Start: 2023-03-02 | End: 2023-03-02

## 2023-03-02 RX ORDER — AMLODIPINE BESYLATE 2.5 MG/1
5 TABLET ORAL DAILY
Refills: 0 | Status: DISCONTINUED | OUTPATIENT
Start: 2023-03-02 | End: 2023-03-02

## 2023-03-02 RX ORDER — VENLAFAXINE HCL 75 MG
75 CAPSULE, EXT RELEASE 24 HR ORAL DAILY
Refills: 0 | Status: DISCONTINUED | OUTPATIENT
Start: 2023-03-02 | End: 2023-03-08

## 2023-03-02 RX ORDER — SENNA PLUS 8.6 MG/1
1 TABLET ORAL
Qty: 0 | Refills: 0 | DISCHARGE

## 2023-03-02 RX ORDER — NETARSUDIL 0.2 MG/ML
0 SOLUTION/ DROPS OPHTHALMIC; TOPICAL
Qty: 0 | Refills: 1 | DISCHARGE

## 2023-03-02 RX ORDER — FERROUS SULFATE 325(65) MG
1 TABLET ORAL
Qty: 0 | Refills: 0 | DISCHARGE

## 2023-03-02 RX ORDER — CHLORHEXIDINE GLUCONATE 213 G/1000ML
1 SOLUTION TOPICAL DAILY
Refills: 0 | Status: DISCONTINUED | OUTPATIENT
Start: 2023-03-02 | End: 2023-03-08

## 2023-03-02 RX ORDER — BIMATOPROST 0.3 MG/ML
1 SOLUTION/ DROPS OPHTHALMIC
Qty: 0 | Refills: 0 | DISCHARGE

## 2023-03-02 RX ORDER — LEVOBUNOLOL HCL 0.5 %
1 DROPS OPHTHALMIC (EYE)
Refills: 0 | Status: DISCONTINUED | OUTPATIENT
Start: 2023-03-02 | End: 2023-03-08

## 2023-03-02 RX ORDER — SENNA PLUS 8.6 MG/1
1 TABLET ORAL AT BEDTIME
Refills: 0 | Status: DISCONTINUED | OUTPATIENT
Start: 2023-03-02 | End: 2023-03-08

## 2023-03-02 RX ORDER — POLYETHYLENE GLYCOL 3350 17 G/17G
17 POWDER, FOR SOLUTION ORAL
Refills: 0 | Status: DISCONTINUED | OUTPATIENT
Start: 2023-03-02 | End: 2023-03-08

## 2023-03-02 RX ORDER — PIPERACILLIN AND TAZOBACTAM 4; .5 G/20ML; G/20ML
3.38 INJECTION, POWDER, LYOPHILIZED, FOR SOLUTION INTRAVENOUS EVERY 8 HOURS
Refills: 0 | Status: DISCONTINUED | OUTPATIENT
Start: 2023-03-02 | End: 2023-03-03

## 2023-03-02 RX ORDER — PYRIDOXINE HCL (VITAMIN B6) 100 MG
50 TABLET ORAL DAILY
Refills: 0 | Status: DISCONTINUED | OUTPATIENT
Start: 2023-03-02 | End: 2023-03-08

## 2023-03-02 RX ADMIN — Medication 1 DROP(S): at 17:57

## 2023-03-02 RX ADMIN — CLOZAPINE 100 MILLIGRAM(S): 150 TABLET, ORALLY DISINTEGRATING ORAL at 00:24

## 2023-03-02 RX ADMIN — PANTOPRAZOLE SODIUM 40 MILLIGRAM(S): 20 TABLET, DELAYED RELEASE ORAL at 07:26

## 2023-03-02 RX ADMIN — PIPERACILLIN AND TAZOBACTAM 25 GRAM(S): 4; .5 INJECTION, POWDER, LYOPHILIZED, FOR SOLUTION INTRAVENOUS at 17:57

## 2023-03-02 RX ADMIN — ATORVASTATIN CALCIUM 40 MILLIGRAM(S): 80 TABLET, FILM COATED ORAL at 21:45

## 2023-03-02 RX ADMIN — PIPERACILLIN AND TAZOBACTAM 25 GRAM(S): 4; .5 INJECTION, POWDER, LYOPHILIZED, FOR SOLUTION INTRAVENOUS at 00:01

## 2023-03-02 RX ADMIN — POLYETHYLENE GLYCOL 3350 17 GRAM(S): 17 POWDER, FOR SOLUTION ORAL at 07:07

## 2023-03-02 RX ADMIN — CLOZAPINE 100 MILLIGRAM(S): 150 TABLET, ORALLY DISINTEGRATING ORAL at 21:44

## 2023-03-02 RX ADMIN — Medication 250 MILLIGRAM(S): at 07:06

## 2023-03-02 RX ADMIN — PIPERACILLIN AND TAZOBACTAM 25 GRAM(S): 4; .5 INJECTION, POWDER, LYOPHILIZED, FOR SOLUTION INTRAVENOUS at 10:35

## 2023-03-02 RX ADMIN — DIVALPROEX SODIUM 1000 MILLIGRAM(S): 500 TABLET, DELAYED RELEASE ORAL at 00:24

## 2023-03-02 RX ADMIN — LATANOPROST 1 DROP(S): 0.05 SOLUTION/ DROPS OPHTHALMIC; TOPICAL at 23:41

## 2023-03-02 RX ADMIN — Medication 75 MILLIGRAM(S): at 13:35

## 2023-03-02 RX ADMIN — TAMSULOSIN HYDROCHLORIDE 0.4 MILLIGRAM(S): 0.4 CAPSULE ORAL at 21:44

## 2023-03-02 RX ADMIN — Medication 50 MILLIGRAM(S): at 13:35

## 2023-03-02 RX ADMIN — DIVALPROEX SODIUM 1000 MILLIGRAM(S): 500 TABLET, DELAYED RELEASE ORAL at 21:44

## 2023-03-02 RX ADMIN — Medication 250 MILLIGRAM(S): at 17:57

## 2023-03-02 RX ADMIN — CHLORHEXIDINE GLUCONATE 1 APPLICATION(S): 213 SOLUTION TOPICAL at 13:36

## 2023-03-02 RX ADMIN — POLYETHYLENE GLYCOL 3350 17 GRAM(S): 17 POWDER, FOR SOLUTION ORAL at 17:57

## 2023-03-02 RX ADMIN — ATORVASTATIN CALCIUM 40 MILLIGRAM(S): 80 TABLET, FILM COATED ORAL at 00:01

## 2023-03-02 RX ADMIN — FINASTERIDE 5 MILLIGRAM(S): 5 TABLET, FILM COATED ORAL at 13:36

## 2023-03-02 RX ADMIN — Medication 75 MICROGRAM(S): at 07:07

## 2023-03-02 RX ADMIN — Medication 25 GRAM(S): at 13:36

## 2023-03-02 RX ADMIN — Medication 2: at 17:57

## 2023-03-02 RX ADMIN — Medication 1: at 08:45

## 2023-03-02 RX ADMIN — AMLODIPINE BESYLATE 5 MILLIGRAM(S): 2.5 TABLET ORAL at 07:06

## 2023-03-02 NOTE — CONSULT NOTE ADULT - ASSESSMENT
This is a 68 yo  M/w a PMH of schizophrenia, bipolar disorder, DM2, HTN and hypothyroidism who presents with transient episode of dizziness for which endocrinology was consulted to rule out adrenal insufficiency given low AM cortisol. It is unclear at this time if patient has any risk factors for adrenal insufficiency and additionally he does not appear to have any convincing symptoms of AI (hypotension, hypoglycemia - hyponatremia is chronic) Therefore, to determine if adrenal insufficiency is real, would recommend ACTH stimulation test to further evaluate    #r/o Adrenal insufficiency  #low AM cortisol  -    #hypothyroidism  -c/w levothyroxine 75mcg daily  -please ensure any vitamins, iron, calcium, PPI, H2 blockers are given 4 hours after the levothyroxine  -levothyroxine should be given on an empty stomach at 7;30AM at least 30 minutes before breakfast    #DM2  -FSG goal 100-180  -can continue with low admelog correction scales for now  -can be discharged on home regimen metformin 1000mg twice per day and januvia 100mg daily  -follow up with outpatient endocrinologist Dr. Farfan    Case discussed with Dr. Vilma Juarez MD  Endocrine Fellow  Can be reached via teams. For follow up questions, discharge recommendations, or new consults, please call answering service at [phone] (weekdays); [phone] (nights/weekends) This is a 66 yo  M/w a PMH of schizophrenia, bipolar disorder, DM2, HTN and hypothyroidism who presents with transient episode of dizziness for which endocrinology was consulted to rule out adrenal insufficiency given low AM cortisol. It is unclear at this time if patient has any risk factors for adrenal insufficiency and additionally he does not appear to have any convincing symptoms of AI (hypotension, hypoglycemia - hyponatremia is chronic) Therefore, to determine if adrenal insufficiency is real, would recommend ACTH stimulation test to further evaluate    #r/o Adrenal insufficiency  #low AM cortisol  -recommend performing ACTH stimulation test as follows:  1) please draw 8AM serum cortisol and ACTH - this lab should be drawn as close to 8AM as possible for the most accurate results  2) please give 250mcg cosyntropin  3)After 30 minutes please draw AM serum cortisol (name of test)  4) after 60 minutes please draw AM serum cortisol      #hypothyroidism  -increase levothyroxine to 88mcg daily  -please ensure any vitamins, iron, calcium, PPI, H2 blockers are given 4 hours after the levothyroxine  -levothyroxine should be given on an empty stomach at 7;30AM at least 30 minutes before breakfast  -repeat TFTs in 6-8 weeks as an outpatient    #DM2  -FSG goal 100-180  -can continue with low admelog correction scales for now  -can be discharged on home regimen metformin 1000mg twice per day and januvia 100mg daily  -follow up with outpatient endocrinologist Dr. Farfan    Case discussed with Dr. Vilma Juarez MD  Endocrine Fellow  Can be reached via teams. For follow up questions, discharge recommendations, or new consults, please call answering service at [phone] (weekdays); [phone] (nights/weekends) This is a 66 yo  M/w a PMH of schizophrenia, bipolar disorder, DM2, HTN and hypothyroidism who presents with transient episode of dizziness for which endocrinology was consulted to rule out adrenal insufficiency given low AM cortisol. It is unclear at this time if patient has any risk factors for adrenal insufficiency and additionally he does not appear to have any convincing symptoms of AI (hypotension, hypoglycemia - hyponatremia is chronic) Therefore, to determine if adrenal insufficiency is real, would recommend ACTH stimulation test to further evaluate    #r/o Adrenal insufficiency  #low AM cortisol  -recommend performing ACTH stimulation test as follows:  1) please draw 8AM serum cortisol and ACTH - this lab should be drawn as close to 8AM as possible for the most accurate results  2) please give 250mcg cosyntropin IV  3)After 30 minutes please draw AM serum cortisol (name of test)  4) after 60 minutes please draw AM serum cortisol      #hypothyroidism  -increase levothyroxine to 88mcg daily  -please ensure any vitamins, iron, calcium, PPI, H2 blockers are given 4 hours after the levothyroxine  -levothyroxine should be given on an empty stomach at 7;30AM at least 30 minutes before breakfast  -repeat TFTs in 6-8 weeks as an outpatient    #DM2  -FSG goal 100-180  -can continue with low admelog correction scales for now  -can be discharged on home regimen metformin 1000mg twice per day and januvia 100mg daily  -follow up with outpatient endocrinologist Dr. Farfan    Case discussed with Dr. Vilma Juarez MD  Endocrine Fellow  Can be reached via teams. For follow up questions, discharge recommendations, or new consults, please call answering service at [phone] (weekdays); [phone] (nights/weekends)

## 2023-03-02 NOTE — PROGRESS NOTE ADULT - ASSESSMENT
68 yo M PMH of schizophrenia, bipolar disorder, type 2 DM, hypertension, hypothyroidism, BPH, glaucoma, and SBO (in 2017) presents for evaluation for a brief period of dizziness, lightheadedness, and confusion today, negative for CVA, found to be hypothermic most likely 2/2 infectious etiology.

## 2023-03-02 NOTE — PROGRESS NOTE ADULT - SUBJECTIVE AND OBJECTIVE BOX
Derrell John MD  Academic Hospitalist  Pager [phone]  Email: [email or web address]  Available on [employer] Teams        PROGRESS NOTE:     Patient is a 67y old  Male who presents with a chief complaint of Hypothermia (01 Mar 2023 21:57)      SUBJECTIVE / OVERNIGHT EVENTS:  Patient seen and examined this morning. No acute events overnight, temperature improved. Discussed with brother who is at bedside.   ADDITIONAL REVIEW OF SYSTEMS:  No f/c/n/v    MEDICATIONS  (STANDING):  amLODIPine   Tablet 5 milliGRAM(s) Oral daily  atorvastatin 40 milliGRAM(s) Oral at bedtime  chlorhexidine 2% Cloths 1 Application(s) Topical daily  cloZAPine 100 milliGRAM(s) Oral at bedtime  dextrose 5%. 1000 milliLiter(s) (100 mL/Hr) IV Continuous <Continuous>  dextrose 5%. 1000 milliLiter(s) (50 mL/Hr) IV Continuous <Continuous>  dextrose 50% Injectable 25 Gram(s) IV Push once  dextrose 50% Injectable 12.5 Gram(s) IV Push once  dextrose 50% Injectable 25 Gram(s) IV Push once  divalproex ER 1000 milliGRAM(s) Oral at bedtime  finasteride 5 milliGRAM(s) Oral daily  glucagon  Injectable 1 milliGRAM(s) IntraMuscular once  insulin lispro (ADMELOG) corrective regimen sliding scale   SubCutaneous three times a day before meals  insulin lispro (ADMELOG) corrective regimen sliding scale   SubCutaneous at bedtime  latanoprost 0.005% Ophthalmic Solution 1 Drop(s) Both EYES at bedtime  levobunolol 0.5% Solution 1 Drop(s) Both EYES two times a day  levothyroxine 75 MICROGram(s) Oral daily  pantoprazole    Tablet 40 milliGRAM(s) Oral before breakfast  piperacillin/tazobactam IVPB.. 3.375 Gram(s) IV Intermittent every 8 hours  polyethylene glycol 3350 17 Gram(s) Oral two times a day  pyridoxine 50 milliGRAM(s) Oral daily  RHOPRESSA (Netarsudil) 0.02% Eye Drops 1 Drop(s)   Both EYES daily  senna 1 Tablet(s) Oral at bedtime  tamsulosin 0.4 milliGRAM(s) Oral at bedtime  vancomycin  IVPB 1000 milliGRAM(s) IV Intermittent every 12 hours  venlafaxine 75 milliGRAM(s) Oral daily    MEDICATIONS  (PRN):  acetaminophen     Tablet .. 650 milliGRAM(s) Oral every 6 hours PRN Temp greater or equal to 38C (100.4F), Mild Pain (1 - 3)  dextrose Oral Gel 15 Gram(s) Oral once PRN Blood Glucose LESS THAN 70 milliGRAM(s)/deciliter      CAPILLARY BLOOD GLUCOSE      POCT Blood Glucose.: 95 mg/dL (02 Mar 2023 12:27)  POCT Blood Glucose.: 154 mg/dL (02 Mar 2023 08:33)  POCT Blood Glucose.: 168 mg/dL (02 Mar 2023 00:35)  POCT Blood Glucose.: 119 mg/dL (01 Mar 2023 22:00)  POCT Blood Glucose.: 105 mg/dL (01 Mar 2023 17:56)    I&O's Summary      PHYSICAL EXAM:  Vital Signs Last 24 Hrs  T(C): 36.5 (02 Mar 2023 13:29), Max: 37.1 (01 Mar 2023 22:17)  T(F): 97.7 (02 Mar 2023 13:29), Max: 98.8 (01 Mar 2023 22:17)  HR: 97 (02 Mar 2023 13:29) (63 - 97)  BP: 91/76 (02 Mar 2023 13:29) (91/76 - 141/69)  BP(mean): --  RR: 18 (02 Mar 2023 13:29) (16 - 18)  SpO2: 90% (02 Mar 2023 13:29) (90% - 100%)    Parameters below as of 02 Mar 2023 13:29  Patient On (Oxygen Delivery Method): room air        CONSTITUTIONAL: NAD, standing near his bed, dome dyskinesia noted  RESPIRATORY: Normal respiratory effort; lungs are clear to auscultation bilaterally  CARDIOVASCULAR: Regular rate and rhythm, normal S1 and S2, no murmur/rub/gallop;  ABDOMEN: Nontender to palpation, normoactive bowel sounds, no rebound/guarding;  MUSCLOSKELETAL: no clubbing or cyanosis of digits; no joint swelling or tenderness to palpation  PSYCH: Cooperative and calm    LABS:                        8.6    5.21  )-----------( 171      ( 02 Mar 2023 06:30 )             26.7     03-02    130<L>  |  101  |  26<H>  ----------------------------<  179<H>  5.0   |  21<L>  |  0.95    Ca    8.2<L>      02 Mar 2023 06:30  Phos  4.2     03-  Mg     1.40         TPro  6.4  /  Alb  4.0  /  TBili  0.2  /  DBili  x   /  AST  19  /  ALT  28  /  AlkPhos  107      PT/INR - ( 01 Mar 2023 12:42 )   PT: 11.2 sec;   INR: 0.97 ratio         PTT - ( 01 Mar 2023 12:42 )  PTT:35.4 sec      Urinalysis Basic - ( 01 Mar 2023 15:34 )    Color: Light Yellow / Appearance: Clear / S.030 / pH: x  Gluc: x / Ketone: Negative  / Bili: Negative / Urobili: <2 mg/dL   Blood: x / Protein: Trace / Nitrite: Negative   Leuk Esterase: Negative / RBC: x / WBC x   Sq Epi: x / Non Sq Epi: x / Bacteria: x          RADIOLOGY & ADDITIONAL TESTS:  Results Reviewed:   Imaging Personally Reviewed:  Electrocardiogram Personally Reviewed:    COORDINATION OF CARE:  Care Discussed with Consultants/Other Providers [Y/N]: Case discussed during interdisciplinary rounds with social work and case management  Prior or Outpatient Records Reviewed [Y/N]:

## 2023-03-02 NOTE — PATIENT PROFILE ADULT - FALL HARM RISK - HARM RISK INTERVENTIONS

## 2023-03-02 NOTE — CONSULT NOTE ADULT - SUBJECTIVE AND OBJECTIVE BOX
HPI:  68 yo M PMH of schizophrenia, bipolar disorder, type 2 DM, hypertension, hypothyroidism, BPH, glaucoma, and SBO (in 2017) presents for evaluation for a brief period of dizziness, lightheadedness, and confusion today. Initially code stroke called due to concern for dysdiadochokinesia on exam. CT angio brain and neck negative for occlusion, has mild chronic white matter changes. Found to be hypothermic to 95F rectally, endorses chronic cough, denies fevers, chills, diarrhea, abdominal pain, dysuria, nausea, or vomiting. Had recent hospitalization 1/30-2/13 for toxic-metabolic encephalopathy 2/2 sepsis from presumed PNA, treated with 7 days of zosyn.    In the ED, T95.1, HR 81, /69 satting well on RA. Given vanc and zosyn, 1L NS (01 Mar 2023 21:57)      Consulted for: r/o adrenal insufficiency  This is a 68 yo  M/w a PMH of schizophrenia, bipolar disorder, DM2, HTN and hypothyroidism who presents with transient epidsode of dizziness. Found to have hypothermia in ED via rectal temperature. AM cortisol drawn this morrning at 6:30AM and was low at 5. Patient remains hemodynamically stable and with BP range 90s-120s/50-70s. He breifly required a heating blanket. He did not receive any steroids. He had normalization of his temperature in the ED    Of note, patient had a similar presentation in January. AM cortisol initially in the 50s, and repeat down to 12.   Patient denies any recent steroid use. Denies symptoms of dizziness at home. Otherwise feels well.     Attempted to reach brother for additional information without success    Regarding hypothyroidism patient reports compliance with levothyroxine 75mcg daily but states at rehab for one month he received his levothyroxine and protonix at the same time    Current outpatient diabetes regimen is metformin 1000mg BID and januvia 100gm daily. HbA1c in 1/2023 was 6.4. Glipizide was stopped at that time. FSG currently controlled with hust low admelog correction scales. On last admission required lantus 5 units nightly and admelog 7 units before meals in addition to low admelog correction scales    PAST MEDICAL & SURGICAL HISTORY:  Diabetes      Hypothyroid      Hypertension      Glaucoma      Schizophrenia      Bipolar disorder      Seborrheic dermatitis      Personal history of other diseases of the digestive system      No significant past surgical history          FAMILY HISTORY:  Family history of diabetes mellitus (DM) (Mother)    Family history of heart disease  mother had 3 heart attacks    Family history of diabetes mellitus in brother  both brothers        Social History: denies all toxic habits    Home Medications:  amLODIPine 5 mg oral tablet: 1 tab(s) orally once a day (02 Mar 2023 00:15)  atorvastatin 40 mg oral tablet: 1 tab(s) orally once a day (at bedtime) (02 Mar 2023 00:15)  cloZAPine 100 mg oral tablet: 1 tab(s) orally once a day (at bedtime) (02 Mar 2023 00:15)  divalproex sodium 500 mg oral tablet, extended release: 2 tab(s) orally once a day (at bedtime) (02 Mar 2023 00:15)  ferrous sulfate 325 mg (65 mg elemental iron) oral delayed release tablet: 1 tab(s) orally once a day (02 Mar 2023 00:15)  finasteride 5 mg oral tablet: 1 tab(s) orally once a day (02 Mar 2023 00:15)  Flomax 0.4 mg oral capsule: 1 cap(s) orally once a day (02 Mar 2023 00:15)  Januvia 100 mg oral tablet: 1 tab(s) orally once a day, am (02 Mar 2023 00:15)  LEVOBUNOLOL 0.5% EYE DROPS: INSTILL 1 DROP INTO BOTH EYES TWICE A DAY (02 Mar 2023 00:15)  lidocaine 4% patch: 1 patch transdermal once a day (02 Mar 2023 00:15)  Lumigan 0.01% ophthalmic solution: 1 drop(s) to each affected eye once a day (in the evening) (02 Mar 2023 00:15)  metFORMIN 1000 mg oral tablet: 1 tab(s) orally 2 times a day (02 Mar 2023 00:15)  MiraLax oral powder for reconstitution: 17 gram(s) orally 2 times a day (02 Mar 2023 00:15)  nystatin 100,000 units/mL oral suspension: 4 milliliter(s) orally 2 times a day (02 Mar 2023 00:15)  omeprazole 40 mg oral delayed release capsule: 1 cap(s) orally once a day (02 Mar 2023 00:15)  pyridoxine 50 mg oral tablet: 1 tab(s) orally once a day (02 Mar 2023 00:15)  RHOPRESSA 0.02% OPHTH SOLUTION: INSTILL 1 DROP INTO BOTH EYES AT BEDTIME AS DIRECTED (02 Mar 2023 00:15)  Senna 8.6 mg oral tablet: 1 tab(s) orally once a day (at bedtime) (02 Mar 2023 00:15)  Synthroid 75 mcg (0.075 mg) oral tablet: 1 tab(s) orally once a day, am (02 Mar 2023 00:15)  venlafaxine 75 mg oral tablet, extended release: 1 tab(s) orally once a day with dinner (02 Mar 2023 00:15)  Vitamin D3 25 mcg (1000 intl units) oral tablet: 1 tab(s) orally once a day (02 Mar 2023 00:15)      MEDICATIONS  (STANDING):  amLODIPine   Tablet 5 milliGRAM(s) Oral daily  atorvastatin 40 milliGRAM(s) Oral at bedtime  chlorhexidine 2% Cloths 1 Application(s) Topical daily  cloZAPine 100 milliGRAM(s) Oral at bedtime  dextrose 5%. 1000 milliLiter(s) (100 mL/Hr) IV Continuous <Continuous>  dextrose 5%. 1000 milliLiter(s) (50 mL/Hr) IV Continuous <Continuous>  dextrose 50% Injectable 25 Gram(s) IV Push once  dextrose 50% Injectable 12.5 Gram(s) IV Push once  dextrose 50% Injectable 25 Gram(s) IV Push once  divalproex ER 1000 milliGRAM(s) Oral at bedtime  finasteride 5 milliGRAM(s) Oral daily  glucagon  Injectable 1 milliGRAM(s) IntraMuscular once  insulin lispro (ADMELOG) corrective regimen sliding scale   SubCutaneous three times a day before meals  insulin lispro (ADMELOG) corrective regimen sliding scale   SubCutaneous at bedtime  latanoprost 0.005% Ophthalmic Solution 1 Drop(s) Both EYES at bedtime  levobunolol 0.5% Solution 1 Drop(s) Both EYES two times a day  levothyroxine 75 MICROGram(s) Oral daily  pantoprazole    Tablet 40 milliGRAM(s) Oral before breakfast  piperacillin/tazobactam IVPB.. 3.375 Gram(s) IV Intermittent every 8 hours  polyethylene glycol 3350 17 Gram(s) Oral two times a day  pyridoxine 50 milliGRAM(s) Oral daily  RHOPRESSA (Netarsudil) 0.02% Eye Drops 1 Drop(s)   Both EYES daily  senna 1 Tablet(s) Oral at bedtime  tamsulosin 0.4 milliGRAM(s) Oral at bedtime  vancomycin  IVPB 1000 milliGRAM(s) IV Intermittent every 12 hours  venlafaxine 75 milliGRAM(s) Oral daily    MEDICATIONS  (PRN):  acetaminophen     Tablet .. 650 milliGRAM(s) Oral every 6 hours PRN Temp greater or equal to 38C (100.4F), Mild Pain (1 - 3)  dextrose Oral Gel 15 Gram(s) Oral once PRN Blood Glucose LESS THAN 70 milliGRAM(s)/deciliter      Allergies    No Known Allergies    Intolerances      Review of Systems:  Constitutional: No fever  Eyes: No blurry vision  Neuro: No tremors  HEENT: No pain  Cardiovascular: No chest pain, palpitations  Respiratory: No SOB, no cough  GI: No nausea, vomiting, abdominal pain  : No dysuria  Skin: no rash  Psych: no depression  Endocrine: no polyuria, polydipsia  Hem/lymph: no swelling  Osteoporosis: no fractures    PHYSICAL EXAM:  VITALS: T(C): 36.5 (03-02-23 @ 13:29)  T(F): 97.7 (03-02-23 @ 13:29), Max: 98.8 (03-01-23 @ 22:17)  HR: 97 (03-02-23 @ 13:29) (63 - 97)  BP: 91/76 (03-02-23 @ 13:29) (91/76 - 141/69)  RR:  (16 - 18)  SpO2:  (90% - 100%)  Wt(kg): --  GENERAL: NAD  EYES: No proptosis, no lid lag, anicteric  THYROID: Normal size, no palpable nodules  RESPIRATORY: Clear to auscultation bilaterally  CARDIOVASCULAR: Regular rate and rhythm  GI: Soft, nontender, non distended  EXT: b/l feet without wounds; 2+ pulses  PSYCH: Alert and oriented x 3, reactive mood    POCT Blood Glucose.: 95 mg/dL (03-02-23 @ 12:27)  POCT Blood Glucose.: 154 mg/dL (03-02-23 @ 08:33)  POCT Blood Glucose.: 168 mg/dL (03-02-23 @ 00:35)  POCT Blood Glucose.: 119 mg/dL (03-01-23 @ 22:00)  POCT Blood Glucose.: 105 mg/dL (03-01-23 @ 17:56)  POCT Blood Glucose.: 230 mg/dL (03-01-23 @ 12:18)                            8.6    5.21  )-----------( 171      ( 02 Mar 2023 06:30 )             26.7       03-02    130<L>  |  101  |  26<H>  ----------------------------<  179<H>  5.0   |  21<L>  |  0.95    eGFR: 87    Ca    8.2<L>      03-02  Mg     1.40     03-02  Phos  4.2     03-02    TPro  6.4  /  Alb  4.0  /  TBili  0.2  /  DBili  x   /  AST  19  /  ALT  28  /  AlkPhos  107  03-01      Thyroid Function Tests:  03-01 @ 18:08 TSH 8.67 FreeT4 1.4 T3 -- Anti TPO -- Anti Thyroglobulin Ab -- TSI --  03-01 @ 16:24 TSH 6.45 FreeT4 -- T3 -- Anti TPO -- Anti Thyroglobulin Ab -- TSI --      A1C with Estimated Average Glucose Result: 6.4 % (01-30-23 @ 16:45)      01-31 Chol 89 Direct LDL -- LDL calculated 23 HDL 53 Trig 64    Radiology:                HPI:  68 yo M PMH of schizophrenia, bipolar disorder, type 2 DM, hypertension, hypothyroidism, BPH, glaucoma, and SBO (in 2017) presents for evaluation for a brief period of dizziness, lightheadedness, and confusion today. Initially code stroke called due to concern for dysdiadochokinesia on exam. CT angio brain and neck negative for occlusion, has mild chronic white matter changes. Found to be hypothermic to 95F rectally, endorses chronic cough, denies fevers, chills, diarrhea, abdominal pain, dysuria, nausea, or vomiting. Had recent hospitalization 1/30-2/13 for toxic-metabolic encephalopathy 2/2 sepsis from presumed PNA, treated with 7 days of zosyn.    In the ED, T95.1, HR 81, /69 satting well on RA. Given vanc and zosyn, 1L NS (01 Mar 2023 21:57)      Consulted for: r/o adrenal insufficiency  This is a 68 yo  M/w a PMH of schizophrenia, bipolar disorder, DM2, HTN and hypothyroidism who presents with transient epidsode of dizziness. Found to have hypothermia in ED via rectal temperature. AM cortisol drawn this morrning at 6:30AM and was low at 5. Patient remains hemodynamically stable and with BP range 90s-120s/50-70s. He breifly required a heating blanket. He did not receive any steroids. He had normalization of his temperature in the ED    Of note, patient had a similar presentation in January. AM cortisol initially in the 50s (patient had received 100mg hydrocortisone prior to lab). Yesterday evening cortisol was 12. This morning at 6:30AM it was 5     Patient denies any recent steroid use. Denies symptoms of dizziness at home. Otherwise feels well.     Attempted to reach brother for additional information without success    Regarding hypothyroidism patient reports compliance with levothyroxine 75mcg daily but states at rehab for one month he received his levothyroxine and protonix at the same time    Current outpatient diabetes regimen is metformin 1000mg BID and januvia 100gm daily. HbA1c in 1/2023 was 6.4. Glipizide was stopped at that time. FSG currently controlled with hust low admelog correction scales. On last admission required lantus 5 units nightly and admelog 7 units before meals in addition to low admelog correction scales    PAST MEDICAL & SURGICAL HISTORY:  Diabetes      Hypothyroid      Hypertension      Glaucoma      Schizophrenia      Bipolar disorder      Seborrheic dermatitis      Personal history of other diseases of the digestive system      No significant past surgical history          FAMILY HISTORY:  Family history of diabetes mellitus (DM) (Mother)    Family history of heart disease  mother had 3 heart attacks    Family history of diabetes mellitus in brother  both brothers        Social History: denies all toxic habits    Home Medications:  amLODIPine 5 mg oral tablet: 1 tab(s) orally once a day (02 Mar 2023 00:15)  atorvastatin 40 mg oral tablet: 1 tab(s) orally once a day (at bedtime) (02 Mar 2023 00:15)  cloZAPine 100 mg oral tablet: 1 tab(s) orally once a day (at bedtime) (02 Mar 2023 00:15)  divalproex sodium 500 mg oral tablet, extended release: 2 tab(s) orally once a day (at bedtime) (02 Mar 2023 00:15)  ferrous sulfate 325 mg (65 mg elemental iron) oral delayed release tablet: 1 tab(s) orally once a day (02 Mar 2023 00:15)  finasteride 5 mg oral tablet: 1 tab(s) orally once a day (02 Mar 2023 00:15)  Flomax 0.4 mg oral capsule: 1 cap(s) orally once a day (02 Mar 2023 00:15)  Januvia 100 mg oral tablet: 1 tab(s) orally once a day, am (02 Mar 2023 00:15)  LEVOBUNOLOL 0.5% EYE DROPS: INSTILL 1 DROP INTO BOTH EYES TWICE A DAY (02 Mar 2023 00:15)  lidocaine 4% patch: 1 patch transdermal once a day (02 Mar 2023 00:15)  Lumigan 0.01% ophthalmic solution: 1 drop(s) to each affected eye once a day (in the evening) (02 Mar 2023 00:15)  metFORMIN 1000 mg oral tablet: 1 tab(s) orally 2 times a day (02 Mar 2023 00:15)  MiraLax oral powder for reconstitution: 17 gram(s) orally 2 times a day (02 Mar 2023 00:15)  nystatin 100,000 units/mL oral suspension: 4 milliliter(s) orally 2 times a day (02 Mar 2023 00:15)  omeprazole 40 mg oral delayed release capsule: 1 cap(s) orally once a day (02 Mar 2023 00:15)  pyridoxine 50 mg oral tablet: 1 tab(s) orally once a day (02 Mar 2023 00:15)  RHOPRESSA 0.02% OPHTH SOLUTION: INSTILL 1 DROP INTO BOTH EYES AT BEDTIME AS DIRECTED (02 Mar 2023 00:15)  Senna 8.6 mg oral tablet: 1 tab(s) orally once a day (at bedtime) (02 Mar 2023 00:15)  Synthroid 75 mcg (0.075 mg) oral tablet: 1 tab(s) orally once a day, am (02 Mar 2023 00:15)  venlafaxine 75 mg oral tablet, extended release: 1 tab(s) orally once a day with dinner (02 Mar 2023 00:15)  Vitamin D3 25 mcg (1000 intl units) oral tablet: 1 tab(s) orally once a day (02 Mar 2023 00:15)      MEDICATIONS  (STANDING):  amLODIPine   Tablet 5 milliGRAM(s) Oral daily  atorvastatin 40 milliGRAM(s) Oral at bedtime  chlorhexidine 2% Cloths 1 Application(s) Topical daily  cloZAPine 100 milliGRAM(s) Oral at bedtime  dextrose 5%. 1000 milliLiter(s) (100 mL/Hr) IV Continuous <Continuous>  dextrose 5%. 1000 milliLiter(s) (50 mL/Hr) IV Continuous <Continuous>  dextrose 50% Injectable 25 Gram(s) IV Push once  dextrose 50% Injectable 12.5 Gram(s) IV Push once  dextrose 50% Injectable 25 Gram(s) IV Push once  divalproex ER 1000 milliGRAM(s) Oral at bedtime  finasteride 5 milliGRAM(s) Oral daily  glucagon  Injectable 1 milliGRAM(s) IntraMuscular once  insulin lispro (ADMELOG) corrective regimen sliding scale   SubCutaneous three times a day before meals  insulin lispro (ADMELOG) corrective regimen sliding scale   SubCutaneous at bedtime  latanoprost 0.005% Ophthalmic Solution 1 Drop(s) Both EYES at bedtime  levobunolol 0.5% Solution 1 Drop(s) Both EYES two times a day  levothyroxine 75 MICROGram(s) Oral daily  pantoprazole    Tablet 40 milliGRAM(s) Oral before breakfast  piperacillin/tazobactam IVPB.. 3.375 Gram(s) IV Intermittent every 8 hours  polyethylene glycol 3350 17 Gram(s) Oral two times a day  pyridoxine 50 milliGRAM(s) Oral daily  RHOPRESSA (Netarsudil) 0.02% Eye Drops 1 Drop(s)   Both EYES daily  senna 1 Tablet(s) Oral at bedtime  tamsulosin 0.4 milliGRAM(s) Oral at bedtime  vancomycin  IVPB 1000 milliGRAM(s) IV Intermittent every 12 hours  venlafaxine 75 milliGRAM(s) Oral daily    MEDICATIONS  (PRN):  acetaminophen     Tablet .. 650 milliGRAM(s) Oral every 6 hours PRN Temp greater or equal to 38C (100.4F), Mild Pain (1 - 3)  dextrose Oral Gel 15 Gram(s) Oral once PRN Blood Glucose LESS THAN 70 milliGRAM(s)/deciliter      Allergies    No Known Allergies    Intolerances      Review of Systems:  Constitutional: No fever  Eyes: No blurry vision  Neuro: No tremors  HEENT: No pain  Cardiovascular: No chest pain, palpitations  Respiratory: No SOB, no cough  GI: No nausea, vomiting, abdominal pain  : No dysuria  Skin: no rash  Psych: no depression  Endocrine: no polyuria, polydipsia  Hem/lymph: no swelling  Osteoporosis: no fractures    PHYSICAL EXAM:  VITALS: T(C): 36.5 (03-02-23 @ 13:29)  T(F): 97.7 (03-02-23 @ 13:29), Max: 98.8 (03-01-23 @ 22:17)  HR: 97 (03-02-23 @ 13:29) (63 - 97)  BP: 91/76 (03-02-23 @ 13:29) (91/76 - 141/69)  RR:  (16 - 18)  SpO2:  (90% - 100%)  Wt(kg): --  GENERAL: NAD  EYES: No proptosis, no lid lag, anicteric  THYROID: Normal size, no palpable nodules  RESPIRATORY: Clear to auscultation bilaterally  CARDIOVASCULAR: Regular rate and rhythm  GI: Soft, nontender, non distended  EXT: b/l feet without wounds; 2+ pulses  PSYCH: Alert and oriented x 3, reactive mood    POCT Blood Glucose.: 95 mg/dL (03-02-23 @ 12:27)  POCT Blood Glucose.: 154 mg/dL (03-02-23 @ 08:33)  POCT Blood Glucose.: 168 mg/dL (03-02-23 @ 00:35)  POCT Blood Glucose.: 119 mg/dL (03-01-23 @ 22:00)  POCT Blood Glucose.: 105 mg/dL (03-01-23 @ 17:56)  POCT Blood Glucose.: 230 mg/dL (03-01-23 @ 12:18)                            8.6    5.21  )-----------( 171      ( 02 Mar 2023 06:30 )             26.7       03-02    130<L>  |  101  |  26<H>  ----------------------------<  179<H>  5.0   |  21<L>  |  0.95    eGFR: 87    Ca    8.2<L>      03-02  Mg     1.40     03-02  Phos  4.2     03-02    TPro  6.4  /  Alb  4.0  /  TBili  0.2  /  DBili  x   /  AST  19  /  ALT  28  /  AlkPhos  107  03-01      Thyroid Function Tests:  03-01 @ 18:08 TSH 8.67 FreeT4 1.4 T3 -- Anti TPO -- Anti Thyroglobulin Ab -- TSI --  03-01 @ 16:24 TSH 6.45 FreeT4 -- T3 -- Anti TPO -- Anti Thyroglobulin Ab -- TSI --      A1C with Estimated Average Glucose Result: 6.4 % (01-30-23 @ 16:45)      01-31 Chol 89 Direct LDL -- LDL calculated 23 HDL 53 Trig 64    Radiology:

## 2023-03-02 NOTE — CONSULT NOTE ADULT - ATTENDING COMMENTS
Per patient and brother - he is 100% back to his normal self.     Exam:  Awake, alert, attentive, gives detailed history.   Generalized dyskinesias including david-buccal-lingual.     A/P  Mr. Olmstead is a 66 yo man with toxic metabolic septic encephalopathy.  No further neurological work up or treatment indicated.   Neurology signing off. Please reconsult PRN or call [facility] [phone] with any questions.  Thank you
67M with hypothyroidism, schizophrenia, bipolar disorder, DM2, with hypothermia, indeterminate 6:30AM cortisol. Check cosyntropin stim test tomorrow at 8AM. TSH elevated increase levothyroxine.  DM2 on metformin and Januvia at home. Inpatient recommend linagliptin and low correction scales.    Fidelina Delvalle MD  Division of Endocrinology  Pager: [phone]    If after 6PM or before 9AM, or on weekends/holidays, please call endocrine answering service for assistance ([phone]).  For nonurgent matters email [email or web address] for assistance.

## 2023-03-03 DIAGNOSIS — R78.81 BACTEREMIA: ICD-10-CM

## 2023-03-03 LAB
ACTH SER-ACNC: 65.9 PG/ML — HIGH (ref 7.2–63.3)
ANION GAP SERPL CALC-SCNC: 7 MMOL/L — SIGNIFICANT CHANGE UP (ref 7–14)
BUN SERPL-MCNC: 16 MG/DL — SIGNIFICANT CHANGE UP (ref 7–23)
CALCIUM SERPL-MCNC: 8.9 MG/DL — SIGNIFICANT CHANGE UP (ref 8.4–10.5)
CHLORIDE SERPL-SCNC: 97 MMOL/L — LOW (ref 98–107)
CO2 SERPL-SCNC: 28 MMOL/L — SIGNIFICANT CHANGE UP (ref 22–31)
CORTIS AM PEAK SERPL-MCNC: 11.1 UG/DL — SIGNIFICANT CHANGE UP (ref 6–18.4)
CORTIS AM PEAK SERPL-MCNC: 18.9 UG/DL — HIGH (ref 6–18.4)
CORTIS AM PEAK SERPL-MCNC: 22 UG/DL — HIGH (ref 6–18.4)
CREAT SERPL-MCNC: 0.85 MG/DL — SIGNIFICANT CHANGE UP (ref 0.5–1.3)
CULTURE RESULTS: SIGNIFICANT CHANGE UP
EGFR: 95 ML/MIN/1.73M2 — SIGNIFICANT CHANGE UP
GLUCOSE BLDC GLUCOMTR-MCNC: 146 MG/DL — HIGH (ref 70–99)
GLUCOSE BLDC GLUCOMTR-MCNC: 172 MG/DL — HIGH (ref 70–99)
GLUCOSE BLDC GLUCOMTR-MCNC: 258 MG/DL — HIGH (ref 70–99)
GLUCOSE SERPL-MCNC: 173 MG/DL — HIGH (ref 70–99)
HCT VFR BLD CALC: 28.1 % — LOW (ref 39–50)
HGB BLD-MCNC: 9.1 G/DL — LOW (ref 13–17)
MCHC RBC-ENTMCNC: 30.4 PG — SIGNIFICANT CHANGE UP (ref 27–34)
MCHC RBC-ENTMCNC: 32.4 GM/DL — SIGNIFICANT CHANGE UP (ref 32–36)
MCV RBC AUTO: 94 FL — SIGNIFICANT CHANGE UP (ref 80–100)
NRBC # BLD: 0 /100 WBCS — SIGNIFICANT CHANGE UP (ref 0–0)
NRBC # FLD: 0 K/UL — SIGNIFICANT CHANGE UP (ref 0–0)
ORGANISM # SPEC MICROSCOPIC CNT: SIGNIFICANT CHANGE UP
ORGANISM # SPEC MICROSCOPIC CNT: SIGNIFICANT CHANGE UP
PLATELET # BLD AUTO: 177 K/UL — SIGNIFICANT CHANGE UP (ref 150–400)
POTASSIUM SERPL-MCNC: 4.5 MMOL/L — SIGNIFICANT CHANGE UP (ref 3.5–5.3)
POTASSIUM SERPL-SCNC: 4.5 MMOL/L — SIGNIFICANT CHANGE UP (ref 3.5–5.3)
PROCALCITONIN SERPL-MCNC: 0.06 NG/ML — SIGNIFICANT CHANGE UP (ref 0.02–0.1)
RBC # BLD: 2.99 M/UL — LOW (ref 4.2–5.8)
RBC # FLD: 15.2 % — HIGH (ref 10.3–14.5)
SODIUM SERPL-SCNC: 132 MMOL/L — LOW (ref 135–145)
SPECIMEN SOURCE: SIGNIFICANT CHANGE UP
VALPROATE SERPL-MCNC: 74.2 UG/ML — SIGNIFICANT CHANGE UP (ref 50–100)
VANCOMYCIN TROUGH SERPL-MCNC: 10.9 UG/ML — SIGNIFICANT CHANGE UP (ref 10–20)
WBC # BLD: 4.85 K/UL — SIGNIFICANT CHANGE UP (ref 3.8–10.5)
WBC # FLD AUTO: 4.85 K/UL — SIGNIFICANT CHANGE UP (ref 3.8–10.5)

## 2023-03-03 PROCEDURE — 71250 CT THORAX DX C-: CPT | Mod: 26

## 2023-03-03 PROCEDURE — 99232 SBSQ HOSP IP/OBS MODERATE 35: CPT

## 2023-03-03 PROCEDURE — 99233 SBSQ HOSP IP/OBS HIGH 50: CPT | Mod: GC

## 2023-03-03 PROCEDURE — 99233 SBSQ HOSP IP/OBS HIGH 50: CPT

## 2023-03-03 RX ORDER — LINAGLIPTIN 5 MG/1
5 TABLET, FILM COATED ORAL DAILY
Refills: 0 | Status: DISCONTINUED | OUTPATIENT
Start: 2023-03-03 | End: 2023-03-08

## 2023-03-03 RX ORDER — VANCOMYCIN HCL 1 G
VIAL (EA) INTRAVENOUS
Refills: 0 | Status: DISCONTINUED | OUTPATIENT
Start: 2023-03-03 | End: 2023-03-03

## 2023-03-03 RX ORDER — LEVOTHYROXINE SODIUM 125 MCG
88 TABLET ORAL DAILY
Refills: 0 | Status: DISCONTINUED | OUTPATIENT
Start: 2023-03-04 | End: 2023-03-08

## 2023-03-03 RX ORDER — HALOPERIDOL DECANOATE 100 MG/ML
1 INJECTION INTRAMUSCULAR EVERY 6 HOURS
Refills: 0 | Status: DISCONTINUED | OUTPATIENT
Start: 2023-03-03 | End: 2023-03-08

## 2023-03-03 RX ORDER — COSYNTROPIN 0.25 MG/ML
0.25 INJECTION, SOLUTION INTRAVENOUS ONCE
Refills: 0 | Status: COMPLETED | OUTPATIENT
Start: 2023-03-03 | End: 2023-03-03

## 2023-03-03 RX ORDER — PANTOPRAZOLE SODIUM 20 MG/1
40 TABLET, DELAYED RELEASE ORAL
Refills: 0 | Status: DISCONTINUED | OUTPATIENT
Start: 2023-03-03 | End: 2023-03-08

## 2023-03-03 RX ADMIN — PANTOPRAZOLE SODIUM 40 MILLIGRAM(S): 20 TABLET, DELAYED RELEASE ORAL at 06:09

## 2023-03-03 RX ADMIN — COSYNTROPIN 0.25 MILLIGRAM(S): 0.25 INJECTION, SOLUTION INTRAVENOUS at 09:07

## 2023-03-03 RX ADMIN — PIPERACILLIN AND TAZOBACTAM 25 GRAM(S): 4; .5 INJECTION, POWDER, LYOPHILIZED, FOR SOLUTION INTRAVENOUS at 11:54

## 2023-03-03 RX ADMIN — LINAGLIPTIN 5 MILLIGRAM(S): 5 TABLET, FILM COATED ORAL at 18:11

## 2023-03-03 RX ADMIN — Medication 75 MILLIGRAM(S): at 18:11

## 2023-03-03 RX ADMIN — Medication 1: at 09:15

## 2023-03-03 RX ADMIN — CLOZAPINE 100 MILLIGRAM(S): 150 TABLET, ORALLY DISINTEGRATING ORAL at 21:45

## 2023-03-03 RX ADMIN — SENNA PLUS 1 TABLET(S): 8.6 TABLET ORAL at 21:46

## 2023-03-03 RX ADMIN — PIPERACILLIN AND TAZOBACTAM 25 GRAM(S): 4; .5 INJECTION, POWDER, LYOPHILIZED, FOR SOLUTION INTRAVENOUS at 02:11

## 2023-03-03 RX ADMIN — Medication 88 MICROGRAM(S): at 09:08

## 2023-03-03 RX ADMIN — Medication 250 MILLIGRAM(S): at 09:26

## 2023-03-03 RX ADMIN — Medication 3: at 18:55

## 2023-03-03 RX ADMIN — ATORVASTATIN CALCIUM 40 MILLIGRAM(S): 80 TABLET, FILM COATED ORAL at 21:44

## 2023-03-03 RX ADMIN — PANTOPRAZOLE SODIUM 40 MILLIGRAM(S): 20 TABLET, DELAYED RELEASE ORAL at 18:11

## 2023-03-03 RX ADMIN — CHLORHEXIDINE GLUCONATE 1 APPLICATION(S): 213 SOLUTION TOPICAL at 18:11

## 2023-03-03 RX ADMIN — FINASTERIDE 5 MILLIGRAM(S): 5 TABLET, FILM COATED ORAL at 18:09

## 2023-03-03 RX ADMIN — LATANOPROST 1 DROP(S): 0.05 SOLUTION/ DROPS OPHTHALMIC; TOPICAL at 21:43

## 2023-03-03 RX ADMIN — Medication 50 MILLIGRAM(S): at 18:10

## 2023-03-03 RX ADMIN — TAMSULOSIN HYDROCHLORIDE 0.4 MILLIGRAM(S): 0.4 CAPSULE ORAL at 21:45

## 2023-03-03 RX ADMIN — DIVALPROEX SODIUM 1000 MILLIGRAM(S): 500 TABLET, DELAYED RELEASE ORAL at 21:44

## 2023-03-03 RX ADMIN — POLYETHYLENE GLYCOL 3350 17 GRAM(S): 17 POWDER, FOR SOLUTION ORAL at 18:10

## 2023-03-03 NOTE — BH CONSULTATION LIAISON ASSESSMENT NOTE - NSBHCHARTREVIEWLAB_PSY_A_CORE FT
CBC with DIFF 3/2: IANC wnl, Hgb 8.6  CMP - pt has chronic hyponatremia, elevated BG  	Urine osm 361, Urine Na+ 37  TFTs - elevated TSH, T3 and T4 wnl  LFTs wnl

## 2023-03-03 NOTE — PATIENT PROFILE ADULT. - TEACHING/LEARNING FACTORS IMPACT ABILITY TO LEARN
anxiety Doxepin Counseling:  Patient advised that the medication is sedating and not to drive a car after taking this medication. Patient informed of potential adverse effects including but not limited to dry mouth, urinary retention, and blurry vision.  The patient verbalized understanding of the proper use and possible adverse effects of doxepin.  All of the patient's questions and concerns were addressed.

## 2023-03-03 NOTE — BH CONSULTATION LIAISON ASSESSMENT NOTE - SUMMARY
68 yo male, domiciled with brother, with schizophrenia and OCD, multiple past psychiatric hospitalizations (last in late 90s), no past suicide attempts, no current substance use, PMH Diabetes, Glaucoma, Hypertension, Hypothyroid, BPH, admitted for hypothermia. Psych consulted for med management given concern for possible role of psychiatric medication in his hypothermia. Low concern at this time given pt's long history with current regimen and absence of signs of malignant catatonia or EPS. Current sx mostly likely due to infectious etiology given positive blood culture. Last CBC with diff wnl, LFTs wnl.     Recs:    - Continue home psychiatric medication regimen:  	- Clozapine 100mg qd  	- Depakote 1000mg qd  	- Venlafaxine ER 75mg qd  		- Low concern for SIADH given pt's hyponatremia is chronic and recent urine osmolality and urine Na+ are not consistent with SIADH picture  - Monitor CBC with DIFF while on clozapine  - PRN for agitation: Haldol 1mg IM/IV q6 PRN  - Remain on routine observation

## 2023-03-03 NOTE — BH CONSULTATION LIAISON ASSESSMENT NOTE - RISK ASSESSMENT
Pt is currently low acute and chronic risk for harm to self or others. Protective factors include good consistency with treatment, supportive family, and stable housing. Chronic risk factors include multiple chronic illnesses including psychiatric illnesses.

## 2023-03-03 NOTE — BH CONSULTATION LIAISON ASSESSMENT NOTE - NSBHATTESTCOMMENTATTENDFT_PSY_A_CORE
Patient known to writer - 67 year old M history of schizophrenia on clozapine for ~30y, appears quite stable behaviorally at this time, Aox3 no acute cognitive deficits noted. No EPS or unusual movements. Denies depression, SI/HI. Denies AH/VH, delusions or paranoia. Denies decreased need for sleep, increased energy, racing thoughts, irritability euphoria or grandiosity. Discussed clozapine adamantly wants to stay on this. Likelihood that clozapine is contributing to hypothermia is near case report level of rarity. Furthermore patient has dramatically decompensated when med is removed in the past, would continue clozapine to preserve QOL. PRNs as above. No psych c/i to discharge, can f/u with Dr. Carter.

## 2023-03-03 NOTE — PROGRESS NOTE ADULT - SUBJECTIVE AND OBJECTIVE BOX
Derrell John MD  Academic Hospitalist  Pager [phone]  Email: [email or web address]  Available on [employer] Teams        PROGRESS NOTE:     Patient is a 67y old  Male who presents with a chief complaint of Hypothermia (03 Mar 2023 09:54)      SUBJECTIVE / OVERNIGHT EVENTS:  Patient seen and examined this morning. No acute events overnight, temperature improved. Discussed with brother who is at bedside.   ADDITIONAL REVIEW OF SYSTEMS:  No f/c/n/v    MEDICATIONS  (STANDING):  atorvastatin 40 milliGRAM(s) Oral at bedtime  chlorhexidine 2% Cloths 1 Application(s) Topical daily  cloZAPine 100 milliGRAM(s) Oral at bedtime  dextrose 5%. 1000 milliLiter(s) (100 mL/Hr) IV Continuous <Continuous>  dextrose 5%. 1000 milliLiter(s) (50 mL/Hr) IV Continuous <Continuous>  dextrose 50% Injectable 25 Gram(s) IV Push once  dextrose 50% Injectable 12.5 Gram(s) IV Push once  dextrose 50% Injectable 25 Gram(s) IV Push once  divalproex ER 1000 milliGRAM(s) Oral at bedtime  finasteride 5 milliGRAM(s) Oral daily  glucagon  Injectable 1 milliGRAM(s) IntraMuscular once  insulin lispro (ADMELOG) corrective regimen sliding scale   SubCutaneous three times a day before meals  insulin lispro (ADMELOG) corrective regimen sliding scale   SubCutaneous at bedtime  latanoprost 0.005% Ophthalmic Solution 1 Drop(s) Both EYES at bedtime  levobunolol 0.5% Solution 1 Drop(s) Both EYES two times a day  levothyroxine 88 MICROGram(s) Oral daily  linagliptin 5 milliGRAM(s) Oral daily  pantoprazole    Tablet 40 milliGRAM(s) Oral before breakfast  piperacillin/tazobactam IVPB.. 3.375 Gram(s) IV Intermittent every 8 hours  polyethylene glycol 3350 17 Gram(s) Oral two times a day  pyridoxine 50 milliGRAM(s) Oral daily  RHOPRESSA (Netarsudil) 0.02% Eye Drops 1 Drop(s) 1 Drop(s) Both EYES daily  senna 1 Tablet(s) Oral at bedtime  tamsulosin 0.4 milliGRAM(s) Oral at bedtime  venlafaxine 75 milliGRAM(s) Oral daily    MEDICATIONS  (PRN):  acetaminophen     Tablet .. 650 milliGRAM(s) Oral every 6 hours PRN Temp greater or equal to 38C (100.4F), Mild Pain (1 - 3)  dextrose Oral Gel 15 Gram(s) Oral once PRN Blood Glucose LESS THAN 70 milliGRAM(s)/deciliter      CAPILLARY BLOOD GLUCOSE      POCT Blood Glucose.: 146 mg/dL (03 Mar 2023 12:10)  POCT Blood Glucose.: 172 mg/dL (03 Mar 2023 09:02)  POCT Blood Glucose.: 223 mg/dL (02 Mar 2023 21:23)  POCT Blood Glucose.: 245 mg/dL (02 Mar 2023 17:52)    I&O's Summary    02 Mar 2023 07:01  -  03 Mar 2023 07:00  --------------------------------------------------------  IN: 0 mL / OUT: 2700 mL / NET: -2700 mL    03 Mar 2023 07:01  -  03 Mar 2023 14:04  --------------------------------------------------------  IN: 350 mL / OUT: 0 mL / NET: 350 mL        PHYSICAL EXAM:  Vital Signs Last 24 Hrs  T(C): 36.6 (03 Mar 2023 08:30), Max: 36.8 (02 Mar 2023 21:16)  T(F): 97.8 (03 Mar 2023 08:30), Max: 98.3 (02 Mar 2023 21:16)  HR: 73 (03 Mar 2023 08:30) (73 - 85)  BP: 102/47 (03 Mar 2023 08:30) (102/47 - 130/70)  BP(mean): --  RR: 15 (03 Mar 2023 08:30) (15 - 18)  SpO2: 96% (03 Mar 2023 08:30) (96% - 100%)    Parameters below as of 03 Mar 2023 08:30  Patient On (Oxygen Delivery Method): room air        CONSTITUTIONAL: NAD, standing near his bed, dome dyskinesia noted  RESPIRATORY: Normal respiratory effort; lungs are clear to auscultation bilaterally  CARDIOVASCULAR: Regular rate and rhythm, normal S1 and S2, no murmur/rub/gallop;  ABDOMEN: Nontender to palpation, normoactive bowel sounds, no rebound/guarding;  MUSCLOSKELETAL: no clubbing or cyanosis of digits; no joint swelling or tenderness to palpation  PSYCH: Cooperative and calm  LABS:                        9.1    4.85  )-----------( 177      ( 03 Mar 2023 08:40 )             28.1     03-03    132<L>  |  97<L>  |  16  ----------------------------<  173<H>  4.5   |  28  |  0.85    Ca    8.9      03 Mar 2023 08:40  Phos  4.2     03-02  Mg     1.40     -            Urinalysis Basic - ( 01 Mar 2023 15:34 )    Color: Light Yellow / Appearance: Clear / S.030 / pH: x  Gluc: x / Ketone: Negative  / Bili: Negative / Urobili: <2 mg/dL   Blood: x / Protein: Trace / Nitrite: Negative   Leuk Esterase: Negative / RBC: x / WBC x   Sq Epi: x / Non Sq Epi: x / Bacteria: x        Culture - Blood (collected 01 Mar 2023 18:10)  Source: .Blood Blood  Gram Stain (02 Mar 2023 16:43):    Growth in aerobic bottle: Gram Positive Cocci in Clusters  Preliminary Report (02 Mar 2023 16:43):    Growth in aerobic bottle: Gram Positive Cocci in Clusters    ***Blood Panel PCR results on this specimen are available    approximately 3 hours after the Gram stain result.***    Gram stain, PCR, and/or culture results may not always    correspond due to difference in methodologies.    ************************************************************    This PCR assay was performed by multiplex PCR. This    Assay tests for 66 bacterial and resistance gene targets.    Please refer to the [facility] test directory    at [email or web address] for details.  Organism: Blood Culture PCR (02 Mar 2023 18:59)  Organism: Blood Culture PCR (02 Mar 2023 18:59)    Culture - Blood (collected 01 Mar 2023 17:55)  Source: .Blood Blood  Preliminary Report (02 Mar 2023 21:01):    No growth to date.    Culture - Urine (collected 01 Mar 2023 16:35)  Source: Clean Catch Clean Catch (Midstream)  Final Report (02 Mar 2023 18:18):    <10,000 CFU/mL Normal Urogenital Mary        RADIOLOGY & ADDITIONAL TESTS:  Results Reviewed:   Imaging Personally Reviewed:  Electrocardiogram Personally Reviewed:    COORDINATION OF CARE:  Care Discussed with Consultants/Other Providers [Y/N]:  Prior or Outpatient Records Reviewed [Y/N]:

## 2023-03-03 NOTE — PROGRESS NOTE ADULT - ASSESSMENT
This is a 68 yo  M/w a PMH of schizophrenia, bipolar disorder, DM2, HTN and hypothyroidism who presents with transient episode of dizziness for which endocrinology was consulted to rule out adrenal insufficiency given low AM cortisol. It is unclear at this time if patient has any risk factors for adrenal insufficiency and additionally he does not appear to have any convincing symptoms of AI (hypotension, hypoglycemia - hyponatremia is chronic) Therefore, to determine if adrenal insufficiency is real, would recommend ACTH stimulation test to further evaluate    #r/o Adrenal insufficiency  #low AM cortisol  -repeat AM cortisol this morning is 11.1, makes AI unlikely  -f/u ACTH stimulation test      #hypothyroidism  -increase levothyroxine to 88mcg daily  -please ensure any vitamins, iron, calcium, PPI, H2 blockers are given 4 hours after the levothyroxine  -levothyroxine should be given on an empty stomach at 7;30AM at least 30 minutes before breakfast  -repeat TFTs in 6-8 weeks as an outpatient    #DM2  -FSG goal 100-180  -can continue with low admelog correction scales for now  -can be discharged on home regimen metformin 1000mg twice per day and januvia 100mg daily  -follow up with outpatient endocrinologist Dr. Farfan    Case discussed with Dr. Vilma Juarez MD  Endocrine Fellow  Can be reached via teams. For follow up questions, discharge recommendations, or new consults, please call answering service at [phone] (weekdays); [phone] (nights/weekends) This is a 66 yo  M/w a PMH of schizophrenia, bipolar disorder, DM2, HTN and hypothyroidism who presents with transient episode of dizziness for which endocrinology was consulted to rule out adrenal insufficiency given low AM cortisol. It is unclear at this time if patient has any risk factors for adrenal insufficiency and additionally he does not appear to have any convincing symptoms of AI (hypotension, hypoglycemia - hyponatremia is chronic) Therefore, to determine if adrenal insufficiency is real, would recommend ACTH stimulation test to further evaluate    #r/o Adrenal insufficiency  #low AM cortisol  AM cortisol drawn at 8:40AM is 11.1  Cosyntropin given at 9AM  Cortisol at 9:35 is 18,9  Cortisol at 10:15 is 22  -this ACTH stimulation test rules out adrenal insufficiency      #hypothyroidism  -increase levothyroxine to 88mcg daily  -please ensure any vitamins, iron, calcium, PPI, H2 blockers are given 4 hours after the levothyroxine  -levothyroxine should be given on an empty stomach at 7;30AM at least 30 minutes before breakfast  -repeat TFTs in 6-8 weeks as an outpatient    #DM2  -FSG goal 100-180  -can continue with low admelog correction scales before meals and before bedtime  -please start linagliptin 5mg daily  -can be discharged on home regimen metformin 1000mg twice per day and januvia 100mg daily  -follow up with outpatient endocrinologist Dr. Farfan    Case discussed with Dr. Vilma Juarez MD  Endocrine Fellow  Can be reached via teams. For follow up questions, discharge recommendations, or new consults, please call answering service at [phone] (weekdays); [phone] (nights/weekends)

## 2023-03-03 NOTE — BH CONSULTATION LIAISON ASSESSMENT NOTE - NSBHCONSULTFOLLOWAFTERCARE_PSY_A_CORE FT
Pt can be discharged to brother when medically stable, continue with current outpatient psychiatric care Pt can be discharged to brother when medically stable, continue with current outpatient psychiatric care with Dr. Carter [phone]

## 2023-03-03 NOTE — PROGRESS NOTE ADULT - SUBJECTIVE AND OBJECTIVE BOX
Admitted for: Sepsis        Following for: r/o adrenal insufficiency    Subjective:     AM cortisol drawn at 8:40AM is 11.1      MEDICATIONS  (STANDING):  atorvastatin 40 milliGRAM(s) Oral at bedtime  chlorhexidine 2% Cloths 1 Application(s) Topical daily  cloZAPine 100 milliGRAM(s) Oral at bedtime  dextrose 5%. 1000 milliLiter(s) (100 mL/Hr) IV Continuous <Continuous>  dextrose 5%. 1000 milliLiter(s) (50 mL/Hr) IV Continuous <Continuous>  dextrose 50% Injectable 25 Gram(s) IV Push once  dextrose 50% Injectable 12.5 Gram(s) IV Push once  dextrose 50% Injectable 25 Gram(s) IV Push once  divalproex ER 1000 milliGRAM(s) Oral at bedtime  finasteride 5 milliGRAM(s) Oral daily  glucagon  Injectable 1 milliGRAM(s) IntraMuscular once  insulin lispro (ADMELOG) corrective regimen sliding scale   SubCutaneous three times a day before meals  insulin lispro (ADMELOG) corrective regimen sliding scale   SubCutaneous at bedtime  latanoprost 0.005% Ophthalmic Solution 1 Drop(s) Both EYES at bedtime  levobunolol 0.5% Solution 1 Drop(s) Both EYES two times a day  levothyroxine 88 MICROGram(s) Oral daily  pantoprazole    Tablet 40 milliGRAM(s) Oral before breakfast  piperacillin/tazobactam IVPB.. 3.375 Gram(s) IV Intermittent every 8 hours  polyethylene glycol 3350 17 Gram(s) Oral two times a day  pyridoxine 50 milliGRAM(s) Oral daily  RHOPRESSA (Netarsudil) 0.02% Eye Drops 1 Drop(s) 1 Drop(s) Both EYES daily  senna 1 Tablet(s) Oral at bedtime  tamsulosin 0.4 milliGRAM(s) Oral at bedtime  vancomycin  IVPB      venlafaxine 75 milliGRAM(s) Oral daily    MEDICATIONS  (PRN):  acetaminophen     Tablet .. 650 milliGRAM(s) Oral every 6 hours PRN Temp greater or equal to 38C (100.4F), Mild Pain (1 - 3)  dextrose Oral Gel 15 Gram(s) Oral once PRN Blood Glucose LESS THAN 70 milliGRAM(s)/deciliter      Allergies    No Known Allergies    Intolerances          PHYSICAL EXAM:  VITALS: T(C): 36.6 (03-03-23 @ 08:30)  T(F): 97.8 (03-03-23 @ 08:30), Max: 98.4 (03-02-23 @ 10:40)  HR: 73 (03-03-23 @ 08:30) (73 - 97)  BP: 102/47 (03-03-23 @ 08:30) (91/76 - 130/70)  RR:  (15 - 18)  SpO2:  (90% - 100%)  Wt(kg): --  GENERAL: NAD  EYES: No proptosis, no lid lag, anicteric  RESPIRATORY: Clear to auscultation bilaterally  CARDIOVASCULAR: Regular rate and rhythm  GI: Soft, nontender, non distended  EXT: b/l feet without wounds, 2+ pulses  PSYCH: Alert and oriented x 3, reactive affect      A1C with Estimated Average Glucose Result: 6.4 % (01-30-23 @ 16:45)      POCT Blood Glucose.: 223 mg/dL (03-02-23 @ 21:23)  POCT Blood Glucose.: 245 mg/dL (03-02-23 @ 17:52)  POCT Blood Glucose.: 95 mg/dL (03-02-23 @ 12:27)  POCT Blood Glucose.: 154 mg/dL (03-02-23 @ 08:33)  POCT Blood Glucose.: 168 mg/dL (03-02-23 @ 00:35)  POCT Blood Glucose.: 119 mg/dL (03-01-23 @ 22:00)  POCT Blood Glucose.: 105 mg/dL (03-01-23 @ 17:56)  POCT Blood Glucose.: 230 mg/dL (03-01-23 @ 12:18)      03-03    132<L>  |  97<L>  |  16  ----------------------------<  173<H>  4.5   |  28  |  0.85    eGFR: 95    Ca    8.9      03-03  Mg     1.40     03-02  Phos  4.2     03-02    TPro  6.4  /  Alb  4.0  /  TBili  0.2  /  DBili  x   /  AST  19  /  ALT  28  /  AlkPhos  107  03-01      Thyroid Function Tests:  03-01 @ 18:08 TSH 8.67 FreeT4 1.4 T3 -- Anti TPO -- Anti Thyroglobulin Ab -- TSI --  03-01 @ 16:24 TSH 6.45 FreeT4 -- T3 -- Anti TPO -- Anti Thyroglobulin Ab -- TSI --                         Admitted for: Sepsis        Following for: r/o adrenal insufficiency    Subjective:   Patient feels well. He is frustrated that his protonix and levothyroxine were given close together  AM cortisol drawn at 8:40AM is 11.1  Cosyntropin given at 9AM  Cortisol at 9:35 is 18,9  Cortisol at 10:15 is 22      MEDICATIONS  (STANDING):  atorvastatin 40 milliGRAM(s) Oral at bedtime  chlorhexidine 2% Cloths 1 Application(s) Topical daily  cloZAPine 100 milliGRAM(s) Oral at bedtime  dextrose 5%. 1000 milliLiter(s) (100 mL/Hr) IV Continuous <Continuous>  dextrose 5%. 1000 milliLiter(s) (50 mL/Hr) IV Continuous <Continuous>  dextrose 50% Injectable 25 Gram(s) IV Push once  dextrose 50% Injectable 12.5 Gram(s) IV Push once  dextrose 50% Injectable 25 Gram(s) IV Push once  divalproex ER 1000 milliGRAM(s) Oral at bedtime  finasteride 5 milliGRAM(s) Oral daily  glucagon  Injectable 1 milliGRAM(s) IntraMuscular once  insulin lispro (ADMELOG) corrective regimen sliding scale   SubCutaneous three times a day before meals  insulin lispro (ADMELOG) corrective regimen sliding scale   SubCutaneous at bedtime  latanoprost 0.005% Ophthalmic Solution 1 Drop(s) Both EYES at bedtime  levobunolol 0.5% Solution 1 Drop(s) Both EYES two times a day  levothyroxine 88 MICROGram(s) Oral daily  pantoprazole    Tablet 40 milliGRAM(s) Oral before breakfast  piperacillin/tazobactam IVPB.. 3.375 Gram(s) IV Intermittent every 8 hours  polyethylene glycol 3350 17 Gram(s) Oral two times a day  pyridoxine 50 milliGRAM(s) Oral daily  RHOPRESSA (Netarsudil) 0.02% Eye Drops 1 Drop(s) 1 Drop(s) Both EYES daily  senna 1 Tablet(s) Oral at bedtime  tamsulosin 0.4 milliGRAM(s) Oral at bedtime  vancomycin  IVPB      venlafaxine 75 milliGRAM(s) Oral daily    MEDICATIONS  (PRN):  acetaminophen     Tablet .. 650 milliGRAM(s) Oral every 6 hours PRN Temp greater or equal to 38C (100.4F), Mild Pain (1 - 3)  dextrose Oral Gel 15 Gram(s) Oral once PRN Blood Glucose LESS THAN 70 milliGRAM(s)/deciliter      Allergies    No Known Allergies    Intolerances          PHYSICAL EXAM:  VITALS: T(C): 36.6 (03-03-23 @ 08:30)  T(F): 97.8 (03-03-23 @ 08:30), Max: 98.4 (03-02-23 @ 10:40)  HR: 73 (03-03-23 @ 08:30) (73 - 97)  BP: 102/47 (03-03-23 @ 08:30) (91/76 - 130/70)  RR:  (15 - 18)  SpO2:  (90% - 100%)  Wt(kg): --  GENERAL: NAD  EYES: No proptosis, no lid lag, anicteric  RESPIRATORY: Clear to auscultation bilaterally  CARDIOVASCULAR: Regular rate and rhythm  GI: Soft, nontender, non distended  EXT: b/l feet without wounds, 2+ pulses  PSYCH: Alert and oriented x 3, reactive affect      A1C with Estimated Average Glucose Result: 6.4 % (01-30-23 @ 16:45)      POCT Blood Glucose.: 223 mg/dL (03-02-23 @ 21:23)  POCT Blood Glucose.: 245 mg/dL (03-02-23 @ 17:52)  POCT Blood Glucose.: 95 mg/dL (03-02-23 @ 12:27)  POCT Blood Glucose.: 154 mg/dL (03-02-23 @ 08:33)  POCT Blood Glucose.: 168 mg/dL (03-02-23 @ 00:35)  POCT Blood Glucose.: 119 mg/dL (03-01-23 @ 22:00)  POCT Blood Glucose.: 105 mg/dL (03-01-23 @ 17:56)  POCT Blood Glucose.: 230 mg/dL (03-01-23 @ 12:18)      03-03    132<L>  |  97<L>  |  16  ----------------------------<  173<H>  4.5   |  28  |  0.85    eGFR: 95    Ca    8.9      03-03  Mg     1.40     03-02  Phos  4.2     03-02    TPro  6.4  /  Alb  4.0  /  TBili  0.2  /  DBili  x   /  AST  19  /  ALT  28  /  AlkPhos  107  03-01      Thyroid Function Tests:  03-01 @ 18:08 TSH 8.67 FreeT4 1.4 T3 -- Anti TPO -- Anti Thyroglobulin Ab -- TSI --  03-01 @ 16:24 TSH 6.45 FreeT4 -- T3 -- Anti TPO -- Anti Thyroglobulin Ab -- TSI --                         Admitted for: Sepsis    Following for: r/o adrenal insufficiency    Subjective:   Patient feels well. He is frustrated that his protonix and levothyroxine were given close together  AM cortisol drawn at 8:40AM is 11.1  Cosyntropin given at 9AM  Cortisol at 9:35 is 18,9  Cortisol at 10:15 is 22      MEDICATIONS  (STANDING):  atorvastatin 40 milliGRAM(s) Oral at bedtime  chlorhexidine 2% Cloths 1 Application(s) Topical daily  cloZAPine 100 milliGRAM(s) Oral at bedtime  dextrose 5%. 1000 milliLiter(s) (100 mL/Hr) IV Continuous <Continuous>  dextrose 5%. 1000 milliLiter(s) (50 mL/Hr) IV Continuous <Continuous>  dextrose 50% Injectable 25 Gram(s) IV Push once  dextrose 50% Injectable 12.5 Gram(s) IV Push once  dextrose 50% Injectable 25 Gram(s) IV Push once  divalproex ER 1000 milliGRAM(s) Oral at bedtime  finasteride 5 milliGRAM(s) Oral daily  glucagon  Injectable 1 milliGRAM(s) IntraMuscular once  insulin lispro (ADMELOG) corrective regimen sliding scale   SubCutaneous three times a day before meals  insulin lispro (ADMELOG) corrective regimen sliding scale   SubCutaneous at bedtime  latanoprost 0.005% Ophthalmic Solution 1 Drop(s) Both EYES at bedtime  levobunolol 0.5% Solution 1 Drop(s) Both EYES two times a day  levothyroxine 88 MICROGram(s) Oral daily  pantoprazole    Tablet 40 milliGRAM(s) Oral before breakfast  piperacillin/tazobactam IVPB.. 3.375 Gram(s) IV Intermittent every 8 hours  polyethylene glycol 3350 17 Gram(s) Oral two times a day  pyridoxine 50 milliGRAM(s) Oral daily  RHOPRESSA (Netarsudil) 0.02% Eye Drops 1 Drop(s) 1 Drop(s) Both EYES daily  senna 1 Tablet(s) Oral at bedtime  tamsulosin 0.4 milliGRAM(s) Oral at bedtime  vancomycin  IVPB      venlafaxine 75 milliGRAM(s) Oral daily    MEDICATIONS  (PRN):  acetaminophen     Tablet .. 650 milliGRAM(s) Oral every 6 hours PRN Temp greater or equal to 38C (100.4F), Mild Pain (1 - 3)  dextrose Oral Gel 15 Gram(s) Oral once PRN Blood Glucose LESS THAN 70 milliGRAM(s)/deciliter      Allergies    No Known Allergies    Intolerances          PHYSICAL EXAM:  VITALS: T(C): 36.6 (03-03-23 @ 08:30)  T(F): 97.8 (03-03-23 @ 08:30), Max: 98.4 (03-02-23 @ 10:40)  HR: 73 (03-03-23 @ 08:30) (73 - 97)  BP: 102/47 (03-03-23 @ 08:30) (91/76 - 130/70)  RR:  (15 - 18)  SpO2:  (90% - 100%)  Wt(kg): --  GENERAL: NAD  EYES: No proptosis, no lid lag, anicteric  RESPIRATORY: Clear to auscultation bilaterally  CARDIOVASCULAR: Regular rate and rhythm  GI: Soft, nontender, non distended  EXT: b/l feet without wounds, 2+ pulses  PSYCH: Alert and oriented x 3, reactive affect      A1C with Estimated Average Glucose Result: 6.4 % (01-30-23 @ 16:45)      POCT Blood Glucose.: 223 mg/dL (03-02-23 @ 21:23)  POCT Blood Glucose.: 245 mg/dL (03-02-23 @ 17:52)  POCT Blood Glucose.: 95 mg/dL (03-02-23 @ 12:27)  POCT Blood Glucose.: 154 mg/dL (03-02-23 @ 08:33)  POCT Blood Glucose.: 168 mg/dL (03-02-23 @ 00:35)  POCT Blood Glucose.: 119 mg/dL (03-01-23 @ 22:00)  POCT Blood Glucose.: 105 mg/dL (03-01-23 @ 17:56)  POCT Blood Glucose.: 230 mg/dL (03-01-23 @ 12:18)      03-03    132<L>  |  97<L>  |  16  ----------------------------<  173<H>  4.5   |  28  |  0.85    eGFR: 95    Ca    8.9      03-03  Mg     1.40     03-02  Phos  4.2     03-02    TPro  6.4  /  Alb  4.0  /  TBili  0.2  /  DBili  x   /  AST  19  /  ALT  28  /  AlkPhos  107  03-01      Thyroid Function Tests:  03-01 @ 18:08 TSH 8.67 FreeT4 1.4 T3 -- Anti TPO -- Anti Thyroglobulin Ab -- TSI --  03-01 @ 16:24 TSH 6.45 FreeT4 -- T3 -- Anti TPO -- Anti Thyroglobulin Ab -- TSI --

## 2023-03-03 NOTE — BH CONSULTATION LIAISON ASSESSMENT NOTE - CURRENT MEDICATION
MEDICATIONS  (STANDING):  atorvastatin 40 milliGRAM(s) Oral at bedtime  chlorhexidine 2% Cloths 1 Application(s) Topical daily  cloZAPine 100 milliGRAM(s) Oral at bedtime  dextrose 5%. 1000 milliLiter(s) (100 mL/Hr) IV Continuous <Continuous>  dextrose 5%. 1000 milliLiter(s) (50 mL/Hr) IV Continuous <Continuous>  dextrose 50% Injectable 25 Gram(s) IV Push once  dextrose 50% Injectable 12.5 Gram(s) IV Push once  dextrose 50% Injectable 25 Gram(s) IV Push once  divalproex ER 1000 milliGRAM(s) Oral at bedtime  finasteride 5 milliGRAM(s) Oral daily  glucagon  Injectable 1 milliGRAM(s) IntraMuscular once  insulin lispro (ADMELOG) corrective regimen sliding scale   SubCutaneous three times a day before meals  insulin lispro (ADMELOG) corrective regimen sliding scale   SubCutaneous at bedtime  latanoprost 0.005% Ophthalmic Solution 1 Drop(s) Both EYES at bedtime  levobunolol 0.5% Solution 1 Drop(s) Both EYES two times a day  levothyroxine 88 MICROGram(s) Oral daily  linagliptin 5 milliGRAM(s) Oral daily  pantoprazole    Tablet 40 milliGRAM(s) Oral before breakfast  piperacillin/tazobactam IVPB.. 3.375 Gram(s) IV Intermittent every 8 hours  polyethylene glycol 3350 17 Gram(s) Oral two times a day  pyridoxine 50 milliGRAM(s) Oral daily  RHOPRESSA (Netarsudil) 0.02% Eye Drops 1 Drop(s) 1 Drop(s) Both EYES daily  senna 1 Tablet(s) Oral at bedtime  tamsulosin 0.4 milliGRAM(s) Oral at bedtime  venlafaxine 75 milliGRAM(s) Oral daily    MEDICATIONS  (PRN):  acetaminophen     Tablet .. 650 milliGRAM(s) Oral every 6 hours PRN Temp greater or equal to 38C (100.4F), Mild Pain (1 - 3)  dextrose Oral Gel 15 Gram(s) Oral once PRN Blood Glucose LESS THAN 70 milliGRAM(s)/deciliter

## 2023-03-03 NOTE — BH CONSULTATION LIAISON ASSESSMENT NOTE - DESCRIPTION
Pt is currently domiciled with brother, recently discharged from [facility] rehab facility after hospitalization for aspiration PNA.

## 2023-03-03 NOTE — BH CONSULTATION LIAISON ASSESSMENT NOTE - HPI (INCLUDE ILLNESS QUALITY, SEVERITY, DURATION, TIMING, CONTEXT, MODIFYING FACTORS, ASSOCIATED SIGNS AND SYMPTOMS)
Pt is a 66yo man with a PPHx of schizophrenia, bipolar disorder, and OCD and a PMH of T2DM, HTN, hypothyroidism, BPH, and glaucoma admitted for hypothermia after an episode of dizziness, lightheadedness, and confusion. Pt initially had code stroke called due to concern for dysdiadochokinesia on exam but CT angio and neck was negative for occlusion or hemorrhage. Pt was recently hospitalized 1/30-2/13 for toxic-metabolic encephalopathy 2/2 sepsis from aspiration PNA, treated with 7 days of zosyn. Pt was discharged to rehab facility ([facility]) and just returned home for 1-2d before this episode.    On interview, pt is A&Ox4, cooperative, and amenable to interview. Pt currently feels much better physically. He feels psychiatrically stable. He describes his mood as "good" and reports no delusions, auditory or visual hallucinations, SI, or HI. He states his schizophrenia is very well controlled with clozapine, which he has been taking since the 1990s. Pt endorses two seizures in the past while on clozapine and it was discontinued briefly but the psychiatric benefits of the medication were significant enough for him that he was restarted with Depakote added as seizure prophylaxis. Pt has never had a seizure since starting the Depakote. Pt endorses drowsiness and increased appetite on the clozapine but states that these side effects are manageable for him. Pt also takes venlafaxine for his OCD, which was diagnosed in the early 1990s. Pt reports when not adequately treated, he had symptoms of intrusive thoughts and had compulsions (constant handwashing, checking, repeating certain phrases). Pt reports he still occasionally has excessive handwashing but overall his OCD symptoms are much improved.    Pt is happy with his current medication regimen and feels that his psychiatric conditions are well controlled. He reports no tremors or stiffness with these medications, but has experienced them in the past with different meds. Pt endorses stable sleep and appetite. Pt has no signs of catatonia or extrapyramidal symptoms on exam. No rigidity or tremor, no akathisia.

## 2023-03-03 NOTE — CONSULT NOTE ADULT - SUBJECTIVE AND OBJECTIVE BOX
Patient is a 67y old  Male who presents with a chief complaint of Hypothermia (03 Mar 2023 14:03)    HPI:  66 yo M PMH of schizophrenia, bipolar disorder, type 2 DM, hypertension, hypothyroidism, BPH, glaucoma, and SBO (in 2017) presents for evaluation for a brief period of dizziness, lightheadedness, and confusion today. Initially code stroke called due to concern for dysdiadochokinesia on exam. CT angio brain and neck negative for occlusion, has mild chronic white matter changes. Found to be hypothermic to 95F rectally, endorses chronic cough, denies fevers, chills, diarrhea, abdominal pain, dysuria, nausea, or vomiting. Had recent hospitalization 1/30-2/13 for toxic-metabolic encephalopathy 2/2 sepsis from presumed PNA, treated with 7 days of zosyn.    In the ED, T95.1, HR 81, /69 satting well on RA. Given vanc and zosyn, 1L NS (01 Mar 2023 21:57)    Following admission, blood cultures were obtained for further evaluation for possible infectious etiology for patient's hypotension.  Blood cultures grew staph in 1 out of 4 bottles.  Urine culture without growth.  MRSA PCR negative.  Patient was started on empiric vancomycin and piperacillin/tazobactam.    Patient seen examined today at bedside.  Appears comfortable.  Denies any pain or discomfort.  Latest labs show no leukocytosis, CMP with renal function within normal limits.  Underwent endocrinology testing.  Repeat blood cultures collected after initiation of antibiotics. No orthopedic hardware, no cardiac devices.    prior hospital charts reviewed [x  ]  primary team notes reviewed [ x ]  other consultant notes reviewed [ x ]    PAST MEDICAL & SURGICAL HISTORY:  Diabetes  Hypothyroid  Hypertension  Glaucoma  Schizophrenia  Bipolar disorder  Seborrheic dermatitis  Personal history of other diseases of the digestive system  No significant past surgical history    Allergies  No Known Allergies    ANTIMICROBIALS (past 90 days)  MEDICATIONS  (STANDING):  piperacillin/tazobactam IVPB.   200 mL/Hr IV Intermittent (03-01-23 @ 18:19)    piperacillin/tazobactam IVPB..   25 mL/Hr IV Intermittent (03-02-23 @ 00:01)    piperacillin/tazobactam IVPB..   25 mL/Hr IV Intermittent (03-03-23 @ 11:54)   25 mL/Hr IV Intermittent (03-03-23 @ 02:11)   25 mL/Hr IV Intermittent (03-02-23 @ 17:57)   25 mL/Hr IV Intermittent (03-02-23 @ 10:35)    vancomycin  IVPB   250 mL/Hr IV Intermittent (03-03-23 @ 09:26)   250 mL/Hr IV Intermittent (03-02-23 @ 17:57)   250 mL/Hr IV Intermittent (03-02-23 @ 07:06)    vancomycin  IVPB.   250 mL/Hr IV Intermittent (03-01-23 @ 18:55)    piperacillin/tazobactam IVPB.. 3.375 every 8 hours    MEDICATIONS  (STANDING):  acetaminophen     Tablet .. 650 every 6 hours PRN  atorvastatin 40 at bedtime  cloZAPine 100 at bedtime  dextrose 50% Injectable 25 once  dextrose 50% Injectable 12.5 once  dextrose 50% Injectable 25 once  dextrose Oral Gel 15 once PRN  divalproex ER 1000 at bedtime  finasteride 5 daily  glucagon  Injectable 1 once  insulin lispro (ADMELOG) corrective regimen sliding scale  three times a day before meals  insulin lispro (ADMELOG) corrective regimen sliding scale  at bedtime  linagliptin 5 daily  pantoprazole    Tablet 40 <User Schedule>  polyethylene glycol 3350 17 two times a day  senna 1 at bedtime  tamsulosin 0.4 at bedtime  venlafaxine 75 daily    SOCIAL HISTORY:     · Substance use	No  · Social History (marital status, living situation, occupation, and sexual history)	Past smoker, quit more than 10 yrs ago, no alcohol or illicit drug use. Lives with brother.    FAMILY HISTORY:  Family history of diabetes mellitus (DM) (Mother)    Family history of heart disease  mother had 3 heart attacks    Family history of diabetes mellitus in brother  both brothers      REVIEW OF SYSTEMS  [  ] ROS unobtainable because:    [ x] All other systems negative except as noted below:	    Constitutional:  [ ] fever [ ] chills  [ ] weight loss  [ ] weakness  Skin:  [ ] rash [ ] phlebitis	  Eyes: [ ] icterus [ ] pain  [ ] discharge	  ENMT: [ ] sore throat  [ ] thrush [ ] ulcers [ ] exudates  Respiratory: [ ] dyspnea [ ] hemoptysis [ ] cough [ ] sputum	  Cardiovascular:  [ ] chest pain [ ] palpitations [ ] edema	  Gastrointestinal:  [ ] nausea [ ] vomiting [ ] diarrhea [ ] constipation [ ] pain	  Genitourinary:  [ ] dysuria [ ] frequency [ ] hematuria [ ] discharge [ ] flank pain  [ ] incontinence  Musculoskeletal:  [ ] myalgias [ ] arthralgias [ ] arthritis  [ ] back pain  Neurological:  [ ] headache [ ] seizures  [ ] confusion/altered mental status  Psychiatric:  [ ] anxiety [ ] depression	  Hematology/Lymphatics:  [ ] lymphadenopathy  Endocrine:  [ ] adrenal [ ] thyroid  Allergic/Immunologic:	 [ ] transplant [ ] seasonal    Vital Signs Last 24 Hrs  T(F): 98.4 (03-03-23 @ 14:18), Max: 98.8 (03-01-23 @ 22:17)  Vital Signs Last 24 Hrs  HR: 69 (03-03-23 @ 14:18) (69 - 85)  BP: 118/63 (03-03-23 @ 14:18) (102/47 - 130/70)  RR: 17 (03-03-23 @ 14:18)  SpO2: 99% (03-03-23 @ 14:18) (96% - 100%)  Wt(kg): --    PHYSICAL EXAM:  Constitutional: comfortable no distress  HEAD/EYES: anicteric, no conjunctival injection  ENT:  supple, no thrush  Cardiovascular:   normal S1, S2, no murmur, no edema  Respiratory:  clear BS bilaterally, no wheezes, no rales  GI:  soft, non-tender, normal bowel sounds  :  no garcia, no CVA tenderness  Musculoskeletal:  no synovitis, normal ROM  Neurologic: awake and alert, normal strength, no focal findings  Skin:  no rash, no erythema, no phlebitis  Heme/Onc: no lymphadenopathy   Psychiatric:  awake, alert, appropriate mood                            9.1    4.85  )-----------( 177      ( 03 Mar 2023 08:40 )             28.1   03-03    132<L>  |  97<L>  |  16  ----------------------------<  173<H>  4.5   |  28  |  0.85    Ca    8.9      03 Mar 2023 08:40  Phos  4.2     03-02  Mg     1.40     03-02      MICROBIOLOGY:Vancomycin Level, Trough: 10.9 (03-03 @ 08:40)    Culture - Blood (collected 01 Mar 2023 18:10)  Source: .Blood Blood  Gram Stain (02 Mar 2023 16:43):    Growth in aerobic bottle: Gram Positive Cocci in Clusters  Final Report (03 Mar 2023 14:36):    Growth in aerobic bottle: Staphylococcus haemolyticus    Coagulase Negative Staphylococci isolated from a single blood culture set    may represent contamination.    Contact the Microbiology Department at [phone] if susceptibility    testing is clinically indicated.    ***Blood Panel PCR results on this specimen are available    approximately 3 hours after the Gram stain result.***    Gram stain, PCR, and/or culture results may not always    correspond due to difference in methodologies.    ************************************************************    This PCR assay was performed by multiplex PCR. This    Assay tests for 66 bacterial and resistance gene targets.    Please refer to the [facility] test directory    at [email or web address] for details.  Organism: Blood Culture PCR (03 Mar 2023 14:36)  Organism: Blood Culture PCR (03 Mar 2023 14:36)      -  Staphylococcus epidermidis, Methicillin resistant: Detec      Method Type: PCR    Culture - Blood (collected 01 Mar 2023 17:55)  Source: .Blood Blood  Preliminary Report (02 Mar 2023 21:01):    No growth to date.    Culture - Urine (collected 01 Mar 2023 16:35)  Source: Clean Catch Clean Catch (Midstream)  Final Report (02 Mar 2023 18:18):    <10,000 CFU/mL Normal Urogenital Mary              Rapid RVP Result: NotDetec (03-01 @ 18:46)      RADIOLOGY:  imaging below personally reviewed and agree with findings

## 2023-03-03 NOTE — BH CONSULTATION LIAISON ASSESSMENT NOTE - NSBHCHARTREVIEWVS_PSY_A_CORE FT
Vital Signs Last 24 Hrs  T(C): 36.6 (03 Mar 2023 08:30), Max: 36.8 (02 Mar 2023 21:16)  T(F): 97.8 (03 Mar 2023 08:30), Max: 98.3 (02 Mar 2023 21:16)  HR: 73 (03 Mar 2023 08:30) (73 - 85)  BP: 102/47 (03 Mar 2023 08:30) (102/47 - 130/70)  BP(mean): --  RR: 15 (03 Mar 2023 08:30) (15 - 18)  SpO2: 96% (03 Mar 2023 08:30) (96% - 100%)    Parameters below as of 03 Mar 2023 08:30  Patient On (Oxygen Delivery Method): room air

## 2023-03-03 NOTE — PROGRESS NOTE ADULT - ATTENDING COMMENTS
67M with hypothyroidism, schizophrenia, bipolar disorder, DM2, with hypothermia, indeterminate 6:30AM cortisol. Checked cosyntropin stim test today and results are normal/good response ruling out adrenal insufficiency. TSH elevated increased levothyroxine.  DM2 on metformin and Januvia at home. Inpatient recommend linagliptin and low correction scales.    Fidelina Delvalle MD  Division of Endocrinology  Pager: [phone]    If after 6PM or before 9AM, or on weekends/holidays, please call endocrine answering service for assistance ([phone]).  For nonurgent matters email [email or web address] for assistance.

## 2023-03-03 NOTE — CONSULT NOTE ADULT - ASSESSMENT
Patient is a 67y old  Male who presents with a chief complaint of Hypothermia. Blood culture with CoNS staph on admission.    Positive blood culture  Likely to be contamination given low grade (1/4 bottles)   No foreign material, no orthopedic hardware  Repeat blood cultures pending however obtained while on antibiotics  Patient otherwise non-toxic, offers no localizing symptoms    Recommendations  Unclear etiology for hypothermia however does not appear to be harboring an infection at this time  Would monitor off antibiotics  Follow pending blood cultures  Low threshold  repeat blood cultures and to resume antibiotics if develops clinical instability  Follow fever curve and WBC count    Nestor Frazier MD  Division of Infectious Diseases  Available on Teams

## 2023-03-04 LAB
-  BLOOD PCR PANEL: SIGNIFICANT CHANGE UP
ANION GAP SERPL CALC-SCNC: 10 MMOL/L — SIGNIFICANT CHANGE UP (ref 7–14)
BASOPHILS # BLD AUTO: 0.02 K/UL — SIGNIFICANT CHANGE UP (ref 0–0.2)
BASOPHILS NFR BLD AUTO: 0.5 % — SIGNIFICANT CHANGE UP (ref 0–2)
BUN SERPL-MCNC: 16 MG/DL — SIGNIFICANT CHANGE UP (ref 7–23)
CALCIUM SERPL-MCNC: 8.8 MG/DL — SIGNIFICANT CHANGE UP (ref 8.4–10.5)
CHLORIDE SERPL-SCNC: 101 MMOL/L — SIGNIFICANT CHANGE UP (ref 98–107)
CO2 SERPL-SCNC: 24 MMOL/L — SIGNIFICANT CHANGE UP (ref 22–31)
CREAT SERPL-MCNC: 0.69 MG/DL — SIGNIFICANT CHANGE UP (ref 0.5–1.3)
EGFR: 101 ML/MIN/1.73M2 — SIGNIFICANT CHANGE UP
EOSINOPHIL # BLD AUTO: 0.03 K/UL — SIGNIFICANT CHANGE UP (ref 0–0.5)
EOSINOPHIL NFR BLD AUTO: 0.7 % — SIGNIFICANT CHANGE UP (ref 0–6)
GLUCOSE BLDC GLUCOMTR-MCNC: 183 MG/DL — HIGH (ref 70–99)
GLUCOSE BLDC GLUCOMTR-MCNC: 190 MG/DL — HIGH (ref 70–99)
GLUCOSE BLDC GLUCOMTR-MCNC: 218 MG/DL — HIGH (ref 70–99)
GLUCOSE BLDC GLUCOMTR-MCNC: 238 MG/DL — HIGH (ref 70–99)
GLUCOSE BLDC GLUCOMTR-MCNC: 281 MG/DL — HIGH (ref 70–99)
GLUCOSE SERPL-MCNC: 207 MG/DL — HIGH (ref 70–99)
GRAM STN FLD: SIGNIFICANT CHANGE UP
HCT VFR BLD CALC: 27 % — LOW (ref 39–50)
HGB BLD-MCNC: 8.7 G/DL — LOW (ref 13–17)
IANC: 2.28 K/UL — SIGNIFICANT CHANGE UP (ref 1.8–7.4)
IMM GRANULOCYTES NFR BLD AUTO: 0.2 % — SIGNIFICANT CHANGE UP (ref 0–0.9)
LYMPHOCYTES # BLD AUTO: 1.4 K/UL — SIGNIFICANT CHANGE UP (ref 1–3.3)
LYMPHOCYTES # BLD AUTO: 33.7 % — SIGNIFICANT CHANGE UP (ref 13–44)
MAGNESIUM SERPL-MCNC: 1.8 MG/DL — SIGNIFICANT CHANGE UP (ref 1.6–2.6)
MCHC RBC-ENTMCNC: 30.2 PG — SIGNIFICANT CHANGE UP (ref 27–34)
MCHC RBC-ENTMCNC: 32.2 GM/DL — SIGNIFICANT CHANGE UP (ref 32–36)
MCV RBC AUTO: 93.8 FL — SIGNIFICANT CHANGE UP (ref 80–100)
METHOD TYPE: SIGNIFICANT CHANGE UP
MONOCYTES # BLD AUTO: 0.42 K/UL — SIGNIFICANT CHANGE UP (ref 0–0.9)
MONOCYTES NFR BLD AUTO: 10.1 % — SIGNIFICANT CHANGE UP (ref 2–14)
NEUTROPHILS # BLD AUTO: 2.28 K/UL — SIGNIFICANT CHANGE UP (ref 1.8–7.4)
NEUTROPHILS NFR BLD AUTO: 54.8 % — SIGNIFICANT CHANGE UP (ref 43–77)
NRBC # BLD: 0 /100 WBCS — SIGNIFICANT CHANGE UP (ref 0–0)
NRBC # FLD: 0 K/UL — SIGNIFICANT CHANGE UP (ref 0–0)
PHOSPHATE SERPL-MCNC: 3.1 MG/DL — SIGNIFICANT CHANGE UP (ref 2.5–4.5)
PLATELET # BLD AUTO: 150 K/UL — SIGNIFICANT CHANGE UP (ref 150–400)
POTASSIUM SERPL-MCNC: 4.7 MMOL/L — SIGNIFICANT CHANGE UP (ref 3.5–5.3)
POTASSIUM SERPL-SCNC: 4.7 MMOL/L — SIGNIFICANT CHANGE UP (ref 3.5–5.3)
RBC # BLD: 2.88 M/UL — LOW (ref 4.2–5.8)
RBC # FLD: 15 % — HIGH (ref 10.3–14.5)
SODIUM SERPL-SCNC: 135 MMOL/L — SIGNIFICANT CHANGE UP (ref 135–145)
WBC # BLD: 4.16 K/UL — SIGNIFICANT CHANGE UP (ref 3.8–10.5)
WBC # FLD AUTO: 4.16 K/UL — SIGNIFICANT CHANGE UP (ref 3.8–10.5)

## 2023-03-04 PROCEDURE — 99232 SBSQ HOSP IP/OBS MODERATE 35: CPT

## 2023-03-04 RX ORDER — INSULIN GLARGINE 100 [IU]/ML
10 INJECTION, SOLUTION SUBCUTANEOUS AT BEDTIME
Refills: 0 | Status: DISCONTINUED | OUTPATIENT
Start: 2023-03-04 | End: 2023-03-05

## 2023-03-04 RX ADMIN — TAMSULOSIN HYDROCHLORIDE 0.4 MILLIGRAM(S): 0.4 CAPSULE ORAL at 22:33

## 2023-03-04 RX ADMIN — ATORVASTATIN CALCIUM 40 MILLIGRAM(S): 80 TABLET, FILM COATED ORAL at 22:33

## 2023-03-04 RX ADMIN — CLOZAPINE 100 MILLIGRAM(S): 150 TABLET, ORALLY DISINTEGRATING ORAL at 22:32

## 2023-03-04 RX ADMIN — FINASTERIDE 5 MILLIGRAM(S): 5 TABLET, FILM COATED ORAL at 12:35

## 2023-03-04 RX ADMIN — DIVALPROEX SODIUM 1000 MILLIGRAM(S): 500 TABLET, DELAYED RELEASE ORAL at 22:32

## 2023-03-04 RX ADMIN — CHLORHEXIDINE GLUCONATE 1 APPLICATION(S): 213 SOLUTION TOPICAL at 12:37

## 2023-03-04 RX ADMIN — SENNA PLUS 1 TABLET(S): 8.6 TABLET ORAL at 22:33

## 2023-03-04 RX ADMIN — Medication 50 MILLIGRAM(S): at 12:37

## 2023-03-04 RX ADMIN — Medication 1 DROP(S): at 21:42

## 2023-03-04 RX ADMIN — Medication 3: at 13:04

## 2023-03-04 RX ADMIN — INSULIN GLARGINE 10 UNIT(S): 100 INJECTION, SOLUTION SUBCUTANEOUS at 21:53

## 2023-03-04 RX ADMIN — Medication 75 MILLIGRAM(S): at 12:35

## 2023-03-04 RX ADMIN — Medication 1: at 09:08

## 2023-03-04 RX ADMIN — PANTOPRAZOLE SODIUM 40 MILLIGRAM(S): 20 TABLET, DELAYED RELEASE ORAL at 12:35

## 2023-03-04 RX ADMIN — LINAGLIPTIN 5 MILLIGRAM(S): 5 TABLET, FILM COATED ORAL at 12:35

## 2023-03-04 RX ADMIN — Medication 88 MICROGRAM(S): at 07:34

## 2023-03-04 RX ADMIN — Medication 2: at 18:12

## 2023-03-04 RX ADMIN — LATANOPROST 1 DROP(S): 0.05 SOLUTION/ DROPS OPHTHALMIC; TOPICAL at 21:37

## 2023-03-04 NOTE — PROVIDER CONTACT NOTE (MEDICATION) - ASSESSMENT
pt home eye drop levobunolol not found at the bedside. Brother stated that he may have additional doses at home but will let us know by tomorrow. Medication was not given at 6AM for the same reason.

## 2023-03-04 NOTE — PROGRESS NOTE ADULT - ASSESSMENT
66 yo M PMH of schizophrenia, bipolar disorder, type 2 DM, hypertension, hypothyroidism, BPH, glaucoma, and SBO (in 2017) presents for evaluation for a brief period of dizziness, lightheadedness, and confusion today, negative for CVA, found to be hypothermic most likely 2/2 infectious etiology.

## 2023-03-04 NOTE — PROGRESS NOTE ADULT - SUBJECTIVE AND OBJECTIVE BOX
[facility] Division of Hospital Medicine  Kenneth Arias DO  Available via MS Teams  In house pager [phone]    SUBJECTIVE / OVERNIGHT EVENTS:  No acute events overnight.  Denies acute complaints.   Discussed the findings from cosyntropin stim test and the positive blood cultures from previous with possible contaminant.  Since then, new BCx resulted in gram-variable rods.    ADDITIONAL REVIEW OF SYSTEMS:    MEDICATIONS  (STANDING):  atorvastatin 40 milliGRAM(s) Oral at bedtime  chlorhexidine 2% Cloths 1 Application(s) Topical daily  cloZAPine 100 milliGRAM(s) Oral at bedtime  dextrose 5%. 1000 milliLiter(s) (100 mL/Hr) IV Continuous <Continuous>  dextrose 5%. 1000 milliLiter(s) (50 mL/Hr) IV Continuous <Continuous>  dextrose 50% Injectable 25 Gram(s) IV Push once  dextrose 50% Injectable 12.5 Gram(s) IV Push once  dextrose 50% Injectable 25 Gram(s) IV Push once  divalproex ER 1000 milliGRAM(s) Oral at bedtime  finasteride 5 milliGRAM(s) Oral daily  glucagon  Injectable 1 milliGRAM(s) IntraMuscular once  insulin lispro (ADMELOG) corrective regimen sliding scale   SubCutaneous three times a day before meals  insulin lispro (ADMELOG) corrective regimen sliding scale   SubCutaneous at bedtime  latanoprost 0.005% Ophthalmic Solution 1 Drop(s) Both EYES at bedtime  levobunolol 0.5% Solution 1 Drop(s) Both EYES two times a day  levothyroxine 88 MICROGram(s) Oral daily  linagliptin 5 milliGRAM(s) Oral daily  pantoprazole    Tablet 40 milliGRAM(s) Oral <User Schedule>  polyethylene glycol 3350 17 Gram(s) Oral two times a day  pyridoxine 50 milliGRAM(s) Oral daily  RHOPRESSA (Netarsudil) 0.02% Eye Drops 1 Drop(s) 1 Drop(s) Both EYES daily  senna 1 Tablet(s) Oral at bedtime  tamsulosin 0.4 milliGRAM(s) Oral at bedtime  venlafaxine 75 milliGRAM(s) Oral daily    MEDICATIONS  (PRN):  acetaminophen     Tablet .. 650 milliGRAM(s) Oral every 6 hours PRN Temp greater or equal to 38C (100.4F), Mild Pain (1 - 3)  dextrose Oral Gel 15 Gram(s) Oral once PRN Blood Glucose LESS THAN 70 milliGRAM(s)/deciliter  haloperidol    Injectable 1 milliGRAM(s) IV Push every 6 hours PRN agitation      I&O's Summary    03 Mar 2023 07:01  -  04 Mar 2023 07:00  --------------------------------------------------------  IN: 350 mL / OUT: 1200 mL / NET: -850 mL        PHYSICAL EXAM:  Vital Signs Last 24 Hrs  T(C): 36.5 (04 Mar 2023 05:31), Max: 36.9 (03 Mar 2023 14:18)  T(F): 97.7 (04 Mar 2023 05:31), Max: 98.4 (03 Mar 2023 14:18)  HR: 78 (04 Mar 2023 05:31) (69 - 91)  BP: 129/63 (04 Mar 2023 05:31) (118/63 - 148/80)  BP(mean): --  RR: 17 (04 Mar 2023 05:31) (17 - 17)  SpO2: 97% (04 Mar 2023 05:31) (97% - 100%)    Parameters below as of 04 Mar 2023 05:31  Patient On (Oxygen Delivery Method): room air      CONSTITUTIONAL: NAD, well-developed, well-groomed  EYES: PERRLA; conjunctiva and sclera clear  ENMT: Moist oral mucosa, no pharyngeal injection or exudates; normal dentition  NECK: Supple, no palpable masses; no thyromegaly  RESPIRATORY: Normal respiratory effort; lungs are clear to auscultation bilaterally  CARDIOVASCULAR: Regular rate and rhythm, normal S1 and S2, no murmur/rub/gallop; No lower extremity edema; Peripheral pulses are 2+ bilaterally  ABDOMEN: Nontender to palpation, normoactive bowel sounds, no rebound/guarding; No hepatosplenomegaly  MUSCULOSKELETAL:  no clubbing or cyanosis of digits; no joint swelling or tenderness to palpation  PSYCH: A+O to person, place, and time; affect appropriate  NEUROLOGY: CN 2-12 are intact and symmetric; no gross sensory deficits   SKIN: No rashes; no palpable lesions    LABS:                        8.7    4.16  )-----------( 150      ( 04 Mar 2023 06:00 )             27.0     03-04    135  |  101  |  16  ----------------------------<  207<H>  4.7   |  24  |  0.69    Ca    8.8      04 Mar 2023 06:00  Phos  3.1     03-04  Mg     1.80     03-04                Culture - Blood (collected 03 Mar 2023 08:46)  Source: .Blood Blood-Venous  Preliminary Report (04 Mar 2023 12:01):    No growth to date.    Culture - Blood (collected 03 Mar 2023 08:15)  Source: .Blood Blood-Peripheral  Preliminary Report (04 Mar 2023 12:01):    No growth to date.    Culture - Blood (collected 01 Mar 2023 18:10)  Source: .Blood Blood  Gram Stain (02 Mar 2023 16:43):    Growth in aerobic bottle: Gram Positive Cocci in Clusters  Final Report (03 Mar 2023 14:36):    Growth in aerobic bottle: Staphylococcus haemolyticus    Coagulase Negative Staphylococci isolated from a single blood culture set    may represent contamination.    Contact the Microbiology Department at [phone] if susceptibility    testing is clinically indicated.    ***Blood Panel PCR results on this specimen are available    approximately 3 hours after the Gram stain result.***    Gram stain, PCR, and/or culture results may not always    correspond due to difference in methodologies.    ************************************************************    This PCR assay was performed by multiplex PCR. This    Assay tests for 66 bacterial and resistance gene targets.    Please refer to the [facility] test directory    at [email or web address] for details.  Organism: Blood Culture PCR (03 Mar 2023 14:36)  Organism: Blood Culture PCR (03 Mar 2023 14:36)    Culture - Blood (collected 01 Mar 2023 17:55)  Source: .Blood Blood  Gram Stain (04 Mar 2023 11:20):    Growth in aerobic bottle: Gram Variable Rods  Preliminary Report (04 Mar 2023 13:22):    Growth in aerobic bottle: Gram Variable Rods    ***Blood Panel PCR results on this specimen are available    approximately 3 hours after the Gram stain result.***    Gram stain, PCR, and/or culture results may not always    correspond due to difference in methodologies.    ************************************************************    This PCR assay was performed by multiplex PCR. This    Assay tests for 66 bacterial and resistance gene targets.    Please refer to the [facility] test directory    at [email or web address] for details.    Culture - Urine (collected 01 Mar 2023 16:35)  Source: Clean Catch Clean Catch (Midstream)  Final Report (02 Mar 2023 18:18):    <10,000 CFU/mL Normal Urogenital Mary      SARS-CoV-2: NotDetec (01 Mar 2023 18:46)  COVID-19 PCR: NotDetec (13 Feb 2023 12:28)  SARS-CoV-2: NotDetec (09 Feb 2023 02:45)  SARS-CoV-2: NotDetec (30 Jan 2023 16:54)        COMMUNICATION:  Care Discussed with Consultants/Other Providers and Details of Discussion: d/w medicine NP

## 2023-03-05 LAB
ANION GAP SERPL CALC-SCNC: 9 MMOL/L — SIGNIFICANT CHANGE UP (ref 7–14)
BASOPHILS # BLD AUTO: 0.04 K/UL — SIGNIFICANT CHANGE UP (ref 0–0.2)
BASOPHILS NFR BLD AUTO: 0.9 % — SIGNIFICANT CHANGE UP (ref 0–2)
BUN SERPL-MCNC: 16 MG/DL — SIGNIFICANT CHANGE UP (ref 7–23)
CALCIUM SERPL-MCNC: 9.4 MG/DL — SIGNIFICANT CHANGE UP (ref 8.4–10.5)
CHLORIDE SERPL-SCNC: 98 MMOL/L — SIGNIFICANT CHANGE UP (ref 98–107)
CO2 SERPL-SCNC: 27 MMOL/L — SIGNIFICANT CHANGE UP (ref 22–31)
CREAT SERPL-MCNC: 0.72 MG/DL — SIGNIFICANT CHANGE UP (ref 0.5–1.3)
EGFR: 100 ML/MIN/1.73M2 — SIGNIFICANT CHANGE UP
EOSINOPHIL # BLD AUTO: 0.06 K/UL — SIGNIFICANT CHANGE UP (ref 0–0.5)
EOSINOPHIL NFR BLD AUTO: 1.4 % — SIGNIFICANT CHANGE UP (ref 0–6)
GLUCOSE BLDC GLUCOMTR-MCNC: 104 MG/DL — HIGH (ref 70–99)
GLUCOSE BLDC GLUCOMTR-MCNC: 117 MG/DL — HIGH (ref 70–99)
GLUCOSE BLDC GLUCOMTR-MCNC: 215 MG/DL — HIGH (ref 70–99)
GLUCOSE BLDC GLUCOMTR-MCNC: 342 MG/DL — HIGH (ref 70–99)
GLUCOSE SERPL-MCNC: 104 MG/DL — HIGH (ref 70–99)
HCT VFR BLD CALC: 29.7 % — LOW (ref 39–50)
HGB BLD-MCNC: 9.6 G/DL — LOW (ref 13–17)
IANC: 1.84 K/UL — SIGNIFICANT CHANGE UP (ref 1.8–7.4)
IMM GRANULOCYTES NFR BLD AUTO: 0.2 % — SIGNIFICANT CHANGE UP (ref 0–0.9)
LYMPHOCYTES # BLD AUTO: 1.93 K/UL — SIGNIFICANT CHANGE UP (ref 1–3.3)
LYMPHOCYTES # BLD AUTO: 44.2 % — HIGH (ref 13–44)
MAGNESIUM SERPL-MCNC: 1.7 MG/DL — SIGNIFICANT CHANGE UP (ref 1.6–2.6)
MCHC RBC-ENTMCNC: 31.1 PG — SIGNIFICANT CHANGE UP (ref 27–34)
MCHC RBC-ENTMCNC: 32.3 GM/DL — SIGNIFICANT CHANGE UP (ref 32–36)
MCV RBC AUTO: 96.1 FL — SIGNIFICANT CHANGE UP (ref 80–100)
MONOCYTES # BLD AUTO: 0.49 K/UL — SIGNIFICANT CHANGE UP (ref 0–0.9)
MONOCYTES NFR BLD AUTO: 11.2 % — SIGNIFICANT CHANGE UP (ref 2–14)
NEUTROPHILS # BLD AUTO: 1.84 K/UL — SIGNIFICANT CHANGE UP (ref 1.8–7.4)
NEUTROPHILS NFR BLD AUTO: 42.1 % — LOW (ref 43–77)
NRBC # BLD: 0 /100 WBCS — SIGNIFICANT CHANGE UP (ref 0–0)
NRBC # FLD: 0 K/UL — SIGNIFICANT CHANGE UP (ref 0–0)
PHOSPHATE SERPL-MCNC: 3.9 MG/DL — SIGNIFICANT CHANGE UP (ref 2.5–4.5)
PLATELET # BLD AUTO: 162 K/UL — SIGNIFICANT CHANGE UP (ref 150–400)
POTASSIUM SERPL-MCNC: 4.8 MMOL/L — SIGNIFICANT CHANGE UP (ref 3.5–5.3)
POTASSIUM SERPL-SCNC: 4.8 MMOL/L — SIGNIFICANT CHANGE UP (ref 3.5–5.3)
PYRIDOXAL PHOS SERPL-MCNC: 33.7 UG/L — SIGNIFICANT CHANGE UP (ref 3.4–65.2)
RBC # BLD: 3.09 M/UL — LOW (ref 4.2–5.8)
RBC # FLD: 15 % — HIGH (ref 10.3–14.5)
SODIUM SERPL-SCNC: 134 MMOL/L — LOW (ref 135–145)
WBC # BLD: 4.37 K/UL — SIGNIFICANT CHANGE UP (ref 3.8–10.5)
WBC # FLD AUTO: 4.37 K/UL — SIGNIFICANT CHANGE UP (ref 3.8–10.5)

## 2023-03-05 PROCEDURE — 99232 SBSQ HOSP IP/OBS MODERATE 35: CPT

## 2023-03-05 RX ORDER — INSULIN GLARGINE 100 [IU]/ML
8 INJECTION, SOLUTION SUBCUTANEOUS AT BEDTIME
Refills: 0 | Status: DISCONTINUED | OUTPATIENT
Start: 2023-03-05 | End: 2023-03-08

## 2023-03-05 RX ADMIN — SENNA PLUS 1 TABLET(S): 8.6 TABLET ORAL at 22:14

## 2023-03-05 RX ADMIN — Medication 1 DROP(S): at 20:15

## 2023-03-05 RX ADMIN — CLOZAPINE 100 MILLIGRAM(S): 150 TABLET, ORALLY DISINTEGRATING ORAL at 22:14

## 2023-03-05 RX ADMIN — DIVALPROEX SODIUM 1000 MILLIGRAM(S): 500 TABLET, DELAYED RELEASE ORAL at 22:14

## 2023-03-05 RX ADMIN — CHLORHEXIDINE GLUCONATE 1 APPLICATION(S): 213 SOLUTION TOPICAL at 12:17

## 2023-03-05 RX ADMIN — TAMSULOSIN HYDROCHLORIDE 0.4 MILLIGRAM(S): 0.4 CAPSULE ORAL at 22:14

## 2023-03-05 RX ADMIN — FINASTERIDE 5 MILLIGRAM(S): 5 TABLET, FILM COATED ORAL at 12:15

## 2023-03-05 RX ADMIN — POLYETHYLENE GLYCOL 3350 17 GRAM(S): 17 POWDER, FOR SOLUTION ORAL at 06:02

## 2023-03-05 RX ADMIN — Medication 1 DROP(S): at 08:22

## 2023-03-05 RX ADMIN — Medication 2: at 22:15

## 2023-03-05 RX ADMIN — LINAGLIPTIN 5 MILLIGRAM(S): 5 TABLET, FILM COATED ORAL at 12:16

## 2023-03-05 RX ADMIN — ATORVASTATIN CALCIUM 40 MILLIGRAM(S): 80 TABLET, FILM COATED ORAL at 22:14

## 2023-03-05 RX ADMIN — PANTOPRAZOLE SODIUM 40 MILLIGRAM(S): 20 TABLET, DELAYED RELEASE ORAL at 12:16

## 2023-03-05 RX ADMIN — Medication 2: at 12:14

## 2023-03-05 RX ADMIN — Medication 75 MILLIGRAM(S): at 12:15

## 2023-03-05 RX ADMIN — LATANOPROST 1 DROP(S): 0.05 SOLUTION/ DROPS OPHTHALMIC; TOPICAL at 22:14

## 2023-03-05 RX ADMIN — Medication 50 MILLIGRAM(S): at 12:16

## 2023-03-05 RX ADMIN — Medication 88 MICROGRAM(S): at 06:02

## 2023-03-05 RX ADMIN — POLYETHYLENE GLYCOL 3350 17 GRAM(S): 17 POWDER, FOR SOLUTION ORAL at 17:46

## 2023-03-05 RX ADMIN — INSULIN GLARGINE 8 UNIT(S): 100 INJECTION, SOLUTION SUBCUTANEOUS at 22:10

## 2023-03-05 NOTE — PROGRESS NOTE ADULT - SUBJECTIVE AND OBJECTIVE BOX
[facility] Division of Hospital Medicine  Kenneth AriasDO  Available via MS Teams  In house pager [phone]    SUBJECTIVE / OVERNIGHT EVENTS:  No acute events overnight.  Feels well.  Is asking about the blood cultures which were suspected to be contaminants. Wanted some clarification.    ADDITIONAL REVIEW OF SYSTEMS:    MEDICATIONS  (STANDING):  atorvastatin 40 milliGRAM(s) Oral at bedtime  chlorhexidine 2% Cloths 1 Application(s) Topical daily  cloZAPine 100 milliGRAM(s) Oral at bedtime  dextrose 5%. 1000 milliLiter(s) (100 mL/Hr) IV Continuous <Continuous>  dextrose 5%. 1000 milliLiter(s) (50 mL/Hr) IV Continuous <Continuous>  dextrose 50% Injectable 12.5 Gram(s) IV Push once  dextrose 50% Injectable 25 Gram(s) IV Push once  dextrose 50% Injectable 25 Gram(s) IV Push once  divalproex ER 1000 milliGRAM(s) Oral at bedtime  finasteride 5 milliGRAM(s) Oral daily  glucagon  Injectable 1 milliGRAM(s) IntraMuscular once  insulin glargine Injectable (LANTUS) 8 Unit(s) SubCutaneous at bedtime  insulin lispro (ADMELOG) corrective regimen sliding scale   SubCutaneous at bedtime  insulin lispro (ADMELOG) corrective regimen sliding scale   SubCutaneous three times a day before meals  latanoprost 0.005% Ophthalmic Solution 1 Drop(s) Both EYES at bedtime  levobunolol 0.5% Solution 1 Drop(s) Both EYES two times a day  levothyroxine 88 MICROGram(s) Oral daily  linagliptin 5 milliGRAM(s) Oral daily  pantoprazole    Tablet 40 milliGRAM(s) Oral <User Schedule>  polyethylene glycol 3350 17 Gram(s) Oral two times a day  pyridoxine 50 milliGRAM(s) Oral daily  RHOPRESSA (Netarsudil) 0.02% Eye Drops 1 Drop(s) 1 Drop(s) Both EYES daily  senna 1 Tablet(s) Oral at bedtime  tamsulosin 0.4 milliGRAM(s) Oral at bedtime  venlafaxine 75 milliGRAM(s) Oral daily    MEDICATIONS  (PRN):  acetaminophen     Tablet .. 650 milliGRAM(s) Oral every 6 hours PRN Temp greater or equal to 38C (100.4F), Mild Pain (1 - 3)  dextrose Oral Gel 15 Gram(s) Oral once PRN Blood Glucose LESS THAN 70 milliGRAM(s)/deciliter  haloperidol    Injectable 1 milliGRAM(s) IV Push every 6 hours PRN agitation      I&O's Summary    05 Mar 2023 07:01  -  05 Mar 2023 14:46  --------------------------------------------------------  IN: 300 mL / OUT: 1 mL / NET: 299 mL        PHYSICAL EXAM:  Vital Signs Last 24 Hrs  T(C): 36.5 (05 Mar 2023 13:41), Max: 36.7 (04 Mar 2023 21:15)  T(F): 97.7 (05 Mar 2023 13:41), Max: 98.1 (04 Mar 2023 21:15)  HR: 62 (05 Mar 2023 13:41) (62 - 73)  BP: 142/62 (05 Mar 2023 13:41) (111/59 - 142/62)  BP(mean): --  RR: 18 (05 Mar 2023 13:41) (18 - 18)  SpO2: 100% (05 Mar 2023 13:41) (99% - 100%)    Parameters below as of 05 Mar 2023 13:41  Patient On (Oxygen Delivery Method): room air      CONSTITUTIONAL: NAD, well-developed, well-groomed  EYES: PERRLA; conjunctiva and sclera clear  ENMT: Moist oral mucosa, no pharyngeal injection or exudates; normal dentition  NECK: Supple, no palpable masses; no thyromegaly  RESPIRATORY: Normal respiratory effort; lungs are clear to auscultation bilaterally  CARDIOVASCULAR: Regular rate and rhythm, normal S1 and S2, no murmur/rub/gallop; No lower extremity edema; Peripheral pulses are 2+ bilaterally  ABDOMEN: Nontender to palpation, normoactive bowel sounds, no rebound/guarding; No hepatosplenomegaly  MUSCULOSKELETAL: no clubbing or cyanosis of digits; no joint swelling or tenderness to palpation; increased thoracic kyphosis  PSYCH: A+O to person, place, and time; affect appropriate  NEUROLOGY: CN 2-12 are intact and symmetric; no gross sensory deficits   SKIN: No rashes; no palpable lesions    LABS:                        9.6    4.37  )-----------( 162      ( 05 Mar 2023 07:00 )             29.7     03-05    134<L>  |  98  |  16  ----------------------------<  104<H>  4.8   |  27  |  0.72    Ca    9.4      05 Mar 2023 07:00  Phos  3.9     03-05  Mg     1.70     03-05                Culture - Blood (collected 03 Mar 2023 08:46)  Source: .Blood Blood-Venous  Preliminary Report (04 Mar 2023 12:01):    No growth to date.    Culture - Blood (collected 03 Mar 2023 08:15)  Source: .Blood Blood-Peripheral  Preliminary Report (04 Mar 2023 12:01):    No growth to date.      SARS-CoV-2: NotDetec (01 Mar 2023 18:46)  COVID-19 PCR: NotDetec (13 Feb 2023 12:28)  SARS-CoV-2: NotDetec (09 Feb 2023 02:45)  SARS-CoV-2: NotDetec (30 Jan 2023 16:54)      COMMUNICATION:  Care Discussed with Consultants/Other Providers and Details of Discussion: d/w medicine NP  Discussions with Patient/Family: tried to call brother while at bedside but unable to reach

## 2023-03-06 ENCOUNTER — TRANSCRIPTION ENCOUNTER (OUTPATIENT)
Age: 68
End: 2023-03-06

## 2023-03-06 DIAGNOSIS — A41.9 SEPSIS, UNSPECIFIED ORGANISM: ICD-10-CM

## 2023-03-06 LAB
ANION GAP SERPL CALC-SCNC: 9 MMOL/L — SIGNIFICANT CHANGE UP (ref 7–14)
BASOPHILS # BLD AUTO: 0.03 K/UL — SIGNIFICANT CHANGE UP (ref 0–0.2)
BASOPHILS NFR BLD AUTO: 0.8 % — SIGNIFICANT CHANGE UP (ref 0–2)
BUN SERPL-MCNC: 13 MG/DL — SIGNIFICANT CHANGE UP (ref 7–23)
CALCIUM SERPL-MCNC: 8.9 MG/DL — SIGNIFICANT CHANGE UP (ref 8.4–10.5)
CHLORIDE SERPL-SCNC: 98 MMOL/L — SIGNIFICANT CHANGE UP (ref 98–107)
CO2 SERPL-SCNC: 26 MMOL/L — SIGNIFICANT CHANGE UP (ref 22–31)
CREAT SERPL-MCNC: 0.66 MG/DL — SIGNIFICANT CHANGE UP (ref 0.5–1.3)
CULTURE RESULTS: SIGNIFICANT CHANGE UP
EGFR: 102 ML/MIN/1.73M2 — SIGNIFICANT CHANGE UP
EOSINOPHIL # BLD AUTO: 0.07 K/UL — SIGNIFICANT CHANGE UP (ref 0–0.5)
EOSINOPHIL NFR BLD AUTO: 1.8 % — SIGNIFICANT CHANGE UP (ref 0–6)
GLUCOSE BLDC GLUCOMTR-MCNC: 127 MG/DL — HIGH (ref 70–99)
GLUCOSE BLDC GLUCOMTR-MCNC: 226 MG/DL — HIGH (ref 70–99)
GLUCOSE BLDC GLUCOMTR-MCNC: 236 MG/DL — HIGH (ref 70–99)
GLUCOSE BLDC GLUCOMTR-MCNC: 337 MG/DL — HIGH (ref 70–99)
GLUCOSE SERPL-MCNC: 102 MG/DL — HIGH (ref 70–99)
GRAM STN FLD: SIGNIFICANT CHANGE UP
HCT VFR BLD CALC: 29.3 % — LOW (ref 39–50)
HGB BLD-MCNC: 9.6 G/DL — LOW (ref 13–17)
IANC: 1.73 K/UL — LOW (ref 1.8–7.4)
IMM GRANULOCYTES NFR BLD AUTO: 0.3 % — SIGNIFICANT CHANGE UP (ref 0–0.9)
LYMPHOCYTES # BLD AUTO: 1.73 K/UL — SIGNIFICANT CHANGE UP (ref 1–3.3)
LYMPHOCYTES # BLD AUTO: 43.7 % — SIGNIFICANT CHANGE UP (ref 13–44)
MAGNESIUM SERPL-MCNC: 1.6 MG/DL — SIGNIFICANT CHANGE UP (ref 1.6–2.6)
MCHC RBC-ENTMCNC: 31.7 PG — SIGNIFICANT CHANGE UP (ref 27–34)
MCHC RBC-ENTMCNC: 32.8 GM/DL — SIGNIFICANT CHANGE UP (ref 32–36)
MCV RBC AUTO: 96.7 FL — SIGNIFICANT CHANGE UP (ref 80–100)
MONOCYTES # BLD AUTO: 0.39 K/UL — SIGNIFICANT CHANGE UP (ref 0–0.9)
MONOCYTES NFR BLD AUTO: 9.8 % — SIGNIFICANT CHANGE UP (ref 2–14)
NEUTROPHILS # BLD AUTO: 1.73 K/UL — LOW (ref 1.8–7.4)
NEUTROPHILS NFR BLD AUTO: 43.6 % — SIGNIFICANT CHANGE UP (ref 43–77)
NRBC # BLD: 0 /100 WBCS — SIGNIFICANT CHANGE UP (ref 0–0)
NRBC # FLD: 0 K/UL — SIGNIFICANT CHANGE UP (ref 0–0)
ORGANISM # SPEC MICROSCOPIC CNT: SIGNIFICANT CHANGE UP
ORGANISM # SPEC MICROSCOPIC CNT: SIGNIFICANT CHANGE UP
PHOSPHATE SERPL-MCNC: 4.1 MG/DL — SIGNIFICANT CHANGE UP (ref 2.5–4.5)
PLATELET # BLD AUTO: 163 K/UL — SIGNIFICANT CHANGE UP (ref 150–400)
POTASSIUM SERPL-MCNC: 4.6 MMOL/L — SIGNIFICANT CHANGE UP (ref 3.5–5.3)
POTASSIUM SERPL-SCNC: 4.6 MMOL/L — SIGNIFICANT CHANGE UP (ref 3.5–5.3)
RBC # BLD: 3.03 M/UL — LOW (ref 4.2–5.8)
RBC # FLD: 14.6 % — HIGH (ref 10.3–14.5)
SODIUM SERPL-SCNC: 133 MMOL/L — LOW (ref 135–145)
SPECIMEN SOURCE: SIGNIFICANT CHANGE UP
WBC # BLD: 3.96 K/UL — SIGNIFICANT CHANGE UP (ref 3.8–10.5)
WBC # FLD AUTO: 3.96 K/UL — SIGNIFICANT CHANGE UP (ref 3.8–10.5)

## 2023-03-06 PROCEDURE — 99232 SBSQ HOSP IP/OBS MODERATE 35: CPT

## 2023-03-06 RX ORDER — LEVOTHYROXINE SODIUM 125 MCG
1 TABLET ORAL
Qty: 0 | Refills: 0 | DISCHARGE

## 2023-03-06 RX ORDER — CLOZAPINE 150 MG/1
1 TABLET, ORALLY DISINTEGRATING ORAL
Qty: 30 | Refills: 0
Start: 2023-03-06 | End: 2023-04-04

## 2023-03-06 RX ORDER — TAMSULOSIN HYDROCHLORIDE 0.4 MG/1
1 CAPSULE ORAL
Qty: 30 | Refills: 0
Start: 2023-03-06 | End: 2023-04-04

## 2023-03-06 RX ORDER — AMLODIPINE BESYLATE 2.5 MG/1
1 TABLET ORAL
Qty: 30 | Refills: 0
Start: 2023-03-06 | End: 2023-04-04

## 2023-03-06 RX ORDER — METFORMIN HYDROCHLORIDE 850 MG/1
1 TABLET ORAL
Qty: 60 | Refills: 0
Start: 2023-03-06 | End: 2023-04-04

## 2023-03-06 RX ORDER — LIDOCAINE 4 G/100G
1 CREAM TOPICAL
Qty: 0 | Refills: 0 | DISCHARGE

## 2023-03-06 RX ORDER — LEVOTHYROXINE SODIUM 125 MCG
1 TABLET ORAL
Qty: 30 | Refills: 0
Start: 2023-03-06 | End: 2023-04-04

## 2023-03-06 RX ORDER — SITAGLIPTIN 50 MG/1
1 TABLET, FILM COATED ORAL
Qty: 30 | Refills: 0
Start: 2023-03-06 | End: 2023-04-04

## 2023-03-06 RX ORDER — VENLAFAXINE HCL 75 MG
1 CAPSULE, EXT RELEASE 24 HR ORAL
Qty: 0 | Refills: 0 | DISCHARGE

## 2023-03-06 RX ORDER — ATORVASTATIN CALCIUM 80 MG/1
1 TABLET, FILM COATED ORAL
Qty: 30 | Refills: 0
Start: 2023-03-06 | End: 2023-04-04

## 2023-03-06 RX ORDER — NYSTATIN 500MM UNIT
4 POWDER (EA) MISCELLANEOUS
Qty: 0 | Refills: 0 | DISCHARGE

## 2023-03-06 RX ORDER — ENOXAPARIN SODIUM 100 MG/ML
40 INJECTION SUBCUTANEOUS EVERY 24 HOURS
Refills: 0 | Status: DISCONTINUED | OUTPATIENT
Start: 2023-03-06 | End: 2023-03-08

## 2023-03-06 RX ORDER — DIVALPROEX SODIUM 500 MG/1
2 TABLET, DELAYED RELEASE ORAL
Qty: 0 | Refills: 0 | DISCHARGE

## 2023-03-06 RX ORDER — TAMSULOSIN HYDROCHLORIDE 0.4 MG/1
1 CAPSULE ORAL
Qty: 0 | Refills: 0 | DISCHARGE

## 2023-03-06 RX ORDER — VENLAFAXINE HCL 75 MG
1 CAPSULE, EXT RELEASE 24 HR ORAL
Qty: 30 | Refills: 0
Start: 2023-03-06 | End: 2023-04-04

## 2023-03-06 RX ORDER — AMLODIPINE BESYLATE 2.5 MG/1
1 TABLET ORAL
Qty: 0 | Refills: 0 | DISCHARGE

## 2023-03-06 RX ORDER — DIVALPROEX SODIUM 500 MG/1
2 TABLET, DELAYED RELEASE ORAL
Qty: 60 | Refills: 0
Start: 2023-03-06 | End: 2023-04-04

## 2023-03-06 RX ORDER — METFORMIN HYDROCHLORIDE 850 MG/1
1 TABLET ORAL
Qty: 0 | Refills: 0 | DISCHARGE

## 2023-03-06 RX ORDER — SITAGLIPTIN 50 MG/1
1 TABLET, FILM COATED ORAL
Qty: 0 | Refills: 0 | DISCHARGE

## 2023-03-06 RX ORDER — POLYETHYLENE GLYCOL 3350 17 G/17G
17 POWDER, FOR SOLUTION ORAL
Qty: 0 | Refills: 0 | DISCHARGE

## 2023-03-06 RX ADMIN — Medication 88 MICROGRAM(S): at 06:11

## 2023-03-06 RX ADMIN — CLOZAPINE 100 MILLIGRAM(S): 150 TABLET, ORALLY DISINTEGRATING ORAL at 22:51

## 2023-03-06 RX ADMIN — CHLORHEXIDINE GLUCONATE 1 APPLICATION(S): 213 SOLUTION TOPICAL at 12:19

## 2023-03-06 RX ADMIN — Medication 1 DROP(S): at 09:48

## 2023-03-06 RX ADMIN — INSULIN GLARGINE 8 UNIT(S): 100 INJECTION, SOLUTION SUBCUTANEOUS at 22:10

## 2023-03-06 RX ADMIN — Medication 10 MILLIGRAM(S): at 12:30

## 2023-03-06 RX ADMIN — FINASTERIDE 5 MILLIGRAM(S): 5 TABLET, FILM COATED ORAL at 12:19

## 2023-03-06 RX ADMIN — TAMSULOSIN HYDROCHLORIDE 0.4 MILLIGRAM(S): 0.4 CAPSULE ORAL at 22:05

## 2023-03-06 RX ADMIN — PANTOPRAZOLE SODIUM 40 MILLIGRAM(S): 20 TABLET, DELAYED RELEASE ORAL at 12:19

## 2023-03-06 RX ADMIN — Medication 2: at 12:18

## 2023-03-06 RX ADMIN — Medication 50 MILLIGRAM(S): at 12:19

## 2023-03-06 RX ADMIN — Medication 4: at 17:57

## 2023-03-06 RX ADMIN — ATORVASTATIN CALCIUM 40 MILLIGRAM(S): 80 TABLET, FILM COATED ORAL at 22:05

## 2023-03-06 RX ADMIN — Medication 1 DROP(S): at 17:58

## 2023-03-06 RX ADMIN — ENOXAPARIN SODIUM 40 MILLIGRAM(S): 100 INJECTION SUBCUTANEOUS at 12:29

## 2023-03-06 RX ADMIN — Medication 75 MILLIGRAM(S): at 12:19

## 2023-03-06 RX ADMIN — SENNA PLUS 1 TABLET(S): 8.6 TABLET ORAL at 22:05

## 2023-03-06 RX ADMIN — LATANOPROST 1 DROP(S): 0.05 SOLUTION/ DROPS OPHTHALMIC; TOPICAL at 22:04

## 2023-03-06 RX ADMIN — POLYETHYLENE GLYCOL 3350 17 GRAM(S): 17 POWDER, FOR SOLUTION ORAL at 06:11

## 2023-03-06 RX ADMIN — LINAGLIPTIN 5 MILLIGRAM(S): 5 TABLET, FILM COATED ORAL at 12:19

## 2023-03-06 RX ADMIN — DIVALPROEX SODIUM 1000 MILLIGRAM(S): 500 TABLET, DELAYED RELEASE ORAL at 22:51

## 2023-03-06 NOTE — PROGRESS NOTE ADULT - SUBJECTIVE AND OBJECTIVE BOX
Chief Complaint: DM2, hypothyroidism    History: tolerating po  no hypoglycemia events or symptoms  for dc planning    MEDICATIONS  (STANDING):  atorvastatin 40 milliGRAM(s) Oral at bedtime  chlorhexidine 2% Cloths 1 Application(s) Topical daily  cloZAPine 100 milliGRAM(s) Oral at bedtime  dextrose 5%. 1000 milliLiter(s) (100 mL/Hr) IV Continuous <Continuous>  dextrose 5%. 1000 milliLiter(s) (50 mL/Hr) IV Continuous <Continuous>  dextrose 50% Injectable 25 Gram(s) IV Push once  dextrose 50% Injectable 12.5 Gram(s) IV Push once  dextrose 50% Injectable 25 Gram(s) IV Push once  divalproex ER 1000 milliGRAM(s) Oral at bedtime  enoxaparin Injectable 40 milliGRAM(s) SubCutaneous every 24 hours  finasteride 5 milliGRAM(s) Oral daily  glucagon  Injectable 1 milliGRAM(s) IntraMuscular once  insulin glargine Injectable (LANTUS) 8 Unit(s) SubCutaneous at bedtime  insulin lispro (ADMELOG) corrective regimen sliding scale   SubCutaneous three times a day before meals  insulin lispro (ADMELOG) corrective regimen sliding scale   SubCutaneous at bedtime  latanoprost 0.005% Ophthalmic Solution 1 Drop(s) Both EYES at bedtime  levobunolol 0.5% Solution 1 Drop(s) Both EYES two times a day  levothyroxine 88 MICROGram(s) Oral daily  linagliptin 5 milliGRAM(s) Oral daily  pantoprazole    Tablet 40 milliGRAM(s) Oral <User Schedule>  polyethylene glycol 3350 17 Gram(s) Oral two times a day  pyridoxine 50 milliGRAM(s) Oral daily  RHOPRESSA (Netarsudil) 0.02% Eye Drops 1 Drop(s) 1 Drop(s) Both EYES daily  senna 1 Tablet(s) Oral at bedtime  tamsulosin 0.4 milliGRAM(s) Oral at bedtime  venlafaxine 75 milliGRAM(s) Oral daily    MEDICATIONS  (PRN):  acetaminophen     Tablet .. 650 milliGRAM(s) Oral every 6 hours PRN Temp greater or equal to 38C (100.4F), Mild Pain (1 - 3)  dextrose Oral Gel 15 Gram(s) Oral once PRN Blood Glucose LESS THAN 70 milliGRAM(s)/deciliter  haloperidol    Injectable 1 milliGRAM(s) IV Push every 6 hours PRN agitation      Allergies    No Known Allergies    Intolerances      Review of Systems:    ALL OTHER SYSTEMS REVIEWED AND NEGATIVE      PHYSICAL EXAM:  VITALS: T(C): 36.5 (03-06-23 @ 13:17)  T(F): 97.7 (03-06-23 @ 13:17), Max: 97.7 (03-06-23 @ 13:17)  HR: 68 (03-06-23 @ 13:17) (64 - 76)  BP: 167/83 (03-06-23 @ 13:17) (152/75 - 167/83)  RR:  (17 - 18)  SpO2:  (100% - 100%)  Wt(kg): --  GENERAL: NAD, well-groomed, well-developed  EYES: No proptosis, no lid lag, anicteric  HEENT:  Atraumatic, Normocephalic, moist mucous membranes  RESPIRATORY: non labored respirations, no wheezing  PSYCH: Alert and oriented x 3, normal affect, normal mood    CAPILLARY BLOOD GLUCOSE      POCT Blood Glucose.: 236 mg/dL (06 Mar 2023 12:12)  POCT Blood Glucose.: 127 mg/dL (06 Mar 2023 08:30)  POCT Blood Glucose.: 342 mg/dL (05 Mar 2023 21:54)  POCT Blood Glucose.: 117 mg/dL (05 Mar 2023 17:57)      03-06    133<L>  |  98  |  13  ----------------------------<  102<H>  4.6   |  26  |  0.66    eGFR: 102    Ca    8.9      03-06  Mg     1.60     03-06  Phos  4.1     03-06        A1C with Estimated Average Glucose Result: 6.4 % (01-30-23 @ 16:45)      Thyroid Function Tests:  03-01 @ 18:08 TSH 8.67 FreeT4 1.4 T3 -- Anti TPO -- Anti Thyroglobulin Ab -- TSI --  03-01 @ 16:24 TSH 6.45 FreeT4 -- T3 -- Anti TPO -- Anti Thyroglobulin Ab -- TSI --

## 2023-03-06 NOTE — PROVIDER CONTACT NOTE (CRITICAL VALUE NOTIFICATION) - TEST AND RESULT REPORTED:
Blood cultures from march 5 show gram positive cocci in clusters
blood cultures collected 3/1= growth in aerobic bottle gram + cocci in clusters
Blood cultures from March 1st.

## 2023-03-06 NOTE — PROGRESS NOTE ADULT - TIME BILLING
Patient was supposed to be discharged, time spent discussing with patient. However right before he was about to leave, received a call about +blood cxs. Again, case was rediscussed with ID for a 2nd time, patient called back to stay as inpatient.

## 2023-03-06 NOTE — PROVIDER CONTACT NOTE (CRITICAL VALUE NOTIFICATION) - BACKGROUND
pt admitted with sepsis
Admitting diagnosis: sepsis
Blood cultures from march 5 show gram positive cocci in clusters

## 2023-03-06 NOTE — PROVIDER CONTACT NOTE (CRITICAL VALUE NOTIFICATION) - ASSESSMENT
VSS. pt afebrile
Patient vitally stable afebrile, initially admitted for sepsis
pt is A&OX4, VSS throughout shift, pt currently on IV abx.

## 2023-03-06 NOTE — DISCHARGE NOTE NURSING/CASE MANAGEMENT/SOCIAL WORK - NSDCVIVACCINE_GEN_ALL_CORE_FT
Tdap; 22-Dec-2020 14:05; Macy Yanez (BRANDON); [employer]; [id number] (Exp. Date: 31-Jul-2022); IntraMuscular; Deltoid Left.; 0.5 milliLiter(s); VIS (VIS Published: 09-May-2013, VIS Presented: 22-Dec-2020);

## 2023-03-06 NOTE — PHYSICAL THERAPY INITIAL EVALUATION ADULT - IMPAIRMENTS CONTRIBUTING TO GAIT DEVIATIONS, PT EVAL
----- Message from Edgar May MD sent at 8/2/2022  2:19 PM CDT -----  Needs referral to colorectal surgery Dr. Miranda for evaluation of perianal lesions. Needs f/u with Vielka in clinic on Mondays per patient's son. Will set up for IV iron infusions.    
Edgar May MD  You 1 hour ago (3:32 PM)         Can put rectal pain and specify abnormal perianal mucosa    Message text         
Pperianal lesions is not an option for a diagnosis on the referral, is there another diagnosis I should try?  
impaired balance/decreased strength

## 2023-03-06 NOTE — PROGRESS NOTE ADULT - ASSESSMENT
Patient is a 67y old  Male who presents with a chief complaint of Hypothermia. Blood culture with CoNS staph on admission.    Positive blood culture  Likely to be contamination given low grade (2/4 bottles),    No foreign material, no orthopedic hardware  Repeat blood cultures pending however obtained while on antibiotics, subsequent off antibiotics, no growth  Patient otherwise non-toxic, offers no localizing symptoms    Recommendations  Given two sets of blood cultures with different species known for contamination and patient remains without fever, leukocytosis or localizing symptoms - the growth noted is most likely contamination  Would continue to monitor off antibiotics   Unclear etiology for hypothermia however does not appear to be harboring an infection at this time  Follow fever curve and WBC count    Nestor Frazier MD  Division of Infectious Diseases  Available on Teams

## 2023-03-06 NOTE — CHART NOTE - NSCHARTNOTEFT_GEN_A_CORE
Notified by 8N RN that [facility] labs called and reported gram variable rods in aerobic bottle of blood culture.  Notified attending, Dr RASHID Arias and also notified ID, who advised to continue monitoring patient off antibiotics and repeat another set of blood cultures (ordered)
Pt's Bcx 3/1 growing GPC in clusters, f/u speciation, Pt already on Vancomycin, will f/u repeat Bcx.
This is a 68 yo  M/w a PMH of schizophrenia, bipolar disorder, DM2, HTN and hypothyroidism who presents with transient episode of dizziness for which endocrinology was consulted to rule out adrenal insufficiency given low AM cortisol. It is unclear at this time if patient has any risk factors for adrenal insufficiency and additionally he does not appear to have any convincing symptoms of AI (hypotension, hypoglycemia - hyponatremia is chronic) Therefore, to determine if adrenal insufficiency is real, would recommend ACTH stimulation test to further evaluate    #r/o Adrenal insufficiency  #low AM cortisol  AM cortisol drawn at 8:40AM is 11.1  Cosyntropin given at 9AM  Cortisol at 9:35 is 18,9  Cortisol at 10:15 is 22  -this ACTH stimulation test rules out adrenal insufficiency      #hypothyroidism  -will continue levothyroxine 88mcg daily  -please ensure any vitamins, iron, calcium, PPI, H2 blockers are given 4 hours after the levothyroxine  -levothyroxine should be given on an empty stomach at 7;30AM at least 30 minutes before breakfast  -repeat TFTs in 6-8 weeks as an outpatient    #DM2  -FSG goal 100-180  -can continue with low admelog correction scales before meals and before bedtime  -will continue linagliptin 5 mg daily  -will start Lantus 10 units SQ QHS  -can be discharged on home regimen metformin 1000 mg twice per day and januvia 100 mg daily  -follow up with outpatient endocrinologist Dr. Farfan      MEDICATIONS  (STANDING):  atorvastatin 40 milliGRAM(s) Oral at bedtime  chlorhexidine 2% Cloths 1 Application(s) Topical daily  cloZAPine 100 milliGRAM(s) Oral at bedtime  dextrose 5%. 1000 milliLiter(s) (100 mL/Hr) IV Continuous <Continuous>  dextrose 5%. 1000 milliLiter(s) (50 mL/Hr) IV Continuous <Continuous>  dextrose 50% Injectable 25 Gram(s) IV Push once  dextrose 50% Injectable 12.5 Gram(s) IV Push once  dextrose 50% Injectable 25 Gram(s) IV Push once  divalproex ER 1000 milliGRAM(s) Oral at bedtime  finasteride 5 milliGRAM(s) Oral daily  glucagon  Injectable 1 milliGRAM(s) IntraMuscular once  insulin lispro (ADMELOG) corrective regimen sliding scale   SubCutaneous three times a day before meals  insulin lispro (ADMELOG) corrective regimen sliding scale   SubCutaneous at bedtime  latanoprost 0.005% Ophthalmic Solution 1 Drop(s) Both EYES at bedtime  levobunolol 0.5% Solution 1 Drop(s) Both EYES two times a day  levothyroxine 88 MICROGram(s) Oral daily  linagliptin 5 milliGRAM(s) Oral daily  pantoprazole    Tablet 40 milliGRAM(s) Oral <User Schedule>  polyethylene glycol 3350 17 Gram(s) Oral two times a day  pyridoxine 50 milliGRAM(s) Oral daily  RHOPRESSA (Netarsudil) 0.02% Eye Drops 1 Drop(s) 1 Drop(s) Both EYES daily  senna 1 Tablet(s) Oral at bedtime  tamsulosin 0.4 milliGRAM(s) Oral at bedtime  venlafaxine 75 milliGRAM(s) Oral daily    MEDICATIONS  (PRN):  acetaminophen     Tablet .. 650 milliGRAM(s) Oral every 6 hours PRN Temp greater or equal to 38C (100.4F), Mild Pain (1 - 3)  dextrose Oral Gel 15 Gram(s) Oral once PRN Blood Glucose LESS THAN 70 milliGRAM(s)/deciliter  haloperidol    Injectable 1 milliGRAM(s) IV Push every 6 hours PRN agitation      Allergies    No Known Allergies      PHYSICAL EXAM:  VITALS: T(C): 36.6 (03-04-23 @ 14:15)  T(F): 97.8 (03-04-23 @ 14:15), Max: 97.8 (03-04-23 @ 14:15)  HR: 74 (03-04-23 @ 14:15) (74 - 91)  BP: 101/51 (03-04-23 @ 14:15) (101/51 - 148/80)  RR:  (16 - 17)  SpO2:  (97% - 100%)  Wt(kg): --    POCT Blood Glucose.: 218 mg/dL (03-04-23 @ 17:38)  POCT Blood Glucose.: 281 mg/dL (03-04-23 @ 12:52)  POCT Blood Glucose.: 190 mg/dL (03-04-23 @ 08:46)  POCT Blood Glucose.: 183 mg/dL (03-03-23 @ 21:56)  POCT Blood Glucose.: 258 mg/dL (03-03-23 @ 18:51)  POCT Blood Glucose.: 146 mg/dL (03-03-23 @ 12:10)  POCT Blood Glucose.: 172 mg/dL (03-03-23 @ 09:02)  POCT Blood Glucose.: 223 mg/dL (03-02-23 @ 21:23)  POCT Blood Glucose.: 245 mg/dL (03-02-23 @ 17:52)  POCT Blood Glucose.: 95 mg/dL (03-02-23 @ 12:27)  POCT Blood Glucose.: 154 mg/dL (03-02-23 @ 08:33)  POCT Blood Glucose.: 168 mg/dL (03-02-23 @ 00:35)  POCT Blood Glucose.: 119 mg/dL (03-01-23 @ 22:00)      03-04    135  |  101  |  16  ----------------------------<  207<H>  4.7   |  24  |  0.69    eGFR: 101    Ca    8.8      03-04  Mg     1.80     03-04  Phos  3.1     03-04        Thyroid Function Tests:  03-01 @ 18:08 TSH 8.67 FreeT4 1.4 T3 -- Anti TPO -- Anti Thyroglobulin Ab -- TSI --  03-01 @ 16:24 TSH 6.45 FreeT4 -- T3 -- Anti TPO -- Anti Thyroglobulin Ab -- TSI --                Alysa Paulino DO  Attending Division of Endocrinology  [phone]
Vital Signs Last 24 Hrs  T(C): 36.5 (06 Mar 2023 13:17), Max: 36.5 (06 Mar 2023 13:17)  T(F): 97.7 (06 Mar 2023 13:17), Max: 97.7 (06 Mar 2023 13:17)  HR: 67 (06 Mar 2023 17:57) (64 - 76)  BP: 113/89 (06 Mar 2023 17:57) (113/89 - 167/83)  BP(mean): --  RR: 18 (06 Mar 2023 13:17) (17 - 18)  SpO2: 100% (06 Mar 2023 13:17) (100% - 100%)    Parameters below as of 06 Mar 2023 13:17  Patient On (Oxygen Delivery Method): room air    Patient was cleared to discharge home as discussed with Dr. John this afternoon per discussion with ID.    Writer spoke with [facility] pharmacy ([phone])> all meds covered (including synthroid, januvia, metformin/glipizde too soon to fill, glucose tabs OTC). Outpatient PT Rx was given to patient.        18:50: However received +BCX with GPC in clusters from 3/5. Discussed results with Dr. John>discharge cancelled, repeat BCX x2 ordered, monitor off antibiotics for now, writer discussed plan with BRANDON Phan and urban [place], patient informed.
This is a 66 yo  M/w a PMH of schizophrenia, bipolar disorder, DM2, HTN and hypothyroidism who presents with transient episode of dizziness for which endocrinology was consulted to rule out adrenal insufficiency given low AM cortisol. It is unclear at this time if patient has any risk factors for adrenal insufficiency and additionally he does not appear to have any convincing symptoms of AI (hypotension, hypoglycemia - hyponatremia is chronic) Therefore, to determine if adrenal insufficiency is real, would recommend ACTH stimulation test to further evaluate    #r/o Adrenal insufficiency  #low AM cortisol  AM cortisol drawn at 8:40AM is 11.1  Cosyntropin given at 9AM  Cortisol at 9:35 is 18,9  Cortisol at 10:15 is 22  -this ACTH stimulation test rules out adrenal insufficiency      #hypothyroidism  -will continue levothyroxine 88mcg daily  -please ensure any vitamins, iron, calcium, PPI, H2 blockers are given 4 hours after the levothyroxine  -levothyroxine should be given on an empty stomach at 7;30AM at least 30 minutes before breakfast  -repeat TFTs in 6-8 weeks as an outpatient    #DM2  -FSG goal 100-180  -can continue with low admelog correction scales before meals and before bedtime; May consider increasing to moderate if FS consistently above 180  -will continue linagliptin 5 mg daily  -Decrease Lantus to 8 units SQ QHS  -can be discharged on home regimen metformin 1000 mg twice per day and januvia 100 mg daily  -Ensure patient has working glucometer, test strips, lancets, alcohol pads, and BD bakari pen needles  -Please also prescribe glucose tabs, Baqsimi nasal spray or glucagon emergency kit for hypoglycemia risk   -follow up with outpatient endocrinologist Dr. Farfan      MEDICATIONS  (STANDING):  atorvastatin 40 milliGRAM(s) Oral at bedtime  chlorhexidine 2% Cloths 1 Application(s) Topical daily  cloZAPine 100 milliGRAM(s) Oral at bedtime  dextrose 5%. 1000 milliLiter(s) (100 mL/Hr) IV Continuous <Continuous>  dextrose 5%. 1000 milliLiter(s) (50 mL/Hr) IV Continuous <Continuous>  dextrose 50% Injectable 25 Gram(s) IV Push once  dextrose 50% Injectable 12.5 Gram(s) IV Push once  dextrose 50% Injectable 25 Gram(s) IV Push once  divalproex ER 1000 milliGRAM(s) Oral at bedtime  finasteride 5 milliGRAM(s) Oral daily  glucagon  Injectable 1 milliGRAM(s) IntraMuscular once  insulin glargine Injectable (LANTUS) 8 Unit(s) SubCutaneous at bedtime  insulin lispro (ADMELOG) corrective regimen sliding scale   SubCutaneous three times a day before meals  insulin lispro (ADMELOG) corrective regimen sliding scale   SubCutaneous at bedtime  latanoprost 0.005% Ophthalmic Solution 1 Drop(s) Both EYES at bedtime  levobunolol 0.5% Solution 1 Drop(s) Both EYES two times a day  levothyroxine 88 MICROGram(s) Oral daily  linagliptin 5 milliGRAM(s) Oral daily  pantoprazole    Tablet 40 milliGRAM(s) Oral <User Schedule>  polyethylene glycol 3350 17 Gram(s) Oral two times a day  pyridoxine 50 milliGRAM(s) Oral daily  RHOPRESSA (Netarsudil) 0.02% Eye Drops 1 Drop(s) 1 Drop(s) Both EYES daily  senna 1 Tablet(s) Oral at bedtime  tamsulosin 0.4 milliGRAM(s) Oral at bedtime  venlafaxine 75 milliGRAM(s) Oral daily    MEDICATIONS  (PRN):  acetaminophen     Tablet .. 650 milliGRAM(s) Oral every 6 hours PRN Temp greater or equal to 38C (100.4F), Mild Pain (1 - 3)  dextrose Oral Gel 15 Gram(s) Oral once PRN Blood Glucose LESS THAN 70 milliGRAM(s)/deciliter  haloperidol    Injectable 1 milliGRAM(s) IV Push every 6 hours PRN agitation      Allergies: No Known Allergies    PHYSICAL EXAM:  VITALS: T(C): 36.5 (03-05-23 @ 13:41)  T(F): 97.7 (03-05-23 @ 13:41), Max: 98.1 (03-04-23 @ 21:15)  HR: 62 (03-05-23 @ 13:41) (62 - 73)  BP: 142/62 (03-05-23 @ 13:41) (111/59 - 142/62)  RR:  (18 - 18)  SpO2:  (99% - 100%)    CAPILLARY BLOOD GLUCOSE  POCT Blood Glucose.: 117 mg/dL (05 Mar 2023 17:57)  POCT Blood Glucose.: 215 mg/dL (05 Mar 2023 12:10)  POCT Blood Glucose.: 104 mg/dL (05 Mar 2023 08:29)  POCT Blood Glucose.: 238 mg/dL (04 Mar 2023 21:46)    A1C with Estimated Average Glucose (01.30.23 @ 16:45)    A1C with Estimated Average Glucose Result: 6.4    Estimated Average Glucose: 137    03-05    134<L>  |  98  |  16  ----------------------------<  104<H>  4.8   |  27  |  0.72    eGFR: 100    Ca    9.4      03-05  Mg     1.70     03-05  Phos  3.9     03-05      Thyroid Function Tests:  03-01 @ 18:08 TSH 8.67 FreeT4 1.4 T3 -- Anti TPO -- Anti Thyroglobulin Ab -- TSI --  03-01 @ 16:24 TSH 6.45 FreeT4 -- T3 -- Anti TPO -- Anti Thyroglobulin Ab -- TSI --    Diet, Consistent Carbohydrate w/Evening Snack:   DASH/TLC {Sodium & Cholesterol Restricted} (DASH)  Narinder (03-01-23 @ 21:52) [Active]      Reina Newton  Nurse Practitioner  Division of Endocrinology & Diabetes  In house pager #[phone]    If before 9AM or after 6PM, or on weekends/holidays, please call endocrine answering service for assistance ([phone]).For nonurgent matters email [email or web address] for assistance.

## 2023-03-06 NOTE — DISCHARGE NOTE PROVIDER - NSDCCPCAREPLAN_GEN_ALL_CORE_FT
PRINCIPAL DISCHARGE DIAGNOSIS  Diagnosis: Hypothermia not due to cold exposure  Assessment and Plan of Treatment: No infection present, cortisol levels are appropriate indicating adequate adrenal function. Infection w/u mostly unremarkable, blooe cultures are likely contaminated. Would recommend continued outpatient follow up and work up. In case you feel hypothermic again, please seek medical attention.      SECONDARY DISCHARGE DIAGNOSES  Diagnosis: Toxic metabolic encephalopathy  Assessment and Plan of Treatment: Likely a result of hypothermia.     PRINCIPAL DISCHARGE DIAGNOSIS  Diagnosis: Hypothermia not due to cold exposure  Assessment and Plan of Treatment: No infection present, cortisol levels are appropriate indicating adequate adrenal function. Infection w/u mostly unremarkable, blood cultures are likely contaminated. Would recommend continued outpatient follow up and work up. In case you feel hypothermic again, please seek medical attention.      SECONDARY DISCHARGE DIAGNOSES  Diagnosis: Toxic metabolic encephalopathy  Assessment and Plan of Treatment: Likely a result of hypothermia. Now mostly resolved.   As per endocrniologist, the ACTH stimulation test ruled out adrenal insufficiency, outpatient followup with your PCP    Diagnosis: Hypothyroidism  Assessment and Plan of Treatment: You snthyroid/levothyroxine dose was changed by endocrinologist, Continue your thyroid medications as recommended and follow-up with your outpatient provider for continual thyroid function testing within 6-8weeks**  ***levothyroxine should be taken on an empty stomach in AM at least 30 minutes before breakfast, and   ****Ensure any vitamins, iron, calcium, omeprazole are taken 4 hours after the levothyroxine**      Diagnosis: Type 2 diabetes mellitus treated with insulin  Assessment and Plan of Treatment: You were seen by the endocrionologist.   Continue home regimen metformin 1000mg twice per day and januvia 100mg daily, as well as glipizide 5mg daily   Follow up with outpatient endocrinologist Dr. Farfan      Diagnosis: Schizophrenia  Assessment and Plan of Treatment: stable, you were seen by the psyhicatrist.   followup with your outpatient psychiatrist  Continue your medications as directed and follow up with your PCP and psychiatrist for further evaluation and medical management. If you are ever in need of immediate psychiatric assistance you may reach out to the [facility] [phone]. [facility] [address] [facility] [address] [address]. [phone]       PRINCIPAL DISCHARGE DIAGNOSIS  Diagnosis: Hypothermia not due to cold exposure  Assessment and Plan of Treatment: No infection present, cortisol levels are appropriate indicating adequate adrenal function. Infection w/u mostly unremarkable, blood cultures are likely contaminated. Would recommend continued outpatient follow up and work up. In case you feel hypothermic again, please seek medical attention.      SECONDARY DISCHARGE DIAGNOSES  Diagnosis: Toxic metabolic encephalopathy  Assessment and Plan of Treatment: Likely a result of hypothermia. Now mostly resolved.   As per endocrniologist, the ACTH stimulation test ruled out adrenal insufficiency, outpatient followup with your PCP    Diagnosis: Schizophrenia  Assessment and Plan of Treatment: stable, you were seen by the psyhicatrist.   followup with your outpatient psychiatrist  Continue your medications as directed and follow up with your PCP and psychiatrist for further evaluation and medical management. If you are ever in need of immediate psychiatric assistance you may reach out to the [facility] [phone]. [facility] [address] [place] [id number]. [phone]      Diagnosis: Hypothyroidism  Assessment and Plan of Treatment: Your synthyroid/levothyroxine dose was changed by endocrinologist, Continue your thyroid medications as recommended and follow-up with your outpatient provider for continual thyroid function testing within 6-8weeks**  ***levothyroxine should be taken on an empty stomach in AM at least 30 minutes before breakfast, and   ****Ensure any vitamins, iron, calcium, omeprazole are taken 4 hours after the levothyroxine**      Diagnosis: Type 2 diabetes mellitus treated with insulin  Assessment and Plan of Treatment: You were seen by the endocrionologist.   Continue home regimen metformin 1000 mg twice per day and januvia 100mg daily, as well as glipizide 5mg daily   Follow up with outpatient endocrinologist Dr. Farfan

## 2023-03-06 NOTE — PROGRESS NOTE ADULT - SUBJECTIVE AND OBJECTIVE BOX
Follow Up: Positive blood cultures     Interval History/ROS:  No new complaints. no localizing symptoms. afebrile, HD stable.      Allergies  No Known Allergies        ANTIMICROBIALS:      OTHER MEDS:  MEDICATIONS  (STANDING):  acetaminophen     Tablet .. 650 every 6 hours PRN  atorvastatin 40 at bedtime  cloZAPine 100 at bedtime  dextrose 50% Injectable 25 once  dextrose 50% Injectable 12.5 once  dextrose 50% Injectable 25 once  dextrose Oral Gel 15 once PRN  divalproex ER 1000 at bedtime  enoxaparin Injectable 40 every 24 hours  finasteride 5 daily  glucagon  Injectable 1 once  haloperidol    Injectable 1 every 6 hours PRN  insulin glargine Injectable (LANTUS) 8 at bedtime  insulin lispro (ADMELOG) corrective regimen sliding scale  three times a day before meals  insulin lispro (ADMELOG) corrective regimen sliding scale  at bedtime  levothyroxine 88 daily  linagliptin 5 daily  pantoprazole    Tablet 40 <User Schedule>  polyethylene glycol 3350 17 two times a day  senna 1 at bedtime  tamsulosin 0.4 at bedtime  venlafaxine 75 daily      Vital Signs Last 24 Hrs  T(C): 36.5 (06 Mar 2023 13:17), Max: 36.5 (06 Mar 2023 13:17)  T(F): 97.7 (06 Mar 2023 13:17), Max: 97.7 (06 Mar 2023 13:17)  HR: 68 (06 Mar 2023 13:17) (64 - 76)  BP: 167/83 (06 Mar 2023 13:17) (152/75 - 167/83)  BP(mean): --  RR: 18 (06 Mar 2023 13:17) (17 - 18)  SpO2: 100% (06 Mar 2023 13:17) (100% - 100%)    Parameters below as of 06 Mar 2023 13:17  Patient On (Oxygen Delivery Method): room air        PHYSICAL EXAM:  Constitutional: comfortable no distress  Cardiovascular:   normal S1, S2, no murmur, no edema  Respiratory:  clear BS bilaterally, no wheezes, no rales  GI:  soft, non-tender, normal bowel sounds  Musculoskeletal:  no synovitis, normal ROM  Neurologic: awake and alert, normal strength, no focal findings  Skin:  no rash, no erythema, no phlebitis  Psychiatric:  awake, alert, appropriate mood                        9.6    3.96  )-----------( 163      ( 06 Mar 2023 07:07 )             29.3       03-06    133<L>  |  98  |  13  ----------------------------<  102<H>  4.6   |  26  |  0.66    Ca    8.9      06 Mar 2023 07:07  Phos  4.1     03-06  Mg     1.60     03-06    MICROBIOLOGY:  v    Culture - Blood (collected 05 Mar 2023 07:05)  Source: .Blood Blood-Peripheral  Preliminary Report (06 Mar 2023 10:01):    No growth to date.    Culture - Blood (collected 05 Mar 2023 07:00)  Source: .Blood Blood-Peripheral  Preliminary Report (06 Mar 2023 10:01):    No growth to date.    Culture - Blood (collected 03 Mar 2023 08:46)  Source: .Blood Blood-Venous  Preliminary Report (04 Mar 2023 12:01):    No growth to date.    Culture - Blood (collected 03 Mar 2023 08:15)  Source: .Blood Blood-Peripheral  Preliminary Report (04 Mar 2023 12:01):    No growth to date.    Culture - Blood (collected 01 Mar 2023 18:10)  Source: .Blood Blood  Gram Stain (02 Mar 2023 16:43):    Growth in aerobic bottle: Gram Positive Cocci in Clusters  Final Report (03 Mar 2023 14:36):    Growth in aerobic bottle: Staphylococcus haemolyticus    Coagulase Negative Staphylococci isolated from a single blood culture set    may represent contamination.    Contact the Microbiology Department at [phone] if susceptibility    testing is clinically indicated.    ***Blood Panel PCR results on this specimen are available    approximately 3 hours after the Gram stain result.***    Gram stain, PCR, and/or culture results may not always    correspond due to difference in methodologies.    ************************************************************    This PCR assay was performed by multiplex PCR. This    Assay tests for 66 bacterial and resistance gene targets.    Please refer to the [facility] test directory    at [email or web address] for details.  Organism: Blood Culture PCR (03 Mar 2023 14:36)  Organism: Blood Culture PCR (03 Mar 2023 14:36)      -  Staphylococcus epidermidis, Methicillin resistant: Detec      Method Type: PCR    Culture - Blood (collected 01 Mar 2023 17:55)  Source: .Blood Blood  Gram Stain (04 Mar 2023 11:20):    Growth in aerobic bottle: Gram Variable Rods  Final Report (06 Mar 2023 10:11):    Growth in aerobic bottle: Corynebacterium amycolatum "Susceptibilities    not performed"    ***Blood Panel PCR results on this specimen are available    approximately 3 hours after the Gram stain result.***    Gram stain, PCR, and/or culture results may not always    correspond due to difference in methodologies.    ************************************************************    This PCR assay was performed by multiplex PCR. This    Assay tests for 66 bacterial and resistance gene targets.    Please refer to [facility] test directory    at [email or web address] for details.  Organism: Blood Culture PCR (06 Mar 2023 10:11)  Organism: Blood Culture PCR (06 Mar 2023 10:11)      -  Blood PCR Panel: NEG      Method Type: PCR    Rapid RVP Result: [place] (03-01 @ 18:46)

## 2023-03-06 NOTE — DISCHARGE NOTE PROVIDER - PROVIDER TOKENS
FREE:[LAST:[Followup with your PCP Dr. NAZARIO Booth],PHONE:[(   )    -],FAX:[(   )    -],ADDRESS:[repeat bloodwork (CBC, BMP, LFTs) and monitor depakote/clonzapine levels routinely],FOLLOWUP:[1 week]],PROVIDER:[TOKEN:[[id number]],FOLLOWUP:[2 weeks],ESTABLISHEDPATIENT:[T]]

## 2023-03-06 NOTE — DISCHARGE NOTE NURSING/CASE MANAGEMENT/SOCIAL WORK - NSDCPEFALRISK_GEN_ALL_CORE
For information on Fall & Injury Prevention, visit: [email or web address] OR  [email or web address] OR  https://www.cdc.gov/steadi/patient.html

## 2023-03-06 NOTE — DISCHARGE NOTE PROVIDER - NSFOLLOWUPCLINICS_GEN_ALL_ED_FT
[facility]  Behavioral Health  [address] [place] [address]  Phone: [phone]  Fax:     [facility] Endocrinology  Endocrinology  [address] [place] [address]  Phone: [phone]  Fax:

## 2023-03-06 NOTE — DISCHARGE NOTE PROVIDER - CARE PROVIDER_API CALL
Followup with your PCP Dr. NAZARIO Booth,   repeat bloodwork (CBC, BMP, LFTs) and monitor depakote/clonzapine levels routinely  Phone: (   )    -  Fax: (   )    -  Follow Up Time: 1 week    Anirudh Farfan)  EndocrinologyMetabDiabetes; Internal Medicine  [address], [address]  [place] [address]  Phone: [phone]  Fax: [phone]  Established Patient  Follow Up Time: 2 weeks

## 2023-03-06 NOTE — DISCHARGE NOTE NURSING/CASE MANAGEMENT/SOCIAL WORK - NSDCFUADDAPPT_GEN_ALL_CORE_FT
Monitor sodium levels is low (chronic likely component of SIADH as well)     **Continue with current outpatient psychiatric care with Dr. Carter [phone]    ****You will need repeat Thyroid function tests (TFTs) in 6-8 weeks, please followup with your PCP/endocrinologist Dr. Farfan     Imaging with Irregular 1cm subpleural nodular opacity within Right upper lobe- recommend follow-up chest CT in 1-3 months to determine  stability.     -follow up with outpatient endocrinologist Dr. Farfan

## 2023-03-06 NOTE — PROVIDER CONTACT NOTE (CRITICAL VALUE NOTIFICATION) - ACTION/TREATMENT ORDERED:
Will hold discharge
ACP notified. no new orders awaiting march 3rd blood culture results
ACP made aware pt is to cont on vancyo, and repeat blood cultures are ordered for tm morning.

## 2023-03-06 NOTE — PROVIDER CONTACT NOTE (CRITICAL VALUE NOTIFICATION) - SITUATION
blood cultures collected 3/1= growth in aerobic bottle gram + cocci in clusters
March 1st blood culture growth in aerobic for gram variable rods.
Blood cultures from march 5 show gram positive cocci in clusters

## 2023-03-06 NOTE — DISCHARGE NOTE PROVIDER - NSDCFUSCHEDAPPT_GEN_ALL_CORE_FT
Marilynn Butcher  [facility] Physician [facility]  [address]THALM [address]  Scheduled Appointment: 03/09/2023    Nehal Eckert  [facility] Physician [facility]  INTMED [address]  Scheduled Appointment: 03/17/2023    Dony Townsend  [facility] Physician [facility]  [address]ALM [address]  Scheduled Appointment: 05/16/2023

## 2023-03-06 NOTE — DISCHARGE NOTE PROVIDER - NSDCFUADDAPPT_GEN_ALL_CORE_FT
Monitor sodium levels is low (chronic likely component of SIADH as well)     **Continue with current outpatient psychiatric care with Dr. Carter [phone]    ****You will need repeat Thyroid function tests (TFTs) in 6-8 weeks, please followup with your PCP/endocrinologist Dr. Farfan     Imaging with Irregular 1cm subpleural nodular opacity within Right upper lobe- recommend follow-up chest CT in 1-3 months to determine  stability.     -follow up with outpatient endocrinologist Dr. Farfan

## 2023-03-06 NOTE — PHYSICAL THERAPY INITIAL EVALUATION ADULT - ADDITIONAL COMMENTS
Pt. owns a straight cane and rolling walker.    Pt. was left seated in chair post PT Evaluation, no apparent distress, call bell within reach.

## 2023-03-06 NOTE — PROGRESS NOTE ADULT - SUBJECTIVE AND OBJECTIVE BOX
Derrell John MD  Academic Hospitalist  Pager [phone]  Email: [email or web address]  Available on [employer] Teams        PROGRESS NOTE:     Patient is a 67y old  Male who presents with a chief complaint of Hypothermia (06 Mar 2023 16:33)      SUBJECTIVE / OVERNIGHT EVENTS:  Patient seen and examined on 3/7, he was supposed to be discharged, however right before he was about to leave, he had another positive cultures. This was discussed with ID and patient ended up staying.  ADDITIONAL REVIEW OF SYSTEMS:  No f/c/n/v  MEDICATIONS  (STANDING):  atorvastatin 40 milliGRAM(s) Oral at bedtime  chlorhexidine 2% Cloths 1 Application(s) Topical daily  cloZAPine 100 milliGRAM(s) Oral at bedtime  dextrose 5%. 1000 milliLiter(s) (50 mL/Hr) IV Continuous <Continuous>  dextrose 5%. 1000 milliLiter(s) (100 mL/Hr) IV Continuous <Continuous>  dextrose 50% Injectable 25 Gram(s) IV Push once  dextrose 50% Injectable 12.5 Gram(s) IV Push once  dextrose 50% Injectable 25 Gram(s) IV Push once  divalproex ER 1000 milliGRAM(s) Oral at bedtime  enoxaparin Injectable 40 milliGRAM(s) SubCutaneous every 24 hours  finasteride 5 milliGRAM(s) Oral daily  glucagon  Injectable 1 milliGRAM(s) IntraMuscular once  insulin glargine Injectable (LANTUS) 8 Unit(s) SubCutaneous at bedtime  insulin lispro (ADMELOG) corrective regimen sliding scale   SubCutaneous three times a day before meals  insulin lispro (ADMELOG) corrective regimen sliding scale   SubCutaneous at bedtime  latanoprost 0.005% Ophthalmic Solution 1 Drop(s) Both EYES at bedtime  levobunolol 0.5% Solution 1 Drop(s) Both EYES two times a day  levothyroxine 88 MICROGram(s) Oral daily  linagliptin 5 milliGRAM(s) Oral daily  pantoprazole    Tablet 40 milliGRAM(s) Oral <User Schedule>  polyethylene glycol 3350 17 Gram(s) Oral two times a day  pyridoxine 50 milliGRAM(s) Oral daily  RHOPRESSA (Netarsudil) 0.02% Eye Drops 1 Drop(s) 1 Drop(s) Both EYES daily  senna 1 Tablet(s) Oral at bedtime  tamsulosin 0.4 milliGRAM(s) Oral at bedtime  venlafaxine 75 milliGRAM(s) Oral daily    MEDICATIONS  (PRN):  acetaminophen     Tablet .. 650 milliGRAM(s) Oral every 6 hours PRN Temp greater or equal to 38C (100.4F), Mild Pain (1 - 3)  dextrose Oral Gel 15 Gram(s) Oral once PRN Blood Glucose LESS THAN 70 milliGRAM(s)/deciliter  haloperidol    Injectable 1 milliGRAM(s) IV Push every 6 hours PRN agitation      CAPILLARY BLOOD GLUCOSE      POCT Blood Glucose.: 226 mg/dL (06 Mar 2023 22:01)  POCT Blood Glucose.: 337 mg/dL (06 Mar 2023 17:45)  POCT Blood Glucose.: 236 mg/dL (06 Mar 2023 12:12)  POCT Blood Glucose.: 127 mg/dL (06 Mar 2023 08:30)    I&O's Summary    06 Mar 2023 07:01  -  07 Mar 2023 07:00  --------------------------------------------------------  IN: 1680 mL / OUT: 0 mL / NET: 1680 mL        PHYSICAL EXAM:  Vital Signs Last 24 Hrs  T(C): 36.2 (07 Mar 2023 05:51), Max: 36.9 (06 Mar 2023 20:59)  T(F): 97.2 (07 Mar 2023 05:51), Max: 98.5 (06 Mar 2023 20:59)  HR: 61 (07 Mar 2023 05:51) (61 - 68)  BP: 154/69 (07 Mar 2023 05:51) (109/55 - 167/83)  BP(mean): --  RR: 16 (07 Mar 2023 05:51) (16 - 18)  SpO2: 98% (07 Mar 2023 05:51) (98% - 100%)    Parameters below as of 07 Mar 2023 05:51  Patient On (Oxygen Delivery Method): room air        CONSTITUTIONAL: NAD, comfortably laying in bed  RESPIRATORY: Normal respiratory effort; lungs are clear to auscultation bilaterally  CARDIOVASCULAR: Regular rate and rhythm, normal S1 and S2, no murmur/rub/gallop;  ABDOMEN: Nontender to palpation, normoactive bowel sounds, no rebound/guarding;  MUSCLOSKELETAL: no clubbing or cyanosis of digits; no joint swelling or tenderness to palpation  PSYCH: Cooperative and calm    LABS:                        9.6    3.96  )-----------( 163      ( 06 Mar 2023 07:07 )             29.3     03-06    133<L>  |  98  |  13  ----------------------------<  102<H>  4.6   |  26  |  0.66    Ca    8.9      06 Mar 2023 07:07  Phos  4.1     03-06  Mg     1.60     03-06                Culture - Blood (collected 05 Mar 2023 07:05)  Source: .Blood Blood-Peripheral  Gram Stain (06 Mar 2023 18:49):    Growth in anaerobic bottle: Gram Positive Cocci in Clusters  Preliminary Report (06 Mar 2023 18:49):    Growth in anaerobic bottle: Gram Positive Cocci in Clusters    Culture - Blood (collected 05 Mar 2023 07:00)  Source: .Blood Blood-Peripheral  Preliminary Report (06 Mar 2023 10:01):    No growth to date.        RADIOLOGY & ADDITIONAL TESTS:  Results Reviewed:   Imaging Personally Reviewed:  Electrocardiogram Personally Reviewed:    COORDINATION OF CARE:  Care Discussed with Consultants/Other Providers [Y/N]:  Prior or Outpatient Records Reviewed [Y/N]:

## 2023-03-06 NOTE — DISCHARGE NOTE PROVIDER - NSDCMRMEDTOKEN_GEN_ALL_CORE_FT
amLODIPine 5 mg oral tablet: 1 tab(s) orally once a day  atorvastatin 40 mg oral tablet: 1 tab(s) orally once a day (at bedtime)  cloZAPine 100 mg oral tablet: 1 tab(s) orally once a day (at bedtime)  divalproex sodium 500 mg oral tablet, extended release: 2 tab(s) orally once a day (at bedtime)  ferrous sulfate 325 mg (65 mg elemental iron) oral delayed release tablet: 1 tab(s) orally once a day  finasteride 5 mg oral tablet: 1 tab(s) orally once a day  Flomax 0.4 mg oral capsule: 1 cap(s) orally once a day  Januvia 100 mg oral tablet: 1 tab(s) orally once a day, am  LEVOBUNOLOL 0.5% EYE DROPS: INSTILL 1 DROP INTO BOTH EYES TWICE A DAY  lidocaine 4% patch: 1 patch transdermal once a day  Lumigan 0.01% ophthalmic solution: 1 drop(s) to each affected eye once a day (in the evening)  metFORMIN 1000 mg oral tablet: 1 tab(s) orally 2 times a day  MiraLax oral powder for reconstitution: 17 gram(s) orally 2 times a day  nystatin 100,000 units/mL oral suspension: 4 milliliter(s) orally 2 times a day  omeprazole 40 mg oral delayed release capsule: 1 cap(s) orally once a day  pyridoxine 50 mg oral tablet: 1 tab(s) orally once a day  RHOPRESSA 0.02% OPHTH SOLUTION: INSTILL 1 DROP INTO BOTH EYES AT BEDTIME AS DIRECTED  Senna 8.6 mg oral tablet: 1 tab(s) orally once a day (at bedtime)  Synthroid 75 mcg (0.075 mg) oral tablet: 1 tab(s) orally once a day, am  venlafaxine 75 mg oral tablet, extended release: 1 tab(s) orally once a day with dinner  Vitamin D3 25 mcg (1000 intl units) oral tablet: 1 tab(s) orally once a day   atorvastatin 40 mg oral tablet: 1 tab(s) orally once a day (at bedtime)  cloZAPine 100 mg oral tablet: 1 tab(s) orally once a day (at bedtime)  divalproex sodium 500 mg oral tablet, extended release: 2 tab(s) orally once a day (at bedtime)  ferrous sulfate 325 mg (65 mg elemental iron) oral delayed release tablet: 1 tab(s) orally once a day  finasteride 5 mg oral tablet: 1 tab(s) orally once a day  Flomax 0.4 mg oral capsule: 1 cap(s) orally once a day  Januvia 100 mg oral tablet: 1 tab(s) orally once a day, am  LEVOBUNOLOL 0.5% EYE DROPS: INSTILL 1 DROP INTO BOTH EYES TWICE A DAY  lidocaine 4% patch: 1 patch transdermal once a day  Lumigan 0.01% ophthalmic solution: 1 drop(s) to each affected eye once a day (in the evening)  metFORMIN 1000 mg oral tablet: 1 tab(s) orally 2 times a day  nystatin 100,000 units/mL oral suspension: 4 milliliter(s) orally 2 times a day  omeprazole 40 mg oral delayed release capsule: 1 cap(s) orally once a day  pyridoxine 50 mg oral tablet: 1 tab(s) orally once a day  RHOPRESSA 0.02% OPHTH SOLUTION: INSTILL 1 DROP INTO BOTH EYES AT BEDTIME AS DIRECTED  Senna 8.6 mg oral tablet: 1 tab(s) orally once a day (at bedtime)  venlafaxine 75 mg oral tablet, extended release: 1 tab(s) orally once a day with dinner  Vitamin D3 25 mcg (1000 intl units) oral tablet: 1 tab(s) orally once a day   amLODIPine 5 mg oral tablet: 1 tab(s) orally once a day  HOLD for systolic blood pressure less than 100  atorvastatin 40 mg oral tablet: 1 tab(s) orally once a day (at bedtime)  cloZAPine 100 mg oral tablet: 1 tab(s) orally once a day (at bedtime)  hold if lethargic  divalproex sodium 500 mg oral tablet, extended release: 2 tab (1000mg) orally once a day (at bedtime)  ferrous sulfate 325 mg (65 mg elemental iron) oral delayed release tablet: 1 tab(s) orally once a day  finasteride 5 mg oral tablet: 1 tab(s) orally once a day  glipiZIDE 5 mg oral tablet: 1 tab(s) orally once a day   Dx E11.9  glucose tablets: Follow instructions on bottle when sugar is low.  Januvia 100 mg oral tablet: 1 tab(s) orally once a day am  Dx E11.9  LEVOBUNOLOL 0.5% EYE DROPS: INSTILL 1 DROP INTO BOTH EYES TWICE A DAY  levothyroxine 88 mcg (0.088 mg) oral tablet: 1 tab(s) orally once a day, Take on an empty stomach &amp; at least 30 minutes before breakfast.  Ensure vitamins//iron/calcium/omeprazole are taken 4 hours after levothyroxine      lidocaine 4% patch: 1 patch transdermal once a day, As Needed  Lumigan 0.01% ophthalmic solution: 1 drop(s) to each affected eye once a day (in the evening)  metFORMIN 1000 mg oral tablet: 1 tab(s) orally 2 times a day  Dx E11.9  omeprazole 40 mg oral delayed release capsule: 1 cap(s) orally once a day  pyridoxine 50 mg oral tablet: 1 tab(s) orally once a day  RHOPRESSA 0.02% OPHTH SOLUTION: INSTILL 1 DROP INTO BOTH EYES AT BEDTIME AS DIRECTED  Senna 8.6 mg oral tablet: 1 tab(s) orally once a day (at bedtime)  tamsulosin 0.4 mg oral capsule: 1 cap(s) orally once a day (at bedtime)   venlafaxine 75 mg oral tablet, extended release: 1 tab(s) orally once a day with dinner  Vitamin D3 25 mcg (1000 intl units) oral tablet: 1 tab(s) orally once a day

## 2023-03-06 NOTE — DISCHARGE NOTE PROVIDER - HOSPITAL COURSE
68 yo M PMH of schizophrenia, bipolar disorder, type 2 DM, hypertension, hypothyroidism, BPH, glaucoma, and SBO (in 2017) presents for evaluation for a brief period of dizziness, lightheadedness, and confusion today, negative for CVA, found to be hypothermic and with encephalopathy.     Etiology for hypothermia were explored and they include:  1. Autonomic dysfunction (possibly 2/2 psych meds) - as per discussion with psychiatry, it does not seem like the psychiatric medications are causing autonomic dysfunction   2. Adrenal insufficiency were ruled out by cosyntropin test, appropriate response was noted, endocrine was consulted  3. No infectious etiology has been identified. However, the patient did have 2 + blood cultures, both growing different organisms and are likely a contamination, ID consulted    Throughout the hospitalization, patient without any other complications and at this time he is optimized for discharge. He will need continued outpatient workup. Discharge plan discussed with the patient and his brother who is at bedside, both verbalized understanding.

## 2023-03-06 NOTE — PHYSICAL THERAPY INITIAL EVALUATION ADULT - GENERAL OBSERVATIONS, REHAB EVAL
Consult received, chart reviewed. Patient received seated in chair, no apparent distress, brother present.

## 2023-03-06 NOTE — PROGRESS NOTE ADULT - ASSESSMENT
This is a 66 yo  M/w a PMH of schizophrenia, bipolar disorder, DM2, HTN and hypothyroidism who presents with transient episode of dizziness for which endocrinology was consulted to rule out adrenal insufficiency given low AM cortisol. It is unclear at this time if patient has any risk factors for adrenal insufficiency and additionally he does not appear to have any convincing symptoms of AI (hypotension, hypoglycemia - hyponatremia is chronic) Therefore, to determine if adrenal insufficiency is real, would recommend ACTH stimulation test to further evaluate    #r/o Adrenal insufficiency  #low AM cortisol  AM cortisol drawn at 8:40AM is 11.1  Cosyntropin given at 9AM  Cortisol at 9:35 is 18,9  Cortisol at 10:15 is 22  -this ACTH stimulation had a good response and ruled out adrenal insufficiency    #hypothyroidism  -increased levothyroxine to 88mcg daily on this admission, continue for discharge with this new dose, will need script at dc  -please ensure any vitamins, iron, calcium, PPI, H2 blockers are given 4 hours after the levothyroxine  -levothyroxine should be given on an empty stomach at 7;30AM at least 30 minutes before breakfast  -repeat TFTs in 6-8 weeks as an outpatient    #DM2  -FSG goal 100-180  -Lantus 8 units qhs, fasting glucose controlled, some prandial excursions  -can continue with low admelog correction scales before meals and before bedtime  -linagliptin 5mg daily  -can be discharged on home regimen metformin 1000mg twice per day and januvia 100mg daily  -follow up with outpatient endocrinologist Dr. Naheed Delvalle MD  Division of Endocrinology  Pager: [phone]    If after 6PM or before 9AM, or on weekends/holidays, please call endocrine answering service for assistance ([phone]).  For nonurgent matters email [email or web address] for assistance. This is a 68 yo  M/w a PMH of schizophrenia, bipolar disorder, DM2, HTN and hypothyroidism who presents with transient episode of dizziness for which endocrinology was consulted to rule out adrenal insufficiency given low AM cortisol. It is unclear at this time if patient has any risk factors for adrenal insufficiency and additionally he does not appear to have any convincing symptoms of AI (hypotension, hypoglycemia - hyponatremia is chronic) Therefore, to determine if adrenal insufficiency is real, would recommend ACTH stimulation test to further evaluate    #r/o Adrenal insufficiency  #low AM cortisol  AM cortisol drawn at 8:40AM is 11.1  Cosyntropin given at 9AM  Cortisol at 9:35 is 18,9  Cortisol at 10:15 is 22  -this ACTH stimulation had a good response and ruled out adrenal insufficiency    #hypothyroidism  -increased levothyroxine to 88mcg daily on this admission, continue for discharge with this new dose, will need script at dc  -please ensure any vitamins, iron, calcium, PPI, H2 blockers are given 4 hours after the levothyroxine  -levothyroxine should be given on an empty stomach at 7;30AM at least 30 minutes before breakfast  -repeat TFTs in 6-8 weeks as an outpatient    #DM2 - HbA1c 6.4%, at goal. Inpatient with some hyperglycemia.  -FSG goal 100-180  -Lantus 8 units qhs, fasting glucose controlled, some prandial excursions  -can continue with low admelog correction scales before meals and before bedtime  -linagliptin 5mg daily  -can be discharged on home regimen metformin 1000mg twice per day and januvia 100mg daily, as well as glipizide 5mg daily which he now reports also taking at home.  -follow up with outpatient endocrinologist Dr. Naheed Delvalle MD  Division of Endocrinology  Pager: [phone]    If after 6PM or before 9AM, or on weekends/holidays, please call endocrine answering service for assistance ([phone]).  For nonurgent matters email [email or web address] for assistance.

## 2023-03-06 NOTE — DISCHARGE NOTE NURSING/CASE MANAGEMENT/SOCIAL WORK - PATIENT PORTAL LINK FT
You can access the FollowMyHealth Patient Portal offered by [facility] by registering at the following website: [email or web address]. By joining [facility]’s FollowMyHealth portal, you will also be able to view your health information using other applications (apps) compatible with our system.

## 2023-03-07 LAB
A1C WITH ESTIMATED AVERAGE GLUCOSE RESULT: 7.1 % — HIGH (ref 4–5.6)
ANION GAP SERPL CALC-SCNC: 7 MMOL/L — SIGNIFICANT CHANGE UP (ref 7–14)
BASOPHILS # BLD AUTO: 0.03 K/UL — SIGNIFICANT CHANGE UP (ref 0–0.2)
BASOPHILS NFR BLD AUTO: 0.7 % — SIGNIFICANT CHANGE UP (ref 0–2)
BUN SERPL-MCNC: 13 MG/DL — SIGNIFICANT CHANGE UP (ref 7–23)
CALCIUM SERPL-MCNC: 8.9 MG/DL — SIGNIFICANT CHANGE UP (ref 8.4–10.5)
CHLORIDE SERPL-SCNC: 97 MMOL/L — LOW (ref 98–107)
CO2 SERPL-SCNC: 29 MMOL/L — SIGNIFICANT CHANGE UP (ref 22–31)
CREAT SERPL-MCNC: 0.77 MG/DL — SIGNIFICANT CHANGE UP (ref 0.5–1.3)
CULTURE RESULTS: SIGNIFICANT CHANGE UP
EGFR: 98 ML/MIN/1.73M2 — SIGNIFICANT CHANGE UP
EOSINOPHIL # BLD AUTO: 0.06 K/UL — SIGNIFICANT CHANGE UP (ref 0–0.5)
EOSINOPHIL NFR BLD AUTO: 1.4 % — SIGNIFICANT CHANGE UP (ref 0–6)
ESTIMATED AVERAGE GLUCOSE: 157 — SIGNIFICANT CHANGE UP
GLUCOSE BLDC GLUCOMTR-MCNC: 105 MG/DL — HIGH (ref 70–99)
GLUCOSE BLDC GLUCOMTR-MCNC: 197 MG/DL — HIGH (ref 70–99)
GLUCOSE BLDC GLUCOMTR-MCNC: 211 MG/DL — HIGH (ref 70–99)
GLUCOSE BLDC GLUCOMTR-MCNC: 213 MG/DL — HIGH (ref 70–99)
GLUCOSE SERPL-MCNC: 90 MG/DL — SIGNIFICANT CHANGE UP (ref 70–99)
HCT VFR BLD CALC: 26.9 % — LOW (ref 39–50)
HGB BLD-MCNC: 8.8 G/DL — LOW (ref 13–17)
IANC: 1.72 K/UL — LOW (ref 1.8–7.4)
IMM GRANULOCYTES NFR BLD AUTO: 0.2 % — SIGNIFICANT CHANGE UP (ref 0–0.9)
LYMPHOCYTES # BLD AUTO: 2.01 K/UL — SIGNIFICANT CHANGE UP (ref 1–3.3)
LYMPHOCYTES # BLD AUTO: 46.5 % — HIGH (ref 13–44)
MAGNESIUM SERPL-MCNC: 1.6 MG/DL — SIGNIFICANT CHANGE UP (ref 1.6–2.6)
MCHC RBC-ENTMCNC: 30.4 PG — SIGNIFICANT CHANGE UP (ref 27–34)
MCHC RBC-ENTMCNC: 32.7 GM/DL — SIGNIFICANT CHANGE UP (ref 32–36)
MCV RBC AUTO: 93.1 FL — SIGNIFICANT CHANGE UP (ref 80–100)
MONOCYTES # BLD AUTO: 0.49 K/UL — SIGNIFICANT CHANGE UP (ref 0–0.9)
MONOCYTES NFR BLD AUTO: 11.3 % — SIGNIFICANT CHANGE UP (ref 2–14)
NEUTROPHILS # BLD AUTO: 1.72 K/UL — LOW (ref 1.8–7.4)
NEUTROPHILS NFR BLD AUTO: 39.9 % — LOW (ref 43–77)
NRBC # BLD: 0 /100 WBCS — SIGNIFICANT CHANGE UP (ref 0–0)
NRBC # FLD: 0 K/UL — SIGNIFICANT CHANGE UP (ref 0–0)
PHOSPHATE SERPL-MCNC: 3.9 MG/DL — SIGNIFICANT CHANGE UP (ref 2.5–4.5)
PLATELET # BLD AUTO: 154 K/UL — SIGNIFICANT CHANGE UP (ref 150–400)
POTASSIUM SERPL-MCNC: 4.6 MMOL/L — SIGNIFICANT CHANGE UP (ref 3.5–5.3)
POTASSIUM SERPL-SCNC: 4.6 MMOL/L — SIGNIFICANT CHANGE UP (ref 3.5–5.3)
RBC # BLD: 2.89 M/UL — LOW (ref 4.2–5.8)
RBC # FLD: 14.3 % — SIGNIFICANT CHANGE UP (ref 10.3–14.5)
SODIUM SERPL-SCNC: 133 MMOL/L — LOW (ref 135–145)
SPECIMEN SOURCE: SIGNIFICANT CHANGE UP
WBC # BLD: 4.32 K/UL — SIGNIFICANT CHANGE UP (ref 3.8–10.5)
WBC # FLD AUTO: 4.32 K/UL — SIGNIFICANT CHANGE UP (ref 3.8–10.5)

## 2023-03-07 PROCEDURE — 99232 SBSQ HOSP IP/OBS MODERATE 35: CPT

## 2023-03-07 RX ORDER — INSULIN LISPRO 100/ML
2 VIAL (ML) SUBCUTANEOUS
Refills: 0 | Status: DISCONTINUED | OUTPATIENT
Start: 2023-03-07 | End: 2023-03-08

## 2023-03-07 RX ADMIN — LINAGLIPTIN 5 MILLIGRAM(S): 5 TABLET, FILM COATED ORAL at 12:44

## 2023-03-07 RX ADMIN — Medication 2: at 12:44

## 2023-03-07 RX ADMIN — Medication 1: at 18:06

## 2023-03-07 RX ADMIN — Medication 88 MICROGRAM(S): at 09:34

## 2023-03-07 RX ADMIN — INSULIN GLARGINE 8 UNIT(S): 100 INJECTION, SOLUTION SUBCUTANEOUS at 21:53

## 2023-03-07 RX ADMIN — SENNA PLUS 1 TABLET(S): 8.6 TABLET ORAL at 22:41

## 2023-03-07 RX ADMIN — TAMSULOSIN HYDROCHLORIDE 0.4 MILLIGRAM(S): 0.4 CAPSULE ORAL at 22:40

## 2023-03-07 RX ADMIN — Medication 50 MILLIGRAM(S): at 12:44

## 2023-03-07 RX ADMIN — Medication 1 DROP(S): at 21:20

## 2023-03-07 RX ADMIN — FINASTERIDE 5 MILLIGRAM(S): 5 TABLET, FILM COATED ORAL at 12:43

## 2023-03-07 RX ADMIN — CLOZAPINE 100 MILLIGRAM(S): 150 TABLET, ORALLY DISINTEGRATING ORAL at 22:40

## 2023-03-07 RX ADMIN — POLYETHYLENE GLYCOL 3350 17 GRAM(S): 17 POWDER, FOR SOLUTION ORAL at 18:05

## 2023-03-07 RX ADMIN — Medication 2 UNIT(S): at 18:06

## 2023-03-07 RX ADMIN — DIVALPROEX SODIUM 1000 MILLIGRAM(S): 500 TABLET, DELAYED RELEASE ORAL at 22:40

## 2023-03-07 RX ADMIN — CHLORHEXIDINE GLUCONATE 1 APPLICATION(S): 213 SOLUTION TOPICAL at 12:41

## 2023-03-07 RX ADMIN — ENOXAPARIN SODIUM 40 MILLIGRAM(S): 100 INJECTION SUBCUTANEOUS at 12:48

## 2023-03-07 RX ADMIN — LATANOPROST 1 DROP(S): 0.05 SOLUTION/ DROPS OPHTHALMIC; TOPICAL at 22:41

## 2023-03-07 RX ADMIN — ATORVASTATIN CALCIUM 40 MILLIGRAM(S): 80 TABLET, FILM COATED ORAL at 22:39

## 2023-03-07 RX ADMIN — POLYETHYLENE GLYCOL 3350 17 GRAM(S): 17 POWDER, FOR SOLUTION ORAL at 06:16

## 2023-03-07 RX ADMIN — Medication 75 MILLIGRAM(S): at 12:44

## 2023-03-07 RX ADMIN — Medication 1 DROP(S): at 10:18

## 2023-03-07 RX ADMIN — PANTOPRAZOLE SODIUM 40 MILLIGRAM(S): 20 TABLET, DELAYED RELEASE ORAL at 12:44

## 2023-03-07 NOTE — PROGRESS NOTE ADULT - ASSESSMENT
Patient is a 67y old  Male who presents with a chief complaint of Hypothermia. Blood culture with CoNS staph on admission.    Positive blood culture  Likely to be contamination given low grade (2/4 bottles),    No foreign material, no orthopedic hardware  Repeat blood cultures pending however obtained while on antibiotics, subsequent off antibiotics, no growth  Patient otherwise non-toxic, offers no localizing symptoms  3/5 Bcx - 1/4 positive GPC in clusters, speciation pending    Recommendations  Additional bottle in 3/5 Bcx set positive - will await speciation  Monitor off antibiotics***      Follow fever curve and WBC count    Nestor Frazier MD  Division of Infectious Diseases  Available on Teams   Patient is a 67y old  Male who presents with a chief complaint of Hypothermia. Blood culture with CoNS staph on admission.    Positive blood culture  Likely to be contamination given low grade (2/4 bottles),    No foreign material, no orthopedic hardware  Repeat blood cultures pending however obtained while on antibiotics, subsequent off antibiotics, no growth  Patient otherwise non-toxic, offers no localizing symptoms  3/5 Bcx - 1/4 positive GPC in clusters, staph hominis growth    Recommendations  Additional bottle on 3/5 Bcx set positive -  growing staph hominis  At this point, given multiple species including staph hematolyticus, hominis and corynebacterium without any localizing symptoms or other reasons to have recurrent true bacteremis will conclude these are most likely contaminants  Monitor off antibiotics  Would not obtain further cultures unless fever  Follow pending blood cultures  Follow fever curve and WBC count    Nestor Frazier MD  Division of Infectious Diseases  Available on Teams

## 2023-03-07 NOTE — PROGRESS NOTE ADULT - PROBLEM SELECTOR PROBLEM 8
BPH (benign prostatic hyperplasia)
Glaucoma
BPH (benign prostatic hyperplasia)

## 2023-03-07 NOTE — PROGRESS NOTE ADULT - SUBJECTIVE AND OBJECTIVE BOX
Interval history:  No acute events   Patient comfortable- denies any complaints     MEDICATIONS  (STANDING):  atorvastatin 40 milliGRAM(s) Oral at bedtime  chlorhexidine 2% Cloths 1 Application(s) Topical daily  cloZAPine 100 milliGRAM(s) Oral at bedtime  dextrose 5%. 1000 milliLiter(s) (100 mL/Hr) IV Continuous <Continuous>  dextrose 5%. 1000 milliLiter(s) (50 mL/Hr) IV Continuous <Continuous>  dextrose 50% Injectable 25 Gram(s) IV Push once  dextrose 50% Injectable 12.5 Gram(s) IV Push once  dextrose 50% Injectable 25 Gram(s) IV Push once  divalproex ER 1000 milliGRAM(s) Oral at bedtime  enoxaparin Injectable 40 milliGRAM(s) SubCutaneous every 24 hours  finasteride 5 milliGRAM(s) Oral daily  glucagon  Injectable 1 milliGRAM(s) IntraMuscular once  insulin glargine Injectable (LANTUS) 8 Unit(s) SubCutaneous at bedtime  insulin lispro (ADMELOG) corrective regimen sliding scale   SubCutaneous three times a day before meals  insulin lispro (ADMELOG) corrective regimen sliding scale   SubCutaneous at bedtime  latanoprost 0.005% Ophthalmic Solution 1 Drop(s) Both EYES at bedtime  levobunolol 0.5% Solution 1 Drop(s) Both EYES two times a day  levothyroxine 88 MICROGram(s) Oral daily  linagliptin 5 milliGRAM(s) Oral daily  pantoprazole    Tablet 40 milliGRAM(s) Oral <User Schedule>  polyethylene glycol 3350 17 Gram(s) Oral two times a day  pyridoxine 50 milliGRAM(s) Oral daily  RHOPRESSA (Netarsudil) 0.02% Eye Drops 1 Drop(s) 1 Drop(s) Both EYES daily  senna 1 Tablet(s) Oral at bedtime  tamsulosin 0.4 milliGRAM(s) Oral at bedtime  venlafaxine 75 milliGRAM(s) Oral daily    MEDICATIONS  (PRN):  acetaminophen     Tablet .. 650 milliGRAM(s) Oral every 6 hours PRN Temp greater or equal to 38C (100.4F), Mild Pain (1 - 3)  dextrose Oral Gel 15 Gram(s) Oral once PRN Blood Glucose LESS THAN 70 milliGRAM(s)/deciliter  haloperidol    Injectable 1 milliGRAM(s) IV Push every 6 hours PRN agitation      Allergies    No Known Allergies    Intolerances      Review of Systems:  Constitutional: No fever  Eyes: No blurry vision  Neuro: No tremors  HEENT: No pain  Cardiovascular: No chest pain, palpitations  Respiratory: No SOB, no cough  GI: No nausea, vomiting, abdominal pain  : No dysuria  Skin: no rash  Psych: no depression  Endocrine: no polyuria, polydipsia  Hem/lymph: no swelling  Osteoporosis: no fractures    ALL OTHER SYSTEMS REVIEWED AND NEGATIVE    UNABLE TO OBTAIN    PHYSICAL EXAM:  VITALS: T(C): 36.6 (03-07-23 @ 14:48)  T(F): 97.9 (03-07-23 @ 14:48), Max: 98.5 (03-06-23 @ 20:59)  HR: 67 (03-07-23 @ 14:48) (61 - 67)  BP: 110/51 (03-07-23 @ 14:48) (109/55 - 154/69)  RR:  (16 - 17)  SpO2:  (98% - 100%)  Wt(kg): --  GENERAL: NAD, well-groomed, well-developed  EYES: No proptosis, no lid lag, anicteric  HEENT:  Atraumatic, Normocephalic, moist mucous membranes  THYROID: Normal size, no palpable nodules  RESPIRATORY: Clear to auscultation bilaterally; No rales, rhonchi, wheezing, or rubs  CARDIOVASCULAR: Regular rate and rhythm; No murmurs; no peripheral edema  GI: Soft, nontender, non distended, normal bowel sounds  SKIN: Dry, intact, No rashes or lesions  MUSCULOSKELETAL: Full range of motion, normal strength  NEURO: sensation intact, extraocular movements intact, no tremor, normal reflexes  PSYCH: Alert and oriented x 3, normal affect, normal mood  CUSHING'S SIGNS: no striae    POCT Blood Glucose.: 213 mg/dL (03-07-23 @ 12:20)  POCT Blood Glucose.: 105 mg/dL (03-07-23 @ 08:53)  POCT Blood Glucose.: 226 mg/dL (03-06-23 @ 22:01)  POCT Blood Glucose.: 337 mg/dL (03-06-23 @ 17:45)  POCT Blood Glucose.: 236 mg/dL (03-06-23 @ 12:12)  POCT Blood Glucose.: 127 mg/dL (03-06-23 @ 08:30)  POCT Blood Glucose.: 342 mg/dL (03-05-23 @ 21:54)  POCT Blood Glucose.: 117 mg/dL (03-05-23 @ 17:57)  POCT Blood Glucose.: 215 mg/dL (03-05-23 @ 12:10)  POCT Blood Glucose.: 104 mg/dL (03-05-23 @ 08:29)  POCT Blood Glucose.: 238 mg/dL (03-04-23 @ 21:46)  POCT Blood Glucose.: 218 mg/dL (03-04-23 @ 17:38)      03-07    133<L>  |  97<L>  |  13  ----------------------------<  90  4.6   |  29  |  0.77    eGFR: 98    Ca    8.9      03-07  Mg     1.60     03-07  Phos  3.9     03-07            Thyroid Function Tests:  03-01 @ 18:08 TSH 8.67 FreeT4 1.4 T3 -- Anti TPO -- Anti Thyroglobulin Ab -- TSI --  03-01 @ 16:24 TSH 6.45 FreeT4 -- T3 -- Anti TPO -- Anti Thyroglobulin Ab -- TSI --

## 2023-03-07 NOTE — PROGRESS NOTE ADULT - PROBLEM SELECTOR PLAN 2
Unclear etiology, possibly autonomic 2/2 psych meds  AM cortisol on 3/2 was5.3  additionally his sodium is low (likely component of SIADH as well)   His blood pressure are normal, however looking back in his chart, his prior systolics were at 150s.   Behavioral health also consulted to assess whether psychiatric meds may be causing autonomic dysfunction  Endocrinology consulted as well, appears like an appropriate response w/consyntropin test
Unclear etiology, possibly autonomic 2/2 psych meds  AM cortisol this morning 5.3   additionally his sodium is low (likely component of SIADH as well) and potassium is on the higher end of normal, close to 5.   His blood pressure are normal, however looking back in his chart, his prior systolics were at 150s. I also consulted behavioral health.  Endocrinology consulted via email
Unclear etiology, possibly autonomic 2/2 psych meds  AM cortisol on 3/2 was5.3  additionally his sodium is low (likely component of SIADH as well)   His blood pressure are normal, however looking back in his chart, his prior systolics were at 150s.   Behavioral health also consulted to assess whether psychiatric meds may be causing autonomic dysfunction  Endocrinology consulted as well, appears like an appropriate response w/consyntropin test

## 2023-03-07 NOTE — PROGRESS NOTE ADULT - SUBJECTIVE AND OBJECTIVE BOX
Derrell John MD  Academic Hospitalist  Pager [phone]  Email: [email or web address]  Available on [employer] Teams        PROGRESS NOTE:     Patient is a 67y old  Male who presents with a chief complaint of Hypothermia (07 Mar 2023 12:06)      SUBJECTIVE / OVERNIGHT EVENTS:  Patient seen and examined this morning. No new complaints this morning. Patient feels at baseline and appears eager to be discharged. I discussed with the patient's brother who was at bedside.   ADDITIONAL REVIEW OF SYSTEMS:  No f/c/n/v    MEDICATIONS  (STANDING):  atorvastatin 40 milliGRAM(s) Oral at bedtime  chlorhexidine 2% Cloths 1 Application(s) Topical daily  cloZAPine 100 milliGRAM(s) Oral at bedtime  dextrose 5%. 1000 milliLiter(s) (100 mL/Hr) IV Continuous <Continuous>  dextrose 5%. 1000 milliLiter(s) (50 mL/Hr) IV Continuous <Continuous>  dextrose 50% Injectable 25 Gram(s) IV Push once  dextrose 50% Injectable 12.5 Gram(s) IV Push once  dextrose 50% Injectable 25 Gram(s) IV Push once  divalproex ER 1000 milliGRAM(s) Oral at bedtime  enoxaparin Injectable 40 milliGRAM(s) SubCutaneous every 24 hours  finasteride 5 milliGRAM(s) Oral daily  glucagon  Injectable 1 milliGRAM(s) IntraMuscular once  insulin glargine Injectable (LANTUS) 8 Unit(s) SubCutaneous at bedtime  insulin lispro (ADMELOG) corrective regimen sliding scale   SubCutaneous three times a day before meals  insulin lispro (ADMELOG) corrective regimen sliding scale   SubCutaneous at bedtime  latanoprost 0.005% Ophthalmic Solution 1 Drop(s) Both EYES at bedtime  levobunolol 0.5% Solution 1 Drop(s) Both EYES two times a day  levothyroxine 88 MICROGram(s) Oral daily  linagliptin 5 milliGRAM(s) Oral daily  pantoprazole    Tablet 40 milliGRAM(s) Oral <User Schedule>  polyethylene glycol 3350 17 Gram(s) Oral two times a day  pyridoxine 50 milliGRAM(s) Oral daily  RHOPRESSA (Netarsudil) 0.02% Eye Drops 1 Drop(s) 1 Drop(s) Both EYES daily  senna 1 Tablet(s) Oral at bedtime  tamsulosin 0.4 milliGRAM(s) Oral at bedtime  venlafaxine 75 milliGRAM(s) Oral daily    MEDICATIONS  (PRN):  acetaminophen     Tablet .. 650 milliGRAM(s) Oral every 6 hours PRN Temp greater or equal to 38C (100.4F), Mild Pain (1 - 3)  dextrose Oral Gel 15 Gram(s) Oral once PRN Blood Glucose LESS THAN 70 milliGRAM(s)/deciliter  haloperidol    Injectable 1 milliGRAM(s) IV Push every 6 hours PRN agitation      CAPILLARY BLOOD GLUCOSE      POCT Blood Glucose.: 213 mg/dL (07 Mar 2023 12:20)  POCT Blood Glucose.: 105 mg/dL (07 Mar 2023 08:53)  POCT Blood Glucose.: 226 mg/dL (06 Mar 2023 22:01)  POCT Blood Glucose.: 337 mg/dL (06 Mar 2023 17:45)    I&O's Summary    06 Mar 2023 07:01  -  07 Mar 2023 07:00  --------------------------------------------------------  IN: 1680 mL / OUT: 0 mL / NET: 1680 mL        PHYSICAL EXAM:  Vital Signs Last 24 Hrs  T(C): 36.2 (07 Mar 2023 05:51), Max: 36.9 (06 Mar 2023 20:59)  T(F): 97.2 (07 Mar 2023 05:51), Max: 98.5 (06 Mar 2023 20:59)  HR: 61 (07 Mar 2023 05:51) (61 - 67)  BP: 154/69 (07 Mar 2023 05:51) (109/55 - 154/69)  BP(mean): --  RR: 16 (07 Mar 2023 05:51) (16 - 17)  SpO2: 98% (07 Mar 2023 05:51) (98% - 98%)    Parameters below as of 07 Mar 2023 05:51  Patient On (Oxygen Delivery Method): room air        CONSTITUTIONAL: NAD, comfortably laying in bed  RESPIRATORY: Normal respiratory effort; lungs are clear to auscultation bilaterally  CARDIOVASCULAR: Regular rate and rhythm, normal S1 and S2, no murmur/rub/gallop;  ABDOMEN: Nontender to palpation, normoactive bowel sounds, no rebound/guarding;  MUSCLOSKELETAL: no clubbing or cyanosis of digits; no joint swelling or tenderness to palpation  PSYCH: Cooperative and calm    LABS:                        8.8    4.32  )-----------( 154      ( 07 Mar 2023 06:00 )             26.9     03-07    133<L>  |  97<L>  |  13  ----------------------------<  90  4.6   |  29  |  0.77    Ca    8.9      07 Mar 2023 06:00  Phos  3.9     03-07  Mg     1.60     03-07                Culture - Blood (collected 05 Mar 2023 07:05)  Source: .Blood Blood-Peripheral  Gram Stain (06 Mar 2023 18:49):    Growth in anaerobic bottle: Gram Positive Cocci in Clusters  Preliminary Report (07 Mar 2023 12:56):    Growth in anaerobic bottle: Staphylococcus hominis    Coagulase Negative Staphylococci isolated from a single blood culture set    may represent contamination.    Contact the Microbiology Department at [phone] if susceptibility    testing is clinically indicated.    Culture - Blood (collected 05 Mar 2023 07:00)  Source: .Blood Blood-Peripheral  Preliminary Report (06 Mar 2023 10:01):    No growth to date.        RADIOLOGY & ADDITIONAL TESTS:  Results Reviewed:   Imaging Personally Reviewed:  Electrocardiogram Personally Reviewed:    COORDINATION OF CARE:  Care Discussed with Consultants/Other Providers [Y/N]: Case discussed during interdisciplinary rounds with social work and case management  Prior or Outpatient Records Reviewed [Y/N]:

## 2023-03-07 NOTE — PROGRESS NOTE ADULT - PROBLEM SELECTOR PLAN 4
TSH elevated to 8.67, free T4 normal  endo consulted  on LT4 88mcg po qam
TSH elevated to 8.67, free T4 normal  endo consulted  on LT4 88mcg po qam
Last A1c 6.4%, on Metformin 1000mg bid and Januvia at home.  -hold oral hypoglycemics while inpatient  -start low-dose ISS and FS checks qAC TID and qHS while inpatient  -during previous admission, required lantus 5 units and admelog 7 units qAC, start basal-bolus regimen if needed and uptitrate as necessary  -carb consistent diet
TSH elevated to 8.67, free T4 normal  endo consulted
TSH elevated to 8.67, free T4 normal  endo consulted  on LT4 88mcg po qam
TSH elevated to 8.67, free T4 normal  endo consulted  on LT4 88mcg po qam

## 2023-03-07 NOTE — PROGRESS NOTE ADULT - SUBJECTIVE AND OBJECTIVE BOX
Follow Up:  Positive blood cultures    Interval History/ROS:  Overnight: Blood culture positive with GPC in clusters, discharge held. Patient otherwise afebrile, hemodynamically stable.  Latest labs show no leukocytosis, CMP with renal function within normal limits.+    Patient seen examined at bedside.  No complaints.    Allergies  No Known Allergies        ANTIMICROBIALS:      OTHER MEDS:  MEDICATIONS  (STANDING):  acetaminophen     Tablet .. 650 every 6 hours PRN  atorvastatin 40 at bedtime  cloZAPine 100 at bedtime  dextrose 50% Injectable 25 once  dextrose 50% Injectable 12.5 once  dextrose 50% Injectable 25 once  dextrose Oral Gel 15 once PRN  divalproex ER 1000 at bedtime  enoxaparin Injectable 40 every 24 hours  finasteride 5 daily  glucagon  Injectable 1 once  haloperidol    Injectable 1 every 6 hours PRN  insulin glargine Injectable (LANTUS) 8 at bedtime  insulin lispro (ADMELOG) corrective regimen sliding scale  three times a day before meals  insulin lispro (ADMELOG) corrective regimen sliding scale  at bedtime  levothyroxine 88 daily  linagliptin 5 daily  pantoprazole    Tablet 40 <User Schedule>  polyethylene glycol 3350 17 two times a day  senna 1 at bedtime  tamsulosin 0.4 at bedtime  venlafaxine 75 daily      Vital Signs Last 24 Hrs  T(C): 36.2 (07 Mar 2023 05:51), Max: 36.9 (06 Mar 2023 20:59)  T(F): 97.2 (07 Mar 2023 05:51), Max: 98.5 (06 Mar 2023 20:59)  HR: 61 (07 Mar 2023 05:51) (61 - 68)  BP: 154/69 (07 Mar 2023 05:51) (109/55 - 167/83)  BP(mean): --  RR: 16 (07 Mar 2023 05:51) (16 - 18)  SpO2: 98% (07 Mar 2023 05:51) (98% - 100%)    Parameters below as of 07 Mar 2023 05:51  Patient On (Oxygen Delivery Method): room air        PHYSICAL EXAM:  Constitutional: comfortable no distress  Cardiovascular:   normal S1, S2, no murmur, no edema  Respiratory:  clear BS bilaterally, no wheezes, no rales  GI:  soft, non-tender, normal bowel sounds  Musculoskeletal:  no synovitis, normal ROM  Neurologic: awake and alert, normal strength, no focal findings  Skin:  no rash, no erythema, no phlebitis  Psychiatric:  awake, alert, appropriate mood                        8.8    4.32  )-----------( 154      ( 07 Mar 2023 06:00 )            26.9     03-07    133<L>  |  97<L>  |  13  ----------------------------<  90  4.6   |  29  |  0.77    Ca    8.9      07 Mar 2023 06:00  Phos  3.9     03-07  Mg     1.60     03-07    MICROBIOLOGY:  v    Culture - Blood (collected 05 Mar 2023 07:05)  Source: .Blood Blood-Peripheral  Gram Stain (06 Mar 2023 18:49):    Growth in anaerobic bottle: Gram Positive Cocci in Clusters  Preliminary Report (06 Mar 2023 18:49):    Growth in anaerobic bottle: Gram Positive Cocci in Clusters    Culture - Blood (collected 05 Mar 2023 07:00)  Source: .Blood Blood-Peripheral  Preliminary Report (06 Mar 2023 10:01):    No growth to date.    Culture - Blood (collected 03 Mar 2023 08:46)  Source: .Blood Blood-Venous  Preliminary Report (04 Mar 2023 12:01):    No growth to date.    Culture - Blood (collected 03 Mar 2023 08:15)  Source: .Blood Blood-Peripheral  Preliminary Report (04 Mar 2023 12:01):    No growth to date.      Rapid RVP Result: NotDetec (03-01 @ 18:46)

## 2023-03-07 NOTE — PROGRESS NOTE ADULT - PROBLEM SELECTOR PROBLEM 5
Type 2 diabetes mellitus treated with insulin
Schizophrenia
Type 2 diabetes mellitus treated with insulin

## 2023-03-07 NOTE — PROGRESS NOTE ADULT - PROBLEM SELECTOR PLAN 1
Pt p/w brief period of dizziness, lightheadedness, and confusion, negative for CVA on CT/CTA imaging. TME most likely 2/2 infection, recent hospitalization with similar presentation of AMS and hypothermia, found to have sepsis.    Likely 2/2 hypothermia  Mostly resolved at this time
Pt p/w brief period of dizziness, lightheadedness, and confusion, negative for CVA on CT/CTA imaging. TME most likely 2/2 infection, recent hospitalization with similar presentation of AMS and hypothermia, found to have sepsis.    No infectious etiology noted  Likely 2/2 hypothermia
Pt p/w brief period of dizziness, lightheadedness, and confusion, negative for CVA on CT/CTA imaging. TME most likely 2/2 infection, recent hospitalization with similar presentation of AMS and hypothermia, found to have sepsis.    Likely 2/2 hypothermia  Mostly resolved at this time

## 2023-03-07 NOTE — PROGRESS NOTE ADULT - PROBLEM SELECTOR PLAN 3
BCx from 3/1 staph epidermidis, likely contamination  however given hypothermia, ID consulted  Procal is low, making it more probable that its a contaminant    Other set of BCx from 3/1 returned with gram-variable rods  Continuing to monitor off abx  appreciate ID input    f/u repeat BCx from 3/3 - another positive culture with gram + in clusters. Will need to hold discharge, discussed with infectious disease doctor, Dr. Frazeir.
TSH elevated to 8.67, free T4 normal  -c/w levothyroxine 75mcg qd for now
BCx from 3/1 staph epidermidis, likely contamination  however given hypothermia, ID consulted  Procal is low, making it more probable that its a contaminant    Other set of BCx from 3/1 returned with gram-variable rods  Continuing to monitor off abx  appreciate ID input    f/u repeat BCx from 3/3 - NGTD
staph epidermidis, likely contamination  however given hypothermia, ID consulted  Procal is low, making it more probable that its a contaminant
BCx from 3/1 staph epidermidis, likely contamination  however given hypothermia, ID consulted  Procal is low, making it more probable that its a contaminant    Other set of BCx from 3/1 returned with gram-variable rods  Continuing to monitor off abx  appreciate ID f/u    f/u repeat BCx from 3/3 - NGTD
BCx from 3/1 staph epidermidis, likely contamination  however given hypothermia, ID consulted  Procal is low, making it more probable that its a contaminant    Other set of BCx from 3/1 returned with gram-variable rods  Continuing to monitor off abx  appreciate ID input    f/u repeat BCx from 3/3 - another positive culture with gram + in clusters. Will need to hold discharge, discussed with infectious disease doctor, Dr. Frazier.

## 2023-03-07 NOTE — PROGRESS NOTE ADULT - PROBLEM SELECTOR PLAN 9
-c/w levobutolol bid, rhophressa and lumigan qd  brother brought in eye drops
-c/w levobutolol bid, rhophressa and lumigan qd  brother brought in eye drops
lovenox DVT ppx  CC DASH diet Kosher
-c/w levobutolol bid, rhophressa and lumigan qd  brother brought in eye drops

## 2023-03-07 NOTE — PROGRESS NOTE ADULT - PROBLEM SELECTOR PLAN 5
Last A1c 6.4%, on Metformin 1000mg bid and Januvia at home.  -hold oral hypoglycemics while inpatient  -c/w low-dose ISS and FS checks qAC TID and qHS while inpatient  -during previous admission, required lantus 5 units and admelog 7 units qAC, start basal-bolus regimen if needed and uptitrate as necessary  -carb consistent diet  - also on linagliptin while inpatient
Last A1c 6.4%, on Metformin 1000mg bid and Januvia at home.  -hold oral hypoglycemics while inpatient  -c/w low-dose ISS and FS checks qAC TID and qHS while inpatient  -during previous admission, required lantus 5 units and admelog 7 units qAC, start basal-bolus regimen if needed and uptitrate as necessary  -carb consistent diet  - also on linagliptin while inpatient
Pt with no S/S of decompensation.   -c/w home meds clozapine 100mg qd, depakote 1000mg qd, venlafaxine ER 75mg qd  -psych consulted  -f/u clozapine and depakote levels  -monitor CBC with diff while on clozapine  -no indication for constant observation at this time
Last A1c 6.4%, on Metformin 1000mg bid and Januvia at home.  -hold oral hypoglycemics while inpatient  -c/w low-dose ISS and FS checks qAC TID and qHS while inpatient  -during previous admission, required lantus 5 units and admelog 7 units qAC, start basal-bolus regimen if needed and uptitrate as necessary  -carb consistent diet  - also on linagliptin while inpatient
Last A1c 6.4%, on Metformin 1000mg bid and Januvia at home.  -hold oral hypoglycemics while inpatient  -start low-dose ISS and FS checks qAC TID and qHS while inpatient  -during previous admission, required lantus 5 units and admelog 7 units qAC, start basal-bolus regimen if needed and uptitrate as necessary  -carb consistent diet
Last A1c 6.4%, on Metformin 1000mg bid and Januvia at home.  -hold oral hypoglycemics while inpatient  -c/w low-dose ISS and FS checks qAC TID and qHS while inpatient  -during previous admission, required lantus 5 units and admelog 7 units qAC, start basal-bolus regimen if needed and uptitrate as necessary  -carb consistent diet  - also on linagliptin while inpatient

## 2023-03-07 NOTE — PROGRESS NOTE ADULT - PROBLEM SELECTOR PLAN 6
Pt with no S/S of decompensation.   -c/w home meds clozapine 100mg qd, depakote 1000mg qd, venlafaxine ER 75mg qd  -psych consulted  -f/u clozapine and depakote levels  -monitor CBC with diff while on clozapine  -no indication for constant observation at this time
Pt with no S/S of decompensation.   -c/w home meds clozapine 100mg qd, depakote 1000mg qd, venlafaxine ER 75mg qd  -psych consulted  -f/u clozapine and depakote levels  -monitor CBC with diff while on clozapine  -no indication for constant observation at this time
BPs running lower than baseline  Hold antihypertensives
Pt with no S/S of decompensation.   -c/w home meds clozapine 100mg qd, depakote 1000mg qd, venlafaxine ER 75mg qd  -psych consulted  -f/u clozapine and depakote levels  -monitor CBC with diff while on clozapine  -no indication for constant observation at this time

## 2023-03-07 NOTE — PROGRESS NOTE ADULT - ASSESSMENT
This is a 68 yo  M/w a PMH of schizophrenia, bipolar disorder, DM2, HTN and hypothyroidism who presents with transient episode of dizziness for which endocrinology was consulted to rule out adrenal insufficiency given low AM cortisol. It is unclear at this time if patient has any risk factors for adrenal insufficiency and additionally he does not appear to have any convincing symptoms of AI (hypotension, hypoglycemia - hyponatremia is chronic) Therefore, to determine if adrenal insufficiency is real, would recommend ACTH stimulation test to further evaluate    #r/o Adrenal insufficiency  #low AM cortisol  AM cortisol drawn at 8:40AM is 11.1  Cosyntropin given at 9AM  Cortisol at 9:35 is 18,9  Cortisol at 10:15 is 22  -this ACTH stimulation had a good response and ruled out adrenal insufficiency    #hypothyroidism  -increased levothyroxine to 88mcg daily on this admission, continue for discharge with this new dose, will need script at dc  -please ensure any vitamins, iron, calcium, PPI, H2 blockers are given 4 hours after the levothyroxine  -levothyroxine should be given on an empty stomach at 7;30AM at least 30 minutes before breakfast  -repeat TFTs in 6-8 weeks as an outpatient    #DM2 - HbA1c 6.4%, at goal. Inpatient with some hyperglycemia.  -FSG goal 100-180  While Inpatient:   -Lantus 8 units qhs, fasting glucose controlled, some prandial excursions  -Add Humalog 2 units TID with meals   -can continue with low admelog correction scales before meals and before bedtime  -linagliptin 5mg daily    -can be discharged on home regimen metformin 1000mg twice per day and januvia 100mg daily, as well as glipizide 5mg daily which he now reports also taking at home.  -follow up with outpatient endocrinologist Dr. Naheed Gibbons, DO   Attending Physician   Department of Endocrinology, Diabetes and Metabolism     If before 9AM or after 5PM, or on weekends/holidays, please call the Endocrine answering service for assistance ([phone]).  For nonurgent matters, please email [email or web address] for assistance.

## 2023-03-07 NOTE — PROGRESS NOTE ADULT - PROBLEM SELECTOR PLAN 10
lovenox DVT ppx  CC DASH diet Kosher    Dispo: suspect dc ready in 1-2 days if repeat BCx remain without growth.  Hypothermia has resolved, though etiology not known.
lovenox DVT ppx  CC DASH diet Kosher
lovenox DVT ppx  CC DASH diet Kosher
lovenox DVT ppx  CC DASH diet Kosher    Dispo: suspect dc ready in 1-2 days if repeat BCx remain without growth.  Hypothermia has resolved, though etiology not known.
lovenox DVT ppx  CC DASH diet Kosher    Dispo: suspect dc ready in 1-2 days if repeat BCx remain without growth.  Hypothermia has resolved, though etiology not known.

## 2023-03-07 NOTE — PROGRESS NOTE ADULT - PROBLEM SELECTOR PLAN 8
-c/w tamsulosin 0.4mg qd and finasteride 5mg qd  -check bladder scan, as pt with urinary retention during last admission
-c/w levobutolol bid, rhophressa and lumigan qd  -ask family to bring in eye drops
-c/w tamsulosin 0.4mg qd and finasteride 5mg qd  -check bladder scan, as pt with urinary retention during last admission

## 2023-03-08 VITALS — WEIGHT: 161.82 LBS

## 2023-03-08 LAB
ANION GAP SERPL CALC-SCNC: 10 MMOL/L — SIGNIFICANT CHANGE UP (ref 7–14)
BUN SERPL-MCNC: 15 MG/DL — SIGNIFICANT CHANGE UP (ref 7–23)
CALCIUM SERPL-MCNC: 8.8 MG/DL — SIGNIFICANT CHANGE UP (ref 8.4–10.5)
CHLORIDE SERPL-SCNC: 97 MMOL/L — LOW (ref 98–107)
CLOZAPINE SERPL-MCNC: 143 NG/ML — LOW (ref 350–600)
CLOZAPINE SPEC-SCNC: 182 NG/ML — LOW
CO2 SERPL-SCNC: 25 MMOL/L — SIGNIFICANT CHANGE UP (ref 22–31)
CREAT SERPL-MCNC: 0.71 MG/DL — SIGNIFICANT CHANGE UP (ref 0.5–1.3)
CULTURE RESULTS: SIGNIFICANT CHANGE UP
CULTURE RESULTS: SIGNIFICANT CHANGE UP
EGFR: 100 ML/MIN/1.73M2 — SIGNIFICANT CHANGE UP
GLUCOSE BLDC GLUCOMTR-MCNC: 139 MG/DL — HIGH (ref 70–99)
GLUCOSE BLDC GLUCOMTR-MCNC: 348 MG/DL — HIGH (ref 70–99)
GLUCOSE SERPL-MCNC: 98 MG/DL — SIGNIFICANT CHANGE UP (ref 70–99)
HCT VFR BLD CALC: 27.3 % — LOW (ref 39–50)
HGB BLD-MCNC: 8.8 G/DL — LOW (ref 13–17)
MAGNESIUM SERPL-MCNC: 1.8 MG/DL — SIGNIFICANT CHANGE UP (ref 1.6–2.6)
MCHC RBC-ENTMCNC: 30.9 PG — SIGNIFICANT CHANGE UP (ref 27–34)
MCHC RBC-ENTMCNC: 32.2 GM/DL — SIGNIFICANT CHANGE UP (ref 32–36)
MCV RBC AUTO: 95.8 FL — SIGNIFICANT CHANGE UP (ref 80–100)
MRSA PCR RESULT.: SIGNIFICANT CHANGE UP
NORCLOZAPINE SERPL-MCNC: 39 NG/ML — SIGNIFICANT CHANGE UP
NRBC # BLD: 0 /100 WBCS — SIGNIFICANT CHANGE UP (ref 0–0)
NRBC # FLD: 0 K/UL — SIGNIFICANT CHANGE UP (ref 0–0)
PHOSPHATE SERPL-MCNC: 3.9 MG/DL — SIGNIFICANT CHANGE UP (ref 2.5–4.5)
PLATELET # BLD AUTO: 148 K/UL — LOW (ref 150–400)
POTASSIUM SERPL-MCNC: 4.3 MMOL/L — SIGNIFICANT CHANGE UP (ref 3.5–5.3)
POTASSIUM SERPL-SCNC: 4.3 MMOL/L — SIGNIFICANT CHANGE UP (ref 3.5–5.3)
RBC # BLD: 2.85 M/UL — LOW (ref 4.2–5.8)
RBC # FLD: 14.5 % — SIGNIFICANT CHANGE UP (ref 10.3–14.5)
S AUREUS DNA NOSE QL NAA+PROBE: SIGNIFICANT CHANGE UP
SODIUM SERPL-SCNC: 132 MMOL/L — LOW (ref 135–145)
SPECIMEN SOURCE: SIGNIFICANT CHANGE UP
SPECIMEN SOURCE: SIGNIFICANT CHANGE UP
WBC # BLD: 4.61 K/UL — SIGNIFICANT CHANGE UP (ref 3.8–10.5)
WBC # FLD AUTO: 4.61 K/UL — SIGNIFICANT CHANGE UP (ref 3.8–10.5)

## 2023-03-08 PROCEDURE — 99232 SBSQ HOSP IP/OBS MODERATE 35: CPT

## 2023-03-08 PROCEDURE — 99239 HOSP IP/OBS DSCHRG MGMT >30: CPT

## 2023-03-08 RX ADMIN — Medication 75 MILLIGRAM(S): at 12:10

## 2023-03-08 RX ADMIN — LINAGLIPTIN 5 MILLIGRAM(S): 5 TABLET, FILM COATED ORAL at 12:10

## 2023-03-08 RX ADMIN — CHLORHEXIDINE GLUCONATE 1 APPLICATION(S): 213 SOLUTION TOPICAL at 12:14

## 2023-03-08 RX ADMIN — Medication 2 UNIT(S): at 09:12

## 2023-03-08 RX ADMIN — FINASTERIDE 5 MILLIGRAM(S): 5 TABLET, FILM COATED ORAL at 12:11

## 2023-03-08 RX ADMIN — Medication 50 MILLIGRAM(S): at 12:10

## 2023-03-08 RX ADMIN — ENOXAPARIN SODIUM 40 MILLIGRAM(S): 100 INJECTION SUBCUTANEOUS at 12:11

## 2023-03-08 RX ADMIN — Medication 88 MICROGRAM(S): at 06:09

## 2023-03-08 RX ADMIN — Medication 2 UNIT(S): at 12:17

## 2023-03-08 RX ADMIN — Medication 4: at 12:17

## 2023-03-08 RX ADMIN — PANTOPRAZOLE SODIUM 40 MILLIGRAM(S): 20 TABLET, DELAYED RELEASE ORAL at 12:15

## 2023-03-08 RX ADMIN — Medication 1 DROP(S): at 09:36

## 2023-03-08 RX ADMIN — POLYETHYLENE GLYCOL 3350 17 GRAM(S): 17 POWDER, FOR SOLUTION ORAL at 07:41

## 2023-03-08 NOTE — PROGRESS NOTE ADULT - PROBLEM SELECTOR PROBLEM 3
Bacteremia
Hypothermia, endogenous
Hypothyroidism
Hypothermia, endogenous
Hypothermia, endogenous
Bacteremia
Bacteremia
Hypothermia, endogenous
Bacteremia
Bacteremia

## 2023-03-08 NOTE — DIETITIAN INITIAL EVALUATION ADULT - NSFNSGIIOFT_GEN_A_CORE
Last bowel movement reported 2-3days ago s/p suppository. Patient on bowel regimen-continue monitoring.

## 2023-03-08 NOTE — PROGRESS NOTE ADULT - PROVIDER SPECIALTY LIST ADULT
Infectious Disease
Hospitalist
Hospitalist
Endocrinology
Hospitalist
Endocrinology
Hospitalist
Endocrinology
Hospitalist
Endocrinology
Hospitalist

## 2023-03-08 NOTE — PROGRESS NOTE ADULT - SUBJECTIVE AND OBJECTIVE BOX
Follow Up:  positive blood cultures    Interval History/ROS:  No acute events. Afebrile.    Patient seen and examined at bedside - no new complaints.    Allergies  No Known Allergies        ANTIMICROBIALS:      OTHER MEDS:  MEDICATIONS  (STANDING):  acetaminophen     Tablet .. 650 every 6 hours PRN  atorvastatin 40 at bedtime  cloZAPine 100 at bedtime  dextrose 50% Injectable 25 once  dextrose 50% Injectable 12.5 once  dextrose 50% Injectable 25 once  dextrose Oral Gel 15 once PRN  divalproex ER 1000 at bedtime  enoxaparin Injectable 40 every 24 hours  finasteride 5 daily  glucagon  Injectable 1 once  haloperidol    Injectable 1 every 6 hours PRN  insulin glargine Injectable (LANTUS) 8 at bedtime  insulin lispro (ADMELOG) corrective regimen sliding scale  three times a day before meals  insulin lispro (ADMELOG) corrective regimen sliding scale  at bedtime  insulin lispro Injectable (ADMELOG) 2 three times a day before meals  levothyroxine 88 daily  linagliptin 5 daily  pantoprazole    Tablet 40 <User Schedule>  polyethylene glycol 3350 17 two times a day  senna 1 at bedtime  tamsulosin 0.4 at bedtime  venlafaxine 75 daily      Vital Signs Last 24 Hrs  T(C): 36.3 (08 Mar 2023 06:22), Max: 36.7 (07 Mar 2023 21:55)  T(F): 97.4 (08 Mar 2023 06:22), Max: 98.1 (07 Mar 2023 21:55)  HR: 61 (08 Mar 2023 06:22) (61 - 67)  BP: 141/77 (08 Mar 2023 06:22) (137/77 - 141/77)  BP(mean): --  RR: 18 (08 Mar 2023 06:22) (17 - 18)  SpO2: 99% (08 Mar 2023 06:22) (99% - 99%)    Parameters below as of 08 Mar 2023 06:22  Patient On (Oxygen Delivery Method): room air        PHYSICAL EXAM:  Constitutional: comfortable no distress  Cardiovascular:   normal S1, S2, no murmur, no edema  Respiratory:  clear BS bilaterally, no wheezes, no rales  GI:  soft, non-tender, normal bowel sounds  Musculoskeletal:  no synovitis, normal ROM  Neurologic: awake and alert, normal strength, no focal findings  Skin:  no rash, no erythema, no phlebitis  Psychiatric:  awake, alert, appropriate mood                           8.8    4.61  )-----------( 148      ( 08 Mar 2023 06:30 )             27.3       03-08    132<L>  |  97<L>  |  15  ----------------------------<  98  4.3   |  25  |  0.71    Ca    8.8      08 Mar 2023 06:30  Phos  3.9     03-08  Mg     1.80     03-08    MICROBIOLOGY:  v    Culture - Blood (collected 06 Mar 2023 19:35)  Source: .Blood Blood-Peripheral  Preliminary Report (08 Mar 2023 03:01):    No growth to date.    Culture - Blood (collected 06 Mar 2023 19:05)  Source: .Blood Blood-Venous  Preliminary Report (08 Mar 2023 03:01):    No growth to date.    Culture - Blood (collected 05 Mar 2023 07:05)  Source: .Blood Blood-Peripheral  Gram Stain (06 Mar 2023 18:49):    Growth in anaerobic bottle: Gram Positive Cocci in Clusters  Final Report (07 Mar 2023 13:58):    Growth in anaerobic bottle: Staphylococcus hominis    Coagulase Negative Staphylococci isolated from a single blood culture set    may represent contamination.    Contact the Microbiology Department at [phone] if susceptibility    testing is clinically indicated.    Culture - Blood (collected 05 Mar 2023 07:00)  Source: .Blood Blood-Peripheral  Preliminary Report (06 Mar 2023 10:01):    No growth to date.              Rapid RVP Result: [place] (03-01 @ 18:46)        RADIOLOGY:

## 2023-03-08 NOTE — PROGRESS NOTE ADULT - ASSESSMENT
This is a 66 yo  M/w a PMH of schizophrenia, bipolar disorder, DM2, HTN and hypothyroidism who presents with transient episode of dizziness for which endocrinology was consulted to rule out adrenal insufficiency given low AM cortisol.    #r/o Adrenal insufficiency  #low AM cortisol  AM cortisol drawn at 8:40AM is 11.1  Cosyntropin given at 9AM  Cortisol at 9:35 is 18,9  Cortisol at 10:15 is 22  -this ACTH stimulation had a good response and ruled out adrenal insufficiency    #hypothyroidism  -increased levothyroxine to 88mcg daily on this admission, continue for discharge with this new dose, will need script at dc  -please ensure any vitamins, iron, calcium, PPI, H2 blockers are given 4 hours after the levothyroxine  -levothyroxine should be given on an empty stomach at 7;30AM at least 30 minutes before breakfast  -repeat TFTs in 6-8 weeks as an outpatient    #DM2 - HbA1c 6.4%, at goal. Inpatient with some hyperglycemia.  -FSG goal 100-180  While Inpatient:   -Lantus 10  units qhs, fasting glucose controlled, some prandial excursions  -increase Humalog 4 units TID with meals   - recommend checking glucose before discharge since he was >300 before lunch  -can continue with low admelog correction scales before meals and before bedtime  -linagliptin 5mg daily    -can be discharged on home regimen metformin 1000mg twice per day and januvia 100mg daily, as well as glipizide 5mg daily which he now reports also taking at home.  -follow up with outpatient endocrinologist Dr. Naheed Mancera MD  Attending Physician   Department of Endocrinology, Diabetes and Metabolism   [employer] Teams on 03-08-23 @ 16:02    If before 9AM or after 5PM, or on weekends/holidays, please call the Endocrine answering service for assistance ([phone]).  For nonurgent matters, please email [email or web address] for assistance.

## 2023-03-08 NOTE — PROGRESS NOTE ADULT - PROBLEM SELECTOR PROBLEM 4
Type 2 diabetes mellitus treated with insulin
R/O Adrenal insufficiency
Hypothyroidism

## 2023-03-08 NOTE — DIETITIAN INITIAL EVALUATION ADULT - PERTINENT MEDS FT
MEDICATIONS  (STANDING):  atorvastatin 40 milliGRAM(s) Oral at bedtime  chlorhexidine 2% Cloths 1 Application(s) Topical daily  cloZAPine 100 milliGRAM(s) Oral at bedtime  dextrose 5%. 1000 milliLiter(s) (100 mL/Hr) IV Continuous <Continuous>  dextrose 5%. 1000 milliLiter(s) (50 mL/Hr) IV Continuous <Continuous>  dextrose 50% Injectable 25 Gram(s) IV Push once  dextrose 50% Injectable 12.5 Gram(s) IV Push once  dextrose 50% Injectable 25 Gram(s) IV Push once  divalproex ER 1000 milliGRAM(s) Oral at bedtime  enoxaparin Injectable 40 milliGRAM(s) SubCutaneous every 24 hours  finasteride 5 milliGRAM(s) Oral daily  glucagon  Injectable 1 milliGRAM(s) IntraMuscular once  insulin glargine Injectable (LANTUS) 8 Unit(s) SubCutaneous at bedtime  insulin lispro (ADMELOG) corrective regimen sliding scale   SubCutaneous three times a day before meals  insulin lispro (ADMELOG) corrective regimen sliding scale   SubCutaneous at bedtime  insulin lispro Injectable (ADMELOG) 2 Unit(s) SubCutaneous three times a day before meals  latanoprost 0.005% Ophthalmic Solution 1 Drop(s) Both EYES at bedtime  levobunolol 0.5% Solution 1 Drop(s) Both EYES two times a day  levothyroxine 88 MICROGram(s) Oral daily  linagliptin 5 milliGRAM(s) Oral daily  pantoprazole    Tablet 40 milliGRAM(s) Oral <User Schedule>  polyethylene glycol 3350 17 Gram(s) Oral two times a day  pyridoxine 50 milliGRAM(s) Oral daily  RHOPRESSA (Netarsudil) 0.02% Eye Drops 1 Drop(s) 1 Drop(s) Both EYES daily  senna 1 Tablet(s) Oral at bedtime  tamsulosin 0.4 milliGRAM(s) Oral at bedtime  venlafaxine 75 milliGRAM(s) Oral daily    MEDICATIONS  (PRN):  acetaminophen     Tablet .. 650 milliGRAM(s) Oral every 6 hours PRN Temp greater or equal to 38C (100.4F), Mild Pain (1 - 3)  dextrose Oral Gel 15 Gram(s) Oral once PRN Blood Glucose LESS THAN 70 milliGRAM(s)/deciliter  haloperidol    Injectable 1 milliGRAM(s) IV Push every 6 hours PRN agitation

## 2023-03-08 NOTE — DIETITIAN INITIAL EVALUATION ADULT - PERTINENT LABORATORY DATA
03-08    132<L>  |  97<L>  |  15  ----------------------------<  98  4.3   |  25  |  0.71    Ca    8.8      08 Mar 2023 06:30  Phos  3.9     03-08  Mg     1.80     03-08    POCT Blood Glucose.: 139 mg/dL (03-08-23 @ 08:16)  A1C with Estimated Average Glucose Result: 7.1 % (03-07-23 @ 06:00)  A1C with Estimated Average Glucose Result: 6.4 % (01-30-23 @ 16:45)

## 2023-03-08 NOTE — DIETITIAN INITIAL EVALUATION ADULT - OTHER INFO
[address] [place] [address]  [phone]    Maria Antonia Correa  [address] [place] [id number]          December 8, 2017    Re: Maria Antonia Correa   1991     To Whom It May Concern:    The patient listed above is under my care for her pregnancy. Her estimated date of delivery is 4/23/2018, and is pregnant with 1 fetus. Her initial date of service in our office for her pregnancy was 8/29/2017.    Sincerely,           Willow Swan MD     
Patient reports good appetite and PO intake at this time. Per nursing flowsheet, po intake % of meals recorded in nursing flowsheet. Patient denies any nausea/vomiting/diarrhea/constipation or difficulty chewing and swallowing. Patient reports he has been filling out menu to get what he likes. Continue good po intake encouraged. No questions or concerns voiced at this time.

## 2023-03-08 NOTE — PROGRESS NOTE ADULT - PROBLEM SELECTOR PROBLEM 1
Hypothyroidism
Toxic metabolic encephalopathy
Hypothyroidism
Toxic metabolic encephalopathy

## 2023-03-08 NOTE — PROGRESS NOTE ADULT - SUBJECTIVE AND OBJECTIVE BOX
Pt reports feeling well, ready to go home  had juice this morning so sugar >348 before lunch. He feels fine    MEDICATIONS  (STANDING):  atorvastatin 40 milliGRAM(s) Oral at bedtime  chlorhexidine 2% Cloths 1 Application(s) Topical daily  cloZAPine 100 milliGRAM(s) Oral at bedtime  dextrose 5%. 1000 milliLiter(s) (100 mL/Hr) IV Continuous <Continuous>  dextrose 5%. 1000 milliLiter(s) (50 mL/Hr) IV Continuous <Continuous>  dextrose 50% Injectable 25 Gram(s) IV Push once  dextrose 50% Injectable 12.5 Gram(s) IV Push once  dextrose 50% Injectable 25 Gram(s) IV Push once  divalproex ER 1000 milliGRAM(s) Oral at bedtime  enoxaparin Injectable 40 milliGRAM(s) SubCutaneous every 24 hours  finasteride 5 milliGRAM(s) Oral daily  glucagon  Injectable 1 milliGRAM(s) IntraMuscular once  insulin glargine Injectable (LANTUS) 8 Unit(s) SubCutaneous at bedtime  insulin lispro (ADMELOG) corrective regimen sliding scale   SubCutaneous three times a day before meals  insulin lispro (ADMELOG) corrective regimen sliding scale   SubCutaneous at bedtime  insulin lispro Injectable (ADMELOG) 2 Unit(s) SubCutaneous three times a day before meals  latanoprost 0.005% Ophthalmic Solution 1 Drop(s) Both EYES at bedtime  levobunolol 0.5% Solution 1 Drop(s) Both EYES two times a day  levothyroxine 88 MICROGram(s) Oral daily  linagliptin 5 milliGRAM(s) Oral daily  pantoprazole    Tablet 40 milliGRAM(s) Oral <User Schedule>  polyethylene glycol 3350 17 Gram(s) Oral two times a day  pyridoxine 50 milliGRAM(s) Oral daily  RHOPRESSA (Netarsudil) 0.02% Eye Drops 1 Drop(s) 1 Drop(s) Both EYES daily  senna 1 Tablet(s) Oral at bedtime  tamsulosin 0.4 milliGRAM(s) Oral at bedtime  venlafaxine 75 milliGRAM(s) Oral daily    MEDICATIONS  (PRN):  acetaminophen     Tablet .. 650 milliGRAM(s) Oral every 6 hours PRN Temp greater or equal to 38C (100.4F), Mild Pain (1 - 3)  dextrose Oral Gel 15 Gram(s) Oral once PRN Blood Glucose LESS THAN 70 milliGRAM(s)/deciliter  haloperidol    Injectable 1 milliGRAM(s) IV Push every 6 hours PRN agitation      Allergies    No Known Allergies    Intolerances        Review of Systems:  Constitutional: No fever, good appetite/po intake  Eyes: No blurry vision, diplopia  Neuro: No tremors  HEENT: No pain  Cardiovascular: No chest pain, palpitations  Respiratory: No SOB, no cough  GI: No nausea, vomiting,   : No dysuria, hematuria  Skin: no rash  Psych: no depression  Endocrine: no polyuria, polydipsia  Hem/lymph: no swelling  Osteoporosis: no fractures    ALL OTHER SYSTEMS REVIEWED AND NEGATIVE    PHYSICAL EXAM:  VITALS: T(C): 36.3 (03-08-23 @ 06:22)  T(F): 97.4 (03-08-23 @ 06:22), Max: 98.1 (03-07-23 @ 21:55)  HR: 61 (03-08-23 @ 06:22) (61 - 67)  BP: 141/77 (03-08-23 @ 06:22) (137/77 - 141/77)  RR:  (17 - 18)  SpO2:  (99% - 99%)  Wt(kg): --  GENERAL: NAD, well-groomed, well-developed  EYES: No proptosis, no lid lag, anicteric  HEENT:  Atraumatic, normocephalic, moist mucous membranes  RESPIRATORY: nonlabored respirations, no wheezing  PSYCH: Alert and oriented x 3, normal affect, normal mood    POCT Blood Glucose.: 348 mg/dL (03-08-23 @ 12:08)  POCT Blood Glucose.: 139 mg/dL (03-08-23 @ 08:16)  POCT Blood Glucose.: 211 mg/dL (03-07-23 @ 21:42)  POCT Blood Glucose.: 197 mg/dL (03-07-23 @ 17:57)  POCT Blood Glucose.: 213 mg/dL (03-07-23 @ 12:20)  POCT Blood Glucose.: 105 mg/dL (03-07-23 @ 08:53)  POCT Blood Glucose.: 226 mg/dL (03-06-23 @ 22:01)  POCT Blood Glucose.: 337 mg/dL (03-06-23 @ 17:45)  POCT Blood Glucose.: 236 mg/dL (03-06-23 @ 12:12)  POCT Blood Glucose.: 127 mg/dL (03-06-23 @ 08:30)  POCT Blood Glucose.: 342 mg/dL (03-05-23 @ 21:54)  POCT Blood Glucose.: 117 mg/dL (03-05-23 @ 17:57)                            8.8    4.61  )-----------( 148      ( 08 Mar 2023 06:30 )             27.3       03-08    132<L>  |  97<L>  |  15  ----------------------------<  98  4.3   |  25  |  0.71    eGFR: 100    Ca    8.8      03-08  Mg     1.80     03-08  Phos  3.9     03-08        Thyroid Function Tests:  03-01 @ 18:08 TSH 8.67 FreeT4 1.4 T3 -- Anti TPO -- Anti Thyroglobulin Ab -- TSI --  03-01 @ 16:24 TSH 6.45 FreeT4 -- T3 -- Anti TPO -- Anti Thyroglobulin Ab -- TSI --      01-31 Chol 89 Direct LDL -- LDL calculated 23 HDL 53 Trig 64    Radiology:

## 2023-03-08 NOTE — PROGRESS NOTE ADULT - PROBLEM SELECTOR PROBLEM 2
Hypothermia not due to cold exposure
Type 2 diabetes mellitus treated with insulin
Hypothermia not due to cold exposure
Hypothermia not due to cold exposure
Type 2 diabetes mellitus treated with insulin
Hypothermia not due to cold exposure

## 2023-03-08 NOTE — PROGRESS NOTE ADULT - ASSESSMENT
Patient is a 67y old  Male who presents with a chief complaint of Hypothermia. Blood culture with CoNS staph on admission.    Positive blood culture  Likely to be contamination given low grade (2/4 bottles),    No foreign material, no orthopedic hardware  Repeat blood cultures pending however obtained while on antibiotics, subsequent off antibiotics, no growth  Patient otherwise non-toxic, offers no localizing symptoms  3/5 Bcx - 1/4 positive GPC in clusters, staph hominis growth    Recommendations  No further growth on blood cultures from 3/5, 3/6  At this point, given multiple species including staph hematolyticus, hominis and corynebacterium without any localizing symptoms or other reasons to have recurrent true bacteremis will conclude these are most likely contaminants  Monitor off antibiotics  Would not obtain further cultures unless fever  Follow pending blood cultures  Follow fever curve and WBC count  Ready for discharge per primary team - no ID contraindications given above    Nestor Frazier MD  Division of Infectious Diseases  Available on Teams

## 2023-03-08 NOTE — DIETITIAN INITIAL EVALUATION ADULT - REASON FOR ADMISSION
Sepsis    Per chart review, 66 y/o Male medical history of schizophrenia, bipolar disorder, type 2 DM, hypertension, hypothyroidism, BPH, glaucoma, and SBO (in 2017) presents for evaluation for a brief period of dizziness, lightheadedness, and confusion today, negative for CVA, found to be hypothermic most likely 2/2 infectious etiology.

## 2023-03-08 NOTE — PROGRESS NOTE ADULT - REASON FOR ADMISSION
Hypothermia

## 2023-03-09 ENCOUNTER — NON-APPOINTMENT (OUTPATIENT)
Age: 68
End: 2023-03-09

## 2023-03-09 ENCOUNTER — APPOINTMENT (OUTPATIENT)
Dept: OPHTHALMOLOGY | Facility: CLINIC | Age: 68
End: 2023-03-09
Payer: MEDICARE

## 2023-03-09 PROCEDURE — 92133 CPTRZD OPH DX IMG PST SGM ON: CPT

## 2023-03-09 PROCEDURE — 92012 INTRM OPH EXAM EST PATIENT: CPT

## 2023-03-09 PROCEDURE — 92083 EXTENDED VISUAL FIELD XM: CPT

## 2023-03-10 LAB
CULTURE RESULTS: SIGNIFICANT CHANGE UP
SPECIMEN SOURCE: SIGNIFICANT CHANGE UP

## 2023-03-17 ENCOUNTER — APPOINTMENT (OUTPATIENT)
Dept: INTERNAL MEDICINE | Facility: CLINIC | Age: 68
End: 2023-03-17
Payer: MEDICARE

## 2023-03-17 VITALS
DIASTOLIC BLOOD PRESSURE: 71 MMHG | HEART RATE: 88 BPM | OXYGEN SATURATION: 95 % | TEMPERATURE: 97.6 F | SYSTOLIC BLOOD PRESSURE: 135 MMHG

## 2023-03-17 DIAGNOSIS — I10 ESSENTIAL (PRIMARY) HYPERTENSION: ICD-10-CM

## 2023-03-17 PROCEDURE — 99214 OFFICE O/P EST MOD 30 MIN: CPT

## 2023-03-17 RX ORDER — LISINOPRIL 10 MG/1
10 TABLET ORAL
Qty: 120 | Refills: 2 | Status: DISCONTINUED | COMMUNITY
Start: 2019-07-19 | End: 2023-03-17

## 2023-03-17 RX ORDER — PYRIDOXINE HCL (VITAMIN B6) 50 MG
50 TABLET ORAL
Refills: 0 | Status: ACTIVE | COMMUNITY
Start: 2023-03-17

## 2023-03-17 RX ORDER — BLOOD-GLUCOSE METER
W/DEVICE EACH MISCELLANEOUS
Qty: 1 | Refills: 0 | Status: ACTIVE | COMMUNITY
Start: 2023-03-17

## 2023-03-17 RX ORDER — BIMATOPROST 0.1 MG/ML
0.01 SOLUTION/ DROPS OPHTHALMIC
Refills: 0 | Status: ACTIVE | COMMUNITY
Start: 2023-03-17

## 2023-03-17 NOTE — ASSESSMENT
[FreeTextEntry1] : \par HCM:\par - CT A/P with no comment of AAA\par - Will need to discuss Shingrix\par - CBC, renal, urine studies\par - Needs thyroid in mid-May\par \par RTC 1 month for multiple med complaints

## 2023-03-17 NOTE — HISTORY OF PRESENT ILLNESS
[de-identified] : Lauro Olmstead is a 67M with PMhx of schizophrenia/ OCD, T2DM, HLD, HTN, hypothyroidism, Schneider's esophagus, chronic hyponatremia (probably 2/2 SIADH from meds) presents for hospital discharge follow-up. Accompanied by brother David\par \par Admitted 1/30-2/13 for dizziness/confusion. Suspected to be septic and hypoxic 2/2 aspiration PNA, received abx and discharge on home O2. Pancytopenia also noted, work-up generally negative. Discharged to [facility] for 2 weeks, and then discharged home.\par \par Readmitted 3/1-3/8 for AMS/hypothermic. Work-up was neg including cosyntropin test and infectious etiology. Suspect 2/2 to psych meds? MRI brain with microvascular changes only. Discharged to home.\par \par Doing well, aside from a low temp 3/12 which has resolved. FSBG quite variable 59 yesterday AM. Patient report he noted his FSBG was 170 so he took 2 glipziide 5mg ER. No cough. Eating is back to normal. Walking is his baseline--still quite unbalanced.\par \par Concerned because his insurance is changing soon--he is worried he will not be able to follow with his Psych doctor.\par Plans to wait on seeing Endo/nephro due to insurance change\par \par Needs repeat CT chest in Apr\par

## 2023-03-17 NOTE — PHYSICAL EXAM
[No Acute Distress] : no acute distress [Normal Sclera/Conjunctiva] : normal sclera/conjunctiva [No Respiratory Distress] : no respiratory distress  [No Accessory Muscle Use] : no accessory muscle use [Clear to Auscultation] : lungs were clear to auscultation bilaterally [Normal Rate] : normal rate  [Regular Rhythm] : with a regular rhythm [Normal S1, S2] : normal S1 and S2 [Kyphosis] : kyphosis [Normal Gait] : normal gait [de-identified] : ?murmur best heard in left sternal border [de-identified] : +1 pitting edema bilaterally [de-identified] : Answering questions appropriately. Gets on exam table and ambulates without assistance [de-identified] : Cooperative. Answer questions appropriately, sometimes answers circumferentially [[email or web address]] : +1

## 2023-03-19 NOTE — ED PROVIDER NOTE - NO SIGNIFICANT PAST SURGICAL HISTORY
Aj Banuelos discharged to home accompanied by wife and daughter.   .   Valuables and belongings sent with patient.   discharge summary, discharge instructions, medications and follow up appointments reviewed with patient.  Patient  verbalized understanding        <<----- Click to add NO significant Past Surgical History

## 2023-03-21 LAB
ALBUMIN SERPL ELPH-MCNC: 4.1 G/DL
ANION GAP SERPL CALC-SCNC: 16 MMOL/L
BASOPHILS # BLD AUTO: 0.04 K/UL
BASOPHILS NFR BLD AUTO: 0.7 %
BUN SERPL-MCNC: 16 MG/DL
CALCIUM SERPL-MCNC: 9.4 MG/DL
CHLORIDE SERPL-SCNC: 94 MMOL/L
CO2 SERPL-SCNC: 24 MMOL/L
CREAT SERPL-MCNC: 0.79 MG/DL
EGFR: 97 ML/MIN/1.73M2
EOSINOPHIL # BLD AUTO: 0.06 K/UL
EOSINOPHIL NFR BLD AUTO: 1 %
FERRITIN SERPL-MCNC: 53 NG/ML
FOLATE SERPL-MCNC: 9.4 NG/ML
GLUCOSE SERPL-MCNC: 92 MG/DL
HCT VFR BLD CALC: 27.1 %
HGB BLD-MCNC: 8.7 G/DL
IMM GRANULOCYTES NFR BLD AUTO: 0.5 %
IRON SATN MFR SERPL: 91 %
IRON SERPL-MCNC: 270 UG/DL
LYMPHOCYTES # BLD AUTO: 1.1 K/UL
LYMPHOCYTES NFR BLD AUTO: 19 %
MAN DIFF?: NORMAL
MCHC RBC-ENTMCNC: 31.8 PG
MCHC RBC-ENTMCNC: 32.1 GM/DL
MCV RBC AUTO: 98.9 FL
MONOCYTES # BLD AUTO: 0.43 K/UL
MONOCYTES NFR BLD AUTO: 7.4 %
NEUTROPHILS # BLD AUTO: 4.12 K/UL
NEUTROPHILS NFR BLD AUTO: 71.4 %
PHOSPHATE SERPL-MCNC: 5.1 MG/DL
PLATELET # BLD AUTO: 215 K/UL
POTASSIUM SERPL-SCNC: 4.9 MMOL/L
RBC # BLD: 2.74 M/UL
RBC # FLD: 15.5 %
SODIUM SERPL-SCNC: 133 MMOL/L
TIBC SERPL-MCNC: 296 UG/DL
UIBC SERPL-MCNC: 26 UG/DL
VIT B12 SERPL-MCNC: 524 PG/ML
WBC # FLD AUTO: 5.78 K/UL

## 2023-05-02 ENCOUNTER — APPOINTMENT (OUTPATIENT)
Dept: INTERNAL MEDICINE | Facility: CLINIC | Age: 68
End: 2023-05-02
Payer: MEDICARE

## 2023-05-02 VITALS
TEMPERATURE: 97.6 F | SYSTOLIC BLOOD PRESSURE: 125 MMHG | HEART RATE: 72 BPM | DIASTOLIC BLOOD PRESSURE: 61 MMHG | HEIGHT: 65 IN | OXYGEN SATURATION: 98 % | BODY MASS INDEX: 27.49 KG/M2 | WEIGHT: 165 LBS

## 2023-05-02 DIAGNOSIS — R91.1 SOLITARY PULMONARY NODULE: ICD-10-CM

## 2023-05-02 DIAGNOSIS — R93.89 ABNORMAL FINDINGS ON DIAGNOSTIC IMAGING OF OTHER SPECIFIED BODY STRUCTURES: ICD-10-CM

## 2023-05-02 PROCEDURE — 99214 OFFICE O/P EST MOD 30 MIN: CPT

## 2023-05-02 NOTE — PHYSICAL EXAM
[No Acute Distress] : no acute distress [Normal Sclera/Conjunctiva] : normal sclera/conjunctiva [No Respiratory Distress] : no respiratory distress  [No Accessory Muscle Use] : no accessory muscle use [Clear to Auscultation] : lungs were clear to auscultation bilaterally [Normal Rate] : normal rate  [Regular Rhythm] : with a regular rhythm [Normal S1, S2] : normal S1 and S2 [Kyphosis] : kyphosis [Scoliosis] : scoliosis [Normal Gait] : normal gait [de-identified] : ?murmur best heard in left sternal border [de-identified] : Answering questions appropriately. Gets on exam table and ambulates without assistance [de-identified] : Cooperative. Answer questions appropriately, sometimes answers circumferentially

## 2023-05-02 NOTE — HISTORY OF PRESENT ILLNESS
[de-identified] : Lauro Olmstead is a 67M with PMhx of schizophrenia/ OCD, T2DM, HLD, HTN, hypothyroidism, Schneider's esophagus, chronic hyponatremia (probably 2/2 SIADH from meds) presents for hospital discharge follow-up. Accompanied by brother David\tasneem \tasneem Tried to avoid glipizide-ER due to recurrent hypoglycemia. Fasting FSBG still variable 80s-150s\tasneem Switched tamsulosin to QHS for poss contribution to dizziness, feels this is improved.\tasneem Now has new insurance and is trying to get back in with all his specialists including Endo, Gastro

## 2023-05-02 NOTE — ASSESSMENT
[FreeTextEntry1] : HCM:\par - TSH due mid-may\par - CT A/P with no comment of AAA\par - Will need to discuss Shingrix\par \par RTC 1-2 months pending subspecialist follow-up

## 2023-05-08 NOTE — PROGRESS NOTE BEHAVIORAL HEALTH - NSBHCONSULTMEDAGITATION_PSY_A_CORE FT
Pt discharged in stable condition with verbalization of discharge instructions all questions answered and all  belongings sent with patient. Patient tolerated nplate without any complications or signs of a reaction.
Haldol 5mg PO/IV/IM q6h

## 2023-05-16 ENCOUNTER — APPOINTMENT (OUTPATIENT)
Dept: OPHTHALMOLOGY | Facility: CLINIC | Age: 68
End: 2023-05-16
Payer: MEDICARE

## 2023-05-16 ENCOUNTER — NON-APPOINTMENT (OUTPATIENT)
Age: 68
End: 2023-05-16

## 2023-05-16 PROCEDURE — 92014 COMPRE OPH EXAM EST PT 1/>: CPT

## 2023-05-16 PROCEDURE — 92133 CPTRZD OPH DX IMG PST SGM ON: CPT

## 2023-06-22 ENCOUNTER — OUTPATIENT (OUTPATIENT)
Dept: OUTPATIENT SERVICES | Facility: HOSPITAL | Age: 68
LOS: 1 days | End: 2023-06-22
Payer: MEDICARE

## 2023-06-22 ENCOUNTER — APPOINTMENT (OUTPATIENT)
Dept: CT IMAGING | Facility: IMAGING CENTER | Age: 68
End: 2023-06-22
Payer: MEDICARE

## 2023-06-22 DIAGNOSIS — R91.1 SOLITARY PULMONARY NODULE: ICD-10-CM

## 2023-06-22 PROCEDURE — 71250 CT THORAX DX C-: CPT

## 2023-06-22 PROCEDURE — 71250 CT THORAX DX C-: CPT | Mod: 26

## 2023-07-07 ENCOUNTER — EMERGENCY (EMERGENCY)
Facility: HOSPITAL | Age: 68
LOS: 1 days | Discharge: HOME | End: 2023-07-07
Attending: EMERGENCY MEDICINE
Payer: MEDICARE

## 2023-07-07 VITALS
HEIGHT: 66 IN | RESPIRATION RATE: 16 BRPM | WEIGHT: 199.96 LBS | TEMPERATURE: 98 F | DIASTOLIC BLOOD PRESSURE: 75 MMHG | HEART RATE: 73 BPM | OXYGEN SATURATION: 96 % | SYSTOLIC BLOOD PRESSURE: 121 MMHG

## 2023-07-07 VITALS
OXYGEN SATURATION: 99 % | TEMPERATURE: 98 F | HEART RATE: 74 BPM | DIASTOLIC BLOOD PRESSURE: 72 MMHG | SYSTOLIC BLOOD PRESSURE: 145 MMHG | RESPIRATION RATE: 18 BRPM

## 2023-07-07 PROCEDURE — 82962 GLUCOSE BLOOD TEST: CPT

## 2023-07-07 PROCEDURE — 99284 EMERGENCY DEPT VISIT MOD MDM: CPT | Mod: 25

## 2023-07-07 PROCEDURE — 73562 X-RAY EXAM OF KNEE 3: CPT | Mod: 26,LT

## 2023-07-07 PROCEDURE — 73080 X-RAY EXAM OF ELBOW: CPT | Mod: 26,RT

## 2023-07-07 PROCEDURE — 90471 IMMUNIZATION ADMIN: CPT

## 2023-07-07 PROCEDURE — 12011 RPR F/E/E/N/L/M 2.5 CM/<: CPT

## 2023-07-07 PROCEDURE — 70450 CT HEAD/BRAIN W/O DYE: CPT | Mod: MA

## 2023-07-07 PROCEDURE — 70450 CT HEAD/BRAIN W/O DYE: CPT | Mod: 26,MA

## 2023-07-07 PROCEDURE — 73562 X-RAY EXAM OF KNEE 3: CPT

## 2023-07-07 PROCEDURE — 73080 X-RAY EXAM OF ELBOW: CPT

## 2023-07-07 PROCEDURE — 90715 TDAP VACCINE 7 YRS/> IM: CPT

## 2023-07-07 RX ORDER — DIVALPROEX SODIUM 500 MG/1
1000 TABLET, DELAYED RELEASE ORAL ONCE
Refills: 0 | Status: COMPLETED | OUTPATIENT
Start: 2023-07-07 | End: 2023-07-07

## 2023-07-07 RX ORDER — TETANUS TOXOID, REDUCED DIPHTHERIA TOXOID AND ACELLULAR PERTUSSIS VACCINE, ADSORBED 5; 2.5; 8; 8; 2.5 [IU]/.5ML; [IU]/.5ML; UG/.5ML; UG/.5ML; UG/.5ML
0.5 SUSPENSION INTRAMUSCULAR ONCE
Refills: 0 | Status: COMPLETED | OUTPATIENT
Start: 2023-07-07 | End: 2023-07-07

## 2023-07-07 RX ORDER — CLOZAPINE 150 MG/1
100 TABLET, ORALLY DISINTEGRATING ORAL ONCE
Refills: 0 | Status: COMPLETED | OUTPATIENT
Start: 2023-07-07 | End: 2023-07-07

## 2023-07-07 RX ORDER — ACETAMINOPHEN 500 MG
650 TABLET ORAL ONCE
Refills: 0 | Status: COMPLETED | OUTPATIENT
Start: 2023-07-07 | End: 2023-07-07

## 2023-07-07 RX ADMIN — TETANUS TOXOID, REDUCED DIPHTHERIA TOXOID AND ACELLULAR PERTUSSIS VACCINE, ADSORBED 0.5 MILLILITER(S): 5; 2.5; 8; 8; 2.5 SUSPENSION INTRAMUSCULAR at 14:40

## 2023-07-07 RX ADMIN — DIVALPROEX SODIUM 1000 MILLIGRAM(S): 500 TABLET, DELAYED RELEASE ORAL at 20:30

## 2023-07-07 RX ADMIN — CLOZAPINE 100 MILLIGRAM(S): 150 TABLET, ORALLY DISINTEGRATING ORAL at 20:30

## 2023-07-07 RX ADMIN — Medication 650 MILLIGRAM(S): at 14:39

## 2023-07-07 NOTE — ED PROCEDURE NOTE - ATTENDING CONTRIBUTION TO CARE
Attending note -- agree with the above as documented unless otherwise noted.  I was present during the key portions of this procedure.  --PARMINDER

## 2023-07-07 NOTE — ED ADULT NURSE NOTE - NSFALLRISKINTERV_ED_ALL_ED
Assistance OOB with selected safe patient handling equipment if applicable/Assistance with ambulation/Communicate fall risk and risk factors to all staff, patient, and family/Monitor gait and stability/Provide visual cue: yellow wristband, yellow gown, etc/Reinforce activity limits and safety measures with patient and family/Call bell, personal items and telephone in reach/Instruct patient to call for assistance before getting out of bed/chair/stretcher/Non-slip footwear applied when patient is off stretcher/[place] to call system/Physically safe environment - no spills, clutter or unnecessary equipment/Purposeful Proactive Rounding/Room/bathroom lighting operational, light cord in reach

## 2023-07-07 NOTE — ED PROVIDER NOTE - PROGRESS NOTE DETAILS
MD Chanelle (PGY-2) Lac repaired. MD Chanelle (PGY-2) CAT scans results reviewed.  No ICH.  X-rays reviewed, no fractures.  Patient is stable for discharge at this time.

## 2023-07-07 NOTE — ED PROVIDER NOTE - PHYSICAL EXAMINATION
Const: not in acute distress  Eyes: no conjunctival injection  HEENT: Head NCAT, Moist MM.  Neck: Trachea midline.   CVS: +S1/S2, Peripheral pulses 2+ and equal in all extremities.  RESP: Unlabored respiratory effort. Clear to auscultation bilaterally.  GI: Nontender/Nondistended, No CVA tenderness b/l.   MSK: Normocephalic/Atraumatic, No Lower Extremities edema b/l.   Skin: Intact.   Neuro: CNs II-XII grossly intact. Motor & Sensation grossly intact.  Psych: Awake, Alert, & Cooperative CT Const: not in acute distress  Eyes: no conjunctival injection  HEENT: Head NCAT, Moist MM.  Neck: Trachea midline.   CVS: +S1/S2, Peripheral pulses 2+ and equal in all extremities.  RESP: Unlabored respiratory effort. Clear to auscultation bilaterally.  GI: Nontender/Nondistended, No CVA tenderness b/l.   MSK: Normocephalic/Atraumatic, No Lower Extremities edema b/l.  Ecchymosis/small abrasion to R elbow without limitations to range of motion and +minimal tenderness to palpation.  L knee c abrasions anteriorly, mild tenderness to palpation overlying patella.  full range of motion, extensor mechanism intact, no FND distally.  Skin: 3-4 cm laceration, horizontal and linear, just superior to R eyebrow, no obvious frontalis muscle involvement, no active bleeding, no stepoff.  Neuro: CNs II-XII grossly intact. Motor & Sensation grossly intact.  Psych: Awake, Alert, & Cooperative CT Const: not in acute distress  Eyes: no conjunctival injection  HEENT: Head NCAT, Moist MM.  Neck: Trachea midline.   CVS: +S1/S2, Peripheral pulses 2+ and equal in all extremities.  RESP: Unlabored respiratory effort. Clear to auscultation bilaterally.  GI: Nontender/Nondistended, No CVA tenderness b/l.   MSK: Normocephalic/Atraumatic, No Lower Extremities edema b/l.  Ecchymosis/small abrasion to R elbow without limitations to range of motion and +minimal tenderness to palpation.  L knee c abrasions anteriorly, mild tenderness to palpation overlying patella.  full range of motion, extensor mechanism intact, no FND distally.  Skin: 3-4 cm laceration, horizontal and linear, just superior to R eyebrow, no obvious frontalis muscle involvement, no active bleeding, no stepoff.  Neuro: Motor & Sensation grossly intact.  Psych: Awake, Alert, & Cooperative CT

## 2023-07-07 NOTE — CHART NOTE - NSCHARTNOTEFT_GEN_A_CORE
Emergency Social Work Note:  LMSW received a referral for assistance with discharge planning. Per medical patient is medically cleared for discharge. Patient has Medicaid insurance benefit.  ([phone]) Authorization number: [id number].   Transportation provider to be assigned. LMSW contacted the patient’s brother, David ([phone]) Patient was made aware transportation may take up to 4 hours to arrive to the hospital. Patient declined to identify a primary caregiver.   LMSW will continue to follow up as needed.

## 2023-07-07 NOTE — ED PROVIDER NOTE - OBJECTIVE STATEMENT
67-year-old male with past medical history of schizophrenia, bipolar disorder, diabetes type 2, hypertension, hypothyroidism, BPH, glaucoma, and SBO (in 2017), presenting with mechanical fall. 67-year-old male with past medical history of schizophrenia, bipolar disorder, diabetes type 2, hypertension, hypothyroidism, BPH, glaucoma, and SBO (in 2017), presenting with mechanical fall.  Patient states that he was stepping up on a curb when he fell.  Patient with laceration above the right eyebrow.  Patient denies LOC.  Patient denies nausea vomiting.  Patient denies being on a blood thinner.  Patient also has abrasion on right elbow.  Patient also has some left knee pain as well.  Patient denies chest pain, shortness of breath, abdominal pain.

## 2023-07-07 NOTE — ED PROVIDER NOTE - CLINICAL SUMMARY MEDICAL DECISION MAKING FREE TEXT BOX
Mechanical trip and fall witnessed by family, +head strike and laceration just superior to R eyebrow as well as abrasions/ecchymosis to L knee anteriorly about tib plateau, less so at R elbow.  Not on AC.  No FND.  No ams as per brother at bedside.  Lac repair, tdap, CT head, apap, XR R elbow and L knee, reassess.  -_BMM

## 2023-07-07 NOTE — ED PROVIDER NOTE - PATIENT PORTAL LINK FT
You can access the FollowMyHealth Patient Portal offered by [facility] by registering at the following website: [email or web address]. By joining [facility]’s FollowMyHealth portal, you will also be able to view your health information using other applications (apps) compatible with our system.

## 2023-07-07 NOTE — ED ADULT NURSE NOTE - OBJECTIVE STATEMENT
68 y/o M with PMH of HTN, HLD, bipolar, hypothyroidism, DM [presents to ED complaining of a fall. Pt tripped and fell over a curve, hit head on ground. Pt denies any anticoagulant use, denies any LOC. Upon arrival, pt is A&Ox4. @ inch laceration on right eyebrow w/ controlled bleeding. small abrasions on bilateral knees, one abrasion on right elbow and right hand. Pt able to move all extremities. Full strength in all extremities. Denies headache, dizziness, vision changes, chest pain, shortness of breath, abdominal pain, nausea, vomiting, diarrhea, fevers, chills, dysuria, hematuria, recent illness travel

## 2023-07-07 NOTE — ED PROVIDER NOTE - NSFOLLOWUPINSTRUCTIONS_ED_ALL_ED_FT
You were seen in the Emergency Department for a fall.  You received a CAT scan of your head which did not show acute bleeding in the head.  You also had an x-ray of your elbow and knee which did not show an acute fracture.  You received 5 sutures for the laceration above your left right eyebrow.  Please have your sutures removed in 5 to 7 days at urgent care, your PCP, or the emergency room.    1) Advance activity as tolerated.   2) Continue all previously prescribed medications as directed.    3) Follow up with your primary care physician in 1-3 days - take copies of your results.    4) Return to the Emergency Department for worsening or persistent symptoms, worsening headache, nausea vomiting, redness around the wound, and/or ANY NEW OR CONCERNING SYMPTOMS.

## 2023-07-12 RX ORDER — ATORVASTATIN CALCIUM 40 MG/1
40 TABLET, FILM COATED ORAL
Qty: 90 | Refills: 3 | Status: ACTIVE | COMMUNITY
Start: 2017-10-24 | End: 1900-01-01

## 2023-07-14 ENCOUNTER — EMERGENCY (EMERGENCY)
Facility: HOSPITAL | Age: 68
LOS: 1 days | Discharge: HOME | End: 2023-07-14
Admitting: STUDENT IN AN ORGANIZED HEALTH CARE EDUCATION/TRAINING PROGRAM
Payer: MEDICARE

## 2023-07-14 VITALS
HEIGHT: 66 IN | SYSTOLIC BLOOD PRESSURE: 124 MMHG | OXYGEN SATURATION: 100 % | DIASTOLIC BLOOD PRESSURE: 63 MMHG | HEART RATE: 69 BPM | RESPIRATION RATE: 16 BRPM | TEMPERATURE: 98 F

## 2023-07-14 PROCEDURE — L9995: CPT

## 2023-07-14 NOTE — ED PROVIDER NOTE - PROGRESS NOTE DETAILS
PA CASTREJON:  Suture removal performed; procedure tolerated well.  Pt medically stable for discharge.  Strict return precautions given.  Pt to follow up with PMD.

## 2023-07-14 NOTE — CHART NOTE - NSCHARTNOTEFT_GEN_A_CORE
SW informed by provider patient is medically cleared for discharge and needs transportation to home. SW met with patient at bedside and confirmed address listed on face sheet is correct. Patient reports he travels to home via bus and patient is requesting a [employer] card. SW has provided patient with [employer] Card. Medical team is aware. There are no further SW interventions needed at this time.

## 2023-07-14 NOTE — ED ADULT TRIAGE NOTE - HEIGHT IN INCHES
6
Alert and oriented to person, place and time, memory intact, behavior appropriate to situation, PERRL.

## 2023-07-14 NOTE — ED PROVIDER NOTE - CLINICAL SUMMARY MEDICAL DECISION MAKING FREE TEXT BOX
Patient is a 67-year-old male with past medical history of schizophrenia, bipolar disorder, diabetes mellitus type 2, hypertension presents with request for suture removal today.  Patient reports on July 7, he tripped and fell, causing laceration at right frontal aspect of head requiring sutures.  Patient presents today requesting suture removal.  Patient denies any fevers, chills, headache, numbness, weakness, redness or swelling at wound.  This is a patient with a healed sutured laceration without evidence of infection.  Plan to remove sutures and discharge patient.

## 2023-07-14 NOTE — ED PROVIDER NOTE - NSFOLLOWUPINSTRUCTIONS_ED_ALL_ED_FT
Advance activity as tolerated.  Continue all previously prescribed medications as directed unless otherwise instructed.  Follow up with your primary care physician in 48-72 hours- bring copies of your results.  Return to the ER for worsening or persistent symptoms, and/or ANY NEW OR CONCERNING SYMPTOMS. If you have issues obtaining follow up, please call: [phone] to obtain a doctor or specialist who takes your insurance in your area.  You may call [phone] to make an appointment with the internal medicine clinic.

## 2023-07-14 NOTE — ED PROVIDER NOTE - PHYSICAL EXAMINATION
3 cm healed sutured laceration at right frontal aspect of head; no redness; no warmth; no swelling; no crepitus; no tenderness

## 2023-07-14 NOTE — ED PROVIDER NOTE - PATIENT PORTAL LINK FT
You can access the FollowMyHealth Patient Portal offered by [facility] by registering at the following website: [email or web address]. By joining [facility]’s FollowMyHealth portal, you will also be able to view your health information using other applications (apps) compatible with our system.

## 2023-07-14 NOTE — ED PROVIDER NOTE - OBJECTIVE STATEMENT
Patient is a 67-year-old male with past medical history of schizophrenia, bipolar disorder, diabetes mellitus type 2, hypertension presents with request for suture removal today.  Patient reports on July 7, he tripped and fell, causing laceration at right frontal aspect of head requiring sutures.  Patient presents today requesting suture removal.  Patient denies any fevers, chills, headache, numbness, weakness, redness or swelling at wound.

## 2023-07-17 ENCOUNTER — RX RENEWAL (OUTPATIENT)
Age: 68
End: 2023-07-17

## 2023-07-17 RX ORDER — CHOLECALCIFEROL (VITAMIN D3) 25 MCG
25 MCG CAPSULE ORAL
Qty: 90 | Refills: 1 | Status: ACTIVE | COMMUNITY
Start: 2023-07-17 | End: 1900-01-01

## 2023-07-20 NOTE — PROGRESS NOTE ADULT - PROBLEM SELECTOR PROBLEM 3
Received phone call from Pt spouse regarding clarification of treatment plan. Pt spouse states she has not been updated regarding chemotherapy and radiation plan. Discussed that concurrent chemoradiation would occur over 3 weeks, followed by a 4 week break, then obtain an MRI, then 6 cycles of adjuvant TMZ. Will reach out to pharmacy staff to f/u regarding delivery of chemotherapy in anticipation to start radiation. Briefly discussed administration details of TMZ and side effects of chemo. Will send additional teaching via patient portal.     Will reach out to Pt and spouse next week for f/u appt scheduling.     Hypertension

## 2023-07-25 ENCOUNTER — APPOINTMENT (OUTPATIENT)
Dept: INTERNAL MEDICINE | Facility: CLINIC | Age: 68
End: 2023-07-25
Payer: MEDICARE

## 2023-07-25 ENCOUNTER — LABORATORY RESULT (OUTPATIENT)
Age: 68
End: 2023-07-25

## 2023-07-25 VITALS
OXYGEN SATURATION: 97 % | HEIGHT: 65 IN | SYSTOLIC BLOOD PRESSURE: 122 MMHG | DIASTOLIC BLOOD PRESSURE: 70 MMHG | HEART RATE: 72 BPM | WEIGHT: 162 LBS | BODY MASS INDEX: 26.99 KG/M2

## 2023-07-25 DIAGNOSIS — E83.42 HYPOMAGNESEMIA: ICD-10-CM

## 2023-07-25 DIAGNOSIS — E87.1 HYPO-OSMOLALITY AND HYPONATREMIA: ICD-10-CM

## 2023-07-25 DIAGNOSIS — F20.9 SCHIZOPHRENIA, UNSPECIFIED: Chronic | ICD-10-CM

## 2023-07-25 DIAGNOSIS — R29.6 REPEATED FALLS: ICD-10-CM

## 2023-07-25 PROCEDURE — 99214 OFFICE O/P EST MOD 30 MIN: CPT

## 2023-07-25 RX ORDER — CHLORHEXIDINE GLUCONATE 4 %
400 (240 MG) LIQUID (ML) TOPICAL 3 TIMES DAILY
Qty: 9 | Refills: 0 | Status: DISCONTINUED | COMMUNITY
Start: 2021-05-14 | End: 2023-07-25

## 2023-07-25 RX ORDER — AMLODIPINE BESYLATE 5 MG/1
5 TABLET ORAL
Qty: 90 | Refills: 1 | Status: ACTIVE | COMMUNITY
Start: 2023-03-17 | End: 1900-01-01

## 2023-07-25 RX ORDER — CHLORHEXIDINE GLUCONATE 4 %
325 (65 FE) LIQUID (ML) TOPICAL DAILY
Qty: 90 | Refills: 1 | Status: DISCONTINUED | COMMUNITY
Start: 2020-10-23 | End: 2023-07-25

## 2023-07-25 RX ORDER — BLOOD SUGAR DIAGNOSTIC
STRIP MISCELLANEOUS DAILY
Qty: 100 | Refills: 2 | Status: ACTIVE | COMMUNITY
Start: 2020-10-12 | End: 1900-01-01

## 2023-07-25 RX ORDER — VENLAFAXINE 100 MG/1
100 TABLET ORAL DAILY
Refills: 0 | Status: ACTIVE | COMMUNITY
Start: 2023-07-25

## 2023-07-25 RX ORDER — SITAGLIPTIN 100 MG/1
100 TABLET, FILM COATED ORAL DAILY
Qty: 90 | Refills: 2 | Status: ACTIVE | COMMUNITY
Start: 2017-07-13 | End: 1900-01-01

## 2023-07-25 RX ORDER — LANCETS 28 GAUGE
EACH MISCELLANEOUS
Qty: 1 | Refills: 2 | Status: DISCONTINUED | COMMUNITY
Start: 2019-11-12 | End: 2023-07-25

## 2023-07-25 RX ORDER — GLIPIZIDE 5 MG/1
5 TABLET ORAL
Qty: 90 | Refills: 1 | Status: ACTIVE | COMMUNITY
Start: 2022-12-13 | End: 1900-01-01

## 2023-07-25 RX ORDER — BLOOD-GLUCOSE METER
KIT MISCELLANEOUS
Qty: 1 | Refills: 0 | Status: DISCONTINUED | COMMUNITY
Start: 2020-01-07 | End: 2023-07-25

## 2023-07-25 NOTE — PROGRESS NOTE ADULT - PROBLEM SELECTOR PROBLEM 12

## 2023-07-25 NOTE — ASSESSMENT
[FreeTextEntry1] : HCM:\par - Will need repeat CT scan Jan 2024\par \par RTC 2-3 months for follow-up

## 2023-07-25 NOTE — PHYSICAL EXAM
[No Acute Distress] : no acute distress [Normal Sclera/Conjunctiva] : normal sclera/conjunctiva [No Respiratory Distress] : no respiratory distress  [No Accessory Muscle Use] : no accessory muscle use [Clear to Auscultation] : lungs were clear to auscultation bilaterally [Normal Rate] : normal rate  [Regular Rhythm] : with a regular rhythm [Normal S1, S2] : normal S1 and S2 [Kyphosis] : kyphosis [Scoliosis] : scoliosis [Normal Gait] : normal gait [de-identified] : ?murmur best heard in left sternal border [de-identified] : Trace pitting edema bilaterally [de-identified] : Answering questions appropriately. Gets on exam table and ambulates without assistance [de-identified] : Cooperative. Answer questions appropriately, sometimes answers circumferentially

## 2023-07-27 LAB
ALBUMIN SERPL ELPH-MCNC: 4.2 G/DL
ALP BLD-CCNC: 93 U/L
ALT SERPL-CCNC: 23 U/L
ANION GAP SERPL CALC-SCNC: 12 MMOL/L
APPEARANCE: CLEAR
AST SERPL-CCNC: 16 U/L
BILIRUB SERPL-MCNC: 0.2 MG/DL
BILIRUBIN URINE: NEGATIVE
BLOOD URINE: NEGATIVE
BUN SERPL-MCNC: 16 MG/DL
CALCIUM SERPL-MCNC: 9.3 MG/DL
CHLORIDE SERPL-SCNC: 95 MMOL/L
CHOLEST SERPL-MCNC: 112 MG/DL
CO2 SERPL-SCNC: 25 MMOL/L
COLOR: YELLOW
CREAT SERPL-MCNC: 0.82 MG/DL
CREAT SPEC-SCNC: 54 MG/DL
EGFR: 96 ML/MIN/1.73M2
ESTIMATED AVERAGE GLUCOSE: 151 MG/DL
FERRITIN SERPL-MCNC: 74 NG/ML
FOLATE SERPL-MCNC: 11.5 NG/ML
GLUCOSE QUALITATIVE U: NEGATIVE MG/DL
GLUCOSE SERPL-MCNC: 135 MG/DL
HBA1C MFR BLD HPLC: 6.9 %
HDLC SERPL-MCNC: 49 MG/DL
IRON SATN MFR SERPL: 29 %
IRON SERPL-MCNC: 85 UG/DL
KETONES URINE: ABNORMAL MG/DL
LDLC SERPL CALC-MCNC: 50 MG/DL
LEUKOCYTE ESTERASE URINE: NEGATIVE
MAGNESIUM SERPL-MCNC: 1.4 MG/DL
MICROALBUMIN 24H UR DL<=1MG/L-MCNC: 9.3 MG/DL
MICROALBUMIN/CREAT 24H UR-RTO: 172 MG/G
NITRITE URINE: NEGATIVE
NONHDLC SERPL-MCNC: 63 MG/DL
PH URINE: 6
POTASSIUM SERPL-SCNC: 4.7 MMOL/L
PROT SERPL-MCNC: 6.3 G/DL
PROTEIN URINE: 30 MG/DL
SODIUM ?TM SUB UR QN: 61 MMOL/L
SODIUM SERPL-SCNC: 131 MMOL/L
SPECIFIC GRAVITY URINE: 1.01
TIBC SERPL-MCNC: 290 UG/DL
TRIGL SERPL-MCNC: 62 MG/DL
TSH SERPL-ACNC: 2.09 UIU/ML
UIBC SERPL-MCNC: 204 UG/DL
UROBILINOGEN URINE: 0.2 MG/DL
VIT B12 SERPL-MCNC: 506 PG/ML

## 2023-07-27 RX ORDER — ELECTROLYTES/DEXTROSE
64 SOLUTION, ORAL ORAL
Qty: 180 | Refills: 1 | Status: ACTIVE | COMMUNITY
Start: 2023-07-27 | End: 1900-01-01

## 2023-09-21 ENCOUNTER — NON-APPOINTMENT (OUTPATIENT)
Age: 68
End: 2023-09-21

## 2023-09-21 ENCOUNTER — APPOINTMENT (OUTPATIENT)
Dept: OPHTHALMOLOGY | Facility: CLINIC | Age: 68
End: 2023-09-21
Payer: MEDICARE

## 2023-09-21 PROCEDURE — 92012 INTRM OPH EXAM EST PATIENT: CPT | Mod: 25

## 2023-10-16 PROCEDURE — 90853 GROUP PSYCHOTHERAPY: CPT

## 2023-10-26 ENCOUNTER — OUTPATIENT (OUTPATIENT)
Dept: OUTPATIENT SERVICES | Facility: HOSPITAL | Age: 68
LOS: 1 days | End: 2023-10-26
Payer: MEDICARE

## 2023-10-26 ENCOUNTER — APPOINTMENT (OUTPATIENT)
Dept: INTERNAL MEDICINE | Facility: CLINIC | Age: 68
End: 2023-10-26
Payer: MEDICARE

## 2023-10-26 VITALS
HEIGHT: 65 IN | HEART RATE: 83 BPM | DIASTOLIC BLOOD PRESSURE: 68 MMHG | SYSTOLIC BLOOD PRESSURE: 126 MMHG | WEIGHT: 171 LBS | BODY MASS INDEX: 28.49 KG/M2 | OXYGEN SATURATION: 97 %

## 2023-10-26 DIAGNOSIS — Z23 ENCOUNTER FOR IMMUNIZATION: ICD-10-CM

## 2023-10-26 DIAGNOSIS — Z08 ENCOUNTER FOR FOLLOW-UP EXAMINATION AFTER COMPLETED TREATMENT FOR MALIGNANT NEOPLASM: ICD-10-CM

## 2023-10-26 DIAGNOSIS — Z85.038 ENCOUNTER FOR FOLLOW-UP EXAMINATION AFTER COMPLETED TREATMENT FOR MALIGNANT NEOPLASM: ICD-10-CM

## 2023-10-26 DIAGNOSIS — I10 ESSENTIAL (PRIMARY) HYPERTENSION: ICD-10-CM

## 2023-10-26 DIAGNOSIS — M41.20 OTHER IDIOPATHIC SCOLIOSIS, SITE UNSPECIFIED: ICD-10-CM

## 2023-10-26 DIAGNOSIS — R63.4 ABNORMAL WEIGHT LOSS: ICD-10-CM

## 2023-10-26 DIAGNOSIS — Z12.83 ENCOUNTER FOR SCREENING FOR MALIGNANT NEOPLASM OF SKIN: ICD-10-CM

## 2023-10-26 DIAGNOSIS — E11.9 TYPE 2 DIABETES MELLITUS W/OUT COMPLICATIONS: ICD-10-CM

## 2023-10-26 PROCEDURE — G0439: CPT | Mod: 25

## 2023-10-26 PROCEDURE — G0008: CPT

## 2023-10-26 PROCEDURE — 90662 IIV NO PRSV INCREASED AG IM: CPT

## 2023-10-26 PROCEDURE — G0439: CPT

## 2023-10-27 LAB
ALBUMIN SERPL ELPH-MCNC: 4.2 G/DL
ALP BLD-CCNC: 81 U/L
ALT SERPL-CCNC: 12 U/L
ANION GAP SERPL CALC-SCNC: 11 MMOL/L
AST SERPL-CCNC: 13 U/L
BILIRUB SERPL-MCNC: 0.2 MG/DL
BUN SERPL-MCNC: 20 MG/DL
CALCIUM SERPL-MCNC: 9.4 MG/DL
CHLORIDE SERPL-SCNC: 97 MMOL/L
CHOLEST SERPL-MCNC: 113 MG/DL
CO2 SERPL-SCNC: 25 MMOL/L
CREAT SERPL-MCNC: 0.98 MG/DL
CREAT SPEC-SCNC: 63 MG/DL
EGFR: 84 ML/MIN/1.73M2
ESTIMATED AVERAGE GLUCOSE: 160 MG/DL
GLUCOSE SERPL-MCNC: 193 MG/DL
HBA1C MFR BLD HPLC: 7.2 %
HCT VFR BLD CALC: 31.2 %
HDLC SERPL-MCNC: 51 MG/DL
HGB BLD-MCNC: 10 G/DL
LDLC SERPL CALC-MCNC: 47 MG/DL
MAGNESIUM SERPL-MCNC: 1.6 MG/DL
MCHC RBC-ENTMCNC: 30.3 PG
MCHC RBC-ENTMCNC: 32.1 GM/DL
MCV RBC AUTO: 94.5 FL
MICROALBUMIN 24H UR DL<=1MG/L-MCNC: 16.8 MG/DL
MICROALBUMIN/CREAT 24H UR-RTO: 268 MG/G
NONHDLC SERPL-MCNC: 62 MG/DL
PLATELET # BLD AUTO: 235 K/UL
POTASSIUM SERPL-SCNC: 5.1 MMOL/L
PROT SERPL-MCNC: 6.5 G/DL
PSA SERPL-MCNC: 0.5 NG/ML
RBC # BLD: 3.3 M/UL
RBC # FLD: 15 %
SODIUM SERPL-SCNC: 133 MMOL/L
TRIGL SERPL-MCNC: 72 MG/DL
TSH SERPL-ACNC: 2.4 UIU/ML
WBC # FLD AUTO: 5.25 K/UL

## 2023-10-27 NOTE — ED ADULT NURSE NOTE - COVID-19 RESULT
Patient called and said that he wanted to inform  that the breztri is working really good and wanted to regroup with him regarding his oxygen.      [id number] [address]: [phone] NEGATIVE

## 2023-11-01 DIAGNOSIS — E11.9 TYPE 2 DIABETES MELLITUS WITHOUT COMPLICATIONS: ICD-10-CM

## 2023-11-01 DIAGNOSIS — M41.20 OTHER IDIOPATHIC SCOLIOSIS, SITE UNSPECIFIED: ICD-10-CM

## 2023-11-01 DIAGNOSIS — Z23 ENCOUNTER FOR IMMUNIZATION: ICD-10-CM

## 2023-11-01 DIAGNOSIS — Z00.00 ENCOUNTER FOR GENERAL ADULT MEDICAL EXAMINATION WITHOUT ABNORMAL FINDINGS: ICD-10-CM

## 2023-11-01 PROCEDURE — 99212 OFFICE O/P EST SF 10 MIN: CPT | Mod: 95

## 2023-11-01 PROCEDURE — 90833 PSYTX W PT W E/M 30 MIN: CPT

## 2023-11-13 RX ORDER — TAMSULOSIN HYDROCHLORIDE 0.4 MG/1
0.4 CAPSULE ORAL
Qty: 180 | Refills: 1 | Status: ACTIVE | COMMUNITY
Start: 2018-12-06 | End: 1900-01-01

## 2023-11-16 ENCOUNTER — NON-APPOINTMENT (OUTPATIENT)
Age: 68
End: 2023-11-16

## 2023-12-05 RX ORDER — BLOOD-GLUCOSE METER
W/DEVICE KIT MISCELLANEOUS
Qty: 1 | Refills: 0 | Status: ACTIVE | COMMUNITY
Start: 2023-12-05 | End: 1900-01-01

## 2023-12-05 RX ORDER — BLOOD SUGAR DIAGNOSTIC
STRIP MISCELLANEOUS DAILY
Qty: 1 | Refills: 3 | Status: ACTIVE | COMMUNITY
Start: 2023-12-05 | End: 1900-01-01

## 2023-12-06 RX ORDER — LANCETS 28 GAUGE
EACH MISCELLANEOUS
Qty: 1 | Refills: 1 | Status: DISCONTINUED | COMMUNITY
Start: 2023-03-17 | End: 2023-12-06

## 2023-12-06 RX ORDER — METFORMIN HYDROCHLORIDE 1000 MG/1
1000 TABLET, COATED ORAL
Qty: 180 | Refills: 2 | Status: ACTIVE | COMMUNITY
Start: 2017-02-06 | End: 1900-01-01

## 2023-12-06 RX ORDER — ALCOHOL ANTISEPTIC PADS
PADS, MEDICATED (EA) TOPICAL
Qty: 1 | Refills: 3 | Status: ACTIVE | COMMUNITY
Start: 2023-12-06 | End: 1900-01-01

## 2023-12-07 RX ORDER — FINASTERIDE 5 MG/1
5 TABLET, FILM COATED ORAL
Qty: 120 | Refills: 2 | Status: ACTIVE | COMMUNITY
Start: 2019-11-01 | End: 1900-01-01

## 2023-12-13 ENCOUNTER — APPOINTMENT (OUTPATIENT)
Dept: GASTROENTEROLOGY | Facility: CLINIC | Age: 68
End: 2023-12-13
Payer: MEDICARE

## 2023-12-13 VITALS
HEART RATE: 90 BPM | DIASTOLIC BLOOD PRESSURE: 70 MMHG | BODY MASS INDEX: 27.49 KG/M2 | HEIGHT: 65 IN | SYSTOLIC BLOOD PRESSURE: 125 MMHG | WEIGHT: 165 LBS | OXYGEN SATURATION: 98 %

## 2023-12-13 DIAGNOSIS — K22.70 BARRETT'S ESOPHAGUS W/OUT DYSPLASIA: ICD-10-CM

## 2023-12-13 DIAGNOSIS — Z12.11 ENCOUNTER FOR SCREENING FOR MALIGNANT NEOPLASM OF COLON: ICD-10-CM

## 2023-12-13 DIAGNOSIS — D64.9 ANEMIA, UNSPECIFIED: ICD-10-CM

## 2023-12-13 PROCEDURE — 99214 OFFICE O/P EST MOD 30 MIN: CPT

## 2023-12-13 RX ORDER — POLYETHYLENE GLYCOL-3350 AND ELECTROLYTES WITH FLAVOR PACK 240; 5.84; 2.98; 6.72; 22.72 G/278.26G; G/278.26G; G/278.26G; G/278.26G; G/278.26G
240 POWDER, FOR SOLUTION ORAL
Qty: 1 | Refills: 0 | Status: ACTIVE | COMMUNITY
Start: 2023-12-13 | End: 1900-01-01

## 2023-12-14 ENCOUNTER — NON-APPOINTMENT (OUTPATIENT)
Age: 68
End: 2023-12-14

## 2023-12-14 ENCOUNTER — APPOINTMENT (OUTPATIENT)
Dept: OPHTHALMOLOGY | Facility: CLINIC | Age: 68
End: 2023-12-14
Payer: MEDICARE

## 2023-12-14 PROCEDURE — 92136 OPHTHALMIC BIOMETRY: CPT

## 2023-12-14 PROCEDURE — 92025 CPTRIZED CORNEAL TOPOGRAPHY: CPT

## 2023-12-14 PROCEDURE — 92012 INTRM OPH EXAM EST PATIENT: CPT | Mod: 25

## 2023-12-15 NOTE — PATIENT PROFILE ADULT - HAS THE PATIENT RECEIVED THE INFLUENZA VACCINE THIS SEASON?
Physical Therapy Visit    Visit Type: Daily Treatment Note  Visit: 7  Referring Provider: Vargas Nevarez MD  Medical Diagnosis (from order): M75.121 - Complete rotator cuff tear or rupture of right shoulder, not specified as traumatic     SUBJECTIVE                                                                                                               Getting a little impatient but knows it's getting stronger.      OBJECTIVE                                                                                                                     Range of Motion (ROM)   (degrees unless noted; active unless noted; norms in ( ); negative=lacking to 0, positive=beyond 0)  Shoulder:    - External Rotation:         Right: pain       - at 45°:             Right: 58    Strength  (out of 5 unless noted, standard test position unless noted)   Comments / Details: Right shoulder external rotation at 0 abduction 5/5 atb 45 degrees 4+/5                       Treatment     Therapeutic Exercise  Performed to  increase independence and improve function with community mobility    Rhythmic stabilization at 30/ 45 degrees elevation supine  Supine abduction manual light resistance focus on eccentric control  Recline 45 degrees  D1 D2 2 pounds  Active  horizontal abduction/ scaption x 20  Manual  Submaximal isometrics all planes   sidelying horizontal abduction 3 pounds x 20  Reactive isometrics red band flexion  Ball wall  Partial push up      Manual Therapy   Performed to improve mobility to regain  motion for activities of daily living  Flexion/ abduction/ external rotation contract relax stretch  D1 D2 resisted patterns  Posterior capsule mobilizations   External rotation stretch contract/ relax        ASSESSMENT                                                                                                            External rotation improved to 65 degrees after stretching. Reminded patient to work on external rotation range of  motion. Strength for diagonals more consistent and sustained with repetitions      Goals  Decrease pain/symptoms to 1-2/10  Improve involved strength to 4/5  Improve  Range of motion to within 10 degrees of opposite arm or better    The above improvements in impairments to assist in obtaining goals listed below  Long Term Goals: to be met by end of plan of care  1. Patient will reach overhead for objects with no problems Status: progressing/ongoing  2. Patient will complete independent upper body dressing  with good internal rotation Status: progressing/ongoing  3. 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 Quick DASH: Patient will complete form to reflect an improved calculated score to less than or equal to 60 to indicate patient reported improvement in function/disability/impairment. (minimal clinically important difference = 15.91) (Eval=79.55)  Status: progressing/ongoing  5. Patient will be independent with progressed and modified home exercise program.      Therapy procedure time and total treatment time can be found documented on the Time Entry flowsheet     unable to assess immunization status...

## 2023-12-19 PROBLEM — K22.70 BARRETT ESOPHAGUS: Status: ACTIVE | Noted: 2019-07-19

## 2023-12-19 PROBLEM — D64.9 ANEMIA, UNSPECIFIED TYPE: Status: ACTIVE | Noted: 2017-07-31

## 2023-12-19 NOTE — HISTORY OF PRESENT ILLNESS
[FreeTextEntry1] : 67M, schizophrenia, DM, HTN, HLD, hypothyroidism, ?chronic hyponatremia, previously seen in fellows clinic, here today w brother David to establish care.   Feels well overall  Reports normal bowel movement every 2-3 days No straining No blood in stool  Denies abd pain, heartburn, dysphagia, odynophagia, wt loss   Fhx -  No fhx of GI malignancy +fhx of colon polyps (brother)   EGD 2019 - salmon colored mucosa suspicious for short segment barretts esophagus, gastritis, fundic gland polyps, duodenitis. Told to repeat in 1 yr.   Colonoscopy 2019 - internal hemorrhoids, 5mm polyp in sigmoid colon, 7mm polyp in transverse colon, stool in colon. Repeat in 3 yrs

## 2023-12-19 NOTE — ASSESSMENT
[FreeTextEntry1] : 67M, schizophrenia, DM, HTN, HLD, hypothyroidism, ?chronic hyponatremia, previously seen in fellows clinic, here today w brother David to establish care.  # Colon cancer screening  +fhx of colon polyps  Colonoscopy 2019 - internal hemorrhoids, 5mm polyp in sigmoid colon, 7mm polyp in transverse colon, stool in colon. Repeat in 3 yrs. Path - HP polyp x 1  - schedule cscope for screening purposes  - r/b/a reviewed  - miralax bid x 1 wk, then gavilyte prep - he is to discuss dm2 regimen w his PCP   # Barretts esophagus  EGD 2019 - salmon colored mucosa suspicious for short segment barretts esophagus, gastritis, fundic gland polyps, duodenitis. Told to repeat in 1 yr. - schedule EGD. R/B/A reviewed   # anemia  No overt signs of GI bleeding  Hgb 10, MCV 94, Iron 85, Ferritin 74, B12 506  - EGD/Colonoscopy as above  - if above negative, recommend heme/onc eval   RTC after egd/colon

## 2023-12-19 NOTE — PHYSICAL EXAM
[Healthy Appearing] : healthy appearing [No Acute Distress] : no acute distress [Abdomen Tenderness] : non-tender [Abdomen Soft] : soft

## 2024-01-26 ENCOUNTER — APPOINTMENT (OUTPATIENT)
Dept: INTERNAL MEDICINE | Facility: CLINIC | Age: 69
End: 2024-01-26

## 2024-01-31 RX ORDER — DAPAGLIFLOZIN 5 MG/1
5 TABLET, FILM COATED ORAL
Qty: 90 | Refills: 3 | Status: ACTIVE | COMMUNITY
Start: 2023-10-27 | End: 1900-01-01

## 2024-02-08 ENCOUNTER — NON-APPOINTMENT (OUTPATIENT)
Age: 69
End: 2024-02-08

## 2024-02-08 LAB
ALBUMIN SERPL ELPH-MCNC: 4.2 G/DL
ALP BLD-CCNC: 79 U/L
ALT SERPL-CCNC: 20 U/L
ANION GAP SERPL CALC-SCNC: 11 MMOL/L
AST SERPL-CCNC: 15 U/L
BILIRUB SERPL-MCNC: 0.2 MG/DL
BUN SERPL-MCNC: 18 MG/DL
CALCIUM SERPL-MCNC: 9.4 MG/DL
CHLORIDE SERPL-SCNC: 94 MMOL/L
CO2 SERPL-SCNC: 26 MMOL/L
CREAT SERPL-MCNC: 1.01 MG/DL
EGFR: 81 ML/MIN/1.73M2
GLUCOSE SERPL-MCNC: 95 MG/DL
POTASSIUM SERPL-SCNC: 5.2 MMOL/L
PROT SERPL-MCNC: 6.5 G/DL
SODIUM SERPL-SCNC: 132 MMOL/L

## 2024-02-09 ENCOUNTER — APPOINTMENT (OUTPATIENT)
Dept: GASTROENTEROLOGY | Facility: HOSPITAL | Age: 69
End: 2024-02-09

## 2024-02-12 NOTE — PATIENT PROFILE ADULT. - NS PRO ABUSE SCREEN AFRAID ANYONE YN
Mucous in both eyes and redness to eyes, left worse than the right. States mild pain, not itchy. No fevers. Has a stuffy nose.  No pain meds given today. Thinks it started Friday. No PMH, IUTD. Seeing clearly, no blurred vision. no

## 2024-03-20 ENCOUNTER — RX RENEWAL (OUTPATIENT)
Age: 69
End: 2024-03-20

## 2024-03-21 ENCOUNTER — APPOINTMENT (OUTPATIENT)
Dept: OPHTHALMOLOGY | Facility: CLINIC | Age: 69
End: 2024-03-21
Payer: MEDICARE

## 2024-03-21 ENCOUNTER — NON-APPOINTMENT (OUTPATIENT)
Age: 69
End: 2024-03-21

## 2024-03-21 PROCEDURE — 92014 COMPRE OPH EXAM EST PT 1/>: CPT

## 2024-03-21 PROCEDURE — 92015 DETERMINE REFRACTIVE STATE: CPT | Mod: NC

## 2024-03-21 PROCEDURE — 92133 CPTRZD OPH DX IMG PST SGM ON: CPT

## 2024-04-08 PROCEDURE — 90853 GROUP PSYCHOTHERAPY: CPT

## 2024-04-12 RX ORDER — POLYETHYLENE GLYCOL 3350 17 G/17G
17 POWDER, FOR SOLUTION ORAL DAILY
Qty: 3 | Refills: 2 | Status: ACTIVE | COMMUNITY
Start: 2023-12-13 | End: 1900-01-01

## 2024-04-15 PROCEDURE — 90853 GROUP PSYCHOTHERAPY: CPT

## 2024-06-03 PROCEDURE — 90833 PSYTX W PT W E/M 30 MIN: CPT

## 2024-06-10 PROCEDURE — 90853 GROUP PSYCHOTHERAPY: CPT

## 2024-06-14 ENCOUNTER — NON-APPOINTMENT (OUTPATIENT)
Age: 69
End: 2024-06-14

## 2024-06-14 ENCOUNTER — APPOINTMENT (OUTPATIENT)
Dept: OPHTHALMOLOGY | Facility: CLINIC | Age: 69
End: 2024-06-14
Payer: MEDICARE

## 2024-06-14 PROCEDURE — 92012 INTRM OPH EXAM EST PATIENT: CPT

## 2024-06-18 NOTE — PLAN
[FreeTextEntry3] : Called patient and left voicemail to make a follow-up appointment. Also tasked  to schedule f/u.  Patient requests all Lab, Cardiology, and Radiology Results on their Discharge Instructions

## 2024-06-28 NOTE — H&P ADULT - NSVTERISKASSESS_GEN_ALL_CORE FT
Fax from facility stating:    Resident has been confused these last couple of days. Resident did have incontinent BM.     Could we have an order for a UA?    Please review    Medical Assessment Completed on: 02-Mar-2023 02:57

## 2024-08-05 PROCEDURE — 90853 GROUP PSYCHOTHERAPY: CPT

## 2024-09-13 NOTE — HISTORY OF PRESENT ILLNESS
RN called patient and advised Camelia Murrell NP sent refills in and the pharmacy will be contacting her, patient verbalized understanding and thanked RN for assistance.   [FreeTextEntry1] : location right knee injury doing well, closed\par severity s/p bleed\par timing/duration weeks\par quality swollen- went to ortho, said no infection, then that he needed procedure in the hospital\par no patella fracture\par other- schitzophrenia\par

## 2024-10-05 NOTE — ED ADULT TRIAGE NOTE - HEART RATE (BEATS/MIN)
HPI    DLS: 6/03/2024    Pt here for 4 Month Check/DFE;  Pt states no eye pain and lately been having trouble seeing clearly.     Meds;  Latanoprost QHS OU  AT's PRN OU    1. POAG vs LTG   2. PCIOL OU   3. Ptosis Sx  (Dr. Mendoza)   4. PIERCE   5. Disc Heme OS   6. ERM OS       Last edited by Emma Ambrosio on 10/7/2024  3:34 PM.            Assessment /Plan     For exam results, see Encounter Report.    Primary open-angle glaucoma, right eye, moderate stage  -     latanoprost 0.005 % ophthalmic solution; Place 1 drop into both eyes every evening.  Dispense: 7.5 mL; Refill: 3    Primary open-angle glaucoma, left eye, mild stage  -     latanoprost 0.005 % ophthalmic solution; Place 1 drop into both eyes every evening.  Dispense: 7.5 mL; Refill: 3    Ptosis of both eyelids    Dry eye syndrome of both eyes    Epiretinal membrane (ERM) of left eye    Status post glaucoma surgery    Pseudophakia, both eyes        Lost to F/U 5/2017 to  5/2021 (4yrs)   Has been seeing his optometrist   Sent back in for OCT - RNFL changes - his optom told him to start gtts - so came back in     Pts wife is Carole Schwab - a glaucoma patient of Dr. Snyder    1.   Low Tension Glaucoma Suspect no gtts - abnl HRT        First HVF 2000        First photos 2005           Family history    Neg (but his wife does have glaucoma and is a pt of Dr. Snyder)        Glaucoma meds   None presently -  IOP ok off gtts post SLT's // Tried xalatan and lumigan - mason ok - but no change in IOP // intol to cosopt         H/O adverse rxn to glaucoma drops    cosopt - caused stomach issues - improved off it         LASERS    SLT OD - 1/27/2022 good resp 22--> 16 // SLT os - 2/10/2022 20-->16        GLAUCOMA SURGERIES    [place] goniotomy os 1/25/2023 /  OMNI -OD -  goniotomy - 4/12/2023         OTHER EYE SURGERIES    PC IOL and [place] os - 1/25/2023 / PC IOL and omni goniotomy od 4/12/2023        CDR    0.9/0.85        Tbase    10-20 / 12-20          Tmax    19 - 20  /17- 20    (20 at outside optom per pt)         Ttarget   About 16-17               HVF    12 test 2000 to 2017 - full od / full os   4 test 2021 to 2024 (4yr gap) - hint early SAD/ sup paracentral defect // ? IAD od // full os         Gonio    +3 ou        CCT    [id number]        OCT    9 test 2005 to 2022 - RNFL  - dec G/TS  od// dec TI, bord G/N os (+prog ou)    4 test 2021 to 2024 - RNFL - dec G/TI od// dec TI, bord G/NS        HRT    7 test 2006 to 2017 - MR -  Dec. I, bord T od // [place] off os /// CDR 0.73 od // [place] off  os        Disc photos    2003, 2005 - slides // 2010, 2015 - + disc heme ST os  - OIS    - Ttoday   13/13  - Test done today - IOP after starting latanoprost   post-op phaco/IOL OS // [place] goniotomy - OS - 1/25/2023    Post-op phaco/IOL OD with goniotomy / -  OMNI     2. ERM os - see OCT - 5/31/2021 - mild     3.   Ptosis / Dermatochalasis        S/P sx with Dr. Mendoza 4/5/2001    4.   PIERCE - AT's prn   (Needed steroid gtts from Dr. Urena for PIERCE) and AT's    5. Disc Heme OS   See photos 11/4/2015     6. PC IOL OU     7.   Skin CA face - S/P sx    Plan  POAG vs LTG -  Mild  to mod  Disease od and suspect to mild  os   OFF GLAUCOMA DROPS POST SLT's OU   OCT w/ inf thinning ou - first time 1/2017 - some prog from 2017 to 2021   -H/O  disc heme today (+H/O disc heme in past)   -HVF - hint SAD od // full os (?prog od)     No apparent effect with latanoprost  Or lumigan - but tolerated ok - used q am    cosopt bid ou -  Intolerant - had to stop GI / stomach issues      IOP  baseline  (LTG)  of 18-20    SLT OD 1/27/2022 - good initial resp 22--> 16   SLT OS -good initial resp 20--> 16     If needed can try timolol alone or brimonidine - likely it was the dorzolamide in the cosopt that upset his stomach     Phaco / IOL OS w/ [place] goniotomy  Date: 1/25/2023  POY > 1   - phaco/IOL - DIB00 22.5  Doing well - 20/60 uncorrected // with MR - 20/30       OCT mac os - + ERM - (pre-existing ) - no cme      OS  ZCBOO 22.50   MTA4UO 19.50         03/31/2023     OD  DI MENDOZA 21.5   MTA4UO 18.5     Phaco Omni OS - 180 goniotomy / canaloplasty - no blood thinners // angle nice and open      Phaco / IOL and goniotomy with OMNI OD Date: 4/13/2023  POY > 1  - phaco/IOL - DIB00 21.0  Doing well -    Pt is / was having a marked increase in the ocular migraines - now occurring daily (1/22/2024 )   Discussed wih Dr Leigh (neuro-ophthalmology )   She recommended staring supplemental riboflavin  400mg daily (can be purchased on amaz[employer])   SAW a  Dr Jacque Oakley  (neurology ) 2/20/2024 (virtual) // doing much better - 6/3/2024    6/3/2024  IOP seems ok // but + VF prog od (involves fixation) - and decrease in vision -    ?? If decrease vision od is 2/2 PCO / or VF loss near fixation //or some macular pathology   Trial of latanoprost ou       F/U 6 months with IOP and gonio (next time he is dilated can get disc photos (forgot 10/2024 )      89

## 2024-10-07 PROCEDURE — 90853 GROUP PSYCHOTHERAPY: CPT

## 2024-10-14 PROCEDURE — 90853 GROUP PSYCHOTHERAPY: CPT

## 2024-10-24 ENCOUNTER — APPOINTMENT (OUTPATIENT)
Dept: OPHTHALMOLOGY | Facility: CLINIC | Age: 69
End: 2024-10-24

## 2024-11-04 PROCEDURE — 90853 GROUP PSYCHOTHERAPY: CPT

## 2024-11-14 NOTE — PATIENT PROFILE ADULT - [id number]
Patient verified name and .  Order for consent NOT found in EHR and matches case posting; patient verifies procedure.   Type 2 surgery, PHONE assessment complete.  Orders NOT received.  Labs per surgeon: NONE  Labs per anesthesia protocol: HCG (DOS), HGB (DOS), POC GLUCOSE (DOS)    Patient answered medical/surgical history questions at their best of ability. All prior to admission medications documented in EPIC.    Patient instructed to continue taking all prescription medications up to the day of surgery but to take only the following medications the day of surgery according to anesthesia guidelines with a small sip of water: NONE Also, patient is requested to take 2 Tylenol in the morning and then again before bed on the day before surgery. Regular or extra strength may be used.       Patient informed that all vitamins and supplements should be held 7 days prior to surgery and NSAIDS 5 days prior to surgery. Prescription meds to hold:NONE    Patient instructed on the following:  Please drink 32 ounces of non-caffeinated clear liquids 2 hours prior to your arrival to avoid dehydration.    > Arrive at [facility] Entr[facility], time of arrival to be called the day before by 1700  > NPO after midnight, unless otherwise indicated, including gum, mints, and ice chips  > Responsible adult must drive patient to the hospital, stay during surgery, and patient will need supervision 24 hours after anesthesia  > Use non moisturizing soap in shower the night before surgery and on the morning of surgery  > All piercings must be removed prior to arrival.    > Leave all valuables (money and jewelry) at home but bring insurance card and ID on DOS.   > You may be required to pay a deductible or co-pay on the day of your procedure. You can pre-pay by calling [phone] if your surgery is at the [facility] or [phone] if your surgery is at the [facility].  > Do not wear make-up, nail polish, lotions, cologne, perfumes, powders, or  known

## 2025-01-06 PROCEDURE — 90853 GROUP PSYCHOTHERAPY: CPT

## 2025-01-13 PROCEDURE — 90853 GROUP PSYCHOTHERAPY: CPT

## 2025-02-03 PROCEDURE — 90853 GROUP PSYCHOTHERAPY: CPT

## 2025-02-10 PROCEDURE — 99214 OFFICE O/P EST MOD 30 MIN: CPT

## 2025-02-10 PROCEDURE — 90833 PSYTX W PT W E/M 30 MIN: CPT

## 2025-03-11 PROCEDURE — 99214 OFFICE O/P EST MOD 30 MIN: CPT

## 2025-03-11 PROCEDURE — 90833 PSYTX W PT W E/M 30 MIN: CPT

## 2025-03-17 PROCEDURE — 90853 GROUP PSYCHOTHERAPY: CPT

## 2025-03-24 PROCEDURE — 90853 GROUP PSYCHOTHERAPY: CPT

## 2025-03-31 PROCEDURE — 90853 GROUP PSYCHOTHERAPY: CPT

## 2025-04-28 PROCEDURE — 90853 GROUP PSYCHOTHERAPY: CPT

## 2025-05-12 PROCEDURE — 99214 OFFICE O/P EST MOD 30 MIN: CPT

## 2025-05-12 PROCEDURE — 90833 PSYTX W PT W E/M 30 MIN: CPT

## 2025-07-07 PROCEDURE — 90853 GROUP PSYCHOTHERAPY: CPT
